# Patient Record
Sex: FEMALE | Race: WHITE | NOT HISPANIC OR LATINO | Employment: OTHER | URBAN - METROPOLITAN AREA
[De-identification: names, ages, dates, MRNs, and addresses within clinical notes are randomized per-mention and may not be internally consistent; named-entity substitution may affect disease eponyms.]

---

## 2017-01-17 DIAGNOSIS — I27.20 PULMONARY HYPERTENSION (H): Primary | ICD-10-CM

## 2017-01-17 DIAGNOSIS — I51.81 STRESS-INDUCED CARDIOMYOPATHY: ICD-10-CM

## 2017-01-19 RX ORDER — FUROSEMIDE 40 MG
TABLET ORAL
Qty: 90 TABLET | Refills: 3 | Status: SHIPPED
Start: 2017-01-19 | End: 2017-11-22

## 2017-02-23 ENCOUNTER — PRE VISIT (OUTPATIENT)
Dept: CARDIOLOGY | Facility: CLINIC | Age: 57
End: 2017-02-23

## 2017-02-23 DIAGNOSIS — R06.09 DYSPNEA ON EXERTION: Primary | ICD-10-CM

## 2017-02-27 NOTE — PROGRESS NOTES
Norm Franco M.D.  Cardiovascular Medicine    I personally saw and examined this patient, discussed care with housestaff and other consultants, reviewed current laboratories and imaging studies, and conveyed impression and diagnostic/therapeutic plan to patient.    Problem List  2. Pulmonary hypertension  3. Hyperlipidemia  4. Gallstones with CBD involvement  5. CKD  6. Iron deficiency  7. Depression  8. MGUS  9. Hyperprolactinemia    History    Patient returns for follow-up.  She is generally doing well, though states she high high emotional stress levels related to her children and caring for her grandchild.  She has no edema, pre-syncope, syncope, PND, orthopnea, increased abdominal girth, palpitations, hemoptysis.      The remainder of 13 system ROS is negative or unchanged  .    Objective  Constitutional: alert, oriented, normal gait and station, normal mentation.  Oral: moist mucous membranes  Lymph: without pathologic adenopathy  Chest: clear to ausculation and percussion  Cor: No evidence of left or right ventricular activity.  Rhythm is regular.  S1 normal, S2 split physiologically widely and loud. Soft murmur TR, JVP not increased and HJR not seen despite echo findings of severe TR  Abdomen: without tenderness, rebound, guarding, masses, ascites and liver not pulsatile  Extremities: Edema not present  Neuro: no focal defects, normal mentation  Skin: without open lesions  Psych: oriented, verbal, mental status in tact    Wt Readings from Last 5 Encounters:   10/18/16 61 kg (134 lb 8 oz)   10/14/16 62 kg (136 lb 9.6 oz)   08/25/16 63 kg (138 lb 14.4 oz)   07/14/16 62.4 kg (137 lb 9.6 oz)   05/24/16 64 kg (141 lb)       Meds    Current Outpatient Prescriptions on File Prior to Visit:  furosemide (LASIX) 40 MG tablet TAKE 1 TABLET BY MOUTH DAILY   carvedilol (COREG) 12.5 MG tablet Take 0.5 tablets (6.25 mg) by mouth 2 times daily (with meals)   denosumab (PROLIA) 60 MG/ML SOLN Inject 1 mL (60 mg)  Subcutaneous every 6 months   tadalafil, PAH, (ADCIRCA) 20 MG TABS Take 2 tablets (40 mg) by mouth daily   azaTHIOprine (IMURAN) 50 MG tablet Take 1 tablet (50 mg) by mouth daily   treprostinil diolamine ER (ORENITRAM) 0.125 MG CR tablet Take 1 tablet (0.125 mg) by mouth 3 times daily (with meals) 4.25mg tid   calcitRIOL (ROCALTROL) 0.25 MCG capsule Take 1 capsule (0.25 mcg) by mouth daily   potassium chloride (K-DUR) 10 MEQ tablet Take 1 tablet (10 mEq) by mouth daily   aspirin 81 MG tablet Take 81 mg by mouth daily   SODIUM BICARBONATE PO Take by mouth 2 times daily   Lactobacillus (ACIDOPHILUS PO) Take 1 tablet by mouth daily   Multiple Vitamins-Minerals (EYE VITAMINS PO) Take 1 tablet by mouth daily   ORDER FOR DME Equipment being ordered: Compression stockings; 2 pair; custom measured. Med compression 20-30 mmhg   TOPIRAMATE PO Take 100 mg by mouth At Bedtime   TEMAZEPAM PO Take by mouth At Bedtime   omeprazole (PRILOSEC) 20 MG capsule Take 1 capsule (20 mg) by mouth 2 times daily (before meals)   LORazepam (ATIVAN) 0.5 MG tablet Take 0.5 mg (1 tab) every morning and 1 mg (2 tabs) every night at bedtime. (Patient taking differently: 1 mg Take 0.5 mg (1 tab) every morning and 1 mg (2 tabs) every night at bedtime.)   buPROPion (WELLBUTRIN SR) 200 MG 12 hr tablet Take 1 tablet (200 mg) by mouth every morning   ORDER FOR DME Equipment being ordered: SHOWER CHAIR  FROM [facility]   acetaminophen (TYLENOL) 500 MG tablet Take 1-2 tablets (500-1,000 mg) by mouth every 6 hours as needed for pain (Take as needed for pain.  Do not exceed 4 grams (8 tablets) in a day)   denosumab (PROLIA) 60 MG/ML SOLN Inject 1 mL (60 mg) Subcutaneous every 6 months   Elastic Bandages & Supports (T.E.D. BELOW KNEE/M-REGULAR) MISC 1 each daily   simvastatin (ZOCOR) 10 MG tablet Take 1 tablet by mouth At Bedtime.     No current facility-administered medications on file prior to visit.       Labs:pending      Imaging   Name: DALIA  ANALI HERNANDEZ  MRN: [id number]  : 1960  Study Date: 10/14/2016 01:42 PM  Age: 56 yrs  Gender: Female  Patient Location: [facility]  Reason For Study: PHTN  History: PHTN  Ordering Physician: MINE CASTRO  Referring Physician: MINE CASTRO  Performed By: Sharmaine Teran DAPHNE     BSA: 1.7 m2  Height: 63 in  Weight: 138 lb  BP: 100/55 mmHg  ______________________________________________________________________________        Procedure  Echocardiogram with two-dimensional, color and spectral Doppler performed.  ______________________________________________________________________________     Interpretation Summary  Global and regional left ventricular function is normal with an EF of 60-65%.  Flattened septum is consistent with right ventricular pressure and volume  overload.  Mild to moderate right ventricular dilation is present.Global right  ventricular function is normal.  Estimated pulmonary artery systolic pressure is 88 mmHg plus right atrial  pressure.  The inferior vena cava is normal.  No pericardial effusion is present.  ______________________________________________________________________________           Left Ventricle  Left ventricular size is normal. Global and regional left ventricular  function is normal with an EF of 60-65%. Flattened septum is consistent with  right ventricular pressure and volume overload.     Right Ventricle  Global right ventricular function is normal. Mild to moderate right  ventricular dilation is present.  Atria  The left atrium appears normal. Mild to moderate right atrial enlargement is  present.     Mitral Valve  The mitral valve is normal.     Aortic Valve  Aortic valve is normal in structure and function.     Tricuspid Valve  The tricuspid valve is normal. Mild to moderate tricuspid insufficiency is  present. Estimated pulmonary artery systolic pressure is 88 mmHg plus right  atrial pressure.     Pulmonic Valve  The pulmonic valve is normal.     Vessels  The  aorta root is normal. The inferior vena cava is normal.  Pericardium  No pericardial effusion is present.     Compared to Previous Study  This study was compared with the study from 4..16, PASP is slightly lower .     ______________________________________________________________________________  MMode/2D Measurements & Calculations  IVSd: 0.66 cm  LVIDd: 3.9 cm  LVIDs: 2.0 cm  LVPWd: 0.93 cm  FS: 47.9 %  EDV(Teich): 63.9 ml  ESV(Teich): 12.8 ml  LV mass(C)d: 87.4 grams  Ao root diam: 2.6 cm  LA dimension: 4.5 cm  asc Aorta Diam: 3.0 cm  LA/Ao: 1.8  LVOT diam: 1.9 cm  LVOT area: 2.8 cm2  LA Volume (BP): 49.0 ml     LA Volume Index (BP): 29.7 ml/m2        Doppler Measurements & Calculations  MV E max dionne: 68.2 cm/sec  MV A max dionne: 47.5 cm/sec  MV E/A: 1.4  MV dec time: 0.17 sec  Ao V2 max: 104.2 cm/sec  Ao max P.3 mmHg  Ao V2 mean: 77.7 cm/sec  Ao mean P.7 mmHg  Ao V2 VTI: 22.6 cm  ZANDER(I,D): 2.5 cm2  ZANDER(V,D): 2.6 cm2  LV V1 max PG: 3.7 mmHg  LV V1 max: 96.0 cm/sec  LV V1 VTI: 19.8 cm  SV(LVOT): 55.8 ml  TR max dionne: 466.9 cm/sec  TR max P.2 mmHg  ZANDER Index (cm2/m2): 1.5  Lateral E/e': 5.8  Medial E/e': 15.2     MR CARDIAC W/O CONTRAST W FLOW QUANT   Date: 2016 12:57 PM  Ordering: RENETTA LOZA  Indication: 56 year old female with sacroid, pulmonary hypertension,  CKD and RV dysfunction.  Comparison: 2015  Technical quality: good         IMPRESSION:  1. Normal left ventricular size and systolic function with a  calculated ejection fraction of 56 %. There is systolic septal  flattening consistent with pulmonary hypertension.  2. Normal right ventricular size and mildly reduced systolic function  with a calculated ejection fraction of 45%. There is moderate right  ventricular hypertrophy. RV visually appears mildly enlarged compared  to LV size, though the RV volumes are normal.  3. The pulmonary artery is mildly dilated.  4. Compared to prior study from May-, paradoxical septum  appears  more pronounced. RV function is unchanged to mildly reduced now.     The MRI sequences and imaging planes in this study were tailored for  cardiac imaging and are suboptimal for evaluation of non-cardiac  structures.     FINDINGS     Left ventricle  Cavity size: Normal  Wall thickness: Normal  Global systolic function: Normal with a quantitative LV ejection  fraction of 56%.   Regional wall motion: Normal     Right ventricle  Cavity size: Normal  Wall thickness: Hypertrophied  Global systolic function: Mildly depressed with a quantitative RV  ejection fraction of 45%.   Regional wall motion: Normal     Atria  LA size: Normal  RA size: Normal     Valves  Aortic Valve  Valve morphology: Tricuspid  Aortic stenosis: None  Aortic regurgitation: None     Mitral Valve  Mitral stenosis: None  Mitral regurgitation: Trace     Tricuspid Valve   Tricuspid stenosis: None  Tricuspid regurgitation: Mild     Pulmonic Valve  Pulmonic stenosis: None  Pulmonic regurgitation: None     Extracardiac  Aortic root dimension: 31 mm (at the sinuses of Valsalva)  Main PA dimension: 38 mm  Pericardial thickness: Normal  Pericardial effusion:None  Pleural effusion: None     Measurements  LEFT VENTRICULAR VOLUME RESULTS  Body Surface Area: 1.77 m2   ED volume: 100.25 ml (96 - 174 ml)       ED volume index: 56.63 ml/m2 (56 - 100 ml/m2)       ES volume: 44.25 ml (27 - 71 ml)       ES volume index: 25.00 ml/m2       Stroke volume: 55.99 ml (61 - 111 ml)       Stroke volume index: 31.63 ml/m2       Cardiac output: 3.44 l/min       Cardiac output index: 1.94 l/(m2*min)       Ejection fraction: 55.86 % (54 - 74 %)          LV wall thickness - Anteroseptal: 0.6 cm  LV wall thickness - Inferolateral: 0.7 cm  LV wall thickness - Maximum: 0.7 cm  LV end-diastolic diameter: 4.3 cm  LV end-systolic diameter: 2.6 cm  LA ehsan-posterior diameter: 3.7 cm     RIGHT VENTRICULAR VOLUME RESULTS  ED volume: 146.97 ml (83 - 178 ml)       ED volume index:  83.02 ml/m2 (47 - 103 ml/m2)       ES volume: 81.44 ml (32 - 73 ml)       ES volume index: 46.00 ml/m2       Stroke volume: 65.53 ml (44 - 113 ml)       Stroke volume index: 37.02 ml/m2       Cardiac output: 4.03 l/min       Cardiac output index: 2.28 l/(m2*min)       Ejection fraction: 44.59 % (49 - 70 %)      Velocity-encoded flow assessment of the pulmonary artery and aorta     Velocity encoded flow maps of the aorta  Forward flow volume: 56 mL  Regurgitant volume: 0 mL  Regurgitant fraction: 0 %     Velocity encoded flow maps of the pulmonary artery  Forward flow volume: 58 mL  Regurgitant volume: 0 mL  Regurgitant fraction: 0 %     Sedation and contrast  Sedation used: No     Pulse sequences used  SSFP Localizers  SSFP Cine  T1 mapping  Velocity encoded flow mapping  Image post-processing was performed on a [employer] workstation    Findings:   Chest: Cardiac size is within normal limits. No pericardial effusion.  Common origin of the left carotid artery and brachiocephalic (bovine  arch). The main pulmonary artery measures 3.8 cm in diameter. The  tracheobronchial tree is clear. The thyroid is unremarkable. No  significant mediastinal, hilar or axillary adenopathy. The lungs are  clear. No pleural effusion or pneumothorax. Punctate endobronchial  filling defect in the right upper lobe (series 4, image 45). There is  a new punctate endobronchial filling defect in the posterior right  upper lobe (series 4, image 123). There is a persistent endobronchial  filling defect in the right middle lobe (series 4, image 181) with a  new filling defect anteriorly.     Upper Abdomen: Evaluation of the upper abdomen is limited by lack of  intravenous contrast. Prominent collecting system in the partially  visualized left kidney, not significantly changed since 3/8/2013.  Postsurgical changes of cholecystectomy.     Bones/Soft tissues: The soft tissues are within normal limits. No  suspicious osseous lesions. Superior endplate  compression deformity of  L1 is new since 9/13/2013. It may have been present on 4/21/2014,  although L1 is incompletely visualized on that study.      IMPRESSION  Impression:   1. Dilated main pulmonary artery suggestive of pulmonary artery  hypertension.  2. Scattered endobronchial filling defects, which likely represent  secretions, in the patient without history of smoking.   3. Superior endplate compression fracture of L1.   4. Continued prominence of the left renal collecting system     Assessment/Plan  1. Laboratories: CBC,CMP, CRP, BNP  2. Right heart catherization  3. Consider MRI   4. RTC immediately following RHC    Our preliminary review of the echocardiogram suggests now severe TR suggestive of failing right ventricle.  The patient is now at target dose of oral prostanoid.  Previous RHC 5 months ago demonstrated improving though still high PA pressures and PVR.  The patient may not be an ideal candidate for parenteral prostanoids in view of emotional stress.      60 minutes of which > 50% involved counseling and coordination of care.

## 2017-02-28 ENCOUNTER — RADIANT APPOINTMENT (OUTPATIENT)
Dept: CARDIOLOGY | Facility: CLINIC | Age: 57
End: 2017-02-28

## 2017-02-28 ENCOUNTER — OFFICE VISIT (OUTPATIENT)
Dept: CARDIOLOGY | Facility: CLINIC | Age: 57
End: 2017-02-28
Attending: INTERNAL MEDICINE
Payer: MEDICARE

## 2017-02-28 VITALS
HEART RATE: 78 BPM | DIASTOLIC BLOOD PRESSURE: 73 MMHG | SYSTOLIC BLOOD PRESSURE: 109 MMHG | OXYGEN SATURATION: 96 % | HEIGHT: 63 IN | WEIGHT: 122.8 LBS | BODY MASS INDEX: 21.76 KG/M2

## 2017-02-28 DIAGNOSIS — R06.09 DYSPNEA ON EXERTION: ICD-10-CM

## 2017-02-28 DIAGNOSIS — I27.20 PULMONARY HYPERTENSION (H): ICD-10-CM

## 2017-02-28 DIAGNOSIS — I27.20 PULMONARY HYPERTENSION (H): Primary | ICD-10-CM

## 2017-02-28 LAB
ANION GAP SERPL CALCULATED.3IONS-SCNC: 9 MMOL/L (ref 3–14)
BUN SERPL-MCNC: 21 MG/DL (ref 7–30)
CALCIUM SERPL-MCNC: 8.7 MG/DL (ref 8.5–10.1)
CHLORIDE SERPL-SCNC: 111 MMOL/L (ref 94–109)
CO2 SERPL-SCNC: 24 MMOL/L (ref 20–32)
CREAT SERPL-MCNC: 1.51 MG/DL (ref 0.52–1.04)
ERYTHROCYTE [DISTWIDTH] IN BLOOD BY AUTOMATED COUNT: 13.3 % (ref 10–15)
GFR SERPL CREATININE-BSD FRML MDRD: 36 ML/MIN/1.7M2
GLUCOSE SERPL-MCNC: 89 MG/DL (ref 70–99)
HCT VFR BLD AUTO: 42.4 % (ref 35–47)
HGB BLD-MCNC: 14.2 G/DL (ref 11.7–15.7)
MCH RBC QN AUTO: 31.1 PG (ref 26.5–33)
MCHC RBC AUTO-ENTMCNC: 33.5 G/DL (ref 31.5–36.5)
MCV RBC AUTO: 93 FL (ref 78–100)
NT-PROBNP SERPL-MCNC: 790 PG/ML (ref 0–125)
PLATELET # BLD AUTO: 228 10E9/L (ref 150–450)
POTASSIUM SERPL-SCNC: 3.6 MMOL/L (ref 3.4–5.3)
RBC # BLD AUTO: 4.56 10E12/L (ref 3.8–5.2)
SODIUM SERPL-SCNC: 144 MMOL/L (ref 133–144)
WBC # BLD AUTO: 6.1 10E9/L (ref 4–11)

## 2017-02-28 PROCEDURE — 36415 COLL VENOUS BLD VENIPUNCTURE: CPT | Performed by: INTERNAL MEDICINE

## 2017-02-28 PROCEDURE — 99215 OFFICE O/P EST HI 40 MIN: CPT | Mod: ZP | Performed by: INTERNAL MEDICINE

## 2017-02-28 PROCEDURE — 83880 ASSAY OF NATRIURETIC PEPTIDE: CPT | Performed by: INTERNAL MEDICINE

## 2017-02-28 PROCEDURE — 80048 BASIC METABOLIC PNL TOTAL CA: CPT | Performed by: INTERNAL MEDICINE

## 2017-02-28 PROCEDURE — 99212 OFFICE O/P EST SF 10 MIN: CPT | Mod: ZF

## 2017-02-28 PROCEDURE — 85027 COMPLETE CBC AUTOMATED: CPT | Performed by: INTERNAL MEDICINE

## 2017-02-28 RX ORDER — LIDOCAINE 40 MG/G
CREAM TOPICAL
Status: CANCELLED | OUTPATIENT
Start: 2017-02-28

## 2017-02-28 ASSESSMENT — PAIN SCALES - GENERAL: PAINLEVEL: NO PAIN (0)

## 2017-02-28 NOTE — NURSING NOTE
Med Reconcile: Reviewed and verified all current medications with the patient. The updated medication list was printed and given to the patient.  Return Appointment: Patient given instructions regarding scheduling next clinic visit. Patient demonstrated understanding of this information and agreed to call with further questions or concerns.  Right Heart Catheterization: Patient was instructed regarding right heart catheterization, including discussion of the procedure, preparation, intra-procedural steps, and recovery at home. Patient demonstrated understanding of this information and agreed to call with further questions or concerns.  Patient stated she understood all health information given and agreed to call with further questions or concerns.     Medication Changes:  No medication changes at this time. Please continue current medication regiment.    Patient Instructions:    Check-In  Time Check-In Location Estimated Length Procedure   Name        [facility]  waiting room 60-90 minutes Right Heart Catheterization**     Procedure Preparations & Instructions     This is an invasive procedure that DOES require preparation:    - Nothing to eat for 6 hours   - You may have clear liquids up until the time of your procedure  - A ride should be arranged for you in the instance you are unable to drive home, however you should be able to function as you normally would after the procedure     *For Patients with Diabetes: ? DO NOT take any oral diabetic medication, short-acting diabetes medications/insulin, humalog or regular insulin the morning of your test  ? Take   dose of long-acting insulin (Lantus, Levemir) the day of your test  ? Remember to  bring your glucometer and insulin with you to take after your test if needed     *For Patients on anticoagulants: ? Your Coumadin Clinic will give you instructions on medication adjustments or bridging prior to the procedure        Follow up Appointment Information:  After Right heart  catheterization

## 2017-02-28 NOTE — NURSING NOTE
Chief Complaint   Patient presents with     Follow Up For     Return PH for 4 month follow up with labs, ECHO prior.

## 2017-02-28 NOTE — MR AVS SNAPSHOT
After Visit Summary   2/28/2017    Judith Nelson    MRN: [id number]           Patient Information     Date Of Birth          1960        Visit Information        Provider Department      2/28/2017 4:30 PM Norm Franco MD [facility]        Today's Diagnoses     Pulmonary hypertension (H)    -  1      Care Instructions    Medication Changes:  No medication changes at this time. Please continue current medication regiment.    Patient Instructions:    Check-In  Time Check-In Location Estimated Length Procedure   Name        [facility]  waiting room 60-90 minutes Right Heart Catheterization**     Procedure Preparations & Instructions     This is an invasive procedure that DOES require preparation:    - Nothing to eat for 6 hours   - You may have clear liquids up until the time of your procedure  - A ride should be arranged for you in the instance you are unable to drive home, however you should be able to function as you normally would after the procedure     *For Patients with Diabetes: ? DO NOT take any oral diabetic medication, short-acting diabetes medications/insulin, humalog or regular insulin the morning of your test  ? Take   dose of long-acting insulin (Lantus, Levemir) the day of your test  ? Remember to  bring your glucometer and insulin with you to take after your test if needed     *For Patients on anticoagulants: ? Your Coumadin Clinic will give you instructions on medication adjustments or bridging prior to the procedure        Follow up Appointment Information:  After Right heart catheterization    We are located on the third floor of [facility] on the [facility].  Our address is     [address] [place] [address] on 3rd Floor   [place] [id number]      Thank you for allowing us to be a part of your care here at the [facility]    If you have questions or concerns please contact us  at:    Lynne Tamayo, RN      Nurse Coordinator       Pulmonary Hypertension     [facility]   [phone]                ** Please note that you will NOT receive a reminder call regarding your scheduled testing, reminder calls are for provider appointments only.  If you are scheduled for testing within the [facility] system you may receive a call regarding pre-registration for billing purposes only.**     Remember to weigh yourself daily after voiding and before you consume any food or beverages and log the numbers.  If you have gained/lost 2 pounds overnight or 5 pounds in a week contact us immediately for medication adjustments or further instructions.        Follow-ups after your visit        Follow-up notes from your care team     Return in about 6 weeks (around 4/11/2017) for After RHC , with Joan, Return PH.      Your next 10 appointments already scheduled     May 11, 2017 10:00 AM CDT   PFT VISIT with  PFL B   [facility] Pulmonary Function Testing ([facility])    [address] [place] [id number] [phone]            May 11, 2017 10:30 AM CDT   (Arrive by 10:15 AM)   Return Interstitial Lung with Gael Flaherty MD   [facility] ([facility])    [address] [place] [id number] [phone]              Future tests that were ordered for you today     Open Future Orders        Priority Expected Expires Ordered    Heart Cath Right heart cath Routine  2/28/2018 2/28/2017            Who to contact     If you have questions or need follow up information about today's clinic visit or your schedule please contact [facility] directly at [phone].  Normal or non-critical lab and imaging results will be communicated to you by MyChart, letter or phone within 4 business days after the clinic has received the results. If you do not hear from  "us within 7 days, please contact the clinic through [employer] or phone. If you have a critical or abnormal lab result, we will notify you by phone as soon as possible.  Submit refill requests through [employer] or call your pharmacy and they will forward the refill request to us. Please allow 3 business days for your refill to be completed.          Additional Information About Your Visit        [employer] Information     [employer] gives you secure access to your electronic health record. If you see a primary care provider, you can also send messages to your care team and make appointments. If you have questions, please call your primary care clinic.  If you do not have a primary care provider, please call [phone] and they will assist you.        Care EveryWhere ID     This is your Care EveryWhere ID. This could be used by other organizations to access your [place] medical records  [id number]        Your Vitals Were     Pulse Height Pulse Oximetry BMI (Body Mass Index)          78 1.6 m (5' 3\") 96% 21.75 kg/m2         Blood Pressure from Last 3 Encounters:   02/28/17 109/73   10/18/16 108/58   10/14/16 121/76    Weight from Last 3 Encounters:   02/28/17 55.7 kg (122 lb 12.8 oz)   10/18/16 61 kg (134 lb 8 oz)   10/14/16 62 kg (136 lb 9.6 oz)                 Today's Medication Changes          These changes are accurate as of: 2/28/17  5:45 PM.  If you have any questions, ask your nurse or doctor.               These medicines have changed or have updated prescriptions.        Dose/Directions    LORazepam 0.5 MG tablet   Commonly known as:  ATIVAN   This may have changed:    - how much to take  - additional instructions   Used for:  Major depressive disorder, single episode, severe with psychotic features (H)        Take 0.5 mg (1 tab) every morning and 1 mg (2 tabs) every night at bedtime.   Quantity:  90 tablet   Refills:  1       treprostinil diolamine ER 0.125 MG CR tablet   Commonly known as:  ORENITRAM   This may " have changed:    - how much to take  - when to take this  - additional instructions   Used for:  Pulmonary arterial hypertension (H)        Dose:  0.125 mg   Take 1 tablet (0.125 mg) by mouth 3 times daily (with meals) 4.25mg tid   Quantity:  90 tablet   Refills:  0                Primary Care Provider Office Phone # Fax #    Jeanne Angel [phone]       [address]  [facility] [address]        Thank you!     Thank you for choosing [facility]  for your care. Our goal is always to provide you with excellent care. Hearing back from our patients is one way we can continue to improve our services. Please take a few minutes to complete the written survey that you may receive in the mail after your visit with us. Thank you!             Your Updated Medication List - Protect others around you: Learn how to safely use, store and throw away your medicines at www.disposemymeds.org.          This list is accurate as of: 2/28/17  5:45 PM.  Always use your most recent med list.                   Brand Name Dispense Instructions for use    acetaminophen 500 MG tablet    TYLENOL    45 tablet    Take 1-2 tablets (500-1,000 mg) by mouth every 6 hours as needed for pain (Take as needed for pain.  Do not exceed 4 grams (8 tablets) in a day)       ACIDOPHILUS PO      Take 1 tablet by mouth daily       aspirin 81 MG tablet      Take 81 mg by mouth daily       azaTHIOprine 50 MG tablet    IMURAN    90 tablet    Take 1 tablet (50 mg) by mouth daily       buPROPion 200 MG 12 hr tablet    WELLBUTRIN SR    30 tablet    Take 1 tablet (200 mg) by mouth every morning       calcitRIOL 0.25 MCG capsule    ROCALTROL    30 capsule    Take 1 capsule (0.25 mcg) by mouth daily       carvedilol 12.5 MG tablet    COREG    135 tablet    Take 0.5 tablets (6.25 mg) by mouth 2 times daily (with meals)       * denosumab 60 MG/ML Soln injection    PROLIA    1 mL    Inject 1 mL (60 mg) Subcutaneous  every 6 months       * denosumab 60 MG/ML Soln injection    PROLIA    1 mL    Inject 1 mL (60 mg) Subcutaneous every 6 months       EYE VITAMINS PO      Take 1 tablet by mouth daily       furosemide 40 MG tablet    LASIX    90 tablet    TAKE 1 TABLET BY MOUTH DAILY       LORazepam 0.5 MG tablet    ATIVAN    90 tablet    Take 0.5 mg (1 tab) every morning and 1 mg (2 tabs) every night at bedtime.       omeprazole 20 MG CR capsule    priLOSEC    180 capsule    Take 1 capsule (20 mg) by mouth 2 times daily (before meals)       order for DME     1 Device    Equipment being ordered: SHOWER CHAIR  FROM [facility]       order for DME     2 Package    Equipment being ordered: Compression stockings; 2 pair; custom measured. Med compression 20-30 mmhg       potassium chloride 10 MEQ tablet    K-TAB,KLOR-CON    90 tablet    Take 1 tablet (10 mEq) by mouth daily       simvastatin 10 MG tablet    ZOCOR    90 tablet    Take 1 tablet by mouth At Bedtime.       SODIUM BICARBONATE PO      Take by mouth 2 times daily       T.E.D. BELOW KNEE/M-REGULAR Misc     2 each    1 each daily       tadalafil (PAH) 20 MG Tabs    ADCIRCA    60 tablet    Take 2 tablets (40 mg) by mouth daily       TEMAZEPAM PO      Take by mouth At Bedtime       TOPIRAMATE PO      Take 100 mg by mouth At Bedtime       treprostinil diolamine ER 0.125 MG CR tablet    ORENITRAM    90 tablet    Take 1 tablet (0.125 mg) by mouth 3 times daily (with meals) 4.25mg tid       * Notice:  This list has 2 medication(s) that are the same as other medications prescribed for you. Read the directions carefully, and ask your doctor or other care provider to review them with you.

## 2017-02-28 NOTE — LETTER
2/28/2017      RE: Judith Nelson  [address] [place] [address]       Dear Colleague,    Thank you for the opportunity to participate in the care of your patient, Judith Nelson, at the [facility]. Please see a copy of my visit note below.    Norm Franco M.D.  Cardiovascular Medicine    I personally saw and examined this patient, discussed care with housestaff and other consultants, reviewed current laboratories and imaging studies, and conveyed impression and diagnostic/therapeutic plan to patient.    Problem List  2. Pulmonary hypertension  3. Hyperlipidemia  4. Gallstones with CBD involvement  5. CKD  6. Iron deficiency  7. Depression  8. MGUS  9. Hyperprolactinemia    History    Patient returns for follow-up.  She is generally doing well, though states she high high emotional stress levels related to her children and caring for her grandchild.  She has no edema, pre-syncope, syncope, PND, orthopnea, increased abdominal girth, palpitations, hemoptysis.      The remainder of 13 system ROS is negative or unchanged  .    Objective  Constitutional: alert, oriented, normal gait and station, normal mentation.  Oral: moist mucous membranes  Lymph: without pathologic adenopathy  Chest: clear to ausculation and percussion  Cor: No evidence of left or right ventricular activity.  Rhythm is regular.  S1 normal, S2 split physiologically widely and loud. Soft murmur TR, JVP not increased and HJR not seen despite echo findings of severe TR  Abdomen: without tenderness, rebound, guarding, masses, ascites and liver not pulsatile  Extremities: Edema not present  Neuro: no focal defects, normal mentation  Skin: without open lesions  Psych: oriented, verbal, mental status in tact    Wt Readings from Last 5 Encounters:   10/18/16 61 kg (134 lb 8 oz)   10/14/16 62 kg (136 lb 9.6 oz)   08/25/16 63 kg (138 lb 14.4 oz)   07/14/16 62.4 kg (137 lb 9.6 oz)    05/24/16 64 kg (141 lb)       Meds    Current Outpatient Prescriptions on File Prior to Visit:  furosemide (LASIX) 40 MG tablet TAKE 1 TABLET BY MOUTH DAILY   carvedilol (COREG) 12.5 MG tablet Take 0.5 tablets (6.25 mg) by mouth 2 times daily (with meals)   denosumab (PROLIA) 60 MG/ML SOLN Inject 1 mL (60 mg) Subcutaneous every 6 months   tadalafil, PAH, (ADCIRCA) 20 MG TABS Take 2 tablets (40 mg) by mouth daily   azaTHIOprine (IMURAN) 50 MG tablet Take 1 tablet (50 mg) by mouth daily   treprostinil diolamine ER (ORENITRAM) 0.125 MG CR tablet Take 1 tablet (0.125 mg) by mouth 3 times daily (with meals) 4.25mg tid   calcitRIOL (ROCALTROL) 0.25 MCG capsule Take 1 capsule (0.25 mcg) by mouth daily   potassium chloride (K-DUR) 10 MEQ tablet Take 1 tablet (10 mEq) by mouth daily   aspirin 81 MG tablet Take 81 mg by mouth daily   SODIUM BICARBONATE PO Take by mouth 2 times daily   Lactobacillus (ACIDOPHILUS PO) Take 1 tablet by mouth daily   Multiple Vitamins-Minerals (EYE VITAMINS PO) Take 1 tablet by mouth daily   ORDER FOR DME Equipment being ordered: Compression stockings; 2 pair; custom measured. Med compression 20-30 mmhg   TOPIRAMATE PO Take 100 mg by mouth At Bedtime   TEMAZEPAM PO Take by mouth At Bedtime   omeprazole (PRILOSEC) 20 MG capsule Take 1 capsule (20 mg) by mouth 2 times daily (before meals)   LORazepam (ATIVAN) 0.5 MG tablet Take 0.5 mg (1 tab) every morning and 1 mg (2 tabs) every night at bedtime. (Patient taking differently: 1 mg Take 0.5 mg (1 tab) every morning and 1 mg (2 tabs) every night at bedtime.)   buPROPion (WELLBUTRIN SR) 200 MG 12 hr tablet Take 1 tablet (200 mg) by mouth every morning   ORDER FOR DME Equipment being ordered: SHOWER CHAIR  FROM [facility]   acetaminophen (TYLENOL) 500 MG tablet Take 1-2 tablets (500-1,000 mg) by mouth every 6 hours as needed for pain (Take as needed for pain.  Do not exceed 4 grams (8 tablets) in a day)   denosumab (PROLIA) 60 MG/ML SOLN Inject 1  mL (60 mg) Subcutaneous every 6 months   Elastic Bandages & Supports (T.E.D. BELOW KNEE/M-REGULAR) MISC 1 each daily   simvastatin (ZOCOR) 10 MG tablet Take 1 tablet by mouth At Bedtime.     No current facility-administered medications on file prior to visit.       Labs:pending      Imaging   Name: ANALI GÓMEZ  MRN: [id number]  : 1960  Study Date: 10/14/2016 01:42 PM  Age: 56 yrs  Gender: Female  Patient [facility]: [facility]  Reason For Study: PHTN  History: PHTN  Ordering Physician: MINE CASTRO  Referring Physician: MINE CASTRO  Performed By: Sharmaine Teran RDCS     BSA: 1.7 m2  Height: 63 in  Weight: 138 lb  BP: 100/55 mmHg  ______________________________________________________________________________        Procedure  Echocardiogram with two-dimensional, color and spectral Doppler performed.  ______________________________________________________________________________     Interpretation Summary  Global and regional left ventricular function is normal with an EF of 60-65%.  Flattened septum is consistent with right ventricular pressure and volume  overload.  Mild to moderate right ventricular dilation is present.Global right  ventricular function is normal.  Estimated pulmonary artery systolic pressure is 88 mmHg plus right atrial  pressure.  The inferior vena cava is normal.  No pericardial effusion is present.  ______________________________________________________________________________           Left Ventricle  Left ventricular size is normal. Global and regional left ventricular  function is normal with an EF of 60-65%. Flattened septum is consistent with  right ventricular pressure and volume overload.     Right Ventricle  Global right ventricular function is normal. Mild to moderate right  ventricular dilation is present.  Atria  The left atrium appears normal. Mild to moderate right atrial enlargement is  present.     Mitral Valve  The mitral valve is normal.     Aortic  Valve  Aortic valve is normal in structure and function.     Tricuspid Valve  The tricuspid valve is normal. Mild to moderate tricuspid insufficiency is  present. Estimated pulmonary artery systolic pressure is 88 mmHg plus right  atrial pressure.     Pulmonic Valve  The pulmonic valve is normal.     Vessels  The aorta root is normal. The inferior vena cava is normal.  Pericardium  No pericardial effusion is present.     Compared to Previous Study  This study was compared with the study from 16, PASP is slightly lower .     ______________________________________________________________________________  MMode/2D Measurements & Calculations  IVSd: 0.66 cm  LVIDd: 3.9 cm  LVIDs: 2.0 cm  LVPWd: 0.93 cm  FS: 47.9 %  EDV(Teich): 63.9 ml  ESV(Teich): 12.8 ml  LV mass(C)d: 87.4 grams  Ao root diam: 2.6 cm  LA dimension: 4.5 cm  asc Aorta Diam: 3.0 cm  LA/Ao: 1.8  LVOT diam: 1.9 cm  LVOT area: 2.8 cm2  LA Volume (BP): 49.0 ml     LA Volume Index (BP): 29.7 ml/m2        Doppler Measurements & Calculations  MV E max dionne: 68.2 cm/sec  MV A max dionne: 47.5 cm/sec  MV E/A: 1.4  MV dec time: 0.17 sec  Ao V2 max: 104.2 cm/sec  Ao max P.3 mmHg  Ao V2 mean: 77.7 cm/sec  Ao mean P.7 mmHg  Ao V2 VTI: 22.6 cm  ZANDER(I,D): 2.5 cm2  ZANDER(V,D): 2.6 cm2  LV V1 max PG: 3.7 mmHg  LV V1 max: 96.0 cm/sec  LV V1 VTI: 19.8 cm  SV(LVOT): 55.8 ml  TR max dionne: 466.9 cm/sec  TR max P.2 mmHg  ZANDER Index (cm2/m2): 1.5  Lateral E/e': 5.8  Medial E/e': 15.2     MR CARDIAC W/O CONTRAST W FLOW QUANT   Date: 2016 12:57 PM  Ordering: RENETTA LOZA  Indication: 56 year old female with sacroid, pulmonary hypertension,  CKD and RV dysfunction.  Comparison: 2015  Technical quality: good         IMPRESSION:  1. Normal left ventricular size and systolic function with a  calculated ejection fraction of 56 %. There is systolic septal  flattening consistent with pulmonary hypertension.  2. Normal right ventricular size and mildly  reduced systolic function  with a calculated ejection fraction of 45%. There is moderate right  ventricular hypertrophy. RV visually appears mildly enlarged compared  to LV size, though the RV volumes are normal.  3. The pulmonary artery is mildly dilated.  4. Compared to prior study from May-2015, paradoxical septum appears  more pronounced. RV function is unchanged to mildly reduced now.     The MRI sequences and imaging planes in this study were tailored for  cardiac imaging and are suboptimal for evaluation of non-cardiac  structures.     FINDINGS     Left ventricle  Cavity size: Normal  Wall thickness: Normal  Global systolic function: Normal with a quantitative LV ejection  fraction of 56%.   Regional wall motion: Normal     Right ventricle  Cavity size: Normal  Wall thickness: Hypertrophied  Global systolic function: Mildly depressed with a quantitative RV  ejection fraction of 45%.   Regional wall motion: Normal     Atria  LA size: Normal  RA size: Normal     Valves  Aortic Valve  Valve morphology: Tricuspid  Aortic stenosis: None  Aortic regurgitation: None     Mitral Valve  Mitral stenosis: None  Mitral regurgitation: Trace     Tricuspid Valve   Tricuspid stenosis: None  Tricuspid regurgitation: Mild     Pulmonic Valve  Pulmonic stenosis: None  Pulmonic regurgitation: None     Extracardiac  Aortic root dimension: 31 mm (at the sinuses of Valsalva)  Main PA dimension: 38 mm  Pericardial thickness: Normal  Pericardial effusion:None  Pleural effusion: None     Measurements  LEFT VENTRICULAR VOLUME RESULTS  Body Surface Area: 1.77 m2   ED volume: 100.25 ml (96 - 174 ml)       ED volume index: 56.63 ml/m2 (56 - 100 ml/m2)       ES volume: 44.25 ml (27 - 71 ml)       ES volume index: 25.00 ml/m2       Stroke volume: 55.99 ml (61 - 111 ml)       Stroke volume index: 31.63 ml/m2       Cardiac output: 3.44 l/min       Cardiac output index: 1.94 l/(m2*min)       Ejection fraction: 55.86 % (54 - 74 %)          LV  wall thickness - Anteroseptal: 0.6 cm  LV wall thickness - Inferolateral: 0.7 cm  LV wall thickness - Maximum: 0.7 cm  LV end-diastolic diameter: 4.3 cm  LV end-systolic diameter: 2.6 cm  LA ehsan-posterior diameter: 3.7 cm     RIGHT VENTRICULAR VOLUME RESULTS  ED volume: 146.97 ml (83 - 178 ml)       ED volume index: 83.02 ml/m2 (47 - 103 ml/m2)       ES volume: 81.44 ml (32 - 73 ml)       ES volume index: 46.00 ml/m2       Stroke volume: 65.53 ml (44 - 113 ml)       Stroke volume index: 37.02 ml/m2       Cardiac output: 4.03 l/min       Cardiac output index: 2.28 l/(m2*min)       Ejection fraction: 44.59 % (49 - 70 %)      Velocity-encoded flow assessment of the pulmonary artery and aorta     Velocity encoded flow maps of the aorta  Forward flow volume: 56 mL  Regurgitant volume: 0 mL  Regurgitant fraction: 0 %     Velocity encoded flow maps of the pulmonary artery  Forward flow volume: 58 mL  Regurgitant volume: 0 mL  Regurgitant fraction: 0 %     Sedation and contrast  Sedation used: No     Pulse sequences used  SSFP Localizers  SSFP Cine  T1 mapping  Velocity encoded flow mapping  Image post-processing was performed on a [employer] workstation    Findings:   Chest: Cardiac size is within normal limits. No pericardial effusion.  Common origin of the left carotid artery and brachiocephalic (bovine  arch). The main pulmonary artery measures 3.8 cm in diameter. The  tracheobronchial tree is clear. The thyroid is unremarkable. No  significant mediastinal, hilar or axillary adenopathy. The lungs are  clear. No pleural effusion or pneumothorax. Punctate endobronchial  filling defect in the right upper lobe (series 4, image 45). There is  a new punctate endobronchial filling defect in the posterior right  upper lobe (series 4, image 123). There is a persistent endobronchial  filling defect in the right middle lobe (series 4, image 181) with a  new filling defect anteriorly.     Upper Abdomen: Evaluation of the upper  abdomen is limited by lack of  intravenous contrast. Prominent collecting system in the partially  visualized left kidney, not significantly changed since 3/8/2013.  Postsurgical changes of cholecystectomy.     Bones/Soft tissues: The soft tissues are within normal limits. No  suspicious osseous lesions. Superior endplate compression deformity of  L1 is new since 9/13/2013. It may have been present on 4/21/2014,  although L1 is incompletely visualized on that study.      IMPRESSION  Impression:   1. Dilated main pulmonary artery suggestive of pulmonary artery  hypertension.  2. Scattered endobronchial filling defects, which likely represent  secretions, in the patient without history of smoking.   3. Superior endplate compression fracture of L1.   4. Continued prominence of the left renal collecting system     Assessment/Plan  1. Laboratories: CBC,CMP, CRP, BNP  2. Right heart catherization  3. Consider MRI   4. RTC immediately following RHC    Our preliminary review of the echocardiogram suggests now severe TR suggestive of failing right ventricle.  The patient is now at target dose of oral prostanoid.  Previous RHC 5 months ago demonstrated improving though still high PA pressures and PVR.  The patient may not be an ideal candidate for parenteral prostanoids in view of emotional stress.      60 minutes of which > 50% involved counseling and coordination of care.      Please do not hesitate to contact me if you have any questions/concerns.     Sincerely,     Norm Franco MD

## 2017-02-28 NOTE — PATIENT INSTRUCTIONS
Medication Changes:  No medication changes at this time. Please continue current medication regiment.    Patient Instructions:    Check-In  Time Check-In Location Estimated Length Procedure   Name        GOLD  waiting room 60-90 minutes Right Heart Catheterization**     Procedure Preparations & Instructions     This is an invasive procedure that DOES require preparation:    - Nothing to eat for 6 hours   - You may have clear liquids up until the time of your procedure  - A ride should be arranged for you in the instance you are unable to drive home, however you should be able to function as you normally would after the procedure     *For Patients with Diabetes: ? DO NOT take any oral diabetic medication, short-acting diabetes medications/insulin, humalog or regular insulin the morning of your test  ? Take   dose of long-acting insulin (Lantus, Levemir) the day of your test  ? Remember to  bring your glucometer and insulin with you to take after your test if needed     *For Patients on anticoagulants: ? Your Coumadin Clinic will give you instructions on medication adjustments or bridging prior to the procedure        Follow up Appointment Information:  After Right heart catheterization    We are located on the third floor of the [facility] ([facility] on the [facility].  Our address is     [address] [place] [address] on [address]rd [place] [address]      Thank you for allowing us to be a part of your care here at the [facility]    If you have questions or concerns please contact us at:    Lynne Tamayo RN      Nurse Coordinator       Pulmonary Hypertension     [facility]   P)[phone]                ** Please note that you will NOT receive a reminder call regarding your scheduled testing, reminder calls are for provider appointments only.  If you are scheduled for testing within the [place] system you may receive a call  regarding pre-registration for billing purposes only.**     Remember to weigh yourself daily after voiding and before you consume any food or beverages and log the numbers.  If you have gained/lost 2 pounds overnight or 5 pounds in a week contact us immediately for medication adjustments or further instructions.

## 2017-03-06 ENCOUNTER — TELEPHONE (OUTPATIENT)
Dept: ENDOCRINOLOGY | Facility: CLINIC | Age: 57
End: 2017-03-06

## 2017-03-06 PROBLEM — Z92.29 PERSONAL HISTORY OF DRUG THERAPY: Status: ACTIVE | Noted: 2017-03-06

## 2017-03-15 DIAGNOSIS — I50.9 HEART FAILURE (H): ICD-10-CM

## 2017-03-15 RX ORDER — POTASSIUM CHLORIDE 750 MG/1
10 TABLET, EXTENDED RELEASE ORAL DAILY
Qty: 90 TABLET | Refills: 1 | Status: SHIPPED | OUTPATIENT
Start: 2017-03-15 | End: 2017-09-08

## 2017-04-06 ENCOUNTER — TRANSFERRED RECORDS (OUTPATIENT)
Dept: HEALTH INFORMATION MANAGEMENT | Facility: CLINIC | Age: 57
End: 2017-04-06

## 2017-04-06 ENCOUNTER — RECORDS - HEALTHEAST (OUTPATIENT)
Dept: LAB | Facility: CLINIC | Age: 57
End: 2017-04-06

## 2017-04-06 LAB
CHOLEST SERPL-MCNC: 193 MG/DL
FASTING STATUS PATIENT QL REPORTED: ABNORMAL
HDLC SERPL-MCNC: 47 MG/DL
LDLC SERPL CALC-MCNC: 124 MG/DL
TRIGL SERPL-MCNC: 112 MG/DL

## 2017-04-07 ENCOUNTER — MEDICAL CORRESPONDENCE (OUTPATIENT)
Dept: HEALTH INFORMATION MANAGEMENT | Facility: CLINIC | Age: 57
End: 2017-04-07

## 2017-04-11 NOTE — PROGRESS NOTES
Reminder message sent to pt via [facility] re: C scheduled for tomorrow. Included instructions and callback number should pt have questions.

## 2017-04-12 ENCOUNTER — APPOINTMENT (OUTPATIENT)
Dept: MEDSURG UNIT | Facility: CLINIC | Age: 57
End: 2017-04-12
Attending: INTERNAL MEDICINE
Payer: MEDICARE

## 2017-04-12 ENCOUNTER — OFFICE VISIT (OUTPATIENT)
Dept: CARDIOLOGY | Facility: CLINIC | Age: 57
End: 2017-04-12
Attending: INTERNAL MEDICINE
Payer: MEDICARE

## 2017-04-12 ENCOUNTER — HOSPITAL ENCOUNTER (OUTPATIENT)
Facility: CLINIC | Age: 57
Discharge: HOME | End: 2017-04-12
Attending: INTERNAL MEDICINE | Admitting: INTERNAL MEDICINE
Payer: MEDICARE

## 2017-04-12 ENCOUNTER — PRE VISIT (OUTPATIENT)
Dept: CARDIOLOGY | Facility: CLINIC | Age: 57
End: 2017-04-12

## 2017-04-12 VITALS
OXYGEN SATURATION: 97 % | SYSTOLIC BLOOD PRESSURE: 136 MMHG | RESPIRATION RATE: 18 BRPM | DIASTOLIC BLOOD PRESSURE: 89 MMHG

## 2017-04-12 VITALS
SYSTOLIC BLOOD PRESSURE: 119 MMHG | HEART RATE: 74 BPM | BODY MASS INDEX: 21.6 KG/M2 | OXYGEN SATURATION: 95 % | HEIGHT: 63 IN | WEIGHT: 121.9 LBS | DIASTOLIC BLOOD PRESSURE: 81 MMHG

## 2017-04-12 DIAGNOSIS — I27.20 PULMONARY HYPERTENSION (H): Primary | ICD-10-CM

## 2017-04-12 DIAGNOSIS — Z79.899 USE OF MEDICATION WITH TERATOGENIC POTENTIAL IN FEMALE OF REPRODUCTIVE AGE: ICD-10-CM

## 2017-04-12 DIAGNOSIS — I27.20 PULMONARY HYPERTENSION (H): ICD-10-CM

## 2017-04-12 LAB
ALBUMIN SERPL-MCNC: 3.6 G/DL (ref 3.4–5)
ALP SERPL-CCNC: 53 U/L (ref 40–150)
ALT SERPL W P-5'-P-CCNC: 22 U/L (ref 0–50)
ANION GAP SERPL CALCULATED.3IONS-SCNC: 9 MMOL/L (ref 3–14)
AST SERPL W P-5'-P-CCNC: 12 U/L (ref 0–45)
BASOPHILS # BLD AUTO: 0 10E9/L (ref 0–0.2)
BASOPHILS NFR BLD AUTO: 0.8 %
BILIRUB SERPL-MCNC: 0.3 MG/DL (ref 0.2–1.3)
BUN SERPL-MCNC: 26 MG/DL (ref 7–30)
CALCIUM SERPL-MCNC: 8.2 MG/DL (ref 8.5–10.1)
CHLORIDE SERPL-SCNC: 113 MMOL/L (ref 94–109)
CO2 SERPL-SCNC: 21 MMOL/L (ref 20–32)
CREAT SERPL-MCNC: 1.46 MG/DL (ref 0.52–1.04)
DIFFERENTIAL METHOD BLD: ABNORMAL
EOSINOPHIL # BLD AUTO: 0.1 10E9/L (ref 0–0.7)
EOSINOPHIL NFR BLD AUTO: 1.8 %
ERYTHROCYTE [DISTWIDTH] IN BLOOD BY AUTOMATED COUNT: 13.2 % (ref 10–15)
GFR SERPL CREATININE-BSD FRML MDRD: 37 ML/MIN/1.7M2
GLUCOSE SERPL-MCNC: 81 MG/DL (ref 70–99)
HCT VFR BLD AUTO: 38.4 % (ref 35–47)
HGB BLD-MCNC: 12.4 G/DL (ref 11.7–15.7)
IMM GRANULOCYTES # BLD: 0 10E9/L (ref 0–0.4)
IMM GRANULOCYTES NFR BLD: 0 %
LYMPHOCYTES # BLD AUTO: 1 10E9/L (ref 0.8–5.3)
LYMPHOCYTES NFR BLD AUTO: 25.3 %
MCH RBC QN AUTO: 30.4 PG (ref 26.5–33)
MCHC RBC AUTO-ENTMCNC: 32.3 G/DL (ref 31.5–36.5)
MCV RBC AUTO: 94 FL (ref 78–100)
MONOCYTES # BLD AUTO: 0.3 10E9/L (ref 0–1.3)
MONOCYTES NFR BLD AUTO: 6.8 %
NEUTROPHILS # BLD AUTO: 2.5 10E9/L (ref 1.6–8.3)
NEUTROPHILS NFR BLD AUTO: 65.3 %
NRBC # BLD AUTO: 0 10*3/UL
NRBC BLD AUTO-RTO: 0 /100
NT-PROBNP SERPL-MCNC: 413 PG/ML (ref 0–900)
PLATELET # BLD AUTO: 195 10E9/L (ref 150–450)
POTASSIUM SERPL-SCNC: 4.2 MMOL/L (ref 3.4–5.3)
PROLACTIN SERPL-MCNC: 9 UG/L (ref 3–27)
PROT SERPL-MCNC: 6.6 G/DL (ref 6.8–8.8)
RBC # BLD AUTO: 4.08 10E12/L (ref 3.8–5.2)
SODIUM SERPL-SCNC: 142 MMOL/L (ref 133–144)
WBC # BLD AUTO: 3.8 10E9/L (ref 4–11)

## 2017-04-12 PROCEDURE — 4A023N6 MEASUREMENT OF CARDIAC SAMPLING AND PRESSURE, RIGHT HEART, PERCUTANEOUS APPROACH: ICD-10-PCS | Performed by: INTERNAL MEDICINE

## 2017-04-12 PROCEDURE — 27210787 ZZH MANIFOLD CR2

## 2017-04-12 PROCEDURE — 27210982 ZZH KIT RT HC TOTES DISP CR7

## 2017-04-12 PROCEDURE — 27211181 ZZH BALLOON TIP PRESSURE CR5

## 2017-04-12 PROCEDURE — 83880 ASSAY OF NATRIURETIC PEPTIDE: CPT | Performed by: INTERNAL MEDICINE

## 2017-04-12 PROCEDURE — 84146 ASSAY OF PROLACTIN: CPT | Performed by: INTERNAL MEDICINE

## 2017-04-12 PROCEDURE — 99213 OFFICE O/P EST LOW 20 MIN: CPT | Mod: ZP | Performed by: INTERNAL MEDICINE

## 2017-04-12 PROCEDURE — 80053 COMPREHEN METABOLIC PANEL: CPT | Performed by: INTERNAL MEDICINE

## 2017-04-12 PROCEDURE — 93451 RIGHT HEART CATH: CPT

## 2017-04-12 PROCEDURE — 99213 OFFICE O/P EST LOW 20 MIN: CPT | Mod: ZF

## 2017-04-12 PROCEDURE — 40000166 ZZH STATISTIC PP CARE STAGE 1

## 2017-04-12 PROCEDURE — 85025 COMPLETE CBC W/AUTO DIFF WBC: CPT | Performed by: INTERNAL MEDICINE

## 2017-04-12 PROCEDURE — 93451 RIGHT HEART CATH: CPT | Mod: 26 | Performed by: INTERNAL MEDICINE

## 2017-04-12 RX ORDER — LIDOCAINE 40 MG/G
CREAM TOPICAL
Status: COMPLETED | OUTPATIENT
Start: 2017-04-12 | End: 2017-04-12

## 2017-04-12 RX ADMIN — LIDOCAINE: 40 CREAM TOPICAL at 09:34

## 2017-04-12 ASSESSMENT — PAIN SCALES - GENERAL: PAINLEVEL: NO PAIN (0)

## 2017-04-12 NOTE — DISCHARGE INSTRUCTIONS
[facility]                        Interventional Cardiology  Discharge Instructions   Post Right Heart Cath      AFTER YOU GO HOME:    DO drink plenty of fluids    DO resume your regular diet and medications unless otherwise instructed by your Primary Physician    Do Not scrub the procedure site vigorously    No lotion or powder to the puncture site for 3 days    CALL YOUR PRIMARY PHYSICIAN IF: You may resume all normal activity.  Monitor neck site for bleeding, swelling, or voice changes. If you notice bleeding or swelling immediately apply pressure to the site and call number below to speak with Cardiology Fellow.  If you experience any changes in your breathing you should call your doctor immediately or come to the closest Emergency Department.  Do not drive yourself.    ADDITIONAL INSTRUCTIONS: Medications: You are to resume all home medications including anticoagulation therapy unless otherwise advised by your primary cardiologist or nurse coordinator.    Follow Up: Per your primary cardiology team    If you have any questions or concerns regarding your procedure site please call [phone] at anytime and ask for Cardiology Fellow on call.  They are available 24 hours a day.  You may also contact the Cardiology Clinic after hours number at [phone].                                                       Telephone Numbers [phone] Monday-Friday 8:00 am to 4:30 pm    [phone] After 4:30 pm Monday-Friday, Weekends & Holidays  Ask for Interventional Cardiologist on call. Someone is on call 24 hours/day   [facility] toll free number [phone] Monday-Friday 8:00 am to 4:30 pm   [facility] Emergency Dept [phone]

## 2017-04-12 NOTE — PROGRESS NOTES
Pt arrived to 2A for R heart cath. VSS, pt denies pain. Lidocaine cream applied to R neck. Pt has been consented. Continue to monitor

## 2017-04-12 NOTE — PROGRESS NOTES
1045  Returned to 2A from CCL per WC accompanied by RN.  S/P Right Heart Cath.  Procedure site is at Right IJ, right neck;  FDI.  No hematoma noted.  Denies any pain except for very slight soreness at procedure site.  Sister-in-law, Bessie, is here with pt.  Nutrition of toast and coffee taken well.  No nausea.  Pt has appointment with Dr. Franco later this afternoon.  CTRN

## 2017-04-12 NOTE — PATIENT INSTRUCTIONS
Medication Changes:  We will obtain macicentan for you.  We and you will investigate subcutaneous and intravenous remodulin    Patient Instructions:  **Prolactin level today (labs)  Results:  Lab Results   Component Value Date    WBC 3.8 (L) 04/12/2017    HGB 12.4 04/12/2017    HCT 38.4 04/12/2017     04/12/2017    CHOL 204 (H) 10/09/2013    TRIG 136 10/09/2013    HDL 75 10/09/2013    ALT 22 04/12/2017    AST 12 04/12/2017     04/12/2017    BUN 26 04/12/2017    CO2 21 04/12/2017    TSH 1.39 10/03/2014    INR 1.04 03/26/2013     Follow up Appointment Information:  Follow up in 3-4 weeks with JAVAN Oh CNP  Check-In  Time Check-In Location Appointment   Type Provider   Name        [address] [place] [address]  3rd Floor Routine Clinic   Follow Up Visit Korin RAPP CNP   Appointment Preparations & Instructions   We ask that you plan accordingly due to traffic and parking that may delay you. We look forward to seeing you!         We are located on the third floor of the [facility] ([facility]) on the [facility].  Our address is     [address] [place] [address] on [address] [place] [phone]    Thank you for allowing us to be a part of your care here at the [facility]    If you have questions or concerns please contact us at:    Mary Davis RN, BSN    Reji Escobar (Schedule,P.A.)  Nurse Coordinator     Clinic   Pulmonary Hypertension   Pulmonary Hypertension  [facility]  (P)[phone]    (P) [phone]        (F)[phone]    ** Please note that you will NOT receive a reminder call regarding your scheduled testing, reminder calls are for provider appointments only.  If you are scheduled for testing within the [facility] system you may receive a call regarding pre-registration for billing purposes only.**     Remember to weigh yourself  daily after voiding and before you consume any food or beverages and log the numbers.  If you have gained/lost 2 pounds overnight or 5 pounds in a week contact us immediately for medication adjustments or further instructions.

## 2017-04-12 NOTE — IP AVS SNAPSHOT
[address] [address]                                       After Visit Summary   4/12/2017    Judith Nelson    MRN: [id number]           After Visit Summary Signature Page     I have received my discharge instructions, and my questions have been answered. I have discussed any challenges I see with this plan with the nurse or doctor.    ..........................................................................................................................................  Patient/Patient Representative Signature      ..........................................................................................................................................  Patient Representative Print Name and Relationship to Patient    ..................................................               ................................................  Date                                            Time    ..........................................................................................................................................  Reviewed by Signature/Title    ...................................................              ..............................................  Date                                                            Time

## 2017-04-12 NOTE — IP AVS SNAPSHOT
MRN:[id number]                      After Visit Summary   4/12/2017    Judith Nelson    MRN: [id number]           Visit Information        Department      4/12/2017  9:01 AM [address] [facility]          Review of your medicines      UNREVIEWED medicines. Ask your doctor about these medicines        Dose / Directions    acetaminophen 500 MG tablet   Commonly known as:  TYLENOL   Used for:  Pulmonary hypertension (H)        Dose:  500-1000 mg   Take 1-2 tablets (500-1,000 mg) by mouth every 6 hours as needed for pain (Take as needed for pain.  Do not exceed 4 grams (8 tablets) in a day)   Quantity:  45 tablet   Refills:  10       ACIDOPHILUS PO   Used for:  Sarcoidosis (H)        Dose:  1 tablet   Take 1 tablet by mouth daily   Refills:  0       aspirin 81 MG tablet   Used for:  Pulmonary arterial hypertension (H)        Dose:  81 mg   Take 81 mg by mouth daily   Refills:  0       azaTHIOprine 50 MG tablet   Commonly known as:  IMURAN   Used for:  Sarcoidosis (H)        Dose:  50 mg   Take 1 tablet (50 mg) by mouth daily   Quantity:  90 tablet   Refills:  3       buPROPion 200 MG 12 hr tablet   Commonly known as:  WELLBUTRIN SR   Used for:  Major depressive disorder, single episode, moderate (H)        Dose:  200 mg   Take 1 tablet (200 mg) by mouth every morning   Quantity:  30 tablet   Refills:  0       calcitRIOL 0.25 MCG capsule   Commonly known as:  ROCALTROL        Dose:  0.25 mcg   Take 1 capsule (0.25 mcg) by mouth daily   Quantity:  30 capsule   Refills:  1       carvedilol 12.5 MG tablet   Commonly known as:  COREG   Used for:  Chronic systolic heart failure (H)        Dose:  6.25 mg   Take 0.5 tablets (6.25 mg) by mouth 2 times daily (with meals)   Quantity:  135 tablet   Refills:  3       * denosumab 60 MG/ML Soln injection   Commonly known as:  PROLIA   Used for:  Osteoporosis, Encounter for long-term (current) use of other medications, Senile osteoporosis        Dose:  60 mg    Inject 1 mL (60 mg) Subcutaneous every 6 months   Quantity:  1 mL   Refills:  1       * denosumab 60 MG/ML Soln injection   Commonly known as:  PROLIA   Used for:  Osteoporosis, postmenopausal        Dose:  60 mg   Inject 1 mL (60 mg) Subcutaneous every 6 months   Quantity:  1 mL   Refills:  1       EYE VITAMINS PO   Used for:  Sarcoidosis (H)        Dose:  1 tablet   Take 1 tablet by mouth daily   Refills:  0       furosemide 40 MG tablet   Commonly known as:  LASIX   Used for:  Pulmonary hypertension (H), Stress-induced cardiomyopathy        TAKE 1 TABLET BY MOUTH DAILY   Quantity:  90 tablet   Refills:  3       LORazepam 0.5 MG tablet   Commonly known as:  ATIVAN   Used for:  Major depressive disorder, single episode, severe with psychotic features (H)        Take 0.5 mg (1 tab) every morning and 1 mg (2 tabs) every night at bedtime.   Quantity:  90 tablet   Refills:  1       omeprazole 20 MG CR capsule   Commonly known as:  priLOSEC   Indication:  gi prophylaxis   Used for:  Dyspepsia        Dose:  20 mg   Take 1 capsule (20 mg) by mouth 2 times daily (before meals)   Quantity:  180 capsule   Refills:  3       potassium chloride 10 MEQ tablet   Commonly known as:  K-TAB,KLOR-CON   Used for:  Heart failure (H)        Dose:  10 mEq   Take 1 tablet (10 mEq) by mouth daily   Quantity:  90 tablet   Refills:  1       simvastatin 10 MG tablet   Commonly known as:  ZOCOR   Used for:  Hypercholesterolemia        Dose:  10 mg   Take 1 tablet by mouth At Bedtime.   Quantity:  90 tablet   Refills:  1       SODIUM BICARBONATE PO   Used for:  Pulmonary arterial hypertension (H)        Take by mouth 2 times daily   Refills:  0       tadalafil (PAH) 20 MG Tabs   Commonly known as:  ADCIRCA   Used for:  Pulmonary hypertension (H)        Dose:  40 mg   Take 2 tablets (40 mg) by mouth daily   Quantity:  60 tablet   Refills:  11       TEMAZEPAM PO        Take by mouth At Bedtime   Refills:  0       TOPIRAMATE PO        Dose:  100  mg   Take 100 mg by mouth At Bedtime   Refills:  0       treprostinil diolamine ER 0.125 MG CR tablet   Commonly known as:  ORENITRAM   Used for:  Pulmonary arterial hypertension (H)        Dose:  0.125 mg   Take 1 tablet (0.125 mg) by mouth 3 times daily (with meals) 4.25mg tid   Quantity:  90 tablet   Refills:  0       * Notice:  This list has 2 medication(s) that are the same as other medications prescribed for you. Read the directions carefully, and ask your doctor or other care provider to review them with you.      CONTINUE these medicines which have NOT CHANGED        Dose / Directions    order for DME   Used for:  Tremor, Generalized muscle weakness, Sarcoidosis (H)        Equipment being ordered: SHOWER CHAIR  FROM [employer]   Quantity:  1 Device   Refills:  0       order for DME   Used for:  Pulmonary hypertension (H), Edema        Equipment being ordered: Compression stockings; 2 pair; custom measured. Med compression 20-30 mmhg   Quantity:  2 Package   Refills:  2       T.E.D. BELOW KNEE/M-REGULAR Misc   Used for:  Lower extremity edema        Dose:  1 each   1 each daily   Quantity:  2 each   Refills:  0                Protect others around you: Learn how to safely use, store and throw away your medicines at www.disposemymeds.org.         Follow-ups after your visit        Your next 10 appointments already scheduled     Apr 12, 2017 10:00 AM CDT   Heart Cath Right Heart Cath with [id number]   [facility] ([facility])    [address] [facility] [address]   [phone]            Apr 12, 2017  2:00 PM CDT   (Arrive by 1:45 PM)   RETURN PRIMARY PULMONARY with Norm Franco MD   [facility] ([facility])    [address]  [address] [facility] [address]   [phone]            Apr 24, 2017 11:15 AM CDT   New Visit with Benito Baltazar MD   [facility]    [address] [facility] [address]   [phone]            May 11, 2017 10:00 AM CDT   PFT VISIT with ALEXANDRA PFL B   [facility] Pulmonary Function Testing ([facility])    [address] [place] [address]  3rd [facility] [address]   [phone]            May 11, 2017 10:30 AM CDT   (Arrive by 10:15 AM)   Return Interstitial Lung with Geal Flaherty MD   [facility] ([facility])    [address] [place] [address]   [phone]            May 11, 2017 12:00 PM CDT   Infusion 60 with ALEXANDRA SPEC INFUSION, UC 49 ATC   [facility] Advanced [facility] ([facility])    [address] [place] [address]   [phone]               Care Instructions        Further instructions from your care team       [facility]                        Interventional Cardiology  Discharge Instructions   Post Right Heart Cath      AFTER YOU GO HOME:    DO drink plenty of fluids    DO resume your regular diet and medications unless otherwise instructed by your Primary Physician    Do Not scrub the procedure site vigorously    No lotion or powder to the puncture site for 3 days    CALL YOUR PRIMARY PHYSICIAN IF: You may resume all normal activity.  Monitor neck site for bleeding, swelling, or voice changes. If you notice bleeding or swelling immediately apply pressure to the site and call number below to speak with Cardiology Fellow.  If you experience any changes in your breathing you should call your doctor immediately or come to the closest Emergency Department.  Do not drive yourself.    ADDITIONAL INSTRUCTIONS: Medications: You are to resume all home medications including anticoagulation therapy unless otherwise advised by your primary cardiologist or nurse coordinator.    Follow Up: Per your primary  cardiology team    If you have any questions or concerns regarding your procedure site please call [phone] at anytime and ask for Cardiology Fellow on call.  They are available 24 hours a day.  You may also contact the Cardiology Clinic after hours number at [phone].                                                       Telephone Numbers [phone] Monday-Friday 8:00 am to 4:30 pm    [phone] After 4:30 pm Monday-Friday, Weekends & Holidays  Ask for Interventional Cardiologist on call. Someone is on call 24 hours/day   [facility] toll free number [phone] Monday-Friday 8:00 am to 4:30 pm   [facility] Emergency Dept [phone]                    Additional Information About Your Visit        [facility] Information     [employer] gives you secure access to your electronic health record. If you see a primary care provider, you can also send messages to your care team and make appointments. If you have questions, please call your primary care clinic.  If you do not have a primary care provider, please call [phone] and they will assist you.        Care EveryWhere ID     This is your Care EveryWhere ID. This could be used by other organizations to access your [facility] medical records  [id number]         Primary Care Provider Office Phone # Fax #    Jeanne Angel [phone]      Thank you!     Thank you for choosing [facility] for your care. Our goal is always to provide you with excellent care. Hearing back from our patients is one way we can continue to improve our services. Please take a few minutes to complete the written survey that you may receive in the mail after you visit with us. Thank you!             Medication List: This is a list of all your medications and when to take them. Check marks below indicate your daily home schedule. Keep this list as a reference.      Medications           Morning Afternoon Evening Bedtime As Needed    acetaminophen 500 MG tablet    Commonly known as:  TYLENOL   Take 1-2 tablets (500-1,000 mg) by mouth every 6 hours as needed for pain (Take as needed for pain.  Do not exceed 4 grams (8 tablets) in a day)                                ACIDOPHILUS PO   Take 1 tablet by mouth daily                                aspirin 81 MG tablet   Take 81 mg by mouth daily                                azaTHIOprine 50 MG tablet   Commonly known as:  IMURAN   Take 1 tablet (50 mg) by mouth daily                                buPROPion 200 MG 12 hr tablet   Commonly known as:  WELLBUTRIN SR   Take 1 tablet (200 mg) by mouth every morning                                calcitRIOL 0.25 MCG capsule   Commonly known as:  ROCALTROL   Take 1 capsule (0.25 mcg) by mouth daily                                carvedilol 12.5 MG tablet   Commonly known as:  COREG   Take 0.5 tablets (6.25 mg) by mouth 2 times daily (with meals)                                * denosumab 60 MG/ML Soln injection   Commonly known as:  PROLIA   Inject 1 mL (60 mg) Subcutaneous every 6 months                                * denosumab 60 MG/ML Soln injection   Commonly known as:  PROLIA   Inject 1 mL (60 mg) Subcutaneous every 6 months                                EYE VITAMINS PO   Take 1 tablet by mouth daily                                furosemide 40 MG tablet   Commonly known as:  LASIX   TAKE 1 TABLET BY MOUTH DAILY                                LORazepam 0.5 MG tablet   Commonly known as:  ATIVAN   Take 0.5 mg (1 tab) every morning and 1 mg (2 tabs) every night at bedtime.                                omeprazole 20 MG CR capsule   Commonly known as:  priLOSEC   Take 1 capsule (20 mg) by mouth 2 times daily (before meals)                                order for DME   Equipment being ordered: SHOWER CHAIR  FROM [facility]                                order for DME   Equipment being ordered: Compression stockings; 2 pair; custom measured. Med compression 20-30 mmhg                                 potassium chloride 10 MEQ tablet   Commonly known as:  K-TAB,KLOR-CON   Take 1 tablet (10 mEq) by mouth daily                                simvastatin 10 MG tablet   Commonly known as:  ZOCOR   Take 1 tablet by mouth At Bedtime.                                SODIUM BICARBONATE PO   Take by mouth 2 times daily                                T.E.D. BELOW KNEE/M-REGULAR Misc   1 each daily                                tadalafil (PAH) 20 MG Tabs   Commonly known as:  ADCIRCA   Take 2 tablets (40 mg) by mouth daily                                TEMAZEPAM PO   Take by mouth At Bedtime                                TOPIRAMATE PO   Take 100 mg by mouth At Bedtime                                treprostinil diolamine ER 0.125 MG CR tablet   Commonly known as:  ORENITRAM   Take 1 tablet (0.125 mg) by mouth 3 times daily (with meals) 4.25mg tid                                * Notice:  This list has 2 medication(s) that are the same as other medications prescribed for you. Read the directions carefully, and ask your doctor or other care provider to review them with you.

## 2017-04-12 NOTE — PROGRESS NOTES
Norm Franco M.D.  Cardiovascular Medicine    I personally saw and examined this patient, discussed care with housestaff and other consultants, reviewed current laboratories and imaging studies, and conveyed impression and diagnostic/therapeutic plan to patient.    Problem List  2. Pulmonary hypertension  3. Hyperlipidemia  4. Gallstones with CBD involvement  5. CKD  6. Iron deficiency  7. Depression  8. MGUS  9. Hyperprolactinemia    History        Objective    Wt Readings from Last 5 Encounters:   02/28/17 55.7 kg (122 lb 12.8 oz)   10/18/16 61 kg (134 lb 8 oz)   10/14/16 62 kg (136 lb 9.6 oz)   08/25/16 63 kg (138 lb 14.4 oz)   07/14/16 62.4 kg (137 lb 9.6 oz)       Meds    Current Facility-Administered Medications on File Prior to Visit:  [COMPLETED] lidocaine (LMX4) kit     Current Outpatient Prescriptions on File Prior to Visit:  potassium chloride (K-TAB,KLOR-CON) 10 MEQ tablet Take 1 tablet (10 mEq) by mouth daily   furosemide (LASIX) 40 MG tablet TAKE 1 TABLET BY MOUTH DAILY   carvedilol (COREG) 12.5 MG tablet Take 0.5 tablets (6.25 mg) by mouth 2 times daily (with meals)   denosumab (PROLIA) 60 MG/ML SOLN Inject 1 mL (60 mg) Subcutaneous every 6 months   tadalafil, PAH, (ADCIRCA) 20 MG TABS Take 2 tablets (40 mg) by mouth daily   azaTHIOprine (IMURAN) 50 MG tablet Take 1 tablet (50 mg) by mouth daily   treprostinil diolamine ER (ORENITRAM) 0.125 MG CR tablet Take 1 tablet (0.125 mg) by mouth 3 times daily (with meals) 4.25mg tid (Patient taking differently: Take 5 mg by mouth daily 4.25mg tid)   calcitRIOL (ROCALTROL) 0.25 MCG capsule Take 1 capsule (0.25 mcg) by mouth daily   aspirin 81 MG tablet Take 81 mg by mouth daily   SODIUM BICARBONATE PO Take by mouth 2 times daily   Lactobacillus (ACIDOPHILUS PO) Take 1 tablet by mouth daily   Multiple Vitamins-Minerals (EYE VITAMINS PO) Take 1 tablet by mouth daily   ORDER FOR DME Equipment being ordered: Compression stockings; 2 pair; custom measured. Med  compression 20-30 mmhg   TOPIRAMATE PO Take 100 mg by mouth At Bedtime   TEMAZEPAM PO Take by mouth At Bedtime   omeprazole (PRILOSEC) 20 MG capsule Take 1 capsule (20 mg) by mouth 2 times daily (before meals)   LORazepam (ATIVAN) 0.5 MG tablet Take 0.5 mg (1 tab) every morning and 1 mg (2 tabs) every night at bedtime. (Patient taking differently: 1 mg Take 0.5 mg (1 tab) every morning and 1 mg (2 tabs) every night at bedtime.)   buPROPion (WELLBUTRIN SR) 200 MG 12 hr tablet Take 1 tablet (200 mg) by mouth every morning   ORDER FOR DME Equipment being ordered: SHOWER CHAIR  FROM [facility]   acetaminophen (TYLENOL) 500 MG tablet Take 1-2 tablets (500-1,000 mg) by mouth every 6 hours as needed for pain (Take as needed for pain.  Do not exceed 4 grams (8 tablets) in a day)   denosumab (PROLIA) 60 MG/ML SOLN Inject 1 mL (60 mg) Subcutaneous every 6 months   Elastic Bandages & Supports (T.E.D. BELOW KNEE/M-REGULAR) MISC 1 each daily   simvastatin (ZOCOR) 10 MG tablet Take 1 tablet by mouth At Bedtime.     Labs  Results for ANALI GÓMEZ (MRN [id number]) as of 4/12/2017 10:42   Ref. Range 2/28/2017 15:06 2/28/2017 16:11 4/12/2017 10:16 4/12/2017 10:41   Sodium Latest Ref Range: 133 - 144 mmol/L 144      Potassium Latest Ref Range: 3.4 - 5.3 mmol/L 3.6      Chloride Latest Ref Range: 94 - 109 mmol/L 111 (H)      Carbon Dioxide Latest Ref Range: 20 - 32 mmol/L 24      Urea Nitrogen Latest Ref Range: 7 - 30 mg/dL 21      Creatinine Latest Ref Range: 0.52 - 1.04 mg/dL 1.51 (H)      GFR Estimate Latest Ref Range: >60 mL/min/1.7m2 36 (L)      GFR Estimate If Black Latest Ref Range: >60 mL/min/1.7m2 43 (L)      Calcium Latest Ref Range: 8.5 - 10.1 mg/dL 8.7      Anion Gap Latest Ref Range: 3 - 14 mmol/L 9      N-Terminal Pro Bnp Latest Ref Range: 0 - 125 pg/mL 790 (H)      Glucose Latest Ref Range: 70 - 99 mg/dL 89      WBC Latest Ref Range: 4.0 - 11.0 10e9/L 6.1  3.8 (L)    Hemoglobin Latest Ref Range: 11.7 - 15.7 g/dL  14.2  12.4    Hematocrit Latest Ref Range: 35.0 - 47.0 % 42.4  38.4    Platelet Count Latest Ref Range: 150 - 450 10e9/L 228  195    RBC Count Latest Ref Range: 3.8 - 5.2 10e12/L 4.56  4.08    MCV Latest Ref Range: 78 - 100 fl 93  94    MCH Latest Ref Range: 26.5 - 33.0 pg 31.1  30.4    MCHC Latest Ref Range: 31.5 - 36.5 g/dL 33.5  32.3    RDW Latest Ref Range: 10.0 - 15.0 % 13.3  13.2    Diff Method Unknown   Automated Method    % Neutrophils Latest Units: %   65.3    % Lymphocytes Latest Units: %   25.3    % Monocytes Latest Units: %   6.8    % Eosinophils Latest Units: %   1.8    % Basophils Latest Units: %   0.8    % Immature Granulocytes Latest Units: %   0.0    Nucleated RBCs Latest Ref Range: 0 /100   0    Absolute Neutrophil Latest Ref Range: 1.6 - 8.3 10e9/L   2.5    Absolute Lymphocytes Latest Ref Range: 0.8 - 5.3 10e9/L   1.0    Absolute Monocytes Latest Ref Range: 0.0 - 1.3 10e9/L   0.3    Absolute Eosinophils Latest Ref Range: 0.0 - 0.7 10e9/L   0.1    Absolute Basophils Latest Ref Range: 0.0 - 0.2 10e9/L   0.0    Abs Immature Granulocytes Latest Ref Range: 0 - 0.4 10e9/L   0.0    Absolute Nucleated RBC Unknown   0.0    ECHO COMPLETE Unknown  Rpt     HEART CATH RIGHT HEART CATH Unknown    Rpt       Imaging   Name: ANALI GÓMEZ  MRN: [id number]  : 1960  Study Date: 2017 03:29 PM  Age: 57 yrs  Gender: Female  [place] [facility] [place]  Reason For Study: Other secondary pulmonary hypertension  Ordering Physician: MINE CASTRO  Referring Physician: MINE CASTRO  Performed By: Holger Collazo RDCS     BSA: 1.6 m2  Height: 63 in  Weight: 134 lb  _____________________________________________________________________________  __        Procedure  Echocardiogram with two-dimensional, color and spectral Doppler performed.  Limited Echocardiogram with portions of two-dimensional, color and spectral  Doppler  performed.  _____________________________________________________________________________  __        Interpretation Summary  The RV is moderately dilated. Global right ventricular function is normal.  Paradoxical septal motion consistent with right ventricular pressure overload  is present.  Global and regional left ventricular function is normal with an EF of 60-65%.  Severe tricuspid insufficiency is present. The right ventricular systolic  pressure is 132 mmHg above the right atrial pressure.  This study was compared with the study from 10/14/2016. RV size and function  are both worse. The degree of pulmonary hypertension is significantly higher.  _____________________________________________________________________________  __        Left Ventricle  Global and regional left ventricular function is normal with an EF of 60-65%.  Left ventricular size is normal. Left ventricular wall thickness is normal.  Paradoxical septal motion consistent with right ventricular pressure overload  is present.     Right Ventricle  Global right ventricular function is normal. The RV is moderately dilated.     Atria  The left atrium appears normal. Severe right atrial enlargement is present.        Mitral Valve  The mitral valve is normal.     Aortic Valve  Aortic valve is normal in structure and function.     Tricuspid Valve  Severe tricuspid insufficiency is present. Right ventricular systolic pressure  is 132mmHg above the right atrial pressure.     Pulmonic Valve  Trace pulmonic insufficiency is present. Notching of the Doppler signal across  the pulmonic valve suggests increased pulmonary vascular resistance.     Vessels  The aorta root is normal. The inferior vena cava was normal in size with  preserved respiratory variability. Estimated right atrial pressure is < 5  mmHg.     Pericardium  No pericardial effusion is present.        Compared to Previous Study  This study was compared with the study from 10/14/2016 . RV size and  "function  are both worse. The degree of pulmonary hypertension is significantly higher.  _____________________________________________________________________________  __     MMode/2D Measurements & Calculations  IVSd: 0.72 cm  LVIDd: 3.8 cm  LVIDs: 2.5 cm  LVPWd: 0.41 cm  FS: 33.0 %  EDV(Teich): 61.8 ml  ESV(Teich): 23.3 ml  LV mass(C)d: 54.5 grams  Ao root diam: 3.0 cm  asc Aorta Diam: 2.7 cm  LVOT diam: 1.8 cm  LVOT area: 2.5 cm2  LA Volume (BP): 41.7 ml  LA Volume Index (BP): 25.6 ml/m2     HEMODYNAMICS:  BSA 1.6  1. HR 60 bpm  2. /69/89 mmHg  3. RA 7/5/4   4. /7  5. /37/65   6. PCW 12/12/10   7. PA sat 56%   8. PCW sat --%  9. Hgb 12.8 g/dL   10. Dipti CO 2.3   11. Dipti CI 1.3   12. TD CO 2.9   13. TD CI 1.7   14. PVR 20.1     Assessment/Plan     1. We will obtain macicentan for you.    2. You will think about allowing us to use drugs \"off label\"    3. We and you will investigate subcutaneous and intravenous remodulin    4. Prolactin level today      See dictated note  "

## 2017-04-12 NOTE — NURSING NOTE
"Diet: Patient instructed regarding a heart healthy diet, including discussion of reduced fat and sodium intake. Patient demonstrated understanding of this information and agreed to call with further questions or concerns.    Labs: Patient was given results of the laboratory testing obtained today. Patient demonstrated understanding of this information and agreed to call with further questions or concerns.     Med Reconcile: Reviewed and verified all current medications with the patient. The updated medication list was printed and given to the patient.    New Medication: Patient was educated regarding newly prescribed medication, including discussion of  the indication, administration, side effects, and when to report to MD or RN. Patient demonstrated understanding of this information and agreed to call with further questions or concerns.    Return Appointment: Patient given instructions regarding scheduling next clinic visit. Patient demonstrated understanding of this information and agreed to call with further questions or concerns.    Medication Change: Patient was educated regarding prescribed medication change, including discussion of the indication, administration, side effects, and when to report to MD or RN. Patient demonstrated understanding of this information and agreed to call with further questions or concerns.    Patient stated she understood all health information given and agreed to call with further questions or concerns.    Medication Changes:  We will obtain macicentan for you.  You will think about allowing us to use drugs \"off label\"  We and you will investigate subcutaneous and intravenous remodulin  Patient Instructions:  **Prolactin level today (labs)    Results:  Lab Results   Component Value Date    WBC 3.8 (L) 04/12/2017    HGB 12.4 04/12/2017    HCT 38.4 04/12/2017     04/12/2017    CHOL 204 (H) 10/09/2013    TRIG 136 10/09/2013    HDL 75 10/09/2013    ALT 22 04/12/2017    AST 12 " 04/12/2017     04/12/2017    BUN 26 04/12/2017    CO2 21 04/12/2017    TSH 1.39 10/03/2014    INR 1.04 03/26/2013     Follow up Appointment Information:  Follow up in 3-4 weeks with Dr. Franco/Korin Hirsch, APRN, CNP  Check-In  Time Check-In Location Appointment   Type Provider   Name        [address] [place] [address]  3rd Floor Routine Clinic   Follow Up Visit Norm Franco MD   Appointment Preparations & Instructions   We ask that you plan accordingly due to traffic and parking that may delay you. We look forward to seeing you!

## 2017-04-12 NOTE — LETTER
4/12/2017      RE: Judith Nelson  [address] [facility] [address]       Dear Colleague,    Thank you for the opportunity to participate in the care of your patient, Judith Nelson, at the [facility]. Please see a copy of my visit note below.    Norm Franco M.D.  Cardiovascular Medicine    I personally saw and examined this patient, discussed care with housestaff and other consultants, reviewed current laboratories and imaging studies, and conveyed impression and diagnostic/therapeutic plan to patient.    Problem List  2. Pulmonary hypertension  3. Hyperlipidemia  4. Gallstones with CBD involvement  5. CKD  6. Iron deficiency  7. Depression  8. MGUS  9. Hyperprolactinemia    History        Objective    Wt Readings from Last 5 Encounters:   02/28/17 55.7 kg (122 lb 12.8 oz)   10/18/16 61 kg (134 lb 8 oz)   10/14/16 62 kg (136 lb 9.6 oz)   08/25/16 63 kg (138 lb 14.4 oz)   07/14/16 62.4 kg (137 lb 9.6 oz)       Meds    Current Facility-Administered Medications on File Prior to Visit:  [COMPLETED] lidocaine (LMX4) kit     Current Outpatient Prescriptions on File Prior to Visit:  potassium chloride (K-TAB,KLOR-CON) 10 MEQ tablet Take 1 tablet (10 mEq) by mouth daily   furosemide (LASIX) 40 MG tablet TAKE 1 TABLET BY MOUTH DAILY   carvedilol (COREG) 12.5 MG tablet Take 0.5 tablets (6.25 mg) by mouth 2 times daily (with meals)   denosumab (PROLIA) 60 MG/ML SOLN Inject 1 mL (60 mg) Subcutaneous every 6 months   tadalafil, PAH, (ADCIRCA) 20 MG TABS Take 2 tablets (40 mg) by mouth daily   azaTHIOprine (IMURAN) 50 MG tablet Take 1 tablet (50 mg) by mouth daily   treprostinil diolamine ER (ORENITRAM) 0.125 MG CR tablet Take 1 tablet (0.125 mg) by mouth 3 times daily (with meals) 4.25mg tid (Patient taking differently: Take 5 mg by mouth daily 4.25mg tid)   calcitRIOL (ROCALTROL) 0.25 MCG capsule Take 1 capsule (0.25 mcg) by mouth daily   aspirin 81  MG tablet Take 81 mg by mouth daily   SODIUM BICARBONATE PO Take by mouth 2 times daily   Lactobacillus (ACIDOPHILUS PO) Take 1 tablet by mouth daily   Multiple Vitamins-Minerals (EYE VITAMINS PO) Take 1 tablet by mouth daily   ORDER FOR DME Equipment being ordered: Compression stockings; 2 pair; custom measured. Med compression 20-30 mmhg   TOPIRAMATE PO Take 100 mg by mouth At Bedtime   TEMAZEPAM PO Take by mouth At Bedtime   omeprazole (PRILOSEC) 20 MG capsule Take 1 capsule (20 mg) by mouth 2 times daily (before meals)   LORazepam (ATIVAN) 0.5 MG tablet Take 0.5 mg (1 tab) every morning and 1 mg (2 tabs) every night at bedtime. (Patient taking differently: 1 mg Take 0.5 mg (1 tab) every morning and 1 mg (2 tabs) every night at bedtime.)   buPROPion (WELLBUTRIN SR) 200 MG 12 hr tablet Take 1 tablet (200 mg) by mouth every morning   ORDER FOR DME Equipment being ordered: SHOWER CHAIR  FROM [facility]   acetaminophen (TYLENOL) 500 MG tablet Take 1-2 tablets (500-1,000 mg) by mouth every 6 hours as needed for pain (Take as needed for pain.  Do not exceed 4 grams (8 tablets) in a day)   denosumab (PROLIA) 60 MG/ML SOLN Inject 1 mL (60 mg) Subcutaneous every 6 months   Elastic Bandages & Supports (T.E.D. BELOW KNEE/M-REGULAR) MISC 1 each daily   simvastatin (ZOCOR) 10 MG tablet Take 1 tablet by mouth At Bedtime.     Labs  Results for ANALI GÓMEZ (MRN [id number]) as of 4/12/2017 10:42   Ref. Range 2/28/2017 15:06 2/28/2017 16:11 4/12/2017 10:16 4/12/2017 10:41   Sodium Latest Ref Range: 133 - 144 mmol/L 144      Potassium Latest Ref Range: 3.4 - 5.3 mmol/L 3.6      Chloride Latest Ref Range: 94 - 109 mmol/L 111 (H)      Carbon Dioxide Latest Ref Range: 20 - 32 mmol/L 24      Urea Nitrogen Latest Ref Range: 7 - 30 mg/dL 21      Creatinine Latest Ref Range: 0.52 - 1.04 mg/dL 1.51 (H)      GFR Estimate Latest Ref Range: >60 mL/min/1.7m2 36 (L)      GFR Estimate If Black Latest Ref Range: >60 mL/min/1.7m2 43 (L)       Calcium Latest Ref Range: 8.5 - 10.1 mg/dL 8.7      Anion Gap Latest Ref Range: 3 - 14 mmol/L 9      N-Terminal Pro Bnp Latest Ref Range: 0 - 125 pg/mL 790 (H)      Glucose Latest Ref Range: 70 - 99 mg/dL 89      WBC Latest Ref Range: 4.0 - 11.0 10e9/L 6.1  3.8 (L)    Hemoglobin Latest Ref Range: 11.7 - 15.7 g/dL 14.2  12.4    Hematocrit Latest Ref Range: 35.0 - 47.0 % 42.4  38.4    Platelet Count Latest Ref Range: 150 - 450 10e9/L 228  195    RBC Count Latest Ref Range: 3.8 - 5.2 10e12/L 4.56  4.08    MCV Latest Ref Range: 78 - 100 fl 93  94    MCH Latest Ref Range: 26.5 - 33.0 pg 31.1  30.4    MCHC Latest Ref Range: 31.5 - 36.5 g/dL 33.5  32.3    RDW Latest Ref Range: 10.0 - 15.0 % 13.3  13.2    Diff Method Unknown   Automated Method    % Neutrophils Latest Units: %   65.3    % Lymphocytes Latest Units: %   25.3    % Monocytes Latest Units: %   6.8    % Eosinophils Latest Units: %   1.8    % Basophils Latest Units: %   0.8    % Immature Granulocytes Latest Units: %   0.0    Nucleated RBCs Latest Ref Range: 0 /100   0    Absolute Neutrophil Latest Ref Range: 1.6 - 8.3 10e9/L   2.5    Absolute Lymphocytes Latest Ref Range: 0.8 - 5.3 10e9/L   1.0    Absolute Monocytes Latest Ref Range: 0.0 - 1.3 10e9/L   0.3    Absolute Eosinophils Latest Ref Range: 0.0 - 0.7 10e9/L   0.1    Absolute Basophils Latest Ref Range: 0.0 - 0.2 10e9/L   0.0    Abs Immature Granulocytes Latest Ref Range: 0 - 0.4 10e9/L   0.0    Absolute Nucleated RBC Unknown   0.0    ECHO COMPLETE Unknown  Rpt     HEART CATH RIGHT HEART CATH Unknown    Rpt       Imaging   Name: ANALI GÓMEZ  MRN: [id number]  : 1960  Study Date: 2017 03:29 PM  Age: 57 yrs  Gender: Female  Patient Location: [facility]  Reason For Study: Other secondary pulmonary hypertension  Ordering Physician: MINE CASTRO  Referring Physician: MINE CASTRO  Performed By: Holger Collazo RDCS     BSA: 1.6 m2  Height: 63 in  Weight: 134  lb  _____________________________________________________________________________  __        Procedure  Echocardiogram with two-dimensional, color and spectral Doppler performed.  Limited Echocardiogram with portions of two-dimensional, color and spectral  Doppler performed.  _____________________________________________________________________________  __        Interpretation Summary  The RV is moderately dilated. Global right ventricular function is normal.  Paradoxical septal motion consistent with right ventricular pressure overload  is present.  Global and regional left ventricular function is normal with an EF of 60-65%.  Severe tricuspid insufficiency is present. The right ventricular systolic  pressure is 132 mmHg above the right atrial pressure.  This study was compared with the study from 10/14/2016. RV size and function  are both worse. The degree of pulmonary hypertension is significantly higher.  _____________________________________________________________________________  __        Left Ventricle  Global and regional left ventricular function is normal with an EF of 60-65%.  Left ventricular size is normal. Left ventricular wall thickness is normal.  Paradoxical septal motion consistent with right ventricular pressure overload  is present.     Right Ventricle  Global right ventricular function is normal. The RV is moderately dilated.     Atria  The left atrium appears normal. Severe right atrial enlargement is present.        Mitral Valve  The mitral valve is normal.     Aortic Valve  Aortic valve is normal in structure and function.     Tricuspid Valve  Severe tricuspid insufficiency is present. Right ventricular systolic pressure  is 132mmHg above the right atrial pressure.     Pulmonic Valve  Trace pulmonic insufficiency is present. Notching of the Doppler signal across  the pulmonic valve suggests increased pulmonary vascular resistance.     Vessels  The aorta root is normal. The inferior vena  "cava was normal in size with  preserved respiratory variability. Estimated right atrial pressure is < 5  mmHg.     Pericardium  No pericardial effusion is present.        Compared to Previous Study  This study was compared with the study from 10/14/2016 . RV size and function  are both worse. The degree of pulmonary hypertension is significantly higher.  _____________________________________________________________________________  __     MMode/2D Measurements & Calculations  IVSd: 0.72 cm  LVIDd: 3.8 cm  LVIDs: 2.5 cm  LVPWd: 0.41 cm  FS: 33.0 %  EDV(Teich): 61.8 ml  ESV(Teich): 23.3 ml  LV mass(C)d: 54.5 grams  Ao root diam: 3.0 cm  asc Aorta Diam: 2.7 cm  LVOT diam: 1.8 cm  LVOT area: 2.5 cm2  LA Volume (BP): 41.7 ml  LA Volume Index (BP): 25.6 ml/m2     HEMODYNAMICS:  BSA 1.6  1. HR 60 bpm  2. /69/89 mmHg  3. RA 7/5/4   4. /7  5. /37/65   6. PCW 12/12/10   7. PA sat 56%   8. PCW sat --%  9. Hgb 12.8 g/dL   10. Dipti CO 2.3   11. Dipti CI 1.3   12. TD CO 2.9   13. TD CI 1.7   14. PVR 20.1     Assessment/Plan     1. We will obtain macicentan for you.    2. You will think about allowing us to use drugs \"off label\"    3. We and you will investigate subcutaneous and intravenous remodulin    4. Prolactin level today        Please do not hesitate to contact me if you have any questions/concerns.     Sincerely,     Norm Franco MD    "

## 2017-04-12 NOTE — MR AVS SNAPSHOT
After Visit Summary   4/12/2017    Judith Nelson    MRN: [id number]           Patient Information     Date Of Birth          1960        Visit Information        Provider Department      4/12/2017 2:00 PM Norm Franco MD [facility]        Today's Diagnoses     Pulmonary hypertension (H)    -  1    Use of medication with teratogenic potential in female of reproductive age          Care Instructions    Medication Changes:  We will obtain macicentan for you.  We and you will investigate subcutaneous and intravenous remodulin    Patient Instructions:  **Prolactin level today (labs)  Results:  Lab Results   Component Value Date    WBC 3.8 (L) 04/12/2017    HGB 12.4 04/12/2017    HCT 38.4 04/12/2017     04/12/2017    CHOL 204 (H) 10/09/2013    TRIG 136 10/09/2013    HDL 75 10/09/2013    ALT 22 04/12/2017    AST 12 04/12/2017     04/12/2017    BUN 26 04/12/2017    CO2 21 04/12/2017    TSH 1.39 10/03/2014    INR 1.04 03/26/2013     Follow up Appointment Information:  Follow up in 3-4 weeks with JAVAN Oh CNP  Check-In  Time Check-In Location Appointment   Type Provider   Name        [address] [place] [address] Floor Routine Clinic   Follow Up Visit Korin RAPP CNP   Appointment Preparations & Instructions   We ask that you plan accordingly due to traffic and parking that may delay you. We look forward to seeing you!         We are located on the third floor of the [facility] and [facility] [place] [place] [facility].  Our address is     [address] [place] [address] on [address] [place] [id number]    Thank you for allowing us to be a part of your care here at the [facility]    If you have questions or concerns please contact us at:    Mary Davis, RN, BSN    Reji Escobar (Schedule,P.A.)  Nurse Coordinator     Clinic   Pulmonary Hypertension   Pulmonary  Hypertension  [facility]  (P)[phone]    (P) [phone]        (F)[phone]    ** Please note that you will NOT receive a reminder call regarding your scheduled testing, reminder calls are for provider appointments only.  If you are scheduled for testing within the [facility] system you may receive a call regarding pre-registration for billing purposes only.**     Remember to weigh yourself daily after voiding and before you consume any food or beverages and log the numbers.  If you have gained/lost 2 pounds overnight or 5 pounds in a week contact us immediately for medication adjustments or further instructions.          Follow-ups after your visit        Follow-up notes from your care team     Return in about 3 weeks (around 5/3/2017) for with Joan, with JAVAN Oh, CNP, Return PH, with, Labs.      Your next 10 appointments already scheduled     Apr 24, 2017 11:15 AM CDT   [facility]    [address] [place] [facility] [address]   [phone]            May 11, 2017 10:00 AM CDT   PFT VISIT with  PFL B   [facility] Pulmonary Function Testing ([facility])    [address] [place] [address]   [phone]            May 11, 2017 10:30 AM CDT   (Arrive by 10:15 AM)   Return Interstitial Lung with Gael Flaherty MD   [facility] ([facility])    [address] [place] [address]   [phone]            May 11, 2017 12:00 PM CDT   Infusion 60 with  SPEC INFUSION, UC 49 ATC   [facility] ([facility])    [address] [place] [address]   [phone]            May 16, 2017 12:30 PM CDT   Lab with ALEXANDRA LAB   [facility]  "Lab ([facility])    [address]  1st Floor  [facility] [id number] [phone]            May 16, 2017  1:00 PM CDT   (Arrive by 12:45 PM)   RETURN PRIMARY PULMONARY with JAVAN Beaulieu CNP   [facility] ([facility])    [address]  [address] [facility] [id number] [phone]              Who to contact     If you have questions or need follow up information about today's clinic visit or your schedule please contact [facility] directly at [phone].  Normal or non-critical lab and imaging results will be communicated to you by MyChart, letter or phone within 4 business days after the clinic has received the results. If you do not hear from us within 7 days, please contact the clinic through [employer] or phone. If you have a critical or abnormal lab result, we will notify you by phone as soon as possible.  Submit refill requests through [employer] or call your pharmacy and they will forward the refill request to us. Please allow 3 business days for your refill to be completed.          Additional Information About Your Visit        [employer] Information     [employer] gives you secure access to your electronic health record. If you see a primary care provider, you can also send messages to your care team and make appointments. If you have questions, please call your primary care clinic.  If you do not have a primary care provider, please call [phone] and they will assist you.        Care EveryWhere ID     This is your Care EveryWhere ID. This could be used by other organizations to access your [facility] medical records  [id number]        Your Vitals Were     Pulse Height Pulse Oximetry BMI (Body Mass Index)          74 1.6 m (5' 3\") 95% 21.59 kg/m2         Blood Pressure from Last 3 Encounters:   04/12/17 136/89   04/12/17 119/81   02/28/17 109/73    Weight from Last 3 Encounters:   04/12/17 55.3 kg " (121 lb 14.4 oz)   02/28/17 55.7 kg (122 lb 12.8 oz)   10/18/16 61 kg (134 lb 8 oz)              Today, you had the following     No orders found for display         Today's Medication Changes      Notice     This visit is during an admission. Changes to the med list made in this visit will be reflected in the After Visit Summary of the admission.             Primary Care Provider Office Phone # Fax #    Anupama Babcock [phone]       [facility] [address] [place] [id number]        Thank you!     Thank you for choosing [facility]  for your care. Our goal is always to provide you with excellent care. Hearing back from our patients is one way we can continue to improve our services. Please take a few minutes to complete the written survey that you may receive in the mail after your visit with us. Thank you!             Your Updated Medication List - Protect others around you: Learn how to safely use, store and throw away your medicines at www.disposemymeds.org.      Notice     This visit is during an admission. Changes to the med list made in this visit will be reflected in the After Visit Summary of the admission.

## 2017-04-12 NOTE — PROCEDURES
PRELIMINARY CARDIAC CATH REPORT:     PROCEDURES PERFORMED:   Right Heart Catheterization    PHYSICIANS:  Attending Physician: Nolan Haque MD  Cardiology Fellow: Reny Sepulveda    INDICATION:  Judith Nelson is a 57 year old female with h/o Severe PAH on 2 oral agents (adcirca + orenitram) with mean PA pressure of 50 on her last RHC in 10/2016, RV dilation and severe TR on echo, MGUS and CKD who presents for follow-up RHC.    DESCRIPTION:  1. Consent obtained with discussion of risks.  All questions were answered.  2. Sterile prep and procedure.  3. Location with Sheaths:   Rt IJ  7 Fr 10 cm [short]  4. Access: Local anesthetic with lidocaine.  A standard 18 guage needle with ultrasound guidance was used to establish vascular access using a modified Seldinger technique.  5. Diagnostic Catheters:   7 Fr  [place] Lani  6. Estimated blood loss: < 5 ml    MEDICATIONS:  The procedure was performed under no conscious sedation.     Procedures:  (AM dose of orenitram not taken due to NPO status - med taken with food)    HEMODYNAMICS:  BSA 1.6  1. HR 60 bpm  2. /69/89 mmHg  3. RA 7/5/4   4. /7  5. /37/65   6. PCW 12/12/10   7. PA sat 56%   8. PCW sat --%  9. Hgb 12.8 g/dL   10. Deep CO 2.3   11. Deep CI 1.3   12. TD CO 2.9   13. TD CI 1.7   14. PVR 20.1     Sheath Removal:  The 7 Fr RIJ sheath was manually removed in the cardiac catheterization laboratory.    Contrast: Isovue, 0 ml     Fluoroscopy Time: 1 min    COMPLICATIONS:  1. None    SUMMARY:   >> Normal right sided filling pressures.  >> Normal left sided filling pressures.  >> Severe pulmonary artery hypertension with mean PA 65 mmHg  >> Reduced cardiac output, 2.3 L/min with index 1.3 L/min/m2 by DEEP    PLAN:   >> Follow-up with Dr. Davison in clinic today  >>. Discharge today per protocol    The attending interventional cardiologist was present and supervised all critical aspects the procedure.    Findings discussed with Dr. Franco.    See CVIS  report for final draft.    Reny Sepulveda  Cardiology Fellow    Dr. Haque   Cardiology Staff

## 2017-04-13 ENCOUNTER — TELEPHONE (OUTPATIENT)
Dept: CARDIOLOGY | Facility: CLINIC | Age: 57
End: 2017-04-13

## 2017-04-13 NOTE — PROGRESS NOTES
April 12, 2017      [place]up[place] [address]      Dear Dr. Babcock:        IMPRESSION:   1.  Pulmonary hypertension.   2.  History of profound depression.   3.  History of prolactin excess with galactorrhea (remote).      I had the pleasure of meeting with Judith Nelson and her sister-in-law for over an hour yesterday to go over the results of her recent right heart catheterization.  This demonstrated continuing significant pulmonary artery hypertension.  The heart rate was 60 at the time of catheterization with blood pressure 121/69 and a mean blood pressure of 89.  The right atrial pressure was 4, which was within normal limits.  The pulmonary artery pressure was 113/37 with a mean pulmonary artery pressure of 65.  In general, the upper limit of the PA systolic pressure should be 35 in someone her size while the mean pulmonary artery pressure should be less than 25 in the normal range.  Her pulmonary capillary wedge pressure (an indirect measurement of left ventricular end-diastolic pressure) was 10.  This was within normal limits.  Her pulmonary artery saturation was extremely low at 56%.  Her Dipti cardiac output was 2.3 with an index of 1.3 while her thermodilution output was 2.9 with an index of 1.7.  Her pulmonary vascular resistance was markedly elevated at 20.  These values were obtained while she was taking oral prostanoids and phosphodiesterase inhibitor.  This is not happy news.      Laboratories yesterday showed a creatinine 1.46, total protein 6.6, the remainder of the electrolytes are within normal limits.  Her white count was somewhat low at 3800 with hemoglobin of 12.4.      Her most recent echocardiogram was obtained last month which showed the right ventricle to be moderately dilated but global right ventricular function was interestingly normal.  There was evidence of right ventricular pressure volume overload  "as would be expected.  Severe tricuspid regurgitation was present.  When compared to previous studies, the RV size and function were both worse.      The \"ultimate\" therapy for pulmonary hypertension is continuous infusion prostacyclin.  This would require placement of a Stratton catheter and the wearing of a pump.  The drug would have to be mixed daily in a sterile fashion and connected to the pump in a thorough fashion.  Under the best of circumstances, there is a risk of systemic infection of approximately 5% or more per year.  The other option for administrating the prostacyclin derivatives is by continuous infusion subcutaneously.  This eliminates the risk of direct intravascular sepsis; however, it still requires sterile mixing at intervals.  In addition, the prostacyclin in some people activates the subcutaneous nerves and produces pain though this can be managed with topical medications.      We thoroughly discussed the upsides and downsides of continuing medical management.  Another option to be considered would be a consideration for lung or heart-lung transplant.        Obviously, her mental health issues make difficult choices even more difficult.      For the interim, we have arranged for her to start to receive Opsumit, an endothelin receptor antagonist that can be given orally, which will hopefully add in incremental therapeutic benefit to her current regimen.      She and her sister-in-law will think about this and discuss the situation further.      We have also drawn a prolactin level as elevated prolactin levels have been associated with pulmonary hypertension.  She no longer has galactorrhea and her galactorrhea and elevated prolactin levels were felt to be a side effect of her psychiatric drugs.  However, we will check to make sure it is not elevated as there are more specific therapies that might be appropriate.      We appreciate your support and thoughts regarding her continuing care.    "   Sincerely yours,         MINE CASTRO MD             D: 2017 08:35   T: 2017 09:02   MT: MADELINE      Name:     ANALI GÓMEZ   MRN:      [id number]        Account:      [id number]   :      1960           Service Date: 2017      Document: [id number]

## 2017-04-13 NOTE — TELEPHONE ENCOUNTER
Prior Authorization Specialty Medication Request    Medication/Dose: Opsumit  Frequency: Daily    Route: PO  Take one tablet (10 MG) by mouth daily. Quantity 30 tablets/30days  Diagnosis and ICD: Idiopathic   WHO Group: 1 NYHA FC: 3  New/Renewal/Insurance Change PA: New  Previously Tried and Failed Therapies:   *Adcirca initiated: 10/13/12  *Tyvaso initiated: 08/19/12 (stopped due to worsening PA pressures)  *Orenitram Initiated: 12/15

## 2017-04-13 NOTE — TELEPHONE ENCOUNTER
PA Initiation    Medication: Opsumit  Insurance Company: [employer] Part D - Phone [phone] Fax [phone]  Start Date: 4/13/2017     C report and clinical documentation have been included with request. Hub form has been faxed to [employer].

## 2017-04-18 NOTE — TELEPHONE ENCOUNTER
Prior Authorization Approval    Authorization Effective Date: 1/14/2017  Authorization Expiration Date: 4/13/2020  Medication: Opsumit - Approved.  Approved Dose/Quantity: 30 tabs/30 days  Reference #: [id number]   Insurance Company: [employer] Part D - Phone [phone] Fax [phone]  Expected CoPay:       CoPay Card Available:      Foundation Assistance Needed:    Which Pharmacy is filling the prescription (Not needed for infusion/clinic administered):    Pharmacy Notified: Yes      [facility] info.

## 2017-04-21 ENCOUNTER — TRANSFERRED RECORDS (OUTPATIENT)
Dept: CARDIOLOGY | Facility: CLINIC | Age: 57
End: 2017-04-21

## 2017-05-03 ENCOUNTER — CARE COORDINATION (OUTPATIENT)
Dept: CARDIOLOGY | Facility: CLINIC | Age: 57
End: 2017-05-03

## 2017-05-03 ENCOUNTER — OFFICE VISIT (OUTPATIENT)
Dept: DERMATOLOGY | Facility: CLINIC | Age: 57
End: 2017-05-03
Payer: MEDICARE

## 2017-05-03 VITALS — DIASTOLIC BLOOD PRESSURE: 66 MMHG | OXYGEN SATURATION: 99 % | SYSTOLIC BLOOD PRESSURE: 106 MMHG | HEART RATE: 66 BPM

## 2017-05-03 DIAGNOSIS — D48.5 NEOPLASM OF UNCERTAIN BEHAVIOR OF SKIN: ICD-10-CM

## 2017-05-03 DIAGNOSIS — L82.1 SK (SEBORRHEIC KERATOSIS): Primary | ICD-10-CM

## 2017-05-03 DIAGNOSIS — L81.4 LENTIGO: ICD-10-CM

## 2017-05-03 DIAGNOSIS — L72.0 MILIA: ICD-10-CM

## 2017-05-03 PROCEDURE — 88305 TISSUE EXAM BY PATHOLOGIST: CPT | Mod: 26 | Performed by: DERMATOLOGY

## 2017-05-03 PROCEDURE — 11100 HC BIOPSY SKIN/SUBQ/MUC MEM, SINGLE LESION: CPT | Mod: 59 | Performed by: DERMATOLOGY

## 2017-05-03 PROCEDURE — 99203 OFFICE O/P NEW LOW 30 MIN: CPT | Mod: 25 | Performed by: DERMATOLOGY

## 2017-05-03 PROCEDURE — 88305 TISSUE EXAM BY PATHOLOGIST: CPT | Performed by: DERMATOLOGY

## 2017-05-03 PROCEDURE — 17110 DESTRUCTION B9 LES UP TO 14: CPT | Performed by: DERMATOLOGY

## 2017-05-03 NOTE — NURSING NOTE
"Initial /66  Pulse 66  SpO2 99% Estimated body mass index is 21.59 kg/(m^2) as calculated from the following:    Height as of 4/12/17: 1.6 m (5' 3\").    Weight as of 4/12/17: 55.3 kg (121 lb 14.4 oz). .      "

## 2017-05-03 NOTE — PROGRESS NOTES
Judith Nelson is a 57 year old year old female patient here today in consultation for spots on face by Dr. Babcock.  Patient states this has been present for a while.  Location left cheek.  Patient reports the following symptoms:  growing .  Patient reports the following previous treatments none.  Patient reports the following modifying factors none.  Associated symptoms: none.  Patient has no other skin complaints today.  Remainder of the HPI, Meds, PMH, Allergies, FH, and SH was reviewed in chart.    Pertinent Hx:   Dad basal cell carcinoma   Past Medical History:   Diagnosis Date     Anxiety      CKD (chronic kidney disease), stage III     biopsy suspicious for renal sarcoidosis      Hyperprolactinemia (H)      Lower extremity edema     chronic w/ chronic right ankle ulcer     Lumbar compression fracture (H)      Major depressive disorder     with psychotic features, followed by Dr. Rosales at [facility] in [place]     Menopause     patient is post menopausal     MGUS (monoclonal gammopathy of unknown significance)     mild, followed by Dr. Long     Osteoporosis      Other seborrheic keratosis      Protrusio acetabuli      Pulmonary hypertension (H)      Stress-induced cardiomyopathy        Past Surgical History:   Procedure Laterality Date     Csection,  X 2       CYSTOSCOPY, RETROGRADES, INSERT STENT URETER(S), COMBINED  10/2/2013    Procedure: COMBINED CYSTOSCOPY, RETROGRADES, INSERT STENT URETER(S);  Left Retrograde Ureteropyelogram and Ureteral Stent Placement;  Surgeon: SONIA Martinez MD;  Location: [facility] OR     ENDOSCOPIC RETROGRADE CHOLANGIOPANCREATOGRAM  2012    Procedure:ENDOSCOPIC RETROGRADE CHOLANGIOPANCREATOGRAM; ERCP biliary sphincterotomy and stone removal; Surgeon:MARGIE RUGGIEROIQ; Location:[facility] OR     GASTROSCOPY,FL  6/10    Erythematous duodenopathy     LAPAROSCOPIC CHOLECYSTECTOMY WITH CHOLANGIOGRAMS  2012    Procedure:LAPAROSCOPIC CHOLECYSTECTOMY WITH CHOLANGIOGRAMS;  Surgeon:BRIANA PADILLA; Location:[facility] OR     ORTHOPEDIC SURGERY  10/1/2010    Right hip replacement        Family History   Problem Relation Age of Onset     CANCER Mother       age 62 leukemia     GASTROINTESTINAL DISEASE Mother      diverticulitis     Hypertension Mother      Allergies Mother      Arthritis Mother      Depression Mother      Eye Disorder Mother      OSTEOPOROSIS Mother      C.A.D. Father      MI/CAD      CANCER Father      skin     Blood Disease Father      renal  problem     Hypertension Father      Anesthesia Reaction Father      Cardiovascular Father      Neurologic Disorder Father      Parkinson's disease     C.A.D. Maternal Grandmother       late 82s MI     DIABETES Maternal Grandmother      C.A.D. Maternal Grandfather       mid 80s MI     Alzheimer Disease Paternal Grandmother       80s     Alzheimer Disease Paternal Grandfather       80s     Neurologic Disorder Sister      migraines     Allergies Sister      DIABETES Other      AODM     Neurologic Disorder Sister      migraines     Allergies Sister      Anesthesia Reaction Son      Anesthesia Reaction Daughter      Anesthesia Reaction Daughter      DIABETES Sister      hypoglycemia       Social History     Social History     Marital status:      Spouse name: N/A     Number of children: 3     Years of education: 12     Occupational History      Unemployed     Social History Main Topics     Smoking status: Never Smoker     Smokeless tobacco: Never Used     Alcohol use No      Comment: Occ.     Drug use: No     Sexual activity: No     Other Topics Concern     Parent/Sibling W/ Cabg, Mi Or Angioplasty Before 65f 55m? No     Social History Narrative       Outpatient Encounter Prescriptions as of 5/3/2017   Medication Sig Dispense Refill     potassium chloride (K-TAB,KLOR-CON) 10 MEQ tablet Take 1 tablet (10 mEq) by mouth daily 90 tablet 1     furosemide (LASIX) 40 MG tablet TAKE 1 TABLET BY MOUTH DAILY 90 tablet  3     carvedilol (COREG) 12.5 MG tablet Take 0.5 tablets (6.25 mg) by mouth 2 times daily (with meals) 135 tablet 3     denosumab (PROLIA) 60 MG/ML SOLN Inject 1 mL (60 mg) Subcutaneous every 6 months 1 mL 1     tadalafil, PAH, (ADCIRCA) 20 MG TABS Take 2 tablets (40 mg) by mouth daily 60 tablet 11     azaTHIOprine (IMURAN) 50 MG tablet Take 1 tablet (50 mg) by mouth daily 90 tablet 3     treprostinil diolamine ER (ORENITRAM) 0.125 MG CR tablet Take 1 tablet (0.125 mg) by mouth 3 times daily (with meals) 4.25mg tid (Patient taking differently: Take 5 mg by mouth daily 4.25mg tid) 90 tablet 0     calcitRIOL (ROCALTROL) 0.25 MCG capsule Take 1 capsule (0.25 mcg) by mouth daily 30 capsule 1     aspirin 81 MG tablet Take 81 mg by mouth daily       SODIUM BICARBONATE PO Take by mouth 2 times daily       Lactobacillus (ACIDOPHILUS PO) Take 1 tablet by mouth daily       Multiple Vitamins-Minerals (EYE VITAMINS PO) Take 1 tablet by mouth daily       ORDER FOR DME Equipment being ordered: Compression stockings; 2 pair; custom measured. Med compression 20-30 mmhg 2 Package 2     TOPIRAMATE PO Take 100 mg by mouth At Bedtime       TEMAZEPAM PO Take by mouth At Bedtime       omeprazole (PRILOSEC) 20 MG capsule Take 1 capsule (20 mg) by mouth 2 times daily (before meals) 180 capsule 3     LORazepam (ATIVAN) 0.5 MG tablet Take 0.5 mg (1 tab) every morning and 1 mg (2 tabs) every night at bedtime. (Patient taking differently: 1 mg Take 0.5 mg (1 tab) every morning and 1 mg (2 tabs) every night at bedtime.) 90 tablet 1     buPROPion (WELLBUTRIN SR) 200 MG 12 hr tablet Take 1 tablet (200 mg) by mouth every morning 30 tablet 0     ORDER FOR DME Equipment being ordered: SHOWER CHAIR  FROM [facility] 1 Device 0     acetaminophen (TYLENOL) 500 MG tablet Take 1-2 tablets (500-1,000 mg) by mouth every 6 hours as needed for pain (Take as needed for pain.  Do not exceed 4 grams (8 tablets) in a day) 45 tablet 10     denosumab (PROLIA) 60  MG/ML SOLN Inject 1 mL (60 mg) Subcutaneous every 6 months 1 mL 1     Elastic Bandages & Supports (T.E.D. BELOW KNEE/M-REGULAR) MISC 1 each daily 2 each 0     simvastatin (ZOCOR) 10 MG tablet Take 1 tablet by mouth At Bedtime. 90 tablet 1     No facility-administered encounter medications on file as of 5/3/2017.              Review Of Systems  Skin: As above  Eyes: negative  Ears/Nose/Throat: negative  Respiratory: No shortness of breath, dyspnea on exertion, cough, or hemoptysis  Cardiovascular: negative  Gastrointestinal: negative  Genitourinary: negative  Musculoskeletal: negative  Neurologic: negative  Psychiatric: negative  Hematologic/Lymphatic/Immunologic: negative  Endocrine: negative      O:   NAD, WDWN, Alert & Oriented, Mood & Affect wnl, Vitals stable   Here today alone   /66  Pulse 66  SpO2 99%   General appearance luz maria ii   Vitals stable   Alert, oriented and in no acute distress      Following lymph nodes palpated: Occipital, Cervical, Supraclavicular no lad   Stuck on papules and brown macules on trunk and ext    l meolabial fold 5mm flesh colored papule   Milia on chin         The remainder of expanded problem focused exam was unremarkable; the following areas were examined:  scalp/hair, conjunctiva/lids, face, neck, lips/teeth, chest, digits/nails, RUE, LUE.      Eyes: Conjunctivae/lids:Normal     ENT: Lips, buccal mucosa, tongue: normal    MSK:Normal    Cardiovascular: peripheral edema none    Pulm: Breathing Normal    Lymph Nodes: No Head and Neck Lymphadenopathy     Neuro/Psych: Orientation:Normal; Mood/Affect:Normal      A/P:  1. L meolabial fold growing mole r/o idn  TANGENTIAL BIOPSY SENT OUT:  After consent, anesthesia with LEC and prep, tangential excision performed and specimen sent out for permanent section histology.  No complications and routine wound care. Patient told to call our office in 1-2 weeks for result.      2. Seborrheic keratosis, lentigo  3. Milia  L  chin  TANGENTIAL EXCISION:  After consent, anesthesia with LEC and prep, tangential excision performed.  No complications and routine wound care.    BENIGN LESIONS DISCUSSED WITH PATIENT:  I discussed the specifics of tumor, prognosis, and genetics of benign lesions.  I explained that treatment of these lesions would be purely cosmetic and not medically neccessary.  I discussed with patient different removal options including excision, cautery and /or laser.      Nature and genetics of benign skin lesions dicussed with patient.  Signs and Symptoms of skin cancer discussed with patient.  Patient encouraged to perform monthly skin exams.  UV precautions reviewed with patient.  Skin care regimen reviewed with patient: Eliminate harsh soaps, i.e. Dial, zest, irsih spring; Mild soaps such as Cetaphil or Dove sensitive skin, avoid hot or cold showers, aggressive use of emollients including vanicream, cetaphil or cerave discussed with patient.    Risks of non-melanoma skin cancer discussed with patient   Return to clinic as needed

## 2017-05-03 NOTE — PROGRESS NOTES
"Problem: SQ Remodulin home visit    Background: 1. Pulmonary hypertension.     2. History of profound depression.     3. History of prolactin excess with galactorrhea (remote).     Assessment: Mendy from [facility] called yesterday (5/2) with an update on how Judith's visit went regarding possibly starting her on SQ Remodulin.     Mendy, RN stated that Judith had a lot of support present at the time of the visit, however, she was still feeling \"overwhelmed\" by everything that was going on. Pt's RN reported that there was still a lot of reservation regarding starting this medication. Judith would prefer to not start this right now and see how things go with her Orenitram and Opsumit.     Pt states that she has not been \"compliant with taking her Orenitram lately\" and is hoping that once she gets back to taking her medications as ordered by Dr. Franco, that she may not need to start her SQ Remodulin.     Intervention: Mendy from [facility] provided the necessary education and support during her RN visit with the patient. All questions and concerns were addressed and support was given as needed.     Response:  Writer will update Dr. Franco of above findings and report.    Continue to monitor closely.   "

## 2017-05-03 NOTE — MR AVS SNAPSHOT
After Visit Summary   5/3/2017    Judith Nelson    MRN: [id number]           Patient Information     Date Of Birth          1960        Visit Information        Provider Department      5/3/2017 2:00 PM Benito Baltazar MD [facility]        Today's Diagnoses     SK (seborrheic keratosis)    -  1    Lentigo        Neoplasm of uncertain behavior of skin        Milia          Care Instructions          Wound Care Instructions     FOR SUPERFICIAL WOUNDS     [facility] [phone]    [facility] [phone]                       AFTER 24 HOURS YOU SHOULD REMOVE THE BANDAGE AND BEGIN DAILY DRESSING CHANGES AS FOLLOWS:     1) Remove Dressing.     2) Clean and dry the area with tap water using a Q-tip or sterile gauze pad.     3) Apply Vaseline, Aquaphor, Polysporin ointment or Bacitracin ointment over entire wound.  Do NOT use Neosporin ointment.     4) Cover the wound with a band-aid, or a sterile non-stick gauze pad and micropore paper tape      REPEAT THESE INSTRUCTIONS AT LEAST ONCE A DAY UNTIL THE WOUND HAS COMPLETELY HEALED.    It is an old wives tale that a wound heals better when it is exposed to air and allowed to dry out. The wound will heal faster with a better cosmetic result if it is kept moist with ointment and covered with a bandage.    **Do not let the wound dry out.**      Supplies Needed:      *Cotton tipped applicators (Q-tips)    *Polysporin Ointment or Bacitracin Ointment (NOT NEOSPORIN)    *Band-aids or non-stick gauze pads and micropore paper tape.      PATIENT INFORMATION:    During the healing process you will notice a number of changes. All wounds develop a small halo of redness surrounding the wound.  This means healing is occurring. Severe itching with extensive redness usually indicates sensitivity to the ointment or bandage tape used to dress the wound.  You should call our office if this develops.      Swelling  and/or  discoloration around your surgical site is common, particularly when performed around the eye.    All wounds normally drain.  The larger the wound the more drainage there will be.  After 7-10 days, you will notice the wound beginning to shrink and new skin will begin to grow.  The wound is healed when you can see skin has formed over the entire area.  A healed wound has a healthy, shiny look to the surface and is red to dark pink in color to normalize.  Wounds may take approximately 4-6 weeks to heal.  Larger wounds may take 6-8 weeks.  After the wound is healed you may discontinue dressing changes.    You may experience a sensation of tightness as your wound heals. This is normal and will gradually subside.    Your healed wound may be sensitive to temperature changes. This sensitivity improves with time, but if you re having a lot of discomfort, try to avoid temperature extremes.    Patients frequently experience itching after their wound appears to have healed because of the continue healing under the skin.  Plain Vaseline will help relieve the itching.        POSSIBLE COMPLICATIONS    BLEEDIN. Leave the bandage in place.  2. Use tightly rolled up gauze or a cloth to apply direct pressure over the bandage for 30  minutes.  3. Reapply pressure for an additional 30 minutes if necessary  4. Use additional gauze and tape to maintain pressure once the bleeding has stopped.          Follow-ups after your visit        Your next 10 appointments already scheduled     May 11, 2017 10:00 AM CDT   PFT VISIT with ALEXANDRA BENDER   [facility] Function Testing ([facility])    [address] [place] [id number]   [phone]            May 11, 2017 10:30 AM CDT   (Arrive by 10:15 AM)   Return Interstitial Lung with Gael Flaherty MD   [facility] ([facility])    [address] [place] [address]  [id number]   [phone]            May 11, 2017 12:00 PM CDT   Infusion 60 with UC SPEC INFUSION, UC 49 ATC   [facility] ([facility])    [address]  2nd Floor  [facility] [id number] [phone]            May 16, 2017 12:30 PM CDT   Lab with UC LAB   [facility] ([facility])    [address] [place] [address]  [address] [facility] [id number] [phone]            May 16, 2017  1:00 PM CDT   (Arrive by 12:45 PM)   RETURN PRIMARY PULMONARY with JAVAN Beaulieu [facility] ([facility])    [address] [place] [address]  3rd Floor  [facility] [id number] [phone]              Who to contact     If you have questions or need follow up information about today's clinic visit or your schedule please contact [facility] directly at [phone].  Normal or non-critical lab and imaging results will be communicated to you by MyChart, letter or phone within 4 business days after the clinic has received the results. If you do not hear from us within 7 days, please contact the clinic through Superhart or phone. If you have a critical or abnormal lab result, we will notify you by phone as soon as possible.  Submit refill requests through MySQL or call your pharmacy and they will forward the refill request to us. Please allow 3 business days for your refill to be completed.          Additional Information About Your Visit        Superhart Information     MySQL gives you secure access to your electronic health record. If you see a primary care provider, you can also send messages to your care team and make appointments. If you have questions, please call your primary care clinic.  If you do not have a primary care provider, please call [phone] and they will assist you.        Care EveryWhere ID     This is your Care EveryWhere ID. This could be  used by other organizations to access your [facility] medical records  [id number]        Your Vitals Were     Pulse Pulse Oximetry                66 99%           Blood Pressure from Last 3 Encounters:   05/03/17 106/66   04/12/17 136/89   04/12/17 119/81    Weight from Last 3 Encounters:   04/12/17 55.3 kg (121 lb 14.4 oz)   02/28/17 55.7 kg (122 lb 12.8 oz)   10/18/16 61 kg (134 lb 8 oz)              We Performed the Following     BIOPSY SKIN/SUBQ/MUC MEM, SINGLE LESION     DESTRUCT BENIGN LESION, UP TO 14     Surgical pathology exam          Today's Medication Changes          These changes are accurate as of: 5/3/17  2:08 PM.  If you have any questions, ask your nurse or doctor.               These medicines have changed or have updated prescriptions.        Dose/Directions    LORazepam 0.5 MG tablet   Commonly known as:  ATIVAN   This may have changed:    - how much to take  - additional instructions   Used for:  Major depressive disorder, single episode, severe with psychotic features (H)        Take 0.5 mg (1 tab) every morning and 1 mg (2 tabs) every night at bedtime.   Quantity:  90 tablet   Refills:  1       treprostinil diolamine ER 0.125 MG CR tablet   Commonly known as:  ORENITRAM   This may have changed:    - how much to take  - when to take this  - additional instructions   Used for:  Pulmonary arterial hypertension (H)        Dose:  0.125 mg   Take 1 tablet (0.125 mg) by mouth 3 times daily (with meals) 4.25mg tid   Quantity:  90 tablet   Refills:  0                Primary Care Provider Office Phone # Fax #    Anupama Babcock [phone]       [address] [address]        Thank you!     Thank you for choosing [facility]  for your care. Our goal is always to provide you with excellent care. Hearing back from our patients is one way we can continue to improve our services. Please take a few minutes to complete the written  survey that you may receive in the mail after your visit with us. Thank you!             Your Updated Medication List - Protect others around you: Learn how to safely use, store and throw away your medicines at www.disposemymeds.org.          This list is accurate as of: 5/3/17  2:08 PM.  Always use your most recent med list.                   Brand Name Dispense Instructions for use    acetaminophen 500 MG tablet    TYLENOL    45 tablet    Take 1-2 tablets (500-1,000 mg) by mouth every 6 hours as needed for pain (Take as needed for pain.  Do not exceed 4 grams (8 tablets) in a day)       ACIDOPHILUS PO      Take 1 tablet by mouth daily       aspirin 81 MG tablet      Take 81 mg by mouth daily       azaTHIOprine 50 MG tablet    IMURAN    90 tablet    Take 1 tablet (50 mg) by mouth daily       buPROPion 200 MG 12 hr tablet    WELLBUTRIN SR    30 tablet    Take 1 tablet (200 mg) by mouth every morning       calcitRIOL 0.25 MCG capsule    ROCALTROL    30 capsule    Take 1 capsule (0.25 mcg) by mouth daily       carvedilol 12.5 MG tablet    COREG    135 tablet    Take 0.5 tablets (6.25 mg) by mouth 2 times daily (with meals)       * denosumab 60 MG/ML Soln injection    PROLIA    1 mL    Inject 1 mL (60 mg) Subcutaneous every 6 months       * denosumab 60 MG/ML Soln injection    PROLIA    1 mL    Inject 1 mL (60 mg) Subcutaneous every 6 months       EYE VITAMINS PO      Take 1 tablet by mouth daily       furosemide 40 MG tablet    LASIX    90 tablet    TAKE 1 TABLET BY MOUTH DAILY       LORazepam 0.5 MG tablet    ATIVAN    90 tablet    Take 0.5 mg (1 tab) every morning and 1 mg (2 tabs) every night at bedtime.       omeprazole 20 MG CR capsule    priLOSEC    180 capsule    Take 1 capsule (20 mg) by mouth 2 times daily (before meals)       order for DME     1 Device    Equipment being ordered: SHOWER CHAIR  FROM [facility] [employer]       order for DME     2 Package    Equipment being ordered: Compression stockings; 2 pair;  custom measured. Med compression 20-30 mmhg       potassium chloride 10 MEQ tablet    K-TAB,KLOR-CON    90 tablet    Take 1 tablet (10 mEq) by mouth daily       simvastatin 10 MG tablet    ZOCOR    90 tablet    Take 1 tablet by mouth At Bedtime.       SODIUM BICARBONATE PO      Take by mouth 2 times daily       T.E.D. BELOW KNEE/M-REGULAR Misc     2 each    1 each daily       tadalafil (PAH) 20 MG Tabs    ADCIRCA    60 tablet    Take 2 tablets (40 mg) by mouth daily       TEMAZEPAM PO      Take by mouth At Bedtime       TOPIRAMATE PO      Take 100 mg by mouth At Bedtime       treprostinil diolamine ER 0.125 MG CR tablet    ORENITRAM    90 tablet    Take 1 tablet (0.125 mg) by mouth 3 times daily (with meals) 4.25mg tid       * Notice:  This list has 2 medication(s) that are the same as other medications prescribed for you. Read the directions carefully, and ask your doctor or other care provider to review them with you.

## 2017-05-03 NOTE — PATIENT INSTRUCTIONS
Wound Care Instructions     FOR SUPERFICIAL WOUNDS     [facility] [phone]    [facility] [phone]                       AFTER 24 HOURS YOU SHOULD REMOVE THE BANDAGE AND BEGIN DAILY DRESSING CHANGES AS FOLLOWS:     1) Remove Dressing.     2) Clean and dry the area with tap water using a Q-tip or sterile gauze pad.     3) Apply Vaseline, Aquaphor, Polysporin ointment or Bacitracin ointment over entire wound.  Do NOT use Neosporin ointment.     4) Cover the wound with a band-aid, or a sterile non-stick gauze pad and micropore paper tape      REPEAT THESE INSTRUCTIONS AT LEAST ONCE A DAY UNTIL THE WOUND HAS COMPLETELY HEALED.    It is an old wives tale that a wound heals better when it is exposed to air and allowed to dry out. The wound will heal faster with a better cosmetic result if it is kept moist with ointment and covered with a bandage.    **Do not let the wound dry out.**      Supplies Needed:      *Cotton tipped applicators (Q-tips)    *Polysporin Ointment or Bacitracin Ointment (NOT NEOSPORIN)    *Band-aids or non-stick gauze pads and micropore paper tape.      PATIENT INFORMATION:    During the healing process you will notice a number of changes. All wounds develop a small halo of redness surrounding the wound.  This means healing is occurring. Severe itching with extensive redness usually indicates sensitivity to the ointment or bandage tape used to dress the wound.  You should call our office if this develops.      Swelling  and/or discoloration around your surgical site is common, particularly when performed around the eye.    All wounds normally drain.  The larger the wound the more drainage there will be.  After 7-10 days, you will notice the wound beginning to shrink and new skin will begin to grow.  The wound is healed when you can see skin has formed over the entire area.  A healed wound has a healthy, shiny look to the surface and is red to dark pink in color  to normalize.  Wounds may take approximately 4-6 weeks to heal.  Larger wounds may take 6-8 weeks.  After the wound is healed you may discontinue dressing changes.    You may experience a sensation of tightness as your wound heals. This is normal and will gradually subside.    Your healed wound may be sensitive to temperature changes. This sensitivity improves with time, but if you re having a lot of discomfort, try to avoid temperature extremes.    Patients frequently experience itching after their wound appears to have healed because of the continue healing under the skin.  Plain Vaseline will help relieve the itching.        POSSIBLE COMPLICATIONS    BLEEDIN. Leave the bandage in place.  2. Use tightly rolled up gauze or a cloth to apply direct pressure over the bandage for 30  minutes.  3. Reapply pressure for an additional 30 minutes if necessary  4. Use additional gauze and tape to maintain pressure once the bleeding has stopped.

## 2017-05-08 LAB — COPATH REPORT: NORMAL

## 2017-05-10 ENCOUNTER — PRE VISIT (OUTPATIENT)
Dept: CARDIOLOGY | Facility: CLINIC | Age: 57
End: 2017-05-10

## 2017-05-10 DIAGNOSIS — R06.09 DYSPNEA ON EXERTION: Primary | ICD-10-CM

## 2017-05-10 DIAGNOSIS — I27.20 PULMONARY HYPERTENSION (H): ICD-10-CM

## 2017-05-10 NOTE — PROGRESS NOTES
RE: abnormal lab results  Received: Yesterday       Jenna Salas MD Berg, Bobbi J RN                     We are ok to proceed            Previous Messages       ----- Message -----      From: Mercedes Santacruz RN      Sent: 5/9/2017   9:47 AM        To: Jenna Salas MD   Subject: abnormal lab results                             Pt is coming in for a prolia injection.  Her labs were drawn on 4-12-17.   Creatinine 1.46 and calcium 8.2 and no phosphorus was drawn.  Are we okay to proceed or do you want new labs drawn?     Thank you,     Mercedes Santacruz RN   [facility]   [phone]

## 2017-05-11 ENCOUNTER — INFUSION THERAPY VISIT (OUTPATIENT)
Dept: INFUSION THERAPY | Facility: CLINIC | Age: 57
End: 2017-05-11
Attending: INTERNAL MEDICINE
Payer: MEDICARE

## 2017-05-11 ENCOUNTER — OFFICE VISIT (OUTPATIENT)
Dept: PULMONOLOGY | Facility: CLINIC | Age: 57
End: 2017-05-11
Attending: INTERNAL MEDICINE
Payer: MEDICARE

## 2017-05-11 VITALS
BODY MASS INDEX: 21.61 KG/M2 | DIASTOLIC BLOOD PRESSURE: 62 MMHG | SYSTOLIC BLOOD PRESSURE: 102 MMHG | WEIGHT: 122 LBS | RESPIRATION RATE: 16 BRPM | OXYGEN SATURATION: 95 % | HEART RATE: 74 BPM

## 2017-05-11 VITALS
OXYGEN SATURATION: 97 % | SYSTOLIC BLOOD PRESSURE: 110 MMHG | RESPIRATION RATE: 16 BRPM | TEMPERATURE: 98 F | DIASTOLIC BLOOD PRESSURE: 64 MMHG

## 2017-05-11 DIAGNOSIS — M81.0 OSTEOPOROSIS, POSTMENOPAUSAL: ICD-10-CM

## 2017-05-11 DIAGNOSIS — M81.0 SENILE OSTEOPOROSIS: ICD-10-CM

## 2017-05-11 DIAGNOSIS — D86.9 SARCOIDOSIS: Primary | ICD-10-CM

## 2017-05-11 DIAGNOSIS — Z79.899 ENCOUNTER FOR LONG-TERM CURRENT USE OF MEDICATION: ICD-10-CM

## 2017-05-11 DIAGNOSIS — D50.9 IRON DEFICIENCY ANEMIA, UNSPECIFIED IRON DEFICIENCY ANEMIA TYPE: ICD-10-CM

## 2017-05-11 DIAGNOSIS — Z92.29 PERSONAL HISTORY OF DRUG THERAPY: Primary | ICD-10-CM

## 2017-05-11 DIAGNOSIS — N18.4 CKD (CHRONIC KIDNEY DISEASE) STAGE 4, GFR 15-29 ML/MIN (H): ICD-10-CM

## 2017-05-11 DIAGNOSIS — M85.80 OSTEOPENIA: ICD-10-CM

## 2017-05-11 DIAGNOSIS — N18.4 CHRONIC KIDNEY DISEASE, STAGE IV (SEVERE) (H): ICD-10-CM

## 2017-05-11 LAB
ANION GAP SERPL CALCULATED.3IONS-SCNC: 12 MMOL/L (ref 3–14)
BUN SERPL-MCNC: 19 MG/DL (ref 7–30)
CALCIUM SERPL-MCNC: 9 MG/DL (ref 8.5–10.1)
CHLORIDE SERPL-SCNC: 107 MMOL/L (ref 94–109)
CO2 SERPL-SCNC: 21 MMOL/L (ref 20–32)
CREAT SERPL-MCNC: 1.58 MG/DL (ref 0.52–1.04)
GFR SERPL CREATININE-BSD FRML MDRD: 34 ML/MIN/1.7M2
GLUCOSE SERPL-MCNC: 81 MG/DL (ref 70–99)
POTASSIUM SERPL-SCNC: 4.3 MMOL/L (ref 3.4–5.3)
SODIUM SERPL-SCNC: 139 MMOL/L (ref 133–144)

## 2017-05-11 PROCEDURE — 82306 VITAMIN D 25 HYDROXY: CPT | Performed by: INTERNAL MEDICINE

## 2017-05-11 PROCEDURE — 96372 THER/PROPH/DIAG INJ SC/IM: CPT

## 2017-05-11 PROCEDURE — 99212 OFFICE O/P EST SF 10 MIN: CPT | Mod: ZF

## 2017-05-11 PROCEDURE — 82164 ANGIOTENSIN I ENZYME TEST: CPT | Performed by: INTERNAL MEDICINE

## 2017-05-11 PROCEDURE — 99212 OFFICE O/P EST SF 10 MIN: CPT | Mod: 25

## 2017-05-11 PROCEDURE — 82652 VIT D 1 25-DIHYDROXY: CPT | Performed by: INTERNAL MEDICINE

## 2017-05-11 PROCEDURE — 36415 COLL VENOUS BLD VENIPUNCTURE: CPT | Performed by: INTERNAL MEDICINE

## 2017-05-11 PROCEDURE — 80048 BASIC METABOLIC PNL TOTAL CA: CPT | Performed by: INTERNAL MEDICINE

## 2017-05-11 PROCEDURE — 25000128 H RX IP 250 OP 636: Mod: ZF | Performed by: INTERNAL MEDICINE

## 2017-05-11 RX ADMIN — DENOSUMAB 60 MG: 60 INJECTION SUBCUTANEOUS at 12:06

## 2017-05-11 ASSESSMENT — PAIN SCALES - GENERAL: PAINLEVEL: NO PAIN (0)

## 2017-05-11 NOTE — PROGRESS NOTES
Reason for Visit  Judith Nelson is a 57 year old year old female who is being seen for sarcoidosis.  HPI    The patient was seen and examined by Gael Flaherty     Ms. Nelson was last seen in the Pulmonary Clinic on 09/2015. At that time she was on  Imuran 50 mg per day for her sarcoidosis primarily for renal involvement.  She also has pulmonary hypertension, most recently with a mean PA pressure of 65.  With the addition of OPSUMIT, and endothelial receptor antagonist orally, in addition to treprostinil and tadalafil.  There is also thought about initiating IV therapy for her.      From a pulmonary standpoint, she believes that her symptoms are stable.  She does have dyspnea on exertion.  She goes up and down a flight of stairs without any significant problems, though towards the end of the day she would feel short of breath.  She has to pace herself while she is lifting anything including laundry.  Denies any cough or sputum production.  No other symptoms.  No known syncope.       Current Outpatient Prescriptions   Medication     potassium chloride (K-TAB,KLOR-CON) 10 MEQ tablet     furosemide (LASIX) 40 MG tablet     carvedilol (COREG) 12.5 MG tablet     denosumab (PROLIA) 60 MG/ML SOLN     tadalafil, PAH, (ADCIRCA) 20 MG TABS     azaTHIOprine (IMURAN) 50 MG tablet     treprostinil diolamine ER (ORENITRAM) 0.125 MG CR tablet     calcitRIOL (ROCALTROL) 0.25 MCG capsule     aspirin 81 MG tablet     SODIUM BICARBONATE PO     Lactobacillus (ACIDOPHILUS PO)     Multiple Vitamins-Minerals (EYE VITAMINS PO)     ORDER FOR DME     TOPIRAMATE PO     TEMAZEPAM PO     omeprazole (PRILOSEC) 20 MG capsule     LORazepam (ATIVAN) 0.5 MG tablet     buPROPion (WELLBUTRIN SR) 200 MG 12 hr tablet     ORDER FOR DME     acetaminophen (TYLENOL) 500 MG tablet     denosumab (PROLIA) 60 MG/ML SOLN     Elastic Bandages & Supports (T.E.D. BELOW KNEE/M-REGULAR) MISC     simvastatin (ZOCOR) 10 MG tablet     No current  facility-administered medications for this visit.      Allergies   Allergen Reactions     Latex Rash     From gloves     Past Medical History:   Diagnosis Date     Anxiety      CKD (chronic kidney disease), stage III     biopsy suspicious for renal sarcoidosis      Hyperprolactinemia (H)      Lower extremity edema     chronic w/ chronic right ankle ulcer     Lumbar compression fracture (H)      Major depressive disorder     with psychotic features, followed by Dr. Rosales at [facility] in [place]     Menopause     patient is post menopausal     MGUS (monoclonal gammopathy of unknown significance)     mild, followed by Dr. Long     Osteoporosis      Other seborrheic keratosis      Protrusio acetabuli      Pulmonary hypertension (H)      Stress-induced cardiomyopathy        Past Surgical History:   Procedure Laterality Date     Csection,  X 2       CYSTOSCOPY, RETROGRADES, INSERT STENT URETER(S), COMBINED  10/2/2013    Procedure: COMBINED CYSTOSCOPY, RETROGRADES, INSERT STENT URETER(S);  Left Retrograde Ureteropyelogram and Ureteral Stent Placement;  Surgeon: SONIA Martinez MD;  Location: [facility] OR     ENDOSCOPIC RETROGRADE CHOLANGIOPANCREATOGRAM  2012    Procedure:ENDOSCOPIC RETROGRADE CHOLANGIOPANCREATOGRAM; ERCP biliary sphincterotomy and stone removal; Surgeon:MARGIE RUGGIEROIQ; Location:[facility] OR     GASTROSCOPY,FL  6/10    Erythematous duodenopathy     LAPAROSCOPIC CHOLECYSTECTOMY WITH CHOLANGIOGRAMS  2012    Procedure:LAPAROSCOPIC CHOLECYSTECTOMY WITH CHOLANGIOGRAMS; Surgeon:BRIANA PADILLA; Location:[facility] OR     ORTHOPEDIC SURGERY  10/1/2010    Right hip replacement       Social History     Social History     Marital status:      Spouse name: N/A     Number of children: 3     Years of education: 12     Occupational History      Unemployed     Social History Main Topics     Smoking status: Never Smoker     Smokeless tobacco: Never Used     Alcohol use No      Comment: Occ.      Drug use: No     Sexual activity: No     Other Topics Concern     Parent/Sibling W/ Cabg, Mi Or Angioplasty Before 65f 55m? No     Social History Narrative       ROS Pulmonary  A complete ROS was otherwise negative except as noted in the HPI.  /62 (BP Location: Right arm, Patient Position: Chair, Cuff Size: Adult Regular)  Pulse 74  Resp 16  Wt 55.3 kg (122 lb)  SpO2 95%  BMI 21.61 kg/m2  Exam:   GENERAL APPEARANCE: Alert, and in no apparent distress.  EYES: PERRL, EOMI  MOUTH: Oral mucosa is moist, without any lesions,, no oropharyngeal exudate.  NECK: supple, no masses, no thyromegaly.  LYMPHATICS: No significant axillary, cervical, or supraclavicular nodes.  RESP: normal percussion, good air flow throughout.  No crackles. No rhonchi. No wheezes.  CV: Normal S1, S2, regular rhythm, normal rate. No murmur.  No rub. No gallop. No LE edema.   MS: extremities normal. No clubbing. No cyanosis.  SKIN: no rash on limited exam  NEURO: Mentation intact, speech normal, normal strength and tone, normal gait and stance  Results:  PFTs done today were reviewed by me with the patient.  FVC is 2.85 liters, which is 98% of predicted.  FEV1 is 2.05 liters, which is 87% of predicted.  The ratio is 72.  DLCO not corrected for hemoglobin is 76% of predicted.  In comparison to prior spirometry in 09/2013, slight decrease in FVC (250 mL) and FEV1 (190 mL).  My interpretation is that spirometry is in the normal range with mildly decreased diffusion capacity.     Assessment and plan: Ms. Nelson is a pleasant 57-year-old female with sarcoidosis based on documentation of granulomatous inflammation on a kidney biopsy, hypercalcemia with minimal pulmonary disease.  She has a longstanding history of pulmonary hypertension, now mean PA pressure of 65 despite oral therapy.  Currently on 50 mg per day of Imuran.  She has had depression in the past.   1.  Pulmonary sarcoidosis.  We will continue to closely monitor.  It is reassuring  that her PFTs have not changed significantly.  Last chest CT in 2015.   2.  Sarcoidosis.  We will check an electrolyte panel, calcium, vitamin D and 125 dihydroxyvitamin D.  LFTs done in April were within normal range.  CBC also in April was within normal range except for leukopenia with a white cell count of 3.8, could be related to her medications.  We will recheck this at the next lab draw.   3.  Pulmonary hypertension.  SAPH versus others.  Continuous IV infusion treatment is being considered.   4.  Osteopenia getting Prolia with infusion pending today.  Her calcium level was low in the past.  We will check vitamin D and 125 dihydroxyvitamin D levels.  I reviewed the case with Dr. Jenna Salas.       Follow up in 6 months. Needs to get CBC to check WBC.    1,25 D3. Is low will review with nephrology.

## 2017-05-11 NOTE — MR AVS SNAPSHOT
After Visit Summary   5/11/2017    Judith Nelson    MRN: [id number]           Patient Information     Date Of Birth          1960        Visit Information        Provider Department      5/11/2017 12:00 PM ALEXANDRA 49 ATC;  SPEC [facility] Specialty and Procedure        Today's Diagnoses     Personal history of drug therapy    -  1    Chronic kidney disease, stage IV (severe) (H)        Iron deficiency anemia, unspecified iron deficiency anemia type        Senile osteoporosis        Osteoporosis, postmenopausal        Encounter for long-term current use of medication        CKD (chronic kidney disease) stage 4, GFR 15-29 ml/min (H)           Follow-ups after your visit        Your next 10 appointments already scheduled     May 11, 2017 12:30 PM CDT   LAB with [facility] ([facility])    [address] [place] [id number] [phone]           Patient must bring picture ID.  Patient should be prepared to give a urine specimen  Please do not eat 10-12 hours before your appointment if you are coming in fasting for labs on lipids, cholesterol, or glucose (sugar).  Pregnant women should follow their Care Team instructions. Water with medications is okay. Do not drink coffee or other fluids.   If you have concerns about taking  your medications, please ask at office or if scheduling via 3Touch, send a message by clicking on Secure Messaging, Message Your Care Team.            May 16, 2017 12:30 PM CDT   Lab with [facility] ([facility])    [address] [place] [id number] [phone]            May 16, 2017  1:00 PM CDT   (Arrive by 12:45 PM)   RETURN PRIMARY PULMONARY with JAVAN Beaulieu [facility] ([facility])    [address] [place] [address]  [address] [facility] [address] [phone]             Nov 16, 2017 11:00 AM CST   PFT VISIT with ALEXANDRA PFL B   [facility] Function [facility] ([facility])    [address] [facility]  [address] [facility] [id number] [phone]            Nov 16, 2017 11:30 AM CST   (Arrive by 11:15 AM)   Return Interstitial Lung with Gael Flaherty MD   [facility] ([facility])    [address]  [address] [facility] [address] [phone]              Future tests that were ordered for you today     Open Future Orders        Priority Expected Expires Ordered    General PFT Lab (Please always keep checked) Routine  5/11/2018 5/11/2017    Pulmonary Function Test Routine  5/11/2018 5/11/2017    6 minute walk test Routine  5/11/2018 5/11/2017    Basic metabolic panel Today  6/10/2017 5/11/2017    CBC with platelets differential Today  6/10/2017 5/11/2017    Vitamin D Deficiency Routine  6/10/2017 5/11/2017    Comprehensive metabolic panel Routine 5/15/2017 5/19/2017 5/10/2017    CBC with platelets Routine 5/15/2017 5/19/2017 5/10/2017    N terminal pro BNP outpatient Routine 5/15/2017 5/19/2017 5/10/2017            Who to contact     If you have questions or need follow up information about today's clinic visit or your schedule please contact [facility] SPECIALTY AND PROCEDURE directly at [phone].  Normal or non-critical lab and imaging results will be communicated to you by MyChart, letter or phone within 4 business days after the clinic has received the results. If you do not hear from us within 7 days, please contact the clinic through MyChart or phone. If you have a critical or abnormal lab result, we will notify you by phone as soon as possible.  Submit refill requests through [employer] or call your pharmacy and they will forward the refill request to us. Please allow 3 business days for your refill to be completed.          Additional  Information About Your Visit        [facility] Information     [employer] gives you secure access to your electronic health record. If you see a primary care provider, you can also send messages to your care team and make appointments. If you have questions, please call your primary care clinic.  If you do not have a primary care provider, please call [phone] and they will assist you.        Care EveryWhere ID     This is your Care EveryWhere ID. This could be used by other organizations to access your [place] medical records  [id number]        Your Vitals Were     Temperature Respirations Pulse Oximetry             98  F (36.7  C) (Oral) 16 97%          Blood Pressure from Last 3 Encounters:   05/11/17 110/64   05/11/17 102/62   05/03/17 106/66    Weight from Last 3 Encounters:   05/11/17 55.3 kg (122 lb)   04/12/17 55.3 kg (121 lb 14.4 oz)   02/28/17 55.7 kg (122 lb 12.8 oz)              Today, you had the following     No orders found for display         Today's Medication Changes          These changes are accurate as of: 5/11/17 12:16 PM.  If you have any questions, ask your nurse or doctor.               These medicines have changed or have updated prescriptions.        Dose/Directions    LORazepam 0.5 MG tablet   Commonly known as:  ATIVAN   This may have changed:    - how much to take  - additional instructions   Used for:  Major depressive disorder, single episode, severe with psychotic features (H)        Take 0.5 mg (1 tab) every morning and 1 mg (2 tabs) every night at bedtime.   Quantity:  90 tablet   Refills:  1       treprostinil diolamine ER 0.125 MG CR tablet   Commonly known as:  ORENITRAM   This may have changed:    - how much to take  - when to take this  - additional instructions   Used for:  Pulmonary arterial hypertension (H)        Dose:  0.125 mg   Take 1 tablet (0.125 mg) by mouth 3 times daily (with meals) 4.25mg tid   Quantity:  90 tablet   Refills:  0                Primary Care  Provider Office Phone # Fax #    Anupama Babcock [phone]       [place] [address] [address]  [facility] [address]        Thank you!     Thank you for choosing [facility] SPECIALTY AND PROCEDURE  for your care. Our goal is always to provide you with excellent care. Hearing back from our patients is one way we can continue to improve our services. Please take a few minutes to complete the written survey that you may receive in the mail after your visit with us. Thank you!             Your Updated Medication List - Protect others around you: Learn how to safely use, store and throw away your medicines at www.disposemymeds.org.          This list is accurate as of: 5/11/17 12:16 PM.  Always use your most recent med list.                   Brand Name Dispense Instructions for use    acetaminophen 500 MG tablet    TYLENOL    45 tablet    Take 1-2 tablets (500-1,000 mg) by mouth every 6 hours as needed for pain (Take as needed for pain.  Do not exceed 4 grams (8 tablets) in a day)       ACIDOPHILUS PO      Take 1 tablet by mouth daily       aspirin 81 MG tablet      Take 81 mg by mouth daily       azaTHIOprine 50 MG tablet    IMURAN    90 tablet    Take 1 tablet (50 mg) by mouth daily       buPROPion 200 MG 12 hr tablet    WELLBUTRIN SR    30 tablet    Take 1 tablet (200 mg) by mouth every morning       calcitRIOL 0.25 MCG capsule    ROCALTROL    30 capsule    Take 1 capsule (0.25 mcg) by mouth daily       carvedilol 12.5 MG tablet    COREG    135 tablet    Take 0.5 tablets (6.25 mg) by mouth 2 times daily (with meals)       * denosumab 60 MG/ML Soln injection    PROLIA    1 mL    Inject 1 mL (60 mg) Subcutaneous every 6 months       * denosumab 60 MG/ML Soln injection    PROLIA    1 mL    Inject 1 mL (60 mg) Subcutaneous every 6 months       EYE VITAMINS PO      Take 1 tablet by mouth daily       furosemide 40 MG tablet    LASIX    90 tablet    TAKE 1 TABLET BY  MOUTH DAILY       LORazepam 0.5 MG tablet    ATIVAN    90 tablet    Take 0.5 mg (1 tab) every morning and 1 mg (2 tabs) every night at bedtime.       omeprazole 20 MG CR capsule    priLOSEC    180 capsule    Take 1 capsule (20 mg) by mouth 2 times daily (before meals)       order for DME     1 Device    Equipment being ordered: SHOWER CHAIR  FROM [facility]       order for DME     2 Package    Equipment being ordered: Compression stockings; 2 pair; custom measured. Med compression 20-30 mmhg       potassium chloride 10 MEQ tablet    K-TAB,KLOR-CON    90 tablet    Take 1 tablet (10 mEq) by mouth daily       simvastatin 10 MG tablet    ZOCOR    90 tablet    Take 1 tablet by mouth At Bedtime.       SODIUM BICARBONATE PO      Take by mouth 2 times daily       T.E.D. BELOW KNEE/M-REGULAR Misc     2 each    1 each daily       tadalafil (PAH) 20 MG Tabs    ADCIRCA    60 tablet    Take 2 tablets (40 mg) by mouth daily       TEMAZEPAM PO      Take by mouth At Bedtime       TOPIRAMATE PO      Take 100 mg by mouth At Bedtime       treprostinil diolamine ER 0.125 MG CR tablet    ORENITRAM    90 tablet    Take 1 tablet (0.125 mg) by mouth 3 times daily (with meals) 4.25mg tid       * Notice:  This list has 2 medication(s) that are the same as other medications prescribed for you. Read the directions carefully, and ask your doctor or other care provider to review them with you.

## 2017-05-11 NOTE — LETTER
5/11/2017       RE: Judith Nelson  [address] [facility] [address]     Dear Colleague,    Thank you for referring your patient, Judith Nelson, to the [facility] at [facility]. Please see a copy of my visit note below.    Reason for Visit  Judith Nelson is a 57 year old year old female who is being seen for sarcoidosis.  HPI    The patient was seen and examined by Gael Flaherty     Ms. Nelson was last seen in the Pulmonary Clinic on 09/2015. At that time she was on  Imuran 50 mg per day for her sarcoidosis primarily for renal involvement.  She also has pulmonary hypertension, most recently with a mean PA pressure of 65.  With the addition of OPSUMIT, and endothelial receptor antagonist orally, in addition to treprostinil and tadalafil.  There is also thought about initiating IV therapy for her.      From a pulmonary standpoint, she believes that her symptoms are stable.  She does have dyspnea on exertion.  She goes up and down a flight of stairs without any significant problems, though towards the end of the day she would feel short of breath.  She has to pace herself while she is lifting anything including laundry.  Denies any cough or sputum production.  No other symptoms.  No known syncope.       Current Outpatient Prescriptions   Medication     potassium chloride (K-TAB,KLOR-CON) 10 MEQ tablet     furosemide (LASIX) 40 MG tablet     carvedilol (COREG) 12.5 MG tablet     denosumab (PROLIA) 60 MG/ML SOLN     tadalafil, PAH, (ADCIRCA) 20 MG TABS     azaTHIOprine (IMURAN) 50 MG tablet     treprostinil diolamine ER (ORENITRAM) 0.125 MG CR tablet     calcitRIOL (ROCALTROL) 0.25 MCG capsule     aspirin 81 MG tablet     SODIUM BICARBONATE PO     Lactobacillus (ACIDOPHILUS PO)     Multiple Vitamins-Minerals (EYE VITAMINS PO)     ORDER FOR DME     TOPIRAMATE PO     TEMAZEPAM PO     omeprazole (PRILOSEC) 20 MG capsule      LORazepam (ATIVAN) 0.5 MG tablet     buPROPion (WELLBUTRIN SR) 200 MG 12 hr tablet     ORDER FOR DME     acetaminophen (TYLENOL) 500 MG tablet     denosumab (PROLIA) 60 MG/ML SOLN     Elastic Bandages & Supports (T.E.D. BELOW KNEE/M-REGULAR) MISC     simvastatin (ZOCOR) 10 MG tablet     No current facility-administered medications for this visit.      Allergies   Allergen Reactions     Latex Rash     From gloves     Past Medical History:   Diagnosis Date     Anxiety      CKD (chronic kidney disease), stage III     biopsy suspicious for renal sarcoidosis      Hyperprolactinemia (H)      Lower extremity edema     chronic w/ chronic right ankle ulcer     Lumbar compression fracture (H)      Major depressive disorder     with psychotic features, followed by Dr. Rosales at [facility] in [facility]     patient is post menopausal     MGUS (monoclonal gammopathy of unknown significance)     mild, followed by Dr. Long     Osteoporosis      Other seborrheic keratosis      Protrusio acetabuli      Pulmonary hypertension (H)      Stress-induced cardiomyopathy        Past Surgical History:   Procedure Laterality Date     Csection,  X 2       CYSTOSCOPY, RETROGRADES, INSERT STENT URETER(S), COMBINED  10/2/2013    Procedure: COMBINED CYSTOSCOPY, RETROGRADES, INSERT STENT URETER(S);  Left Retrograde Ureteropyelogram and Ureteral Stent Placement;  Surgeon: SONIA Martinez MD;  Location: [facility] OR     ENDOSCOPIC RETROGRADE CHOLANGIOPANCREATOGRAM  2012    Procedure:ENDOSCOPIC RETROGRADE CHOLANGIOPANCREATOGRAM; ERCP biliary sphincterotomy and stone removal; Surgeon:MARGIE RUGGIERO; Location:[facility] OR     GASTROSCOPY,FL  6/10    Erythematous duodenopathy     LAPAROSCOPIC CHOLECYSTECTOMY WITH CHOLANGIOGRAMS  2012    Procedure:LAPAROSCOPIC CHOLECYSTECTOMY WITH CHOLANGIOGRAMS; Surgeon:BRIANA PADILLA; Location:[facility] OR     ORTHOPEDIC SURGERY  10/1/2010    Right hip replacement       Social History      Social History     Marital status:      Spouse name: N/A     Number of children: 3     Years of education: 12     Occupational History      Unemployed     Social History Main Topics     Smoking status: Never Smoker     Smokeless tobacco: Never Used     Alcohol use No      Comment: Occ.     Drug use: No     Sexual activity: No     Other Topics Concern     Parent/Sibling W/ Cabg, Mi Or Angioplasty Before 65f 55m? No     Social History Narrative       ROS Pulmonary  A complete ROS was otherwise negative except as noted in the HPI.  /62 (BP Location: Right arm, Patient Position: Chair, Cuff Size: Adult Regular)  Pulse 74  Resp 16  Wt 55.3 kg (122 lb)  SpO2 95%  BMI 21.61 kg/m2  Exam:   GENERAL APPEARANCE: Alert, and in no apparent distress.  EYES: PERRL, EOMI  MOUTH: Oral mucosa is moist, without any lesions,, no oropharyngeal exudate.  NECK: supple, no masses, no thyromegaly.  LYMPHATICS: No significant axillary, cervical, or supraclavicular nodes.  RESP: normal percussion, good air flow throughout.  No crackles. No rhonchi. No wheezes.  CV: Normal S1, S2, regular rhythm, normal rate. No murmur.  No rub. No gallop. No LE edema.   MS: extremities normal. No clubbing. No cyanosis.  SKIN: no rash on limited exam  NEURO: Mentation intact, speech normal, normal strength and tone, normal gait and stance  Results:  PFTs done today were reviewed by me with the patient.  FVC is 2.85 liters, which is 98% of predicted.  FEV1 is 2.05 liters, which is 87% of predicted.  The ratio is 72.  DLCO not corrected for hemoglobin is 76% of predicted.  In comparison to prior spirometry in 09/2013, slight decrease in FVC (250 mL) and FEV1 (190 mL).  My interpretation is that spirometry is in the normal range with mildly decreased diffusion capacity.     Assessment and plan: Ms. Nelson is a pleasant 57-year-old female with sarcoidosis based on documentation of granulomatous inflammation on a kidney biopsy,  hypercalcemia with minimal pulmonary disease.  She has a longstanding history of pulmonary hypertension, now mean PA pressure of 65 despite oral therapy.  Currently on 50 mg per day of Imuran.  She has had depression in the past.   1.  Pulmonary sarcoidosis.  We will continue to closely monitor.  It is reassuring that her PFTs have not changed significantly.  Last chest CT in 2015.   2.  Sarcoidosis.  We will check an electrolyte panel, calcium, vitamin D and 125 dihydroxyvitamin D.  LFTs done in April were within normal range.  CBC also in April was within normal range except for leukopenia with a white cell count of 3.8, could be related to her medications.  We will recheck this at the next lab draw.   3.  Pulmonary hypertension.  SAPH versus others.  Continuous IV infusion treatment is being considered.   4.  Osteopenia getting Prolia with infusion pending today.  Her calcium level was low in the past.  We will check vitamin D and 125 dihydroxyvitamin D levels.  I reviewed the case with Dr. Jenna Salas.       Follow up in 6 months. Needs to get CBC to check WBC.    1,25 D3. Is low will review with nephrology.               Again, thank you for allowing me to participate in the care of your patient.      Sincerely,    Gael Flaherty MD

## 2017-05-11 NOTE — PROGRESS NOTES
Prolia given today as ordered: 60 mg sub-q in the left upper quadrant. Per Dr. Salas patient's labs from 4/12 are ok to proceed off of.     Patient tolerated injection well.   Next appointment in 6 months.     Jie Meneses RN

## 2017-05-11 NOTE — MR AVS SNAPSHOT
After Visit Summary   5/11/2017    Judith Nelson    MRN: [id number]           Patient Information     Date Of Birth          1960        Visit Information        Provider Department      5/11/2017 10:30 AM Gael Flaherty MD [facility]        Today's Diagnoses     Sarcoidosis (H)    -  1    Osteopenia           Follow-ups after your visit        Follow-up notes from your care team     Return in about 6 months (around 11/11/2017).      Your next 10 appointments already scheduled     May 11, 2017 12:00 PM CDT   Infusion 60 with  SPEC INFUSION, UC 49 ATC   [facility] ([facility])    [address] [place] [address]  2nd [facility] [id number] [phone]            May 11, 2017 12:30 PM CDT   LAB with  LAB   [facility] Lab ([facility])    [address] [place] [id number] [phone]           Patient must bring picture ID.  Patient should be prepared to give a urine specimen  Please do not eat 10-12 hours before your appointment if you are coming in fasting for labs on lipids, cholesterol, or glucose (sugar).  Pregnant women should follow their Care Team instructions. Water with medications is okay. Do not drink coffee or other fluids.   If you have concerns about taking  your medications, please ask at office or if scheduling via [employer], send a message by clicking on Secure Messaging, Message Your Care Team.            May 16, 2017 12:30 PM CDT   Lab with  LAB   [facility] Lab ([facility])    [address] [place] [address]  1st [facility] [id number] [phone]            May 16, 2017  1:00 PM CDT   (Arrive by 12:45 PM)   RETURN PRIMARY PULMONARY with JAVAN Beaulieu [facility] ([facility])    [address] [place] [address]  3rd [facility]  [id number]   [phone]            Nov 16, 2017 11:00 AM CST   PFT VISIT with  PFL B   [facility] Pulmonary Function Testing ([facility])    [address]  3rd [facility] [id number] [phone]            Nov 16, 2017 11:30 AM CST   (Arrive by 11:15 AM)   Return Interstitial Lung with Gael Flaherty MD   [facility] ([facility])    [address]  3rd [facility] [id number] [phone]              Future tests that were ordered for you today     Open Future Orders        Priority Expected Expires Ordered    General PFT Lab (Please always keep checked) Routine  5/11/2018 5/11/2017    Pulmonary Function Test Routine  5/11/2018 5/11/2017    6 minute walk test Routine  5/11/2018 5/11/2017    Basic metabolic panel Today  6/10/2017 5/11/2017    CBC with platelets differential Today  6/10/2017 5/11/2017    Vitamin D Deficiency Routine  6/10/2017 5/11/2017    Comprehensive metabolic panel Routine 5/15/2017 5/19/2017 5/10/2017    CBC with platelets Routine 5/15/2017 5/19/2017 5/10/2017    N terminal pro BNP outpatient Routine 5/15/2017 5/19/2017 5/10/2017            Who to contact     If you have questions or need follow up information about today's clinic visit or your schedule please contact [facility] directly at [phone].  Normal or non-critical lab and imaging results will be communicated to you by MyChart, letter or phone within 4 business days after the clinic has received the results. If you do not hear from us within 7 days, please contact the clinic through [employer] or phone. If you have a critical or abnormal lab result, we will notify you by phone as soon as possible.  Submit refill requests through [employer] or call your pharmacy and they will forward the refill request to us. Please allow 3 business days for your refill to be completed.           Additional Information About Your Visit        [employer] Information     [employer] gives you secure access to your electronic health record. If you see a primary care provider, you can also send messages to your care team and make appointments. If you have questions, please call your primary care clinic.  If you do not have a primary care provider, please call [phone] and they will assist you.        Care EveryWhere ID     This is your Care EveryWhere ID. This could be used by other organizations to access your [place] medical records  [id number]        Your Vitals Were     Pulse Respirations Pulse Oximetry BMI (Body Mass Index)          74 16 95% 21.61 kg/m2         Blood Pressure from Last 3 Encounters:   05/11/17 102/62   05/03/17 106/66   04/12/17 136/89    Weight from Last 3 Encounters:   05/11/17 55.3 kg (122 lb)   04/12/17 55.3 kg (121 lb 14.4 oz)   02/28/17 55.7 kg (122 lb 12.8 oz)              We Performed the Following     1,25 Dihydroxyvitamin D     Angiotensin converting enzyme          Today's Medication Changes          These changes are accurate as of: 5/11/17 11:53 AM.  If you have any questions, ask your nurse or doctor.               These medicines have changed or have updated prescriptions.        Dose/Directions    LORazepam 0.5 MG tablet   Commonly known as:  ATIVAN   This may have changed:    - how much to take  - additional instructions   Used for:  Major depressive disorder, single episode, severe with psychotic features (H)        Take 0.5 mg (1 tab) every morning and 1 mg (2 tabs) every night at bedtime.   Quantity:  90 tablet   Refills:  1       treprostinil diolamine ER 0.125 MG CR tablet   Commonly known as:  ORENITRAM   This may have changed:    - how much to take  - when to take this  - additional instructions   Used for:  Pulmonary arterial hypertension (H)        Dose:  0.125 mg   Take 1 tablet (0.125 mg) by mouth 3 times daily (with meals) 4.25mg tid   Quantity:  90 tablet    Refills:  0                Primary Care Provider Office Phone # Fax #    Anupama Babcock [phone]       [address] [address]        Thank you!     Thank you for choosing [facility]  for your care. Our goal is always to provide you with excellent care. Hearing back from our patients is one way we can continue to improve our services. Please take a few minutes to complete the written survey that you may receive in the mail after your visit with us. Thank you!             Your Updated Medication List - Protect others around you: Learn how to safely use, store and throw away your medicines at www.disposemymeds.org.          This list is accurate as of: 5/11/17 11:53 AM.  Always use your most recent med list.                   Brand Name Dispense Instructions for use    acetaminophen 500 MG tablet    TYLENOL    45 tablet    Take 1-2 tablets (500-1,000 mg) by mouth every 6 hours as needed for pain (Take as needed for pain.  Do not exceed 4 grams (8 tablets) in a day)       ACIDOPHILUS PO      Take 1 tablet by mouth daily       aspirin 81 MG tablet      Take 81 mg by mouth daily       azaTHIOprine 50 MG tablet    IMURAN    90 tablet    Take 1 tablet (50 mg) by mouth daily       buPROPion 200 MG 12 hr tablet    WELLBUTRIN SR    30 tablet    Take 1 tablet (200 mg) by mouth every morning       calcitRIOL 0.25 MCG capsule    ROCALTROL    30 capsule    Take 1 capsule (0.25 mcg) by mouth daily       carvedilol 12.5 MG tablet    COREG    135 tablet    Take 0.5 tablets (6.25 mg) by mouth 2 times daily (with meals)       * denosumab 60 MG/ML Soln injection    PROLIA    1 mL    Inject 1 mL (60 mg) Subcutaneous every 6 months       * denosumab 60 MG/ML Soln injection    PROLIA    1 mL    Inject 1 mL (60 mg) Subcutaneous every 6 months       EYE VITAMINS PO      Take 1 tablet by mouth daily       furosemide 40 MG tablet    LASIX    90  tablet    TAKE 1 TABLET BY MOUTH DAILY       LORazepam 0.5 MG tablet    ATIVAN    90 tablet    Take 0.5 mg (1 tab) every morning and 1 mg (2 tabs) every night at bedtime.       omeprazole 20 MG CR capsule    priLOSEC    180 capsule    Take 1 capsule (20 mg) by mouth 2 times daily (before meals)       order for DME     1 Device    Equipment being ordered: SHOWER CHAIR  FROM [facility]       order for DME     2 Package    Equipment being ordered: Compression stockings; 2 pair; custom measured. Med compression 20-30 mmhg       potassium chloride 10 MEQ tablet    K-TAB,KLOR-CON    90 tablet    Take 1 tablet (10 mEq) by mouth daily       simvastatin 10 MG tablet    ZOCOR    90 tablet    Take 1 tablet by mouth At Bedtime.       SODIUM BICARBONATE PO      Take by mouth 2 times daily       T.E.D. BELOW KNEE/M-REGULAR Misc     2 each    1 each daily       tadalafil (PAH) 20 MG Tabs    ADCIRCA    60 tablet    Take 2 tablets (40 mg) by mouth daily       TEMAZEPAM PO      Take by mouth At Bedtime       TOPIRAMATE PO      Take 100 mg by mouth At Bedtime       treprostinil diolamine ER 0.125 MG CR tablet    ORENITRAM    90 tablet    Take 1 tablet (0.125 mg) by mouth 3 times daily (with meals) 4.25mg tid       * Notice:  This list has 2 medication(s) that are the same as other medications prescribed for you. Read the directions carefully, and ask your doctor or other care provider to review them with you.

## 2017-05-11 NOTE — NURSING NOTE
Chief Complaint   Patient presents with     Interstitial Lung Disease (ILD)     Patient is being seen for ILD follow up      Guillermina Shook CMA at 10:55 AM on 5/11/2017

## 2017-05-12 LAB — ACE SERPL-CCNC: 53 U/L

## 2017-05-15 LAB — DEPRECATED CALCIDIOL+CALCIFEROL SERPL-MC: 44 UG/L (ref 20–75)

## 2017-05-16 ENCOUNTER — OFFICE VISIT (OUTPATIENT)
Dept: CARDIOLOGY | Facility: CLINIC | Age: 57
End: 2017-05-16
Attending: NURSE PRACTITIONER
Payer: MEDICARE

## 2017-05-16 VITALS
HEIGHT: 63 IN | OXYGEN SATURATION: 95 % | HEART RATE: 72 BPM | WEIGHT: 114.7 LBS | BODY MASS INDEX: 20.32 KG/M2 | SYSTOLIC BLOOD PRESSURE: 120 MMHG | DIASTOLIC BLOOD PRESSURE: 64 MMHG

## 2017-05-16 DIAGNOSIS — R06.09 DYSPNEA ON EXERTION: ICD-10-CM

## 2017-05-16 DIAGNOSIS — I27.29 RIGHT HEART FAILURE DUE TO PULMONARY HYPERTENSION (H): ICD-10-CM

## 2017-05-16 DIAGNOSIS — I27.20 PULMONARY HYPERTENSION (H): ICD-10-CM

## 2017-05-16 DIAGNOSIS — I27.20 PULMONARY HYPERTENSION (H): Primary | ICD-10-CM

## 2017-05-16 DIAGNOSIS — I50.810 RIGHT HEART FAILURE DUE TO PULMONARY HYPERTENSION (H): ICD-10-CM

## 2017-05-16 LAB
ALBUMIN SERPL-MCNC: 4 G/DL (ref 3.4–5)
ALP SERPL-CCNC: 52 U/L (ref 40–150)
ALT SERPL W P-5'-P-CCNC: 29 U/L (ref 0–50)
ANION GAP SERPL CALCULATED.3IONS-SCNC: 7 MMOL/L (ref 3–14)
AST SERPL W P-5'-P-CCNC: 28 U/L (ref 0–45)
BILIRUB SERPL-MCNC: 0.4 MG/DL (ref 0.2–1.3)
BUN SERPL-MCNC: 15 MG/DL (ref 7–30)
CALCIUM SERPL-MCNC: 8 MG/DL (ref 8.5–10.1)
CHLORIDE SERPL-SCNC: 116 MMOL/L (ref 94–109)
CO2 SERPL-SCNC: 19 MMOL/L (ref 20–32)
CREAT SERPL-MCNC: 1.18 MG/DL (ref 0.52–1.04)
ERYTHROCYTE [DISTWIDTH] IN BLOOD BY AUTOMATED COUNT: 12.9 % (ref 10–15)
GFR SERPL CREATININE-BSD FRML MDRD: 47 ML/MIN/1.7M2
GLUCOSE SERPL-MCNC: 93 MG/DL (ref 70–99)
HCT VFR BLD AUTO: 40.8 % (ref 35–47)
HGB BLD-MCNC: 13.2 G/DL (ref 11.7–15.7)
MCH RBC QN AUTO: 30.8 PG (ref 26.5–33)
MCHC RBC AUTO-ENTMCNC: 32.4 G/DL (ref 31.5–36.5)
MCV RBC AUTO: 95 FL (ref 78–100)
NT-PROBNP SERPL-MCNC: 685 PG/ML (ref 0–125)
PLATELET # BLD AUTO: 195 10E9/L (ref 150–450)
POTASSIUM SERPL-SCNC: 4.1 MMOL/L (ref 3.4–5.3)
PROT SERPL-MCNC: 7.2 G/DL (ref 6.8–8.8)
RBC # BLD AUTO: 4.28 10E12/L (ref 3.8–5.2)
SODIUM SERPL-SCNC: 142 MMOL/L (ref 133–144)
WBC # BLD AUTO: 4.3 10E9/L (ref 4–11)

## 2017-05-16 PROCEDURE — 99214 OFFICE O/P EST MOD 30 MIN: CPT | Mod: ZP | Performed by: NURSE PRACTITIONER

## 2017-05-16 PROCEDURE — 36415 COLL VENOUS BLD VENIPUNCTURE: CPT | Performed by: NURSE PRACTITIONER

## 2017-05-16 PROCEDURE — 83880 ASSAY OF NATRIURETIC PEPTIDE: CPT | Performed by: NURSE PRACTITIONER

## 2017-05-16 PROCEDURE — 80053 COMPREHEN METABOLIC PANEL: CPT | Performed by: NURSE PRACTITIONER

## 2017-05-16 PROCEDURE — 99213 OFFICE O/P EST LOW 20 MIN: CPT | Mod: ZF

## 2017-05-16 PROCEDURE — 85027 COMPLETE CBC AUTOMATED: CPT | Performed by: NURSE PRACTITIONER

## 2017-05-16 ASSESSMENT — PAIN SCALES - GENERAL: PAINLEVEL: NO PAIN (0)

## 2017-05-16 NOTE — PATIENT INSTRUCTIONS
You were seen today in the Cardiovascular Clinic at the [facility].      Cardiology Providers you saw during your visit:  Korin Hirsch CNP    Diagnosis:  Pulmonary hypertension    Results:  Labs reviewed in clinic.    Recommendations:   Continue medications as prescribed.     Gentle exercise outside.  You should be able to talk in full sentences with out being short of breath.     Take orenitram with food.  Try protein bars.    Follow-up:  6-8 weeks with Dr. Franco      For emergencies call [phone].    For any scheduling needs, please call [phone]. Option 1 then option 3    Thank you for your visit today!     Please call if you have any questions or concerns.  Lynne Tamayo RN

## 2017-05-16 NOTE — PROGRESS NOTES
"May 16, 2017      Ms. Judith Nelson is a pleasant 57 year old female with a past medical history of pulmonary arterial hypertension, iron deficiency anemia, sarcoidosis, hyperprolactinemia, depression, MGUS, CKD, anxiety and depression.     Today, she is accompanied by her daughter and supportive sister in law, Bessie.  She is currently taking Orenitram and Adcirca, and then we recently started her on Opsumit.  At her last visit, the initiation of IV prostacyclin therapy was spoken about at length, however Mrs. Nelson is very nervous, overwhelmed and resistant to this course of therapy.  The [facility] RN came out to her home to initiate teaching, and Mrs. Nelson had all three of her children, as well as two friends at her home for the teaching. Bessie was not able to be there. Mrs. Nelson stated that the more everyone started asking questions, the more she felt she \"could not do this.\"  She ultimately was upset and felt very overwhelmed by everything.     Mrs. Nelson has only been on the Opsumit for the past two weeks, but so far reports feeling no worsening symptoms, lower extremity swelling, weight gain, bloating in her abdomen or increased shortness of breath.    Current Symptoms  1. Any ER visits or hospital admissions since last appointment: No  2. Shortness of breath: No change; occurs with overexertion.  3. Dizziness or lightheadedness: Yes: but rarely and with positional changes.  4. Any syncopal episodes or falls: No  5. Chest pain or chest tightness: No  6. Nausea, vomiting, diarrhea, constipation: Yes: one episode with taking Orenitram on an empty stomach.  At times she will also have stomach cramping when she doesn't eat enough.    7. Swelling in the legs: No  8. Bloating in the abdomen: No  9. PND; Waking up at night coughing, choking or SOB: No; but will cough in the evenings at times.  10. Any medication intolerances: No  11. Reported headaches: Yes: once in a while; but more than before " starting Orenitram.  12. Jaw pain or bone/joint pain: No  13. Current level of activity: Independent, does all ADLs.  Will need to sit and rest with extended exertion, as she becomes short of breath.      PAST MEDICAL HISTORY:  Past Medical History:   Diagnosis Date     Anxiety      CKD (chronic kidney disease), stage III     biopsy suspicious for renal sarcoidosis      Hyperprolactinemia (H)      Lower extremity edema     chronic w/ chronic right ankle ulcer     Lumbar compression fracture (H)      Major depressive disorder     with psychotic features, followed by Dr. Rosales at [facility] in [place]     Menopause 2012    patient is post menopausal     MGUS (monoclonal gammopathy of unknown significance)     mild, followed by Dr. Long     Osteoporosis      Other seborrheic keratosis      Protrusio acetabuli      Pulmonary hypertension (H)      Stress-induced cardiomyopathy          FAMILY HISTORY:  Family History   Problem Relation Age of Onset     CANCER Mother       age 62 leukemia     GASTROINTESTINAL DISEASE Mother      diverticulitis     Hypertension Mother      Allergies Mother      Arthritis Mother      Depression Mother      Eye Disorder Mother      OSTEOPOROSIS Mother      C.A.D. Father      MI/CAD      CANCER Father      skin     Blood Disease Father      renal  problem     Hypertension Father      Anesthesia Reaction Father      Cardiovascular Father      Neurologic Disorder Father      Parkinson's disease     C.A.D. Maternal Grandmother       late 82s MI     DIABETES Maternal Grandmother      C.A.D. Maternal Grandfather       mid 80s MI     Alzheimer Disease Paternal Grandmother       80s     Alzheimer Disease Paternal Grandfather       80s     Neurologic Disorder Sister      migraines     Allergies Sister      DIABETES Other      AODM     Neurologic Disorder Sister      migraines     Allergies Sister      Anesthesia Reaction Son      Anesthesia Reaction Daughter       Anesthesia Reaction Daughter      DIABETES Sister      hypoglycemia         SOCIAL HISTORY:  Social History   Substance Use Topics     Smoking status: Never Smoker     Smokeless tobacco: Never Used     Alcohol use No      Comment: Occ.         CURRENT MEDICATIONS:  Current Outpatient Prescriptions   Medication Sig Dispense Refill     Macitentan (OPSUMIT PO) Take by mouth daily Pt doesn't know dosage,       potassium chloride (K-TAB,KLOR-CON) 10 MEQ tablet Take 1 tablet (10 mEq) by mouth daily 90 tablet 1     furosemide (LASIX) 40 MG tablet TAKE 1 TABLET BY MOUTH DAILY 90 tablet 3     carvedilol (COREG) 12.5 MG tablet Take 0.5 tablets (6.25 mg) by mouth 2 times daily (with meals) 135 tablet 3     denosumab (PROLIA) 60 MG/ML SOLN Inject 1 mL (60 mg) Subcutaneous every 6 months 1 mL 1     tadalafil, PAH, (ADCIRCA) 20 MG TABS Take 2 tablets (40 mg) by mouth daily 60 tablet 11     azaTHIOprine (IMURAN) 50 MG tablet Take 1 tablet (50 mg) by mouth daily 90 tablet 3     treprostinil diolamine ER (ORENITRAM) 0.125 MG CR tablet Take 1 tablet (0.125 mg) by mouth 3 times daily (with meals) 4.25mg tid (Patient taking differently: Take 5 mg by mouth daily 4.25mg tid) 90 tablet 0     calcitRIOL (ROCALTROL) 0.25 MCG capsule Take 1 capsule (0.25 mcg) by mouth daily 30 capsule 1     aspirin 81 MG tablet Take 81 mg by mouth daily       SODIUM BICARBONATE PO Take by mouth 2 times daily       Lactobacillus (ACIDOPHILUS PO) Take 1 tablet by mouth daily       Multiple Vitamins-Minerals (EYE VITAMINS PO) Take 1 tablet by mouth daily       ORDER FOR DME Equipment being ordered: Compression stockings; 2 pair; custom measured. Med compression 20-30 mmhg 2 Package 2     TOPIRAMATE PO Take 100 mg by mouth At Bedtime       TEMAZEPAM PO Take by mouth At Bedtime       omeprazole (PRILOSEC) 20 MG capsule Take 1 capsule (20 mg) by mouth 2 times daily (before meals) 180 capsule 3     LORazepam (ATIVAN) 0.5 MG tablet Take 0.5 mg (1 tab) every morning  "and 1 mg (2 tabs) every night at bedtime. (Patient taking differently: 1 mg Take 0.5 mg (1 tab) every morning and 1 mg (2 tabs) every night at bedtime.) 90 tablet 1     buPROPion (WELLBUTRIN SR) 200 MG 12 hr tablet Take 1 tablet (200 mg) by mouth every morning 30 tablet 0     ORDER FOR DME Equipment being ordered: SHOWER CHAIR  FROM [email or web address] 1 Device 0     acetaminophen (TYLENOL) 500 MG tablet Take 1-2 tablets (500-1,000 mg) by mouth every 6 hours as needed for pain (Take as needed for pain.  Do not exceed 4 grams (8 tablets) in a day) 45 tablet 10     denosumab (PROLIA) 60 MG/ML SOLN Inject 1 mL (60 mg) Subcutaneous every 6 months 1 mL 1     Elastic Bandages & Supports (T.E.D. BELOW KNEE/M-REGULAR) MISC 1 each daily 2 each 0     simvastatin (ZOCOR) 10 MG tablet Take 1 tablet by mouth At Bedtime. 90 tablet 1         ROS:   10 point ROS of systems including Constitutional, Eyes, Respiratory, Cardiovascular, Gastroenterology, Genitourinary, Integumentary, Muscularskeletal, Psychiatric were all negative except for pertinent positives noted in my HPI.    EXAM:  /64 (BP Location: Left arm, Patient Position: Chair, Cuff Size: Adult Regular)  Pulse 72  Ht 1.588 m (5' 2.5\")  Wt 52 kg (114 lb 11.2 oz)  SpO2 95%  BMI 20.64 kg/m2  General: appears comfortable, alert and articulate  Head: normocephalic, atraumatic  Eyes: anicteric sclera, EOMI  Neck: no adenopathy  Orophyarynx: moist mucosa, no lesions, dentition intact  Heart: regular, S1/S2, P2 auscultated, no murmur, gallop, rub, estimated JVP WNL  Lungs: clear, no rales or wheezing  Abdomen: soft, non-tender, bowel sounds present, no hepatosplenomegaly  Extremities: no clubbing, cyanosis or edema  Neurological: normal speech and affect, no gross motor deficits, slight tremor observed      Weight  Wt Readings from Last 10 Encounters:   05/16/17 52 kg (114 lb 11.2 oz)   05/11/17 55.3 kg (122 lb)   04/12/17 55.3 kg (121 lb 14.4 oz)   02/28/17 55.7 kg (122 lb " 12.8 oz)   10/18/16 61 kg (134 lb 8 oz)   10/14/16 62 kg (136 lb 9.6 oz)   08/25/16 63 kg (138 lb 14.4 oz)   07/14/16 62.4 kg (137 lb 9.6 oz)   05/24/16 64 kg (141 lb)   04/26/16 69.4 kg (153 lb)         Labs:    CBC RESULTS:  Lab Results   Component Value Date    WBC 4.3 05/16/2017    RBC 4.28 05/16/2017    HGB 13.2 05/16/2017    HCT 40.8 05/16/2017    MCV 95 05/16/2017    MCH 30.8 05/16/2017    MCHC 32.4 05/16/2017    RDW 12.9 05/16/2017     05/16/2017       CMP RESULTS:  Lab Results   Component Value Date     05/11/2017    POTASSIUM 4.3 05/11/2017    CHLORIDE 107 05/11/2017    CO2 21 05/11/2017    ANIONGAP 12 05/11/2017    GLC 81 05/11/2017    BUN 19 05/11/2017    CR 1.58 (H) 05/11/2017    GFRESTIMATED 34 (L) 05/11/2017    GFRESTBLACK 41 (L) 05/11/2017    ISHMAEL 9.0 05/11/2017    BILITOTAL 0.3 04/12/2017    ALBUMIN 3.6 04/12/2017    ALKPHOS 53 04/12/2017    ALT 22 04/12/2017    AST 12 04/12/2017        INR RESULTS:  Lab Results   Component Value Date    INR 1.04 03/26/2013       BNP RESULTS:  Lab Results   Component Value Date    NTBNPI 413 04/12/2017     No results found for: BNP      Recent Tests:      Echocardiogram (2/28/2017):  Interpretation Summary  The RV is moderately dilated. Global right ventricular function is normal.  Paradoxical septal motion consistent with right ventricular pressure overload  is present.  Global and regional left ventricular function is normal with an EF of 60-65%.  Severe tricuspid insufficiency is present. The right ventricular systolic  pressure is 132 mmHg above the right atrial pressure.  This study was compared with the study from 10/14/2016. RV size and function  are both worse. The degree of pulmonary hypertension is significantly higher.      RHC (4/12/2017):  RA- 7, 5, 4  RV- 113, 7  PA- 113/37, 65  PAW- 12, 12, 10  PVR-20.1  CO/CI-2.9 (1.6)  Dipti CO/CI-2.3 (1.3)      PFT (5/11/2017):   PFT Latest Ref Rng & Units 5/11/2017   FVC L 2.85   FEV1 L 2.05   FVC% % 98    FEV1% % 87     FEV1/FVC- 0.72  DLCO - 76%      6MWT (4/26/2016):  351 meters, on room air, O2sats starting at 94%, desaturated to 87%      Assessment and Plan:   Ms. Judith Nelson is a 57 year old female with pulmonary arterial hypertension, WHO Group I, NYHA Functional Class II/III.    Diagnoses:  1. Pulmonary arterial hypertension  Mrs. Judiht Nelson has significant PAH that was evaluated to be worsened between previous and most recent RHC procedures with Dr. Franco at her last appointment. Although Opsumit therapy was started as triple therapy, the message conveyed in her last visit and today's visit was that eventually, with the time undetermined as of now, IV prostacyclin would be therapy that Mrs. Nelson would have to start.  Instead of a forceful approach, I encouraged Mrs. Nelson that the current plan would be to see if Opsumit will make any improvements in her condition and symptoms.  If it becomes the case that her condition still worsens on triple therapy (Opsumit, Adcirca and Orenitram), then she would have to start IV prostacyclin to maintain her health, longevity and activity tolerance.  I emphasized how much support we would provide to Mrs. Nelson, and that we would not let her be overwhelmed, and help her in every way we could.  Conversely, I also did not push this on her and did say that it was quite reasonable to give the Opsumit a chance to work - since it has only been two weeks of therapy so far.  We spent 65 minutes overall together, discussing the meeting with the [facility] RN, strategies on who could help her at home if she may need to manage an IV and IV CAD pump, and how although it feels overwhelming now - she truly can be strong enough to do this because it will mean staying one step ahead of her progressing PAH. Instead of letting the possibility frighten her, I tried to empower her and focus on her capabilities to take care of herself in this way.     At the end of our  meeting, I feel that Mrs. Nelson was not as completely overwhelmed with the idea of IV prostacyclin, but she, her sister in law, and myself all agreed to give the Opsumit a chance to work with her other medications. I do still want to make sure she feels a part of the decision-making when it comes to her care under our team.  Lastly, I suggested she try having protein bars available, to make sure she has adequate calories when taking Orenitram at times that she does not have access to a full meal to prevent nausea.  We also agreed that gentle walking for exercise without overexertion, would be helpful for maintaining activity tolerance.        It was a pleasure seeing Ms. Judith Nelson at the [facility].       Sincerely,  JAVAN Oh, CNP.

## 2017-05-16 NOTE — NURSING NOTE
Labs: Patient was given results of the laboratory testing obtained today. Patient was instructed to return for the next laboratory testing in 6-8 weeks . Patient demonstrated understanding of this information and agreed to call with further questions or concerns.   Med Reconcile: Reviewed and verified all current medications with the patient. Patient will take her orenitram with food.  The updated medication list was printed and given to the patient.  Patient will start gentle, outside exercise.  She will be able to maintain a conversation with out getting short of breath.  She will use this to guide her exercise.  Patient states understanding and agrees to call with any questions or concerns.  Return Appointment: 6-8 weeks with Dr. Franco.  Patient given instructions regarding scheduling next clinic visit. Patient demonstrated understanding of this information and agreed to call with further questions or concerns.  Patient stated she understood all health information given and agreed to call with further questions or concerns.

## 2017-05-16 NOTE — LETTER
"5/16/2017      RE: Judith Nelson  [address] [facility] [address]       Dear Colleague,    Thank you for the opportunity to participate in the care of your patient, Judith Nelson, at the [facility] at [facility]. Please see a copy of my visit note below.    May 16, 2017      Ms. Judith Nelson is a pleasant 57 year old female with a past medical history of pulmonary arterial hypertension, iron deficiency anemia, sarcoidosis, hyperprolactinemia, depression, MGUS, CKD, anxiety and depression.     Today, she is accompanied by her daughter and supportive sister in law, Bessie.  She is currently taking Orenitram and Adcirca, and then we recently started her on Opsumit.  At her last visit, the initiation of IV prostacyclin therapy was spoken about at length, however Mrs. Nelson is very nervous, overwhelmed and resistant to this course of therapy.  The [facility] RN came out to her home to initiate teaching, and Mrs. Nelson had all three of her children, as well as two friends at her home for the teaching. Bessie was not able to be there. Mrs. Nelson stated that the more everyone started asking questions, the more she felt she \"could not do this.\"  She ultimately was upset and felt very overwhelmed by everything.     Mrs. Nelson has only been on the Opsumit for the past two weeks, but so far reports feeling no worsening symptoms, lower extremity swelling, weight gain, bloating in her abdomen or increased shortness of breath.    Current Symptoms  1. Any ER visits or hospital admissions since last appointment: No  2. Shortness of breath: No change; occurs with overexertion.  3. Dizziness or lightheadedness: Yes: but rarely and with positional changes.  4. Any syncopal episodes or falls: No  5. Chest pain or chest tightness: No  6. Nausea, vomiting, diarrhea, constipation: Yes: one episode with taking Orenitram on an empty stomach.  At times she will also " have stomach cramping when she doesn't eat enough.    7. Swelling in the legs: No  8. Bloating in the abdomen: No  9. PND; Waking up at night coughing, choking or SOB: No; but will cough in the evenings at times.  10. Any medication intolerances: No  11. Reported headaches: Yes: once in a while; but more than before starting Orenitram.  12. Jaw pain or bone/joint pain: No  13. Current level of activity: Independent, does all ADLs.  Will need to sit and rest with extended exertion, as she becomes short of breath.      PAST MEDICAL HISTORY:  Past Medical History:   Diagnosis Date     Anxiety      CKD (chronic kidney disease), stage III     biopsy suspicious for renal sarcoidosis      Hyperprolactinemia (H)      Lower extremity edema     chronic w/ chronic right ankle ulcer     Lumbar compression fracture (H)      Major depressive disorder     with psychotic features, followed by Dr. Rosales at [facility] in [place]     Menopause 2012    patient is post menopausal     MGUS (monoclonal gammopathy of unknown significance)     mild, followed by Dr. Long     Osteoporosis      Other seborrheic keratosis      Protrusio acetabuli      Pulmonary hypertension (H)      Stress-induced cardiomyopathy          FAMILY HISTORY:  Family History   Problem Relation Age of Onset     CANCER Mother       age 62 leukemia     GASTROINTESTINAL DISEASE Mother      diverticulitis     Hypertension Mother      Allergies Mother      Arthritis Mother      Depression Mother      Eye Disorder Mother      OSTEOPOROSIS Mother      C.A.D. Father      MI/CAD      CANCER Father      skin     Blood Disease Father      renal  problem     Hypertension Father      Anesthesia Reaction Father      Cardiovascular Father      Neurologic Disorder Father      Parkinson's disease     C.A.D. Maternal Grandmother       late 82s MI     DIABETES Maternal Grandmother      C.A.D. Maternal Grandfather       mid 80s MI     Alzheimer Disease Paternal  Grandmother       80s     Alzheimer Disease Paternal Grandfather       80s     Neurologic Disorder Sister      migraines     Allergies Sister      DIABETES Other      AODM     Neurologic Disorder Sister      migraines     Allergies Sister      Anesthesia Reaction Son      Anesthesia Reaction Daughter      Anesthesia Reaction Daughter      DIABETES Sister      hypoglycemia         SOCIAL HISTORY:  Social History   Substance Use Topics     Smoking status: Never Smoker     Smokeless tobacco: Never Used     Alcohol use No      Comment: Occ.         CURRENT MEDICATIONS:  Current Outpatient Prescriptions   Medication Sig Dispense Refill     Macitentan (OPSUMIT PO) Take by mouth daily Pt doesn't know dosage,       potassium chloride (K-TAB,KLOR-CON) 10 MEQ tablet Take 1 tablet (10 mEq) by mouth daily 90 tablet 1     furosemide (LASIX) 40 MG tablet TAKE 1 TABLET BY MOUTH DAILY 90 tablet 3     carvedilol (COREG) 12.5 MG tablet Take 0.5 tablets (6.25 mg) by mouth 2 times daily (with meals) 135 tablet 3     denosumab (PROLIA) 60 MG/ML SOLN Inject 1 mL (60 mg) Subcutaneous every 6 months 1 mL 1     tadalafil, PAH, (ADCIRCA) 20 MG TABS Take 2 tablets (40 mg) by mouth daily 60 tablet 11     azaTHIOprine (IMURAN) 50 MG tablet Take 1 tablet (50 mg) by mouth daily 90 tablet 3     treprostinil diolamine ER (ORENITRAM) 0.125 MG CR tablet Take 1 tablet (0.125 mg) by mouth 3 times daily (with meals) 4.25mg tid (Patient taking differently: Take 5 mg by mouth daily 4.25mg tid) 90 tablet 0     calcitRIOL (ROCALTROL) 0.25 MCG capsule Take 1 capsule (0.25 mcg) by mouth daily 30 capsule 1     aspirin 81 MG tablet Take 81 mg by mouth daily       SODIUM BICARBONATE PO Take by mouth 2 times daily       Lactobacillus (ACIDOPHILUS PO) Take 1 tablet by mouth daily       Multiple Vitamins-Minerals (EYE VITAMINS PO) Take 1 tablet by mouth daily       ORDER FOR DME Equipment being ordered: Compression stockings; 2 pair; custom measured. Med  "compression 20-30 mmhg 2 Package 2     TOPIRAMATE PO Take 100 mg by mouth At Bedtime       TEMAZEPAM PO Take by mouth At Bedtime       omeprazole (PRILOSEC) 20 MG capsule Take 1 capsule (20 mg) by mouth 2 times daily (before meals) 180 capsule 3     LORazepam (ATIVAN) 0.5 MG tablet Take 0.5 mg (1 tab) every morning and 1 mg (2 tabs) every night at bedtime. (Patient taking differently: 1 mg Take 0.5 mg (1 tab) every morning and 1 mg (2 tabs) every night at bedtime.) 90 tablet 1     buPROPion (WELLBUTRIN SR) 200 MG 12 hr tablet Take 1 tablet (200 mg) by mouth every morning 30 tablet 0     ORDER FOR DME Equipment being ordered: SHOWER CHAIR  FROM [employer] 1 Device 0     acetaminophen (TYLENOL) 500 MG tablet Take 1-2 tablets (500-1,000 mg) by mouth every 6 hours as needed for pain (Take as needed for pain.  Do not exceed 4 grams (8 tablets) in a day) 45 tablet 10     denosumab (PROLIA) 60 MG/ML SOLN Inject 1 mL (60 mg) Subcutaneous every 6 months 1 mL 1     Elastic Bandages & Supports (T.E.D. BELOW KNEE/M-REGULAR) MISC 1 each daily 2 each 0     simvastatin (ZOCOR) 10 MG tablet Take 1 tablet by mouth At Bedtime. 90 tablet 1         ROS:   10 point ROS of systems including Constitutional, Eyes, Respiratory, Cardiovascular, Gastroenterology, Genitourinary, Integumentary, Muscularskeletal, Psychiatric were all negative except for pertinent positives noted in my HPI.    EXAM:  /64 (BP Location: Left arm, Patient Position: Chair, Cuff Size: Adult Regular)  Pulse 72  Ht 1.588 m (5' 2.5\")  Wt 52 kg (114 lb 11.2 oz)  SpO2 95%  BMI 20.64 kg/m2  General: appears comfortable, alert and articulate  Head: normocephalic, atraumatic  Eyes: anicteric sclera, EOMI  Neck: no adenopathy  Orophyarynx: moist mucosa, no lesions, dentition intact  Heart: regular, S1/S2, P2 auscultated, no murmur, gallop, rub, estimated JVP WNL  Lungs: clear, no rales or wheezing  Abdomen: soft, non-tender, bowel sounds present, no " hepatosplenomegaly  Extremities: no clubbing, cyanosis or edema  Neurological: normal speech and affect, no gross motor deficits, slight tremor observed      Weight  Wt Readings from Last 10 Encounters:   05/16/17 52 kg (114 lb 11.2 oz)   05/11/17 55.3 kg (122 lb)   04/12/17 55.3 kg (121 lb 14.4 oz)   02/28/17 55.7 kg (122 lb 12.8 oz)   10/18/16 61 kg (134 lb 8 oz)   10/14/16 62 kg (136 lb 9.6 oz)   08/25/16 63 kg (138 lb 14.4 oz)   07/14/16 62.4 kg (137 lb 9.6 oz)   05/24/16 64 kg (141 lb)   04/26/16 69.4 kg (153 lb)         Labs:    CBC RESULTS:  Lab Results   Component Value Date    WBC 4.3 05/16/2017    RBC 4.28 05/16/2017    HGB 13.2 05/16/2017    HCT 40.8 05/16/2017    MCV 95 05/16/2017    MCH 30.8 05/16/2017    MCHC 32.4 05/16/2017    RDW 12.9 05/16/2017     05/16/2017       CMP RESULTS:  Lab Results   Component Value Date     05/11/2017    POTASSIUM 4.3 05/11/2017    CHLORIDE 107 05/11/2017    CO2 21 05/11/2017    ANIONGAP 12 05/11/2017    GLC 81 05/11/2017    BUN 19 05/11/2017    CR 1.58 (H) 05/11/2017    GFRESTIMATED 34 (L) 05/11/2017    GFRESTBLACK 41 (L) 05/11/2017    ISHMAEL 9.0 05/11/2017    BILITOTAL 0.3 04/12/2017    ALBUMIN 3.6 04/12/2017    ALKPHOS 53 04/12/2017    ALT 22 04/12/2017    AST 12 04/12/2017        INR RESULTS:  Lab Results   Component Value Date    INR 1.04 03/26/2013       BNP RESULTS:  Lab Results   Component Value Date    NTBNPI 413 04/12/2017     No results found for: BNP      Recent Tests:      Echocardiogram (2/28/2017):  Interpretation Summary  The RV is moderately dilated. Global right ventricular function is normal.  Paradoxical septal motion consistent with right ventricular pressure overload  is present.  Global and regional left ventricular function is normal with an EF of 60-65%.  Severe tricuspid insufficiency is present. The right ventricular systolic  pressure is 132 mmHg above the right atrial pressure.  This study was compared with the study from 10/14/2016.  RV size and function  are both worse. The degree of pulmonary hypertension is significantly higher.      RHC (4/12/2017):  RA- 7, 5, 4  RV- 113, 7  PA- 113/37, 65  PAW- 12, 12, 10  PVR-20.1  CO/CI-2.9 (1.6)  Dipti CO/CI-2.3 (1.3)      PFT (5/11/2017):   PFT Latest Ref Rng & Units 5/11/2017   FVC L 2.85   FEV1 L 2.05   FVC% % 98   FEV1% % 87     FEV1/FVC- 0.72  DLCO - 76%      6MWT (4/26/2016):  351 meters, on room air, O2sats starting at 94%, desaturated to 87%      Assessment and Plan:   Ms. Judith Nleson is a 57 year old female with pulmonary arterial hypertension, WHO Group I, NYHA Functional Class II/III.    Diagnoses:  1. Pulmonary arterial hypertension  Mrs. Judith Nelson has significant PAH that was evaluated to be worsened between previous and most recent RHC procedures with Dr. Franco at her last appointment. Although Opsumit therapy was started as triple therapy, the message conveyed in her last visit and today's visit was that eventually, with the time undetermined as of now, IV prostacyclin would be therapy that Mrs. Nelson would have to start.  Instead of a forceful approach, I encouraged Mrs. Nelson that the current plan would be to see if Opsumit will make any improvements in her condition and symptoms.  If it becomes the case that her condition still worsens on triple therapy (Opsumit, Adcirca and Orenitram), then she would have to start IV prostacyclin to maintain her health, longevity and activity tolerance.  I emphasized how much support we would provide to Mrs. Nelson, and that we would not let her be overwhelmed, and help her in every way we could.  Conversely, I also did not push this on her and did say that it was quite reasonable to give the Opsumit a chance to work - since it has only been two weeks of therapy so far.  We spent 65 minutes overall together, discussing the meeting with the [facility] RN, strategies on who could help her at home if she may need to manage an IV and IV  CAD pump, and how although it feels overwhelming now - she truly can be strong enough to do this because it will mean staying one step ahead of her progressing PAH. Instead of letting the possibility frighten her, I tried to empower her and focus on her capabilities to take care of herself in this way.     At the end of our meeting, I feel that Mrs. Nelson was not as completely overwhelmed with the idea of IV prostacyclin, but she, her sister in law, and myself all agreed to give the Opsumit a chance to work with her other medications. I do still want to make sure she feels a part of the decision-making when it comes to her care under our team.  Lastly, I suggested she try having protein bars available, to make sure she has adequate calories when taking Orenitram at times that she does not have access to a full meal to prevent nausea.  We also agreed that gentle walking for exercise without overexertion, would be helpful for maintaining activity tolerance.        It was a pleasure seeing Ms. Judith Nelson at the [facility].       Sincerely,  Korin Hirsch, APRN, CNP.

## 2017-05-16 NOTE — NURSING NOTE
Chief Complaint   Patient presents with     Follow Up For     Return PH for follow up s/p discussion regarding initiation of prostacyclin therapy

## 2017-05-16 NOTE — MR AVS SNAPSHOT
After Visit Summary   5/16/2017    Judith Nelson    MRN: [id number]           Patient Information     Date Of Birth          1960        Visit Information        Provider Department      5/16/2017 1:00 PM Korin Hirsch APRN CNP [facility]        Today's Diagnoses     Pulmonary hypertension (H)    -  1    Heart failure (H)           Care Instructions    You were seen today in the Cardiovascular Clinic at the [facility].      Cardiology Providers you saw during your visit:  Korin Hirsch CNP    Diagnosis:  Pulmonary hypertension    Results:  Labs reviewed in clinic.    Recommendations:   Continue medications as prescribed.     Gentle exercise outside.  You should be able to talk in full sentences with out being short of breath.     Take orenitram with food.  Try protein bars.    Follow-up:  6-8 weeks with Dr. Franco      For emergencies call 911.    For any scheduling needs, please call [phone]. Option 1 then option 3    Thank you for your visit today!     Please call if you have any questions or concerns.  Lynne Tamayo RN            Follow-ups after your visit        Follow-up notes from your care team     See patient instructions section of the AVS Return in about 6 weeks (around 6/27/2017) for Dr. Franco,  , Pulmonary Hypertension.      Your next 10 appointments already scheduled     Jul 25, 2017  2:00 PM CDT   Lab with [employer] LAB   [facility] ([facility])    [address] [place] [id number] [phone]            Jul 25, 2017  2:30 PM CDT   (Arrive by 2:15 PM)   RETURN PRIMARY PULMONARY with Norm Franco MD   [facility] ([facility])    [address] [place] [id number] [phone]            Oct 16, 2017 10:45 AM CDT   Lab with [employer] LAB   [facility] ([facility])    [address] [place] [address]  Floor  [facility] [address]   [phone]            Nov 16, 2017 11:00 AM CST   PFT VISIT with ALEXANDRA PFL B   [facility] ([facility])    [address] [place] [id number]   [phone]            Nov 16, 2017 11:30 AM CST   (Arrive by 11:15 AM)   Return Interstitial Lung with Gael Flaherty MD   [facility] ([facility])    [address] [place] [id number]   [phone]              Future tests that were ordered for you today     Open Future Orders        Priority Expected Expires Ordered    Comprehensive metabolic panel Routine 6/16/2017 7/24/2017 5/16/2017    CBC with platelets Routine 6/16/2017 7/24/2017 5/16/2017    N terminal pro BNP outpatient Routine 6/16/2017 7/24/2017 5/16/2017            Who to contact     If you have questions or need follow up information about today's clinic visit or your schedule please contact [facility] directly at [phone].  Normal or non-critical lab and imaging results will be communicated to you by [employer], letter or phone within 4 business days after the clinic has received the results. If you do not hear from us within 7 days, please contact the clinic through [employer] or phone. If you have a critical or abnormal lab result, we will notify you by phone as soon as possible.  Submit refill requests through [employer] or call your pharmacy and they will forward the refill request to us. Please allow 3 business days for your refill to be completed.          Additional Information About Your Visit        [employer] Information     [employer] gives you secure access to your electronic health record. If you see a primary care provider, you can also send messages to your care team and make appointments. If you have questions, please call your primary care clinic.  If you do not have a primary care provider, please call  "[phone] and they will assist you.        Care EveryWhere ID     This is your Care EveryWhere ID. This could be used by other organizations to access your [place] medical records  [id number]        Your Vitals Were     Pulse Height Pulse Oximetry BMI (Body Mass Index)          72 1.588 m (5' 2.5\") 95% 20.64 kg/m2         Blood Pressure from Last 3 Encounters:   05/16/17 120/64   05/11/17 110/64   05/11/17 102/62    Weight from Last 3 Encounters:   05/16/17 52 kg (114 lb 11.2 oz)   05/11/17 55.3 kg (122 lb)   04/12/17 55.3 kg (121 lb 14.4 oz)                 Today's Medication Changes          These changes are accurate as of: 5/16/17  2:36 PM.  If you have any questions, ask your nurse or doctor.               These medicines have changed or have updated prescriptions.        Dose/Directions    LORazepam 0.5 MG tablet   Commonly known as:  ATIVAN   This may have changed:    - how much to take  - additional instructions   Used for:  Major depressive disorder, single episode, severe with psychotic features (H)        Take 0.5 mg (1 tab) every morning and 1 mg (2 tabs) every night at bedtime.   Quantity:  90 tablet   Refills:  1       treprostinil diolamine ER 0.125 MG CR tablet   Commonly known as:  ORENITRAM   This may have changed:    - how much to take  - when to take this  - additional instructions   Used for:  Pulmonary arterial hypertension (H)        Dose:  0.125 mg   Take 1 tablet (0.125 mg) by mouth 3 times daily (with meals) 4.25mg tid   Quantity:  90 tablet   Refills:  0                Primary Care Provider Office Phone # Fax #    Anupama Babcock [phone]       [address] [address]        Thank you!     Thank you for choosing [facility]  for your care. Our goal is always to provide you with excellent care. Hearing back from our patients is one way we can continue to improve our services. Please take a few minutes to complete " the written survey that you may receive in the mail after your visit with us. Thank you!             Your Updated Medication List - Protect others around you: Learn how to safely use, store and throw away your medicines at www.disposemymeds.org.          This list is accurate as of: 5/16/17  2:36 PM.  Always use your most recent med list.                   Brand Name Dispense Instructions for use    acetaminophen 500 MG tablet    TYLENOL    45 tablet    Take 1-2 tablets (500-1,000 mg) by mouth every 6 hours as needed for pain (Take as needed for pain.  Do not exceed 4 grams (8 tablets) in a day)       ACIDOPHILUS PO      Take 1 tablet by mouth daily       aspirin 81 MG tablet      Take 81 mg by mouth daily       azaTHIOprine 50 MG tablet    IMURAN    90 tablet    Take 1 tablet (50 mg) by mouth daily       buPROPion 200 MG 12 hr tablet    WELLBUTRIN SR    30 tablet    Take 1 tablet (200 mg) by mouth every morning       calcitRIOL 0.25 MCG capsule    ROCALTROL    30 capsule    Take 1 capsule (0.25 mcg) by mouth daily       carvedilol 12.5 MG tablet    COREG    135 tablet    Take 0.5 tablets (6.25 mg) by mouth 2 times daily (with meals)       * denosumab 60 MG/ML Soln injection    PROLIA    1 mL    Inject 1 mL (60 mg) Subcutaneous every 6 months       * denosumab 60 MG/ML Soln injection    PROLIA    1 mL    Inject 1 mL (60 mg) Subcutaneous every 6 months       EYE VITAMINS PO      Take 1 tablet by mouth daily       furosemide 40 MG tablet    LASIX    90 tablet    TAKE 1 TABLET BY MOUTH DAILY       LORazepam 0.5 MG tablet    ATIVAN    90 tablet    Take 0.5 mg (1 tab) every morning and 1 mg (2 tabs) every night at bedtime.       omeprazole 20 MG CR capsule    priLOSEC    180 capsule    Take 1 capsule (20 mg) by mouth 2 times daily (before meals)       OPSUMIT PO      Take by mouth daily Pt doesn't know dosage,       order for DME     1 Device    Equipment being ordered: SHOWER CHAIR  FROM [facility]       order for  DME     2 Package    Equipment being ordered: Compression stockings; 2 pair; custom measured. Med compression 20-30 mmhg       potassium chloride 10 MEQ tablet    K-TAB,KLOR-CON    90 tablet    Take 1 tablet (10 mEq) by mouth daily       simvastatin 10 MG tablet    ZOCOR    90 tablet    Take 1 tablet by mouth At Bedtime.       SODIUM BICARBONATE PO      Take by mouth 2 times daily       T.E.D. BELOW KNEE/M-REGULAR Misc     2 each    1 each daily       tadalafil (PAH) 20 MG Tabs    ADCIRCA    60 tablet    Take 2 tablets (40 mg) by mouth daily       TEMAZEPAM PO      Take by mouth At Bedtime       TOPIRAMATE PO      Take 100 mg by mouth At Bedtime       treprostinil diolamine ER 0.125 MG CR tablet    ORENITRAM    90 tablet    Take 1 tablet (0.125 mg) by mouth 3 times daily (with meals) 4.25mg tid       * Notice:  This list has 2 medication(s) that are the same as other medications prescribed for you. Read the directions carefully, and ask your doctor or other care provider to review them with you.

## 2017-05-17 LAB — 1,25(OH)2D SERPL-MCNC: <5 PG/ML (ref 19.9–79.3)

## 2017-05-24 ENCOUNTER — HOSPITAL ENCOUNTER (EMERGENCY)
Facility: CLINIC | Age: 57
Discharge: HOME | End: 2017-05-24
Attending: NURSE PRACTITIONER | Admitting: NURSE PRACTITIONER
Payer: MEDICARE

## 2017-05-24 VITALS
OXYGEN SATURATION: 97 % | SYSTOLIC BLOOD PRESSURE: 130 MMHG | TEMPERATURE: 98.1 F | HEART RATE: 88 BPM | BODY MASS INDEX: 20.7 KG/M2 | DIASTOLIC BLOOD PRESSURE: 70 MMHG | RESPIRATION RATE: 18 BRPM | WEIGHT: 115 LBS

## 2017-05-24 DIAGNOSIS — L03.116 CELLULITIS OF LEFT LOWER EXTREMITY: ICD-10-CM

## 2017-05-24 LAB
ANION GAP SERPL CALCULATED.3IONS-SCNC: 9 MMOL/L (ref 3–14)
BASOPHILS # BLD AUTO: 0 10E9/L (ref 0–0.2)
BASOPHILS NFR BLD AUTO: 0.1 %
BUN SERPL-MCNC: 19 MG/DL (ref 7–30)
CALCIUM SERPL-MCNC: 8.3 MG/DL (ref 8.5–10.1)
CHLORIDE SERPL-SCNC: 107 MMOL/L (ref 94–109)
CO2 SERPL-SCNC: 19 MMOL/L (ref 20–32)
CREAT SERPL-MCNC: 1.31 MG/DL (ref 0.52–1.04)
DIFFERENTIAL METHOD BLD: ABNORMAL
EOSINOPHIL # BLD AUTO: 0 10E9/L (ref 0–0.7)
EOSINOPHIL NFR BLD AUTO: 0.3 %
ERYTHROCYTE [DISTWIDTH] IN BLOOD BY AUTOMATED COUNT: 12.5 % (ref 10–15)
GFR SERPL CREATININE-BSD FRML MDRD: 42 ML/MIN/1.7M2
GLUCOSE SERPL-MCNC: 99 MG/DL (ref 70–99)
HCT VFR BLD AUTO: 39.7 % (ref 35–47)
HGB BLD-MCNC: 13.4 G/DL (ref 11.7–15.7)
IMM GRANULOCYTES # BLD: 0 10E9/L (ref 0–0.4)
IMM GRANULOCYTES NFR BLD: 0.1 %
LACTATE BLD-SCNC: 0.9 MMOL/L (ref 0.7–2.1)
LYMPHOCYTES # BLD AUTO: 0.8 10E9/L (ref 0.8–5.3)
LYMPHOCYTES NFR BLD AUTO: 9.7 %
MCH RBC QN AUTO: 31.2 PG (ref 26.5–33)
MCHC RBC AUTO-ENTMCNC: 33.8 G/DL (ref 31.5–36.5)
MCV RBC AUTO: 92 FL (ref 78–100)
MONOCYTES # BLD AUTO: 0.7 10E9/L (ref 0–1.3)
MONOCYTES NFR BLD AUTO: 8.4 %
NEUTROPHILS # BLD AUTO: 6.4 10E9/L (ref 1.6–8.3)
NEUTROPHILS NFR BLD AUTO: 81.4 %
PLATELET # BLD AUTO: 136 10E9/L (ref 150–450)
POTASSIUM SERPL-SCNC: 3.5 MMOL/L (ref 3.4–5.3)
RBC # BLD AUTO: 4.3 10E12/L (ref 3.8–5.2)
SODIUM SERPL-SCNC: 135 MMOL/L (ref 133–144)
WBC # BLD AUTO: 7.8 10E9/L (ref 4–11)

## 2017-05-24 PROCEDURE — 25000128 H RX IP 250 OP 636: Performed by: NURSE PRACTITIONER

## 2017-05-24 PROCEDURE — 83605 ASSAY OF LACTIC ACID: CPT | Performed by: NURSE PRACTITIONER

## 2017-05-24 PROCEDURE — 80048 BASIC METABOLIC PNL TOTAL CA: CPT | Performed by: NURSE PRACTITIONER

## 2017-05-24 PROCEDURE — 96365 THER/PROPH/DIAG IV INF INIT: CPT

## 2017-05-24 PROCEDURE — 99284 EMERGENCY DEPT VISIT MOD MDM: CPT | Performed by: NURSE PRACTITIONER

## 2017-05-24 PROCEDURE — 96375 TX/PRO/DX INJ NEW DRUG ADDON: CPT

## 2017-05-24 PROCEDURE — 85025 COMPLETE CBC W/AUTO DIFF WBC: CPT | Performed by: NURSE PRACTITIONER

## 2017-05-24 PROCEDURE — 99284 EMERGENCY DEPT VISIT MOD MDM: CPT | Mod: 25

## 2017-05-24 RX ORDER — SODIUM CHLORIDE 9 MG/ML
1000 INJECTION, SOLUTION INTRAVENOUS CONTINUOUS
Status: DISCONTINUED | OUTPATIENT
Start: 2017-05-24 | End: 2017-05-24 | Stop reason: HOSPADM

## 2017-05-24 RX ORDER — ERTAPENEM 1 G/1
1 INJECTION, POWDER, LYOPHILIZED, FOR SOLUTION INTRAMUSCULAR; INTRAVENOUS EVERY 24 HOURS
Status: CANCELLED | OUTPATIENT
Start: 2017-05-26

## 2017-05-24 RX ORDER — ONDANSETRON 2 MG/ML
4 INJECTION INTRAMUSCULAR; INTRAVENOUS EVERY 30 MIN PRN
Status: DISCONTINUED | OUTPATIENT
Start: 2017-05-24 | End: 2017-05-24 | Stop reason: HOSPADM

## 2017-05-24 RX ORDER — ERTAPENEM 1 G/1
1 INJECTION, POWDER, LYOPHILIZED, FOR SOLUTION INTRAMUSCULAR; INTRAVENOUS ONCE
Status: COMPLETED | OUTPATIENT
Start: 2017-05-24 | End: 2017-05-24

## 2017-05-24 RX ADMIN — ERTAPENEM SODIUM 1 G: 1 INJECTION, POWDER, LYOPHILIZED, FOR SOLUTION INTRAMUSCULAR; INTRAVENOUS at 13:36

## 2017-05-24 RX ADMIN — ONDANSETRON 4 MG: 2 INJECTION INTRAMUSCULAR; INTRAVENOUS at 13:16

## 2017-05-24 RX ADMIN — SODIUM CHLORIDE 1000 ML: 9 INJECTION, SOLUTION INTRAVENOUS at 13:16

## 2017-05-24 ASSESSMENT — ENCOUNTER SYMPTOMS
VOMITING: 0
ABDOMINAL PAIN: 0
RESPIRATORY NEGATIVE: 1
FEVER: 1
CARDIOVASCULAR NEGATIVE: 1
CHILLS: 1
NAUSEA: 1
NEUROLOGICAL NEGATIVE: 1

## 2017-05-24 NOTE — ED PROVIDER NOTES
History     Chief Complaint   Patient presents with     Wound Infection     left lower leg redness, warm to touch, nausea, intermittent fevers.     HPI  Judith Nelson is a 57 year old female with history of pulmonary htn, GERD, and CKD who presents for evaluation of cat scratches/bite to her left lower extremity. This occurred Sunday (3 days ago). Her cat became frightened from a stranger at the door and attacked her left lower leg, scratching and biting her. Patient reports increased redness, pain, and swelling over the last 48 hours. Low grade fever and chills up to 100 at home on Monday.     Patient Active Problem List   Diagnosis     Asymptomatic postmenopausal status     Major depressive disorder, single episode, moderate (H)     Intrapelvic protrusion of acetabulum     GENERALIZED ANXIETY DISORDER     Breast discharge     Hyperprolactinemia (H)     Duodenitis hemorrhagic     Pulmonary hypertension (H)     CARDIOVASCULAR SCREENING; LDL GOAL LESS THAN 160     Mental status change     Acute renal failure (ARF) (H)     Lower extremity edema     Hypomagnesemia     Elevated liver enzymes     Gall stones, common bile duct     Tremor     Personal history of other drug therapy     Encounter for long-term current use of medication     Osteoporosis, postmenopausal     Senile osteoporosis     Skin ulcer of ankle (H)     CKD (chronic kidney disease) stage 4, GFR 15-29 ml/min (H)     Sarcoidosis (H)     Dehydration     Compression fracture     Elevated troponin     Stress-induced cardiomyopathy     Mixed hyperlipidemia     GERD (gastroesophageal reflux disease)     Major depressive disorder, single episode, severe with psychotic features (H)     [facility]     Iron deficiency anemia     Intestinal malabsorption     Chronic steroid use     Chronic kidney disease, stage IV (severe) (H)     Personal history of drug therapy     Cellulitis of left lower extremity       No current  facility-administered medications on file prior to encounter.   Current Outpatient Prescriptions on File Prior to Encounter:  potassium chloride (K-TAB,KLOR-CON) 10 MEQ tablet Take 1 tablet (10 mEq) by mouth daily   furosemide (LASIX) 40 MG tablet TAKE 1 TABLET BY MOUTH DAILY   carvedilol (COREG) 12.5 MG tablet Take 0.5 tablets (6.25 mg) by mouth 2 times daily (with meals)   denosumab (PROLIA) 60 MG/ML SOLN Inject 1 mL (60 mg) Subcutaneous every 6 months   tadalafil, PAH, (ADCIRCA) 20 MG TABS Take 2 tablets (40 mg) by mouth daily   azaTHIOprine (IMURAN) 50 MG tablet Take 1 tablet (50 mg) by mouth daily   treprostinil diolamine ER (ORENITRAM) 0.125 MG CR tablet Take 1 tablet (0.125 mg) by mouth 3 times daily (with meals) 4.25mg tid (Patient taking differently: Take 5 mg by mouth daily 4.25mg tid)   calcitRIOL (ROCALTROL) 0.25 MCG capsule Take 1 capsule (0.25 mcg) by mouth daily   aspirin 81 MG tablet Take 81 mg by mouth daily   Multiple Vitamins-Minerals (EYE VITAMINS PO) Take 1 tablet by mouth daily   TOPIRAMATE PO Take 100 mg by mouth At Bedtime   buPROPion (WELLBUTRIN SR) 200 MG 12 hr tablet Take 1 tablet (200 mg) by mouth every morning   acetaminophen (TYLENOL) 500 MG tablet Take 1-2 tablets (500-1,000 mg) by mouth every 6 hours as needed for pain (Take as needed for pain.  Do not exceed 4 grams (8 tablets) in a day)   simvastatin (ZOCOR) 10 MG tablet Take 1 tablet by mouth At Bedtime.   Macitentan (OPSUMIT PO) Take by mouth daily Pt doesn't know dosage,   SODIUM BICARBONATE PO Take by mouth 2 times daily   Lactobacillus (ACIDOPHILUS PO) Take 1 tablet by mouth daily   ORDER FOR DME Equipment being ordered: Compression stockings; 2 pair; custom measured. Med compression 20-30 mmhg   TEMAZEPAM PO Take by mouth At Bedtime   omeprazole (PRILOSEC) 20 MG capsule Take 1 capsule (20 mg) by mouth 2 times daily (before meals)   LORazepam (ATIVAN) 0.5 MG tablet Take 0.5 mg (1 tab) every morning and 1 mg (2 tabs) every night  at bedtime. (Patient taking differently: 1 mg Take 0.5 mg (1 tab) every morning and 1 mg (2 tabs) every night at bedtime.)   ORDER FOR DME Equipment being ordered: SHOWER CHAIR  FROM [employer]   [DISCONTINUED] denosumab (PROLIA) 60 MG/ML SOLN Inject 1 mL (60 mg) Subcutaneous every 6 months   Elastic Bandages & Supports (T.E.D. BELOW KNEE/M-REGULAR) MISC 1 each daily           I have reviewed the Medications, Allergies, Past Medical and Surgical History, and Social History in the Epic system.    Review of Systems   Constitutional: Positive for chills and fever.   HENT: Negative.    Respiratory: Negative.    Cardiovascular: Negative.    Gastrointestinal: Positive for nausea. Negative for abdominal pain and vomiting.   Skin:        Lower extremity redness, pain and abrasions from cat.   Neurological: Negative.        Physical Exam   BP: 123/77  Heart Rate: 100  Temp: 98.1  F (36.7  C)  Resp: 18  Weight: 52.2 kg (115 lb)  SpO2: 94 %  Physical Exam   Constitutional: She is oriented to person, place, and time. She appears well-developed and well-nourished. No distress.   HENT:   Head: Normocephalic and atraumatic.   Right Ear: External ear normal.   Left Ear: External ear normal.   Nose: Nose normal.   Mouth/Throat: Oropharynx is clear and moist.   Eyes: Conjunctivae and EOM are normal.   Cardiovascular: Normal rate, regular rhythm and normal heart sounds.    Abdominal: Soft. There is no tenderness.   Musculoskeletal: Normal range of motion.   Neurological: She is alert and oriented to person, place, and time.   Skin:   Left lower extremity with multiple cat scratches and cat bite with surrounding erythema and induration. No evidence of abscess or fluctuance palpable.        ED Course     ED Course     Procedures           Results for orders placed or performed during the hospital encounter of 05/24/17 (from the past 48 hour(s))   CBC with platelets differential   Result Value Ref Range    WBC 7.8 4.0 - 11.0 10e9/L     RBC Count 4.30 3.8 - 5.2 10e12/L    Hemoglobin 13.4 11.7 - 15.7 g/dL    Hematocrit 39.7 35.0 - 47.0 %    MCV 92 78 - 100 fl    MCH 31.2 26.5 - 33.0 pg    MCHC 33.8 31.5 - 36.5 g/dL    RDW 12.5 10.0 - 15.0 %    Platelet Count 136 (L) 150 - 450 10e9/L    Diff Method Automated Method     % Neutrophils 81.4 %    % Lymphocytes 9.7 %    % Monocytes 8.4 %    % Eosinophils 0.3 %    % Basophils 0.1 %    % Immature Granulocytes 0.1 %    Absolute Neutrophil 6.4 1.6 - 8.3 10e9/L    Absolute Lymphocytes 0.8 0.8 - 5.3 10e9/L    Absolute Monocytes 0.7 0.0 - 1.3 10e9/L    Absolute Eosinophils 0.0 0.0 - 0.7 10e9/L    Absolute Basophils 0.0 0.0 - 0.2 10e9/L    Abs Immature Granulocytes 0.0 0 - 0.4 10e9/L   Basic metabolic panel   Result Value Ref Range    Sodium 135 133 - 144 mmol/L    Potassium 3.5 3.4 - 5.3 mmol/L    Chloride 107 94 - 109 mmol/L    Carbon Dioxide 19 (L) 20 - 32 mmol/L    Anion Gap 9 3 - 14 mmol/L    Glucose 99 70 - 99 mg/dL    Urea Nitrogen 19 7 - 30 mg/dL    Creatinine 1.31 (H) 0.52 - 1.04 mg/dL    GFR Estimate 42 (L) >60 mL/min/1.7m2    GFR Estimate If Black 51 (L) >60 mL/min/1.7m2    Calcium 8.3 (L) 8.5 - 10.1 mg/dL   Lactic acid whole blood   Result Value Ref Range    Lactic Acid 0.9 0.7 - 2.1 mmol/L     *Note: Due to a large number of results and/or encounters for the requested time period, some results have not been displayed. A complete set of results can be found in Results Review.         Assessments & Plan (with Medical Decision Making)   57 year old female with cellulitis of the left lower extremity after she was scratched and bitten by her cat at home on Sunday.  There are multiple abrasions and a Bite wounds with surrounding erythema.  I discussed patient's history, and exam findings with Dr. Cheng Dailey, emergency provider, who also examined the patient. WBC normal. Lactic acid normal. Patient received IV ertapenem 1 g today.  Infusion therapy plan placed for patient to receive ertapenem 1 g every  24 hours for 2 more doses.  She will be evaluated in clinic on Friday and at that time it can be determined whether she needs to continue IV therapy or if improving can switch to oral antibiotics.  If symptoms worsen, including increased redness, fever, or pain she should return sooner to the emergency department to be reevaluated.  I have reviewed the nursing notes.    I have reviewed the findings, diagnosis, plan and need for follow up with the patient.    Discharge Medication List as of 5/24/2017  2:18 PM          Final diagnoses:   Cellulitis of left lower extremity       5/24/2017   [facility] EMERGENCY DEPARTMENT     Emily Noonan APRN CNP  05/24/17 4043       Emily Noonan APRN CNP  05/24/17 0114

## 2017-05-24 NOTE — ED AVS SNAPSHOT
[facility] Emergency Department    [address] [address]    Phone:  [phone]    Fax:  [phone]                                       Judith Nelson   MRN: [id number]    Department:  [facility] Emergency Department   Date of Visit:  5/24/2017           After Visit Summary Signature Page     I have received my discharge instructions, and my questions have been answered. I have discussed any challenges I see with this plan with the nurse or doctor.    ..........................................................................................................................................  Patient/Patient Representative Signature      ..........................................................................................................................................  Patient Representative Print Name and Relationship to Patient    ..................................................               ................................................  Date                                            Time    ..........................................................................................................................................  Reviewed by Signature/Title    ...................................................              ..............................................  Date                                                            Time

## 2017-05-24 NOTE — ED NOTES
Pt has multiple scratches and mult open wounds to lt lower leg from cat scratch/poss bite from this past Sunday. Lower leg is swollen, red, and painful.  Cat up to date on shots.

## 2017-05-24 NOTE — DISCHARGE INSTRUCTIONS
Return tomorrow and Friday at 2pm for IV infusion of antibiotic at the Infusion [facility].  Recheck in clinic on Friday 5/26 at 10:40am with Jaimee Zhao NP for recheck and for the provider determine continuation of IV antibiotics or switch to oral antibiotics after last dose on Friday.

## 2017-05-24 NOTE — ED AVS SNAPSHOT
[facility] Emergency Department    [address] [address]    Phone:  [phone]    Fax:  [phone]                                       Judith Nelson   MRN: [id number]    Department:  [facility] Emergency Department   Date of Visit:  5/24/2017           Patient Information     Date Of Birth          1960        Your diagnoses for this visit were:     Cellulitis of left lower extremity        You were seen by Emily Noonan APRN CNP.      Follow-up Information     Follow up with Jaimee Zhao APRN CNP In 2 days.    Specialty:  Nurse Practitioner    Why:  at 10:40am for recheck.    Contact information:    [facility]  [address] [address]  [phone]          Discharge Instructions       Return tomorrow and Friday for IV infusion of antibiotic at the Infusion Center.  Recheck in clinic on Friday 5/26 at 10:40am with Jaimee Zhao NP for recheck and determine continuation of IV antibiotics or switch to oral antibiotics.    Future Appointments        Provider Department Dept Phone Center    5/26/2017 10:40 AM JAVAN Brown CNP [facility] [phone] [facility]    7/25/2017 2:00 PM Lab [facility] Lab [phone] [facility]    7/25/2017 2:30 PM Norm Franco MD [facility] [phone] [facility]    10/16/2017 10:45 AM Lab [facility] Lab [phone] [facility]    11/16/2017 11:00 AM Pulmonary Function Lab [facility] Pulmonary Function Testing [phone] [facility]    11/16/2017 11:30 AM Gael Flaherty MD [facility] [phone] [facility]      24 Hour Appointment Hotline       To make an appointment at any [facility], call [phone] ([phone]). If you don't have a family doctor or clinic, we will help you find one. [facility] are conveniently located to serve the needs of you and your family.             Review of your medicines      Our records  show that you are taking the medicines listed below. If these are incorrect, please call your family doctor or clinic.        Dose / Directions Last dose taken    acetaminophen 500 MG tablet   Commonly known as:  TYLENOL   Dose:  500-1000 mg   Quantity:  45 tablet        Take 1-2 tablets (500-1,000 mg) by mouth every 6 hours as needed for pain (Take as needed for pain.  Do not exceed 4 grams (8 tablets) in a day)   Refills:  10        ACIDOPHILUS PO   Dose:  1 tablet        Take 1 tablet by mouth daily   Refills:  0        aspirin 81 MG tablet   Dose:  81 mg        Take 81 mg by mouth daily   Refills:  0        azaTHIOprine 50 MG tablet   Commonly known as:  IMURAN   Dose:  50 mg   Quantity:  90 tablet        Take 1 tablet (50 mg) by mouth daily   Refills:  3        buPROPion 200 MG 12 hr tablet   Commonly known as:  WELLBUTRIN SR   Dose:  200 mg   Quantity:  30 tablet        Take 1 tablet (200 mg) by mouth every morning   Refills:  0        calcitRIOL 0.25 MCG capsule   Commonly known as:  ROCALTROL   Dose:  0.25 mcg   Quantity:  30 capsule        Take 1 capsule (0.25 mcg) by mouth daily   Refills:  1        carvedilol 12.5 MG tablet   Commonly known as:  COREG   Dose:  6.25 mg   Quantity:  135 tablet        Take 0.5 tablets (6.25 mg) by mouth 2 times daily (with meals)   Refills:  3        denosumab 60 MG/ML Soln injection   Commonly known as:  PROLIA   Dose:  60 mg   Quantity:  1 mL        Inject 1 mL (60 mg) Subcutaneous every 6 months   Refills:  1        EYE VITAMINS PO   Dose:  1 tablet        Take 1 tablet by mouth daily   Refills:  0        furosemide 40 MG tablet   Commonly known as:  LASIX   Quantity:  90 tablet        TAKE 1 TABLET BY MOUTH DAILY   Refills:  3        LORazepam 0.5 MG tablet   Commonly known as:  ATIVAN   Quantity:  90 tablet        Take 0.5 mg (1 tab) every morning and 1 mg (2 tabs) every night at bedtime.   Refills:  1        omeprazole 20 MG CR capsule   Commonly known as:  priLOSEC    Dose:  20 mg   Indication:  gi prophylaxis   Quantity:  180 capsule        Take 1 capsule (20 mg) by mouth 2 times daily (before meals)   Refills:  3        OPSUMIT PO        Take by mouth daily Pt doesn't know dosage,   Refills:  0        order for DME   Quantity:  1 Device        Equipment being ordered: SHOWER CHAIR  FROM [employer]   Refills:  0        order for DME   Quantity:  2 Package        Equipment being ordered: Compression stockings; 2 pair; custom measured. Med compression 20-30 mmhg   Refills:  2        potassium chloride 10 MEQ tablet   Commonly known as:  K-TAB,KLOR-CON   Dose:  10 mEq   Quantity:  90 tablet        Take 1 tablet (10 mEq) by mouth daily   Refills:  1        simvastatin 10 MG tablet   Commonly known as:  ZOCOR   Dose:  10 mg   Quantity:  90 tablet        Take 1 tablet by mouth At Bedtime.   Refills:  1        SODIUM BICARBONATE PO        Take by mouth 2 times daily   Refills:  0        T.E.D. BELOW KNEE/M-REGULAR Misc   Dose:  1 each   Quantity:  2 each        1 each daily   Refills:  0        tadalafil (PAH) 20 MG Tabs   Commonly known as:  ADCIRCA   Dose:  40 mg   Quantity:  60 tablet        Take 2 tablets (40 mg) by mouth daily   Refills:  11        TEMAZEPAM PO        Take by mouth At Bedtime   Refills:  0        TOPIRAMATE PO   Dose:  100 mg        Take 100 mg by mouth At Bedtime   Refills:  0        treprostinil diolamine ER 0.125 MG CR tablet   Commonly known as:  ORENITRAM   Dose:  0.125 mg   Quantity:  90 tablet        Take 1 tablet (0.125 mg) by mouth 3 times daily (with meals) 4.25mg tid   Refills:  0                Procedures and tests performed during your visit     Basic metabolic panel    CBC with platelets differential    Lactic acid whole blood      Orders Needing Specimen Collection     None      Pending Results     No orders found from 5/22/2017 to 5/25/2017.            Pending Culture Results     No orders found from 5/22/2017 to 5/25/2017.            Pending  Results Instructions     If you had any lab results that were not finalized at the time of your Discharge, you can call the ED Lab Result RN at [phone]. You will be contacted by this team for any positive Lab results or changes in treatment. The nurses are available 7 days a week from 10A to 6:30P.  You can leave a message 24 hours per day and they will return your call.        Test Results From Your Hospital Stay        5/24/2017  1:28 PM      Component Results     Component Value Ref Range & Units Status    WBC 7.8 4.0 - 11.0 10e9/L Final    RBC Count 4.30 3.8 - 5.2 10e12/L Final    Hemoglobin 13.4 11.7 - 15.7 g/dL Final    Hematocrit 39.7 35.0 - 47.0 % Final    MCV 92 78 - 100 fl Final    MCH 31.2 26.5 - 33.0 pg Final    MCHC 33.8 31.5 - 36.5 g/dL Final    RDW 12.5 10.0 - 15.0 % Final    Platelet Count 136 (L) 150 - 450 10e9/L Final    Diff Method Automated Method  Final    % Neutrophils 81.4 % Final    % Lymphocytes 9.7 % Final    % Monocytes 8.4 % Final    % Eosinophils 0.3 % Final    % Basophils 0.1 % Final    % Immature Granulocytes 0.1 % Final    Absolute Neutrophil 6.4 1.6 - 8.3 10e9/L Final    Absolute Lymphocytes 0.8 0.8 - 5.3 10e9/L Final    Absolute Monocytes 0.7 0.0 - 1.3 10e9/L Final    Absolute Eosinophils 0.0 0.0 - 0.7 10e9/L Final    Absolute Basophils 0.0 0.0 - 0.2 10e9/L Final    Abs Immature Granulocytes 0.0 0 - 0.4 10e9/L Final         5/24/2017  1:43 PM      Component Results     Component Value Ref Range & Units Status    Sodium 135 133 - 144 mmol/L Final    Potassium 3.5 3.4 - 5.3 mmol/L Final    Chloride 107 94 - 109 mmol/L Final    Carbon Dioxide 19 (L) 20 - 32 mmol/L Final    Anion Gap 9 3 - 14 mmol/L Final    Glucose 99 70 - 99 mg/dL Final    Urea Nitrogen 19 7 - 30 mg/dL Final    Creatinine 1.31 (H) 0.52 - 1.04 mg/dL Final    GFR Estimate 42 (L) >60 mL/min/1.7m2 Final    Non  GFR Calc    GFR Estimate If Black 51 (L) >60 mL/min/1.7m2 Final    African American GFR  Calc    Calcium 8.3 (L) 8.5 - 10.1 mg/dL Final         5/24/2017  1:29 PM      Component Results     Component Value Ref Range & Units Status    Lactic Acid 0.9 0.7 - 2.1 mmol/L Final                Thank you for choosing [facility]       Thank you for choosing [facility] for your care. Our goal is always to provide you with excellent care. Hearing back from our patients is one way we can continue to improve our services. Please take a few minutes to complete the written survey that you may receive in the mail after you visit with us. Thank you!        [employer] Information     [employer] gives you secure access to your electronic health record. If you see a primary care provider, you can also send messages to your care team and make appointments. If you have questions, please call your primary care clinic.  If you do not have a primary care provider, please call [phone] and they will assist you.        Care EveryWhere ID     This is your Care EveryWhere ID. This could be used by other organizations to access your [facility] medical records  [id number]        After Visit Summary       This is your record. Keep this with you and show to your community pharmacist(s) and doctor(s) at your next visit.

## 2017-05-25 ENCOUNTER — INFUSION THERAPY VISIT (OUTPATIENT)
Dept: INFUSION THERAPY | Facility: CLINIC | Age: 57
End: 2017-05-25
Attending: NURSE PRACTITIONER
Payer: MEDICARE

## 2017-05-25 VITALS — SYSTOLIC BLOOD PRESSURE: 90 MMHG | DIASTOLIC BLOOD PRESSURE: 51 MMHG | TEMPERATURE: 97.3 F | HEART RATE: 75 BPM

## 2017-05-25 DIAGNOSIS — L03.116 CELLULITIS OF LEFT LOWER EXTREMITY: Primary | ICD-10-CM

## 2017-05-25 PROCEDURE — 25000128 H RX IP 250 OP 636: Performed by: NURSE PRACTITIONER

## 2017-05-25 PROCEDURE — 96365 THER/PROPH/DIAG IV INF INIT: CPT

## 2017-05-25 RX ORDER — ERTAPENEM 1 G/1
1 INJECTION, POWDER, LYOPHILIZED, FOR SOLUTION INTRAMUSCULAR; INTRAVENOUS EVERY 24 HOURS
Status: DISCONTINUED | OUTPATIENT
Start: 2017-05-26 | End: 2017-05-25 | Stop reason: HOSPADM

## 2017-05-25 RX ORDER — ERTAPENEM 1 G/1
1 INJECTION, POWDER, LYOPHILIZED, FOR SOLUTION INTRAMUSCULAR; INTRAVENOUS EVERY 24 HOURS
Status: CANCELLED | OUTPATIENT
Start: 2017-05-26

## 2017-05-25 RX ADMIN — ERTAPENEM SODIUM 1 G: 1 INJECTION, POWDER, LYOPHILIZED, FOR SOLUTION INTRAMUSCULAR; INTRAVENOUS at 14:42

## 2017-05-25 NOTE — PROGRESS NOTES
Infusion Nursing Note:  Judith Nelson presents today for Invanz.    Patient seen by provider today: No   present during visit today: Not Applicable.    Note: N/A.    Intravenous Access:  Peripheral IV in place and patent.    Treatment Conditions:  Not Applicable.      Post Infusion Assessment:  Patient tolerated infusion without incident.  Blood return noted pre and post infusion.  Site patent and intact, free from redness, edema or discomfort.  No evidence of extravasations.  Flushed with NS for use tomorrow.    Discharge Plan:   Patient discharged in stable condition accompanied by: daughter.  Departure Mode: Ambulatory.    Odalis Schneider RN

## 2017-05-25 NOTE — MR AVS SNAPSHOT
After Visit Summary   5/25/2017    Judith Nelson    MRN: [id number]           Patient Information     Date Of Birth          1960        Visit Information        Provider Department      5/25/2017 2:00 PM ROOM 2 [facility]        Today's Diagnoses     Cellulitis of left lower extremity    -  1       Follow-ups after your visit        Your next 10 appointments already scheduled     May 26, 2017 10:40 AM CDT   SHORT with JAVAN Jordan CNP   [facility]    [address] [facility] [address]   [phone]            May 26, 2017  1:30 PM CDT   Level 1 with ROOM 8 [facility] ([facility])    [facility]  [address]  [place] [address]   [phone]            Jul 25, 2017  2:00 PM CDT   Lab with  LAB   [facility] ([facility])    [address] [place] [id number] [phone]            Jul 25, 2017  2:30 PM CDT   (Arrive by 2:15 PM)   RETURN PRIMARY PULMONARY with Norm Franco MD   [facility] ([facility])    [address] [place] [address] [phone]            Oct 16, 2017 10:45 AM CDT   Lab with UC LAB   [facility] ([facility])    [address] [place] [id number] [phone]            Nov 16, 2017 11:00 AM CST   PFT VISIT with  PFL B   [facility] Pulmonary Function Testing ([facility])    [address] [place] [address]  3rd [facility] [id number] [phone]            Nov 16, 2017 11:30 AM CST   (Arrive by 11:15 AM)   Return Interstitial Lung with Gael Flaherty MD   [facility] ([facility])    [address] [place] [address]   [phone]               Who to contact     If you have questions or need follow up information about today's clinic visit or your schedule please contact [facility] directly at [phone].  Normal or non-critical lab and imaging results will be communicated to you by [employer], letter or phone within 4 business days after the clinic has received the results. If you do not hear from us within 7 days, please contact the clinic through [employer] or phone. If you have a critical or abnormal lab result, we will notify you by phone as soon as possible.  Submit refill requests through directworx or call your pharmacy and they will forward the refill request to us. Please allow 3 business days for your refill to be completed.          Additional Information About Your Visit        [employer] Information     directworx gives you secure access to your electronic health record. If you see a primary care provider, you can also send messages to your care team and make appointments. If you have questions, please call your primary care clinic.  If you do not have a primary care provider, please call [phone] and they will assist you.        Care EveryWhere ID     This is your Care EveryWhere ID. This could be used by other organizations to access your [place] medical records  [id number]        Your Vitals Were     Pulse Temperature                75 97.3  F (36.3  C) (Tympanic)           Blood Pressure from Last 3 Encounters:   05/25/17 90/51   05/24/17 130/70   05/16/17 120/64    Weight from Last 3 Encounters:   05/24/17 52.2 kg (115 lb)   05/16/17 52 kg (114 lb 11.2 oz)   05/11/17 55.3 kg (122 lb)              Today, you had the following     No orders found for display         Today's Medication Changes          These changes are accurate as of: 5/25/17  3:33 PM.  If you have any questions, ask your nurse or doctor.               These medicines have changed or have updated prescriptions.        Dose/Directions    LORazepam 0.5 MG  tablet   Commonly known as:  ATIVAN   This may have changed:    - how much to take  - additional instructions   Used for:  Major depressive disorder, single episode, severe with psychotic features (H)        Take 0.5 mg (1 tab) every morning and 1 mg (2 tabs) every night at bedtime.   Quantity:  90 tablet   Refills:  1       treprostinil diolamine ER 0.125 MG CR tablet   Commonly known as:  ORENITRAM   This may have changed:    - how much to take  - when to take this  - additional instructions   Used for:  Pulmonary arterial hypertension (H)        Dose:  0.125 mg   Take 1 tablet (0.125 mg) by mouth 3 times daily (with meals) 4.25mg tid   Quantity:  90 tablet   Refills:  0                Primary Care Provider Office Phone # Fax #    Anupama Babcock [phone]       [address] [address]        Thank you!     Thank you for choosing [facility]  for your care. Our goal is always to provide you with excellent care. Hearing back from our patients is one way we can continue to improve our services. Please take a few minutes to complete the written survey that you may receive in the mail after your visit with us. Thank you!             Your Updated Medication List - Protect others around you: Learn how to safely use, store and throw away your medicines at www.disposemymeds.org.          This list is accurate as of: 5/25/17  3:33 PM.  Always use your most recent med list.                   Brand Name Dispense Instructions for use    acetaminophen 500 MG tablet    TYLENOL    45 tablet    Take 1-2 tablets (500-1,000 mg) by mouth every 6 hours as needed for pain (Take as needed for pain.  Do not exceed 4 grams (8 tablets) in a day)       ACIDOPHILUS PO      Take 1 tablet by mouth daily       aspirin 81 MG tablet      Take 81 mg by mouth daily       azaTHIOprine 50 MG tablet    IMURAN    90 tablet    Take 1 tablet (50 mg) by mouth daily       buPROPion  200 MG 12 hr tablet    WELLBUTRIN SR    30 tablet    Take 1 tablet (200 mg) by mouth every morning       calcitRIOL 0.25 MCG capsule    ROCALTROL    30 capsule    Take 1 capsule (0.25 mcg) by mouth daily       carvedilol 12.5 MG tablet    COREG    135 tablet    Take 0.5 tablets (6.25 mg) by mouth 2 times daily (with meals)       denosumab 60 MG/ML Soln injection    PROLIA    1 mL    Inject 1 mL (60 mg) Subcutaneous every 6 months       EYE VITAMINS PO      Take 1 tablet by mouth daily       furosemide 40 MG tablet    LASIX    90 tablet    TAKE 1 TABLET BY MOUTH DAILY       LORazepam 0.5 MG tablet    ATIVAN    90 tablet    Take 0.5 mg (1 tab) every morning and 1 mg (2 tabs) every night at bedtime.       omeprazole 20 MG CR capsule    priLOSEC    180 capsule    Take 1 capsule (20 mg) by mouth 2 times daily (before meals)       OPSUMIT PO      Take by mouth daily Pt doesn't know dosage,       order for DME     1 Device    Equipment being ordered: SHOWER CHAIR  FROM [facility]       order for DME     2 Package    Equipment being ordered: Compression stockings; 2 pair; custom measured. Med compression 20-30 mmhg       potassium chloride 10 MEQ tablet    K-TAB,KLOR-CON    90 tablet    Take 1 tablet (10 mEq) by mouth daily       simvastatin 10 MG tablet    ZOCOR    90 tablet    Take 1 tablet by mouth At Bedtime.       SODIUM BICARBONATE PO      Take by mouth 2 times daily       T.E.D. BELOW KNEE/M-REGULAR Misc     2 each    1 each daily       tadalafil (PAH) 20 MG Tabs    ADCIRCA    60 tablet    Take 2 tablets (40 mg) by mouth daily       TEMAZEPAM PO      Take by mouth At Bedtime       TOPIRAMATE PO      Take 100 mg by mouth At Bedtime       treprostinil diolamine ER 0.125 MG CR tablet    ORENITRAM    90 tablet    Take 1 tablet (0.125 mg) by mouth 3 times daily (with meals) 4.25mg tid

## 2017-05-26 ENCOUNTER — OFFICE VISIT (OUTPATIENT)
Dept: FAMILY MEDICINE | Facility: CLINIC | Age: 57
End: 2017-05-26
Payer: MEDICARE

## 2017-05-26 ENCOUNTER — INFUSION THERAPY VISIT (OUTPATIENT)
Dept: INFUSION THERAPY | Facility: CLINIC | Age: 57
End: 2017-05-26
Attending: NURSE PRACTITIONER
Payer: MEDICARE

## 2017-05-26 VITALS — DIASTOLIC BLOOD PRESSURE: 52 MMHG | HEART RATE: 89 BPM | SYSTOLIC BLOOD PRESSURE: 103 MMHG | TEMPERATURE: 99.1 F

## 2017-05-26 VITALS
BODY MASS INDEX: 20.82 KG/M2 | WEIGHT: 117.5 LBS | TEMPERATURE: 99.1 F | HEIGHT: 63 IN | HEART RATE: 83 BPM | SYSTOLIC BLOOD PRESSURE: 97 MMHG | DIASTOLIC BLOOD PRESSURE: 58 MMHG

## 2017-05-26 DIAGNOSIS — L03.116 CELLULITIS OF LEFT LOWER EXTREMITY: Primary | ICD-10-CM

## 2017-05-26 PROCEDURE — 25000132 ZZH RX MED GY IP 250 OP 250 PS 637: Mod: GY | Performed by: NURSE PRACTITIONER

## 2017-05-26 PROCEDURE — A9270 NON-COVERED ITEM OR SERVICE: HCPCS | Mod: GY | Performed by: NURSE PRACTITIONER

## 2017-05-26 PROCEDURE — 96365 THER/PROPH/DIAG IV INF INIT: CPT

## 2017-05-26 PROCEDURE — 99213 OFFICE O/P EST LOW 20 MIN: CPT | Performed by: NURSE PRACTITIONER

## 2017-05-26 PROCEDURE — 25000128 H RX IP 250 OP 636: Performed by: NURSE PRACTITIONER

## 2017-05-26 RX ORDER — ERTAPENEM 1 G/1
1 INJECTION, POWDER, LYOPHILIZED, FOR SOLUTION INTRAMUSCULAR; INTRAVENOUS EVERY 24 HOURS
Status: CANCELLED | OUTPATIENT
Start: 2017-05-27

## 2017-05-26 RX ORDER — ERTAPENEM 1 G/1
1 INJECTION, POWDER, LYOPHILIZED, FOR SOLUTION INTRAMUSCULAR; INTRAVENOUS EVERY 24 HOURS
Status: DISCONTINUED | OUTPATIENT
Start: 2017-05-27 | End: 2017-05-26 | Stop reason: HOSPADM

## 2017-05-26 RX ORDER — ACETAMINOPHEN 500 MG
1000 TABLET ORAL ONCE
Status: COMPLETED | OUTPATIENT
Start: 2017-05-26 | End: 2017-05-26

## 2017-05-26 RX ADMIN — ACETAMINOPHEN 1000 MG: 500 TABLET ORAL at 14:29

## 2017-05-26 RX ADMIN — ERTAPENEM SODIUM 1 G: 1 INJECTION, POWDER, LYOPHILIZED, FOR SOLUTION INTRAMUSCULAR; INTRAVENOUS at 13:52

## 2017-05-26 RX ADMIN — SODIUM CHLORIDE 250 ML: 9 INJECTION, SOLUTION INTRAVENOUS at 13:51

## 2017-05-26 NOTE — MR AVS SNAPSHOT
After Visit Summary   5/26/2017    Judith Nelson    MRN: [id number]           Patient Information     Date Of Birth          1960        Visit Information        Provider Department      5/26/2017 10:40 AM Jaimee Zhao APRN CNP [facility]        Today's Diagnoses     Cellulitis of left lower extremity    -  1       Follow-ups after your visit        Your next 10 appointments already scheduled     May 26, 2017  1:30 PM CDT   Level 1 with ROOM 8 [facility] ([facility])    [facility]  [address]  [facility] [address]   [phone]            May 31, 2017  4:00 PM CDT   SHORT with JAVAN Ramirez CNP   [facility] ([facility])    [address] [facility] [address]   [phone]            Jul 25, 2017  2:00 PM CDT   Lab with  LAB   [facility] ([facility])    [address] [place] [address]   [phone]            Jul 25, 2017  2:30 PM CDT   (Arrive by 2:15 PM)   RETURN PRIMARY PULMONARY with Norm Franco MD   [facility] ([facility])    [address] [place] [address]   [phone]            Oct 16, 2017 10:45 AM CDT   Lab with  LAB    [facility] ([facility])    [address] [place] [address]   [phone]            Nov 16, 2017 11:00 AM CST   PFT VISIT with  PFL B   [facility] Pulmonary Function Testing ([facility])    [address] [place] [address]   [phone]            Nov 16, 2017 11:30 AM CST   (Arrive by 11:15 AM)   Return Interstitial Lung with Gael Flaherty MD   [facility] ([facility])    [address] [place] [address]  [id number] [phone]              Who to contact     If you have questions or need follow up information about today's clinic visit or your schedule please contact [facility] directly at [phone].  Normal or non-critical lab and imaging results will be communicated to you by Connect[employer]hart, letter or phone within 4 business days after the clinic has received the results. If you do not hear from us within 7 days, please contact the clinic through obopayt or phone. If you have a critical or abnormal lab result, we will notify you by phone as soon as possible.  Submit refill requests through [employer] or call your pharmacy and they will forward the refill request to us. Please allow 3 business days for your refill to be completed.          Additional Information About Your Visit        [employer] Information     [employer] gives you secure access to your electronic health record. If you see a primary care provider, you can also send messages to your care team and make appointments. If you have questions, please call your primary care clinic.  If you do not have a primary care provider, please call [phone] and they will assist you.        Care EveryWhere ID     This is your Care EveryWhere ID. This could be used by other organizations to access your [place] medical records  [id number]        Your Vitals Were     Pulse Temperature Height Breastfeeding? BMI (Body Mass Index)       83 99.1  F (37.3  C) (Tympanic) 5' 2.5\" (1.588 m) No 21.15 kg/m2        Blood Pressure from Last 3 Encounters:   05/26/17 97/58   05/25/17 90/51   05/24/17 130/70    Weight from Last 3 Encounters:   05/26/17 117 lb 8 oz (53.3 kg)   05/24/17 115 lb (52.2 kg)   05/16/17 114 lb 11.2 oz (52 kg)              Today, you had the following     No orders found for display         Today's Medication Changes          These changes are accurate as of: 5/26/17 12:23 PM.  If you have any questions, ask your nurse or doctor.               Start " taking these medicines.        Dose/Directions    amoxicillin-clavulanate 875-125 MG per tablet   Commonly known as:  AUGMENTIN   Used for:  Cellulitis of left lower extremity   Started by:  Jaimee Zhao APRN CNP        Dose:  1 tablet   Take 1 tablet by mouth 2 times daily   Quantity:  28 tablet   Refills:  0         These medicines have changed or have updated prescriptions.        Dose/Directions    LORazepam 0.5 MG tablet   Commonly known as:  ATIVAN   This may have changed:    - how much to take  - additional instructions   Used for:  Major depressive disorder, single episode, severe with psychotic features (H)        Take 0.5 mg (1 tab) every morning and 1 mg (2 tabs) every night at bedtime.   Quantity:  90 tablet   Refills:  1       treprostinil diolamine ER 0.125 MG CR tablet   Commonly known as:  ORENITRAM   This may have changed:    - how much to take  - when to take this  - additional instructions   Used for:  Pulmonary arterial hypertension (H)        Dose:  0.125 mg   Take 1 tablet (0.125 mg) by mouth 3 times daily (with meals) 4.25mg tid   Quantity:  90 tablet   Refills:  0            Where to get your medicines      These medications were sent to [facility] [place], [place] - [address] [facility]  [address] [facility] [address]     Phone:  [phone]     amoxicillin-clavulanate 875-125 MG per tablet                Primary Care Provider Office Phone # Fax #    Anupama Babcock [phone]       [address] [address]        Thank you!     Thank you for choosing [facility]  for your care. Our goal is always to provide you with excellent care. Hearing back from our patients is one way we can continue to improve our services. Please take a few minutes to complete the written survey that you may receive in the mail after your visit with us. Thank you!             Your Updated Medication List -  Protect others around you: Learn how to safely use, store and throw away your medicines at www.disposemymeds.org.          This list is accurate as of: 5/26/17 12:23 PM.  Always use your most recent med list.                   Brand Name Dispense Instructions for use    acetaminophen 500 MG tablet    TYLENOL    45 tablet    Take 1-2 tablets (500-1,000 mg) by mouth every 6 hours as needed for pain (Take as needed for pain.  Do not exceed 4 grams (8 tablets) in a day)       ACIDOPHILUS PO      Take 1 tablet by mouth daily       amoxicillin-clavulanate 875-125 MG per tablet    AUGMENTIN    28 tablet    Take 1 tablet by mouth 2 times daily       aspirin 81 MG tablet      Take 81 mg by mouth daily       azaTHIOprine 50 MG tablet    IMURAN    90 tablet    Take 1 tablet (50 mg) by mouth daily       buPROPion 200 MG 12 hr tablet    WELLBUTRIN SR    30 tablet    Take 1 tablet (200 mg) by mouth every morning       calcitRIOL 0.25 MCG capsule    ROCALTROL    30 capsule    Take 1 capsule (0.25 mcg) by mouth daily       carvedilol 12.5 MG tablet    COREG    135 tablet    Take 0.5 tablets (6.25 mg) by mouth 2 times daily (with meals)       denosumab 60 MG/ML Soln injection    PROLIA    1 mL    Inject 1 mL (60 mg) Subcutaneous every 6 months       EYE VITAMINS PO      Take 1 tablet by mouth daily       furosemide 40 MG tablet    LASIX    90 tablet    TAKE 1 TABLET BY MOUTH DAILY       LORazepam 0.5 MG tablet    ATIVAN    90 tablet    Take 0.5 mg (1 tab) every morning and 1 mg (2 tabs) every night at bedtime.       omeprazole 20 MG CR capsule    priLOSEC    180 capsule    Take 1 capsule (20 mg) by mouth 2 times daily (before meals)       OPSUMIT PO      Take by mouth daily Pt doesn't know dosage,       order for DME     1 Device    Equipment being ordered: SHOWER CHAIR  FROM [facility]       order for DME     2 Package    Equipment being ordered: Compression stockings; 2 pair; custom measured. Med compression 20-30 mmhg        potassium chloride 10 MEQ tablet    K-TAB,KLOR-CON    90 tablet    Take 1 tablet (10 mEq) by mouth daily       simvastatin 10 MG tablet    ZOCOR    90 tablet    Take 1 tablet by mouth At Bedtime.       SODIUM BICARBONATE PO      Take by mouth 2 times daily       T.E.D. BELOW KNEE/M-REGULAR Misc     2 each    1 each daily       tadalafil (PAH) 20 MG Tabs    ADCIRCA    60 tablet    Take 2 tablets (40 mg) by mouth daily       TEMAZEPAM PO      Take by mouth At Bedtime       TOPIRAMATE PO      Take 100 mg by mouth At Bedtime       treprostinil diolamine ER 0.125 MG CR tablet    ORENITRAM    90 tablet    Take 1 tablet (0.125 mg) by mouth 3 times daily (with meals) 4.25mg tid

## 2017-05-26 NOTE — PROGRESS NOTES
Infusion Nursing Note:  Judith Nelson presents today for Invanz.    Patient seen by provider today: No   present during visit today: Not Applicable.    Note: N/A.    Intravenous Access:  Peripheral IV in place.    Treatment Conditions:  Not Applicable.      Post Infusion Assessment:  Patient tolerated infusion without incident.  Blood return noted pre and post infusion.  Site patent and intact, free from redness, edema or discomfort.  No evidence of extravasations.  Access discontinued per protocol.    Discharge Plan:   Patient discharged in stable condition accompanied by: daughter  Departure Mode: Ambulatory.    Molly Alexander RN

## 2017-05-26 NOTE — MR AVS SNAPSHOT
After Visit Summary   5/26/2017    Judith Nelson    MRN: [id number]           Patient Information     Date Of Birth          1960        Visit Information        Provider Department      5/26/2017 1:30 PM ROOM 8 [place] [facility]        Today's Diagnoses     Cellulitis of left lower extremity    -  1       Follow-ups after your visit        Your next 10 appointments already scheduled     May 31, 2017  4:00 PM CDT   SHORT with JAVAN Ramirez CNP   [facility] ([facility])    [address] [facility] [address]   [phone]            Jul 25, 2017  2:00 PM CDT   Lab with  LAB    [facility] ([facility])    [address] [place] [id number] [phone]            Jul 25, 2017  2:30 PM CDT   (Arrive by 2:15 PM)   RETURN PRIMARY PULMONARY with Norm Franco MD   [facility] ([facility])    [address] [place] [address]  [address] [facility] [id number] [phone]            Oct 16, 2017 10:45 AM CDT   Lab with  LAB    [facility] ([facility])    [address] [place] [id number] [phone]            Nov 16, 2017 11:00 AM CST   PFT VISIT with  PFL B   [facility] Pulmonary Function Testing ([facility])    [address] [place] [id number] [phone]            Nov 16, 2017 11:30 AM CST   (Arrive by 11:15 AM)   Return Interstitial Lung with Gael Flaherty MD   [facility] ([facility])    [address] [place] [id number] [phone]              Who to contact     If you have questions or need follow up information about today's clinic visit or your schedule please contact [facility] directly at [phone].  Normal or non-critical lab and  imaging results will be communicated to you by [employer], letter or phone within 4 business days after the clinic has received the results. If you do not hear from us within 7 days, please contact the clinic through [facility] or phone. If you have a critical or abnormal lab result, we will notify you by phone as soon as possible.  Submit refill requests through [employer] or call your pharmacy and they will forward the refill request to us. Please allow 3 business days for your refill to be completed.          Additional Information About Your Visit        [employer] Information     [employer] gives you secure access to your electronic health record. If you see a primary care provider, you can also send messages to your care team and make appointments. If you have questions, please call your primary care clinic.  If you do not have a primary care provider, please call [phone] and they will assist you.        Care EveryWhere ID     This is your Care EveryWhere ID. This could be used by other organizations to access your [place] medical records  [id number]        Your Vitals Were     Pulse Temperature                89 99.1  F (37.3  C) (Oral)           Blood Pressure from Last 3 Encounters:   05/26/17 103/52   05/26/17 97/58   05/25/17 90/51    Weight from Last 3 Encounters:   05/26/17 53.3 kg (117 lb 8 oz)   05/24/17 52.2 kg (115 lb)   05/16/17 52 kg (114 lb 11.2 oz)              Today, you had the following     No orders found for display         Today's Medication Changes          These changes are accurate as of: 5/26/17  3:15 PM.  If you have any questions, ask your nurse or doctor.               Start taking these medicines.        Dose/Directions    amoxicillin-clavulanate 875-125 MG per tablet   Commonly known as:  AUGMENTIN   Used for:  Cellulitis of left lower extremity   Started by:  Jaimee Zhao APRN CNP        Dose:  1 tablet   Take 1 tablet by mouth 2 times daily   Quantity:  28 tablet    Refills:  0         These medicines have changed or have updated prescriptions.        Dose/Directions    LORazepam 0.5 MG tablet   Commonly known as:  ATIVAN   This may have changed:    - how much to take  - additional instructions   Used for:  Major depressive disorder, single episode, severe with psychotic features (H)        Take 0.5 mg (1 tab) every morning and 1 mg (2 tabs) every night at bedtime.   Quantity:  90 tablet   Refills:  1       treprostinil diolamine ER 0.125 MG CR tablet   Commonly known as:  ORENITRAM   This may have changed:    - how much to take  - when to take this  - additional instructions   Used for:  Pulmonary arterial hypertension (H)        Dose:  0.125 mg   Take 1 tablet (0.125 mg) by mouth 3 times daily (with meals) 4.25mg tid   Quantity:  90 tablet   Refills:  0            Where to get your medicines      These medications were sent to [facility] [address] [facility]  [address] [facility] [address]     Phone:  [phone]     amoxicillin-clavulanate 875-125 MG per tablet                Primary Care Provider Office Phone # Fax #    Anupama Babcock [phone]       [address] [address]        Thank you!     Thank you for choosing [facility]  for your care. Our goal is always to provide you with excellent care. Hearing back from our patients is one way we can continue to improve our services. Please take a few minutes to complete the written survey that you may receive in the mail after your visit with us. Thank you!             Your Updated Medication List - Protect others around you: Learn how to safely use, store and throw away your medicines at www.disposemymeds.org.          This list is accurate as of: 5/26/17  3:15 PM.  Always use your most recent med list.                   Brand Name Dispense Instructions for use    acetaminophen 500 MG tablet    TYLENOL    45 tablet     Take 1-2 tablets (500-1,000 mg) by mouth every 6 hours as needed for pain (Take as needed for pain.  Do not exceed 4 grams (8 tablets) in a day)       ACIDOPHILUS PO      Take 1 tablet by mouth daily       amoxicillin-clavulanate 875-125 MG per tablet    AUGMENTIN    28 tablet    Take 1 tablet by mouth 2 times daily       aspirin 81 MG tablet      Take 81 mg by mouth daily       azaTHIOprine 50 MG tablet    IMURAN    90 tablet    Take 1 tablet (50 mg) by mouth daily       buPROPion 200 MG 12 hr tablet    WELLBUTRIN SR    30 tablet    Take 1 tablet (200 mg) by mouth every morning       calcitRIOL 0.25 MCG capsule    ROCALTROL    30 capsule    Take 1 capsule (0.25 mcg) by mouth daily       carvedilol 12.5 MG tablet    COREG    135 tablet    Take 0.5 tablets (6.25 mg) by mouth 2 times daily (with meals)       denosumab 60 MG/ML Soln injection    PROLIA    1 mL    Inject 1 mL (60 mg) Subcutaneous every 6 months       EYE VITAMINS PO      Take 1 tablet by mouth daily       furosemide 40 MG tablet    LASIX    90 tablet    TAKE 1 TABLET BY MOUTH DAILY       LORazepam 0.5 MG tablet    ATIVAN    90 tablet    Take 0.5 mg (1 tab) every morning and 1 mg (2 tabs) every night at bedtime.       omeprazole 20 MG CR capsule    priLOSEC    180 capsule    Take 1 capsule (20 mg) by mouth 2 times daily (before meals)       OPSUMIT PO      Take by mouth daily Pt doesn't know dosage,       order for DME     1 Device    Equipment being ordered: SHOWER CHAIR  FROM [facility]       order for DME     2 Package    Equipment being ordered: Compression stockings; 2 pair; custom measured. Med compression 20-30 mmhg       potassium chloride 10 MEQ tablet    K-TAB,KLOR-CON    90 tablet    Take 1 tablet (10 mEq) by mouth daily       simvastatin 10 MG tablet    ZOCOR    90 tablet    Take 1 tablet by mouth At Bedtime.       SODIUM BICARBONATE PO      Take by mouth 2 times daily       T.E.D. BELOW KNEE/M-REGULAR Misc     2 each    1 each daily        tadalafil (PAH) 20 MG Tabs    ADCIRCA    60 tablet    Take 2 tablets (40 mg) by mouth daily       TEMAZEPAM PO      Take by mouth At Bedtime       TOPIRAMATE PO      Take 100 mg by mouth At Bedtime       treprostinil diolamine ER 0.125 MG CR tablet    ORENITRAM    90 tablet    Take 1 tablet (0.125 mg) by mouth 3 times daily (with meals) 4.25mg tid

## 2017-05-26 NOTE — NURSING NOTE
"Initial BP 97/58  Pulse 83  Temp 99.1  F (37.3  C) (Tympanic)  Ht 5' 2.5\" (1.588 m)  Wt 117 lb 8 oz (53.3 kg)  Breastfeeding? No  BMI 21.15 kg/m2 Estimated body mass index is 21.15 kg/(m^2) as calculated from the following:    Height as of this encounter: 5' 2.5\" (1.588 m).    Weight as of this encounter: 117 lb 8 oz (53.3 kg). .    Taylor Iyer CMA ([facility])  "

## 2017-05-26 NOTE — PROGRESS NOTES
"  SUBJECTIVE:                                                    Judith Nelson is a 57 year old female who presents to clinic today for the following health issues:    ED/UC Followup:    Facility:  [place]   Date of visit: 5/24/17  Reason for visit: Cellulitis of left leg  - from her cat. Was scratched or bit multiple times  Current Status:  Pt state she is doing ok, her lower left leg is still red but patient states it is looking better. She is here today to decide if she still needs IV antibiotics or if she can switch to oral medication.          Problem list and histories reviewed & adjusted, as indicated.  Additional history: as documented      Reviewed and updated as needed this visit by clinical staff       Reviewed and updated as needed this visit by Provider         ROS:  Constitutional, HEENT, cardiovascular, pulmonary, gi and gu systems are negative, except as otherwise noted.    OBJECTIVE:                                                    BP 97/58  Pulse 83  Temp 99.1  F (37.3  C) (Tympanic)  Ht 5' 2.5\" (1.588 m)  Wt 117 lb 8 oz (53.3 kg)  Breastfeeding? No  BMI 21.15 kg/m2  Body mass index is 21.15 kg/(m^2).  GENERAL: healthy, alert and no distress  SKIN: Left lower leg - minimal edema. Multiple scratch/bit sites. Erythema continues. No warmth. Tender to touch.         ASSESSMENT/PLAN:                                                        ICD-10-CM    1. Cellulitis of left lower extremity L03.116 amoxicillin-clavulanate (AUGMENTIN) 875-125 MG per tablet     Patient states that the warmth and swelling are improved, states that the redness is a little better and the pain is better.  Continues to have redness on exam.  Advised patient to get last dose of IV antibiotics today.   Start oral antibiotics today as well - Augmentin BID.  Re-eval on Wednesday.  Instructed to return to ER over the weekend if any of the symptoms worsen.    The risks, benefits and treatment options of prescribed " medications or other treatments have been discussed with the patient. The patient verbalized their understanding and should call or follow up if no improvement or if they develop further problems.    JAVAN Brown [facility]

## 2017-05-31 ENCOUNTER — OFFICE VISIT (OUTPATIENT)
Dept: FAMILY MEDICINE | Facility: CLINIC | Age: 57
End: 2017-05-31
Payer: MEDICARE

## 2017-05-31 VITALS
TEMPERATURE: 97.1 F | DIASTOLIC BLOOD PRESSURE: 66 MMHG | HEART RATE: 64 BPM | WEIGHT: 118.8 LBS | HEIGHT: 63 IN | BODY MASS INDEX: 21.05 KG/M2 | SYSTOLIC BLOOD PRESSURE: 92 MMHG

## 2017-05-31 DIAGNOSIS — L03.116 CELLULITIS OF LEFT LOWER EXTREMITY: Primary | ICD-10-CM

## 2017-05-31 PROCEDURE — 99213 OFFICE O/P EST LOW 20 MIN: CPT | Performed by: NURSE PRACTITIONER

## 2017-05-31 NOTE — PROGRESS NOTES
"  SUBJECTIVE:                                                    Judith Nelson is a 57 year old female who presents to clinic today for the following health issues: left leg cellulitis recheck. Had cat bite and multiple scratches over a week ago, received IV Ertapenem for 2 days and then was switched to oral Augmentin, which she started 7 days ago. Noted improvement in leg erythema, but still have left leg swelling and small amounts of bloody discharge.   Denies fever, chills.  No side effects from Augmentin, today is 7 th day of treatment.     Problem list and histories reviewed & adjusted, as indicated.  Additional history: as documented    Labs reviewed in EPIC    Reviewed and updated as needed this visit by clinical staff  Allergies       Reviewed and updated as needed this visit by Provider         ROS:  Constitutional, HEENT, cardiovascular, pulmonary, gi and gu systems are negative, except as otherwise noted.    OBJECTIVE:                                                    BP 92/66  Pulse 64  Temp 97.1  F (36.2  C) (Tympanic)  Ht 5' 2.5\" (1.588 m)  Wt 118 lb 12.8 oz (53.9 kg)  BMI 21.38 kg/m2  Body mass index is 21.38 kg/(m^2).  GENERAL: healthy, alert and no distress  ABDOMEN: soft, nontender, no hepatosplenomegaly, no masses and bowel sounds normal  MS: mild non-pitting left lower leg edema  SKIN: left leg lateral shin erythema with few scratched marks and small bite wound draining of serous-bloody drainage.   I saw this patient with Jaimee Zhao last week and see improvement, but the progression of improvement is slow.      ASSESSMENT/PLAN:                                                      1. Cellulitis of left lower extremity  -slowly improving   - continue amoxicillin-clavulanate (AUGMENTIN) 875-125 MG per tablet; Take 1 tablet by mouth 2 times daily for another 7 days  -follow up for recheck in 5-6 days  -will consider switching to different antibiotic, Bactrim if still have infection. "   -elevate leg when possible  -Bacitracin cream twice daily     See Patient Instructions    JAVAN Olson CNP  [facility]

## 2017-05-31 NOTE — PATIENT INSTRUCTIONS
Continue Augmentin 1 tablet twice daily for another week, if still  slow improvement will switch to different antibiotic, possibly Bactrim plus Flagyl, or Cipro plus Flagyl     Follow up in 5-7 days  Bacitracin twice daily

## 2017-05-31 NOTE — NURSING NOTE
"Chief Complaint   Patient presents with     Cellulitis     Recheck, has improved but is still bleeding.      Edema     Left lower leg, last night was the worse.        Initial BP 92/66  Pulse 64  Temp 97.1  F (36.2  C) (Tympanic)  Ht 5' 2.5\" (1.588 m)  Wt 118 lb 12.8 oz (53.9 kg)  BMI 21.38 kg/m2 Estimated body mass index is 21.38 kg/(m^2) as calculated from the following:    Height as of this encounter: 5' 2.5\" (1.588 m).    Weight as of this encounter: 118 lb 12.8 oz (53.9 kg).  Medication Reconciliation: complete  "

## 2017-05-31 NOTE — MR AVS SNAPSHOT
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    -  1    Cellulitis of left lower extremity          Care Instructions    Continue Augmentin 1 tablet twice daily for another week, if slow improvement will switch to different antibiotic, possibly Bactrim plus Flagyl, or Cipro plus Flagyl     Follow up in 5-7 days  Bacitracin twice daily             Follow-ups after your visit        Your next 10 appointments already scheduled     Jul 25, 2017  2:00 PM CDT   Lab with  LAB   [facility] ([facility])    [address] [place] [id number] [phone]            Jul 25, 2017  2:30 PM CDT   (Arrive by 2:15 PM)   RETURN PRIMARY PULMONARY with Norm Franco MD   [facility] ([facility])    [address] [place] [id number] [phone]            Oct 16, 2017 10:45 AM CDT   Lab with  LAB   [facility] ([facility])    [address] [place] [id number] [phone]            Nov 16, 2017 11:00 AM CST   PFT VISIT with  PFL B   [facility] Pulmonary Function Testing ([facility])    [address] [place] [id number] [phone]            Nov 16, 2017 11:30 AM CST   (Arrive by 11:15 AM)   Return Interstitial Lung with Gael Flaherty MD   [facility] ([facility])    [address] [place] [id number] [phone]              Who to contact     If you have questions or need follow up information about today's clinic  "visit or your schedule please contact [facility] directly at [phone].  Normal or non-critical lab and imaging results will be communicated to you by MyChart, letter or phone within 4 business days after the clinic has received the results. If you do not hear from us within 7 days, please contact the clinic through "[employer]t or phone. If you have a critical or abnormal lab result, we will notify you by phone as soon as possible.  Submit refill requests through [employer] or call your pharmacy and they will forward the refill request to us. Please allow 3 business days for your refill to be completed.          Additional Information About Your Visit        [employer] Information     [employer] gives you secure access to your electronic health record. If you see a primary care provider, you can also send messages to your care team and make appointments. If you have questions, please call your primary care clinic.  If you do not have a primary care provider, please call [phone] and they will assist you.        Care EveryWhere ID     This is your Care EveryWhere ID. This could be used by other organizations to access your [place] medical records  [id number]        Your Vitals Were     Pulse Temperature Height BMI (Body Mass Index)          64 97.1  F (36.2  C) (Tympanic) 5' 2.5\" (1.588 m) 21.38 kg/m2         Blood Pressure from Last 3 Encounters:   05/31/17 92/66   05/26/17 103/52   05/26/17 97/58    Weight from Last 3 Encounters:   05/31/17 118 lb 12.8 oz (53.9 kg)   05/26/17 117 lb 8 oz (53.3 kg)   05/24/17 115 lb (52.2 kg)              Today, you had the following     No orders found for display         Today's Medication Changes          These changes are accurate as of: 5/31/17  4:39 PM.  If you have any questions, ask your nurse or doctor.               These medicines have changed or have updated prescriptions.        Dose/Directions    LORazepam 0.5 MG tablet   Commonly known as:  ATIVAN   This " may have changed:    - how much to take  - additional instructions   Used for:  Major depressive disorder, single episode, severe with psychotic features (H)        Take 0.5 mg (1 tab) every morning and 1 mg (2 tabs) every night at bedtime.   Quantity:  90 tablet   Refills:  1       treprostinil diolamine ER 0.125 MG CR tablet   Commonly known as:  ORENITRAM   This may have changed:    - how much to take  - when to take this  - additional instructions   Used for:  Pulmonary arterial hypertension (H)        Dose:  0.125 mg   Take 1 tablet (0.125 mg) by mouth 3 times daily (with meals) 4.25mg tid   Quantity:  90 tablet   Refills:  0                Primary Care Provider Office Phone # Fax #    Anupama Babcock [phone]       [address] [address]        Thank you!     Thank you for choosing [facility]  for your care. Our goal is always to provide you with excellent care. Hearing back from our patients is one way we can continue to improve our services. Please take a few minutes to complete the written survey that you may receive in the mail after your visit with us. Thank you!             Your Updated Medication List - Protect others around you: Learn how to safely use, store and throw away your medicines at www.disposemymeds.org.          This list is accurate as of: 5/31/17  4:39 PM.  Always use your most recent med list.                   Brand Name Dispense Instructions for use    acetaminophen 500 MG tablet    TYLENOL    45 tablet    Take 1-2 tablets (500-1,000 mg) by mouth every 6 hours as needed for pain (Take as needed for pain.  Do not exceed 4 grams (8 tablets) in a day)       ACIDOPHILUS PO      Take 1 tablet by mouth daily       amoxicillin-clavulanate 875-125 MG per tablet    AUGMENTIN    28 tablet    Take 1 tablet by mouth 2 times daily       aspirin 81 MG tablet      Take 81 mg by mouth daily       azaTHIOprine 50 MG tablet     IMURAN    90 tablet    Take 1 tablet (50 mg) by mouth daily       buPROPion 200 MG 12 hr tablet    WELLBUTRIN SR    30 tablet    Take 1 tablet (200 mg) by mouth every morning       calcitRIOL 0.25 MCG capsule    ROCALTROL    30 capsule    Take 1 capsule (0.25 mcg) by mouth daily       carvedilol 12.5 MG tablet    COREG    135 tablet    Take 0.5 tablets (6.25 mg) by mouth 2 times daily (with meals)       denosumab 60 MG/ML Soln injection    PROLIA    1 mL    Inject 1 mL (60 mg) Subcutaneous every 6 months       EYE VITAMINS PO      Take 1 tablet by mouth daily       furosemide 40 MG tablet    LASIX    90 tablet    TAKE 1 TABLET BY MOUTH DAILY       LORazepam 0.5 MG tablet    ATIVAN    90 tablet    Take 0.5 mg (1 tab) every morning and 1 mg (2 tabs) every night at bedtime.       omeprazole 20 MG CR capsule    priLOSEC    180 capsule    Take 1 capsule (20 mg) by mouth 2 times daily (before meals)       OPSUMIT PO      Take by mouth daily Pt doesn't know dosage,       order for DME     1 Device    Equipment being ordered: SHOWER CHAIR  FROM [facility] MEDICAL       order for DME     2 Package    Equipment being ordered: Compression stockings; 2 pair; custom measured. Med compression 20-30 mmhg       potassium chloride 10 MEQ tablet    K-TAB,KLOR-CON    90 tablet    Take 1 tablet (10 mEq) by mouth daily       simvastatin 10 MG tablet    ZOCOR    90 tablet    Take 1 tablet by mouth At Bedtime.       SODIUM BICARBONATE PO      Take by mouth 2 times daily       T.E.D. BELOW KNEE/M-REGULAR Misc     2 each    1 each daily       tadalafil (PAH) 20 MG Tabs    ADCIRCA    60 tablet    Take 2 tablets (40 mg) by mouth daily       TEMAZEPAM PO      Take by mouth At Bedtime       TOPIRAMATE PO      Take 100 mg by mouth At Bedtime       treprostinil diolamine ER 0.125 MG CR tablet    ORENITRAM    90 tablet    Take 1 tablet (0.125 mg) by mouth 3 times daily (with meals) 4.25mg tid

## 2017-06-07 LAB
DLCOUNC-%PRED-PRE: 76 %
DLCOUNC-PRE: 16.52 ML/MIN/MMHG
DLCOUNC-PRED: 21.64 ML/MIN/MMHG
ERV-%PRED-PRE: 93 %
ERV-PRE: 0.94 L
ERV-PRED: 1 L
EXPTIME-PRE: 7.14 SEC
FEF2575-%PRED-PRE: 62 %
FEF2575-PRE: 1.43 L/SEC
FEF2575-PRED: 2.27 L/SEC
FEFMAX-%PRED-PRE: 68 %
FEFMAX-PRE: 4.31 L/SEC
FEFMAX-PRED: 6.33 L/SEC
FEV1-%PRED-PRE: 87 %
FEV1-PRE: 2.05 L
FEV1FEV6-PRE: 72 %
FEV1FEV6-PRED: 81 %
FEV1FVC-PRE: 72 %
FEV1FVC-PRED: 81 %
FEV1SVC-PRE: 76 %
FEV1SVC-PRED: 73 %
FIFMAX-PRE: 4.12 L/SEC
FVC-%PRED-PRE: 98 %
FVC-PRE: 2.85 L
FVC-PRED: 2.91 L
IC-%PRED-PRE: 78 %
IC-PRE: 1.74 L
IC-PRED: 2.22 L
VA-%PRED-PRE: 88 %
VA-PRE: 4.35 L
VC-%PRED-PRE: 83 %
VC-PRE: 2.68 L
VC-PRED: 3.22 L

## 2017-06-09 ENCOUNTER — OFFICE VISIT (OUTPATIENT)
Dept: FAMILY MEDICINE | Facility: CLINIC | Age: 57
End: 2017-06-09
Payer: MEDICARE

## 2017-06-09 VITALS
WEIGHT: 118.6 LBS | DIASTOLIC BLOOD PRESSURE: 58 MMHG | HEIGHT: 63 IN | HEART RATE: 70 BPM | TEMPERATURE: 98.5 F | BODY MASS INDEX: 21.02 KG/M2 | SYSTOLIC BLOOD PRESSURE: 100 MMHG

## 2017-06-09 DIAGNOSIS — L03.116 CELLULITIS OF LEFT LOWER EXTREMITY: Primary | ICD-10-CM

## 2017-06-09 DIAGNOSIS — I95.9 HYPOTENSION, UNSPECIFIED HYPOTENSION TYPE: ICD-10-CM

## 2017-06-09 LAB
ANION GAP SERPL CALCULATED.3IONS-SCNC: 7 MMOL/L (ref 3–14)
BUN SERPL-MCNC: 14 MG/DL (ref 7–30)
CALCIUM SERPL-MCNC: 8.8 MG/DL (ref 8.5–10.1)
CHLORIDE SERPL-SCNC: 108 MMOL/L (ref 94–109)
CO2 SERPL-SCNC: 23 MMOL/L (ref 20–32)
CREAT SERPL-MCNC: 1.4 MG/DL (ref 0.52–1.04)
GFR SERPL CREATININE-BSD FRML MDRD: 39 ML/MIN/1.7M2
GLUCOSE SERPL-MCNC: 87 MG/DL (ref 70–99)
POTASSIUM SERPL-SCNC: 4 MMOL/L (ref 3.4–5.3)
SODIUM SERPL-SCNC: 138 MMOL/L (ref 133–144)
WBC # BLD AUTO: 5.5 10E9/L (ref 4–11)

## 2017-06-09 PROCEDURE — 36415 COLL VENOUS BLD VENIPUNCTURE: CPT | Performed by: NURSE PRACTITIONER

## 2017-06-09 PROCEDURE — 85048 AUTOMATED LEUKOCYTE COUNT: CPT | Performed by: NURSE PRACTITIONER

## 2017-06-09 PROCEDURE — 80048 BASIC METABOLIC PNL TOTAL CA: CPT | Performed by: NURSE PRACTITIONER

## 2017-06-09 PROCEDURE — 99214 OFFICE O/P EST MOD 30 MIN: CPT | Performed by: NURSE PRACTITIONER

## 2017-06-09 RX ORDER — SULFAMETHOXAZOLE/TRIMETHOPRIM 800-160 MG
1 TABLET ORAL 2 TIMES DAILY
Qty: 14 TABLET | Refills: 0 | Status: SHIPPED | OUTPATIENT
Start: 2017-06-09 | End: 2017-11-01

## 2017-06-09 NOTE — NURSING NOTE
"Chief Complaint   Patient presents with     Cellulitis     Recheck, doesn't think it has gone away.        Initial BP 96/53  Pulse 72  Temp 98.5  F (36.9  C) (Tympanic)  Ht 5' 2.5\" (1.588 m)  Wt 118 lb 9.6 oz (53.8 kg)  BMI 21.35 kg/m2 Estimated body mass index is 21.35 kg/(m^2) as calculated from the following:    Height as of this encounter: 5' 2.5\" (1.588 m).    Weight as of this encounter: 118 lb 9.6 oz (53.8 kg).  Medication Reconciliation: complete  "

## 2017-06-09 NOTE — PROGRESS NOTES
"  SUBJECTIVE:                                                    Judith Nelson is a 57 year old female who presents to clinic today for the following health issues:follow up, left lower leg cellulitis recheck. Had cat bite and multiple scratches on May 24 th, was seen in ED, received IV Ertapenem for 2 days and then was switched to oral Augmentin, finished 2 weeks treatment with Augmentin. Noted improvement in leg erythema, but still have slight redness and mild edema. Denies fever, chills.  Complains of low BP readings, she has history of CKD stage 4, pulmonary hypertension and sarcoidosis. She is on Lasix 40 mg daily, Coreg 6.25 twice daily, Orinitram and Adcirca for pulmonary hypertension. She recently saw her nephrologist who was also concerned about her slightly low BP readings. The patient denies dizziness, shortness of breath, chest pain and palpitations.     Problem list and histories reviewed & adjusted, as indicated.  Additional history: as documented    Labs reviewed in EPIC    Reviewed and updated as needed this visit by clinical staff  Tobacco  Allergies  Med Hx  Surg Hx  Fam Hx  Soc Hx      Reviewed and updated as needed this visit by Provider         ROS:  Constitutional, HEENT, cardiovascular, pulmonary, gi and gu systems are negative, except as otherwise noted.    OBJECTIVE:                                                    /58  Pulse 70  Temp 98.5  F (36.9  C) (Tympanic)  Ht 5' 2.5\" (1.588 m)  Wt 118 lb 9.6 oz (53.8 kg)  BMI 21.35 kg/m2  Body mass index is 21.35 kg/(m^2).  GENERAL: healthy, alert and no distress  RESP: lungs clear to auscultation - no rales, rhonchi or wheezes  CV: regular rate and rhythm, normal S1 S2, no S3 or S4, no murmur, click or rub, no peripheral edema and peripheral pulses strong  SKIN: left lower leg lateral side mild erythema, significant improvement compare to exam when I saw patient on 5/31, there is small dry scab, there is no drainage. "     Diagnostic Test Results:  pending     ASSESSMENT/PLAN:                                                      1. Cellulitis of left lower extremity  -improving  -stop Augmentin   - sulfamethoxazole-trimethoprim (BACTRIM DS/SEPTRA DS) 800-160 MG per tablet; Take 1 tablet by mouth 2 times daily  Dispense: 14 tablet; Refill: 0  - Basic metabolic panel  - WBC count  -keep wound open to ear to promote healing     2. Hypotension, unspecified hypotension type  -BP ranging from 92/66 to 100/58, she still have mild cellulitis which is resolving, she is not having fevers, not tachycardic.    -considering history of CKD and risk of renal hypoperfusion with having low BP, that can worsen her renal function I recommended decrease Lasix to 40 mg 4 days per week and 20 mg on other days.  -follow up with cardiologist in July regarding pulmonary hypertension     - Basic metabolic panel    See Patient Instructions    JAVAN Olson [facility]

## 2017-06-09 NOTE — PATIENT INSTRUCTIONS
BP slightly low today  Plan: reduce Lasix to 40 mg days per week and take 20 mg on other days    Labs: kidney function, electrolytes, WBC    Bactrim 1 tablet twice daily for 7 days      Keep wound open to air    Apply Bacitracin cream twice daily for 2-3 days and then stop

## 2017-06-09 NOTE — MR AVS SNAPSHOT
After Visit Summary   6/9/2017    Judith Nelson    MRN: [id number]           Patient Information     Date Of Birth          1960        Visit Information        Provider Department      6/9/2017 1:20 PM Kiana Virgen APRN [facility]        Today's Diagnoses     Cellulitis of left lower extremity    -  1      Care Instructions    BP slightly low today  Plan: reduce Lasix to 40 mg days per week and take 20 mg on other days    Labs: kidney function, electrolytes, WBC    Bactrim 1 tablet twice daily for 7 days      Keep wound open to air    Apply Bacitracin cream twice daily for 2-3 days and then stop                 Follow-ups after your visit        Your next 10 appointments already scheduled     Jul 25, 2017  2:00 PM CDT   Lab with  LAB   [facility] ([facility])    [address] [place] [address]   [phone]            Jul 25, 2017  2:30 PM CDT   (Arrive by 2:15 PM)   RETURN PRIMARY PULMONARY with Norm Franco MD   [facility] ([facility])    [address] [place] [id number] [phone]            Oct 16, 2017 10:45 AM CDT   Lab with  LAB   [facility] ([facility])    [address] [place] [address]   [phone]            Oct 20, 2017  1:00 PM CDT   (Arrive by 12:45 PM)   RETURN ENDOCRINE with Jenna Salas MD   [facility] Endocrinology ([facility])    [address] [place] [address]   [phone]            Nov 16, 2017 11:00 AM CST   PFT VISIT with  PFL B   [facility] Pulmonary Function Testing ([facility])    [address] [place] [address]   [phone]            Nov 16, 2017 11:30 AM CST   (Arrive by 11:15 AM)   Return Interstitial Lung with Gael Flaherty MD     "[facility] ([facility])    [address]  3rd Floor  [facility] [address] [phone]              Who to contact     If you have questions or need follow up information about today's clinic visit or your schedule please contact [facility] directly at [phone].  Normal or non-critical lab and imaging results will be communicated to you by MyChart, letter or phone within 4 business days after the clinic has received the results. If you do not hear from us within 7 days, please contact the clinic through [facility] [employer] or phone. If you have a critical or abnormal lab result, we will notify you by phone as soon as possible.  Submit refill requests through [employer] or call your pharmacy and they will forward the refill request to us. Please allow 3 business days for your refill to be completed.          Additional Information About Your Visit        [facility] [employer] Information     [employer] gives you secure access to your electronic health record. If you see a primary care provider, you can also send messages to your care team and make appointments. If you have questions, please call your primary care clinic.  If you do not have a primary care provider, please call [phone] and they will assist you.        Care EveryWhere ID     This is your Care EveryWhere ID. This could be used by other organizations to access your [facility] medical records  [id number]        Your Vitals Were     Pulse Temperature Height BMI (Body Mass Index)          72 98.5  F (36.9  C) (Tympanic) 5' 2.5\" (1.588 m) 21.35 kg/m2         Blood Pressure from Last 3 Encounters:   06/09/17 96/53   05/31/17 92/66   05/26/17 103/52    Weight from Last 3 Encounters:   06/09/17 118 lb 9.6 oz (53.8 kg)   05/31/17 118 lb 12.8 oz (53.9 kg)   05/26/17 117 lb 8 oz (53.3 kg)              We Performed the Following     Basic metabolic panel     WBC count          Today's Medication Changes "          These changes are accurate as of: 6/9/17  2:01 PM.  If you have any questions, ask your nurse or doctor.               Start taking these medicines.        Dose/Directions    sulfamethoxazole-trimethoprim 800-160 MG per tablet   Commonly known as:  BACTRIM DS/SEPTRA DS   Used for:  Cellulitis of left lower extremity   Started by:  Kiana Virgen APRN CNP        Dose:  1 tablet   Take 1 tablet by mouth 2 times daily   Quantity:  14 tablet   Refills:  0         These medicines have changed or have updated prescriptions.        Dose/Directions    LORazepam 0.5 MG tablet   Commonly known as:  ATIVAN   This may have changed:    - how much to take  - additional instructions   Used for:  Major depressive disorder, single episode, severe with psychotic features (H)        Take 0.5 mg (1 tab) every morning and 1 mg (2 tabs) every night at bedtime.   Quantity:  90 tablet   Refills:  1       treprostinil diolamine ER 0.125 MG CR tablet   Commonly known as:  ORENITRAM   This may have changed:    - how much to take  - when to take this  - additional instructions   Used for:  Pulmonary arterial hypertension (H)        Dose:  0.125 mg   Take 1 tablet (0.125 mg) by mouth 3 times daily (with meals) 4.25mg tid   Quantity:  90 tablet   Refills:  0            Where to get your medicines      These medications were sent to [facility] [address] [place] [address] AT [facility] OF [address] [place] [address]  [address] [facility] [address]     Phone:  [phone]     sulfamethoxazole-trimethoprim 800-160 MG per tablet                Primary Care Provider Office Phone # Fax #    Anupama Babcock [phone]       [address] [address]        Thank you!     Thank you for choosing [facility]  for your care. Our goal is always to provide you with excellent care. Hearing back from our patients is one way we can continue to  improve our services. Please take a few minutes to complete the written survey that you may receive in the mail after your visit with us. Thank you!             Your Updated Medication List - Protect others around you: Learn how to safely use, store and throw away your medicines at www.disposemymeds.org.          This list is accurate as of: 6/9/17  2:01 PM.  Always use your most recent med list.                   Brand Name Dispense Instructions for use    acetaminophen 500 MG tablet    TYLENOL    45 tablet    Take 1-2 tablets (500-1,000 mg) by mouth every 6 hours as needed for pain (Take as needed for pain.  Do not exceed 4 grams (8 tablets) in a day)       ACIDOPHILUS PO      Take 1 tablet by mouth daily       amoxicillin-clavulanate 875-125 MG per tablet    AUGMENTIN    28 tablet    Take 1 tablet by mouth 2 times daily       aspirin 81 MG tablet      Take 81 mg by mouth daily       azaTHIOprine 50 MG tablet    IMURAN    90 tablet    Take 1 tablet (50 mg) by mouth daily       buPROPion 200 MG 12 hr tablet    WELLBUTRIN SR    30 tablet    Take 1 tablet (200 mg) by mouth every morning       calcitRIOL 0.25 MCG capsule    ROCALTROL    30 capsule    Take 1 capsule (0.25 mcg) by mouth daily       carvedilol 12.5 MG tablet    COREG    135 tablet    Take 0.5 tablets (6.25 mg) by mouth 2 times daily (with meals)       denosumab 60 MG/ML Soln injection    PROLIA    1 mL    Inject 1 mL (60 mg) Subcutaneous every 6 months       EYE VITAMINS PO      Take 1 tablet by mouth daily       furosemide 40 MG tablet    LASIX    90 tablet    TAKE 1 TABLET BY MOUTH DAILY       LORazepam 0.5 MG tablet    ATIVAN    90 tablet    Take 0.5 mg (1 tab) every morning and 1 mg (2 tabs) every night at bedtime.       omeprazole 20 MG CR capsule    priLOSEC    180 capsule    Take 1 capsule (20 mg) by mouth 2 times daily (before meals)       OPSUMIT PO      Take by mouth daily Pt doesn't know dosage,       order for DME     1 Device    Equipment  being ordered: SHOWER CHAIR  FROM [facility]       order for DME     2 Package    Equipment being ordered: Compression stockings; 2 pair; custom measured. Med compression 20-30 mmhg       potassium chloride 10 MEQ tablet    K-TAB,KLOR-CON    90 tablet    Take 1 tablet (10 mEq) by mouth daily       simvastatin 10 MG tablet    ZOCOR    90 tablet    Take 1 tablet by mouth At Bedtime.       SODIUM BICARBONATE PO      Take by mouth 2 times daily       sulfamethoxazole-trimethoprim 800-160 MG per tablet    BACTRIM DS/SEPTRA DS    14 tablet    Take 1 tablet by mouth 2 times daily       T.E.D. BELOW KNEE/M-REGULAR Misc     2 each    1 each daily       tadalafil (PAH) 20 MG Tabs    ADCIRCA    60 tablet    Take 2 tablets (40 mg) by mouth daily       TEMAZEPAM PO      Take by mouth At Bedtime       TOPIRAMATE PO      Take 100 mg by mouth At Bedtime       treprostinil diolamine ER 0.125 MG CR tablet    ORENITRAM    90 tablet    Take 1 tablet (0.125 mg) by mouth 3 times daily (with meals) 4.25mg tid

## 2017-06-28 ENCOUNTER — TELEPHONE (OUTPATIENT)
Dept: FAMILY MEDICINE | Facility: CLINIC | Age: 57
End: 2017-06-28

## 2017-06-28 NOTE — LETTER
[facility]  [address]  [facility] [address] [phone]    June 28, 2017    Judith Nelson  [address] [facility] [address]          Dear Judith,    --Pap smear screening is recommended every 3 years. Some patients require more frequent Pap smears based on an individual assessment of other risk factors. Please call the clinic to schedule this in the near future.   --Mammogram screening is generally recommended every year starting at age of 50.  Some women may choose to be screened at an earlier age ( between 40 & 50) depending on risk factors and discussions with her primary provider.   --Colon cancer screening is generally recommended in all patients over the age of 50 years of age. This can be with either colonoscopy every 10 years, or testing the stool for blood one time per year (called a FIT test, a simple card you can send back to us in the mail). On review of our electronic medical record it appears you are due for colon cancer screening. Please call the clinic to assist in setting up a colonoscopy or, if you prefer, setting up the test for stool blood(FIT).    If you have had any of these tests at an outside facility, please let us know so that we can update your record. -  If you are still seeing Dr. Anupama Barton I apologize for sending this, but you've been seen here at [facility] 3 times recently so I need to update your information.  Thank you!    Please disregard this notice if you have already scheduled an appointment.     Sincerely,    Jaimee KNAPP  [facility] - [phone]  [email or web address]

## 2017-06-28 NOTE — TELEPHONE ENCOUNTER
Panel Management Review          Composite cancer screening  Chart review shows that this patient is due/due soon for the following Pap Smear, Mammogram and Colonoscopy  Summary:    Patient is due/failing the following:   COLONOSCOPY, MAMMOGRAM and PAP  Chart states she transferred care to Dr. Anupama [facility] as of 2015, but has since seen providers here at [facility]    Action needed:   Patient needs office visit for physical/pap / mammo and colonoscopy.    Type of outreach:    Sent [employer] message.    Questions for provider review:    None                                                                                                                                JENNIFER TENORIO        .

## 2017-07-15 ENCOUNTER — HEALTH MAINTENANCE LETTER (OUTPATIENT)
Age: 57
End: 2017-07-15

## 2017-07-24 ENCOUNTER — PRE VISIT (OUTPATIENT)
Dept: CARDIOLOGY | Facility: CLINIC | Age: 57
End: 2017-07-24

## 2017-07-24 DIAGNOSIS — R06.09 DOE (DYSPNEA ON EXERTION): ICD-10-CM

## 2017-07-24 DIAGNOSIS — I51.81 STRESS-INDUCED CARDIOMYOPATHY: Primary | ICD-10-CM

## 2017-07-24 DIAGNOSIS — I27.20 PULMONARY HYPERTENSION (H): ICD-10-CM

## 2017-07-25 ENCOUNTER — HOSPITAL ENCOUNTER (OUTPATIENT)
Facility: CLINIC | Age: 57
End: 2017-07-25
Admitting: INTERNAL MEDICINE

## 2017-07-25 ENCOUNTER — PRE VISIT (OUTPATIENT)
Dept: CARDIOLOGY | Facility: CLINIC | Age: 57
End: 2017-07-25

## 2017-07-25 ENCOUNTER — OFFICE VISIT (OUTPATIENT)
Dept: CARDIOLOGY | Facility: CLINIC | Age: 57
End: 2017-07-25
Attending: NURSE PRACTITIONER
Payer: MEDICARE

## 2017-07-25 VITALS
OXYGEN SATURATION: 94 % | HEART RATE: 66 BPM | DIASTOLIC BLOOD PRESSURE: 62 MMHG | BODY MASS INDEX: 22.05 KG/M2 | WEIGHT: 119.8 LBS | HEIGHT: 62 IN | SYSTOLIC BLOOD PRESSURE: 99 MMHG

## 2017-07-25 DIAGNOSIS — I51.81 STRESS-INDUCED CARDIOMYOPATHY: ICD-10-CM

## 2017-07-25 DIAGNOSIS — I27.20 PULMONARY HYPERTENSION (H): ICD-10-CM

## 2017-07-25 DIAGNOSIS — I27.20 PULMONARY HYPERTENSION (H): Primary | ICD-10-CM

## 2017-07-25 DIAGNOSIS — R06.09 DOE (DYSPNEA ON EXERTION): ICD-10-CM

## 2017-07-25 LAB
ALBUMIN SERPL-MCNC: 4.1 G/DL (ref 3.4–5)
ALP SERPL-CCNC: 37 U/L (ref 40–150)
ALT SERPL W P-5'-P-CCNC: 19 U/L (ref 0–50)
ANION GAP SERPL CALCULATED.3IONS-SCNC: 8 MMOL/L (ref 3–14)
AST SERPL W P-5'-P-CCNC: 15 U/L (ref 0–45)
BILIRUB SERPL-MCNC: 0.5 MG/DL (ref 0.2–1.3)
BUN SERPL-MCNC: 21 MG/DL (ref 7–30)
CALCIUM SERPL-MCNC: 9.3 MG/DL (ref 8.5–10.1)
CHLORIDE SERPL-SCNC: 108 MMOL/L (ref 94–109)
CO2 SERPL-SCNC: 23 MMOL/L (ref 20–32)
CREAT SERPL-MCNC: 1.5 MG/DL (ref 0.52–1.04)
ERYTHROCYTE [DISTWIDTH] IN BLOOD BY AUTOMATED COUNT: 13 % (ref 10–15)
GFR SERPL CREATININE-BSD FRML MDRD: 36 ML/MIN/1.7M2
GLUCOSE SERPL-MCNC: 100 MG/DL (ref 70–99)
HCT VFR BLD AUTO: 40.7 % (ref 35–47)
HGB BLD-MCNC: 13.7 G/DL (ref 11.7–15.7)
MCH RBC QN AUTO: 30.7 PG (ref 26.5–33)
MCHC RBC AUTO-ENTMCNC: 33.7 G/DL (ref 31.5–36.5)
MCV RBC AUTO: 91 FL (ref 78–100)
NT-PROBNP SERPL-MCNC: 525 PG/ML (ref 0–125)
PLATELET # BLD AUTO: 187 10E9/L (ref 150–450)
POTASSIUM SERPL-SCNC: 3.9 MMOL/L (ref 3.4–5.3)
PROT SERPL-MCNC: 7 G/DL (ref 6.8–8.8)
RBC # BLD AUTO: 4.46 10E12/L (ref 3.8–5.2)
SODIUM SERPL-SCNC: 140 MMOL/L (ref 133–144)
WBC # BLD AUTO: 4.4 10E9/L (ref 4–11)

## 2017-07-25 PROCEDURE — 83880 ASSAY OF NATRIURETIC PEPTIDE: CPT | Performed by: NURSE PRACTITIONER

## 2017-07-25 PROCEDURE — 99213 OFFICE O/P EST LOW 20 MIN: CPT | Mod: ZP | Performed by: NURSE PRACTITIONER

## 2017-07-25 PROCEDURE — 36415 COLL VENOUS BLD VENIPUNCTURE: CPT | Performed by: NURSE PRACTITIONER

## 2017-07-25 PROCEDURE — 85027 COMPLETE CBC AUTOMATED: CPT | Performed by: NURSE PRACTITIONER

## 2017-07-25 PROCEDURE — 80053 COMPREHEN METABOLIC PANEL: CPT | Performed by: NURSE PRACTITIONER

## 2017-07-25 PROCEDURE — 99212 OFFICE O/P EST SF 10 MIN: CPT

## 2017-07-25 RX ORDER — LIDOCAINE 40 MG/G
CREAM TOPICAL
Status: CANCELLED | OUTPATIENT
Start: 2017-07-25

## 2017-07-25 ASSESSMENT — PAIN SCALES - GENERAL: PAINLEVEL: NO PAIN (0)

## 2017-07-25 NOTE — PATIENT INSTRUCTIONS
Medication Changes:  1. No medication changes today.    Patient Instructions:  1. You will have a repeat right heart catheterization procedure  2. Continue regular exercise as tolerable.   3. Stress reducing activities and practicing mindfulness    Check-In  Time Check-In Location Estimated Length Procedure   Name        GOLD  waiting room 60-90 minutes Right Heart Catheterization**     Procedure Preparations & Instructions     This is an invasive procedure that DOES require preparation:    - Nothing to eat for 6 hours   - You may have clear liquids up until the time of your procedure  - A ride should be arranged for you in the instance you are unable to drive home, however you should be able to function as you normally would after the procedure     *For Patients with Diabetes: ? DO NOT take any oral diabetic medication, short-acting diabetes medications/insulin, humalog or regular insulin the morning of your test  ? Take   dose of long-acting insulin (Lantus, Levemir) the day of your test  ? Remember to  bring your glucometer and insulin with you to take after your test if needed     *For Patients on anticoagulants: ? Your Coumadin Clinic will give you instructions on medication adjustments or bridging prior to the procedure        Follow up Appointment Information:  1. In 6 weeks with Dr. Franco and labs    We are located on the third floor of [facility] on the [facility].  Our address is     [address] [place] [address] [place] [address] [place] [address]      Thank you for allowing us to be a part of your care here at the [facility]    If you have questions or concerns please contact us at:    Lynne Tamayo, RN, BSN    Reji Escobar ([facility]  Nurse Coordinator     Clinic   Pulmonary Hypertension   Pulmonary Hypertension  [facility]  (P)[phone]    (P) [phone]        (F)[phone]                ** Please note that you will NOT receive a reminder call regarding your scheduled testing, reminder calls are for provider appointments only.  If you are scheduled for testing within the [facility] system you may receive a call regarding pre-registration for billing purposes only.**     Remember to weigh yourself daily after voiding and before you consume any food or beverages and log the numbers.  If you have gained/lost 2 pounds overnight or 5 pounds in a week contact us immediately for medication adjustments or further instructions.  **Please call us immediately if you have any syncope, chest pain, edema, or decline in your functional status.

## 2017-07-25 NOTE — LETTER
7/25/2017      RE: Judith Nelson  [address] [facility] [id number]       Dear Colleague,    Thank you for the opportunity to participate in the care of your patient, Judith Nelson, at the [facility] at [facility]. Please see a copy of my visit note below.    July 25, 2017      Ms. Judith Nelson is a pleasant 57 year old female with a past medical history of pulmonary arterial hypertension, iron deficiency anemia, sarcoidosis, hyperprolactinemia, depression, MGUS, CKD, anxiety and depression.     Today, she is accompanied by: sister in law, Bessie      Current Symptoms  1. Any ER visits or hospital admissions since last appointment: Yes:  For a left lower leg cat scratch/bite, where she was treating at the [facility] for IV antibiotics.    2. Shortness of breath: Yes: with extended activity.  Will also feel tired at the end of a busy day.     3. Dizziness or lightheadedness: No  4. Any syncopal episodes or falls: No  5. Chest pain or chest tightness: No  6. Nausea, vomiting, diarrhea, constipation: Yes: improved with eating before taking orenitram  7. Swelling in the legs: No  8. Bloating in the abdomen: No  9. PND; Waking up at night coughing, choking or SOB: No  10. Any medication intolerances: No  11. Reported headaches: Yes: occasionally, treated with OTC pain medications  12. Jaw pain or bone/joint pain: Yes: occasionally, and depending on the food  13. On IV Prostacyclin: No; current dose: N/A      PAST MEDICAL HISTORY:  Past Medical History:   Diagnosis Date     Anxiety      CKD (chronic kidney disease), stage III     biopsy suspicious for renal sarcoidosis      Hyperprolactinemia (H)      Lower extremity edema     chronic w/ chronic right ankle ulcer     Lumbar compression fracture (H)      Major depressive disorder     with psychotic features, followed by Dr. Rosales at [facility] in [place]     Menopause 2012    patient  is post menopausal     MGUS (monoclonal gammopathy of unknown significance)     mild, followed by Dr. Long     Osteoporosis      Other seborrheic keratosis      Protrusio acetabuli      Pulmonary hypertension (H)      Stress-induced cardiomyopathy          FAMILY HISTORY:  Family History   Problem Relation Age of Onset     CANCER Mother       age 62 leukemia     GASTROINTESTINAL DISEASE Mother      diverticulitis     Hypertension Mother      Allergies Mother      Arthritis Mother      Depression Mother      Eye Disorder Mother      OSTEOPOROSIS Mother      C.A.D. Father      MI/CAD      CANCER Father      skin     Blood Disease Father      renal  problem     Hypertension Father      Anesthesia Reaction Father      Cardiovascular Father      Neurologic Disorder Father      Parkinson's disease     C.A.D. Maternal Grandmother       late 82s MI     DIABETES Maternal Grandmother      C.A.D. Maternal Grandfather       mid 80s MI     Alzheimer Disease Paternal Grandmother       80s     Alzheimer Disease Paternal Grandfather       80s     Neurologic Disorder Sister      migraines     Allergies Sister      DIABETES Other      AODM     Neurologic Disorder Sister      migraines     Allergies Sister      Anesthesia Reaction Son      Anesthesia Reaction Daughter      Anesthesia Reaction Daughter      DIABETES Sister      hypoglycemia         SOCIAL HISTORY:  Social History   Substance Use Topics     Smoking status: Never Smoker     Smokeless tobacco: Never Used     Alcohol use No      Comment: Occ.         CURRENT MEDICATIONS:  Current Outpatient Prescriptions   Medication Sig Dispense Refill     Macitentan (OPSUMIT PO) Take by mouth daily Pt doesn't know dosage,       potassium chloride (K-TAB,KLOR-CON) 10 MEQ tablet Take 1 tablet (10 mEq) by mouth daily 90 tablet 1     furosemide (LASIX) 40 MG tablet TAKE 1 TABLET BY MOUTH DAILY 90 tablet 3     carvedilol (COREG) 12.5 MG tablet Take 0.5 tablets (6.25 mg) by  mouth 2 times daily (with meals) 135 tablet 3     denosumab (PROLIA) 60 MG/ML SOLN Inject 1 mL (60 mg) Subcutaneous every 6 months 1 mL 1     tadalafil, PAH, (ADCIRCA) 20 MG TABS Take 2 tablets (40 mg) by mouth daily 60 tablet 11     azaTHIOprine (IMURAN) 50 MG tablet Take 1 tablet (50 mg) by mouth daily 90 tablet 3     treprostinil diolamine ER (ORENITRAM) 0.125 MG CR tablet Take 1 tablet (0.125 mg) by mouth 3 times daily (with meals) 4.25mg tid (Patient taking differently: Take 5 mg by mouth daily 4.25mg tid) 90 tablet 0     calcitRIOL (ROCALTROL) 0.25 MCG capsule Take 1 capsule (0.25 mcg) by mouth daily 30 capsule 1     aspirin 81 MG tablet Take 81 mg by mouth daily       SODIUM BICARBONATE PO Take by mouth 2 times daily       Lactobacillus (ACIDOPHILUS PO) Take 1 tablet by mouth daily       Multiple Vitamins-Minerals (EYE VITAMINS PO) Take 1 tablet by mouth daily       ORDER FOR DME Equipment being ordered: Compression stockings; 2 pair; custom measured. Med compression 20-30 mmhg 2 Package 2     TOPIRAMATE PO Take 100 mg by mouth At Bedtime       TEMAZEPAM PO Take by mouth At Bedtime       omeprazole (PRILOSEC) 20 MG capsule Take 1 capsule (20 mg) by mouth 2 times daily (before meals) 180 capsule 3     LORazepam (ATIVAN) 0.5 MG tablet Take 0.5 mg (1 tab) every morning and 1 mg (2 tabs) every night at bedtime. (Patient taking differently: 1 mg Take 0.5 mg (1 tab) every morning and 1 mg (2 tabs) every night at bedtime.) 90 tablet 1     buPROPion (WELLBUTRIN SR) 200 MG 12 hr tablet Take 1 tablet (200 mg) by mouth every morning 30 tablet 0     ORDER FOR DME Equipment being ordered: SHOWER CHAIR  FROM [employer] 1 Device 0     acetaminophen (TYLENOL) 500 MG tablet Take 1-2 tablets (500-1,000 mg) by mouth every 6 hours as needed for pain (Take as needed for pain.  Do not exceed 4 grams (8 tablets) in a day) 45 tablet 10     Elastic Bandages & Supports (T.E.D. BELOW KNEE/M-REGULAR) MISC 1 each daily 2 each 0      "simvastatin (ZOCOR) 10 MG tablet Take 1 tablet by mouth At Bedtime. 90 tablet 1     sulfamethoxazole-trimethoprim (BACTRIM DS/SEPTRA DS) 800-160 MG per tablet Take 1 tablet by mouth 2 times daily (Patient not taking: Reported on 7/25/2017) 14 tablet 0     amoxicillin-clavulanate (AUGMENTIN) 875-125 MG per tablet Take 1 tablet by mouth 2 times daily (Patient not taking: Reported on 7/25/2017) 28 tablet 0         ROS:   10 point ROS of systems including Constitutional, Eyes, Respiratory, Cardiovascular, Gastroenterology, Genitourinary, Integumentary, Muscularskeletal, Psychiatric were all negative except for pertinent positives noted in my HPI.    EXAM:  BP 99/62 (BP Location: Left arm, Patient Position: Chair, Cuff Size: Adult Small)  Pulse 66  Ht 1.575 m (5' 2\")  Wt 54.3 kg (119 lb 12.8 oz)  SpO2 94%  BMI 21.91 kg/m2  General: appears comfortable, alert and articulate  Head: normocephalic, atraumatic  Eyes: anicteric sclera, EOMI  Neck: no adenopathy  Orophyarynx: moist mucosa, no lesions, dentition intact  Heart: regular, S1, audible P2, +lift, no murmur, gallop, rub, estimated JVP at level of the clavicle  Lungs: clear, no rales or wheezing  Abdomen: soft, non-tender, bowel sounds present, no hepatosplenomegaly  Extremities: no clubbing, cyanosis or edema  Neurological: normal speech and affect, no gross motor deficits      Weight  Wt Readings from Last 10 Encounters:   07/25/17 54.3 kg (119 lb 12.8 oz)   06/09/17 53.8 kg (118 lb 9.6 oz)   05/31/17 53.9 kg (118 lb 12.8 oz)   05/26/17 53.3 kg (117 lb 8 oz)   05/24/17 52.2 kg (115 lb)   05/16/17 52 kg (114 lb 11.2 oz)   05/11/17 55.3 kg (122 lb)   04/12/17 55.3 kg (121 lb 14.4 oz)   02/28/17 55.7 kg (122 lb 12.8 oz)   10/18/16 61 kg (134 lb 8 oz)         Labs:    CBC RESULTS:  Lab Results   Component Value Date    WBC 4.4 07/25/2017    RBC 4.46 07/25/2017    HGB 13.7 07/25/2017    HCT 40.7 07/25/2017    MCV 91 07/25/2017    MCH 30.7 07/25/2017    MCHC 33.7 " 07/25/2017    RDW 13.0 07/25/2017     07/25/2017       CMP RESULTS:  Lab Results   Component Value Date     07/25/2017    POTASSIUM 3.9 07/25/2017    CHLORIDE 108 07/25/2017    CO2 23 07/25/2017    ANIONGAP 8 07/25/2017     (H) 07/25/2017    BUN 21 07/25/2017    CR 1.50 (H) 07/25/2017    GFRESTIMATED 36 (L) 07/25/2017    GFRESTBLACK 43 (L) 07/25/2017    ISHMAEL 9.3 07/25/2017    BILITOTAL 0.5 07/25/2017    ALBUMIN 4.1 07/25/2017    ALKPHOS 37 (L) 07/25/2017    ALT 19 07/25/2017    AST 15 07/25/2017        INR RESULTS:  Lab Results   Component Value Date    INR 1.04 03/26/2013       BNP RESULTS:  Lab Results   Component Value Date    NTBNPI 413 04/12/2017     No results found for: BNP      Recent Tests:      Echocardiogram (2/28/2017):  Interpretation Summary  The RV is moderately dilated. Global right ventricular function is normal.  Paradoxical septal motion consistent with right ventricular pressure overload  is present.  Global and regional left ventricular function is normal with an EF of 60-65%.  Severe tricuspid insufficiency is present. The right ventricular systolic  pressure is 132 mmHg above the right atrial pressure.  This study was compared with the study from 10/14/2016. RV size and function  are both worse. The degree of pulmonary hypertension is significantly higher.        RHC (4/12/2017):  RA- 7, 5, 4  RV- 113, 7  PA- 113/37, 65  PAW- 12, 12, 10  PVR-20.1  CO/CI-2.9 (1.6)  Dipti CO/CI-2.3 (1.3)      PFT (5/11/2017):   PFT Latest Ref Rng & Units 5/11/2017   FVC L 2.85   FEV1 L 2.05   FVC% % 98   FEV1% % 87       Assessment and Plan:   Ms. Judith Nelson is a 57 year old female with pulmonary arterial hypertension and sarcoidosis, WHO Group I, NYHA Functional Class II.    Mrs. Judith Nelson has been taking Opsumit therapy for approximately two months and is feeling well on the medication without side effects.  She also is finding her Orenitram medication more tolerable by eating  enough calories when taking the dosages.  Since she is very reluctant to switch to IV prostacyclin therapy despite her severe PAH, she will instead have a repeat RHC procedure in 6 weeks to reassess the mPAP.  She will have a follow up appointment with Dr. Franco also in 6 weeks to discuss the results.    It was a pleasure seeing Ms. Judith Nelson at the [facility].       Sincerely,  Korin Hirsch, APRN, CNP.

## 2017-07-25 NOTE — PROGRESS NOTES
July 25, 2017      Ms. Judith Nelson is a pleasant 57 year old female with a past medical history of pulmonary arterial hypertension, iron deficiency anemia, sarcoidosis, hyperprolactinemia, depression, MGUS, CKD, anxiety and depression.     Today, she is accompanied by: sister in law, Bessie      Current Symptoms  1. Any ER visits or hospital admissions since last appointment: Yes:  For a left lower leg cat scratch/bite, where she was treating at the [facility] for IV antibiotics.    2. Shortness of breath: Yes: with extended activity.  Will also feel tired at the end of a busy day.     3. Dizziness or lightheadedness: No  4. Any syncopal episodes or falls: No  5. Chest pain or chest tightness: No  6. Nausea, vomiting, diarrhea, constipation: Yes: improved with eating before taking orenitram  7. Swelling in the legs: No  8. Bloating in the abdomen: No  9. PND; Waking up at night coughing, choking or SOB: No  10. Any medication intolerances: No  11. Reported headaches: Yes: occasionally, treated with OTC pain medications  12. Jaw pain or bone/joint pain: Yes: occasionally, and depending on the food  13. On IV Prostacyclin: No; current dose: N/A      PAST MEDICAL HISTORY:  Past Medical History:   Diagnosis Date     Anxiety      CKD (chronic kidney disease), stage III     biopsy suspicious for renal sarcoidosis      Hyperprolactinemia (H)      Lower extremity edema     chronic w/ chronic right ankle ulcer     Lumbar compression fracture (H)      Major depressive disorder     with psychotic features, followed by Dr. Rosales at [facility] in [place]     Menopause 2012    patient is post menopausal     MGUS (monoclonal gammopathy of unknown significance)     mild, followed by Dr. Long     Osteoporosis      Other seborrheic keratosis      Protrusio acetabuli      Pulmonary hypertension (H)      Stress-induced cardiomyopathy          FAMILY HISTORY:  Family History   Problem Relation Age of Onset      CANCER Mother       age 62 leukemia     GASTROINTESTINAL DISEASE Mother      diverticulitis     Hypertension Mother      Allergies Mother      Arthritis Mother      Depression Mother      Eye Disorder Mother      OSTEOPOROSIS Mother      C.A.D. Father      MI/CAD      CANCER Father      skin     Blood Disease Father      renal  problem     Hypertension Father      Anesthesia Reaction Father      Cardiovascular Father      Neurologic Disorder Father      Parkinson's disease     C.A.D. Maternal Grandmother       late 82s MI     DIABETES Maternal Grandmother      C.A.D. Maternal Grandfather       mid 80s MI     Alzheimer Disease Paternal Grandmother       80s     Alzheimer Disease Paternal Grandfather       80s     Neurologic Disorder Sister      migraines     Allergies Sister      DIABETES Other      AODM     Neurologic Disorder Sister      migraines     Allergies Sister      Anesthesia Reaction Son      Anesthesia Reaction Daughter      Anesthesia Reaction Daughter      DIABETES Sister      hypoglycemia         SOCIAL HISTORY:  Social History   Substance Use Topics     Smoking status: Never Smoker     Smokeless tobacco: Never Used     Alcohol use No      Comment: Occ.         CURRENT MEDICATIONS:  Current Outpatient Prescriptions   Medication Sig Dispense Refill     Macitentan (OPSUMIT PO) Take by mouth daily Pt doesn't know dosage,       potassium chloride (K-TAB,KLOR-CON) 10 MEQ tablet Take 1 tablet (10 mEq) by mouth daily 90 tablet 1     furosemide (LASIX) 40 MG tablet TAKE 1 TABLET BY MOUTH DAILY 90 tablet 3     carvedilol (COREG) 12.5 MG tablet Take 0.5 tablets (6.25 mg) by mouth 2 times daily (with meals) 135 tablet 3     denosumab (PROLIA) 60 MG/ML SOLN Inject 1 mL (60 mg) Subcutaneous every 6 months 1 mL 1     tadalafil, PAH, (ADCIRCA) 20 MG TABS Take 2 tablets (40 mg) by mouth daily 60 tablet 11     azaTHIOprine (IMURAN) 50 MG tablet Take 1 tablet (50 mg) by mouth daily 90 tablet 3      treprostinil diolamine ER (ORENITRAM) 0.125 MG CR tablet Take 1 tablet (0.125 mg) by mouth 3 times daily (with meals) 4.25mg tid (Patient taking differently: Take 5 mg by mouth daily 4.25mg tid) 90 tablet 0     calcitRIOL (ROCALTROL) 0.25 MCG capsule Take 1 capsule (0.25 mcg) by mouth daily 30 capsule 1     aspirin 81 MG tablet Take 81 mg by mouth daily       SODIUM BICARBONATE PO Take by mouth 2 times daily       Lactobacillus (ACIDOPHILUS PO) Take 1 tablet by mouth daily       Multiple Vitamins-Minerals (EYE VITAMINS PO) Take 1 tablet by mouth daily       ORDER FOR DME Equipment being ordered: Compression stockings; 2 pair; custom measured. Med compression 20-30 mmhg 2 Package 2     TOPIRAMATE PO Take 100 mg by mouth At Bedtime       TEMAZEPAM PO Take by mouth At Bedtime       omeprazole (PRILOSEC) 20 MG capsule Take 1 capsule (20 mg) by mouth 2 times daily (before meals) 180 capsule 3     LORazepam (ATIVAN) 0.5 MG tablet Take 0.5 mg (1 tab) every morning and 1 mg (2 tabs) every night at bedtime. (Patient taking differently: 1 mg Take 0.5 mg (1 tab) every morning and 1 mg (2 tabs) every night at bedtime.) 90 tablet 1     buPROPion (WELLBUTRIN SR) 200 MG 12 hr tablet Take 1 tablet (200 mg) by mouth every morning 30 tablet 0     ORDER FOR DME Equipment being ordered: SHOWER CHAIR  FROM [employer] 1 Device 0     acetaminophen (TYLENOL) 500 MG tablet Take 1-2 tablets (500-1,000 mg) by mouth every 6 hours as needed for pain (Take as needed for pain.  Do not exceed 4 grams (8 tablets) in a day) 45 tablet 10     Elastic Bandages & Supports (T.E.D. BELOW KNEE/M-REGULAR) MISC 1 each daily 2 each 0     simvastatin (ZOCOR) 10 MG tablet Take 1 tablet by mouth At Bedtime. 90 tablet 1     sulfamethoxazole-trimethoprim (BACTRIM DS/SEPTRA DS) 800-160 MG per tablet Take 1 tablet by mouth 2 times daily (Patient not taking: Reported on 7/25/2017) 14 tablet 0     amoxicillin-clavulanate (AUGMENTIN) 875-125 MG per tablet Take 1  "tablet by mouth 2 times daily (Patient not taking: Reported on 7/25/2017) 28 tablet 0         ROS:   10 point ROS of systems including Constitutional, Eyes, Respiratory, Cardiovascular, Gastroenterology, Genitourinary, Integumentary, Muscularskeletal, Psychiatric were all negative except for pertinent positives noted in my HPI.    EXAM:  BP 99/62 (BP Location: Left arm, Patient Position: Chair, Cuff Size: Adult Small)  Pulse 66  Ht 1.575 m (5' 2\")  Wt 54.3 kg (119 lb 12.8 oz)  SpO2 94%  BMI 21.91 kg/m2  General: appears comfortable, alert and articulate  Head: normocephalic, atraumatic  Eyes: anicteric sclera, EOMI  Neck: no adenopathy  Orophyarynx: moist mucosa, no lesions, dentition intact  Heart: regular, S1, audible P2, +lift, no murmur, gallop, rub, estimated JVP at level of the clavicle  Lungs: clear, no rales or wheezing  Abdomen: soft, non-tender, bowel sounds present, no hepatosplenomegaly  Extremities: no clubbing, cyanosis or edema  Neurological: normal speech and affect, no gross motor deficits      Weight  Wt Readings from Last 10 Encounters:   07/25/17 54.3 kg (119 lb 12.8 oz)   06/09/17 53.8 kg (118 lb 9.6 oz)   05/31/17 53.9 kg (118 lb 12.8 oz)   05/26/17 53.3 kg (117 lb 8 oz)   05/24/17 52.2 kg (115 lb)   05/16/17 52 kg (114 lb 11.2 oz)   05/11/17 55.3 kg (122 lb)   04/12/17 55.3 kg (121 lb 14.4 oz)   02/28/17 55.7 kg (122 lb 12.8 oz)   10/18/16 61 kg (134 lb 8 oz)         Labs:    CBC RESULTS:  Lab Results   Component Value Date    WBC 4.4 07/25/2017    RBC 4.46 07/25/2017    HGB 13.7 07/25/2017    HCT 40.7 07/25/2017    MCV 91 07/25/2017    MCH 30.7 07/25/2017    MCHC 33.7 07/25/2017    RDW 13.0 07/25/2017     07/25/2017       CMP RESULTS:  Lab Results   Component Value Date     07/25/2017    POTASSIUM 3.9 07/25/2017    CHLORIDE 108 07/25/2017    CO2 23 07/25/2017    ANIONGAP 8 07/25/2017     (H) 07/25/2017    BUN 21 07/25/2017    CR 1.50 (H) 07/25/2017    GFRESTIMATED 36 " (L) 07/25/2017    GFRESTBLACK 43 (L) 07/25/2017    ISHMAEL 9.3 07/25/2017    BILITOTAL 0.5 07/25/2017    ALBUMIN 4.1 07/25/2017    ALKPHOS 37 (L) 07/25/2017    ALT 19 07/25/2017    AST 15 07/25/2017        INR RESULTS:  Lab Results   Component Value Date    INR 1.04 03/26/2013       BNP RESULTS:  Lab Results   Component Value Date    NTBNPI 413 04/12/2017     No results found for: BNP      Recent Tests:      Echocardiogram (2/28/2017):  Interpretation Summary  The RV is moderately dilated. Global right ventricular function is normal.  Paradoxical septal motion consistent with right ventricular pressure overload  is present.  Global and regional left ventricular function is normal with an EF of 60-65%.  Severe tricuspid insufficiency is present. The right ventricular systolic  pressure is 132 mmHg above the right atrial pressure.  This study was compared with the study from 10/14/2016. RV size and function  are both worse. The degree of pulmonary hypertension is significantly higher.        RHC (4/12/2017):  RA- 7, 5, 4  RV- 113, 7  PA- 113/37, 65  PAW- 12, 12, 10  PVR-20.1  CO/CI-2.9 (1.6)  Dipti CO/CI-2.3 (1.3)      PFT (5/11/2017):   PFT Latest Ref Rng & Units 5/11/2017   FVC L 2.85   FEV1 L 2.05   FVC% % 98   FEV1% % 87       Assessment and Plan:   Ms. Judith Nelson is a 57 year old female with pulmonary arterial hypertension and sarcoidosis, WHO Group I, NYHA Functional Class II.    Mrs. Judith Nelson has been taking Opsumit therapy for approximately two months and is feeling well on the medication without side effects.  She also is finding her Orenitram medication more tolerable by eating enough calories when taking the dosages.  Since she is very reluctant to switch to IV prostacyclin therapy despite her severe PAH, she will instead have a repeat RHC procedure in 6 weeks to reassess the mPAP.  She will have a follow up appointment with Dr. Franco also in 6 weeks to discuss the results.    It was a pleasure  seeing Ms. Judith Nelson at the [facility].       Sincerely,  Korin Hirsch, APRN, CNP.

## 2017-07-25 NOTE — MR AVS SNAPSHOT
After Visit Summary   7/25/2017    Judith Nelson    MRN: [id number]           Patient Information     Date Of Birth          1960        Visit Information        Provider Department      7/25/2017 1:00 PM Korin Hirsch, APRN CNP [facility]        Today's Diagnoses     Pulmonary hypertension (H)    -  1      Care Instructions    Medication Changes:  1. No medication changes today.    Patient Instructions:  1. You will have a repeat right heart catheterization procedure  2. Continue regular exercise as tolerable.   3. Stress reducing activities and practicing mindfulness    Check-In  Time Check-In Location Estimated Length Procedure   Name        [facility]  waiting room 60-90 minutes Right Heart Catheterization**     Procedure Preparations & Instructions     This is an invasive procedure that DOES require preparation:    - Nothing to eat for 6 hours   - You may have clear liquids up until the time of your procedure  - A ride should be arranged for you in the instance you are unable to drive home, however you should be able to function as you normally would after the procedure     *For Patients with Diabetes: ? DO NOT take any oral diabetic medication, short-acting diabetes medications/insulin, humalog or regular insulin the morning of your test  ? Take   dose of long-acting insulin (Lantus, Levemir) the day of your test  ? Remember to  bring your glucometer and insulin with you to take after your test if needed     *For Patients on anticoagulants: ? Your Coumadin Clinic will give you instructions on medication adjustments or bridging prior to the procedure        Follow up Appointment Information:  1. In 6 weeks with Dr. Franco and labs    We are located on the third floor [facility] the [facility].  Our address is     [address] [place] [address] on [address] [place] [phone]      Thank you for allowing us to  be a part of your care here at the [facility]    If you have questions or concerns please contact us at:    Lynne Tamayo RN, BSN    Reji Escobar (Schedule,P.A.)  Nurse Coordinator     Clinic   Pulmonary Hypertension   Pulmonary Hypertension  [facility]  (P)[phone]    (P) [phone]        (F)[phone]                ** Please note that you will NOT receive a reminder call regarding your scheduled testing, reminder calls are for provider appointments only.  If you are scheduled for testing within the [place] system you may receive a call regarding pre-registration for billing purposes only.**     Remember to weigh yourself daily after voiding and before you consume any food or beverages and log the numbers.  If you have gained/lost 2 pounds overnight or 5 pounds in a week contact us immediately for medication adjustments or further instructions.  **Please call us immediately if you have any syncope, chest pain, edema, or decline in your functional status.            Follow-ups after your visit        Follow-up notes from your care team     Return in about 6 weeks (around 9/5/2017) for with Joan, Return PH, Labs.      Your next 10 appointments already scheduled     Sep 13, 2017  2:00 PM CDT   (Arrive by 1:45 PM)   RETURN PRIMARY PULMONARY with Norm Franco MD   [facility] ([facility])    [address] [place] [id number] [phone]            Oct 16, 2017 10:45 AM CDT   Lab with  LAB   [facility] Lab ([facility])    [address] [place] [address]   [phone]            Oct 20, 2017  1:00 PM CDT   (Arrive by 12:45 PM)   RETURN ENDOCRINE with Jenna Salas MD   [facility] Endocrinology ([facility])    [address] [place] [address]  Floor  [facility] [address]   [phone]            Nov 16, 2017 11:00 AM CST   PFT VISIT with ALEXANDRA PFL YULIA   [facility] Pulmonary Function Testing ([facility])    [address] [place] [address]   [phone]            Nov 16, 2017 11:30 AM CST   (Arrive by 11:15 AM)   Return Interstitial Lung with Gael Flaherty MD   [facility] ([facility])    [address] [place] [address]   [phone]              Future tests that were ordered for you today     Open Future Orders        Priority Expected Expires Ordered    Heart Cath Right heart cath Routine  7/25/2018 7/25/2017            Who to contact     If you have questions or need follow up information about today's clinic visit or your schedule please contact [facility] directly at [phone].  Normal or non-critical lab and imaging results will be communicated to you by [employer], letter or phone within 4 business days after the clinic has received the results. If you do not hear from us within 7 days, please contact the clinic through [employer] or phone. If you have a critical or abnormal lab result, we will notify you by phone as soon as possible.  Submit refill requests through [employer] or call your pharmacy and they will forward the refill request to us. Please allow 3 business days for your refill to be completed.          Additional Information About Your Visit        [employer] Information     [employer] gives you secure access to your electronic health record. If you see a primary care provider, you can also send messages to your care team and make appointments. If you have questions, please call your primary care clinic.  If you do not have a primary care provider, please call [phone] and they will assist you.        Care EveryWhere ID     This is your Care EveryWhere ID. This could be used by other  "organizations to access your [place] medical records  [id number]        Your Vitals Were     Pulse Height Pulse Oximetry BMI (Body Mass Index)          66 1.575 m (5' 2\") 94% 21.91 kg/m2         Blood Pressure from Last 3 Encounters:   07/25/17 99/62   06/09/17 100/58   05/31/17 92/66    Weight from Last 3 Encounters:   07/25/17 54.3 kg (119 lb 12.8 oz)   06/09/17 53.8 kg (118 lb 9.6 oz)   05/31/17 53.9 kg (118 lb 12.8 oz)                 Today's Medication Changes          These changes are accurate as of: 7/25/17  2:22 PM.  If you have any questions, ask your nurse or doctor.               These medicines have changed or have updated prescriptions.        Dose/Directions    LORazepam 0.5 MG tablet   Commonly known as:  ATIVAN   This may have changed:    - how much to take  - additional instructions   Used for:  Major depressive disorder, single episode, severe with psychotic features (H)        Take 0.5 mg (1 tab) every morning and 1 mg (2 tabs) every night at bedtime.   Quantity:  90 tablet   Refills:  1       treprostinil diolamine ER 0.125 MG CR tablet   Commonly known as:  ORENITRAM   This may have changed:    - how much to take  - when to take this  - additional instructions   Used for:  Pulmonary arterial hypertension (H)        Dose:  0.125 mg   Take 1 tablet (0.125 mg) by mouth 3 times daily (with meals) 4.25mg tid   Quantity:  90 tablet   Refills:  0                Primary Care Provider Office Phone # Fax #    Anupama Cecilia Babcock [phone]       [address] [address]        Equal Access to Services     SHADI CHACKO AH: Hadii pablo Leiva, wadanieda lufrancisca, qaybta kaalmaalysha epstein. So [facility] [phone].    ATENCIÓN: Si habla español, tiene a dhillon disposición servicios gratuitos de asistencia lingüística. Llame al [phone].    We comply with applicable federal civil rights laws and [place] " laws. We do not discriminate on the basis of race, color, national origin, age, disability sex, sexual orientation or gender identity.            Thank you!     Thank you for choosing [facility]  for your care. Our goal is always to provide you with excellent care. Hearing back from our patients is one way we can continue to improve our services. Please take a few minutes to complete the written survey that you may receive in the mail after your visit with us. Thank you!             Your Updated Medication List - Protect others around you: Learn how to safely use, store and throw away your medicines at www.disposemymeds.org.          This list is accurate as of: 7/25/17  2:22 PM.  Always use your most recent med list.                   Brand Name Dispense Instructions for use Diagnosis    acetaminophen 500 MG tablet    TYLENOL    45 tablet    Take 1-2 tablets (500-1,000 mg) by mouth every 6 hours as needed for pain (Take as needed for pain.  Do not exceed 4 grams (8 tablets) in a day)    Pulmonary hypertension (H)       ACIDOPHILUS PO      Take 1 tablet by mouth daily    Sarcoidosis (H)       amoxicillin-clavulanate 875-125 MG per tablet    AUGMENTIN    28 tablet    Take 1 tablet by mouth 2 times daily    Cellulitis of left lower extremity       aspirin 81 MG tablet      Take 81 mg by mouth daily    Pulmonary arterial hypertension (H)       azaTHIOprine 50 MG tablet    IMURAN    90 tablet    Take 1 tablet (50 mg) by mouth daily    Sarcoidosis (H)       buPROPion 200 MG 12 hr tablet    WELLBUTRIN SR    30 tablet    Take 1 tablet (200 mg) by mouth every morning    Major depressive disorder, single episode, moderate (H)       calcitRIOL 0.25 MCG capsule    ROCALTROL    30 capsule    Take 1 capsule (0.25 mcg) by mouth daily        carvedilol 12.5 MG tablet    COREG    135 tablet    Take 0.5 tablets (6.25 mg) by mouth 2 times daily (with meals)    Chronic systolic heart failure (H)       denosumab 60 MG/ML Soln  injection    PROLIA    1 mL    Inject 1 mL (60 mg) Subcutaneous every 6 months    Osteoporosis, postmenopausal       EYE VITAMINS PO      Take 1 tablet by mouth daily    Sarcoidosis (H)       furosemide 40 MG tablet    LASIX    90 tablet    TAKE 1 TABLET BY MOUTH DAILY    Pulmonary hypertension (H), Stress-induced cardiomyopathy       LORazepam 0.5 MG tablet    ATIVAN    90 tablet    Take 0.5 mg (1 tab) every morning and 1 mg (2 tabs) every night at bedtime.    Major depressive disorder, single episode, severe with psychotic features (H)       omeprazole 20 MG CR capsule    priLOSEC    180 capsule    Take 1 capsule (20 mg) by mouth 2 times daily (before meals)    Dyspepsia       OPSUMIT PO      Take by mouth daily Pt doesn't know dosage,        order for DME     1 Device    Equipment being ordered: SHOWER CHAIR  FROM [facility]    Tremor, Generalized muscle weakness, Sarcoidosis (H)       order for DME     2 Package    Equipment being ordered: Compression stockings; 2 pair; custom measured. Med compression 20-30 mmhg    Pulmonary hypertension (H), Edema       potassium chloride 10 MEQ tablet    K-TAB,KLOR-CON    90 tablet    Take 1 tablet (10 mEq) by mouth daily    Heart failure (H)       simvastatin 10 MG tablet    ZOCOR    90 tablet    Take 1 tablet by mouth At Bedtime.    Hypercholesterolemia       SODIUM BICARBONATE PO      Take by mouth 2 times daily    Pulmonary arterial hypertension (H)       sulfamethoxazole-trimethoprim 800-160 MG per tablet    BACTRIM DS/SEPTRA DS    14 tablet    Take 1 tablet by mouth 2 times daily    Cellulitis of left lower extremity       T.E.D. BELOW KNEE/M-REGULAR Misc     2 each    1 each daily    Lower extremity edema       tadalafil (PAH) 20 MG Tabs    ADCIRCA    60 tablet    Take 2 tablets (40 mg) by mouth daily    Pulmonary hypertension (H)       TEMAZEPAM PO      Take by mouth At Bedtime        TOPIRAMATE PO      Take 100 mg by mouth At Bedtime        treprostinil diolamine  ER 0.125 MG CR tablet    ORENITRAM    90 tablet    Take 1 tablet (0.125 mg) by mouth 3 times daily (with meals) 4.25mg tid    Pulmonary arterial hypertension (H)

## 2017-09-08 DIAGNOSIS — I50.9 HEART FAILURE (H): ICD-10-CM

## 2017-09-12 DIAGNOSIS — D86.9 SARCOIDOSIS: ICD-10-CM

## 2017-09-12 RX ORDER — AZATHIOPRINE 50 MG/1
50 TABLET ORAL DAILY
Qty: 90 TABLET | Refills: 3 | Status: SHIPPED | OUTPATIENT
Start: 2017-09-12 | End: 2018-09-21

## 2017-09-13 ENCOUNTER — HOSPITAL ENCOUNTER (OUTPATIENT)
Facility: CLINIC | Age: 57
Discharge: HOME | End: 2017-09-13
Attending: INTERNAL MEDICINE | Admitting: INTERNAL MEDICINE
Payer: MEDICARE

## 2017-09-13 ENCOUNTER — APPOINTMENT (OUTPATIENT)
Dept: MEDSURG UNIT | Facility: CLINIC | Age: 57
End: 2017-09-13
Attending: INTERNAL MEDICINE
Payer: MEDICARE

## 2017-09-13 ENCOUNTER — OFFICE VISIT (OUTPATIENT)
Dept: CARDIOLOGY | Facility: CLINIC | Age: 57
End: 2017-09-13
Attending: INTERNAL MEDICINE
Payer: MEDICARE

## 2017-09-13 ENCOUNTER — APPOINTMENT (OUTPATIENT)
Dept: CARDIOLOGY | Facility: CLINIC | Age: 57
End: 2017-09-13
Attending: NURSE PRACTITIONER
Payer: MEDICARE

## 2017-09-13 ENCOUNTER — PRE VISIT (OUTPATIENT)
Dept: CARDIOLOGY | Facility: CLINIC | Age: 57
End: 2017-09-13

## 2017-09-13 VITALS
HEIGHT: 63 IN | SYSTOLIC BLOOD PRESSURE: 120 MMHG | WEIGHT: 123.1 LBS | BODY MASS INDEX: 21.81 KG/M2 | HEART RATE: 71 BPM | DIASTOLIC BLOOD PRESSURE: 77 MMHG | OXYGEN SATURATION: 93 %

## 2017-09-13 VITALS
OXYGEN SATURATION: 93 % | SYSTOLIC BLOOD PRESSURE: 130 MMHG | RESPIRATION RATE: 20 BRPM | TEMPERATURE: 97.8 F | DIASTOLIC BLOOD PRESSURE: 68 MMHG | HEART RATE: 63 BPM

## 2017-09-13 DIAGNOSIS — R06.09 DYSPNEA ON EXERTION: ICD-10-CM

## 2017-09-13 DIAGNOSIS — I27.20 PULMONARY HYPERTENSION (H): ICD-10-CM

## 2017-09-13 DIAGNOSIS — I27.20 PULMONARY HYPERTENSION (H): Primary | ICD-10-CM

## 2017-09-13 LAB
ALBUMIN SERPL-MCNC: 3.8 G/DL (ref 3.4–5)
ALP SERPL-CCNC: 62 U/L (ref 40–150)
ALT SERPL W P-5'-P-CCNC: 39 U/L (ref 0–50)
ANION GAP SERPL CALCULATED.3IONS-SCNC: 6 MMOL/L (ref 3–14)
AST SERPL W P-5'-P-CCNC: 36 U/L (ref 0–45)
BILIRUB SERPL-MCNC: 0.4 MG/DL (ref 0.2–1.3)
BUN SERPL-MCNC: 20 MG/DL (ref 7–30)
CALCIUM SERPL-MCNC: 8.4 MG/DL (ref 8.5–10.1)
CHLORIDE SERPL-SCNC: 110 MMOL/L (ref 94–109)
CO2 SERPL-SCNC: 24 MMOL/L (ref 20–32)
CREAT SERPL-MCNC: 1.44 MG/DL (ref 0.52–1.04)
ERYTHROCYTE [DISTWIDTH] IN BLOOD BY AUTOMATED COUNT: 13.3 % (ref 10–15)
GFR SERPL CREATININE-BSD FRML MDRD: 38 ML/MIN/1.7M2
GLUCOSE SERPL-MCNC: 98 MG/DL (ref 70–99)
HCG SERPL QL: NEGATIVE
HCT VFR BLD AUTO: 39.8 % (ref 35–47)
HGB BLD-MCNC: 12.9 G/DL (ref 11.7–15.7)
MCH RBC QN AUTO: 30.1 PG (ref 26.5–33)
MCHC RBC AUTO-ENTMCNC: 32.4 G/DL (ref 31.5–36.5)
MCV RBC AUTO: 93 FL (ref 78–100)
NT-PROBNP SERPL-MCNC: 2161 PG/ML (ref 0–125)
PLATELET # BLD AUTO: 209 10E9/L (ref 150–450)
POTASSIUM SERPL-SCNC: 3.8 MMOL/L (ref 3.4–5.3)
PROT SERPL-MCNC: 7.1 G/DL (ref 6.8–8.8)
RBC # BLD AUTO: 4.28 10E12/L (ref 3.8–5.2)
SODIUM SERPL-SCNC: 140 MMOL/L (ref 133–144)
WBC # BLD AUTO: 4.3 10E9/L (ref 4–11)

## 2017-09-13 PROCEDURE — 84703 CHORIONIC GONADOTROPIN ASSAY: CPT | Performed by: NURSE PRACTITIONER

## 2017-09-13 PROCEDURE — 93451 RIGHT HEART CATH: CPT

## 2017-09-13 PROCEDURE — 27211181 ZZH BALLOON TIP PRESSURE CR5

## 2017-09-13 PROCEDURE — 4A023N6 MEASUREMENT OF CARDIAC SAMPLING AND PRESSURE, RIGHT HEART, PERCUTANEOUS APPROACH: ICD-10-PCS | Performed by: INTERNAL MEDICINE

## 2017-09-13 PROCEDURE — 27210806 ZZH SHEATH CR5

## 2017-09-13 PROCEDURE — 27210787 ZZH MANIFOLD CR2

## 2017-09-13 PROCEDURE — 36415 COLL VENOUS BLD VENIPUNCTURE: CPT | Performed by: INTERNAL MEDICINE

## 2017-09-13 PROCEDURE — 99212 OFFICE O/P EST SF 10 MIN: CPT | Mod: 25,ZF

## 2017-09-13 PROCEDURE — 25000132 ZZH RX MED GY IP 250 OP 250 PS 637: Mod: GY | Performed by: INTERNAL MEDICINE

## 2017-09-13 PROCEDURE — 40000166 ZZH STATISTIC PP CARE STAGE 1

## 2017-09-13 PROCEDURE — A9270 NON-COVERED ITEM OR SERVICE: HCPCS | Mod: GY | Performed by: INTERNAL MEDICINE

## 2017-09-13 PROCEDURE — 93451 RIGHT HEART CATH: CPT | Mod: 26 | Performed by: INTERNAL MEDICINE

## 2017-09-13 PROCEDURE — 27210982 ZZH KIT RT HC TOTES DISP CR7

## 2017-09-13 PROCEDURE — 80053 COMPREHEN METABOLIC PANEL: CPT | Performed by: INTERNAL MEDICINE

## 2017-09-13 PROCEDURE — 83880 ASSAY OF NATRIURETIC PEPTIDE: CPT | Performed by: INTERNAL MEDICINE

## 2017-09-13 PROCEDURE — 85027 COMPLETE CBC AUTOMATED: CPT | Performed by: INTERNAL MEDICINE

## 2017-09-13 RX ORDER — LORAZEPAM 0.5 MG/1
1 TABLET ORAL ONCE
Status: COMPLETED | OUTPATIENT
Start: 2017-09-13 | End: 2017-09-13

## 2017-09-13 RX ORDER — POTASSIUM CHLORIDE 750 MG/1
10 TABLET, EXTENDED RELEASE ORAL DAILY
Qty: 90 TABLET | Refills: 1 | Status: SHIPPED | OUTPATIENT
Start: 2017-09-13 | End: 2018-03-15

## 2017-09-13 RX ORDER — LIDOCAINE 40 MG/G
CREAM TOPICAL
Status: COMPLETED | OUTPATIENT
Start: 2017-09-13 | End: 2017-09-13

## 2017-09-13 RX ADMIN — LORAZEPAM 1 MG: 0.5 TABLET ORAL at 11:18

## 2017-09-13 RX ADMIN — LIDOCAINE: 40 CREAM TOPICAL at 11:07

## 2017-09-13 ASSESSMENT — PAIN SCALES - GENERAL: PAINLEVEL: NO PAIN (0)

## 2017-09-13 NOTE — PROGRESS NOTES
Mendy returned to 2a after having her right heart cath procedure.  Her vital signs were stable.  The puncture site was dry and intact.  Discharge instructions were reviewed.  PIV removed.  Patient discharged and has a clinic appointment at 1400.

## 2017-09-13 NOTE — IP AVS SNAPSHOT
[address] [address]                                       After Visit Summary   9/13/2017    Judith Nelson    MRN: [id number]           After Visit Summary Signature Page     I have received my discharge instructions, and my questions have been answered. I have discussed any challenges I see with this plan with the nurse or doctor.    ..........................................................................................................................................  Patient/Patient Representative Signature      ..........................................................................................................................................  Patient Representative Print Name and Relationship to Patient    ..................................................               ................................................  Date                                            Time    ..........................................................................................................................................  Reviewed by Signature/Title    ...................................................              ..............................................  Date                                                            Time

## 2017-09-13 NOTE — NURSING NOTE
Chief Complaint   Patient presents with     Follow Up For     Return for PH F/U with [facility] prior     Vitals were taken and medications were reconciled.   Bria Camarillo MA    1:31 PM

## 2017-09-13 NOTE — IP AVS SNAPSHOT
MRN:[id number]                      After Visit Summary   9/13/2017    Judith Nelson    MRN: [id number]           Visit Information        Department      9/13/2017 10:30 AM [address] [facility]          Review of your medicines      UNREVIEWED medicines. Ask your doctor about these medicines        Dose / Directions    acetaminophen 500 MG tablet   Commonly known as:  TYLENOL   Used for:  Pulmonary hypertension (H)        Dose:  500-1000 mg   Take 1-2 tablets (500-1,000 mg) by mouth every 6 hours as needed for pain (Take as needed for pain.  Do not exceed 4 grams (8 tablets) in a day)   Quantity:  45 tablet   Refills:  10       ACIDOPHILUS PO   Used for:  Sarcoidosis (H)        Dose:  1 tablet   Take 1 tablet by mouth daily   Refills:  0       amoxicillin-clavulanate 875-125 MG per tablet   Commonly known as:  AUGMENTIN   Used for:  Cellulitis of left lower extremity        Dose:  1 tablet   Take 1 tablet by mouth 2 times daily   Quantity:  28 tablet   Refills:  0       aspirin 81 MG tablet   Used for:  Pulmonary arterial hypertension (H)        Dose:  81 mg   Take 81 mg by mouth daily   Refills:  0       azaTHIOprine 50 MG tablet   Commonly known as:  IMURAN   Used for:  Sarcoidosis (H)        Dose:  50 mg   Take 1 tablet (50 mg) by mouth daily   Quantity:  90 tablet   Refills:  3       buPROPion 200 MG 12 hr tablet   Commonly known as:  WELLBUTRIN SR   Used for:  Major depressive disorder, single episode, moderate (H)        Dose:  200 mg   Take 1 tablet (200 mg) by mouth every morning   Quantity:  30 tablet   Refills:  0       calcitRIOL 0.25 MCG capsule   Commonly known as:  ROCALTROL        Dose:  0.25 mcg   Take 1 capsule (0.25 mcg) by mouth daily   Quantity:  30 capsule   Refills:  1       carvedilol 12.5 MG tablet   Commonly known as:  COREG   Used for:  Chronic systolic heart failure (H)        Dose:  6.25 mg   Take 0.5 tablets (6.25 mg) by mouth 2 times daily (with meals)    Quantity:  135 tablet   Refills:  3       denosumab 60 MG/ML Soln injection   Commonly known as:  PROLIA   Used for:  Osteoporosis, postmenopausal        Dose:  60 mg   Inject 1 mL (60 mg) Subcutaneous every 6 months   Quantity:  1 mL   Refills:  1       EYE VITAMINS PO   Used for:  Sarcoidosis (H)        Dose:  1 tablet   Take 1 tablet by mouth daily   Refills:  0       furosemide 40 MG tablet   Commonly known as:  LASIX   Used for:  Pulmonary hypertension (H), Stress-induced cardiomyopathy        TAKE 1 TABLET BY MOUTH DAILY   Quantity:  90 tablet   Refills:  3       LORazepam 0.5 MG tablet   Commonly known as:  ATIVAN   Used for:  Major depressive disorder, single episode, severe with psychotic features (H)        Take 0.5 mg (1 tab) every morning and 1 mg (2 tabs) every night at bedtime.   Quantity:  90 tablet   Refills:  1       omeprazole 20 MG CR capsule   Commonly known as:  priLOSEC   Indication:  gi prophylaxis   Used for:  Dyspepsia        Dose:  20 mg   Take 1 capsule (20 mg) by mouth 2 times daily (before meals)   Quantity:  180 capsule   Refills:  3       OPSUMIT PO        Take by mouth daily Pt doesn't know dosage,   Refills:  0       potassium chloride 10 MEQ tablet   Commonly known as:  K-TAB,KLOR-CON   Used for:  Heart failure (H)        Dose:  10 mEq   Take 1 tablet (10 mEq) by mouth daily   Quantity:  90 tablet   Refills:  1       simvastatin 10 MG tablet   Commonly known as:  ZOCOR   Used for:  Hypercholesterolemia        Dose:  10 mg   Take 1 tablet by mouth At Bedtime.   Quantity:  90 tablet   Refills:  1       SODIUM BICARBONATE PO   Used for:  Pulmonary arterial hypertension (H)        Take by mouth 2 times daily   Refills:  0       sulfamethoxazole-trimethoprim 800-160 MG per tablet   Commonly known as:  BACTRIM DS/SEPTRA DS   Used for:  Cellulitis of left lower extremity        Dose:  1 tablet   Take 1 tablet by mouth 2 times daily   Quantity:  14 tablet   Refills:  0       tadalafil  (PAH) 20 MG Tabs   Commonly known as:  ADCIRCA   Used for:  Pulmonary hypertension (H)        Dose:  40 mg   Take 2 tablets (40 mg) by mouth daily   Quantity:  60 tablet   Refills:  11       TEMAZEPAM PO        Take by mouth At Bedtime   Refills:  0       TOPIRAMATE PO        Dose:  100 mg   Take 100 mg by mouth At Bedtime   Refills:  0       treprostinil diolamine ER 0.125 MG CR tablet   Commonly known as:  ORENITRAM   Used for:  Pulmonary arterial hypertension (H)        Dose:  0.125 mg   Take 1 tablet (0.125 mg) by mouth 3 times daily (with meals) 4.25mg tid   Quantity:  90 tablet   Refills:  0         CONTINUE these medicines which have NOT CHANGED        Dose / Directions    order for DME   Used for:  Tremor, Generalized muscle weakness, Sarcoidosis (H)        Equipment being ordered: SHOWER CHAIR  FROM [employer]   Quantity:  1 Device   Refills:  0       order for DME   Used for:  Pulmonary hypertension (H), Edema        Equipment being ordered: Compression stockings; 2 pair; custom measured. Med compression 20-30 mmhg   Quantity:  2 Package   Refills:  2       T.E.D. BELOW KNEE/M-REGULAR Misc   Used for:  Lower extremity edema        Dose:  1 each   1 each daily   Quantity:  2 each   Refills:  0                Protect others around you: Learn how to safely use, store and throw away your medicines at www.disposemymeds.org.         Follow-ups after your visit        Your next 10 appointments already scheduled     Sep 13, 2017  2:00 PM CDT   (Arrive by 1:45 PM)   RETURN PRIMARY PULMONARY with Norm Franco MD   [facility] ([facility])    [address] [place] [address]  3rd [facility] [id number] [phone]            Oct 16, 2017 10:45 AM CDT   Lab with  LAB    [facility] ([facility])    [address] [place] [id number] [phone]            Oct 20, 2017  1:00 PM CDT   (Arrive by 12:45 PM)   RETURN  ENDOCRINE with Jenna Salas MD   [facility] Endocrinology ([facility])    [address] [id number] [phone]            Nov 16, 2017 11:00 AM CST   PFT VISIT with ALEXANDRA PFL YULIA   [facility] Pulmonary Function Testing ([facility])    [address] [place] [id number] [phone]            Nov 16, 2017 11:30 AM CST   (Arrive by 11:15 AM)   Return Interstitial Lung with Gael Flaherty MD   [facility] ([facility])    [address] [place] [id number] [phone]               Care Instructions        Further instructions from your care team       [facility]                        Interventional Cardiology  Discharge Instructions   Post Right Heart Cath      AFTER YOU GO HOME:    DO drink plenty of fluids    DO resume your regular diet and medications unless otherwise instructed by your Primary Physician    Do Not scrub the procedure site vigorously    No lotion or powder to the puncture site for 3 days    CALL YOUR PRIMARY PHYSICIAN IF: You may resume all normal activity.  Monitor neck site for bleeding, swelling, or voice changes. If you notice bleeding or swelling immediately apply pressure to the site and call number below to speak with Cardiology Fellow.  If you experience any changes in your breathing you should call your doctor immediately or come to the closest Emergency Department.  Do not drive yourself.    ADDITIONAL INSTRUCTIONS: Medications: You are to resume all home medications including anticoagulation therapy unless otherwise advised by your primary cardiologist or nurse coordinator.    Follow Up: Per your primary cardiology team    If you have any questions or concerns regarding your procedure site please call [phone] at anytime and ask for Cardiology Fellow on call.  They are  available 24 hours a day.  You may also contact the Cardiology Clinic after hours number at [phone].                                                       Telephone Numbers [phone] Monday-Friday 8:00 am to 4:30 pm    [phone] After 4:30 pm Monday-Friday, Weekends & Holidays  Ask for Interventional Cardiologist on call. Someone is on call 24 hours/day   [facility] toll free number [phone] Monday-Friday 8:00 am to 4:30 pm   [facility] Emergency Dept [phone]                    Additional Information About Your Visit        [facility] Information     [facility] gives you secure access to your electronic health record. If you see a primary care provider, you can also send messages to your care team and make appointments. If you have questions, please call your primary care clinic.  If you do not have a primary care provider, please call [phone] and they will assist you.        Care EveryWhere ID     This is your Care EveryWhere ID. This could be used by other organizations to access your [facility] medical records  [id number]        Your Vitals Were     Blood Pressure Pulse Temperature Respirations Pulse Oximetry       113/74 (BP Location: Right arm) 63 97.8  F (36.6  C) 20 93%        Primary Care Provider Office Phone # Fax #    Anupama Babcock [phone]      Equal Access to Services     [facility] : Hadii aad ku hadasho Soomaali, waaxda luqadaha, qaybta kaalmada adeegyada, alysha abdi. So [facility] [phone].    ATENCIÓN: Si habla español, tiene a dhillon disposición servicios gratuitos de asistencia lingüística. Llame al [phone].    We comply with applicable federal civil rights laws and [place] laws. We do not discriminate on the basis of race, color, national origin, age, disability sex, sexual orientation or gender identity.            Thank you!     Thank you for choosing [facility] for your care. Our goal is always to provide you with  excellent care. Hearing back from our patients is one way we can continue to improve our services. Please take a few minutes to complete the written survey that you may receive in the mail after you visit with us. Thank you!             Medication List: This is a list of all your medications and when to take them. Check marks below indicate your daily home schedule. Keep this list as a reference.      Medications           Morning Afternoon Evening Bedtime As Needed    acetaminophen 500 MG tablet   Commonly known as:  TYLENOL   Take 1-2 tablets (500-1,000 mg) by mouth every 6 hours as needed for pain (Take as needed for pain.  Do not exceed 4 grams (8 tablets) in a day)                                ACIDOPHILUS PO   Take 1 tablet by mouth daily                                amoxicillin-clavulanate 875-125 MG per tablet   Commonly known as:  AUGMENTIN   Take 1 tablet by mouth 2 times daily                                aspirin 81 MG tablet   Take 81 mg by mouth daily                                azaTHIOprine 50 MG tablet   Commonly known as:  IMURAN   Take 1 tablet (50 mg) by mouth daily                                buPROPion 200 MG 12 hr tablet   Commonly known as:  WELLBUTRIN SR   Take 1 tablet (200 mg) by mouth every morning                                calcitRIOL 0.25 MCG capsule   Commonly known as:  ROCALTROL   Take 1 capsule (0.25 mcg) by mouth daily                                carvedilol 12.5 MG tablet   Commonly known as:  COREG   Take 0.5 tablets (6.25 mg) by mouth 2 times daily (with meals)                                denosumab 60 MG/ML Soln injection   Commonly known as:  PROLIA   Inject 1 mL (60 mg) Subcutaneous every 6 months                                EYE VITAMINS PO   Take 1 tablet by mouth daily                                furosemide 40 MG tablet   Commonly known as:  LASIX   TAKE 1 TABLET BY MOUTH DAILY                                LORazepam 0.5 MG tablet   Commonly known as:   ATIVAN   Take 0.5 mg (1 tab) every morning and 1 mg (2 tabs) every night at bedtime.   Last time this was given:  1 mg on 9/13/2017 11:18 AM                                omeprazole 20 MG CR capsule   Commonly known as:  priLOSEC   Take 1 capsule (20 mg) by mouth 2 times daily (before meals)                                OPSUMIT PO   Take by mouth daily Pt doesn't know dosage,                                order for DME   Equipment being ordered: SHOWER CHAIR  FROM [facility]                                order for DME   Equipment being ordered: Compression stockings; 2 pair; custom measured. Med compression 20-30 mmhg                                potassium chloride 10 MEQ tablet   Commonly known as:  K-TAB,KLOR-CON   Take 1 tablet (10 mEq) by mouth daily                                simvastatin 10 MG tablet   Commonly known as:  ZOCOR   Take 1 tablet by mouth At Bedtime.                                SODIUM BICARBONATE PO   Take by mouth 2 times daily                                sulfamethoxazole-trimethoprim 800-160 MG per tablet   Commonly known as:  BACTRIM DS/SEPTRA DS   Take 1 tablet by mouth 2 times daily                                T.E.D. BELOW KNEE/M-REGULAR Misc   1 each daily                                tadalafil (PAH) 20 MG Tabs   Commonly known as:  ADCIRCA   Take 2 tablets (40 mg) by mouth daily                                TEMAZEPAM PO   Take by mouth At Bedtime                                TOPIRAMATE PO   Take 100 mg by mouth At Bedtime                                treprostinil diolamine ER 0.125 MG CR tablet   Commonly known as:  ORENITRAM   Take 1 tablet (0.125 mg) by mouth 3 times daily (with meals) 4.25mg tid

## 2017-09-13 NOTE — DISCHARGE INSTRUCTIONS
[facility]                        Interventional Cardiology  Discharge Instructions   Post Right Heart Cath      AFTER YOU GO HOME:    DO drink plenty of fluids    DO resume your regular diet and medications unless otherwise instructed by your Primary Physician    Do Not scrub the procedure site vigorously    No lotion or powder to the puncture site for 3 days    CALL YOUR PRIMARY PHYSICIAN IF: You may resume all normal activity.  Monitor neck site for bleeding, swelling, or voice changes. If you notice bleeding or swelling immediately apply pressure to the site and call number below to speak with Cardiology Fellow.  If you experience any changes in your breathing you should call your doctor immediately or come to the closest Emergency Department.  Do not drive yourself.    ADDITIONAL INSTRUCTIONS: Medications: You are to resume all home medications including anticoagulation therapy unless otherwise advised by your primary cardiologist or nurse coordinator.    Follow Up: Per your primary cardiology team    If you have any questions or concerns regarding your procedure site please call [phone] at anytime and ask for Cardiology Fellow on call.  They are available 24 hours a day.  You may also contact the Cardiology Clinic after hours number at [phone].                                                       Telephone Numbers [phone] Monday-Friday 8:00 am to 4:30 pm    [phone] After 4:30 pm Monday-Friday, Weekends & Holidays  Ask for Interventional Cardiologist on call. Someone is on call 24 hours/day   [facility] toll free number [phone] Monday-Friday 8:00 am to 4:30 pm   [facility] Emergency Dept [phone]

## 2017-09-13 NOTE — PROGRESS NOTES
April 12, 2017      Anupama Babcock [place] [address]      Dear Dr. Babcock:        IMPRESSION:   1.  Pulmonary hypertension.   2.  History of profound depression.   3.  History of prolactin excess with galactorrhea (remote).      I had the pleasure of meeting with Judith Nelson and her sister-in-law for over an hour yesterday to go over the results of her recent right heart catheterization.  This demonstrated continuing significant pulmonary artery hypertension.  The heart rate was 60 at the time of catheterization with blood pressure 121/69 and a mean blood pressure of 89.  The right atrial pressure was 4, which was within normal limits.  The pulmonary artery pressure was 113/37 with a mean pulmonary artery pressure of 65.  In general, the upper limit of the PA systolic pressure should be 35 in someone her size while the mean pulmonary artery pressure should be less than 25 in the normal range.  Her pulmonary capillary wedge pressure (an indirect measurement of left ventricular end-diastolic pressure) was 10.  This was within normal limits.  Her pulmonary artery saturation was extremely low at 56%.  Her Dipti cardiac output was 2.3 with an index of 1.3 while her thermodilution output was 2.9 with an index of 1.7.  Her pulmonary vascular resistance was markedly elevated at 20.  These values were obtained while she was taking oral prostanoids and phosphodiesterase inhibitor.  This is not happy news.      Laboratories yesterday showed a creatinine 1.46, total protein 6.6, the remainder of the electrolytes are within normal limits.  Her white count was somewhat low at 3800 with hemoglobin of 12.4.      Her most recent echocardiogram was obtained last month which showed the right ventricle to be moderately dilated but global right ventricular function was interestingly normal.  There was evidence of right ventricular pressure volume overload  "as would be expected.  Severe tricuspid regurgitation was present.  When compared to previous studies, the RV size and function were both worse.      The \"ultimate\" therapy for pulmonary hypertension is continuous infusion prostacyclin.  This would require placement of a Stratton catheter and the wearing of a pump.  The drug would have to be mixed daily in a sterile fashion and connected to the pump in a thorough fashion.  Under the best of circumstances, there is a risk of systemic infection of approximately 5% or more per year.  The other option for administrating the prostacyclin derivatives is by continuous infusion subcutaneously.  This eliminates the risk of direct intravascular sepsis; however, it still requires sterile mixing at intervals.  In addition, the prostacyclin in some people activates the subcutaneous nerves and produces pain though this can be managed with topical medications.      We thoroughly discussed the upsides and downsides of continuing medical management.  Another option to be considered would be a consideration for lung or heart-lung transplant.        Obviously, her mental health issues make difficult choices even more difficult.      For the interim, we have arranged for her to start to receive Opsumit, an endothelin receptor antagonist that can be given orally, which will hopefully add in incremental therapeutic benefit to her current regimen.      She and her sister-in-law will think about this and discuss the situation further.      We have also drawn a prolactin level as elevated prolactin levels have been associated with pulmonary hypertension.  She no longer has galactorrhea and her galactorrhea and elevated prolactin levels were felt to be a side effect of her psychiatric drugs.  However, we will check to make sure it is not elevated as there are more specific therapies that might be appropriate.      We appreciate your support and thoughts regarding her continuing care. "     HEMODYNAMICS:  HR: 51  RA:    RV: 100/10  PA: 100/40 (57)   PCWP: 15   PA sat: 55.6%   Measured VO2: 232 mL/(kg*min)  Dipti CO/CI: 3.9/2.6   TD CO/CI: 3.2/2.1   Measured Dipti CO/CI: 3.4/2.3  SV: 67  PVR: 12.4   PAC: 1.1     Sincerely yours,         MINE CASTRO MD             D: 2017 08:35   T: 2017 09:02   MT: MADELINE      Name:     ANALI GÓMEZ   MRN:      [id number]        Account:      [id number]   :      1960           Service Date: 2017      Document: [id number]

## 2017-09-13 NOTE — MR AVS SNAPSHOT
After Visit Summary   9/13/2017    Judith Nelson    MRN: [id number]           Patient Information     Date Of Birth          1960        Visit Information        Provider Department      9/13/2017 2:00 PM Norm Franco MD [facility]        Today's Diagnoses     Pulmonary hypertension (H)    -  1    Dyspnea on exertion          Care Instructions    Medication Changes:  No medication changes at this time. Please continue current medication regiment.     Patient Instructions:  Continue staying active, eating a low sodium, low fat diet.     Check-In  Time Check-In Location Estimated Length Procedure   Name        [facility]  waiting room 60 minutes Cardiac MRI**   Procedure Preparations & Instructions   This is a non-invasive procedure that DOES require preparation:    - DO NOT use alcohol, tobacco or food/beverages containing caffeine for 12 hours prior to your procedure (ie. Coffee, tea, soda, chocolate, de-caffeinated beverages, certain medications including Excedrin, Anacin, NoDoz)        *For Pt on Continuous IV Medications   Please call your specialty pharmacy and ensure they are sending you the following supplies: IV extended tubing, extra medication.  - Please bring to the test: IV extended tubing, extra pump, extra medication and supplies (extra cassette of medicaiton)          Follow up Appointment Information:  November with SARI Langley    Results:    We are located on the third floor of the [facility] ([facility] on the [facility].  Our address is     [address] [place] [address] on [address]rd [place] [address]    Thank you for allowing us to be a part of your care here at the [facility]    If you have questions or concerns please contact us at:    Ness Bonilla, RN, BSN   Reji Escobar (Schedule,P.A.)  Nurse Coordinator     Clinic   Pulmonary Hypertension   Pulmonary  Hypertension  [facility]  [phone])[phone]    (P) [phone]        (F)[phone]    ** Please note that you will NOT receive a reminder call regarding your scheduled testing, reminder calls are for provider appointments only.  If you are scheduled for testing within the [place] system you may receive a call regarding pre-registration for billing purposes only.**     Remember to weigh yourself daily after voiding and before you consume any food or beverages and log the numbers.  If you have gained/lost 2 pounds overnight or 5 pounds in a week contact us immediately for medication adjustments or further instructions.   **Please call us immediately if you have any syncope, chest pain, edema, or decline in your functional status.    Support Group:  Pulmonary Hypertension Association  https://www.phassociation.org/    [facility]  [place], [place]  Leader: Michelet Garcia  Phone: [phone]  Email: [email or web address]          Follow-ups after your visit        Follow-up notes from your care team     Return for with Korin, Return PH, with, Labs.      Your next 10 appointments already scheduled     Oct 16, 2017 10:45 AM CDT   Lab with  LAB   [facility] Lab ([facility])    [address] [place] [id number] [phone]            Oct 20, 2017  1:00 PM CDT   (Arrive by 12:45 PM)   RETURN ENDOCRINE with Jenna Salas MD   [facility] Endocrinology ([facility])    [address] [place] [id number] [phone]            Nov 01, 2017 12:00 PM CDT   MR MYOCARDIUM W/O CONTRAST with UUMR4, UU CV MR NURSE   [facility] [place], MRI ([facility])    [address] [facility] [id number] [phone]           Take your medicines as usual, unless your doctor tells you not to. Bring a  list of your current medicines to your exam (including vitamins, minerals and over-the-counter drugs). Also bring the results of similar scans you may have had.  Please remove any body piercings and hair extensions before you arrive.  Follow your doctor s orders. If you do not, we may have to postpone your exam.  You will not have contrast for this exam. You do not need to do anything special to prepare.  The MRI machine uses a strong magnet. Please wear clothes without metal (snaps, zippers). A sweatsuit works well, or we may give you a hospital gown.   **IMPORTANT** THE INSTRUCTIONS BELOW ARE ONLY FOR THOSE PATIENTS WHO HAVE BEEN TOLD THEY WILL RECEIVE SEDATION OR GENERAL ANESTHESIA DURING THEIR MRI PROCEDURE:  IF YOU WILL RECEIVE SEDATION (take medicine to help you relax during your exam):   You must get the medicine from your doctor before you arrive. Bring the medicine to the exam. Do not take it at home.   Arrive one hour early. Bring someone who can take you home after the test. Your medicine will make you sleepy. After the exam, you may not drive, take a bus or take a taxi by yourself.   No eating 8 hours before your exam. You may have clear liquids up until 4 hours before your exam. (Clear liquids include water, clear tea, black coffee and fruit juice without pulp.)  IF YOU WILL RECEIVE ANESTHESIA (be asleep for your exam):   Arrive 1 1/2 hours early. Bring someone who can take you home after the test. You may not drive, take a bus or take a taxi by yourself.   No eating 8 hours before your exam. You may have clear liquids up until 4 hours before your exam. (Clear liquids include water, clear tea, black coffee and fruit juice without pulp.)   You will spend four to five hours in the recovery room.  Please call the Imaging Department at your exam site with any questions.            Nov 01, 2017  2:00 PM CDT   Lab with [facility] ([facility])    [address] [place] [address]  [facility] [id number]   [phone]            Nov 01, 2017  2:30 PM CDT   (Arrive by 2:15 PM)   RETURN PRIMARY PULMONARY with Norm Franco MD   [facility] ([facility])    [address] [place] [id number]   [phone]            Nov 16, 2017 11:00 AM CST   PFT VISIT with  PFL B   [facility] Pulmonary Function Testing ([facility])    [address] [place] [phone]   [phone]            Nov 16, 2017 11:30 AM CST   (Arrive by 11:15 AM)   Return Interstitial Lung with Gael Flaherty MD   [facility] ([facility])    [address] [place] [id number]   [phone]              Future tests that were ordered for you today     Open Future Orders        Priority Expected Expires Ordered    N terminal pro BNP outpatient Routine  9/13/2018 9/13/2017    CBC with platelets Routine  9/13/2018 9/13/2017    Comprehensive metabolic panel Routine  9/13/2018 9/13/2017    MRI Cardiac w/o contrast Routine 9/14/2017 9/13/2018 9/13/2017            Who to contact     If you have questions or need follow up information about today's clinic visit or your schedule please contact [facility] directly at [phone].  Normal or non-critical lab and imaging results will be communicated to you by MyChart, letter or phone within 4 business days after the clinic has received the results. If you do not hear from us within 7 days, please contact the clinic through [employer] or phone. If you have a critical or abnormal lab result, we will notify you by phone as soon as possible.  Submit refill requests through [employer] or call your pharmacy and they will forward the refill request to us. Please allow 3 business days for your refill to be completed.          Additional Information About Your Visit        MyChart Information  "    [employer] gives you secure access to your electronic health record. If you see a primary care provider, you can also send messages to your care team and make appointments. If you have questions, please call your primary care clinic.  If you do not have a primary care provider, please call [phone] and they will assist you.        Care EveryWhere ID     This is your Care EveryWhere ID. This could be used by other organizations to access your [facility] medical records  [id number]        Your Vitals Were     Pulse Height Pulse Oximetry BMI (Body Mass Index)          71 1.6 m (5' 3\") 93% 21.81 kg/m2         Blood Pressure from Last 3 Encounters:   09/13/17 120/77   09/13/17 130/68   07/25/17 99/62    Weight from Last 3 Encounters:   09/13/17 55.8 kg (123 lb 1.6 oz)   07/25/17 54.3 kg (119 lb 12.8 oz)   06/09/17 53.8 kg (118 lb 9.6 oz)                 Today's Medication Changes          These changes are accurate as of: 9/13/17  3:35 PM.  If you have any questions, ask your nurse or doctor.               These medicines have changed or have updated prescriptions.        Dose/Directions    LORazepam 0.5 MG tablet   Commonly known as:  ATIVAN   This may have changed:    - how much to take  - additional instructions   Used for:  Major depressive disorder, single episode, severe with psychotic features (H)        Take 0.5 mg (1 tab) every morning and 1 mg (2 tabs) every night at bedtime.   Quantity:  90 tablet   Refills:  1       treprostinil diolamine ER 0.125 MG CR tablet   Commonly known as:  ORENITRAM   This may have changed:    - how much to take  - when to take this  - additional instructions   Used for:  Pulmonary arterial hypertension (H)        Dose:  0.125 mg   Take 1 tablet (0.125 mg) by mouth 3 times daily (with meals) 4.25mg tid   Quantity:  90 tablet   Refills:  0                Primary Care Provider Office Phone # Fax #    Anupama Babcock [phone]       [place] [phone] " KAREN CHOUDHARY  [facility] [address]        Equal Access to Services     LESLIE CHACKO : Hadii pablo zuniga ralphjabier Liliaali, waaxda luqadaha, qaybta karenardveronique garza, alysha reederchiquitafranklin worley . So [facility] [phone].    ATENCIÓN: Si habla español, tiene a dhillon disposición servicios gratuitos de asistencia lingüística. Llame al [phone].    We comply with applicable federal civil rights laws and [place] laws. We do not discriminate on the basis of race, color, national origin, age, disability sex, sexual orientation or gender identity.            Thank you!     Thank you for choosing [facility]  for your care. Our goal is always to provide you with excellent care. Hearing back from our patients is one way we can continue to improve our services. Please take a few minutes to complete the written survey that you may receive in the mail after your visit with us. Thank you!             Your Updated Medication List - Protect others around you: Learn how to safely use, store and throw away your medicines at www.disposemymeds.org.          This list is accurate as of: 9/13/17  3:35 PM.  Always use your most recent med list.                   Brand Name Dispense Instructions for use Diagnosis    acetaminophen 500 MG tablet    TYLENOL    45 tablet    Take 1-2 tablets (500-1,000 mg) by mouth every 6 hours as needed for pain (Take as needed for pain.  Do not exceed 4 grams (8 tablets) in a day)    Pulmonary hypertension (H)       ACIDOPHILUS PO      Take 1 tablet by mouth daily    Sarcoidosis (H)       amoxicillin-clavulanate 875-125 MG per tablet    AUGMENTIN    28 tablet    Take 1 tablet by mouth 2 times daily    Cellulitis of left lower extremity       aspirin 81 MG tablet      Take 81 mg by mouth daily    Pulmonary arterial hypertension (H)       azaTHIOprine 50 MG tablet    IMURAN    90 tablet    Take 1 tablet (50 mg) by mouth daily    Sarcoidosis (H)       buPROPion 200 MG 12 hr tablet     WELLBUTRIN SR    30 tablet    Take 1 tablet (200 mg) by mouth every morning    Major depressive disorder, single episode, moderate (H)       calcitRIOL 0.25 MCG capsule    ROCALTROL    30 capsule    Take 1 capsule (0.25 mcg) by mouth daily        carvedilol 12.5 MG tablet    COREG    135 tablet    Take 0.5 tablets (6.25 mg) by mouth 2 times daily (with meals)    Chronic systolic heart failure (H)       denosumab 60 MG/ML Soln injection    PROLIA    1 mL    Inject 1 mL (60 mg) Subcutaneous every 6 months    Osteoporosis, postmenopausal       EYE VITAMINS PO      Take 1 tablet by mouth daily    Sarcoidosis (H)       furosemide 40 MG tablet    LASIX    90 tablet    TAKE 1 TABLET BY MOUTH DAILY    Pulmonary hypertension (H), Stress-induced cardiomyopathy       LORazepam 0.5 MG tablet    ATIVAN    90 tablet    Take 0.5 mg (1 tab) every morning and 1 mg (2 tabs) every night at bedtime.    Major depressive disorder, single episode, severe with psychotic features (H)       omeprazole 20 MG CR capsule    priLOSEC    180 capsule    Take 1 capsule (20 mg) by mouth 2 times daily (before meals)    Dyspepsia       OPSUMIT PO      Take by mouth daily Pt doesn't know dosage,        order for DME     1 Device    Equipment being ordered: SHOWER CHAIR  FROM [facility]    Tremor, Generalized muscle weakness, Sarcoidosis (H)       order for DME     2 Package    Equipment being ordered: Compression stockings; 2 pair; custom measured. Med compression 20-30 mmhg    Pulmonary hypertension (H), Edema       potassium chloride 10 MEQ tablet    K-TAB,KLOR-CON    90 tablet    Take 1 tablet (10 mEq) by mouth daily    Heart failure (H)       simvastatin 10 MG tablet    ZOCOR    90 tablet    Take 1 tablet by mouth At Bedtime.    Hypercholesterolemia       SODIUM BICARBONATE PO      Take by mouth 2 times daily    Pulmonary arterial hypertension (H)       sulfamethoxazole-trimethoprim 800-160 MG per tablet    BACTRIM DS/SEPTRA DS    14 tablet     Take 1 tablet by mouth 2 times daily    Cellulitis of left lower extremity       T.E.D. BELOW KNEE/M-REGULAR Misc     2 each    1 each daily    Lower extremity edema       tadalafil (PAH) 20 MG Tabs    ADCIRCA    60 tablet    Take 2 tablets (40 mg) by mouth daily    Pulmonary hypertension (H)       TEMAZEPAM PO      Take by mouth At Bedtime        TOPIRAMATE PO      Take 100 mg by mouth At Bedtime        treprostinil diolamine ER 0.125 MG CR tablet    ORENITRAM    90 tablet    Take 1 tablet (0.125 mg) by mouth 3 times daily (with meals) 4.25mg tid    Pulmonary arterial hypertension (H)

## 2017-09-13 NOTE — TELEPHONE ENCOUNTER
Return Pulmonary Hypertension Patient Form       Patient Name: Judith Nelson Age: 57F [birth year] MRN: [id number]   Todays Provider: Dr. Norm Franco   Appt: 9/13/2017             PH Medications: Adcirca 40 mg Daily, Opsumit 10 mg Daily, Orenitram 4.25 mg TID              Todays Testing: RHC                  Patient Update: Pt was last seen on 7/25/17 by Korin GUO, at that time we requested she complete a RHC and F/U after testing.    Pt has a history of pulmonary arterial hypertension, iron deficiency anemia, sarcoidosis, hyperprolactinemia, depression, MGUS, CKD, anxiety and depression.

## 2017-09-13 NOTE — H&P
H&P Right Heart Catheterization    HPI:  57 year old female with a past medical history of WHO Group I pulmonary arterial hypertension, iron deficiency anemia, sarcoidosis, hyperprolactinemia, depression, MGUS, CKD, anxiety and depression who comes for right heart catheterization. She is on treatment with Opsumit, Orenitram, and Adcirca.     Past Medical History:   Diagnosis Date     Anxiety      CKD (chronic kidney disease), stage III     biopsy suspicious for renal sarcoidosis      Hyperprolactinemia (H)      Lower extremity edema     chronic w/ chronic right ankle ulcer     Lumbar compression fracture (H)      Major depressive disorder     with psychotic features, followed by Dr. Rosales [facility] in [facility] 2012    patient is post menopausal     MGUS (monoclonal gammopathy of unknown significance)     mild, followed by Dr. Long     Osteoporosis      Other seborrheic keratosis      Protrusio acetabuli      Pulmonary hypertension (H)      Stress-induced cardiomyopathy      Past Surgical History:   Procedure Laterality Date     Csection,  X 2       CYSTOSCOPY, RETROGRADES, INSERT STENT URETER(S), COMBINED  10/2/2013    Procedure: COMBINED CYSTOSCOPY, RETROGRADES, INSERT STENT URETER(S);  Left Retrograde Ureteropyelogram and Ureteral Stent Placement;  Surgeon: SONIA Matrinez MD;  Location: [facility] OR     ENDOSCOPIC RETROGRADE CHOLANGIOPANCREATOGRAM  2012    Procedure:ENDOSCOPIC RETROGRADE CHOLANGIOPANCREATOGRAM; ERCP biliary sphincterotomy and stone removal; Surgeon:MARGIE RUGGIEROIQ; Location:[facility] OR     GASTROSCOPY,FL  6/10    Erythematous duodenopathy     LAPAROSCOPIC CHOLECYSTECTOMY WITH CHOLANGIOGRAMS  2012    Procedure:LAPAROSCOPIC CHOLECYSTECTOMY WITH CHOLANGIOGRAMS; Surgeon:BRIANA PADILLA; Location:[facility] OR     ORTHOPEDIC SURGERY  10/1/2010    Right hip replacement     /74 (BP Location: Right arm)  Pulse 61  Temp 97.8  F (36.6  C) (Oral)  Resp 20  SpO2  95%    Gen: sitting in bed, no acute distress  CV: audible s1, s2  Pulm: audible breath sounds  Neuro: no focal deficits    Lab Results   Component Value Date    WBC 4.4 07/25/2017    HGB 13.7 07/25/2017    HCT 40.7 07/25/2017     07/25/2017     07/25/2017    POTASSIUM 3.9 07/25/2017    CHLORIDE 108 07/25/2017    CO2 23 07/25/2017    BUN 21 07/25/2017    CR 1.50 (H) 07/25/2017     (H) 07/25/2017    SED 8 08/16/2013    NTBNPI 413 04/12/2017    NTBNP 525 (H) 07/25/2017    TROPI 0.467 (HH) 04/19/2014    AST 15 07/25/2017    ALT 19 07/25/2017    GGT 16 12/18/2008    ALKPHOS 37 (L) 07/25/2017    BILITOTAL 0.5 07/25/2017    INR 1.04 03/26/2013       RHC (4/12/2017):  RA- 7, 5, 4  RV- 113, 7  PA- 113/37, 65  PAW- 12, 12, 10  PVR-20.1  CO/CI-2.9 (1.6)  Dipit CO/CI-2.3 (1.3)      Assessment:  57 year old female with a past medical history of WHO Group I pulmonary arterial hypertension who comes for RHC to reassess her pulmonary pressures.    Plan:  Proceed with right heart catheterization.    Carlos Cordova MD  Advanced Heart Failure/Heart Transplant Fellow  [facility] Division of Cardiology   [phone]    Jeanne Angel MD   [facility]  Heart Failure, Transplant, and Mechanical Circulatory Support Cardiology   Cardiovascular Division  [facility]   [phone]

## 2017-09-13 NOTE — PATIENT INSTRUCTIONS
Medication Changes:  No medication changes at this time. Please continue current medication regiment.     Patient Instructions:  Continue staying active, eating a low sodium, low fat diet.     Check-In  Time Check-In Location Estimated Length Procedure   Name        [facility]  waiting room 60 minutes Cardiac MRI**   Procedure Preparations & Instructions   This is a non-invasive procedure that DOES require preparation:    - DO NOT use alcohol, tobacco or food/beverages containing caffeine for 12 hours prior to your procedure (ie. Coffee, tea, soda, chocolate, de-caffeinated beverages, certain medications including Excedrin, Anacin, NoDoz)        *For Pt on Continuous IV Medications   Please call your specialty pharmacy and ensure they are sending you the following supplies: IV extended tubing, extra medication.  - Please bring to the test: IV extended tubing, extra pump, extra medication and supplies (extra cassette of medicaiton)          Follow up Appointment Information:  November with SARI Langley    Results:    We are located on the third floor of the Clinic [facility] Surgery Center ([facility] on the [facility].  Our address is     [address] [place] [address] on [address]rd [place] [address]    Thank you for allowing us to be a part of your care here at the [facility]    If you have questions or concerns please contact us at:    Ness Bonilla RN, BSN   Reji Escobar (Schedule,P.A.)  Nurse Coordinator     Clinic   Pulmonary Hypertension   Pulmonary Hypertension  [facility]  (P)[phone]    (P) [phone]        (F)[phone]    ** Please note that you will NOT receive a reminder call regarding your scheduled testing, reminder calls are for provider appointments only.  If you are scheduled for testing within the [facility] system you may receive a call regarding  pre-registration for billing purposes only.**     Remember to weigh yourself daily after voiding and before you consume any food or beverages and log the numbers.  If you have gained/lost 2 pounds overnight or 5 pounds in a week contact us immediately for medication adjustments or further instructions.   **Please call us immediately if you have any syncope, chest pain, edema, or decline in your functional status.    Support Group:  Pulmonary Hypertension Association  https://www.phassociation.org/    [facility] Support Group  [place], [place]  Leader: Michelet Garcia  Phone: [phone]  Email: [email or web address]

## 2017-09-13 NOTE — LETTER
9/13/2017      RE: Judith Nelson  [address] [facility] [address]       Dear Colleague,    Thank you for the opportunity to participate in the care of your patient, Judith Nelson, at the [facility]. Please see a copy of my visit note below.    April 12, 2017      Anupama Babcock MD.    [place] [address]      Dear Dr. Babcock:        IMPRESSION:   1.  Pulmonary hypertension.   2.  History of profound depression.   3.  History of prolactin excess with galactorrhea (remote).      I had the pleasure of meeting with Judith Nelson and her sister-in-law for over an hour yesterday to go over the results of her recent right heart catheterization.  This demonstrated continuing significant pulmonary artery hypertension.  The heart rate was 60 at the time of catheterization with blood pressure 121/69 and a mean blood pressure of 89.  The right atrial pressure was 4, which was within normal limits.  The pulmonary artery pressure was 113/37 with a mean pulmonary artery pressure of 65.  In general, the upper limit of the PA systolic pressure should be 35 in someone her size while the mean pulmonary artery pressure should be less than 25 in the normal range.  Her pulmonary capillary wedge pressure (an indirect measurement of left ventricular end-diastolic pressure) was 10.  This was within normal limits.  Her pulmonary artery saturation was extremely low at 56%.  Her Dipti cardiac output was 2.3 with an index of 1.3 while her thermodilution output was 2.9 with an index of 1.7.  Her pulmonary vascular resistance was markedly elevated at 20.  These values were obtained while she was taking oral prostanoids and phosphodiesterase inhibitor.  This is not happy news.      Laboratories yesterday showed a creatinine 1.46, total protein 6.6, the remainder of the electrolytes are within normal  "limits.  Her white count was somewhat low at 3800 with hemoglobin of 12.4.      Her most recent echocardiogram was obtained last month which showed the right ventricle to be moderately dilated but global right ventricular function was interestingly normal.  There was evidence of right ventricular pressure volume overload as would be expected.  Severe tricuspid regurgitation was present.  When compared to previous studies, the RV size and function were both worse.      The \"ultimate\" therapy for pulmonary hypertension is continuous infusion prostacyclin.  This would require placement of a Stratton catheter and the wearing of a pump.  The drug would have to be mixed daily in a sterile fashion and connected to the pump in a thorough fashion.  Under the best of circumstances, there is a risk of systemic infection of approximately 5% or more per year.  The other option for administrating the prostacyclin derivatives is by continuous infusion subcutaneously.  This eliminates the risk of direct intravascular sepsis; however, it still requires sterile mixing at intervals.  In addition, the prostacyclin in some people activates the subcutaneous nerves and produces pain though this can be managed with topical medications.      We thoroughly discussed the upsides and downsides of continuing medical management.  Another option to be considered would be a consideration for lung or heart-lung transplant.        Obviously, her mental health issues make difficult choices even more difficult.      For the interim, we have arranged for her to start to receive Opsumit, an endothelin receptor antagonist that can be given orally, which will hopefully add in incremental therapeutic benefit to her current regimen.      She and her sister-in-law will think about this and discuss the situation further.      We have also drawn a prolactin level as elevated prolactin levels have been associated with pulmonary hypertension.  She no longer has " galactorrhea and her galactorrhea and elevated prolactin levels were felt to be a side effect of her psychiatric drugs.  However, we will check to make sure it is not elevated as there are more specific therapies that might be appropriate.      We appreciate your support and thoughts regarding her continuing care.     HEMODYNAMICS:  HR: 51  RA: 9/13/9   RV: 100/10  PA: 100/40 (57)   PCWP: 15   PA sat: 55.6%   Measured VO2: 232 mL/(kg*min)  Dipti CO/CI: 3.9/2.6   TD CO/CI: 3.2/2.1   Measured Dipti CO/CI: 3.4/2.3  SV: 67  PVR: 12.4   PAC: 1.1     Sincerely yours,      Norm Franco MD

## 2017-09-13 NOTE — PROCEDURES
PRELIMINARY CARDIAC CATH REPORT:     PROCEDURES PERFORMED:   Right Heart Catheterization    PHYSICIANS:  Attending Physician: Jeanne Angel MD  Cardiology Fellow: Nikolai Razo MD, PhD    INDICATION:  Judith Nelson is a 57 year old female with severe PAH who comes for hemodynamic evaluation.    DESCRIPTION:  1. Consent obtained with discussion of risks.  All questions were answered.  2. Sterile prep and procedure.  3. Location with Sheaths:   Rt IJ  7 Fr 10 cm [short]  4. Access: Local anesthetic with lidocaine.  A micropuncture 21 gauge needle with ultrasound guidance was used to establish vascular access using a modified Seldinger technique.  5. Diagnostic Catheters: 7 Fr  [place]  6. Estimated blood loss: < 5 ml    HEMODYNAMICS:  HR: 51  RA: 9/13/9   RV: 100/10  PA: 100/40 (57)   PCWP: 15   PA sat: 55.6%   Measured VO2: 232 mL/(kg*min)  Dipti CO/CI: 3.9/2.6   TD CO/CI: 3.2/2.1   Measured Dipti CO/CI: 3.4/2.3  SV: 67  PVR: 12.4   PAC: 1.1    Sheath Removal:  The 7 Fr sheath was manually removed in the cardiac catheterization laboratory.    Contrast: Isovue, 0 ml     Fluoroscopy Time: 1.0 min    COMPLICATIONS:  1. None    SUMMARY:   -Severe pulmonary arterial hypertension  -Normal filling pressures  -Slightly reduced cardiac output    PLAN:    >>. Discharge today per protocol    The attending cardiologist was present and supervised all critical aspects the procedure.    Findings discussed with Dr. Franco.    See CVIS report for final draft.    Nikolai Razo MD, PhD  Cardiology Fellow    Jeanne Angel MD  Cardiology Staff

## 2017-09-14 ENCOUNTER — TELEPHONE (OUTPATIENT)
Dept: CARDIOLOGY | Facility: CLINIC | Age: 57
End: 2017-09-14

## 2017-09-14 NOTE — TELEPHONE ENCOUNTER
Medication/Dose: Orenitram 5 MG  Frequency: TID    Route: PO  Diagnosis and ICD: Idiopathic PAH WHO Group 1 NYHA 3  New/Renewal/Insurance Change PA: Renewal  Previously Tried and Failed Therapies:   *Adcirca initiated: 10/13/12  *Tyvaso initiated: 08/19/12 (stopped due to worsening PA pressures)  *Orenitram Initiated: 12/15    *Please included RHC results from 9/13/17.

## 2017-09-14 NOTE — TELEPHONE ENCOUNTER
PA Initiation    Medication: Orenitram 5 MG  Insurance Company: [employer] Part D - Phone [phone] Fax [phone]  Pharmacy Filling the Rx: [facility], [address]  Filling Pharmacy Phone: [phone]  Filling Pharmacy Fax:    Start Date: 9/14/2017    Urgent request has been submitted to [facility] via phone. Clinical pharmacy who reviewed the case states that determination letter will be faxed.

## 2017-10-17 DIAGNOSIS — J84.9 ILD (INTERSTITIAL LUNG DISEASE) (H): Primary | ICD-10-CM

## 2017-10-30 ENCOUNTER — PRE VISIT (OUTPATIENT)
Dept: CARDIOLOGY | Facility: CLINIC | Age: 57
End: 2017-10-30

## 2017-10-30 DIAGNOSIS — R06.09 DYSPNEA ON EXERTION: ICD-10-CM

## 2017-10-30 DIAGNOSIS — I27.20 PULMONARY HYPERTENSION (H): Primary | ICD-10-CM

## 2017-10-30 NOTE — TELEPHONE ENCOUNTER
Pulmonary Hypertension Return Patient Form       Patient Name: Judith Nelson Age: 57F  [birth year] MRN: [id number]   Todays Provider: Dr. Franco    Appt:              PH Medications: Opsumit, Adcirca 40mg QD, Orenitram                Todays Testing: Labs, Cardiac MRI                 Patient Update: Last seen by Dr. Franco on 9/13. At that time, RHC thoroughly reviewed, risks/benefits to IV prostacyclin and heart/lung transplant discussed. All options complicated by patient's complex mental health issues. Opsumit therapy initiated.              9/13/2017 RHC RA: 9, 13, 9 PA: 100/40, 57 PVR: 854 (10.7)     RV: 100, 10 PAW: *, *, 15 COI: 3.9 (2.2)

## 2017-11-01 ENCOUNTER — OFFICE VISIT (OUTPATIENT)
Dept: CARDIOLOGY | Facility: CLINIC | Age: 57
End: 2017-11-01
Attending: INTERNAL MEDICINE
Payer: MEDICARE

## 2017-11-01 ENCOUNTER — HOSPITAL ENCOUNTER (OUTPATIENT)
Dept: MRI IMAGING | Facility: CLINIC | Age: 57
Discharge: HOME | End: 2017-11-01
Attending: INTERNAL MEDICINE | Admitting: INTERNAL MEDICINE
Payer: MEDICARE

## 2017-11-01 VITALS
HEIGHT: 63 IN | WEIGHT: 120 LBS | BODY MASS INDEX: 21.26 KG/M2 | OXYGEN SATURATION: 95 % | SYSTOLIC BLOOD PRESSURE: 103 MMHG | HEART RATE: 69 BPM | DIASTOLIC BLOOD PRESSURE: 68 MMHG

## 2017-11-01 DIAGNOSIS — I27.20 PULMONARY HYPERTENSION (H): ICD-10-CM

## 2017-11-01 DIAGNOSIS — D86.9 SARCOIDOSIS: ICD-10-CM

## 2017-11-01 DIAGNOSIS — R06.09 DYSPNEA ON EXERTION: ICD-10-CM

## 2017-11-01 DIAGNOSIS — I27.20 PULMONARY HYPERTENSION (H): Primary | ICD-10-CM

## 2017-11-01 DIAGNOSIS — I27.21 PULMONARY ARTERIAL HYPERTENSION (H): ICD-10-CM

## 2017-11-01 LAB
ALBUMIN SERPL-MCNC: 4.1 G/DL (ref 3.4–5)
ALP SERPL-CCNC: 43 U/L (ref 40–150)
ALT SERPL W P-5'-P-CCNC: 23 U/L (ref 0–50)
ANION GAP SERPL CALCULATED.3IONS-SCNC: 9 MMOL/L (ref 3–14)
AST SERPL W P-5'-P-CCNC: 19 U/L (ref 0–45)
BILIRUB SERPL-MCNC: 0.6 MG/DL (ref 0.2–1.3)
BUN SERPL-MCNC: 26 MG/DL (ref 7–30)
CALCIUM SERPL-MCNC: 9 MG/DL (ref 8.5–10.1)
CHLORIDE SERPL-SCNC: 106 MMOL/L (ref 94–109)
CO2 SERPL-SCNC: 25 MMOL/L (ref 20–32)
CREAT BLD-MCNC: 1.6 MG/DL (ref 0.52–1.04)
CREAT SERPL-MCNC: 1.56 MG/DL (ref 0.52–1.04)
ERYTHROCYTE [DISTWIDTH] IN BLOOD BY AUTOMATED COUNT: 13 % (ref 10–15)
GFR SERPL CREATININE-BSD FRML MDRD: 33 ML/MIN/1.7M2
GFR SERPL CREATININE-BSD FRML MDRD: 34 ML/MIN/1.7M2
GLUCOSE SERPL-MCNC: 88 MG/DL (ref 70–99)
HCT VFR BLD AUTO: 44.3 % (ref 35–47)
HGB BLD-MCNC: 14.3 G/DL (ref 11.7–15.7)
MCH RBC QN AUTO: 29.8 PG (ref 26.5–33)
MCHC RBC AUTO-ENTMCNC: 32.3 G/DL (ref 31.5–36.5)
MCV RBC AUTO: 92 FL (ref 78–100)
NT-PROBNP SERPL-MCNC: 648 PG/ML (ref 0–125)
PLATELET # BLD AUTO: 195 10E9/L (ref 150–450)
POTASSIUM SERPL-SCNC: 3.7 MMOL/L (ref 3.4–5.3)
PROT SERPL-MCNC: 7.2 G/DL (ref 6.8–8.8)
RBC # BLD AUTO: 4.8 10E12/L (ref 3.8–5.2)
SODIUM SERPL-SCNC: 139 MMOL/L (ref 133–144)
WBC # BLD AUTO: 5.5 10E9/L (ref 4–11)

## 2017-11-01 PROCEDURE — 75557 CARDIAC MRI FOR MORPH: CPT

## 2017-11-01 PROCEDURE — 83880 ASSAY OF NATRIURETIC PEPTIDE: CPT | Performed by: INTERNAL MEDICINE

## 2017-11-01 PROCEDURE — 82565 ASSAY OF CREATININE: CPT

## 2017-11-01 PROCEDURE — 36415 COLL VENOUS BLD VENIPUNCTURE: CPT | Performed by: INTERNAL MEDICINE

## 2017-11-01 PROCEDURE — 99214 OFFICE O/P EST MOD 30 MIN: CPT | Mod: ZP | Performed by: INTERNAL MEDICINE

## 2017-11-01 PROCEDURE — 75557 CARDIAC MRI FOR MORPH: CPT | Mod: 26 | Performed by: INTERNAL MEDICINE

## 2017-11-01 PROCEDURE — 85027 COMPLETE CBC AUTOMATED: CPT | Performed by: INTERNAL MEDICINE

## 2017-11-01 PROCEDURE — 99213 OFFICE O/P EST LOW 20 MIN: CPT | Mod: ZF

## 2017-11-01 PROCEDURE — 80053 COMPREHEN METABOLIC PANEL: CPT | Performed by: INTERNAL MEDICINE

## 2017-11-01 RX ORDER — SELENIUM 50 MCG
1 TABLET ORAL PRN
Qty: 30 CAPSULE | Refills: 0 | Status: SHIPPED | OUTPATIENT
Start: 2017-11-01 | End: 2018-04-10

## 2017-11-01 ASSESSMENT — PAIN SCALES - GENERAL: PAINLEVEL: NO PAIN (0)

## 2017-11-01 NOTE — LETTER
11/1/2017      RE: Judith Nelson  [address] [facility] [address]       Dear Colleague,    Thank you for the opportunity to participate in the care of your patient, Judith Nelson, at the [facility] HEART [facility]. Please see a copy of my visit note below.         Anupama Babcock MD.    [place] [address]      Dear Dr. Babcock:        IMPRESSION:   1.  Pulmonary hypertension.   2.  History of profound depression.   3.  History of prolactin excess with galactorrhea (remote).      I had the pleasure of meeting with Judith Nelson and her sister-in-law for over an hour yesterday to go over the results of her recent right heart catheterization and her interim treatment.  This demonstrated continuing significant pulmonary artery hypertension.  The heart rate was 60 at the time of catheterization with blood pressure 121/69 and a mean blood pressure of 89.  The right atrial pressure was 4, which was within normal limits.  The pulmonary artery pressure was 113/37 with a mean pulmonary artery pressure of 65.  In general, the upper limit of the PA systolic pressure should be 35 in someone her size while the mean pulmonary artery pressure should be less than 25 in the normal range.  Her pulmonary capillary wedge pressure (an indirect measurement of left ventricular end-diastolic pressure) was 10.  This was within normal limits.  Her pulmonary artery saturation was extremely low at 56%.  Her Dipti cardiac output was 2.3 with an index of 1.3 while her thermodilution output was 2.9 with an index of 1.7.  Her pulmonary vascular resistance was markedly elevated at 20.  These values were obtained while she was taking oral prostanoids and phosphodiesterase inhibitor.  This is not happy news.     Functionally, she is improved with increased exercise tolerance and no symptoms of right heart failure nor  "exertional pre-syncope or syncope.  More importantly, her BNP is markedly reduced, suggesting a medication effect.     Laboratories yesterday showed a creatinine 1.46, total protein 6.6, the remainder of the electrolytes are within normal limits.  Her white count was somewhat low at 3800 with hemoglobin of 12.4.      Her most recent echocardiogram was obtained last month which showed the right ventricle to be moderately dilated but global right ventricular function was interestingly normal.  There was evidence of right ventricular pressure volume overload as would be expected.  Severe tricuspid regurgitation was present.  When compared to previous studies, the RV size and function were both worse.      The \"ultimate\" therapy for pulmonary hypertension is continuous infusion prostacyclin.  This would require placement of a Stratton catheter and the wearing of a pump.  The drug would have to be mixed daily in a sterile fashion and connected to the pump in a thorough fashion.  Under the best of circumstances, there is a risk of systemic infection of approximately 5% or more per year.  The other option for administrating the prostacyclin derivatives is by continuous infusion subcutaneously.  This eliminates the risk of direct intravascular sepsis; however, it still requires sterile mixing at intervals.  In addition, the prostacyclin in some people activates the subcutaneous nerves and produces pain though this can be managed with topical medications.      We thoroughly discussed the upsides and downsides of continuing medical management.  Another option to be considered would be a consideration for lung or heart-lung transplant.  At this point since she appears to be improved we collectively decided to hold the course and continue with medical management as currently outlined      Obviously, her mental health issues make difficult choices even more difficult.      For the interim, we have arranged for her to start to " receive Opsumit, an endothelin receptor antagonist that can be given orally, which will hopefully add in incremental therapeutic benefit to her current regimen.      She and her sister-in-law will think about this and discuss the situation further.      We have also drawn a prolactin level as elevated prolactin levels have been associated with pulmonary hypertension.  She no longer has galactorrhea and her galactorrhea and elevated prolactin levels were felt to be a side effect of her psychiatric drugs.  However, we will check to make sure it is not elevated as there are more specific therapies that might be appropriate.      We appreciate your support and thoughts regarding her continuing care.     Results for ANALI GÓMEZ (MRN [id number]) as of 11/2/2017 08:24   Ref. Range 9/13/2017 12:18 9/13/2017 15:58 11/1/2017 12:26 11/1/2017 13:18 11/1/2017 13:47   Sodium Latest Ref Range: 133 - 144 mmol/L  140   139   Potassium Latest Ref Range: 3.4 - 5.3 mmol/L  3.8   3.7   Chloride Latest Ref Range: 94 - 109 mmol/L  110 (H)   106   Carbon Dioxide Latest Ref Range: 20 - 32 mmol/L  24   25   Urea Nitrogen Latest Ref Range: 7 - 30 mg/dL  20   26   Creatinine Latest Ref Range: 0.52 - 1.04 mg/dL  1.44 (H) 1.6 (H)  1.56 (H)   GFR Estimate Latest Ref Range: >60 mL/min/1.7m2  38 (L) 33 (L)  34 (L)   GFR Estimate If Black Latest Ref Range: >60 mL/min/1.7m2  45 (L) 40 (L)  41 (L)   Calcium Latest Ref Range: 8.5 - 10.1 mg/dL  8.4 (L)   9.0   Anion Gap Latest Ref Range: 3 - 14 mmol/L  6   9   Albumin Latest Ref Range: 3.4 - 5.0 g/dL  3.8   4.1   Protein Total Latest Ref Range: 6.8 - 8.8 g/dL  7.1   7.2   Bilirubin Total Latest Ref Range: 0.2 - 1.3 mg/dL  0.4   0.6   Alkaline Phosphatase Latest Ref Range: 40 - 150 U/L  62   43   ALT Latest Ref Range: 0 - 50 U/L  39   23   AST Latest Ref Range: 0 - 45 U/L  36   19   N-Terminal Pro Bnp Latest Ref Range: 0 - 125 pg/mL  2161 (H)   648 (H)   Glucose Latest Ref Range: 70 - 99 mg/dL  98  "  88   WBC Latest Ref Range: 4.0 - 11.0 10e9/L  4.3   5.5   Hemoglobin Latest Ref Range: 11.7 - 15.7 g/dL  12.9   14.3   Hematocrit Latest Ref Range: 35.0 - 47.0 %  39.8   44.3   Platelet Count Latest Ref Range: 150 - 450 10e9/L  209   195   RBC Count Latest Ref Range: 3.8 - 5.2 10e12/L  4.28   4.80   MCV Latest Ref Range: 78 - 100 fl  93   92   MCH Latest Ref Range: 26.5 - 33.0 pg  30.1   29.8   MCHC Latest Ref Range: 31.5 - 36.5 g/dL  32.4   32.3   RDW Latest Ref Range: 10.0 - 15.0 %  13.3   13.0   MR CARDIAC W/O CONTRAST Unknown    Rpt        HEMODYNAMICS:  HR: 51  RA: 9/13/9   RV: 100/10  PA: 100/40 (57)   PCWP: 15   PA sat: 55.6%   Measured VO2: 232 mL/(kg*min)  Dipti CO/CI: 3.9/2.6   TD CO/CI: 3.2/2.1   Measured Dipti CO/CI: 3.4/2.3  SV: 67  PVR: 12.4   PAC: 1.1       Norm Franco M.D.  Cardiovascular Medicine    I personally saw and examined this patient, discussed care with housestaff and other consultants, reviewed current laboratories and imaging studies, and conveyed impression and diagnostic/therapeutic plan to patient.    Problem List  1. Pulmonary hypertension  2. Chronic renal failure  3. Sarcoid      History    The patient returns for follow-up of heart failure.  There is no interim history of chest pain, tightness, paroxysmal nocturnal dyspnea, orthopnea, peripheral edema, palpitation, pre-syncope, syncope, device discharge.  Exercise tolerance is stable.  The patient is attempting to exercise regularly and following a sodium restricted, calorically appropriate diet.  Medications are reviewed and the patient is taking medications as prescribed.  The patient is generally sleeping well.       Objective  /68 (BP Location: Left arm, Patient Position: Chair, Cuff Size: Adult Regular)  Pulse 69  Ht 1.6 m (5' 3\")  Wt 54.4 kg (120 lb)  SpO2 95%  BMI 21.26 kg/m2   PERRLA, EOM full, normal gait and station, normal mentation, skin without lesions, chest is clear, no evidence of right or left " ventricular overactivity, sternum stable, no carotid bruits, regular rhythm, S1, S2, no murmurs, abdomen without tenderness, rebound guarding or masses, no edema, no focal neurologic defects.    Wt Readings from Last 5 Encounters:   11/01/17 54.4 kg (120 lb)   09/13/17 55.8 kg (123 lb 1.6 oz)   07/25/17 54.3 kg (119 lb 12.8 oz)   06/09/17 53.8 kg (118 lb 9.6 oz)   05/31/17 53.9 kg (118 lb 12.8 oz)       Meds  Current Outpatient Prescriptions   Medication     potassium chloride (K-TAB,KLOR-CON) 10 MEQ tablet     azaTHIOprine (IMURAN) 50 MG tablet     sulfamethoxazole-trimethoprim (BACTRIM DS/SEPTRA DS) 800-160 MG per tablet     amoxicillin-clavulanate (AUGMENTIN) 875-125 MG per tablet     Macitentan (OPSUMIT PO)     furosemide (LASIX) 40 MG tablet     carvedilol (COREG) 12.5 MG tablet     denosumab (PROLIA) 60 MG/ML SOLN     tadalafil, PAH, (ADCIRCA) 20 MG TABS     treprostinil diolamine ER (ORENITRAM) 0.125 MG CR tablet     calcitRIOL (ROCALTROL) 0.25 MCG capsule     aspirin 81 MG tablet     SODIUM BICARBONATE PO     Lactobacillus (ACIDOPHILUS PO)     Multiple Vitamins-Minerals (EYE VITAMINS PO)     ORDER FOR DME     TOPIRAMATE PO     TEMAZEPAM PO     omeprazole (PRILOSEC) 20 MG capsule     LORazepam (ATIVAN) 0.5 MG tablet     buPROPion (WELLBUTRIN SR) 200 MG 12 hr tablet     ORDER FOR DME     acetaminophen (TYLENOL) 500 MG tablet     Elastic Bandages & Supports (T.E.D. BELOW KNEE/M-REGULAR) MISC     simvastatin (ZOCOR) 10 MG tablet     No current facility-administered medications for this visit.        Wt Readings from Last 24 Encounters:   11/01/17 54.4 kg (120 lb)   09/13/17 55.8 kg (123 lb 1.6 oz)   07/25/17 54.3 kg (119 lb 12.8 oz)   06/09/17 53.8 kg (118 lb 9.6 oz)   05/31/17 53.9 kg (118 lb 12.8 oz)   05/26/17 53.3 kg (117 lb 8 oz)   05/24/17 52.2 kg (115 lb)   05/16/17 52 kg (114 lb 11.2 oz)   05/11/17 55.3 kg (122 lb)   04/12/17 55.3 kg (121 lb 14.4 oz)   02/28/17 55.7 kg (122 lb 12.8 oz)   10/18/16 61 kg  (134 lb 8 oz)   10/14/16 62 kg (136 lb 9.6 oz)   08/25/16 63 kg (138 lb 14.4 oz)   07/14/16 62.4 kg (137 lb 9.6 oz)   05/24/16 64 kg (141 lb)   04/26/16 69.4 kg (153 lb)   03/17/16 69.4 kg (153 lb)   03/16/16 69.4 kg (153 lb)   03/15/16 68.3 kg (150 lb 8 oz)   03/08/16 68.7 kg (151 lb 8 oz)   03/03/16 70.3 kg (154 lb 15.7 oz)   02/16/16 70.3 kg (155 lb)   02/11/16 70.3 kg (155 lb)     Labs  Results for ANALI GÓMEZ (MRN [id number]) as of 11/1/2017 15:36   Ref. Range 2/28/2017 15:06 4/12/2017 10:16 5/11/2017 12:39 5/16/2017 12:54 5/24/2017 13:12 6/9/2017 14:10 7/25/2017 12:10 9/13/2017 15:58 11/1/2017 12:26 11/1/2017 13:47   Creatinine Latest Ref Range: 0.52 - 1.04 mg/dL 1.51 (H) 1.46 (H) 1.58 (H) 1.18 (H) 1.31 (H) 1.40 (H) 1.50 (H) 1.44 (H) 1.6 (H) 1.56 (H)       Results for ANALI GÓMEZ (MRN [id number]) as of 11/1/2017 15:36   Ref. Range 9/13/2017 15:58 11/1/2017 12:26 11/1/2017 13:18 11/1/2017 13:47   Sodium Latest Ref Range: 133 - 144 mmol/L 140   139   Potassium Latest Ref Range: 3.4 - 5.3 mmol/L 3.8   3.7   Chloride Latest Ref Range: 94 - 109 mmol/L 110 (H)   106   Carbon Dioxide Latest Ref Range: 20 - 32 mmol/L 24   25   Urea Nitrogen Latest Ref Range: 7 - 30 mg/dL 20   26   Creatinine Latest Ref Range: 0.52 - 1.04 mg/dL 1.44 (H) 1.6 (H)  1.56 (H)   GFR Estimate Latest Ref Range: >60 mL/min/1.7m2 38 (L) 33 (L)  34 (L)   GFR Estimate If Black Latest Ref Range: >60 mL/min/1.7m2 45 (L) 40 (L)  41 (L)   Calcium Latest Ref Range: 8.5 - 10.1 mg/dL 8.4 (L)   9.0   Anion Gap Latest Ref Range: 3 - 14 mmol/L 6   9   Albumin Latest Ref Range: 3.4 - 5.0 g/dL 3.8   4.1   Protein Total Latest Ref Range: 6.8 - 8.8 g/dL 7.1   7.2   Bilirubin Total Latest Ref Range: 0.2 - 1.3 mg/dL 0.4   0.6   Alkaline Phosphatase Latest Ref Range: 40 - 150 U/L 62   43   ALT Latest Ref Range: 0 - 50 U/L 39   23   AST Latest Ref Range: 0 - 45 U/L 36   19   N-Terminal Pro Bnp Latest Ref Range: 0 - 125 pg/mL 2161 (H)   648 (H)    Glucose Latest Ref Range: 70 - 99 mg/dL 98   88   WBC Latest Ref Range: 4.0 - 11.0 10e9/L 4.3   5.5   Hemoglobin Latest Ref Range: 11.7 - 15.7 g/dL 12.9   14.3   Hematocrit Latest Ref Range: 35.0 - 47.0 % 39.8   44.3   Platelet Count Latest Ref Range: 150 - 450 10e9/L 209   195   RBC Count Latest Ref Range: 3.8 - 5.2 10e12/L 4.28   4.80   MCV Latest Ref Range: 78 - 100 fl 93   92   MCH Latest Ref Range: 26.5 - 33.0 pg 30.1   29.8   MCHC Latest Ref Range: 31.5 - 36.5 g/dL 32.4   32.3   RDW Latest Ref Range: 10.0 - 15.0 % 13.3   13.0   MR CARDIAC W/O CONTRAST Unknown   Rpt        Imaging     Scan Date:   2017 12:52:25                                     Signed by Mellissa Flores ([id number])  14:22:04.     SUMMARY   ==========================================================================================================     Clinical history: Pulmonary hypertension  Comparison CMR:2016     1. The LV is normal in cavity size and wall thickness. The global systolic function is normal. The LVEF is  66%. There is systolic flattening of the interventricular septum consistent with RV pressure overload.      2. The RV is normal in cavity size.  There is moderate right ventricular hypertrophy. The global systolic  function is low normal. The RVEF is 52%.      3. There is mild dilatation of the main pulmonary artery.     4. There is no significant valvular disease.      5. Late gadolinium enhancement imaging was not performed.      CONCLUSIONS:  Normal left ventricular systolic function.  The right ventricle is normal in size with  moderate right ventricular hypertrophy. Right ventriculast systolic function is low-normal with an RVEF of  52%.    In direct comparison to previous study dated 2016, there has been a mild interval improvement in right  ventricular systolic function.    Name: ANALI GÓMEZ  MRN: [id number]  : 1960  Study Date: 2016 02:12 PM  Age: 56 yrs  Gender:  Female  Patient Location: [facility]  Reason For Study: , Other secondary pulmonary hypertension  Ordering Physician: RENETTA LOZA  Referring Physician: RENETTA LOZA  Performed By: Jose Pastrana RDCS     BSA: 1.7 m2  Height: 62 in  Weight: 153 lb  BP: 117/73 mmHg  ______________________________________________________________________________        Procedure  Echocardiogram with two-dimensional, color and spectral Doppler performed.  ______________________________________________________________________________     Interpretation Summary  Paradoxical septal motion consistent with right ventricular pressure and  volume overload is present.  Mild to moderate right ventricular dilation is present.  Global right ventricular function is mildly reduced.  Moderate tricuspid insufficiency is present.  Right ventricular systolic pressure is 95mmHg above the right atrial  pressure.  The inferior vena cava is normal.  No pericardial effusion is present.  ______________________________________________________________________________           Left Ventricle  Global and regional left ventricular function is normal with an EF of 60-65%.  Left ventricular wall thickness is normal. Left ventricular size is normal.  Left ventricular diastolic function cannot be assessed. Paradoxical septal  motion consistent with right ventricular pressure and volume overload is  present.     Right Ventricle  Mild to moderate right ventricular dilation is present. Global right  ventricular function is mildly reduced.  Atria  Mild left atrial enlargement is present. Moderate right atrial enlargement is  present.     Mitral Valve  The mitral valve is normal.     Aortic Valve  Aortic valve is normal in structure and function.     Tricuspid Valve  Moderate tricuspid insufficiency is present. Right ventricular systolic  pressure is 95mmHg above the right atrial pressure.     Pulmonic Valve  The pulmonic valve is normal.     Vessels  The  aorta root is normal. The inferior vena cava is normal.  Pericardium  No pericardial effusion is present.     ______________________________________________________________________________  MMode/2D Measurements & Calculations  IVSd: 0.52 cm  LVIDd: 4.2 cm  LVIDs: 2.9 cm  LVPWd: 0.64 cm  FS: 29.6 %  EDV(Teich): 76.7 ml  ESV(Teich): 33.0 ml  LV mass(C)d: 66.4 grams  LA dimension: 3.3 cm  asc Aorta Diam: 2.9 cm  LVOT diam: 2.1 cm  LVOT area: 3.4 cm2  LA Volume (BP): 62.0 ml     LA Volume Index (BP): 36.3 ml/m2           Doppler Measurements & Calculations  MV E max dionne: 56.0 cm/sec  MV A max dionne: 34.9 cm/sec  MV E/A: 1.6  MV dec time: 0.24 sec  TR max dionne: 460.2 cm/sec  TR max P.8 mmHg          HR: 51  RA: 9/13/9   RV: 100/10  PA: 100/40 (57)   PCWP: 15   PA sat: 55.6%   Measured VO2: 232 mL/(kg*min)  Dipti CO/CI: 3.9/2.6   TD CO/CI: 3.2/2.1   Measured Dipti CO/CI: 3.4/2.3  SV: 67  PVR: 12.4   PAC: 1.1  Assessment/Plan     Return to clinic in 3-4 months    Have echocardiogram on day of return to clinic    Consider PET to assess sarcoid activity    Discussed intravenous prostacyclin but will hold off for now    Laboratories with return: CRPinfl, IL-6 in addition to regular laboratories      Sincerely,     Norm Franco MD

## 2017-11-01 NOTE — PROGRESS NOTES
Anupama Babcock MD.    [place] [address]      Dear Dr. Babcock:        IMPRESSION:   1.  Pulmonary hypertension.   2.  History of profound depression.   3.  History of prolactin excess with galactorrhea (remote).      I had the pleasure of meeting with Judith Nelson and her sister-in-law for over an hour yesterday to go over the results of her recent right heart catheterization and her interim treatment.  This demonstrated continuing significant pulmonary artery hypertension.  The heart rate was 60 at the time of catheterization with blood pressure 121/69 and a mean blood pressure of 89.  The right atrial pressure was 4, which was within normal limits.  The pulmonary artery pressure was 113/37 with a mean pulmonary artery pressure of 65.  In general, the upper limit of the PA systolic pressure should be 35 in someone her size while the mean pulmonary artery pressure should be less than 25 in the normal range.  Her pulmonary capillary wedge pressure (an indirect measurement of left ventricular end-diastolic pressure) was 10.  This was within normal limits.  Her pulmonary artery saturation was extremely low at 56%.  Her Dipti cardiac output was 2.3 with an index of 1.3 while her thermodilution output was 2.9 with an index of 1.7.  Her pulmonary vascular resistance was markedly elevated at 20.  These values were obtained while she was taking oral prostanoids and phosphodiesterase inhibitor.  This is not happy news.     Functionally, she is improved with increased exercise tolerance and no symptoms of right heart failure nor exertional pre-syncope or syncope.  More importantly, her BNP is markedly reduced, suggesting a medication effect.     Laboratories yesterday showed a creatinine 1.46, total protein 6.6, the remainder of the electrolytes are within normal limits.  Her white count was somewhat low at 3800 with hemoglobin of 12.4.      Her most  "recent echocardiogram was obtained last month which showed the right ventricle to be moderately dilated but global right ventricular function was interestingly normal.  There was evidence of right ventricular pressure volume overload as would be expected.  Severe tricuspid regurgitation was present.  When compared to previous studies, the RV size and function were both worse.      The \"ultimate\" therapy for pulmonary hypertension is continuous infusion prostacyclin.  This would require placement of a Stratton catheter and the wearing of a pump.  The drug would have to be mixed daily in a sterile fashion and connected to the pump in a thorough fashion.  Under the best of circumstances, there is a risk of systemic infection of approximately 5% or more per year.  The other option for administrating the prostacyclin derivatives is by continuous infusion subcutaneously.  This eliminates the risk of direct intravascular sepsis; however, it still requires sterile mixing at intervals.  In addition, the prostacyclin in some people activates the subcutaneous nerves and produces pain though this can be managed with topical medications.      We thoroughly discussed the upsides and downsides of continuing medical management.  Another option to be considered would be a consideration for lung or heart-lung transplant.  At this point since she appears to be improved we collectively decided to hold the course and continue with medical management as currently outlined      Obviously, her mental health issues make difficult choices even more difficult.      For the interim, we have arranged for her to start to receive Opsumit, an endothelin receptor antagonist that can be given orally, which will hopefully add in incremental therapeutic benefit to her current regimen.      She and her sister-in-law will think about this and discuss the situation further.      We have also drawn a prolactin level as elevated prolactin levels have been " associated with pulmonary hypertension.  She no longer has galactorrhea and her galactorrhea and elevated prolactin levels were felt to be a side effect of her psychiatric drugs.  However, we will check to make sure it is not elevated as there are more specific therapies that might be appropriate.      We appreciate your support and thoughts regarding her continuing care.     Results for ANALI GÓMEZ (MRN [id number]) as of 11/2/2017 08:24   Ref. Range 9/13/2017 12:18 9/13/2017 15:58 11/1/2017 12:26 11/1/2017 13:18 11/1/2017 13:47   Sodium Latest Ref Range: 133 - 144 mmol/L  140   139   Potassium Latest Ref Range: 3.4 - 5.3 mmol/L  3.8   3.7   Chloride Latest Ref Range: 94 - 109 mmol/L  110 (H)   106   Carbon Dioxide Latest Ref Range: 20 - 32 mmol/L  24   25   Urea Nitrogen Latest Ref Range: 7 - 30 mg/dL  20   26   Creatinine Latest Ref Range: 0.52 - 1.04 mg/dL  1.44 (H) 1.6 (H)  1.56 (H)   GFR Estimate Latest Ref Range: >60 mL/min/1.7m2  38 (L) 33 (L)  34 (L)   GFR Estimate If Black Latest Ref Range: >60 mL/min/1.7m2  45 (L) 40 (L)  41 (L)   Calcium Latest Ref Range: 8.5 - 10.1 mg/dL  8.4 (L)   9.0   Anion Gap Latest Ref Range: 3 - 14 mmol/L  6   9   Albumin Latest Ref Range: 3.4 - 5.0 g/dL  3.8   4.1   Protein Total Latest Ref Range: 6.8 - 8.8 g/dL  7.1   7.2   Bilirubin Total Latest Ref Range: 0.2 - 1.3 mg/dL  0.4   0.6   Alkaline Phosphatase Latest Ref Range: 40 - 150 U/L  62   43   ALT Latest Ref Range: 0 - 50 U/L  39   23   AST Latest Ref Range: 0 - 45 U/L  36   19   N-Terminal Pro Bnp Latest Ref Range: 0 - 125 pg/mL  2161 (H)   648 (H)   Glucose Latest Ref Range: 70 - 99 mg/dL  98   88   WBC Latest Ref Range: 4.0 - 11.0 10e9/L  4.3   5.5   Hemoglobin Latest Ref Range: 11.7 - 15.7 g/dL  12.9   14.3   Hematocrit Latest Ref Range: 35.0 - 47.0 %  39.8   44.3   Platelet Count Latest Ref Range: 150 - 450 10e9/L  209   195   RBC Count Latest Ref Range: 3.8 - 5.2 10e12/L  4.28   4.80   MCV Latest Ref Range: 78 -  100 fl  93   92   MCH Latest Ref Range: 26.5 - 33.0 pg  30.1   29.8   MCHC Latest Ref Range: 31.5 - 36.5 g/dL  32.4   32.3   RDW Latest Ref Range: 10.0 - 15.0 %  13.3   13.0   MR CARDIAC W/O CONTRAST Unknown    Rpt        HEMODYNAMICS:  HR: 51  RA:    RV: 100/10  PA: 100/40 (57)   PCWP: 15   PA sat: 55.6%   Measured VO2: 232 mL/(kg*min)  Dipti CO/CI: 3.9/2.6   TD CO/CI: 3.2/2.1   Measured Dipti CO/CI: 3.4/2.3  SV: 67  PVR: 12.4   PAC: 1.1     Sincerely yours,         MINE CASTRO MD             D: 2017 08:35   T: 2017 09:02   MT: MADELINE      Name:     ANALI GÓMEZ   MRN:      [id number]        Account:      [id number]   :      1960           Service Date: 2017      Document: [id number]

## 2017-11-01 NOTE — NURSING NOTE
Cardiac Testing: Patient given instructions regarding  echocardiogram . Discussed purpose, preparation, procedure and when to expect results reported back to the patient. Patient demonstrated understanding of this information and agreed to call with further questions or concerns.    Diet: Patient instructed regarding a heart healthy diet, including discussion of reduced fat and sodium intake. Patient demonstrated understanding of this information and agreed to call with further questions or concerns.    Med Reconcile: Reviewed and verified all current medications with the patient. The updated medication list was printed and given to the patient.    Return Appointment: Patient given instructions regarding scheduling next clinic visit. Patient demonstrated understanding of this information and agreed to call with further questions or concerns.    Patient stated she understood all health information given and agreed to call with further questions or concerns.    Medication Changes:  No medication changes at this time. Please continue current medication regiment.      Patient Instructions:  Continue staying active, eating a low sodium, low fat diet.    Check-In  Time Check-In Location Estimated Length Procedure   Name         60 minutes Echocardiogram (Echo)**   Procedure Preparations & Instructions     This is a non-invasive procedure and does NOT require any preparation       Consider PET scan to assess sarcoid activity.    Follow up Appointment Information:  Follow up in 3-4 months with echo done same day.    Results:  Results for DALIA ANALI J (MRN [id number]) as of 11/1/2017 16:05   Ref. Range 11/1/2017 13:47   Sodium Latest Ref Range: 133 - 144 mmol/L 139   Potassium Latest Ref Range: 3.4 - 5.3 mmol/L 3.7   Chloride Latest Ref Range: 94 - 109 mmol/L 106   Carbon Dioxide Latest Ref Range: 20 - 32 mmol/L 25   Urea Nitrogen Latest Ref Range: 7 - 30 mg/dL 26   Creatinine Latest Ref Range: 0.52 - 1.04 mg/dL 1.56  (H)   GFR Estimate Latest Ref Range: >60 mL/min/1.7m2 34 (L)   GFR Estimate If Black Latest Ref Range: >60 mL/min/1.7m2 41 (L)   Calcium Latest Ref Range: 8.5 - 10.1 mg/dL 9.0   Anion Gap Latest Ref Range: 3 - 14 mmol/L 9   Albumin Latest Ref Range: 3.4 - 5.0 g/dL 4.1   Protein Total Latest Ref Range: 6.8 - 8.8 g/dL 7.2   Bilirubin Total Latest Ref Range: 0.2 - 1.3 mg/dL 0.6   Alkaline Phosphatase Latest Ref Range: 40 - 150 U/L 43   ALT Latest Ref Range: 0 - 50 U/L 23   AST Latest Ref Range: 0 - 45 U/L 19   N-Terminal Pro Bnp Latest Ref Range: 0 - 125 pg/mL 648 (H)   Glucose Latest Ref Range: 70 - 99 mg/dL 88   WBC Latest Ref Range: 4.0 - 11.0 10e9/L 5.5   Hemoglobin Latest Ref Range: 11.7 - 15.7 g/dL 14.3   Hematocrit Latest Ref Range: 35.0 - 47.0 % 44.3   Platelet Count Latest Ref Range: 150 - 450 10e9/L 195   RBC Count Latest Ref Range: 3.8 - 5.2 10e12/L 4.80   MCV Latest Ref Range: 78 - 100 fl 92   MCH Latest Ref Range: 26.5 - 33.0 pg 29.8   MCHC Latest Ref Range: 31.5 - 36.5 g/dL 32.3   RDW Latest Ref Range: 10.0 - 15.0 % 13.0

## 2017-11-01 NOTE — PATIENT INSTRUCTIONS
Medication Changes:  No medication changes at this time. Please continue current medication regiment. Please verify Lasix and Buspar dosages and call Ness at [phone] to update.      Patient Instructions:  Continue staying active, eating a low sodium, low fat diet.    Check-In  Time Check-In Location Estimated Length Procedure   Name         60 minutes Echocardiogram (Echo)**   Procedure Preparations & Instructions     This is a non-invasive procedure and does NOT require any preparation       Consider PET scan to assess sarcoid activity.    Follow up Appointment Information:  Follow up in 3-4 months with echo done same day.    Results:  Results for ANALI GÓMEZ (MRN [id number]) as of 11/1/2017 16:05   Ref. Range 11/1/2017 13:47   Sodium Latest Ref Range: 133 - 144 mmol/L 139   Potassium Latest Ref Range: 3.4 - 5.3 mmol/L 3.7   Chloride Latest Ref Range: 94 - 109 mmol/L 106   Carbon Dioxide Latest Ref Range: 20 - 32 mmol/L 25   Urea Nitrogen Latest Ref Range: 7 - 30 mg/dL 26   Creatinine Latest Ref Range: 0.52 - 1.04 mg/dL 1.56 (H)   GFR Estimate Latest Ref Range: >60 mL/min/1.7m2 34 (L)   GFR Estimate If Black Latest Ref Range: >60 mL/min/1.7m2 41 (L)   Calcium Latest Ref Range: 8.5 - 10.1 mg/dL 9.0   Anion Gap Latest Ref Range: 3 - 14 mmol/L 9   Albumin Latest Ref Range: 3.4 - 5.0 g/dL 4.1   Protein Total Latest Ref Range: 6.8 - 8.8 g/dL 7.2   Bilirubin Total Latest Ref Range: 0.2 - 1.3 mg/dL 0.6   Alkaline Phosphatase Latest Ref Range: 40 - 150 U/L 43   ALT Latest Ref Range: 0 - 50 U/L 23   AST Latest Ref Range: 0 - 45 U/L 19   N-Terminal Pro Bnp Latest Ref Range: 0 - 125 pg/mL 648 (H)   Glucose Latest Ref Range: 70 - 99 mg/dL 88   WBC Latest Ref Range: 4.0 - 11.0 10e9/L 5.5   Hemoglobin Latest Ref Range: 11.7 - 15.7 g/dL 14.3   Hematocrit Latest Ref Range: 35.0 - 47.0 % 44.3   Platelet Count Latest Ref Range: 150 - 450 10e9/L 195   RBC Count Latest Ref Range: 3.8 - 5.2 10e12/L 4.80   MCV Latest Ref  Range: 78 - 100 fl 92   MCH Latest Ref Range: 26.5 - 33.0 pg 29.8   MCHC Latest Ref Range: 31.5 - 36.5 g/dL 32.3   RDW Latest Ref Range: 10.0 - 15.0 % 13.0       We are located on the third floor of the [facility] on the [facility].  Our address is     [address] [place] [address] [place] [address] [place] [address]    Thank you for allowing us to be a part of your care here at the [facility]    If you have questions or concerns please contact us at:    Ness Bonilla, RN, BSN   Reji Escobar (Schedule,P.A.)  Nurse Coordinator     Clinic   Pulmonary Hypertension   Pulmonary Hypertension  [facility]  [phone]    (P) [phone]        (F)[phone]    ** Please note that you will NOT receive a reminder call regarding your scheduled testing, reminder calls are for provider appointments only.  If you are scheduled for testing within the [facility] system you may receive a call regarding pre-registration for billing purposes only.**     Remember to weigh yourself daily after voiding and before you consume any food or beverages and log the numbers.  If you have gained/lost 2 pounds overnight or 5 pounds in a week contact us immediately for medication adjustments or further instructions.   **Please call us immediately if you have any syncope, chest pain, edema, or decline in your functional status.    Support Group:  Pulmonary Hypertension Association  Https://www.phassociation.org/  **Look at the Events Tab** They even have Support Groups that you can call into    [facility] PH Support Group  First Saturday of the Month from 1-3 PM   Location: [address] [place] [address]  Leader: Michelet Garcia  Phone: [phone]  Email: [email or web address]      Return to clinic in 3-4 months    Have echocardiogram on day of return to clinic        Discussed  intravenous prostacyclin but will hold off for now    Laboratories with return: CRPinfl, IL-6 in addition to regular laboratories

## 2017-11-01 NOTE — NURSING NOTE
Chief Complaint   Patient presents with     Follow Up For     Return PH patient with labs prior     Vitals were taken and medications were reconciled. EKG was performed.    KASI Crawford  2:13 PM

## 2017-11-01 NOTE — MR AVS SNAPSHOT
After Visit Summary   11/1/2017    Anali Gómez    MRN: [id number]           Patient Information     Date Of Birth          1960        Visit Information        Provider Department      11/1/2017 2:30 PM Norm Franco MD [facility]        Today's Diagnoses     Pulmonary hypertension    -  1    Pulmonary arterial hypertension        Sarcoidosis          Care Instructions    Medication Changes:  No medication changes at this time. Please continue current medication regiment. Please verify Lasix and Buspar dosages and call Ness at [phone] to update.      Patient Instructions:  Continue staying active, eating a low sodium, low fat diet.    Check-In  Time Check-In Location Estimated Length Procedure   Name         60 minutes Echocardiogram (Echo)**   Procedure Preparations & Instructions     This is a non-invasive procedure and does NOT require any preparation       Consider PET scan to assess sarcoid activity.    Follow up Appointment Information:  Follow up in 3-4 months with echo done same day.    Results:  Results for ANALI GÓMEZ (MRN [id number]) as of 11/1/2017 16:05   Ref. Range 11/1/2017 13:47   Sodium Latest Ref Range: 133 - 144 mmol/L 139   Potassium Latest Ref Range: 3.4 - 5.3 mmol/L 3.7   Chloride Latest Ref Range: 94 - 109 mmol/L 106   Carbon Dioxide Latest Ref Range: 20 - 32 mmol/L 25   Urea Nitrogen Latest Ref Range: 7 - 30 mg/dL 26   Creatinine Latest Ref Range: 0.52 - 1.04 mg/dL 1.56 (H)   GFR Estimate Latest Ref Range: >60 mL/min/1.7m2 34 (L)   GFR Estimate If Black Latest Ref Range: >60 mL/min/1.7m2 41 (L)   Calcium Latest Ref Range: 8.5 - 10.1 mg/dL 9.0   Anion Gap Latest Ref Range: 3 - 14 mmol/L 9   Albumin Latest Ref Range: 3.4 - 5.0 g/dL 4.1   Protein Total Latest Ref Range: 6.8 - 8.8 g/dL 7.2   Bilirubin Total Latest Ref Range: 0.2 - 1.3 mg/dL 0.6   Alkaline Phosphatase Latest Ref Range: 40 - 150 U/L 43   ALT Latest Ref Range: 0 - 50 U/L 23    AST Latest Ref Range: 0 - 45 U/L 19   N-Terminal Pro Bnp Latest Ref Range: 0 - 125 pg/mL 648 (H)   Glucose Latest Ref Range: 70 - 99 mg/dL 88   WBC Latest Ref Range: 4.0 - 11.0 10e9/L 5.5   Hemoglobin Latest Ref Range: 11.7 - 15.7 g/dL 14.3   Hematocrit Latest Ref Range: 35.0 - 47.0 % 44.3   Platelet Count Latest Ref Range: 150 - 450 10e9/L 195   RBC Count Latest Ref Range: 3.8 - 5.2 10e12/L 4.80   MCV Latest Ref Range: 78 - 100 fl 92   MCH Latest Ref Range: 26.5 - 33.0 pg 29.8   MCHC Latest Ref Range: 31.5 - 36.5 g/dL 32.3   RDW Latest Ref Range: 10.0 - 15.0 % 13.0       We are located on the third floor of the [facility] ([facility] on the [facility].  Our address is     [address] [place] [address] on [address]rd [place] [address]    Thank you for allowing us to be a part of your care here at the [facility]    If you have questions or concerns please contact us at:    Ness Bonilla RN, BSN   Reji Escobar (Schedule,P.A.)  Nurse Coordinator     Clinic   Pulmonary Hypertension   Pulmonary Hypertension  [facility]  [phone])[phone]    (P) [phone]        (F)[phone]    ** Please note that you will NOT receive a reminder call regarding your scheduled testing, reminder calls are for provider appointments only.  If you are scheduled for testing within the [facility] system you may receive a call regarding pre-registration for billing purposes only.**     Remember to weigh yourself daily after voiding and before you consume any food or beverages and log the numbers.  If you have gained/lost 2 pounds overnight or 5 pounds in a week contact us immediately for medication adjustments or further instructions.   **Please call us immediately if you have any syncope, chest pain, edema, or decline in your functional status.    Support Group:  Pulmonary Hypertension  Association  Https://www.phassociation.org/  **Look at the Events Tab** They even have Support Groups that you can call into    [facility] PH Support Group  First Saturday of the Month from 1-3 PM   [facility] [address]  Leader: Micheletshanique Garcia  Phone: [phone]  Email: [email or web address]      Return to clinic in 3-4 months    Have echocardiogram on day of return to clinic        Discussed intravenous prostacyclin but will hold off for now    Laboratories with return: CRPinfl, IL-6 in addition to regular laboratories              Follow-ups after your visit        Follow-up notes from your care team     Return in about 4 months (around 3/1/2018) for with Joan, Return PH, with, Echo, Labs.      Your next 10 appointments already scheduled     Nov 16, 2017 10:45 AM CST   Lab with  LAB   [facility] ([facility])    [address] [place] [id number]   [phone]            Nov 16, 2017 11:00 AM CST   PFT VISIT with  PFL B   [facility] ([facility])    [address] [place] [phone]   [phone]            Nov 16, 2017 11:30 AM CST   (Arrive by 11:15 AM)   Return Interstitial Lung with Gael Flaherty MD   [facility] ([facility])    [address] [place] [phone]   [phone]            Feb 09, 2018 12:30 PM CST   (Arrive by 12:15 PM)   RETURN ENDOCRINE with Jenna Salas MD   [facility] ([facility])    [address] [place] [id number] [phone]            Mar 21, 2018 12:30 PM CDT   Lab with [facility]    [facility] ([facility])    [address] [place] [id number]   [phone]            Mar 21, 2018  1:00 PM CDT   Ech Complete with ALEXANDRAECHPAUL     [facility] ([facility])    [address] [address] [phone]           1. Please bring or wear a comfortable two-piece outfit. 2. You may eat, drink and take your normal medicines. 3. For any questions that cannot be answered, please contact the ordering physician            Mar 21, 2018  2:00 PM CDT   (Arrive by 1:45 PM)   RETURN PRIMARY PULMONARY with Norm Franco MD   [facility] ([facility])    [address] [place] [id number] [phone]              Future tests that were ordered for you today     Open Future Orders        Priority Expected Expires Ordered    Interleukin 6 Blood Routine  11/1/2018 11/1/2017    CRP inflammation Routine  11/1/2018 11/1/2017            Who to contact     If you have questions or need follow up information about today's clinic visit or your schedule please contact [facility] directly at [phone].  Normal or non-critical lab and imaging results will be communicated to you by [facility], letter or phone within 4 business days after the clinic has received the results. If you do not hear from us within 7 days, please contact the clinic through [facility] or phone. If you have a critical or abnormal lab result, we will notify you by phone as soon as possible.  Submit refill requests through [facility] or call your pharmacy and they will forward the refill request to us. Please allow 3 business days for your refill to be completed.          Additional Information About Your Visit        [facility] Information     [facility] gives you secure access to your electronic health record. If you see a primary care provider, you can also send messages to your care team and make appointments. If you have questions, please call your primary care clinic.  If you do not have a primary care provider, please call [phone] and they will assist you.        Care  "EveryWhere ID     This is your Care EveryWhere ID. This could be used by other organizations to access your [facility] medical records  [id number]        Your Vitals Were     Pulse Height Pulse Oximetry BMI (Body Mass Index)          69 1.6 m (5' 3\") 95% 21.26 kg/m2         Blood Pressure from Last 3 Encounters:   11/01/17 103/68   09/13/17 120/77   09/13/17 130/68    Weight from Last 3 Encounters:   11/01/17 54.4 kg (120 lb)   09/13/17 55.8 kg (123 lb 1.6 oz)   07/25/17 54.3 kg (119 lb 12.8 oz)                 Today's Medication Changes          These changes are accurate as of: 11/1/17  4:40 PM.  If you have any questions, ask your nurse or doctor.               These medicines have changed or have updated prescriptions.        Dose/Directions    acidophilus Caps   This may have changed:    - when to take this  - reasons to take this   Used for:  Sarcoidosis   Changed by:  Norm Franco MD        Dose:  1 tablet   Take 1 tablet by mouth as needed   Quantity:  30 capsule   Refills:  0       LORazepam 0.5 MG tablet   Commonly known as:  ATIVAN   This may have changed:    - how much to take  - additional instructions   Used for:  Major depressive disorder, single episode, severe with psychotic features (H)        Take 0.5 mg (1 tab) every morning and 1 mg (2 tabs) every night at bedtime.   Quantity:  90 tablet   Refills:  1       treprostinil diolamine ER 0.125 MG CR tablet   Commonly known as:  ORENITRAM   This may have changed:  how much to take   Used for:  Pulmonary arterial hypertension   Changed by:  Norm Franco MD        Dose:  0.5 mg   Take 4 tablets (0.5 mg) by mouth 3 times daily (with meals) 4.25mg tid   Quantity:  90 tablet   Refills:  0            Where to get your medicines      Some of these will need a paper prescription and others can be bought over the counter.  Ask your nurse if you have questions.     Bring a paper prescription for each of these medications     acidophilus " Caps         Call your pharmacy to confirm that your medication is ready for pickup. It may take up to 24 hours for them to receive the prescription. If the prescription is not ready within 3 business days, please contact your clinic or your provider.     We will let you know when these medications are ready. If you don't hear back within 3 business days, please contact us.     treprostinil diolamine ER 0.125 MG CR tablet                Primary Care Provider Office Phone # Fax #    Anupama Babcock [phone]       [address] [address]        Equal Access to Services     [facility]: Hadii pablo Leiva, waaxveronique salas, qajason yeageraldara garza, alysha worley . [facility] [phone].    ATENCIÓN: Si habla español, tiene a dhillon disposición servicios gratuitos de asistencia lingüística. [facility] [phone].    We comply with applicable federal civil rights laws and [place] laws. We do not discriminate on the basis of race, color, national origin, age, disability, sex, sexual orientation, or gender identity.            Thank you!     Thank you for choosing [facility]  for your care. Our goal is always to provide you with excellent care. Hearing back from our patients is one way we can continue to improve our services. Please take a few minutes to complete the written survey that you may receive in the mail after your visit with us. Thank you!             Your Updated Medication List - Protect others around you: Learn how to safely use, store and throw away your medicines at www.disposemymeds.org.          This list is accurate as of: 11/1/17  4:40 PM.  Always use your most recent med list.                   Brand Name Dispense Instructions for use Diagnosis    acetaminophen 500 MG tablet    TYLENOL    45 tablet    Take 1-2 tablets (500-1,000 mg) by mouth every 6 hours as needed for pain (Take as needed for  pain.  Do not exceed 4 grams (8 tablets) in a day)    Pulmonary hypertension       acidophilus Caps     30 capsule    Take 1 tablet by mouth as needed    Sarcoidosis       azaTHIOprine 50 MG tablet    IMURAN    90 tablet    Take 1 tablet (50 mg) by mouth daily    Sarcoidosis       buPROPion 200 MG 12 hr tablet    WELLBUTRIN SR    30 tablet    Take 1 tablet (200 mg) by mouth every morning    Major depressive disorder, single episode, moderate (H)       calcitRIOL 0.25 MCG capsule    ROCALTROL    30 capsule    Take 1 capsule (0.25 mcg) by mouth daily        carvedilol 12.5 MG tablet    COREG    135 tablet    Take 0.5 tablets (6.25 mg) by mouth 2 times daily (with meals)    Chronic systolic heart failure (H)       denosumab 60 MG/ML Soln injection    PROLIA    1 mL    Inject 1 mL (60 mg) Subcutaneous every 6 months    Osteoporosis, postmenopausal       EYE VITAMINS PO      Take 1 tablet by mouth daily    Sarcoidosis       furosemide 40 MG tablet    LASIX    90 tablet    TAKE 1 TABLET BY MOUTH DAILY    Pulmonary hypertension, Stress-induced cardiomyopathy       LORazepam 0.5 MG tablet    ATIVAN    90 tablet    Take 0.5 mg (1 tab) every morning and 1 mg (2 tabs) every night at bedtime.    Major depressive disorder, single episode, severe with psychotic features (H)       omeprazole 20 MG CR capsule    priLOSEC    180 capsule    Take 1 capsule (20 mg) by mouth 2 times daily (before meals)    Dyspepsia       OPSUMIT PO      Take by mouth daily Pt doesn't know dosage,        order for DME     1 Device    Equipment being ordered: SHOWER CHAIR  FROM [facility]    Tremor, Generalized muscle weakness, Sarcoidosis       order for DME     2 Package    Equipment being ordered: Compression stockings; 2 pair; custom measured. Med compression 20-30 mmhg    Pulmonary hypertension, Edema       potassium chloride 10 MEQ tablet    K-TAB,KLOR-CON    90 tablet    Take 1 tablet (10 mEq) by mouth daily    Heart failure (H)        simvastatin 10 MG tablet    ZOCOR    90 tablet    Take 1 tablet by mouth At Bedtime.    Hypercholesterolemia       SODIUM BICARBONATE PO      Take by mouth 2 times daily    Pulmonary arterial hypertension       T.E.D. BELOW KNEE/M-REGULAR Misc     2 each    1 each daily    Lower extremity edema       tadalafil (PAH) 20 MG Tabs    ADCIRCA    60 tablet    Take 2 tablets (40 mg) by mouth daily    Pulmonary hypertension       TEMAZEPAM PO      Take by mouth At Bedtime        TOPIRAMATE PO      Take 100 mg by mouth At Bedtime        treprostinil diolamine ER 0.125 MG CR tablet    ORENITRAM    90 tablet    Take 4 tablets (0.5 mg) by mouth 3 times daily (with meals) 4.25mg tid    Pulmonary arterial hypertension

## 2017-11-01 NOTE — PROGRESS NOTES
"Norm Franco M.D.  Cardiovascular Medicine    I personally saw and examined this patient, discussed care with housestaff and other consultants, reviewed current laboratories and imaging studies, and conveyed impression and diagnostic/therapeutic plan to patient.    Problem List  1. Pulmonary hypertension  2. Chronic renal failure  3. Sarcoid      History    The patient returns for follow-up of heart failure.  There is no interim history of chest pain, tightness, paroxysmal nocturnal dyspnea, orthopnea, peripheral edema, palpitation, pre-syncope, syncope, device discharge.  Exercise tolerance is stable.  The patient is attempting to exercise regularly and following a sodium restricted, calorically appropriate diet.  Medications are reviewed and the patient is taking medications as prescribed.  The patient is generally sleeping well.       Objective  /68 (BP Location: Left arm, Patient Position: Chair, Cuff Size: Adult Regular)  Pulse 69  Ht 1.6 m (5' 3\")  Wt 54.4 kg (120 lb)  SpO2 95%  BMI 21.26 kg/m2   PERRLA, EOM full, normal gait and station, normal mentation, skin without lesions, chest is clear, no evidence of right or left ventricular overactivity, sternum stable, no carotid bruits, regular rhythm, S1, S2, no murmurs, abdomen without tenderness, rebound guarding or masses, no edema, no focal neurologic defects.    Wt Readings from Last 5 Encounters:   11/01/17 54.4 kg (120 lb)   09/13/17 55.8 kg (123 lb 1.6 oz)   07/25/17 54.3 kg (119 lb 12.8 oz)   06/09/17 53.8 kg (118 lb 9.6 oz)   05/31/17 53.9 kg (118 lb 12.8 oz)       Meds  Current Outpatient Prescriptions   Medication     potassium chloride (K-TAB,KLOR-CON) 10 MEQ tablet     azaTHIOprine (IMURAN) 50 MG tablet     sulfamethoxazole-trimethoprim (BACTRIM DS/SEPTRA DS) 800-160 MG per tablet     amoxicillin-clavulanate (AUGMENTIN) 875-125 MG per tablet     Macitentan (OPSUMIT PO)     furosemide (LASIX) 40 MG tablet     carvedilol (COREG) 12.5 MG " tablet     denosumab (PROLIA) 60 MG/ML SOLN     tadalafil, PAH, (ADCIRCA) 20 MG TABS     treprostinil diolamine ER (ORENITRAM) 0.125 MG CR tablet     calcitRIOL (ROCALTROL) 0.25 MCG capsule     aspirin 81 MG tablet     SODIUM BICARBONATE PO     Lactobacillus (ACIDOPHILUS PO)     Multiple Vitamins-Minerals (EYE VITAMINS PO)     ORDER FOR DME     TOPIRAMATE PO     TEMAZEPAM PO     omeprazole (PRILOSEC) 20 MG capsule     LORazepam (ATIVAN) 0.5 MG tablet     buPROPion (WELLBUTRIN SR) 200 MG 12 hr tablet     ORDER FOR DME     acetaminophen (TYLENOL) 500 MG tablet     Elastic Bandages & Supports (T.E.D. BELOW KNEE/M-REGULAR) MISC     simvastatin (ZOCOR) 10 MG tablet     No current facility-administered medications for this visit.        Wt Readings from Last 24 Encounters:   11/01/17 54.4 kg (120 lb)   09/13/17 55.8 kg (123 lb 1.6 oz)   07/25/17 54.3 kg (119 lb 12.8 oz)   06/09/17 53.8 kg (118 lb 9.6 oz)   05/31/17 53.9 kg (118 lb 12.8 oz)   05/26/17 53.3 kg (117 lb 8 oz)   05/24/17 52.2 kg (115 lb)   05/16/17 52 kg (114 lb 11.2 oz)   05/11/17 55.3 kg (122 lb)   04/12/17 55.3 kg (121 lb 14.4 oz)   02/28/17 55.7 kg (122 lb 12.8 oz)   10/18/16 61 kg (134 lb 8 oz)   10/14/16 62 kg (136 lb 9.6 oz)   08/25/16 63 kg (138 lb 14.4 oz)   07/14/16 62.4 kg (137 lb 9.6 oz)   05/24/16 64 kg (141 lb)   04/26/16 69.4 kg (153 lb)   03/17/16 69.4 kg (153 lb)   03/16/16 69.4 kg (153 lb)   03/15/16 68.3 kg (150 lb 8 oz)   03/08/16 68.7 kg (151 lb 8 oz)   03/03/16 70.3 kg (154 lb 15.7 oz)   02/16/16 70.3 kg (155 lb)   02/11/16 70.3 kg (155 lb)     Labs  Results for ANALI GÓMEZ (MRN [id number]) as of 11/1/2017 15:36   Ref. Range 2/28/2017 15:06 4/12/2017 10:16 5/11/2017 12:39 5/16/2017 12:54 5/24/2017 13:12 6/9/2017 14:10 7/25/2017 12:10 9/13/2017 15:58 11/1/2017 12:26 11/1/2017 13:47   Creatinine Latest Ref Range: 0.52 - 1.04 mg/dL 1.51 (H) 1.46 (H) 1.58 (H) 1.18 (H) 1.31 (H) 1.40 (H) 1.50 (H) 1.44 (H) 1.6 (H) 1.56 (H)       Results  for GÓMEZANALI (MRN [id number]) as of 11/1/2017 15:36   Ref. Range 9/13/2017 15:58 11/1/2017 12:26 11/1/2017 13:18 11/1/2017 13:47   Sodium Latest Ref Range: 133 - 144 mmol/L 140   139   Potassium Latest Ref Range: 3.4 - 5.3 mmol/L 3.8   3.7   Chloride Latest Ref Range: 94 - 109 mmol/L 110 (H)   106   Carbon Dioxide Latest Ref Range: 20 - 32 mmol/L 24   25   Urea Nitrogen Latest Ref Range: 7 - 30 mg/dL 20   26   Creatinine Latest Ref Range: 0.52 - 1.04 mg/dL 1.44 (H) 1.6 (H)  1.56 (H)   GFR Estimate Latest Ref Range: >60 mL/min/1.7m2 38 (L) 33 (L)  34 (L)   GFR Estimate If Black Latest Ref Range: >60 mL/min/1.7m2 45 (L) 40 (L)  41 (L)   Calcium Latest Ref Range: 8.5 - 10.1 mg/dL 8.4 (L)   9.0   Anion Gap Latest Ref Range: 3 - 14 mmol/L 6   9   Albumin Latest Ref Range: 3.4 - 5.0 g/dL 3.8   4.1   Protein Total Latest Ref Range: 6.8 - 8.8 g/dL 7.1   7.2   Bilirubin Total Latest Ref Range: 0.2 - 1.3 mg/dL 0.4   0.6   Alkaline Phosphatase Latest Ref Range: 40 - 150 U/L 62   43   ALT Latest Ref Range: 0 - 50 U/L 39   23   AST Latest Ref Range: 0 - 45 U/L 36   19   N-Terminal Pro Bnp Latest Ref Range: 0 - 125 pg/mL 2161 (H)   648 (H)   Glucose Latest Ref Range: 70 - 99 mg/dL 98   88   WBC Latest Ref Range: 4.0 - 11.0 10e9/L 4.3   5.5   Hemoglobin Latest Ref Range: 11.7 - 15.7 g/dL 12.9   14.3   Hematocrit Latest Ref Range: 35.0 - 47.0 % 39.8   44.3   Platelet Count Latest Ref Range: 150 - 450 10e9/L 209   195   RBC Count Latest Ref Range: 3.8 - 5.2 10e12/L 4.28   4.80   MCV Latest Ref Range: 78 - 100 fl 93   92   MCH Latest Ref Range: 26.5 - 33.0 pg 30.1   29.8   MCHC Latest Ref Range: 31.5 - 36.5 g/dL 32.4   32.3   RDW Latest Ref Range: 10.0 - 15.0 % 13.3   13.0   MR CARDIAC W/O CONTRAST Unknown   Rpt        Imaging     Scan Date:   2017-11-01 12:52:25                                     Signed by Mellissa Flores ([id number]) 2017-Nov-01 14:22:04.     SUMMARY    ==========================================================================================================     Clinical history: Pulmonary hypertension  Comparison CMR:2016     1. The LV is normal in cavity size and wall thickness. The global systolic function is normal. The LVEF is  66%. There is systolic flattening of the interventricular septum consistent with RV pressure overload.      2. The RV is normal in cavity size.  There is moderate right ventricular hypertrophy. The global systolic  function is low normal. The RVEF is 52%.      3. There is mild dilatation of the main pulmonary artery.     4. There is no significant valvular disease.      5. Late gadolinium enhancement imaging was not performed.      CONCLUSIONS:  Normal left ventricular systolic function.  The right ventricle is normal in size with  moderate right ventricular hypertrophy. Right ventriculast systolic function is low-normal with an RVEF of  52%.    In direct comparison to previous study dated 2016, there has been a mild interval improvement in right  ventricular systolic function.    Name: ANALI GÓMEZ  MRN: [id number]  : 1960  Study Date: 2016 02:12 PM  Age: 56 yrs  Gender: Female  [place] [facility] [place] Study: , Other secondary pulmonary hypertension  Ordering Physician: RENETTA LOZA  Referring Physician: RENETTA LOZA  Performed By: Jose Pastrana RDCS     BSA: 1.7 m2  Height: 62 in  Weight: 153 lb  BP: 117/73 mmHg  ______________________________________________________________________________        Procedure  Echocardiogram with two-dimensional, color and spectral Doppler performed.  ______________________________________________________________________________     Interpretation Summary  Paradoxical septal motion consistent with right ventricular pressure and  volume overload is present.  Mild to moderate right ventricular dilation is present.  Global right ventricular  function is mildly reduced.  Moderate tricuspid insufficiency is present.  Right ventricular systolic pressure is 95mmHg above the right atrial  pressure.  The inferior vena cava is normal.  No pericardial effusion is present.  ______________________________________________________________________________           Left Ventricle  Global and regional left ventricular function is normal with an EF of 60-65%.  Left ventricular wall thickness is normal. Left ventricular size is normal.  Left ventricular diastolic function cannot be assessed. Paradoxical septal  motion consistent with right ventricular pressure and volume overload is  present.     Right Ventricle  Mild to moderate right ventricular dilation is present. Global right  ventricular function is mildly reduced.  Atria  Mild left atrial enlargement is present. Moderate right atrial enlargement is  present.     Mitral Valve  The mitral valve is normal.     Aortic Valve  Aortic valve is normal in structure and function.     Tricuspid Valve  Moderate tricuspid insufficiency is present. Right ventricular systolic  pressure is 95mmHg above the right atrial pressure.     Pulmonic Valve  The pulmonic valve is normal.     Vessels  The aorta root is normal. The inferior vena cava is normal.  Pericardium  No pericardial effusion is present.     ______________________________________________________________________________  MMode/2D Measurements & Calculations  IVSd: 0.52 cm  LVIDd: 4.2 cm  LVIDs: 2.9 cm  LVPWd: 0.64 cm  FS: 29.6 %  EDV(Teich): 76.7 ml  ESV(Teich): 33.0 ml  LV mass(C)d: 66.4 grams  LA dimension: 3.3 cm  asc Aorta Diam: 2.9 cm  LVOT diam: 2.1 cm  LVOT area: 3.4 cm2  LA Volume (BP): 62.0 ml     LA Volume Index (BP): 36.3 ml/m2           Doppler Measurements & Calculations  MV E max dionne: 56.0 cm/sec  MV A max dionne: 34.9 cm/sec  MV E/A: 1.6  MV dec time: 0.24 sec  TR max dionne: 460.2 cm/sec  TR max P.8 mmHg          HR: 51  RA:    RV: 100/10  PA:  100/40 (57)   PCWP: 15   PA sat: 55.6%   Measured VO2: 232 mL/(kg*min)  Dipti CO/CI: 3.9/2.6   TD CO/CI: 3.2/2.1   Measured Dipti CO/CI: 3.4/2.3  SV: 67  PVR: 12.4   PAC: 1.1  Assessment/Plan     Return to clinic in 3-4 months    Have echocardiogram on day of return to clinic    Consider PET to assess sarcoid activity    Discussed intravenous prostacyclin but will hold off for now    Laboratories with return: CRPinfl, IL-6 in addition to regular laboratories

## 2017-11-02 NOTE — TELEPHONE ENCOUNTER
Prior Authorization Approval    Authorization Effective Date: 6/16/2017  Authorization Expiration Date: 9/14/2018  Medication: Orenitram 5 MG - Approved  Approved Dose/Quantity: N/A  Reference #: [id number]   Insurance Company: [employer] Part D - Phone [phone] Fax [phone]  Expected CoPay: N/A     CoPay Card Available:      Foundation Assistance Needed:    Which Pharmacy is filling the prescription (Not needed for infusion/clinic administered): [facility] [address]  Pharmacy Notified: Yes  Patient Notified: Yes

## 2017-11-08 DIAGNOSIS — I27.20 PULMONARY HYPERTENSION (H): ICD-10-CM

## 2017-11-13 RX ORDER — TADALAFIL 20 MG/1
40 TABLET ORAL DAILY
Qty: 60 TABLET | Refills: 11 | Status: SHIPPED | OUTPATIENT
Start: 2017-11-13 | End: 2018-12-24

## 2017-11-16 ENCOUNTER — OFFICE VISIT (OUTPATIENT)
Dept: PULMONOLOGY | Facility: CLINIC | Age: 57
End: 2017-11-16
Attending: INTERNAL MEDICINE
Payer: MEDICARE

## 2017-11-16 VITALS
RESPIRATION RATE: 16 BRPM | WEIGHT: 117.5 LBS | DIASTOLIC BLOOD PRESSURE: 64 MMHG | BODY MASS INDEX: 20.81 KG/M2 | OXYGEN SATURATION: 94 % | HEART RATE: 70 BPM | SYSTOLIC BLOOD PRESSURE: 100 MMHG

## 2017-11-16 DIAGNOSIS — D86.9 SARCOIDOSIS: ICD-10-CM

## 2017-11-16 DIAGNOSIS — I27.20 PULMONARY HYPERTENSION (H): ICD-10-CM

## 2017-11-16 DIAGNOSIS — Z23 ENCOUNTER FOR IMMUNIZATION: ICD-10-CM

## 2017-11-16 DIAGNOSIS — J84.9 ILD (INTERSTITIAL LUNG DISEASE) (H): ICD-10-CM

## 2017-11-16 DIAGNOSIS — D86.9 SARCOIDOSIS: Primary | ICD-10-CM

## 2017-11-16 LAB
ALBUMIN SERPL-MCNC: 4.2 G/DL (ref 3.4–5)
ALP SERPL-CCNC: 45 U/L (ref 40–150)
ALT SERPL W P-5'-P-CCNC: 27 U/L (ref 0–50)
ANION GAP SERPL CALCULATED.3IONS-SCNC: 10 MMOL/L (ref 3–14)
AST SERPL W P-5'-P-CCNC: 20 U/L (ref 0–45)
BASOPHILS # BLD AUTO: 0.1 10E9/L (ref 0–0.2)
BASOPHILS NFR BLD AUTO: 0.9 %
BILIRUB SERPL-MCNC: 0.6 MG/DL (ref 0.2–1.3)
BUN SERPL-MCNC: 22 MG/DL (ref 7–30)
CALCIUM SERPL-MCNC: 9.5 MG/DL (ref 8.5–10.1)
CHLORIDE SERPL-SCNC: 105 MMOL/L (ref 94–109)
CO2 SERPL-SCNC: 24 MMOL/L (ref 20–32)
CREAT SERPL-MCNC: 1.59 MG/DL (ref 0.52–1.04)
CRP SERPL-MCNC: <2.9 MG/L (ref 0–8)
DIFFERENTIAL METHOD BLD: NORMAL
EOSINOPHIL # BLD AUTO: 0.1 10E9/L (ref 0–0.7)
EOSINOPHIL NFR BLD AUTO: 1.3 %
ERYTHROCYTE [DISTWIDTH] IN BLOOD BY AUTOMATED COUNT: 13.2 % (ref 10–15)
GFR SERPL CREATININE-BSD FRML MDRD: 34 ML/MIN/1.7M2
GLUCOSE SERPL-MCNC: 90 MG/DL (ref 70–99)
HCT VFR BLD AUTO: 41.8 % (ref 35–47)
HGB BLD-MCNC: 13.8 G/DL (ref 11.7–15.7)
IMM GRANULOCYTES # BLD: 0 10E9/L (ref 0–0.4)
IMM GRANULOCYTES NFR BLD: 0.2 %
LYMPHOCYTES # BLD AUTO: 1.2 10E9/L (ref 0.8–5.3)
LYMPHOCYTES NFR BLD AUTO: 22 %
MCH RBC QN AUTO: 30.5 PG (ref 26.5–33)
MCHC RBC AUTO-ENTMCNC: 33 G/DL (ref 31.5–36.5)
MCV RBC AUTO: 92 FL (ref 78–100)
MONOCYTES # BLD AUTO: 0.5 10E9/L (ref 0–1.3)
MONOCYTES NFR BLD AUTO: 8.2 %
NEUTROPHILS # BLD AUTO: 3.7 10E9/L (ref 1.6–8.3)
NEUTROPHILS NFR BLD AUTO: 67.4 %
NRBC # BLD AUTO: 0 10*3/UL
NRBC BLD AUTO-RTO: 0 /100
PLATELET # BLD AUTO: 193 10E9/L (ref 150–450)
POTASSIUM SERPL-SCNC: 3.8 MMOL/L (ref 3.4–5.3)
PROT SERPL-MCNC: 7.4 G/DL (ref 6.8–8.8)
RBC # BLD AUTO: 4.53 10E12/L (ref 3.8–5.2)
SODIUM SERPL-SCNC: 139 MMOL/L (ref 133–144)
WBC # BLD AUTO: 5.5 10E9/L (ref 4–11)

## 2017-11-16 PROCEDURE — 85025 COMPLETE CBC W/AUTO DIFF WBC: CPT | Performed by: INTERNAL MEDICINE

## 2017-11-16 PROCEDURE — G0009 ADMIN PNEUMOCOCCAL VACCINE: HCPCS | Mod: ZF

## 2017-11-16 PROCEDURE — G0008 ADMIN INFLUENZA VIRUS VAC: HCPCS | Mod: ZF

## 2017-11-16 PROCEDURE — 36415 COLL VENOUS BLD VENIPUNCTURE: CPT | Performed by: INTERNAL MEDICINE

## 2017-11-16 PROCEDURE — 80053 COMPREHEN METABOLIC PANEL: CPT | Performed by: INTERNAL MEDICINE

## 2017-11-16 PROCEDURE — 25000128 H RX IP 250 OP 636: Mod: ZF | Performed by: INTERNAL MEDICINE

## 2017-11-16 PROCEDURE — 90686 IIV4 VACC NO PRSV 0.5 ML IM: CPT | Mod: ZF | Performed by: INTERNAL MEDICINE

## 2017-11-16 PROCEDURE — 96372 THER/PROPH/DIAG INJ SC/IM: CPT | Mod: ZF

## 2017-11-16 PROCEDURE — 90670 PCV13 VACCINE IM: CPT | Mod: ZF | Performed by: INTERNAL MEDICINE

## 2017-11-16 RX ADMIN — PNEUMOCOCCAL 13-VALENT CONJUGATE VACCINE 0.5 ML: 2.2; 2.2; 2.2; 2.2; 2.2; 4.4; 2.2; 2.2; 2.2; 2.2; 2.2; 2.2; 2.2 INJECTION, SUSPENSION INTRAMUSCULAR at 13:19

## 2017-11-16 RX ADMIN — INFLUENZA A VIRUS A/MICHIGAN/45/2015 X-275 (H1N1) ANTIGEN (FORMALDEHYDE INACTIVATED), INFLUENZA A VIRUS A/HONG KONG/4801/2014 X-263B (H3N2) ANTIGEN (FORMALDEHYDE INACTIVATED), INFLUENZA B VIRUS B/PHUKET/3073/2013 ANTIGEN (FORMALDEHYDE INACTIVATED), AND INFLUENZA B VIRUS B/BRISBANE/60/2008 ANTIGEN (FORMALDEHYDE INACTIVATED) 0.5 ML: 15; 15; 15; 15 INJECTION, SUSPENSION INTRAMUSCULAR at 13:19

## 2017-11-16 ASSESSMENT — PAIN SCALES - GENERAL: PAINLEVEL: NO PAIN (0)

## 2017-11-16 NOTE — LETTER
11/16/2017       RE: Judith Nelson  [address] [facility] [address]     Dear Colleague,    Thank you for referring your patient, Judith Nelson, to the [facility] Please see a copy of my visit note below.    Reason for Visit  Judith Nelson is a 57 year old year old female who is being seen for Sarcoidosis  Pulmonary HPI    The patient was seen and examined by aGel Flaherty     Ms. Nelson comes in for followup.  She was last seen in the Pulmonary Clinic on 05/11/2017.  At that time, for her pulmonary hypertension with tadalafil, treprostinil and endothelial receptor antagonist, her PA pressures were 65.  IV therapy was being contemplated.  Since then, she has had a repeat right heart catheterization done where some improvement has been seen in the PA pressure with a mean PA pressure of 57 (100/40) and pulmonary capillary wedge pressure of 15.  She is currently on oral agents for her pulmonary hypertension.  Her current dose of Imuran for sarcoidosis is 50 mg per day.      Overall, she tells me that she is doing quite well.  She participated in pulmonary rehab and thinks that she was able to lose quite a bit of weight and her functional status improved.  She is not doing anything to continue the program at home.  She denies any cough or sputum production.  She denies any other pulmonary symptoms.  She has dyspnea which is present more with activity and is quite a bit better than how it has been in the past.           Current Outpatient Prescriptions   Medication     BUSPIRONE HCL PO     tadalafil, PAH, (ADCIRCA) 20 MG TABS     treprostinil diolamine ER (ORENITRAM) 0.125 MG CR tablet     potassium chloride (K-TAB,KLOR-CON) 10 MEQ tablet     azaTHIOprine (IMURAN) 50 MG tablet     Macitentan (OPSUMIT PO)     furosemide (LASIX) 40 MG tablet     carvedilol (COREG) 12.5 MG tablet     denosumab (PROLIA) 60 MG/ML  SOLN     calcitRIOL (ROCALTROL) 0.25 MCG capsule     SODIUM BICARBONATE PO     Multiple Vitamins-Minerals (EYE VITAMINS PO)     ORDER FOR DME     TOPIRAMATE PO     TEMAZEPAM PO     omeprazole (PRILOSEC) 20 MG capsule     LORazepam (ATIVAN) 0.5 MG tablet     buPROPion (WELLBUTRIN SR) 200 MG 12 hr tablet     ORDER FOR DME     acetaminophen (TYLENOL) 500 MG tablet     Elastic Bandages & Supports (T.E.D. BELOW KNEE/M-REGULAR) MISC     simvastatin (ZOCOR) 10 MG tablet     Lactobacillus (ACIDOPHILUS) CAPS     No current facility-administered medications for this visit.      Allergies   Allergen Reactions     Latex Rash     From gloves     Past Medical History:   Diagnosis Date     Anxiety      CKD (chronic kidney disease), stage III     biopsy suspicious for renal sarcoidosis      Hyperprolactinemia (H)      Lower extremity edema     chronic w/ chronic right ankle ulcer     Lumbar compression fracture (H)      Major depressive disorder     with psychotic features, followed by Dr. Rosales at [facility] in [facility]     patient is post menopausal     MGUS (monoclonal gammopathy of unknown significance)     mild, followed by Dr. Long     Osteoporosis      Other seborrheic keratosis      Protrusio acetabuli      Pulmonary hypertension      Stress-induced cardiomyopathy        Past Surgical History:   Procedure Laterality Date     Csection,  X 2       CYSTOSCOPY, RETROGRADES, INSERT STENT URETER(S), COMBINED  10/2/2013    Procedure: COMBINED CYSTOSCOPY, RETROGRADES, INSERT STENT URETER(S);  Left Retrograde Ureteropyelogram and Ureteral Stent Placement;  Surgeon: SONIA Martinez MD;  Location: [facility] OR     ENDOSCOPIC RETROGRADE CHOLANGIOPANCREATOGRAM  2012    Procedure:ENDOSCOPIC RETROGRADE CHOLANGIOPANCREATOGRAM; ERCP biliary sphincterotomy and stone removal; Surgeon:MARGIE RUGGIEROIQ; Location:[facility] OR     GASTROSCOPY,FL  6/10    Erythematous duodenopathy     LAPAROSCOPIC CHOLECYSTECTOMY WITH  CHOLANGIOGRAMS  1/21/2012    Procedure:LAPAROSCOPIC CHOLECYSTECTOMY WITH CHOLANGIOGRAMS; Surgeon:BRIANA PADILLA; Location:[facility] OR     ORTHOPEDIC SURGERY  10/1/2010    Right hip replacement       Social History     Social History     Marital status:      Spouse name: N/A     Number of children: 3     Years of education: 12     Occupational History      Unemployed     Social History Main Topics     Smoking status: Never Smoker     Smokeless tobacco: Never Used     Alcohol use No      Comment: Occ.     Drug use: No     Sexual activity: No     Other Topics Concern     Parent/Sibling W/ Cabg, Mi Or Angioplasty Before 65f 55m? No     Social History Narrative       ROS Pulmonary    A complete ROS was otherwise negative except as noted in the HPI.  /64 (BP Location: Right arm, Patient Position: Chair, Cuff Size: Adult Regular)  Pulse 70  Resp 16  Wt 53.3 kg (117 lb 8 oz)  SpO2 94%  BMI 20.81 kg/m2  Exam:   GENERAL APPEARANCE: Alert, and in no apparent distress.  EYES: PERRL, EOMI  HENT: Nasal mucosa with no edema and no hyperemia. No nasal polyps.  MOUTH: Oral mucosa is moist, without any lesions, no tonsillar enlargement, no oropharyngeal exudate.  NECK: supple, no masses, no thyromegaly.  LYMPHATICS: No significant axillary, cervical, or supraclavicular nodes.  RESP: normal percussion, good air flow throughout.  No crackles. No rhonchi. No wheezes.  CV: Normal S1, S2, regular rhythm, normal rate. No murmur.  No rub. No gallop. No LE edema.   ABDOMEN:  Bowel sounds normal, soft, nontender, no HSM or masses.   MS: extremities normal. No clubbing. No cyanosis.    Results:  PFTs done today were reviewed by me with the patient.  Her FVC is 2.69 liters which is 92% of predicted.  FEV1 is 1.93 liters which is 82% of predicted.  The ratio is 72.  DLCO not corrected for hemoglobin is 73.  My interpretation is that the spirometry is in the normal range with mildly decreased uncorrected diffusion capacity.   However, compared to May, the FVC has gone down by 160 mL and the FEV1 has also decreased by 120 mL.  The diffusion capacity is unchanged.       Assessment and plan:   Ms. Nelson is a very pleasant 57-year-old female with a diagnosis of sarcoidosis based on granulomatous inflammation found on renal biopsy and hypercalcemia with minimal pulmonary disease.  She is currently on Imuran 50 mg once a day for the renal involvement.  I believe that the pulmonary hypertension could be related to her sarcoidosis.    1.  Pulmonary sarcoidosis with normal spirometry.  The DLCO decrease could be related to pulmonary hypertension.  I do not think that she needs any specific treatment for pulmonary disease in addition to what she is getting for renal involvement.   2.  Extrapulmonary sarcoidosis with possible sarcoidosis pulmonary hypertension and renal involvement.  She also had hypercalcemia.  She follows with an ophthalmologist outside the [facility] system.  We will check CBC and LFTs today.  We will also check calcium levels today.  She will schedule with Ophthalmology soon.   3.  Osteoporosis.  She follows with Dr. Salas which has been scheduled for February.        Again, thank you for allowing me to participate in the care of your patient.      Sincerely,    Gael Flaherty MD     cc:  Willi Garcia MD, Nephrology

## 2017-11-16 NOTE — MR AVS SNAPSHOT
After Visit Summary   11/16/2017    Judith Nelson    MRN: [id number]           Patient Information     Date Of Birth          1960        Visit Information        Provider Department      11/16/2017 11:30 AM Gael Flaherty MD [facility]        Today's Diagnoses     Sarcoidosis    -  1    Pulmonary hypertension        Encounter for immunization           Follow-ups after your visit        Additional Services     PULMONARY REHAB REFERRAL                 Follow-up notes from your care team     Return in about 4 weeks (around 12/14/2017).      Your next 10 appointments already scheduled     Feb 09, 2018 12:30 PM CST   (Arrive by 12:15 PM)   RETURN ENDOCRINE with Jenna Salas MD   [facility] Endocrinology ([facility])    [address] [place] [id number]   [phone]            Mar 13, 2018  2:30 PM CDT   FULL PULMONARY FUNCTION with UC PFL D   [facility] Pulmonary Function Testing ([facility])    [address] [place] [address]   [phone]            Mar 13, 2018  3:30 PM CDT   Six Minute Walk with UC PFL 6 MINUTE WALK 1   [facility] Pulmonary Function Testing ([facility])    [address] [place] [address]   [phone]            Mar 13, 2018  4:00 PM CDT   (Arrive by 3:45 PM)   Return Interstitial Lung with Gael Flaherty MD   [facility] ([facility])    [address] [place] [address]   [phone]            Mar 21, 2018 12:30 PM CDT   Lab with UC LAB    [facility] ([facility])    [address] [place] [address]   [phone]            Mar 21, 2018  1:00 PM CDT   Ech Complete with UCECHCR2   [facility] Echo ([facility])    [address] [place] [address]  Street [address]  [address] [place] [id number] [phone]           1. Please bring or wear a comfortable two-piece outfit. 2. You may eat, drink and take your normal medicines. 3. For any questions that cannot be answered, please contact the ordering physician            Mar 21, 2018  2:00 PM CDT   (Arrive by 1:45 PM)   RETURN PRIMARY PULMONARY with Norm Franco MD   [facility] ([facility])    [address] [id number] [phone]              Future tests that were ordered for you today     Open Future Orders        Priority Expected Expires Ordered    General PFT Lab (Please always keep checked) Routine  11/16/2018 11/16/2017    Pulmonary Function Test Routine  2/2/2027 11/16/2017    6 minute walk test Routine  11/16/2018 11/16/2017    PULMONARY REHAB REFERRAL Routine  11/16/2018 11/16/2017            Who to contact     If you have questions or need follow up information about today's clinic visit or your schedule please contact [facility] directly at [phone].  Normal or non-critical lab and imaging results will be communicated to you by BoardProspectshart, letter or phone within 4 business days after the clinic has received the results. If you do not hear from us within 7 days, please contact the clinic through SureSpeakt or phone. If you have a critical or abnormal lab result, we will notify you by phone as soon as possible.  Submit refill requests through [employer] or call your pharmacy and they will forward the refill request to us. Please allow 3 business days for your refill to be completed.          Additional Information About Your Visit        BoardProspectshart Information     [employer] gives you secure access to your electronic health record. If you see a primary care provider, you can also send messages to your care team and make appointments. If you have questions, please call your primary care clinic.  If  you do not have a primary care provider, please call [phone] and they will assist you.        Care EveryWhere ID     This is your Care EveryWhere ID. This could be used by other organizations to access your [place] medical records  [id number]        Your Vitals Were     Pulse Respirations Pulse Oximetry BMI (Body Mass Index)          70 16 94% 20.81 kg/m2         Blood Pressure from Last 3 Encounters:   11/16/17 100/64   11/01/17 103/68   09/13/17 120/77    Weight from Last 3 Encounters:   11/16/17 53.3 kg (117 lb 8 oz)   11/01/17 54.4 kg (120 lb)   09/13/17 55.8 kg (123 lb 1.6 oz)                 Today's Medication Changes          These changes are accurate as of: 11/16/17 12:46 PM.  If you have any questions, ask your nurse or doctor.               These medicines have changed or have updated prescriptions.        Dose/Directions    LORazepam 0.5 MG tablet   Commonly known as:  ATIVAN   This may have changed:    - how much to take  - additional instructions   Used for:  Major depressive disorder, single episode, severe with psychotic features (H)        Take 0.5 mg (1 tab) every morning and 1 mg (2 tabs) every night at bedtime.   Quantity:  90 tablet   Refills:  1                Primary Care Provider Office Phone # Fax #    Anupama Babcock [phone]       [place] [address]        Equal Access to Services     SHADI CHACKO AH: Hadii pablo rosaso Somarsha, waaxda luqadaha, qaybta kaalmada kayla, alysha abdi. So [facility] [phone].    ATENCIÓN: Si habla español, tiene a dhillon disposición servicios gratuitos de asistencia lingüística. Llame al [phone].    We comply with applicable federal civil rights laws and [place] laws. We do not discriminate on the basis of race, color, national origin, age, disability, sex, sexual orientation, or gender identity.            Thank you!     Thank you for choosing [facility]  for your care. Our goal is always to provide you with excellent care. Hearing back from our patients is one way we can continue to improve our services. Please take a few minutes to complete the written survey that you may receive in the mail after your visit with us. Thank you!             Your Updated Medication List - Protect others around you: Learn how to safely use, store and throw away your medicines at www.disposemymeds.org.          This list is accurate as of: 11/16/17 12:46 PM.  Always use your most recent med list.                   Brand Name Dispense Instructions for use Diagnosis    acetaminophen 500 MG tablet    TYLENOL    45 tablet    Take 1-2 tablets (500-1,000 mg) by mouth every 6 hours as needed for pain (Take as needed for pain.  Do not exceed 4 grams (8 tablets) in a day)    Pulmonary hypertension       acidophilus Caps     30 capsule    Take 1 tablet by mouth as needed    Sarcoidosis       azaTHIOprine 50 MG tablet    IMURAN    90 tablet    Take 1 tablet (50 mg) by mouth daily    Sarcoidosis       buPROPion 200 MG 12 hr tablet    WELLBUTRIN SR    30 tablet    Take 1 tablet (200 mg) by mouth every morning    Major depressive disorder, single episode, moderate (H)       BUSPIRONE HCL PO      Take 15 mg by mouth 2 times daily        calcitRIOL 0.25 MCG capsule    ROCALTROL    30 capsule    Take 1 capsule (0.25 mcg) by mouth daily        carvedilol 12.5 MG tablet    COREG    135 tablet    Take 0.5 tablets (6.25 mg) by mouth 2 times daily (with meals)    Chronic systolic heart failure (H)       denosumab 60 MG/ML Soln injection    PROLIA    1 mL    Inject 1 mL (60 mg) Subcutaneous every 6 months    Osteoporosis, postmenopausal       EYE VITAMINS PO      Take 1 tablet by mouth daily    Sarcoidosis       furosemide 40 MG tablet    LASIX    90 tablet    TAKE 1 TABLET BY MOUTH DAILY    Pulmonary hypertension, Stress-induced cardiomyopathy       LORazepam 0.5  MG tablet    ATIVAN    90 tablet    Take 0.5 mg (1 tab) every morning and 1 mg (2 tabs) every night at bedtime.    Major depressive disorder, single episode, severe with psychotic features (H)       omeprazole 20 MG CR capsule    priLOSEC    180 capsule    Take 1 capsule (20 mg) by mouth 2 times daily (before meals)    Dyspepsia       OPSUMIT PO      Take by mouth daily Pt doesn't know dosage,        order for DME     1 Device    Equipment being ordered: SHOWER CHAIR  FROM [facility]    Tremor, Generalized muscle weakness, Sarcoidosis       order for DME     2 Package    Equipment being ordered: Compression stockings; 2 pair; custom measured. Med compression 20-30 mmhg    Pulmonary hypertension, Edema       potassium chloride 10 MEQ tablet    K-TAB,KLOR-CON    90 tablet    Take 1 tablet (10 mEq) by mouth daily    Heart failure (H)       simvastatin 10 MG tablet    ZOCOR    90 tablet    Take 1 tablet by mouth At Bedtime.    Hypercholesterolemia       SODIUM BICARBONATE PO      Take by mouth 2 times daily    Pulmonary arterial hypertension       T.E.D. BELOW KNEE/M-REGULAR Misc     2 each    1 each daily    Lower extremity edema       tadalafil (PAH) 20 MG Tabs    ADCIRCA    60 tablet    Take 2 tablets (40 mg) by mouth daily    Pulmonary hypertension       TEMAZEPAM PO      Take by mouth At Bedtime        TOPIRAMATE PO      Take 100 mg by mouth At Bedtime        treprostinil diolamine ER 0.125 MG CR tablet    ORENITRAM    90 tablet    Take 4 tablets (0.5 mg) by mouth 3 times daily (with meals) 4.25mg tid    Pulmonary arterial hypertension

## 2017-11-16 NOTE — PROGRESS NOTES
Reason for Visit  Judith Nelson is a 57 year old year old female who is being seen for Sarcoidosis  Pulmonary HPI    The patient was seen and examined by Gael Flaherty     Ms. Nelson comes in for followup.  She was last seen in the Pulmonary Clinic on 05/11/2017.  At that time, for her pulmonary hypertension with tadalafil, treprostinil and endothelial receptor antagonist, her PA pressures were 65.  IV therapy was being contemplated.  Since then, she has had a repeat right heart catheterization done where some improvement has been seen in the PA pressure with a mean PA pressure of 57 (100/40) and pulmonary capillary wedge pressure of 15.  She is currently on oral agents for her pulmonary hypertension.  Her current dose of Imuran for sarcoidosis is 50 mg per day.      Overall, she tells me that she is doing quite well.  She participated in pulmonary rehab and thinks that she was able to lose quite a bit of weight and her functional status improved.  She is not doing anything to continue the program at home.  She denies any cough or sputum production.  She denies any other pulmonary symptoms.  She has dyspnea which is present more with activity and is quite a bit better than how it has been in the past.           Current Outpatient Prescriptions   Medication     BUSPIRONE HCL PO     tadalafil, PAH, (ADCIRCA) 20 MG TABS     treprostinil diolamine ER (ORENITRAM) 0.125 MG CR tablet     potassium chloride (K-TAB,KLOR-CON) 10 MEQ tablet     azaTHIOprine (IMURAN) 50 MG tablet     Macitentan (OPSUMIT PO)     furosemide (LASIX) 40 MG tablet     carvedilol (COREG) 12.5 MG tablet     denosumab (PROLIA) 60 MG/ML SOLN     calcitRIOL (ROCALTROL) 0.25 MCG capsule     SODIUM BICARBONATE PO     Multiple Vitamins-Minerals (EYE VITAMINS PO)     ORDER FOR DME     TOPIRAMATE PO     TEMAZEPAM PO     omeprazole (PRILOSEC) 20 MG capsule     LORazepam (ATIVAN) 0.5 MG tablet     buPROPion (WELLBUTRIN SR) 200 MG 12 hr tablet     ORDER  FOR DME     acetaminophen (TYLENOL) 500 MG tablet     Elastic Bandages & Supports (T.E.D. BELOW KNEE/M-REGULAR) MISC     simvastatin (ZOCOR) 10 MG tablet     Lactobacillus (ACIDOPHILUS) CAPS     No current facility-administered medications for this visit.      Allergies   Allergen Reactions     Latex Rash     From gloves     Past Medical History:   Diagnosis Date     Anxiety      CKD (chronic kidney disease), stage III     biopsy suspicious for renal sarcoidosis      Hyperprolactinemia (H)      Lower extremity edema     chronic w/ chronic right ankle ulcer     Lumbar compression fracture (H)      Major depressive disorder     with psychotic features, followed by Dr. Rosales at [facility] in [place]     Menopause 2012    patient is post menopausal     MGUS (monoclonal gammopathy of unknown significance)     mild, followed by Dr. Long     Osteoporosis      Other seborrheic keratosis      Protrusio acetabuli      Pulmonary hypertension      Stress-induced cardiomyopathy        Past Surgical History:   Procedure Laterality Date     Csection,  X 2       CYSTOSCOPY, RETROGRADES, INSERT STENT URETER(S), COMBINED  10/2/2013    Procedure: COMBINED CYSTOSCOPY, RETROGRADES, INSERT STENT URETER(S);  Left Retrograde Ureteropyelogram and Ureteral Stent Placement;  Surgeon: SONIA Martinez MD;  Location: [facility] OR     ENDOSCOPIC RETROGRADE CHOLANGIOPANCREATOGRAM  2012    Procedure:ENDOSCOPIC RETROGRADE CHOLANGIOPANCREATOGRAM; ERCP biliary sphincterotomy and stone removal; Surgeon:MARGIE RUGGIEROIQ; Location:[facility] OR     GASTROSCOPY,FL  6/10    Erythematous duodenopathy     LAPAROSCOPIC CHOLECYSTECTOMY WITH CHOLANGIOGRAMS  2012    Procedure:LAPAROSCOPIC CHOLECYSTECTOMY WITH CHOLANGIOGRAMS; Surgeon:BRIANA PADILLA; Location:[facility] OR     ORTHOPEDIC SURGERY  10/1/2010    Right hip replacement       Social History     Social History     Marital status:      Spouse name: N/A     Number of children: 3      Years of education: 12     Occupational History      Unemployed     Social History Main Topics     Smoking status: Never Smoker     Smokeless tobacco: Never Used     Alcohol use No      Comment: Occ.     Drug use: No     Sexual activity: No     Other Topics Concern     Parent/Sibling W/ Cabg, Mi Or Angioplasty Before 65f 55m? No     Social History Narrative       ROS Pulmonary    A complete ROS was otherwise negative except as noted in the HPI.  /64 (BP Location: Right arm, Patient Position: Chair, Cuff Size: Adult Regular)  Pulse 70  Resp 16  Wt 53.3 kg (117 lb 8 oz)  SpO2 94%  BMI 20.81 kg/m2  Exam:   GENERAL APPEARANCE: Alert, and in no apparent distress.  EYES: PERRL, EOMI  HENT: Nasal mucosa with no edema and no hyperemia. No nasal polyps.  MOUTH: Oral mucosa is moist, without any lesions, no tonsillar enlargement, no oropharyngeal exudate.  NECK: supple, no masses, no thyromegaly.  LYMPHATICS: No significant axillary, cervical, or supraclavicular nodes.  RESP: normal percussion, good air flow throughout.  No crackles. No rhonchi. No wheezes.  CV: Normal S1, S2, regular rhythm, normal rate. No murmur.  No rub. No gallop. No LE edema.   ABDOMEN:  Bowel sounds normal, soft, nontender, no HSM or masses.   MS: extremities normal. No clubbing. No cyanosis.    Results:  PFTs done today were reviewed by me with the patient.  Her FVC is 2.69 liters which is 92% of predicted.  FEV1 is 1.93 liters which is 82% of predicted.  The ratio is 72.  DLCO not corrected for hemoglobin is 73.  My interpretation is that the spirometry is in the normal range with mildly decreased uncorrected diffusion capacity.  However, compared to May, the FVC has gone down by 160 mL and the FEV1 has also decreased by 120 mL.  The diffusion capacity is unchanged.       Assessment and plan:   Ms. Nelson is a very pleasant 57-year-old female with a diagnosis of sarcoidosis based on granulomatous inflammation found on renal biopsy and  hypercalcemia with minimal pulmonary disease.  She is currently on Imuran 50 mg once a day for the renal involvement.  I believe that the pulmonary hypertension could be related to her sarcoidosis.    1.  Pulmonary sarcoidosis with normal spirometry.  The DLCO decrease could be related to pulmonary hypertension.  I do not think that she needs any specific treatment for pulmonary disease in addition to what she is getting for renal involvement.   2.  Extrapulmonary sarcoidosis with possible sarcoidosis pulmonary hypertension and renal involvement.  She also had hypercalcemia.  She follows with an ophthalmologist outside the [place] system.  We will check CBC and LFTs today.  We will also check calcium levels today.  She will schedule with Ophthalmology soon.   3.  Osteoporosis.  She follows with Dr. Salas which has been scheduled for February.       cc:  Willi Garcia MD, Nephrology

## 2017-11-17 LAB — IL6 SERPL-MCNC: <0.7 PG/ML

## 2017-11-21 LAB
DLCOUNC-%PRED-PRE: 73 %
DLCOUNC-PRE: 15.88 ML/MIN/MMHG
DLCOUNC-PRED: 21.58 ML/MIN/MMHG
ERV-%PRED-PRE: 103 %
ERV-PRE: 1.07 L
ERV-PRED: 1.03 L
EXPTIME-PRE: 7.48 SEC
FEF2575-%PRED-PRE: 59 %
FEF2575-PRE: 1.33 L/SEC
FEF2575-PRED: 2.24 L/SEC
FEFMAX-%PRED-PRE: 62 %
FEFMAX-PRE: 3.95 L/SEC
FEFMAX-PRED: 6.31 L/SEC
FEV1-%PRED-PRE: 82 %
FEV1-PRE: 1.93 L
FEV1FEV6-PRE: 72 %
FEV1FEV6-PRED: 81 %
FEV1FVC-PRE: 72 %
FEV1FVC-PRED: 81 %
FEV1SVC-PRE: 72 %
FEV1SVC-PRED: 73 %
FIFMAX-PRE: 4.51 L/SEC
FVC-%PRED-PRE: 92 %
FVC-PRE: 2.69 L
FVC-PRED: 2.9 L
IC-%PRED-PRE: 74 %
IC-PRE: 1.62 L
IC-PRED: 2.18 L
VA-%PRED-PRE: 82 %
VA-PRE: 4.05 L
VC-%PRED-PRE: 83 %
VC-PRE: 2.69 L
VC-PRED: 3.21 L

## 2017-11-22 DIAGNOSIS — I27.20 PULMONARY HYPERTENSION (H): ICD-10-CM

## 2017-11-22 DIAGNOSIS — I51.81 STRESS-INDUCED CARDIOMYOPATHY: ICD-10-CM

## 2017-11-22 RX ORDER — FUROSEMIDE 40 MG
TABLET ORAL
Qty: 90 TABLET | Refills: 3 | COMMUNITY
Start: 2017-11-22 | End: 2018-01-20

## 2017-11-30 ENCOUNTER — CARE COORDINATION (OUTPATIENT)
Dept: CARDIOLOGY | Facility: CLINIC | Age: 57
End: 2017-11-30

## 2017-11-30 NOTE — PROGRESS NOTES
Patient called in with report of 8 pound weight loss overnight. States she has had intermittent diarrhea and one episode of chest pain last week, but otherwise denies new symptoms. Writer advised that she should get in to see her primary today, tomorrow at the latest and that is only if her symptoms don't worsen over the course of the day. Advised that if she has another episode of chest pain that would be considered urgent/emergent and she would need to be seen by a medical professional immediately.     Patient will call her daughter for transportation to her primary. She will call me if she is unable to secure transportation or an appointment.    Patient returns verbal understanding of all teaching and agrees to plan of care. She will call with further questions/concerns.

## 2018-01-20 DIAGNOSIS — I51.81 STRESS-INDUCED CARDIOMYOPATHY: ICD-10-CM

## 2018-01-20 DIAGNOSIS — I27.20 PULMONARY HYPERTENSION (H): ICD-10-CM

## 2018-01-22 RX ORDER — FUROSEMIDE 40 MG
TABLET ORAL
Qty: 90 TABLET | Refills: 3 | Status: ON HOLD | OUTPATIENT
Start: 2018-01-22 | End: 2019-01-25

## 2018-02-06 ENCOUNTER — APPOINTMENT (OUTPATIENT)
Dept: GENERAL RADIOLOGY | Facility: CLINIC | Age: 58
End: 2018-02-06
Attending: FAMILY MEDICINE
Payer: MEDICARE

## 2018-02-06 ENCOUNTER — HOSPITAL ENCOUNTER (EMERGENCY)
Facility: CLINIC | Age: 58
Discharge: HOME | End: 2018-02-06
Attending: FAMILY MEDICINE | Admitting: FAMILY MEDICINE
Payer: MEDICARE

## 2018-02-06 VITALS
WEIGHT: 115 LBS | RESPIRATION RATE: 18 BRPM | OXYGEN SATURATION: 92 % | HEIGHT: 63 IN | HEART RATE: 85 BPM | TEMPERATURE: 98 F | SYSTOLIC BLOOD PRESSURE: 115 MMHG | DIASTOLIC BLOOD PRESSURE: 73 MMHG | BODY MASS INDEX: 20.38 KG/M2

## 2018-02-06 DIAGNOSIS — F41.9 ANXIETY: ICD-10-CM

## 2018-02-06 DIAGNOSIS — R07.9 RIGHT-SIDED CHEST PAIN: ICD-10-CM

## 2018-02-06 LAB
ALBUMIN SERPL-MCNC: 4.1 G/DL (ref 3.4–5)
ALP SERPL-CCNC: 56 U/L (ref 40–150)
ALT SERPL W P-5'-P-CCNC: 17 U/L (ref 0–50)
ANION GAP SERPL CALCULATED.3IONS-SCNC: 10 MMOL/L (ref 3–14)
AST SERPL W P-5'-P-CCNC: 13 U/L (ref 0–45)
BASOPHILS # BLD AUTO: 0 10E9/L (ref 0–0.2)
BASOPHILS NFR BLD AUTO: 0.6 %
BILIRUB SERPL-MCNC: 0.4 MG/DL (ref 0.2–1.3)
BUN SERPL-MCNC: 21 MG/DL (ref 7–30)
CALCIUM SERPL-MCNC: 8.9 MG/DL (ref 8.5–10.1)
CHLORIDE SERPL-SCNC: 109 MMOL/L (ref 94–109)
CO2 SERPL-SCNC: 23 MMOL/L (ref 20–32)
CREAT SERPL-MCNC: 1.55 MG/DL (ref 0.52–1.04)
DIFFERENTIAL METHOD BLD: NORMAL
EOSINOPHIL # BLD AUTO: 0.1 10E9/L (ref 0–0.7)
EOSINOPHIL NFR BLD AUTO: 2.2 %
ERYTHROCYTE [DISTWIDTH] IN BLOOD BY AUTOMATED COUNT: 13 % (ref 10–15)
GFR SERPL CREATININE-BSD FRML MDRD: 34 ML/MIN/1.7M2
GLUCOSE SERPL-MCNC: 96 MG/DL (ref 70–99)
HCT VFR BLD AUTO: 42.2 % (ref 35–47)
HGB BLD-MCNC: 14.6 G/DL (ref 11.7–15.7)
IMM GRANULOCYTES # BLD: 0 10E9/L (ref 0–0.4)
IMM GRANULOCYTES NFR BLD: 0.2 %
LYMPHOCYTES # BLD AUTO: 1.2 10E9/L (ref 0.8–5.3)
LYMPHOCYTES NFR BLD AUTO: 24.1 %
MCH RBC QN AUTO: 31 PG (ref 26.5–33)
MCHC RBC AUTO-ENTMCNC: 34.6 G/DL (ref 31.5–36.5)
MCV RBC AUTO: 90 FL (ref 78–100)
MONOCYTES # BLD AUTO: 0.6 10E9/L (ref 0–1.3)
MONOCYTES NFR BLD AUTO: 12.3 %
NEUTROPHILS # BLD AUTO: 3.1 10E9/L (ref 1.6–8.3)
NEUTROPHILS NFR BLD AUTO: 60.6 %
PLATELET # BLD AUTO: 201 10E9/L (ref 150–450)
POTASSIUM SERPL-SCNC: 3.6 MMOL/L (ref 3.4–5.3)
PROT SERPL-MCNC: 7.4 G/DL (ref 6.8–8.8)
RBC # BLD AUTO: 4.71 10E12/L (ref 3.8–5.2)
SODIUM SERPL-SCNC: 142 MMOL/L (ref 133–144)
TROPONIN I SERPL-MCNC: <0.015 UG/L (ref 0–0.04)
WBC # BLD AUTO: 5 10E9/L (ref 4–11)

## 2018-02-06 PROCEDURE — 71046 X-RAY EXAM CHEST 2 VIEWS: CPT

## 2018-02-06 PROCEDURE — 84484 ASSAY OF TROPONIN QUANT: CPT | Performed by: FAMILY MEDICINE

## 2018-02-06 PROCEDURE — 93005 ELECTROCARDIOGRAM TRACING: CPT

## 2018-02-06 PROCEDURE — 93010 ELECTROCARDIOGRAM REPORT: CPT | Mod: Z6 | Performed by: FAMILY MEDICINE

## 2018-02-06 PROCEDURE — 80053 COMPREHEN METABOLIC PANEL: CPT | Performed by: FAMILY MEDICINE

## 2018-02-06 PROCEDURE — 99285 EMERGENCY DEPT VISIT HI MDM: CPT | Mod: 25 | Performed by: FAMILY MEDICINE

## 2018-02-06 PROCEDURE — 85025 COMPLETE CBC W/AUTO DIFF WBC: CPT | Performed by: FAMILY MEDICINE

## 2018-02-06 PROCEDURE — 99285 EMERGENCY DEPT VISIT HI MDM: CPT | Mod: 25

## 2018-02-06 NOTE — DISCHARGE INSTRUCTIONS
Return to the Emergency Room if the following occurs:     Worsened pain, worsened breathing, fever >101, fainting, or for any concern at anytime.    Or, follow-up with the following provider as we discussed:     Return to your primary doctor as needed or if the pain is recurring.    Medications discussed:    None new.  No changes.    If you received pain-relieving or sedating medication during your time in the ER, avoid alcohol, driving automobiles, or working with machinery.  Also, a responsible adult must stay with you.        Call the Nurse Advice Line at [phone] or [phone] for any concern at anytime.

## 2018-02-06 NOTE — ED NOTES
Pt with onset of chest pain 3.5 hours ago. Pt unable to sleep due to discomfort. Pt denies SOB or cough. Pt states pain is stabbing, currently in right side of chest.

## 2018-02-06 NOTE — ED PROVIDER NOTES
"HPI  Patient is a 57-year-old female presenting with right-sided chest pain.  She has a known history of sarcoid and pulmonary hypertension.  She sees a cardiologist and a pulmonologist at the [facility].  Recent office visits are recorded in her chart.  Her last EKG in our system is documented on 4/18/14.  No prior myocardial infarction.  Medications are reviewed.  Other past medical history reviewed.  Occasional alcohol, none recently.    The patient reports onset of right-sided chest pain starting at about 10:00 PM though she admits that \"the pain may have started earlier.\"  The pain was felt on the right side of her chest.  It was mild at the time of onset.  No radiating symptoms described.  No pleuritic pain described.  She does report the pain to be more obvious when she would lie on her right side.  She had trouble sleeping.  She acknowledges that she was expecting her daughter to be home by 10:00 and she was very frustrated and anxious about where she was.  The patient's discomfort continued throughout the night.  Just prior to arrival her pain resolved.  She currently rates her pain 0/10.  There is been no diaphoresis or nausea associated with the pain.  No shortness of breath.  No palpitations or lightheadedness.  No fainting.  She does report recent bilateral leg discomfort but none currently.  She has had bilateral leg edema secondary to her underlying medical problems but she reports this is improved.  No recent cough.  No fever.  No trauma or injury.  No abdominal pain.  No back pain.  She denies having similar symptoms in the past.    ROS: All other review of systems are negative other than that noted above.     Past Medical History:   Diagnosis Date     Anxiety      CKD (chronic kidney disease), stage III     biopsy suspicious for renal sarcoidosis      Hyperprolactinemia (H)      Lower extremity edema     chronic w/ chronic right ankle ulcer     Lumbar compression fracture (H)      " Major depressive disorder     with psychotic features, followed by Dr. Rosales at [facility] in [place]     Menopause     patient is post menopausal     MGUS (monoclonal gammopathy of unknown significance)     mild, followed by Dr. Long     Osteoporosis      Other seborrheic keratosis      Protrusio acetabuli      Pulmonary hypertension      Sarcoidosis      Stress-induced cardiomyopathy      Past Surgical History:   Procedure Laterality Date     Csection,  X 2       CYSTOSCOPY, RETROGRADES, INSERT STENT URETER(S), COMBINED  10/2/2013    Procedure: COMBINED CYSTOSCOPY, RETROGRADES, INSERT STENT URETER(S);  Left Retrograde Ureteropyelogram and Ureteral Stent Placement;  Surgeon: SONIA Martinez MD;  Location: [facility] OR     ENDOSCOPIC RETROGRADE CHOLANGIOPANCREATOGRAM  2012    Procedure:ENDOSCOPIC RETROGRADE CHOLANGIOPANCREATOGRAM; ERCP biliary sphincterotomy and stone removal; Surgeon:MARGIE RUGGIEROIQ; Location:[facility] OR     GASTROSCOPY,FL  6/10    Erythematous duodenopathy     LAPAROSCOPIC CHOLECYSTECTOMY WITH CHOLANGIOGRAMS  2012    Procedure:LAPAROSCOPIC CHOLECYSTECTOMY WITH CHOLANGIOGRAMS; Surgeon:BRIANA PADILLA; Location:[facility] OR     ORTHOPEDIC SURGERY  10/1/2010    Right hip replacement     Family History   Problem Relation Age of Onset     CANCER Mother       age 62 leukemia     GASTROINTESTINAL DISEASE Mother      diverticulitis     Hypertension Mother      Allergies Mother      Arthritis Mother      Depression Mother      Eye Disorder Mother      OSTEOPOROSIS Mother      C.A.D. Father      MI/CAD      CANCER Father      skin     Blood Disease Father      renal  problem     Hypertension Father      Anesthesia Reaction Father      Cardiovascular Father      Neurologic Disorder Father      Parkinson's disease     C.A.D. Maternal Grandmother       late 82s MI     DIABETES Maternal Grandmother      C.A.D. Maternal Grandfather       mid 80s MI     Alzheimer Disease Paternal  "Grandmother       80s     Alzheimer Disease Paternal Grandfather       80s     Neurologic Disorder Sister      migraines     Allergies Sister      DIABETES Other      AODM     Neurologic Disorder Sister      migraines     Allergies Sister      Anesthesia Reaction Son      Anesthesia Reaction Daughter      Anesthesia Reaction Daughter      DIABETES Sister      hypoglycemia     Social History   Substance Use Topics     Smoking status: Never Smoker     Smokeless tobacco: Never Used     Alcohol use No      Comment: Occ.         PHYSICAL  /66  Pulse 85  Temp 98  F (36.7  C) (Oral)  Resp 18  Ht 1.6 m (5' 3\")  Wt 52.2 kg (115 lb)  SpO2 93%  Breastfeeding? No  BMI 20.37 kg/m2  General: Patient is alert and in moderate distress.  Anxious.  Neurological: Alert.  Moving upper and lower extremities equally, bilaterally.  Head / Neck: Atraumatic.  Ears: Not done.  Eyes: Pupils are equal, round, and reactive.  Normal conjunctiva.  Nose: Midline.  No epistaxis.  Mouth / Throat: No ulcerations or lesions.  Upper pharynx is not erythematous.  Moist.  Respiratory: No respiratory distress. CTA B.  Cardiovascular: Regular rhythm.  Peripheral extremities are warm.  No edema.  No calf tenderness.  Abdomen / Pelvis: Not tender.  No distention.  Soft throughout.  Genitalia: Not done.  Musculoskeletal: No tenderness over major muscles and joints.  Skin: No evidence of rash or trauma.        ED COURSE  0437.  Patient reports onset of pain at about 10:00 PM last evening.  Pain resolved just prior to arrival.  No associated symptoms.  EKG does show mild, nonspecific changes in leads V1-V3 is documented below.  Troponin pending.  Chest x-ray ordered.  Patient is obviously anxious and frustrated about her daughter's absence (she returned to the home at about 3:00 PM).    Labs Ordered and Resulted from Time of ED Arrival Up to the Time of Departure from the ED   COMPREHENSIVE METABOLIC PANEL - Abnormal; Notable for the " following:        Result Value    Creatinine 1.55 (*)     GFR Estimate 34 (*)     GFR Estimate If Black 42 (*)     All other components within normal limits   CBC WITH PLATELETS DIFFERENTIAL   TROPONIN I     IMAGING  Images reviewed by me.  Radiology report also reviewed.  XR Chest 2 Views   Preliminary Result   IMPRESSION: No acute abnormality.        EKG  (0407)   Interpretation performed by me.  Rate: 79     Rhythm: sinus     Axis: nl  Intervals: ND (12-2) 132, QRS (<12) 96, QTc (>5) 441  P wave: nl     QRS complex: nl  ST segment / T-wave: New T-wave inversion in leads V1-V3.  Conclusion: New T-wave inversion in leads V1-V3 as compared to 04/18/14.    0511.  Troponin negative.  Low concern for acute coronary syndrome.  Chest x-ray unremarkable for acute change.  Low concern for pulmonary source of symptoms.  Low concern for aortic dissection.  The patient will be discharged home.  She continues to be without symptoms.  She acknowledges that she has anxiety and stress related to her daughter.  She attributes the symptoms to that problem and actually tells me now that she has had symptoms like this off and on in the past.      IMPRESSION    ICD-10-CM    1. Right-sided chest pain R07.9    2. Anxiety F41.9            Critical Care time:  none                    Markie Giles MD  02/06/18 5421

## 2018-02-06 NOTE — ED AVS SNAPSHOT
[facility] Emergency Department    [address] [address]    Phone:  [phone]    Fax:  [phone]                                       Judith Nelson   MRN: [id number]    Department:  [facility] Emergency Department   Date of Visit:  2/6/2018           Patient Information     Date Of Birth          1960        Your diagnoses for this visit were:     Right-sided chest pain     Anxiety        You were seen by Markie Giles MD.        Discharge Instructions       Return to the Emergency Room if the following occurs:     Worsened pain, worsened breathing, fever >101, fainting, or for any concern at anytime.    Or, follow-up with the following provider as we discussed:     Return to your primary doctor as needed or if the pain is recurring.    Medications discussed:    None new.  No changes.    If you received pain-relieving or sedating medication during your time in the ER, avoid alcohol, driving automobiles, or working with machinery.  Also, a responsible adult must stay with you.        Call the Nurse Advice Line at [phone] or [phone] for any concern at anytime.      Future Appointments        Provider Department Dept Phone Center    2/9/2018 12:30 PM Jenna Salas MD [facility] Endocrinology [phone] [facility]    3/13/2018 2:30 PM Pulmonary Function Lab [facility] Pulmonary Function Testing [phone] [facility]    3/13/2018 3:30 PM PFL 6 minute Walk  Health Pulmonary Function Testing [phone] [facility]    3/13/2018 4:00 PM Gael Flaherty MD [facility] [phone] [facility]    3/21/2018 12:30 PM Lab  Health Lab [phone] [facility]    3/21/2018 1:00 PM [facility] IMAGING CVC ECHO ROOM 2  Health Echo [phone] [facility]    3/21/2018 2:00 PM Norm Franco MD [facility] Heart Care [phone] [facility]      24 Hour Appointment Hotline       To make an appointment at any [facility], call [phone]  ([phone]). If you don't have a family doctor or clinic, we will help you find one. [facility] are conveniently located to serve the needs of you and your family.             Review of your medicines      Our records show that you are taking the medicines listed below. If these are incorrect, please call your family doctor or clinic.        Dose / Directions Last dose taken    acetaminophen 500 MG tablet   Commonly known as:  TYLENOL   Dose:  500-1000 mg   Quantity:  45 tablet        Take 1-2 tablets (500-1,000 mg) by mouth every 6 hours as needed for pain (Take as needed for pain.  Do not exceed 4 grams (8 tablets) in a day)   Refills:  10        acidophilus Caps   Dose:  1 tablet   Quantity:  30 capsule        Take 1 tablet by mouth as needed   Refills:  0        azaTHIOprine 50 MG tablet   Commonly known as:  IMURAN   Dose:  50 mg   Quantity:  90 tablet        Take 1 tablet (50 mg) by mouth daily   Refills:  3        buPROPion 200 MG 12 hr tablet   Commonly known as:  WELLBUTRIN SR   Dose:  200 mg   Quantity:  30 tablet        Take 1 tablet (200 mg) by mouth every morning   Refills:  0        BUSPIRONE HCL PO   Dose:  15 mg        Take 15 mg by mouth 2 times daily   Refills:  0        calcitRIOL 0.25 MCG capsule   Commonly known as:  ROCALTROL   Dose:  0.25 mcg   Quantity:  30 capsule        Take 1 capsule (0.25 mcg) by mouth daily   Refills:  1        carvedilol 12.5 MG tablet   Commonly known as:  COREG   Dose:  6.25 mg   Quantity:  135 tablet        Take 0.5 tablets (6.25 mg) by mouth 2 times daily (with meals)   Refills:  3        denosumab 60 MG/ML Soln injection   Commonly known as:  PROLIA   Dose:  60 mg   Quantity:  1 mL        Inject 1 mL (60 mg) Subcutaneous every 6 months   Refills:  1        EYE VITAMINS PO   Dose:  1 tablet        Take 1 tablet by mouth daily   Refills:  0        furosemide 40 MG tablet   Commonly known as:  LASIX   Quantity:  90 tablet        Take 40 MG M-W-F, 20 MG all  other days.   Refills:  3        LORazepam 0.5 MG tablet   Commonly known as:  ATIVAN   Quantity:  90 tablet        Take 0.5 mg (1 tab) every morning and 1 mg (2 tabs) every night at bedtime.   Refills:  1        omeprazole 20 MG CR capsule   Commonly known as:  priLOSEC   Dose:  20 mg   Indication:  gi prophylaxis   Quantity:  180 capsule        Take 1 capsule (20 mg) by mouth 2 times daily (before meals)   Refills:  3        OPSUMIT PO        Take by mouth daily Pt doesn't know dosage,   Refills:  0        order for DME   Quantity:  1 Device        Equipment being ordered: SHOWER CHAIR  FROM 1d4 Pty   Refills:  0        order for DME   Quantity:  2 Package        Equipment being ordered: Compression stockings; 2 pair; custom measured. Med compression 20-30 mmhg   Refills:  2        potassium chloride 10 MEQ tablet   Commonly known as:  K-TAB,KLOR-CON   Dose:  10 mEq   Quantity:  90 tablet        Take 1 tablet (10 mEq) by mouth daily   Refills:  1        simvastatin 10 MG tablet   Commonly known as:  ZOCOR   Dose:  10 mg   Quantity:  90 tablet        Take 1 tablet by mouth At Bedtime.   Refills:  1        SODIUM BICARBONATE PO        Take by mouth 2 times daily   Refills:  0        T.E.D. BELOW KNEE/M-REGULAR Misc   Dose:  1 each   Quantity:  2 each        1 each daily   Refills:  0        tadalafil (PAH) 20 MG Tabs   Commonly known as:  ADCIRCA   Dose:  40 mg   Quantity:  60 tablet        Take 2 tablets (40 mg) by mouth daily   Refills:  11        TEMAZEPAM PO        Take by mouth At Bedtime   Refills:  0        TOPIRAMATE PO   Dose:  100 mg        Take 100 mg by mouth At Bedtime   Refills:  0        treprostinil diolamine ER 0.125 MG CR tablet   Commonly known as:  ORENITRAM   Dose:  0.5 mg   Quantity:  90 tablet        Take 4 tablets (0.5 mg) by mouth 3 times daily (with meals) 4.25mg tid   Refills:  0                Procedures and tests performed during your visit     CBC with platelets differential     Comprehensive metabolic panel    EKG 12-lead, tracing only    Troponin I    XR Chest 2 Views      Orders Needing Specimen Collection     None      Pending Results     Date and Time Order Name Status Description    2/6/2018 0429 XR Chest 2 Views Preliminary             Pending Culture Results     No orders found from 2/4/2018 to 2/7/2018.            Pending Results Instructions     If you had any lab results that were not finalized at the time of your Discharge, you can call the ED Lab Result RN at [phone]. You will be contacted by this team for any positive Lab results or changes in treatment. The nurses are available 7 days a week from 10A to 6:30P.  You can leave a message 24 hours per day and they will return your call.        Test Results From Your Hospital Stay        2/6/2018  4:18 AM      Component Results     Component Value Ref Range & Units Status    WBC 5.0 4.0 - 11.0 10e9/L Final    RBC Count 4.71 3.8 - 5.2 10e12/L Final    Hemoglobin 14.6 11.7 - 15.7 g/dL Final    Hematocrit 42.2 35.0 - 47.0 % Final    MCV 90 78 - 100 fl Final    MCH 31.0 26.5 - 33.0 pg Final    MCHC 34.6 31.5 - 36.5 g/dL Final    RDW 13.0 10.0 - 15.0 % Final    Platelet Count 201 150 - 450 10e9/L Final    Diff Method Automated Method  Final    % Neutrophils 60.6 % Final    % Lymphocytes 24.1 % Final    % Monocytes 12.3 % Final    % Eosinophils 2.2 % Final    % Basophils 0.6 % Final    % Immature Granulocytes 0.2 % Final    Absolute Neutrophil 3.1 1.6 - 8.3 10e9/L Final    Absolute Lymphocytes 1.2 0.8 - 5.3 10e9/L Final    Absolute Monocytes 0.6 0.0 - 1.3 10e9/L Final    Absolute Eosinophils 0.1 0.0 - 0.7 10e9/L Final    Absolute Basophils 0.0 0.0 - 0.2 10e9/L Final    Abs Immature Granulocytes 0.0 0 - 0.4 10e9/L Final         2/6/2018  4:32 AM      Component Results     Component Value Ref Range & Units Status    Sodium 142 133 - 144 mmol/L Final    Potassium 3.6 3.4 - 5.3 mmol/L Final    Chloride 109 94 - 109 mmol/L Final     Carbon Dioxide 23 20 - 32 mmol/L Final    Anion Gap 10 3 - 14 mmol/L Final    Glucose 96 70 - 99 mg/dL Final    Urea Nitrogen 21 7 - 30 mg/dL Final    Creatinine 1.55 (H) 0.52 - 1.04 mg/dL Final    GFR Estimate 34 (L) >60 mL/min/1.7m2 Final    Non  GFR Calc    GFR Estimate If Black 42 (L) >60 mL/min/1.7m2 Final    African American GFR Calc    Calcium 8.9 8.5 - 10.1 mg/dL Final    Bilirubin Total 0.4 0.2 - 1.3 mg/dL Final    Albumin 4.1 3.4 - 5.0 g/dL Final    Protein Total 7.4 6.8 - 8.8 g/dL Final    Alkaline Phosphatase 56 40 - 150 U/L Final    ALT 17 0 - 50 U/L Final    AST 13 0 - 45 U/L Final         2/6/2018  4:32 AM      Component Results     Component Value Ref Range & Units Status    Troponin I ES <0.015 0.000 - 0.045 ug/L Final    The 99th percentile for upper reference range is 0.045 ug/L.  Troponin values   in the range of 0.045 - 0.120 ug/L may be associated with risks of adverse   clinical events.           2/6/2018  4:47 AM      Narrative     XR CHEST 2 VW  2/6/2018 4:42 AM     HISTORY: Chest pain.    COMPARISON: 10/3/2014.    FINDINGS: The heart size is normal. The lungs are clear. No  pneumothorax or pleural effusion.        Impression     IMPRESSION: No acute abnormality.                Thank you for choosing [facility]       Thank you for choosing [facility] for your care. Our goal is always to provide you with excellent care. Hearing back from our patients is one way we can continue to improve our services. Please take a few minutes to complete the written survey that you may receive in the mail after you visit with us. Thank you!        [employer] Information     [employer] gives you secure access to your electronic health record. If you see a primary care provider, you can also send messages to your care team and make appointments. If you have questions, please call your primary care clinic.  If you do not have a primary care provider, please call [phone] and they will assist you.         Care EveryWhere ID     This is your Care EveryWhere ID. This could be used by other organizations to access your [facility] medical records  [id number]        Equal Access to Services     LESLIE CHACKO : Flavio Leiva, natanael salas, zhao garza, alysha abdi. So [facility] [phone].    ATENCIÓN: Si habla español, tiene a dhillon disposición servicios gratuitos de asistencia lingüística. Llame al [phone].    We comply with applicable federal civil rights laws and [place] laws. We do not discriminate on the basis of race, color, national origin, age, disability, sex, sexual orientation, or gender identity.            After Visit Summary       This is your record. Keep this with you and show to your community pharmacist(s) and doctor(s) at your next visit.

## 2018-02-06 NOTE — ED AVS SNAPSHOT
[facility] Emergency Department    [address] [address]    Phone:  [phone]    Fax:  [phone]                                       Judith Nelson   MRN: [id number]    Department:  [facility] Emergency Department   Date of Visit:  2/6/2018           After Visit Summary Signature Page     I have received my discharge instructions, and my questions have been answered. I have discussed any challenges I see with this plan with the nurse or doctor.    ..........................................................................................................................................  Patient/Patient Representative Signature      ..........................................................................................................................................  Patient Representative Print Name and Relationship to Patient    ..................................................               ................................................  Date                                            Time    ..........................................................................................................................................  Reviewed by Signature/Title    ...................................................              ..............................................  Date                                                            Time

## 2018-02-09 ENCOUNTER — OFFICE VISIT (OUTPATIENT)
Dept: ENDOCRINOLOGY | Facility: CLINIC | Age: 58
End: 2018-02-09
Payer: MEDICARE

## 2018-02-09 VITALS
BODY MASS INDEX: 21.19 KG/M2 | HEART RATE: 105 BPM | SYSTOLIC BLOOD PRESSURE: 104 MMHG | HEIGHT: 63 IN | WEIGHT: 119.6 LBS | DIASTOLIC BLOOD PRESSURE: 68 MMHG

## 2018-02-09 DIAGNOSIS — M81.0 OSTEOPOROSIS, POSTMENOPAUSAL: Primary | ICD-10-CM

## 2018-02-09 DIAGNOSIS — M81.0 OSTEOPOROSIS, POSTMENOPAUSAL: ICD-10-CM

## 2018-02-09 DIAGNOSIS — R93.89 ABNORMAL FINDINGS ON DIAGNOSTIC IMAGING OF OTHER SPECIFIED BODY STRUCTURES: ICD-10-CM

## 2018-02-09 LAB
ANION GAP SERPL CALCULATED.3IONS-SCNC: 8 MMOL/L (ref 3–14)
BUN SERPL-MCNC: 24 MG/DL (ref 7–30)
CALCIUM SERPL-MCNC: 8.9 MG/DL (ref 8.5–10.1)
CHLORIDE SERPL-SCNC: 108 MMOL/L (ref 94–109)
CO2 SERPL-SCNC: 24 MMOL/L (ref 20–32)
CREAT SERPL-MCNC: 1.75 MG/DL (ref 0.52–1.04)
GFR SERPL CREATININE-BSD FRML MDRD: 30 ML/MIN/1.7M2
GLUCOSE SERPL-MCNC: 101 MG/DL (ref 70–99)
POTASSIUM SERPL-SCNC: 3.7 MMOL/L (ref 3.4–5.3)
PTH-INTACT SERPL-MCNC: 112 PG/ML (ref 12–72)
SODIUM SERPL-SCNC: 140 MMOL/L (ref 133–144)
T4 FREE SERPL-MCNC: 1.16 NG/DL (ref 0.76–1.46)
TSH SERPL DL<=0.005 MIU/L-ACNC: 1.34 MU/L (ref 0.4–4)

## 2018-02-09 PROCEDURE — 82306 VITAMIN D 25 HYDROXY: CPT | Performed by: INTERNAL MEDICINE

## 2018-02-09 PROCEDURE — 83970 ASSAY OF PARATHORMONE: CPT | Performed by: INTERNAL MEDICINE

## 2018-02-09 RX ORDER — TROLAMINE SALICYLATE 10 G/100G
CREAM TOPICAL 2 TIMES DAILY
Qty: 141 G | Refills: 1 | Status: SHIPPED | OUTPATIENT
Start: 2018-02-09 | End: 2018-04-10

## 2018-02-09 ASSESSMENT — PAIN SCALES - GENERAL: PAINLEVEL: NO PAIN (0)

## 2018-02-09 NOTE — NURSING NOTE
Chief Complaint   Patient presents with     RECHECK     sarcodiosis annual follow up      Jaimee Yuan CMA

## 2018-02-09 NOTE — MR AVS SNAPSHOT
After Visit Summary   2/9/2018    Judith Nelson    MRN: [id number]           Patient Information     Date Of Birth          1960        Visit Information        Provider Department      2/9/2018 12:30 PM Jenna Salas MD [facility]        Today's Diagnoses     Osteoporosis, postmenopausal    -  1    Abnormal findings on diagnostic imaging of other specified body structures           Care Instructions    Put the salicylate cream on skin - twice daily for 1 week, then daily for 1 week, then as needed      Get dental work done    prolia 2 months after    See me in 6 months with dxa    To expedite your medication refill(s), please contact your pharmacy and have them fax a refill request to: [phone].  *Please allow 3 business days for routine medication refills.  *Please allow 5 business days for controlled substance medication refills.  --------------------  For scheduling appointments (including lab work), please request an appointment through [employer], or call: [phone].    For questions for your provider or the endocrine nurse, please send a [employer] message.  For after-hours urgent issues, please dial [phone], and ask to speak with the Endocrinologist On-Call.  --------------------  Please Note: If you are active on [employer], all future test results will be sent by [employer] message only and will no longer be sent by mail. You may also receive communication directly from your physician.            Follow-ups after your visit        Follow-up notes from your care team     Return in about 6 months (around 8/9/2018).      Your next 10 appointments already scheduled     Feb 09, 2018  1:45 PM CST   LAB with  LAB    Health Lab ([facility])    [address] [place] [id number] [phone]           Please do not eat 10-12 hours before your appointment if you are coming in fasting for labs on lipids, cholesterol, or  glucose (sugar). This does not apply to pregnant women. Water, hot tea and black coffee (with nothing added) are okay. Do not drink other fluids, diet soda or chew gum.            Mar 13, 2018  2:30 PM CDT   FULL PULMONARY FUNCTION with  PFL D   [facility] Function [facility] ([facility])    [address] [facility] [address]   [phone]            Mar 13, 2018  3:30 PM CDT   Six Minute Walk with  PFL 6 MINUTE WALK 1   [facility] ([facility])    [address] [place] [address]   [phone]            Mar 13, 2018  4:00 PM CDT   (Arrive by 3:45 PM)   Return Interstitial Lung with Gael Flaherty MD   [facility] ([facility])    [address] [place] [address]  [address] [place] [address]   [phone]            Mar 21, 2018 12:30 PM CDT   Lab with  LAB    [facility] ([facility])    [address] [place] [id number] [phone]            Mar 21, 2018  1:00 PM CDT   Ech Complete with UCECHCR2   [facility] Echo ([facility])    [address] [place] [id number] [phone]           1. Please bring or wear a comfortable two-piece outfit. 2. You may eat, drink and take your normal medicines. 3. For any questions that cannot be answered, please contact the ordering physician            Mar 21, 2018  2:00 PM CDT   (Arrive by 1:45 PM)   RETURN PRIMARY PULMONARY with Norm Franco MD   [facility] ([facility])    [address] [place] [address]  [address]  [facility] [id number] [phone]            Aug 10, 2018 12:00 PM CDT   DX HIP/PELVIS/SPINE with UCDX1   [facility] Dexa ([facility])    [address] [place] [address]    [phone]           Please do not take any of the following 24 hours prior to the day of your exam: vitamins, calcium tablets, antacids.  If possible, please wear clothes without metal (snaps, zippers). A sweatsuit works well.            Aug 10, 2018  1:00 PM CDT   (Arrive by 12:45 PM)   RETURN ENDOCRINE with Jenna Salas MD   [facility] ([facility])    [address] [place] [address] [phone]              Future tests that were ordered for you today     Open Future Orders        Priority Expected Expires Ordered    Dexa hip/pelvis/spine Routine  9/7/2018 2/9/2018    TSH Routine  2/9/2019 2/9/2018    T4 free Routine  2/9/2019 2/9/2018    25 Hydroxyvitamin D2 and D3 Routine  2/9/2019 2/9/2018    Parathyroid Hormone Intact Routine  2/9/2019 2/9/2018    Basic metabolic panel Routine  2/9/2019 2/9/2018            Who to contact     Please call your clinic at [phone] to:    Ask questions about your health    Make or cancel appointments    Discuss your medicines    Learn about your test results    Speak to your doctor            Additional Information About Your Visit        [employer] Information     [employer] gives you secure access to your electronic health record. If you see a primary care provider, you can also send messages to your care team and make appointments. If you have questions, please call your primary care clinic.  If you do not have a primary care provider, please call [phone] and they will assist you.      [employer] is an electronic gateway that provides easy, online access to your medical records. With [employer], you can request a clinic appointment, read your test results, renew a prescription or communicate with your care team.     To access your existing account, please contact your [facility] or call [phone] for assistance.        Care EveryWhere ID     This is your Care EveryWhere ID.  "This could be used by other organizations to access your [facility] medical records  [id number]        Your Vitals Were     Pulse Height BMI (Body Mass Index)             105 1.6 m (5' 3\") 21.19 kg/m2          Blood Pressure from Last 3 Encounters:   02/09/18 104/68   02/06/18 115/73   11/16/17 100/64    Weight from Last 3 Encounters:   02/09/18 54.3 kg (119 lb 9.6 oz)   02/06/18 52.2 kg (115 lb)   11/16/17 53.3 kg (117 lb 8 oz)                 Today's Medication Changes          These changes are accurate as of 2/9/18  1:31 PM.  If you have any questions, ask your nurse or doctor.               Start taking these medicines.        Dose/Directions    trolamine salicylate 10 % cream   Commonly known as:  ASPERCREME   Used for:  Osteoporosis, postmenopausal, Abnormal findings on diagnostic imaging of other specified body structures   Started by:  Jenna Salas MD        Apply topically 2 times daily   Quantity:  141 g   Refills:  1         These medicines have changed or have updated prescriptions.        Dose/Directions    LORazepam 0.5 MG tablet   Commonly known as:  ATIVAN   This may have changed:    - how much to take  - additional instructions   Used for:  Major depressive disorder, single episode, severe with psychotic features (H)        Take 0.5 mg (1 tab) every morning and 1 mg (2 tabs) every night at bedtime.   Quantity:  90 tablet   Refills:  1            Where to get your medicines      [facility] [address] AT [address]  [address] [facility] [address]     Phone:  [phone]     trolamine salicylate 10 % cream                Primary Care Provider Office Phone # Fax #    Anupama Babcock [phone]       [address] [address]        Equal Access to Services     LESLIE CHACKO AH: Flavio Leiva, natanael luqadashar, qaybta " alysha colladonasir worley ah. [facility] [phone].    ATENCIÓN: Si bruna coello, tiene a dhillon disposición servicios gratuitos de asistencia lingüística. Miller al [phone].    We comply with applicable federal civil rights laws and [place] laws. We do not discriminate on the basis of race, color, national origin, age, disability, sex, sexual orientation, or gender identity.            Thank you!     Thank you for choosing [facility]  for your care. Our goal is always to provide you with excellent care. Hearing back from our patients is one way we can continue to improve our services. Please take a few minutes to complete the written survey that you may receive in the mail after your visit with us. Thank you!             Your Updated Medication List - Protect others around you: Learn how to safely use, store and throw away your medicines at www.disposemymeds.org.          This list is accurate as of 2/9/18  1:31 PM.  Always use your most recent med list.                   Brand Name Dispense Instructions for use Diagnosis    acetaminophen 500 MG tablet    TYLENOL    45 tablet    Take 1-2 tablets (500-1,000 mg) by mouth every 6 hours as needed for pain (Take as needed for pain.  Do not exceed 4 grams (8 tablets) in a day)    Pulmonary hypertension       acidophilus Caps     30 capsule    Take 1 tablet by mouth as needed    Sarcoidosis       azaTHIOprine 50 MG tablet    IMURAN    90 tablet    Take 1 tablet (50 mg) by mouth daily    Sarcoidosis       buPROPion 200 MG 12 hr tablet    WELLBUTRIN SR    30 tablet    Take 1 tablet (200 mg) by mouth every morning    Major depressive disorder, single episode, moderate (H)       BUSPIRONE HCL PO      Take 15 mg by mouth 2 times daily        calcitRIOL 0.25 MCG capsule    ROCALTROL    30 capsule    Take 1 capsule (0.25 mcg) by mouth daily        carvedilol 12.5 MG tablet    COREG    135 tablet    Take 0.5 tablets (6.25 mg) by  mouth 2 times daily (with meals)    Chronic systolic heart failure (H)       denosumab 60 MG/ML Soln injection    PROLIA    1 mL    Inject 1 mL (60 mg) Subcutaneous every 6 months    Osteoporosis, postmenopausal       EYE VITAMINS PO      Take 1 tablet by mouth daily    Sarcoidosis       furosemide 40 MG tablet    LASIX    90 tablet    Take 40 MG M-W-F, 20 MG all other days.    Pulmonary hypertension, Stress-induced cardiomyopathy       LORazepam 0.5 MG tablet    ATIVAN    90 tablet    Take 0.5 mg (1 tab) every morning and 1 mg (2 tabs) every night at bedtime.    Major depressive disorder, single episode, severe with psychotic features (H)       omeprazole 20 MG CR capsule    priLOSEC    180 capsule    Take 1 capsule (20 mg) by mouth 2 times daily (before meals)    Dyspepsia       OPSUMIT PO      Take by mouth daily Pt doesn't know dosage,        order for DME     1 Device    Equipment being ordered: SHOWER CHAIR  FROM [facility]    Tremor, Generalized muscle weakness, Sarcoidosis       order for DME     2 Package    Equipment being ordered: Compression stockings; 2 pair; custom measured. Med compression 20-30 mmhg    Pulmonary hypertension, Edema       potassium chloride 10 MEQ tablet    K-TAB,KLOR-CON    90 tablet    Take 1 tablet (10 mEq) by mouth daily    Heart failure (H)       simvastatin 10 MG tablet    ZOCOR    90 tablet    Take 1 tablet by mouth At Bedtime.    Hypercholesterolemia       SODIUM BICARBONATE PO      Take by mouth 2 times daily    Pulmonary arterial hypertension       T.E.D. BELOW KNEE/M-REGULAR Misc     2 each    1 each daily    Lower extremity edema       tadalafil (PAH) 20 MG Tabs    ADCIRCA    60 tablet    Take 2 tablets (40 mg) by mouth daily    Pulmonary hypertension       TEMAZEPAM PO      Take by mouth At Bedtime        TOPIRAMATE PO      Take 100 mg by mouth At Bedtime        treprostinil diolamine ER 0.125 MG CR tablet    ORENITRAM    90 tablet    Take 4 tablets (0.5 mg) by mouth 3  times daily (with meals) 4.25mg tid    Pulmonary arterial hypertension       trolamine salicylate 10 % cream    ASPERCREME    141 g    Apply topically 2 times daily    Osteoporosis, postmenopausal, Abnormal findings on diagnostic imaging of other specified body structures

## 2018-02-09 NOTE — LETTER
2/9/2018    RE: Judith Nelson  [address] [facility] [address]     Patient presents for follow-up of osteoporosis    #1 osteoporosis With history of nontraumatic fracture and chronic steroid use and sarcoidosis  Ms. Nelson is a 53-year-old female with a complex medical history including pulmonary hypertension for which she has been following up with Dr. Franco since 10/2010 and she was placed on inhaled treprostini and tadafanil with right sided heart showing a significant pulm HTN. Earlier in 3/201 she was found to have high Ca of 11.3 along with a normal PTH and worsening renal functions. Patient was given a dose of Zolendroic acid with a drop in her Ca to 9.4 next month. Her Ca has been stable with levels less than 10 till last wek when she had a Ca of 10.7. She had an extensive workup for her renal failure and renal biopsy showed a non-necrotizing granulomas and negative stains bringing up the issue of sarcoidosis. That diagnosis has been considered more likely in view of the hypercalcemia, chronic kidney disease and elevated ACE levels. She also found to have MGUS.  She has a history of osteoporosis based on a DEXA scan which was done in 02/2010 showing a T score of - 2.7 in the LS along with a non traumatic fracture of her left femur after a hip arthoplasty. She was seen in our clinic for that matter in 2010 and she was placed on a monthly Boniva since then but that has been stopped apparently secondary to her worsening renal fx, as well as, her Ca- vitamin D supplement has been stopped in view of the recurrent hypercalcemia. She has a repeat DXA scan in 2011 which showed possible improvement in her T score to - 2.5.  The patient has been on prednisone since 04/02/2013. When I evaluated the patient in 2013, her renal function remained quite poor with a creatinine around 2.0.  I then started the patient on Prolia.  She received her first dose in October 2013.  She tolerated this well.  She  "received her second dose of Prolia in April 2014.  She tolerated this well.  Her bone density in April 2014 showed the lowest T score -2.3 in the lumbar spine which has improved compared with her previous T score -2.5 in 2011.   Of note, the patient reports stopping her prednisone in 2014.  She has now been off her prednisone since roughly August 2014.  Her creatinine in September 2014 was about 1.98. The patient received her 3rd dose of Prolia in September 2014.   She received her fourth dose of Prolia in April 2015.  March 2015 DEXA scan showed improvement at the level of the lumbar spine and possible decline at the left total hip ( which appears nonsignificant). Her fifth dose of Prolia was given in August, 2017. She has a history of hypercalcemia related to her sarcoidosis although her September 2015 serum calcium was 8.9.    Interval history since last visit: Patient is doing well, mood unchanged per patient, though her accompanying daughter states that she worries \"all the time\". She denies any fall or fractures since last visit. She continues to take calcitriol. Patient had not gotten her teeth extracted since last visit, but has plan to do so next Friday (Feb. 16). Patient is due for another Prolia infusion and DEXA scan.     #2 Right sided chest pain  Patient went to the emergency department 3 days ago complaining of right sided chest pain. Work up by the emergency department included EKG study, Troponin I, CBC & CMP.They did not reach a definitive diagnosis. Patient was discharged without incident. She is still feeling pain on her chest today, rating it a 6/10 on the pain scale. She says she notices the pain more when she is not distracted by an activity. Pain increases with movement, does not radiate. She does not recall any precipitating event that cause chest pain onset. The pain is more intense in the evening when she sleeps on the side. She denied shortness of breath. Patient ran out of Tylenol at " home and has not have a chance to go get more. She avoids taking NSAIDs due to her history of sarcoidosis.       #3 pulmonary issues due to pulmonary artery hypertension and sarcoidosis  The patient has a history of pulmonary artery hypertension currently on adcirca and inhaled tyvaso .  Recent cardiology evaluation showed no vasodilator response, reduce RV function, and cardiac output.  Patient is considered potential candidate for oral remoduli and was referred to pulmonology for further management.  Cardiac PET scan is planned.  Pulmonology assessment in 2015 is that she has minimal pulmonary involvement for her sarcoidosis.    #4 sarcoidosis  The patient had been previously on prednisone for treatment of sarcoidosis.  Per patient report, she's been on prednisone since  which she had stopped roughly 2014.  The patient is currently on Imuran.  This dose had been reduced because of her declining white count.  Interval history since last visit:  ACTH stimulation test was normal.    #5 anxiety  The patient is no longer on Zyprexa.  She reports that her overall mood has improved.  However, she is interested in weight loss.    Interval history since last visit: patient mood has not changed. She is experiencing stress with her eldest daughter getting ready to undergo induced labor in a few days.     PM/SH:  History of right ankle infection in  which grew Pseudomonas.  Her 2014 lower extremity ultrasound did not show any venous insufficiency.          Past Medical History   Diagnosis Date     Other seborrheic keratosis      Hypertension     Hyperlipidemia      Pulmonary hypertension      Sarcoidosis, suggested on recent renal biopsy      Protrusio acetabuli    - Osteoporosis  - depression and anxiety  - chronic kidney disease, stage 4    Past Surgical History:   Procedure Laterality Date     Csection,  X 2       CYSTOSCOPY, RETROGRADES, INSERT STENT URETER(S), COMBINED  10/2/2013     Procedure: COMBINED CYSTOSCOPY, RETROGRADES, INSERT STENT URETER(S);  Left Retrograde Ureteropyelogram and Ureteral Stent Placement;  Surgeon: SONIA Martinez MD;  Location: [facility] OR     ENDOSCOPIC RETROGRADE CHOLANGIOPANCREATOGRAM  1/22/2012    Procedure:ENDOSCOPIC RETROGRADE CHOLANGIOPANCREATOGRAM; ERCP biliary sphincterotomy and stone removal; Surgeon:MARGIE RUGGIERO; Location:[facility] OR     GASTROSCOPY,FL  6/10    Erythematous duodenopathy     LAPAROSCOPIC CHOLECYSTECTOMY WITH CHOLANGIOGRAMS  1/21/2012    Procedure:LAPAROSCOPIC CHOLECYSTECTOMY WITH CHOLANGIOGRAMS; Surgeon:BRIANA PADILLA; Location:[facility] OR     ORTHOPEDIC SURGERY  10/1/2010    Right hip replacement     Medications:  Current Outpatient Prescriptions   Medication Sig Dispense Refill     furosemide (LASIX) 40 MG tablet Take 40 MG M-W-F, 20 MG all other days. 90 tablet 3     BUSPIRONE HCL PO Take 15 mg by mouth 2 times daily       tadalafil, PAH, (ADCIRCA) 20 MG TABS Take 2 tablets (40 mg) by mouth daily 60 tablet 11     treprostinil diolamine ER (ORENITRAM) 0.125 MG CR tablet Take 4 tablets (0.5 mg) by mouth 3 times daily (with meals) 4.25mg tid 90 tablet 0     Lactobacillus (ACIDOPHILUS) CAPS Take 1 tablet by mouth as needed 30 capsule 0     potassium chloride (K-TAB,KLOR-CON) 10 MEQ tablet Take 1 tablet (10 mEq) by mouth daily 90 tablet 1     azaTHIOprine (IMURAN) 50 MG tablet Take 1 tablet (50 mg) by mouth daily 90 tablet 3     Macitentan (OPSUMIT PO) Take by mouth daily Pt doesn't know dosage,       carvedilol (COREG) 12.5 MG tablet Take 0.5 tablets (6.25 mg) by mouth 2 times daily (with meals) 135 tablet 3     denosumab (PROLIA) 60 MG/ML SOLN Inject 1 mL (60 mg) Subcutaneous every 6 months 1 mL 1     calcitRIOL (ROCALTROL) 0.25 MCG capsule Take 1 capsule (0.25 mcg) by mouth daily 30 capsule 1     SODIUM BICARBONATE PO Take by mouth 2 times daily       Multiple Vitamins-Minerals (EYE VITAMINS PO) Take 1 tablet by mouth daily       ORDER FOR DME  Equipment being ordered: Compression stockings; 2 pair; custom measured. Med compression 20-30 mmhg 2 Package 2     TOPIRAMATE PO Take 100 mg by mouth At Bedtime       TEMAZEPAM PO Take by mouth At Bedtime       omeprazole (PRILOSEC) 20 MG capsule Take 1 capsule (20 mg) by mouth 2 times daily (before meals) 180 capsule 3     LORazepam (ATIVAN) 0.5 MG tablet Take 0.5 mg (1 tab) every morning and 1 mg (2 tabs) every night at bedtime. (Patient taking differently: 1 mg Take 0.5 mg (1 tab) every morning and 1 mg (2 tabs) every night at bedtime.) 90 tablet 1     buPROPion (WELLBUTRIN SR) 200 MG 12 hr tablet Take 1 tablet (200 mg) by mouth every morning 30 tablet 0     ORDER FOR DME Equipment being ordered: SHOWER CHAIR  FROM Desall 1 Device 0     acetaminophen (TYLENOL) 500 MG tablet Take 1-2 tablets (500-1,000 mg) by mouth every 6 hours as needed for pain (Take as needed for pain.  Do not exceed 4 grams (8 tablets) in a day) 45 tablet 10     Elastic Bandages & Supports (T.E.D. BELOW KNEE/M-REGULAR) MISC 1 each daily 2 each 0     simvastatin (ZOCOR) 10 MG tablet Take 1 tablet by mouth At Bedtime. 90 tablet 1      Allergies:  Allergies   Allergen Reactions     Latex Rash     From gloves     Family History:  Family History   Problem Relation Age of Onset     Cancer Mother       age 62 leukemia     GI Mother      diverticulitis     Hypertension Mother      Allergies Mother      Arthritis Mother      Depression Mother      Eye Mother      Osteoporosis Mother      C.A.D. Father      MI/CAD      Cancer Father      skin     Blood Disease Father      renal  problem     Hypertension Father      Anesthesia Father      Cardiovascular Father      Neurological Father      Parkinson's disease     C.A.D. Maternal Grandmother       late 82s MI     Diabetes Maternal Grandmother      C.A.D. Maternal Grandfather       mid 80s MI     Alzheimers Paternal Grandmother       80s     Alzheimers Paternal Grandfather       " 80s     Neurological Sister      migraines     Allergies Sister      Diabetes Other      AODM     Neurological Sister      migraines     Allergies Sister      Diabetes Sister      hypoglycemia     Anesthesia Son      Anesthesia Daughter      Anesthesia Daughter      Social History:  Social History     Social History     Marital status:      Spouse name: N/A     Number of children: 3     Years of education: 12     Occupational History      Unemployed     Social History Main Topics     Smoking status: Never Smoker     Smokeless tobacco: Never Used     Alcohol use No      Comment: Occ.     Drug use: No     Sexual activity: No     Other Topics Concern     Parent/Sibling W/ Cabg, Mi Or Angioplasty Before 65f 55m? No     Social History Narrative     Review of System:   ROS: 10 point ROS neg other than the symptoms noted above in the HPI.    Physical Examination:  Blood pressure 104/68, pulse 105, height 1.6 m (5' 3\"), weight 54.3 kg (119 lb 9.6 oz), not currently breastfeeding.  Gen: NAD, comfortable,  No tremor noted today  PSYCH: Normal mood, stress with family obligation.  Alert and oriented   Musculoskeletal: localized right chest pain, intensity increases with palpation   Cardiovascular:  RRR without M/R/G  Pulmonary: lungs clear bilaterally, no crackle or stridor.   HENT: bilateral dental carries on bottom molar teeth and 2 teeth on top row.    Latest known medical visit with results is:  Recent Results (from the past 168 hour(s))   CBC with platelets differential    Collection Time: 18  4:11 AM   Result Value Ref Range    WBC 5.0 4.0 - 11.0 10e9/L    RBC Count 4.71 3.8 - 5.2 10e12/L    Hemoglobin 14.6 11.7 - 15.7 g/dL    Hematocrit 42.2 35.0 - 47.0 %    MCV 90 78 - 100 fl    MCH 31.0 26.5 - 33.0 pg    MCHC 34.6 31.5 - 36.5 g/dL    RDW 13.0 10.0 - 15.0 %    Platelet Count 201 150 - 450 10e9/L    Diff Method Automated Method     % Neutrophils 60.6 %    % Lymphocytes 24.1 %    % Monocytes 12.3 %    " % Eosinophils 2.2 %    % Basophils 0.6 %    % Immature Granulocytes 0.2 %    Absolute Neutrophil 3.1 1.6 - 8.3 10e9/L    Absolute Lymphocytes 1.2 0.8 - 5.3 10e9/L    Absolute Monocytes 0.6 0.0 - 1.3 10e9/L    Absolute Eosinophils 0.1 0.0 - 0.7 10e9/L    Absolute Basophils 0.0 0.0 - 0.2 10e9/L    Abs Immature Granulocytes 0.0 0 - 0.4 10e9/L   Comprehensive metabolic panel    Collection Time: 02/06/18  4:11 AM   Result Value Ref Range    Sodium 142 133 - 144 mmol/L    Potassium 3.6 3.4 - 5.3 mmol/L    Chloride 109 94 - 109 mmol/L    Carbon Dioxide 23 20 - 32 mmol/L    Anion Gap 10 3 - 14 mmol/L    Glucose 96 70 - 99 mg/dL    Urea Nitrogen 21 7 - 30 mg/dL    Creatinine 1.55 (H) 0.52 - 1.04 mg/dL    GFR Estimate 34 (L) >60 mL/min/1.7m2    GFR Estimate If Black 42 (L) >60 mL/min/1.7m2    Calcium 8.9 8.5 - 10.1 mg/dL    Bilirubin Total 0.4 0.2 - 1.3 mg/dL    Albumin 4.1 3.4 - 5.0 g/dL    Protein Total 7.4 6.8 - 8.8 g/dL    Alkaline Phosphatase 56 40 - 150 U/L    ALT 17 0 - 50 U/L    AST 13 0 - 45 U/L   Troponin I    Collection Time: 02/06/18  4:11 AM   Result Value Ref Range    Troponin I ES <0.015 0.000 - 0.045 ug/L   Basic metabolic panel    Collection Time: 02/09/18  2:11 PM   Result Value Ref Range    Sodium 140 133 - 144 mmol/L    Potassium 3.7 3.4 - 5.3 mmol/L    Chloride 108 94 - 109 mmol/L    Carbon Dioxide 24 20 - 32 mmol/L    Anion Gap 8 3 - 14 mmol/L    Glucose 101 (H) 70 - 99 mg/dL    Urea Nitrogen 24 7 - 30 mg/dL    Creatinine 1.75 (H) 0.52 - 1.04 mg/dL    GFR Estimate 30 (L) >60 mL/min/1.7m2    GFR Estimate If Black 36 (L) >60 mL/min/1.7m2    Calcium 8.9 8.5 - 10.1 mg/dL   TSH    Collection Time: 02/09/18  2:11 PM   Result Value Ref Range    TSH 1.34 0.40 - 4.00 mU/L   T4 free    Collection Time: 02/09/18  2:11 PM   Result Value Ref Range    T4 Free 1.16 0.76 - 1.46 ng/dL           Assessment:  #1 osteoporosis in the setting of nontraumatic femoral fracture, impaired renal function, chronic steroid use and  likely sarcoidosis  Overall, patient has been tolerating Prolia pretty well and it had improved her bone density. Her renal function is stable but not normal. Patient will benefit of continuing with Prolia considering that there has been a 9 months gap since her last infusion. However, she plans to have 3 teeth extraction next week. To avoid possible Bisphosphonate-Related Osteonecrosis of the Jaw, we will hold off the prolia until 2 months after extraction.    Will check thyroid and parathyroid labs today.  Will recheck BMD with return visit in about 6 months.    #2 Right sided chest pain  Patient has localized right sided chest pain, graded 6/10. Pain grade changes with position and movement and upon palpation. This was previously assessed by emergency department with no new insights as regards to cardiac functions. The pain may be more of musculoskeletal nature. Patient does not take NSAIDs to avoid compromising kidney function. I prescribed a topical salicylate for her to help alleviate the pain. Topical salicylate is minimally absrobed following application to intact skin so this will help alleviate pain without affecting kidney function.  Patient is advised to use as needed.    #3 pulmonary issues due to pulmonary hypertension and sarcoidosis  The main issue appears to be her progressive pulmonary hypertension.  There appears to be minimal pulmonary sarcoidosis.  We'll defer to cardiology.    #4 sarcoidosis  Patient currently on Imuran.  I think that the Prolia is helping with the hypercalcemia..    #5 anxiety  Patient no longer on Zyprexa.  Dietary lifestyle changes recommended for weight loss.    25 minutes spent with patient of which 20 minutes was spent in face-to-face discussion coordination of care    ICherie MS3, scribed for Dr. Jenna Salas. Dr. Jenna Salas reviewed and edited the aforementioned note. Jenna Salas MD personally performed the entire clinical encounter documented in this note.    Jenna  Darien Salas MD        Patient:  Judith Nelson  :      1960  MRN:     [id number]           Ms.Pamela MARY Nelson  [address] [facility] [address]           2018     Dear Judith     Your labs which look pretty reasonable.  Your thyroid is normal.  Your PTH has improved.  Your kidney function has slightly worsened with a creatinine of 1.75.  I am thinking that your calcitriol (currently at 0.25 mcg daily) could be potentially increased to twice daily, but I will defer to Dr. Garcia.  Please discuss with him when you next meet with him.  I will send him a copy and let him know.     If you have any questions, please feel free to contact my nurse at [phone] select option #3 for triage nurse  or  option #1 for scheduling related questions.     Regards     Jenna Salas MD      Resulted Orders   25 Hydroxyvitamin D2 and D3   Result Value Ref Range     25 OH Vit D2 <5 ug/L     25 OH Vit D3 26 ug/L     25 OH Vit D total <31 20 - 75 ug/L         Comment:         Season, race, dietary intake, and treatment affect the concentration of 25-hydroxy-Vitamin D. Values may decrease during winter months and increase during summer months. Values 20-29 ug/L may indicate Vitamin D insufficiency and values <20 ug/L may indicate Vitamin D deficiency. This test was developed and its performance characteristics determined by the [facility],  Special Chemistry Laboratory. It has not been cleared or approved by the FDA. The laboratory is regulated under CLIA as qualified to perform high-complexity testing. This test is used for clinical purposes. It should not be regarded as investigational or for research.   Parathyroid Hormone Intact   Result Value Ref Range     Parathyroid Hormone Intact 112 (H) 12 - 72 pg/mL   Basic metabolic panel   Result Value Ref Range     Sodium 140 133 - 144 mmol/L     Potassium 3.7 3.4 - 5.3 mmol/L     Chloride 108 94 - 109 mmol/L     Carbon Dioxide 24 20  - 32 mmol/L     Anion Gap 8 3 - 14 mmol/L     Glucose 101 (H) 70 - 99 mg/dL     Urea Nitrogen 24 7 - 30 mg/dL     Creatinine 1.75 (H) 0.52 - 1.04 mg/dL     GFR Estimate 30 (L) >60 mL/min/1.7m2         Comment:         Non  GFR Calc     GFR Estimate If Black 36 (L) >60 mL/min/1.7m2         Comment:          GFR Calc     Calcium 8.9 8.5 - 10.1 mg/dL   TSH   Result Value Ref Range     TSH 1.34 0.40 - 4.00 mU/L   T4 free   Result Value Ref Range     T4 Free 1.16 0.76 - 1.46 ng/dL         Cc:   Dr. Willi JOSE - [facility] (NEXT TO [facility], BORDERING [place] [address])  [address]  [address]  T:  [phone]

## 2018-02-09 NOTE — PATIENT INSTRUCTIONS
Put the salicylate cream on skin - twice daily for 1 week, then daily for 1 week, then as needed      Get dental work done    prolia 2 months after    See me in 6 months with dxa    To expedite your medication refill(s), please contact your pharmacy and have them fax a refill request to: [phone].  *Please allow 3 business days for routine medication refills.  *Please allow 5 business days for controlled substance medication refills.  --------------------  For scheduling appointments (including lab work), please request an appointment through [employer], or call: [phone].    For questions for your provider or the endocrine nurse, please send a [employer] message.  For after-hours urgent issues, please dial [phone], and ask to speak with the Endocrinologist On-Call.  --------------------  Please Note: If you are active on [employer], all future test results will be sent by [employer] message only and will no longer be sent by mail. You may also receive communication directly from your physician.

## 2018-02-09 NOTE — LETTER
Patient:  Judith Nelson  :   1960  MRN:     [id number]        Ms.Pamela MARY Nelson  [address] [facility] [address]        2018    Dear Judith    Your labs which look pretty reasonable.  Your thyroid is normal.  Your PTH has improved.  Your kidney function has slightly worsened with a creatinine of 1.75.  I am thinking that your calcitriol (currently at 0.25 mcg daily) could be potentially increased to twice daily, but I will defer to Dr. Garcia.  Please discuss with him when you next meet with him.  I will send him a copy and let him know.    If you have any questions, please feel free to contact my nurse at [phone] select option #3 for triage nurse  or  option #1 for scheduling related questions.    Regards    Jenna Salas MD            Resulted Orders   25 Hydroxyvitamin D2 and D3   Result Value Ref Range    25 OH Vit D2 <5 ug/L    25 OH Vit D3 26 ug/L    25 OH Vit D total <31 20 - 75 ug/L      Comment:      Season, race, dietary intake, and treatment affect the concentration of   25-hydroxy-Vitamin D. Values may decrease during winter months and increase   during summer months. Values 20-29 ug/L may indicate Vitamin D insufficiency   and values <20 ug/L may indicate Vitamin D deficiency.  This test was developed and its performance characteristics determined by the   [facility],  Special Chemistry Laboratory. It has   not been cleared or approved by the FDA. The laboratory is regulated under   CLIA as qualified to perform high-complexity testing. This test is used for   clinical purposes. It should not be regarded as investigational or for   research.     Parathyroid Hormone Intact   Result Value Ref Range    Parathyroid Hormone Intact 112 (H) 12 - 72 pg/mL   Basic metabolic panel   Result Value Ref Range    Sodium 140 133 - 144 mmol/L    Potassium 3.7 3.4 - 5.3 mmol/L    Chloride 108 94 - 109 mmol/L    Carbon Dioxide 24 20 - 32 mmol/L    Anion Gap  8 3 - 14 mmol/L    Glucose 101 (H) 70 - 99 mg/dL    Urea Nitrogen 24 7 - 30 mg/dL    Creatinine 1.75 (H) 0.52 - 1.04 mg/dL    GFR Estimate 30 (L) >60 mL/min/1.7m2      Comment:      Non  GFR Calc    GFR Estimate If Black 36 (L) >60 mL/min/1.7m2      Comment:       GFR Calc    Calcium 8.9 8.5 - 10.1 mg/dL   TSH   Result Value Ref Range    TSH 1.34 0.40 - 4.00 mU/L   T4 free   Result Value Ref Range    T4 Free 1.16 0.76 - 1.46 ng/dL       Cc: Dr. Willi JOSE - [facility] [place] ([facility] )  [address]  [address]  T:  [phone]

## 2018-02-09 NOTE — PROGRESS NOTES
Patient presents for follow-up of osteoporosis    #1 osteoporosis With history of nontraumatic fracture and chronic steroid use and sarcoidosis  Ms. Nelson is a 53-year-old female with a complex medical history including pulmonary hypertension for which she has been following up with Dr. Franco since 10/2010 and she was placed on inhaled treprostini and tadafanil with right sided heart showing a significant pulm HTN. Earlier in 3/201 she was found to have high Ca of 11.3 along with a normal PTH and worsening renal functions. Patient was given a dose of Zolendroic acid with a drop in her Ca to 9.4 next month. Her Ca has been stable with levels less than 10 till last wek when she had a Ca of 10.7. She had an extensive workup for her renal failure and renal biopsy showed a non-necrotizing granulomas and negative stains bringing up the issue of sarcoidosis. That diagnosis has been considered more likely in view of the hypercalcemia, chronic kidney disease and elevated ACE levels. She also found to have MGUS.  She has a history of osteoporosis based on a DEXA scan which was done in 02/2010 showing a T score of - 2.7 in the LS along with a non traumatic fracture of her left femur after a hip arthoplasty. She was seen in our clinic for that matter in 2010 and she was placed on a monthly Boniva since then but that has been stopped apparently secondary to her worsening renal fx, as well as, her Ca- vitamin D supplement has been stopped in view of the recurrent hypercalcemia. She has a repeat DXA scan in 2011 which showed possible improvement in her T score to - 2.5.  The patient has been on prednisone since 04/02/2013. When I evaluated the patient in 2013, her renal function remained quite poor with a creatinine around 2.0.  I then started the patient on Prolia.  She received her first dose in October 2013.  She tolerated this well.  She received her second dose of Prolia in April 2014.  She tolerated this well.  Her  "bone density in April 2014 showed the lowest T score -2.3 in the lumbar spine which has improved compared with her previous T score -2.5 in 2011.   Of note, the patient reports stopping her prednisone in 2014.  She has now been off her prednisone since roughly August 2014.  Her creatinine in September 2014 was about 1.98. The patient received her 3rd dose of Prolia in September 2014.   She received her fourth dose of Prolia in April 2015.  March 2015 DEXA scan showed improvement at the level of the lumbar spine and possible decline at the left total hip ( which appears nonsignificant). Her fifth dose of Prolia was given in August, 2017. She has a history of hypercalcemia related to her sarcoidosis although her September 2015 serum calcium was 8.9.    Interval history since last visit: Patient is doing well, mood unchanged per patient, though her accompanying daughter states that she worries \"all the time\". She denies any fall or fractures since last visit. She continues to take calcitriol. Patient had not gotten her teeth extracted since last visit, but has plan to do so next Friday (Feb. 16). Patient is due for another Prolia infusion and DEXA scan.     #2 Right sided chest pain  Patient went to the emergency department 3 days ago complaining of right sided chest pain. Work up by the emergency department included EKG study, Troponin I, CBC & CMP.They did not reach a definitive diagnosis. Patient was discharged without incident. She is still feeling pain on her chest today, rating it a 6/10 on the pain scale. She says she notices the pain more when she is not distracted by an activity. Pain increases with movement, does not radiate. She does not recall any precipitating event that cause chest pain onset. The pain is more intense in the evening when she sleeps on the side. She denied shortness of breath. Patient ran out of Tylenol at home and has not have a chance to go get more. She avoids taking NSAIDs due to her " history of sarcoidosis.       #3 pulmonary issues due to pulmonary artery hypertension and sarcoidosis  The patient has a history of pulmonary artery hypertension currently on adcirca and inhaled tyvaso .  Recent cardiology evaluation showed no vasodilator response, reduce RV function, and cardiac output.  Patient is considered potential candidate for oral remoduli and was referred to pulmonology for further management.  Cardiac PET scan is planned.  Pulmonology assessment in 2015 is that she has minimal pulmonary involvement for her sarcoidosis.    #4 sarcoidosis  The patient had been previously on prednisone for treatment of sarcoidosis.  Per patient report, she's been on prednisone since  which she had stopped roughly 2014.  The patient is currently on Imuran.  This dose had been reduced because of her declining white count.  Interval history since last visit:  ACTH stimulation test was normal.    #5 anxiety  The patient is no longer on Zyprexa.  She reports that her overall mood has improved.  However, she is interested in weight loss.    Interval history since last visit: patient mood has not changed. She is experiencing stress with her eldest daughter getting ready to undergo induced labor in a few days.     PM/SH:  History of right ankle infection in  which grew Pseudomonas.  Her 2014 lower extremity ultrasound did not show any venous insufficiency.          Past Medical History   Diagnosis Date     Other seborrheic keratosis      Hypertension     Hyperlipidemia      Pulmonary hypertension      Sarcoidosis, suggested on recent renal biopsy      Protrusio acetabuli    - Osteoporosis  - depression and anxiety  - chronic kidney disease, stage 4    Past Surgical History:   Procedure Laterality Date     Csection,  X 2       CYSTOSCOPY, RETROGRADES, INSERT STENT URETER(S), COMBINED  10/2/2013    Procedure: COMBINED CYSTOSCOPY, RETROGRADES, INSERT STENT URETER(S);  Left  Retrograde Ureteropyelogram and Ureteral Stent Placement;  Surgeon: SONIA Martinez MD;  Location: [facility]     ENDOSCOPIC RETROGRADE CHOLANGIOPANCREATOGRAM  1/22/2012    Procedure:ENDOSCOPIC RETROGRADE CHOLANGIOPANCREATOGRAM; ERCP biliary sphincterotomy and stone removal; Surgeon:MARGIE RUGGIERO; Location:[facility]     GASTROSCOPY,FL  6/10    Erythematous duodenopathy     LAPAROSCOPIC CHOLECYSTECTOMY WITH CHOLANGIOGRAMS  1/21/2012    Procedure:LAPAROSCOPIC CHOLECYSTECTOMY WITH CHOLANGIOGRAMS; Surgeon:BRIANA PADILLA; Location:[facility]     ORTHOPEDIC SURGERY  10/1/2010    Right hip replacement     Medications:  Current Outpatient Prescriptions   Medication Sig Dispense Refill     furosemide (LASIX) 40 MG tablet Take 40 MG M-W-F, 20 MG all other days. 90 tablet 3     BUSPIRONE HCL PO Take 15 mg by mouth 2 times daily       tadalafil, PAH, (ADCIRCA) 20 MG TABS Take 2 tablets (40 mg) by mouth daily 60 tablet 11     treprostinil diolamine ER (ORENITRAM) 0.125 MG CR tablet Take 4 tablets (0.5 mg) by mouth 3 times daily (with meals) 4.25mg tid 90 tablet 0     Lactobacillus (ACIDOPHILUS) CAPS Take 1 tablet by mouth as needed 30 capsule 0     potassium chloride (K-TAB,KLOR-CON) 10 MEQ tablet Take 1 tablet (10 mEq) by mouth daily 90 tablet 1     azaTHIOprine (IMURAN) 50 MG tablet Take 1 tablet (50 mg) by mouth daily 90 tablet 3     Macitentan (OPSUMIT PO) Take by mouth daily Pt doesn't know dosage,       carvedilol (COREG) 12.5 MG tablet Take 0.5 tablets (6.25 mg) by mouth 2 times daily (with meals) 135 tablet 3     denosumab (PROLIA) 60 MG/ML SOLN Inject 1 mL (60 mg) Subcutaneous every 6 months 1 mL 1     calcitRIOL (ROCALTROL) 0.25 MCG capsule Take 1 capsule (0.25 mcg) by mouth daily 30 capsule 1     SODIUM BICARBONATE PO Take by mouth 2 times daily       Multiple Vitamins-Minerals (EYE VITAMINS PO) Take 1 tablet by mouth daily       ORDER FOR DME Equipment being ordered: Compression stockings; 2 pair; custom measured.  Med compression 20-30 mmhg 2 Package 2     TOPIRAMATE PO Take 100 mg by mouth At Bedtime       TEMAZEPAM PO Take by mouth At Bedtime       omeprazole (PRILOSEC) 20 MG capsule Take 1 capsule (20 mg) by mouth 2 times daily (before meals) 180 capsule 3     LORazepam (ATIVAN) 0.5 MG tablet Take 0.5 mg (1 tab) every morning and 1 mg (2 tabs) every night at bedtime. (Patient taking differently: 1 mg Take 0.5 mg (1 tab) every morning and 1 mg (2 tabs) every night at bedtime.) 90 tablet 1     buPROPion (WELLBUTRIN SR) 200 MG 12 hr tablet Take 1 tablet (200 mg) by mouth every morning 30 tablet 0     ORDER FOR DME Equipment being ordered: SHOWER CHAIR  FROM [employer] 1 Device 0     acetaminophen (TYLENOL) 500 MG tablet Take 1-2 tablets (500-1,000 mg) by mouth every 6 hours as needed for pain (Take as needed for pain.  Do not exceed 4 grams (8 tablets) in a day) 45 tablet 10     Elastic Bandages & Supports (T.E.D. BELOW KNEE/M-REGULAR) MISC 1 each daily 2 each 0     simvastatin (ZOCOR) 10 MG tablet Take 1 tablet by mouth At Bedtime. 90 tablet 1      Allergies:  Allergies   Allergen Reactions     Latex Rash     From gloves     Family History:  Family History   Problem Relation Age of Onset     Cancer Mother       age 62 leukemia     GI Mother      diverticulitis     Hypertension Mother      Allergies Mother      Arthritis Mother      Depression Mother      Eye Mother      Osteoporosis Mother      C.A.D. Father      MI/CAD      Cancer Father      skin     Blood Disease Father      renal  problem     Hypertension Father      Anesthesia Father      Cardiovascular Father      Neurological Father      Parkinson's disease     C.A.D. Maternal Grandmother       late 82s MI     Diabetes Maternal Grandmother      C.A.D. Maternal Grandfather       mid 80s MI     Alzheimers Paternal Grandmother       80s     Alzheimers Paternal Grandfather       80s     Neurological Sister      migraines     Allergies Sister       "Diabetes Other      AODM     Neurological Sister      migraines     Allergies Sister      Diabetes Sister      hypoglycemia     Anesthesia Son      Anesthesia Daughter      Anesthesia Daughter      Social History:  Social History     Social History     Marital status:      Spouse name: N/A     Number of children: 3     Years of education: 12     Occupational History      Unemployed     Social History Main Topics     Smoking status: Never Smoker     Smokeless tobacco: Never Used     Alcohol use No      Comment: Occ.     Drug use: No     Sexual activity: No     Other Topics Concern     Parent/Sibling W/ Cabg, Mi Or Angioplasty Before 65f 55m? No     Social History Narrative     Review of System:   ROS: 10 point ROS neg other than the symptoms noted above in the HPI.    Physical Examination:  Blood pressure 104/68, pulse 105, height 1.6 m (5' 3\"), weight 54.3 kg (119 lb 9.6 oz), not currently breastfeeding.  Gen: NAD, comfortable,  No tremor noted today  PSYCH: Normal mood, stress with family obligation.  Alert and oriented   Musculoskeletal: localized right chest pain, intensity increases with palpation   Cardiovascular:  RRR without M/R/G  Pulmonary: lungs clear bilaterally, no crackle or stridor.   HENT: bilateral dental carries on bottom molar teeth and 2 teeth on top row.    Latest known medical visit with results is:  Recent Results (from the past 168 hour(s))   CBC with platelets differential    Collection Time: 02/06/18  4:11 AM   Result Value Ref Range    WBC 5.0 4.0 - 11.0 10e9/L    RBC Count 4.71 3.8 - 5.2 10e12/L    Hemoglobin 14.6 11.7 - 15.7 g/dL    Hematocrit 42.2 35.0 - 47.0 %    MCV 90 78 - 100 fl    MCH 31.0 26.5 - 33.0 pg    MCHC 34.6 31.5 - 36.5 g/dL    RDW 13.0 10.0 - 15.0 %    Platelet Count 201 150 - 450 10e9/L    Diff Method Automated Method     % Neutrophils 60.6 %    % Lymphocytes 24.1 %    % Monocytes 12.3 %    % Eosinophils 2.2 %    % Basophils 0.6 %    % Immature Granulocytes 0.2 % "    Absolute Neutrophil 3.1 1.6 - 8.3 10e9/L    Absolute Lymphocytes 1.2 0.8 - 5.3 10e9/L    Absolute Monocytes 0.6 0.0 - 1.3 10e9/L    Absolute Eosinophils 0.1 0.0 - 0.7 10e9/L    Absolute Basophils 0.0 0.0 - 0.2 10e9/L    Abs Immature Granulocytes 0.0 0 - 0.4 10e9/L   Comprehensive metabolic panel    Collection Time: 02/06/18  4:11 AM   Result Value Ref Range    Sodium 142 133 - 144 mmol/L    Potassium 3.6 3.4 - 5.3 mmol/L    Chloride 109 94 - 109 mmol/L    Carbon Dioxide 23 20 - 32 mmol/L    Anion Gap 10 3 - 14 mmol/L    Glucose 96 70 - 99 mg/dL    Urea Nitrogen 21 7 - 30 mg/dL    Creatinine 1.55 (H) 0.52 - 1.04 mg/dL    GFR Estimate 34 (L) >60 mL/min/1.7m2    GFR Estimate If Black 42 (L) >60 mL/min/1.7m2    Calcium 8.9 8.5 - 10.1 mg/dL    Bilirubin Total 0.4 0.2 - 1.3 mg/dL    Albumin 4.1 3.4 - 5.0 g/dL    Protein Total 7.4 6.8 - 8.8 g/dL    Alkaline Phosphatase 56 40 - 150 U/L    ALT 17 0 - 50 U/L    AST 13 0 - 45 U/L   Troponin I    Collection Time: 02/06/18  4:11 AM   Result Value Ref Range    Troponin I ES <0.015 0.000 - 0.045 ug/L   Basic metabolic panel    Collection Time: 02/09/18  2:11 PM   Result Value Ref Range    Sodium 140 133 - 144 mmol/L    Potassium 3.7 3.4 - 5.3 mmol/L    Chloride 108 94 - 109 mmol/L    Carbon Dioxide 24 20 - 32 mmol/L    Anion Gap 8 3 - 14 mmol/L    Glucose 101 (H) 70 - 99 mg/dL    Urea Nitrogen 24 7 - 30 mg/dL    Creatinine 1.75 (H) 0.52 - 1.04 mg/dL    GFR Estimate 30 (L) >60 mL/min/1.7m2    GFR Estimate If Black 36 (L) >60 mL/min/1.7m2    Calcium 8.9 8.5 - 10.1 mg/dL   TSH    Collection Time: 02/09/18  2:11 PM   Result Value Ref Range    TSH 1.34 0.40 - 4.00 mU/L   T4 free    Collection Time: 02/09/18  2:11 PM   Result Value Ref Range    T4 Free 1.16 0.76 - 1.46 ng/dL       Assessment:  #1 osteoporosis in the setting of nontraumatic femoral fracture, impaired renal function, chronic steroid use and likely sarcoidosis  Overall, patient has been tolerating Prolia pretty well and  it had improved her bone density. Her renal function is stable but not normal. Patient will benefit of continuing with Prolia considering that there has been a 9 months gap since her last infusion. However, she plans to have 3 teeth extraction next week. To avoid possible Bisphosphonate-Related Osteonecrosis of the Jaw, we will hold off the prolia until 2 months after extraction.    Will check thyroid and parathyroid labs today.  Will recheck BMD with return visit in about 6 months.    #2 Right sided chest pain  Patient has localized right sided chest pain, graded 6/10. Pain grade changes with position and movement and upon palpation. This was previously assessed by emergency department with no new insights as regards to cardiac functions. The pain may be more of musculoskeletal nature. Patient does not take NSAIDs to avoid compromising kidney function. I prescribed a topical salicylate for her to help alleviate the pain. Topical salicylate is minimally absrobed following application to intact skin so this will help alleviate pain without affecting kidney function.  Patient is advised to use as needed.    #3 pulmonary issues due to pulmonary hypertension and sarcoidosis  The main issue appears to be her progressive pulmonary hypertension.  There appears to be minimal pulmonary sarcoidosis.  We'll defer to cardiology.    #4 sarcoidosis  Patient currently on Imuran.  I think that the Prolia is helping with the hypercalcemia..    #5 anxiety  Patient no longer on Zyprexa.  Dietary lifestyle changes recommended for weight loss.    25 minutes spent with patient of which 20 minutes was spent in face-to-face discussion coordination of care    I, Cherie Husdonung MS3, scribed for Dr. Jenna Salas. Dr. Jenna Salas reviewed and edited the aforementioned note. Jenna Salas MD personally performed the entire clinical encounter documented in this note.

## 2018-02-09 NOTE — LETTER
2/9/2018       RE: Judith Nelson  [address] [facility] [address]     Dear Colleague,    Thank you for referring your patient, Judith Nelson, to the [facility] ENDO[facility] at [facility]. Please see a copy of my visit note below.    Patient presents for follow-up of osteoporosis    #1 osteoporosis With history of nontraumatic fracture and chronic steroid use and sarcoidosis  Ms. Nelson is a 53-year-old female with a complex medical history including pulmonary hypertension for which she has been following up with Dr. Franco since 10/2010 and she was placed on inhaled treprostini and tadafanil with right sided heart showing a significant pulm HTN. Earlier in 3/201 she was found to have high Ca of 11.3 along with a normal PTH and worsening renal functions. Patient was given a dose of Zolendroic acid with a drop in her Ca to 9.4 next month. Her Ca has been stable with levels less than 10 till last wek when she had a Ca of 10.7. She had an extensive workup for her renal failure and renal biopsy showed a non-necrotizing granulomas and negative stains bringing up the issue of sarcoidosis. That diagnosis has been considered more likely in view of the hypercalcemia, chronic kidney disease and elevated ACE levels. She also found to have MGUS.  She has a history of osteoporosis based on a DEXA scan which was done in 02/2010 showing a T score of - 2.7 in the LS along with a non traumatic fracture of her left femur after a hip arthoplasty. She was seen in our clinic for that matter in 2010 and she was placed on a monthly Boniva since then but that has been stopped apparently secondary to her worsening renal fx, as well as, her Ca- vitamin D supplement has been stopped in view of the recurrent hypercalcemia. She has a repeat DXA scan in 2011 which showed possible improvement in her T score to - 2.5.  The patient has been on prednisone since 04/02/2013. When I evaluated the  "patient in 2013, her renal function remained quite poor with a creatinine around 2.0.  I then started the patient on Prolia.  She received her first dose in October 2013.  She tolerated this well.  She received her second dose of Prolia in April 2014.  She tolerated this well.  Her bone density in April 2014 showed the lowest T score -2.3 in the lumbar spine which has improved compared with her previous T score -2.5 in 2011.   Of note, the patient reports stopping her prednisone in 2014.  She has now been off her prednisone since roughly August 2014.  Her creatinine in September 2014 was about 1.98. The patient received her 3rd dose of Prolia in September 2014.   She received her fourth dose of Prolia in April 2015.  March 2015 DEXA scan showed improvement at the level of the lumbar spine and possible decline at the left total hip ( which appears nonsignificant). Her fifth dose of Prolia was given in August, 2017. She has a history of hypercalcemia related to her sarcoidosis although her September 2015 serum calcium was 8.9.    Interval history since last visit: Patient is doing well, mood unchanged per patient, though her accompanying daughter states that she worries \"all the time\". She denies any fall or fractures since last visit. She continues to take calcitriol. Patient had not gotten her teeth extracted since last visit, but has plan to do so next Friday (Feb. 16). Patient is due for another Prolia infusion and DEXA scan.     #2 Right sided chest pain  Patient went to the emergency department 3 days ago complaining of right sided chest pain. Work up by the emergency department included EKG study, Troponin I, CBC & CMP.They did not reach a definitive diagnosis. Patient was discharged without incident. She is still feeling pain on her chest today, rating it a 6/10 on the pain scale. She says she notices the pain more when she is not distracted by an activity. Pain increases with movement, does not radiate. She " does not recall any precipitating event that cause chest pain onset. The pain is more intense in the evening when she sleeps on the side. She denied shortness of breath. Patient ran out of Tylenol at home and has not have a chance to go get more. She avoids taking NSAIDs due to her history of sarcoidosis.       #3 pulmonary issues due to pulmonary artery hypertension and sarcoidosis  The patient has a history of pulmonary artery hypertension currently on adcirca and inhaled tyvaso .  Recent cardiology evaluation showed no vasodilator response, reduce RV function, and cardiac output.  Patient is considered potential candidate for oral remoduli and was referred to pulmonology for further management.  Cardiac PET scan is planned.  Pulmonology assessment in September 2015 is that she has minimal pulmonary involvement for her sarcoidosis.    #4 sarcoidosis  The patient had been previously on prednisone for treatment of sarcoidosis.  Per patient report, she's been on prednisone since 2013 which she had stopped roughly August 2014.  The patient is currently on Imuran.  This dose had been reduced because of her declining white count.  Interval history since last visit: 2014 ACTH stimulation test was normal.    #5 anxiety  The patient is no longer on Zyprexa.  She reports that her overall mood has improved.  However, she is interested in weight loss.    Interval history since last visit: patient mood has not changed. She is experiencing stress with her eldest daughter getting ready to undergo induced labor in a few days.     PM/SH:  History of right ankle infection in 2014 which grew Pseudomonas.  Her March 2014 lower extremity ultrasound did not show any venous insufficiency.          Past Medical History   Diagnosis Date     Other seborrheic keratosis      Hypertension     Hyperlipidemia      Pulmonary hypertension      Sarcoidosis, suggested on recent renal biopsy      Protrusio acetabuli    - Osteoporosis  - depression  and anxiety  - chronic kidney disease, stage 4    Past Surgical History:   Procedure Laterality Date     Csection,  X 2       CYSTOSCOPY, RETROGRADES, INSERT STENT URETER(S), COMBINED  10/2/2013    Procedure: COMBINED CYSTOSCOPY, RETROGRADES, INSERT STENT URETER(S);  Left Retrograde Ureteropyelogram and Ureteral Stent Placement;  Surgeon: SONIA Martinez MD;  Location: [facility] OR     ENDOSCOPIC RETROGRADE CHOLANGIOPANCREATOGRAM  2012    Procedure:ENDOSCOPIC RETROGRADE CHOLANGIOPANCREATOGRAM; ERCP biliary sphincterotomy and stone removal; Surgeon:MARGIE RUGGIERO; Location:[facility] OR     GASTROSCOPY,FL  6/10    Erythematous duodenopathy     LAPAROSCOPIC CHOLECYSTECTOMY WITH CHOLANGIOGRAMS  2012    Procedure:LAPAROSCOPIC CHOLECYSTECTOMY WITH CHOLANGIOGRAMS; Surgeon:BRIANA PADILLA; Location:[facility] OR     ORTHOPEDIC SURGERY  10/1/2010    Right hip replacement     Medications:  Current Outpatient Prescriptions   Medication Sig Dispense Refill     furosemide (LASIX) 40 MG tablet Take 40 MG --, 20 MG all other days. 90 tablet 3     BUSPIRONE HCL PO Take 15 mg by mouth 2 times daily       tadalafil, PAH, (ADCIRCA) 20 MG TABS Take 2 tablets (40 mg) by mouth daily 60 tablet 11     treprostinil diolamine ER (ORENITRAM) 0.125 MG CR tablet Take 4 tablets (0.5 mg) by mouth 3 times daily (with meals) 4.25mg tid 90 tablet 0     Lactobacillus (ACIDOPHILUS) CAPS Take 1 tablet by mouth as needed 30 capsule 0     potassium chloride (K-TAB,KLOR-CON) 10 MEQ tablet Take 1 tablet (10 mEq) by mouth daily 90 tablet 1     azaTHIOprine (IMURAN) 50 MG tablet Take 1 tablet (50 mg) by mouth daily 90 tablet 3     Macitentan (OPSUMIT PO) Take by mouth daily Pt doesn't know dosage,       carvedilol (COREG) 12.5 MG tablet Take 0.5 tablets (6.25 mg) by mouth 2 times daily (with meals) 135 tablet 3     denosumab (PROLIA) 60 MG/ML SOLN Inject 1 mL (60 mg) Subcutaneous every 6 months 1 mL 1     calcitRIOL (ROCALTROL) 0.25 MCG capsule Take 1  capsule (0.25 mcg) by mouth daily 30 capsule 1     SODIUM BICARBONATE PO Take by mouth 2 times daily       Multiple Vitamins-Minerals (EYE VITAMINS PO) Take 1 tablet by mouth daily       ORDER FOR DME Equipment being ordered: Compression stockings; 2 pair; custom measured. Med compression 20-30 mmhg 2 Package 2     TOPIRAMATE PO Take 100 mg by mouth At Bedtime       TEMAZEPAM PO Take by mouth At Bedtime       omeprazole (PRILOSEC) 20 MG capsule Take 1 capsule (20 mg) by mouth 2 times daily (before meals) 180 capsule 3     LORazepam (ATIVAN) 0.5 MG tablet Take 0.5 mg (1 tab) every morning and 1 mg (2 tabs) every night at bedtime. (Patient taking differently: 1 mg Take 0.5 mg (1 tab) every morning and 1 mg (2 tabs) every night at bedtime.) 90 tablet 1     buPROPion (WELLBUTRIN SR) 200 MG 12 hr tablet Take 1 tablet (200 mg) by mouth every morning 30 tablet 0     ORDER FOR DME Equipment being ordered: SHOWER CHAIR  FROM Viropro 1 Device 0     acetaminophen (TYLENOL) 500 MG tablet Take 1-2 tablets (500-1,000 mg) by mouth every 6 hours as needed for pain (Take as needed for pain.  Do not exceed 4 grams (8 tablets) in a day) 45 tablet 10     Elastic Bandages & Supports (T.E.D. BELOW KNEE/M-REGULAR) MISC 1 each daily 2 each 0     simvastatin (ZOCOR) 10 MG tablet Take 1 tablet by mouth At Bedtime. 90 tablet 1      Allergies:  Allergies   Allergen Reactions     Latex Rash     From gloves     Family History:  Family History   Problem Relation Age of Onset     Cancer Mother       age 62 leukemia     GI Mother      diverticulitis     Hypertension Mother      Allergies Mother      Arthritis Mother      Depression Mother      Eye Mother      Osteoporosis Mother      C.A.D. Father      MI/CAD      Cancer Father      skin     Blood Disease Father      renal  problem     Hypertension Father      Anesthesia Father      Cardiovascular Father      Neurological Father      Parkinson's disease     C.A.D. Maternal Grandmother      "  late 82s MI     Diabetes Maternal Grandmother      AMARA Maternal Grandfather       mid 80s MI     Alzheimers Paternal Grandmother       80s     Alzheimers Paternal Grandfather       80s     Neurological Sister      migraines     Allergies Sister      Diabetes Other      AODM     Neurological Sister      migraines     Allergies Sister      Diabetes Sister      hypoglycemia     Anesthesia Son      Anesthesia Daughter      Anesthesia Daughter      Social History:  Social History     Social History     Marital status:      Spouse name: N/A     Number of children: 3     Years of education: 12     Occupational History      Unemployed     Social History Main Topics     Smoking status: Never Smoker     Smokeless tobacco: Never Used     Alcohol use No      Comment: Occ.     Drug use: No     Sexual activity: No     Other Topics Concern     Parent/Sibling W/ Cabg, Mi Or Angioplasty Before 65f 55m? No     Social History Narrative     Review of System:   ROS: 10 point ROS neg other than the symptoms noted above in the HPI.    Physical Examination:  Blood pressure 104/68, pulse 105, height 1.6 m (5' 3\"), weight 54.3 kg (119 lb 9.6 oz), not currently breastfeeding.  Gen: NAD, comfortable,  No tremor noted today  PSYCH: Normal mood, stress with family obligation.  Alert and oriented   Musculoskeletal: localized right chest pain, intensity increases with palpation   Cardiovascular:  RRR without M/R/G  Pulmonary: lungs clear bilaterally, no crackle or stridor.   HENT: bilateral dental carries on bottom molar teeth and 2 teeth on top row.    Latest known medical visit with results is:  Recent Results (from the past 168 hour(s))   CBC with platelets differential    Collection Time: 18  4:11 AM   Result Value Ref Range    WBC 5.0 4.0 - 11.0 10e9/L    RBC Count 4.71 3.8 - 5.2 10e12/L    Hemoglobin 14.6 11.7 - 15.7 g/dL    Hematocrit 42.2 35.0 - 47.0 %    MCV 90 78 - 100 fl    MCH 31.0 26.5 - 33.0 pg    " MCHC 34.6 31.5 - 36.5 g/dL    RDW 13.0 10.0 - 15.0 %    Platelet Count 201 150 - 450 10e9/L    Diff Method Automated Method     % Neutrophils 60.6 %    % Lymphocytes 24.1 %    % Monocytes 12.3 %    % Eosinophils 2.2 %    % Basophils 0.6 %    % Immature Granulocytes 0.2 %    Absolute Neutrophil 3.1 1.6 - 8.3 10e9/L    Absolute Lymphocytes 1.2 0.8 - 5.3 10e9/L    Absolute Monocytes 0.6 0.0 - 1.3 10e9/L    Absolute Eosinophils 0.1 0.0 - 0.7 10e9/L    Absolute Basophils 0.0 0.0 - 0.2 10e9/L    Abs Immature Granulocytes 0.0 0 - 0.4 10e9/L   Comprehensive metabolic panel    Collection Time: 02/06/18  4:11 AM   Result Value Ref Range    Sodium 142 133 - 144 mmol/L    Potassium 3.6 3.4 - 5.3 mmol/L    Chloride 109 94 - 109 mmol/L    Carbon Dioxide 23 20 - 32 mmol/L    Anion Gap 10 3 - 14 mmol/L    Glucose 96 70 - 99 mg/dL    Urea Nitrogen 21 7 - 30 mg/dL    Creatinine 1.55 (H) 0.52 - 1.04 mg/dL    GFR Estimate 34 (L) >60 mL/min/1.7m2    GFR Estimate If Black 42 (L) >60 mL/min/1.7m2    Calcium 8.9 8.5 - 10.1 mg/dL    Bilirubin Total 0.4 0.2 - 1.3 mg/dL    Albumin 4.1 3.4 - 5.0 g/dL    Protein Total 7.4 6.8 - 8.8 g/dL    Alkaline Phosphatase 56 40 - 150 U/L    ALT 17 0 - 50 U/L    AST 13 0 - 45 U/L   Troponin I    Collection Time: 02/06/18  4:11 AM   Result Value Ref Range    Troponin I ES <0.015 0.000 - 0.045 ug/L   Basic metabolic panel    Collection Time: 02/09/18  2:11 PM   Result Value Ref Range    Sodium 140 133 - 144 mmol/L    Potassium 3.7 3.4 - 5.3 mmol/L    Chloride 108 94 - 109 mmol/L    Carbon Dioxide 24 20 - 32 mmol/L    Anion Gap 8 3 - 14 mmol/L    Glucose 101 (H) 70 - 99 mg/dL    Urea Nitrogen 24 7 - 30 mg/dL    Creatinine 1.75 (H) 0.52 - 1.04 mg/dL    GFR Estimate 30 (L) >60 mL/min/1.7m2    GFR Estimate If Black 36 (L) >60 mL/min/1.7m2    Calcium 8.9 8.5 - 10.1 mg/dL   TSH    Collection Time: 02/09/18  2:11 PM   Result Value Ref Range    TSH 1.34 0.40 - 4.00 mU/L   T4 free    Collection Time: 02/09/18  2:11 PM    Result Value Ref Range    T4 Free 1.16 0.76 - 1.46 ng/dL       Assessment:  #1 osteoporosis in the setting of nontraumatic femoral fracture, impaired renal function, chronic steroid use and likely sarcoidosis  Overall, patient has been tolerating Prolia pretty well and it had improved her bone density. Her renal function is stable but not normal. Patient will benefit of continuing with Prolia considering that there has been a 9 months gap since her last infusion. However, she plans to have 3 teeth extraction next week. To avoid possible Bisphosphonate-Related Osteonecrosis of the Jaw, we will hold off the prolia until 2 months after extraction.    Will check thyroid and parathyroid labs today.  Will recheck BMD with return visit in about 6 months.    #2 Right sided chest pain  Patient has localized right sided chest pain, graded 6/10. Pain grade changes with position and movement and upon palpation. This was previously assessed by emergency department with no new insights as regards to cardiac functions. The pain may be more of musculoskeletal nature. Patient does not take NSAIDs to avoid compromising kidney function. I prescribed a topical salicylate for her to help alleviate the pain. Topical salicylate is minimally absrobed following application to intact skin so this will help alleviate pain without affecting kidney function.  Patient is advised to use as needed.    #3 pulmonary issues due to pulmonary hypertension and sarcoidosis  The main issue appears to be her progressive pulmonary hypertension.  There appears to be minimal pulmonary sarcoidosis.  We'll defer to cardiology.    #4 sarcoidosis  Patient currently on Imuran.  I think that the Prolia is helping with the hypercalcemia..    #5 anxiety  Patient no longer on Zyprexa.  Dietary lifestyle changes recommended for weight loss.    25 minutes spent with patient of which 20 minutes was spent in face-to-face discussion coordination of care    Cherie COOPER  MS3, scribed for Dr. Jenna Salas. Dr. Jenna Salas reviewed and edited the aforementioned note. Jenna Salas MD personally performed the entire clinical encounter documented in this note.

## 2018-02-13 ENCOUNTER — TELEPHONE (OUTPATIENT)
Dept: ENDOCRINOLOGY | Facility: CLINIC | Age: 58
End: 2018-02-13

## 2018-02-13 LAB
DEPRECATED CALCIDIOL+CALCIFEROL SERPL-MC: <31 UG/L (ref 20–75)
VITAMIN D2 SERPL-MCNC: <5 UG/L
VITAMIN D3 SERPL-MCNC: 26 UG/L

## 2018-02-13 NOTE — TELEPHONE ENCOUNTER
Spoke with pt, informed PA is approved, to call [facility] Infusion center [phone], [phone] and schedule.   ----- Message -----     From: Jenna Salas MD     Sent: 2/12/2018  11:13 AM       To: Jesus Flores RN    Yes, please    ----- Message -----     From: Jesus Flores RN     Sent: 2/9/2018   1:22 PM       To: Jenna Salas MD    Yes pt just need to call their infusion center for scheduling, I see PA is not done yet do you want me to initiate one?  ----- Message -----     From: Jenna Salas MD     Sent: 2/9/2018   1:00 PM       To: [facility]    Can we give prolia at [facility]

## 2018-02-13 NOTE — PROGRESS NOTES
Dear Judith    Your labs which look pretty reasonable.  Your thyroid is normal.  Your PTH has improved.  Your kidney function has slightly worsened with a creatinine of 1.75.  I am thinking that your calcitriol (currently at 0.25 mcg daily) could be potentially increased to twice daily, but I will defer to Dr. Garcia.  Please discuss with him when you next meet with him.  I will send him a copy and let him know.    If you have any questions, please feel free to contact my nurse at [phone] select option #3 for triage nurse  or  option #1 for scheduling related questions.    Regards    Jenna Salas MD      Cc: Dr. Willi Garcia  [facility] (NEXT TO [facility], [place]ORDERING [place] [address])  [address]  [address]  T:  [phone]

## 2018-02-15 ENCOUNTER — TELEPHONE (OUTPATIENT)
Dept: ENDOCRINOLOGY | Facility: CLINIC | Age: 58
End: 2018-02-15

## 2018-02-15 NOTE — TELEPHONE ENCOUNTER
Called and left message.- Will send letter in the interim.    ---- Message from Jenna Salas MD sent at 2/13/2018  4:48 PM CST -----  Contact willi flores about pt  Cc: Dr. Willi JOSE - [facility] (NEXT TO [facility], [address]ORDER[place] [address])  [address]  [address]  T:  [phone]

## 2018-03-15 DIAGNOSIS — I50.9 HEART FAILURE (H): Primary | ICD-10-CM

## 2018-03-15 DIAGNOSIS — I27.0 PRIMARY PULMONARY HYPERTENSION (H): Primary | ICD-10-CM

## 2018-03-15 RX ORDER — POTASSIUM CHLORIDE 750 MG/1
10 TABLET, EXTENDED RELEASE ORAL DAILY
Qty: 90 TABLET | Refills: 1 | Status: SHIPPED | OUTPATIENT
Start: 2018-03-15 | End: 2018-09-21

## 2018-03-15 NOTE — TELEPHONE ENCOUNTER
Medication Refill double check:    Last office visit was with Dr. Franco on 11/1/17.    Follow up was recommended for 3-4 months.    Any additional encounters with changes to requested med? No    Authorizing provider is :Jeanne changed to Dr. Franco.    Refill was approved.     Additional orders/notes: Labs ordered for upcoming appt in clinic.    Keyanna Mccray RN on 3/15/2018 at 11:02 AM

## 2018-03-16 DIAGNOSIS — R06.09 DYSPNEA ON EXERTION: ICD-10-CM

## 2018-03-16 DIAGNOSIS — I27.20 PULMONARY HYPERTENSION (H): ICD-10-CM

## 2018-03-21 ENCOUNTER — OFFICE VISIT (OUTPATIENT)
Dept: CARDIOLOGY | Facility: CLINIC | Age: 58
End: 2018-03-21
Attending: INTERNAL MEDICINE
Payer: MEDICARE

## 2018-03-21 ENCOUNTER — RADIANT APPOINTMENT (OUTPATIENT)
Dept: CARDIOLOGY | Facility: CLINIC | Age: 58
End: 2018-03-21
Payer: MEDICARE

## 2018-03-21 VITALS
WEIGHT: 115 LBS | OXYGEN SATURATION: 98 % | SYSTOLIC BLOOD PRESSURE: 94 MMHG | BODY MASS INDEX: 20.37 KG/M2 | DIASTOLIC BLOOD PRESSURE: 59 MMHG | HEART RATE: 80 BPM

## 2018-03-21 DIAGNOSIS — D64.9 ANEMIA, UNSPECIFIED TYPE: ICD-10-CM

## 2018-03-21 DIAGNOSIS — I27.20 PULMONARY HYPERTENSION (H): ICD-10-CM

## 2018-03-21 DIAGNOSIS — I27.0 PRIMARY PULMONARY HYPERTENSION (H): Primary | ICD-10-CM

## 2018-03-21 DIAGNOSIS — I27.21 PULMONARY ARTERIAL HYPERTENSION (H): ICD-10-CM

## 2018-03-21 DIAGNOSIS — R06.02 SOB (SHORTNESS OF BREATH): ICD-10-CM

## 2018-03-21 DIAGNOSIS — I27.0 PRIMARY PULMONARY HYPERTENSION (H): ICD-10-CM

## 2018-03-21 DIAGNOSIS — R06.09 DYSPNEA ON EXERTION: ICD-10-CM

## 2018-03-21 LAB
ALBUMIN SERPL-MCNC: 4.1 G/DL (ref 3.4–5)
ALP SERPL-CCNC: 66 U/L (ref 40–150)
ALT SERPL W P-5'-P-CCNC: 22 U/L (ref 0–50)
ANION GAP SERPL CALCULATED.3IONS-SCNC: 7 MMOL/L (ref 3–14)
AST SERPL W P-5'-P-CCNC: 30 U/L (ref 0–45)
BILIRUB SERPL-MCNC: 0.4 MG/DL (ref 0.2–1.3)
BUN SERPL-MCNC: 20 MG/DL (ref 7–30)
CALCIUM SERPL-MCNC: 9.3 MG/DL (ref 8.5–10.1)
CHLORIDE SERPL-SCNC: 105 MMOL/L (ref 94–109)
CO2 SERPL-SCNC: 25 MMOL/L (ref 20–32)
CREAT SERPL-MCNC: 1.57 MG/DL (ref 0.52–1.04)
ERYTHROCYTE [DISTWIDTH] IN BLOOD BY AUTOMATED COUNT: 13.2 % (ref 10–15)
FERRITIN SERPL-MCNC: 9 NG/ML (ref 8–252)
GFR SERPL CREATININE-BSD FRML MDRD: 34 ML/MIN/1.7M2
GLUCOSE SERPL-MCNC: 86 MG/DL (ref 70–99)
HCT VFR BLD AUTO: 41.7 % (ref 35–47)
HGB BLD-MCNC: 13.9 G/DL (ref 11.7–15.7)
IRON SATN MFR SERPL: 14 % (ref 15–46)
IRON SERPL-MCNC: 53 UG/DL (ref 35–180)
MCH RBC QN AUTO: 31 PG (ref 26.5–33)
MCHC RBC AUTO-ENTMCNC: 33.3 G/DL (ref 31.5–36.5)
MCV RBC AUTO: 93 FL (ref 78–100)
NT-PROBNP SERPL-MCNC: 552 PG/ML (ref 0–125)
PLATELET # BLD AUTO: 206 10E9/L (ref 150–450)
POTASSIUM SERPL-SCNC: 3.5 MMOL/L (ref 3.4–5.3)
PROT SERPL-MCNC: 7.4 G/DL (ref 6.8–8.8)
RBC # BLD AUTO: 4.48 10E12/L (ref 3.8–5.2)
SODIUM SERPL-SCNC: 137 MMOL/L (ref 133–144)
TIBC SERPL-MCNC: 385 UG/DL (ref 240–430)
WBC # BLD AUTO: 5.2 10E9/L (ref 4–11)

## 2018-03-21 PROCEDURE — 82728 ASSAY OF FERRITIN: CPT | Performed by: INTERNAL MEDICINE

## 2018-03-21 PROCEDURE — 83540 ASSAY OF IRON: CPT | Performed by: INTERNAL MEDICINE

## 2018-03-21 PROCEDURE — G0463 HOSPITAL OUTPT CLINIC VISIT: HCPCS | Mod: ZF

## 2018-03-21 PROCEDURE — 83550 IRON BINDING TEST: CPT | Performed by: INTERNAL MEDICINE

## 2018-03-21 PROCEDURE — 36415 COLL VENOUS BLD VENIPUNCTURE: CPT | Performed by: INTERNAL MEDICINE

## 2018-03-21 PROCEDURE — 85027 COMPLETE CBC AUTOMATED: CPT | Performed by: INTERNAL MEDICINE

## 2018-03-21 PROCEDURE — 84238 ASSAY NONENDOCRINE RECEPTOR: CPT | Performed by: INTERNAL MEDICINE

## 2018-03-21 PROCEDURE — 99213 OFFICE O/P EST LOW 20 MIN: CPT | Performed by: INTERNAL MEDICINE

## 2018-03-21 PROCEDURE — 83880 ASSAY OF NATRIURETIC PEPTIDE: CPT | Performed by: INTERNAL MEDICINE

## 2018-03-21 PROCEDURE — 80053 COMPREHEN METABOLIC PANEL: CPT | Performed by: INTERNAL MEDICINE

## 2018-03-21 RX ORDER — LIDOCAINE 40 MG/G
CREAM TOPICAL
Status: CANCELLED | OUTPATIENT
Start: 2018-03-21

## 2018-03-21 ASSESSMENT — PAIN SCALES - GENERAL: PAINLEVEL: NO PAIN (0)

## 2018-03-21 NOTE — PROGRESS NOTES
Norm Franco M.D.  Cardiovascular Medicine    I personally saw and examined this patient, discussed care with housestaff and other consultants, reviewed current laboratories and imaging studies, and conveyed impression and diagnostic/therapeutic plan to patient.    The patient returns for follow-up.  She complains of excessive fatigue and daytime somnolence.  There is no good history of obstructive sleep apnea.  She has no melena, hematochezia or hematemesis.  She does describe black stools, depending on diet.  She has no abdominal pain, hematemesis.  Previous GI evaluations have been negative according to her report.  The patient does describe loose stools.  Medications are reviewed and she is on omeprazole which may cause diarrhea.  Her hemoglobin is stable at 13.9.  She has been iron deficient in the past.      Plan:    The patient will discontinue omeprazole and see if this clears her diarrhea.  Laboratory show iron deficiency and this will be supplemented      Objective  BP 94/59  Pulse 80  Wt 52.2 kg (115 lb)  SpO2 98%  BMI 20.37 kg/m2  Current Outpatient Prescriptions   Medication     ranitidine (RANITIDINE) 75 MG tablet     treprostinil diolamine ER (ORENITRAM) 0.125 MG CR tablet     potassium chloride (K-TAB,KLOR-CON) 10 MEQ tablet     furosemide (LASIX) 40 MG tablet     BUSPIRONE HCL PO     tadalafil, PAH, (ADCIRCA) 20 MG TABS     azaTHIOprine (IMURAN) 50 MG tablet     Macitentan (OPSUMIT PO)     carvedilol (COREG) 12.5 MG tablet     denosumab (PROLIA) 60 MG/ML SOLN     calcitRIOL (ROCALTROL) 0.25 MCG capsule     SODIUM BICARBONATE PO     TEMAZEPAM PO     LORazepam (ATIVAN) 0.5 MG tablet     buPROPion (WELLBUTRIN SR) 200 MG 12 hr tablet     ORDER FOR DME     acetaminophen (TYLENOL) 500 MG tablet     Elastic Bandages & Supports (T.E.D. BELOW KNEE/M-REGULAR) MISC     simvastatin (ZOCOR) 10 MG tablet     trolamine salicylate (ASPERCREME) 10 % cream     Lactobacillus (ACIDOPHILUS) CAPS     Multiple  Vitamins-Minerals (EYE VITAMINS PO)     ORDER FOR DME     TOPIRAMATE PO     No current facility-administered medications for this visit.      Wt Readings from Last 5 Encounters:   18 52.2 kg (115 lb)   18 54.3 kg (119 lb 9.6 oz)   18 52.2 kg (115 lb)   17 53.3 kg (117 lb 8 oz)   17 54.4 kg (120 lb)       Meds  Current Outpatient Prescriptions   Medication     potassium chloride (K-TAB,KLOR-CON) 10 MEQ tablet     furosemide (LASIX) 40 MG tablet     BUSPIRONE HCL PO     tadalafil, PAH, (ADCIRCA) 20 MG TABS     treprostinil diolamine ER (ORENITRAM) 0.125 MG CR tablet     azaTHIOprine (IMURAN) 50 MG tablet     Macitentan (OPSUMIT PO)     carvedilol (COREG) 12.5 MG tablet     denosumab (PROLIA) 60 MG/ML SOLN     calcitRIOL (ROCALTROL) 0.25 MCG capsule     SODIUM BICARBONATE PO     TEMAZEPAM PO     omeprazole (PRILOSEC) 20 MG capsule     LORazepam (ATIVAN) 0.5 MG tablet     buPROPion (WELLBUTRIN SR) 200 MG 12 hr tablet     ORDER FOR DME     acetaminophen (TYLENOL) 500 MG tablet     Elastic Bandages & Supports (T.E.D. BELOW KNEE/M-REGULAR) MISC     simvastatin (ZOCOR) 10 MG tablet     trolamine salicylate (ASPERCREME) 10 % cream     Lactobacillus (ACIDOPHILUS) CAPS     Multiple Vitamins-Minerals (EYE VITAMINS PO)     ORDER FOR DME     TOPIRAMATE PO     No current facility-administered medications for this visit.          Labs  Laboratories were reviewed and demonstrated a creatinine of 1.57 which is in the range of previous values the creatinine is 1.57, GFR 34  iron-binding capacity is reduced at 14%  Imaging   MRN: [id number]  : 1960  Study Date: 2018 01:00 PM  Age: 58 yrs  Gender: Female  [place] [facility] [place]  Reason For Study: Primary pulmonary hypertension (H)  Ordering Physician: MINE CASTRO  Referring Physician: MINE CASTRO  Performed By: Sekou Paiz RDCS     BSA: 1.6 m2  Height: 63 in  Weight: 119 lb  HR: 66  BP: 104/68  mmHg  _____________________________________________________________________________  __        Procedure  Complete Portable Echo Adult.  _____________________________________________________________________________  __        Interpretation Summary  Paradoxical septal motion consistent with right ventricular pressure and  volume overload is present.  Mild to moderate right ventricular dilation is present.  Global right ventricular function is normal.  Right ventricular systolic pressure is 73mmHg above the right atrial pressure.  No pericardial effusion is present.  Lower PA pressure when compared to prior study.  _____________________________________________________________________________  __        Left Ventricle  Global and regional left ventricular function is normal with an EF of 60-65%.  Left ventricular wall thickness is normal. Left ventricular size is normal.  Left ventricular diastolic function is normal. Paradoxical septal motion  consistent with right ventricular pressure and volume overload is present.     Right Ventricle  Global right ventricular function is normal. Mild to moderate right  ventricular dilation is present.     Atria  Both atria appear normal. The atrial septum is intact as assessed by color  Doppler .     Mitral Valve  The mitral valve is normal.        Aortic Valve  Aortic valve is normal in structure and function.     Tricuspid Valve  The tricuspid valve is normal. Mild tricuspid insufficiency is present. Right  ventricular systolic pressure is 73mmHg above the right atrial pressure.     Pulmonic Valve  The pulmonic valve is normal.     Vessels  The aorta root is normal. The pulmonary artery is normal. The inferior vena  cava is normal.     Pericardium  No pericardial effusion is present.        Compared to Previous Study  Lower PA pressure when compared to prior study.  _____________________________________________________________________________  __  MMode/2D Measurements &  Calculations  IVSd: 0.82 cm     LVIDd: 3.8 cm  LVIDs: 2.6 cm  LVPWd: 0.79 cm  FS: 33.2 %  LV mass(C)d: 88.2 grams  LV mass(C)dI: 56.9 grams/m2  LVOT diam: 2.0 cm  LVOT area: 3.2 cm2  LA Volume (BP): 50.0 ml  LA Volume Index (BP): 32.3 ml/m2  RWT: 0.41        Doppler Measurements & Calculations  MV E max oziel: 51.9 cm/sec  MV A max oziel: 45.1 cm/sec  MV E/A: 1.2     MV dec time: 0.30 sec  Ao V2 max: 117.1 cm/sec  Ao max P.0 mmHg  ZANDER(V,D): 2.8 cm2  LV V1 max P.4 mmHg  LV V1 max: 105.3 cm/sec  PA acc time: 0.07 sec  TR max oziel: 414.8 cm/sec  TR max P.9 mmHg  AV Oziel Ratio (DI): 0.90  E/E' av.3  Lateral E/e': 4.8  Medial E/e': 11.8

## 2018-03-21 NOTE — MR AVS SNAPSHOT
After Visit Summary   3/21/2018    Judith Nelson    MRN: [id number]           Patient Information     Date Of Birth          1960        Visit Information        Provider Department      3/21/2018 2:00 PM Norm Franco MD [facility]        Today's Diagnoses     Primary pulmonary hypertension (H)    -  1    Anemia, unspecified type        SOB (shortness of breath)          Care Instructions    Medication Changes:  -Stop Omeprazole  -Start Zantac 75-150mg per day  -Change Orenitram to 7.5mg twice a day    Patient Instructions:  -Continue staying active and eat a heart healthy diet.  -Right Heart Catheterization and labs  -Overnight Oximetry (You will be contacted by [employer])  -Add-on Iron Labs today    Check-In  Time Check-In Location Estimated Length Procedure   Name        [facility]  waiting room 60-90 minutes Right Heart Catheterization**     Procedure Preparations & Instructions     This is an invasive procedure that DOES require preparation:    - Nothing to eat for 6 hours   - You may have clear liquids up until the time of your procedure  - A ride should be arranged for you in the instance you are unable to drive home, however you should be able to function as you normally would after the procedure     *For Patients with Diabetes: ? DO NOT take any oral diabetic medication, short-acting diabetes medications/insulin, humalog or regular insulin the morning of your test  ? Take   dose of long-acting insulin (Lantus, Levemir) the day of your test  ? Remember to  bring your glucometer and insulin with you to take after your test if needed     *For Patients on anticoagulants: ? Your Coumadin Clinic will give you instructions on medication adjustments or bridging prior to the procedure          Follow up Appointment Information:  Follow up in clinic with Dr. Franco after your Heart Catheterization on the same day.    Results:  Component      Latest Ref Rng & Units 3/21/2018    Sodium      133 - 144 mmol/L 137   Potassium      3.4 - 5.3 mmol/L 3.5   Chloride      94 - 109 mmol/L 105   Carbon Dioxide      20 - 32 mmol/L 25   Anion Gap      3 - 14 mmol/L 7   Glucose      70 - 99 mg/dL 86   Urea Nitrogen      7 - 30 mg/dL 20   Creatinine      0.52 - 1.04 mg/dL 1.57 (H)   GFR Estimate      >60 mL/min/1.7m2 34 (L)   GFR Estimate If Black      >60 mL/min/1.7m2 41 (L)   Calcium      8.5 - 10.1 mg/dL 9.3   Bilirubin Total      0.2 - 1.3 mg/dL 0.4   Albumin      3.4 - 5.0 g/dL 4.1   Protein Total      6.8 - 8.8 g/dL 7.4   Alkaline Phosphatase      40 - 150 U/L 66   ALT      0 - 50 U/L 22   AST      0 - 45 U/L 30   WBC      4.0 - 11.0 10e9/L 5.2   RBC Count      3.8 - 5.2 10e12/L 4.48   Hemoglobin      11.7 - 15.7 g/dL 13.9   Hematocrit      35.0 - 47.0 % 41.7   MCV      78 - 100 fl 93   MCH      26.5 - 33.0 pg 31.0   MCHC      31.5 - 36.5 g/dL 33.3   RDW      10.0 - 15.0 % 13.2   Platelet Count      150 - 450 10e9/L 206   N-Terminal Pro Bnp      0 - 125 pg/mL 552 (H)     We are located on the third floor of the [facility] [place].  Our address is     [address] [place] [address] on [address] [place] [address]    Thank you for allowing us to be a part of your care here at the [facility]    If you have questions or concerns please contact us at:    Keyanna Mccray, RN, BSN, PHN Reji Escobar (Schedule,P.A.)  Nurse Coordinator     Clinic   Pulmonary Hypertension   Pulmonary Hypertension  [facility]  (P)[phone]    (P) [phone]        (F) [phone]    ** Please note that you will NOT receive a reminder call regarding your scheduled testing, reminder calls are for provider appointments only.  If you are scheduled for testing within the [facility] system you may receive a call regarding pre-registration for billing  purposes only.**     Remember to weigh yourself daily after voiding and before you consume any food or beverages and log the numbers.  If you have gained/lost 2 pounds overnight or 5 pounds in a week contact us immediately for medication adjustments or further instructions.   **Please call us immediately if you have any syncope, chest pain, edema, or decline in your functional status.    Support Group:  Pulmonary Hypertension Association  Https://www.phassociation.org/  **Look at the Events Tab** They even have Support Groups that you can call into    [facility] Support Group  First Saturday of the Month from 1-3 PM   Location: [address] [place] [address]  Leader: Michelet Garcia  Phone: [phone]  Email: [email or web address]            Follow-ups after your visit        Additional Services     ORAL SURGERY REFERRAL       Your provider has referred you to: [facility]  [phone]      Please be aware that coverage of these services is subject to the terms and limitations of your health insurance plan.  Call member services at your health plan with any benefit or coverage questions.      Please bring the following to your appointment:    >>   Any x-rays, CTs or MRIs which have been performed.  Contact the facility where they were done to arrange for  prior to your scheduled appointment.    >>   List of current medications   >>   This referral request   >>   Any documents/labs given to you for this referral                  Your next 10 appointments already scheduled     Apr 18, 2018  8:30 AM CDT   Procedure - 2.5 hour with U2A ROOM 15   [address] [facility] ([facility])    [address] [address]               Apr 18, 2018  9:30 AM CDT   Heart Cath Right Heart Cath with [id number]   [facility]    [address]  [address]   [phone]            Apr 18, 2018 12:30 PM CDT   (Arrive by 12:15 PM)   RETURN PRIMARY PULMONARY with Norm Franco MD   [facility] ([facility])    [address] [place] [address]  [address] [place] [id number] [phone]            May 01, 2018  2:00 PM CDT   SIX MINUTE WALK with UC PFL C, UC PFL 6 MINUTE WALK 1   [facility] Pulmonary Function Testing ([facility])    [address] [place] [address]   [phone]            May 01, 2018  3:00 PM CDT   (Arrive by 2:45 PM)   Return Interstitial Lung with Gael Flaherty MD   [facility] ([facility])    [address] [place] [address]  [address] [place] [id number] [phone]            Aug 10, 2018 12:00 PM CDT   DX HIP/PELVIS/SPINE with UCDX1   [facility] ([facility])    [address] [place] [id number] [phone]           Please do not take any of the following 24 hours prior to the day of your exam: vitamins, calcium tablets, antacids.  If possible, please wear clothes without metal (snaps, zippers). A sweatsuit works well.            Aug 10, 2018  1:00 PM CDT   (Arrive by 12:45 PM)   RETURN ENDOCRINE with Jenna Salas MD   [facility] Endocrinology ([facility])    [address] [place] [id number] [phone]              Future tests that were ordered for you today     Open Future Orders        Priority Expected Expires Ordered    Heart Cath Right heart cath Routine  3/21/2019 3/21/2018            Who to contact     If you have questions or need follow up information about today's clinic visit or your schedule please contact [facility] directly at [phone].  Normal or non-critical lab and imaging results will be communicated to you by MyChart, letter or phone  within 4 business days after the clinic has received the results. If you do not hear from us within 7 days, please contact the clinic through [employer] or phone. If you have a critical or abnormal lab result, we will notify you by phone as soon as possible.  Submit refill requests through [employer] or call your pharmacy and they will forward the refill request to us. Please allow 3 business days for your refill to be completed.          Additional Information About Your Visit        [employer]ist Information     [employer] gives you secure access to your electronic health record. If you see a primary care provider, you can also send messages to your care team and make appointments. If you have questions, please call your primary care clinic.  If you do not have a primary care provider, please call [phone] and they will assist you.        Care EveryWhere ID     This is your Care EveryWhere ID. This could be used by other organizations to access your [place] medical records  [id number]        Your Vitals Were     Pulse Pulse Oximetry BMI (Body Mass Index)             80 98% 20.37 kg/m2          Blood Pressure from Last 3 Encounters:   03/21/18 94/59   02/09/18 104/68   02/06/18 115/73    Weight from Last 3 Encounters:   03/21/18 52.2 kg (115 lb)   02/09/18 54.3 kg (119 lb 9.6 oz)   02/06/18 52.2 kg (115 lb)              We Performed the Following     ORAL SURGERY REFERRAL          Today's Medication Changes          These changes are accurate as of 3/21/18  3:17 PM.  If you have any questions, ask your nurse or doctor.               Start taking these medicines.        Dose/Directions    ranitidine 75 MG tablet   Commonly known as:  ranitidine   Used for:  Primary pulmonary hypertension (H), Anemia, unspecified type, SOB (shortness of breath)   Started by:  Norm Franco MD        Dose:   mg   Take 1-2 tablets ( mg) by mouth 2 times daily   Quantity:  60 tablet   Refills:  11         These medicines  have changed or have updated prescriptions.        Dose/Directions    LORazepam 0.5 MG tablet   Commonly known as:  ATIVAN   This may have changed:    - how much to take  - additional instructions   Used for:  Major depressive disorder, single episode, severe with psychotic features (H)        Take 0.5 mg (1 tab) every morning and 1 mg (2 tabs) every night at bedtime.   Quantity:  90 tablet   Refills:  1         Stop taking these medicines if you haven't already. Please contact your care team if you have questions.     omeprazole 20 MG CR capsule   Commonly known as:  priLOSEC   Stopped by:  Norm Franco MD                Where to get your medicines      These medications were sent to [id number] - [address] AT [facility] OF [address] [place] [address]     Phone:  [phone]     ranitidine 75 MG tablet                Primary Care Provider Office Phone # Fax #    Anupama Cecilia Babcock [phone]       [address] [address]        Equal Access to Services     [facility] : Hadii aad ku hadasho Soomaali, waaxda luqadaha, qaybta kaalmada adeegyaveronique, alysha abdi. So [facility] [phone].    ATENCIÓN: Si habla español, tiene a dhillon disposición servicios gratuitos de asistencia lingüística. [facility] [phone].    We comply with applicable federal civil rights laws and [place] laws. We do not discriminate on the basis of race, color, national origin, age, disability, sex, sexual orientation, or gender identity.            Thank you!     Thank you for choosing [facility]  for your care. Our goal is always to provide you with excellent care. Hearing back from our patients is one way we can continue to improve our services. Please take a few minutes to complete the written survey that you may receive in the mail after your visit with us. Thank  you!             Your Updated Medication List - Protect others around you: Learn how to safely use, store and throw away your medicines at www.disposemymeds.org.          This list is accurate as of 3/21/18  3:17 PM.  Always use your most recent med list.                   Brand Name Dispense Instructions for use Diagnosis    acetaminophen 500 MG tablet    TYLENOL    45 tablet    Take 1-2 tablets (500-1,000 mg) by mouth every 6 hours as needed for pain (Take as needed for pain.  Do not exceed 4 grams (8 tablets) in a day)    Pulmonary hypertension       acidophilus Caps     30 capsule    Take 1 tablet by mouth as needed    Sarcoidosis       azaTHIOprine 50 MG tablet    IMURAN    90 tablet    Take 1 tablet (50 mg) by mouth daily    Sarcoidosis       buPROPion 200 MG 12 hr tablet    WELLBUTRIN SR    30 tablet    Take 1 tablet (200 mg) by mouth every morning    Major depressive disorder, single episode, moderate (H)       BUSPIRONE HCL PO      Take 15 mg by mouth 2 times daily        calcitRIOL 0.25 MCG capsule    ROCALTROL    30 capsule    Take 1 capsule (0.25 mcg) by mouth daily        carvedilol 12.5 MG tablet    COREG    135 tablet    Take 0.5 tablets (6.25 mg) by mouth 2 times daily (with meals)    Chronic systolic heart failure (H)       denosumab 60 MG/ML Soln injection    PROLIA    1 mL    Inject 1 mL (60 mg) Subcutaneous every 6 months    Osteoporosis, postmenopausal       EYE VITAMINS PO      Take 1 tablet by mouth daily    Sarcoidosis       furosemide 40 MG tablet    LASIX    90 tablet    Take 40 MG M-W-F, 20 MG all other days.    Pulmonary hypertension, Stress-induced cardiomyopathy       LORazepam 0.5 MG tablet    ATIVAN    90 tablet    Take 0.5 mg (1 tab) every morning and 1 mg (2 tabs) every night at bedtime.    Major depressive disorder, single episode, severe with psychotic features (H)       OPSUMIT PO      Take by mouth daily Pt doesn't know dosage,        order for DME     1 Device    Equipment  being ordered: SHOWER CHAIR  FROM [facility]    Tremor, Generalized muscle weakness, Sarcoidosis       order for DME     2 Package    Equipment being ordered: Compression stockings; 2 pair; custom measured. Med compression 20-30 mmhg    Pulmonary hypertension, Edema       potassium chloride 10 MEQ tablet    K-TAB,KLOR-CON    90 tablet    Take 1 tablet (10 mEq) by mouth daily    Heart failure (H)       ranitidine 75 MG tablet    ranitidine    60 tablet    Take 1-2 tablets ( mg) by mouth 2 times daily    Primary pulmonary hypertension (H), Anemia, unspecified type, SOB (shortness of breath)       simvastatin 10 MG tablet    ZOCOR    90 tablet    Take 1 tablet by mouth At Bedtime.    Hypercholesterolemia       SODIUM BICARBONATE PO      Take by mouth 2 times daily    Pulmonary arterial hypertension       T.E.D. BELOW KNEE/M-REGULAR Misc     2 each    1 each daily    Lower extremity edema       tadalafil (PAH) 20 MG Tabs    ADCIRCA    60 tablet    Take 2 tablets (40 mg) by mouth daily    Pulmonary hypertension       TEMAZEPAM PO      Take by mouth At Bedtime        TOPIRAMATE PO      Take 100 mg by mouth At Bedtime        treprostinil diolamine ER 0.125 MG CR tablet    ORENITRAM    90 tablet    Take 4 tablets (0.5 mg) by mouth 3 times daily (with meals) 4.25mg tid    Pulmonary arterial hypertension       trolamine salicylate 10 % cream    ASPERCREME    141 g    Apply topically 2 times daily    Osteoporosis, postmenopausal, Abnormal findings on diagnostic imaging of other specified body structures

## 2018-03-21 NOTE — NURSING NOTE
Chief Complaint   Patient presents with     Follow Up For     4 month return echo and labs prior      Vitals were taken and medications were reconciled.   Talita Colindres RMA  1:47 PM

## 2018-03-21 NOTE — LETTER
3/21/2018      RE: Judith Nelson  [address] [facility] [address]       Dear Colleague,    Thank you for the opportunity to participate in the care of your patient, Judith Nelson, at the [facility]. Please see a copy of my visit note below.    Norm Franco M.D.  Cardiovascular Medicine    I personally saw and examined this patient, discussed care with housestaff and other consultants, reviewed current laboratories and imaging studies, and conveyed impression and diagnostic/therapeutic plan to patient.    The patient returns for follow-up.  She complains of excessive fatigue and daytime somnolence.  There is no good history of obstructive sleep apnea.  She has no melena, hematochezia or hematemesis.  She does describe black stools, depending on diet.  She has no abdominal pain, hematemesis.  Previous GI evaluations have been negative according to her report.  The patient does describe loose stools.  Medications are reviewed and she is on omeprazole which may cause diarrhea.  Her hemoglobin is stable at 13.9.  She has been iron deficient in the past.      Plan:    The patient will discontinue omeprazole and see if this clears her diarrhea.  Laboratory show iron deficiency and this will be supplemented      Objective  BP 94/59  Pulse 80  Wt 52.2 kg (115 lb)  SpO2 98%  BMI 20.37 kg/m2  Current Outpatient Prescriptions   Medication     ranitidine (RANITIDINE) 75 MG tablet     treprostinil diolamine ER (ORENITRAM) 0.125 MG CR tablet     potassium chloride (K-TAB,KLOR-CON) 10 MEQ tablet     furosemide (LASIX) 40 MG tablet     BUSPIRONE HCL PO     tadalafil, PAH, (ADCIRCA) 20 MG TABS     azaTHIOprine (IMURAN) 50 MG tablet     Macitentan (OPSUMIT PO)     carvedilol (COREG) 12.5 MG tablet     denosumab (PROLIA) 60 MG/ML SOLN     calcitRIOL (ROCALTROL) 0.25 MCG capsule     SODIUM BICARBONATE PO     TEMAZEPAM PO     LORazepam (ATIVAN) 0.5 MG  tablet     buPROPion (WELLBUTRIN SR) 200 MG 12 hr tablet     ORDER FOR DME     acetaminophen (TYLENOL) 500 MG tablet     Elastic Bandages & Supports (T.E.D. BELOW KNEE/M-REGULAR) MISC     simvastatin (ZOCOR) 10 MG tablet     trolamine salicylate (ASPERCREME) 10 % cream     Lactobacillus (ACIDOPHILUS) CAPS     Multiple Vitamins-Minerals (EYE VITAMINS PO)     ORDER FOR DME     TOPIRAMATE PO     No current facility-administered medications for this visit.      Wt Readings from Last 5 Encounters:   18 52.2 kg (115 lb)   18 54.3 kg (119 lb 9.6 oz)   18 52.2 kg (115 lb)   17 53.3 kg (117 lb 8 oz)   17 54.4 kg (120 lb)       Meds  Current Outpatient Prescriptions   Medication     potassium chloride (K-TAB,KLOR-CON) 10 MEQ tablet     furosemide (LASIX) 40 MG tablet     BUSPIRONE HCL PO     tadalafil, PAH, (ADCIRCA) 20 MG TABS     treprostinil diolamine ER (ORENITRAM) 0.125 MG CR tablet     azaTHIOprine (IMURAN) 50 MG tablet     Macitentan (OPSUMIT PO)     carvedilol (COREG) 12.5 MG tablet     denosumab (PROLIA) 60 MG/ML SOLN     calcitRIOL (ROCALTROL) 0.25 MCG capsule     SODIUM BICARBONATE PO     TEMAZEPAM PO     omeprazole (PRILOSEC) 20 MG capsule     LORazepam (ATIVAN) 0.5 MG tablet     buPROPion (WELLBUTRIN SR) 200 MG 12 hr tablet     ORDER FOR DME     acetaminophen (TYLENOL) 500 MG tablet     Elastic Bandages & Supports (T.E.D. BELOW KNEE/M-REGULAR) MISC     simvastatin (ZOCOR) 10 MG tablet     trolamine salicylate (ASPERCREME) 10 % cream     Lactobacillus (ACIDOPHILUS) CAPS     Multiple Vitamins-Minerals (EYE VITAMINS PO)     ORDER FOR DME     TOPIRAMATE PO     No current facility-administered medications for this visit.          Labs  Laboratories were reviewed and demonstrated a creatinine of 1.57 which is in the range of previous values the creatinine is 1.57, GFR 34  iron-binding capacity is reduced at 14%  Imaging   MRN: [id number]  : 1960  Study Date: 2018  01:00 PM  Age: 58 yrs  Gender: Female  Patient Location: [facility] [place]  Reason For Study: Primary pulmonary hypertension (H)  Ordering Physician: MINE CASTRO  Referring Physician: MINE CASTRO  Performed By: [facility]     BSA: 1.6 m2  Height: 63 in  Weight: 119 lb  HR: 66  BP: 104/68 mmHg  _____________________________________________________________________________  __        Procedure  Complete Portable Echo Adult.  _____________________________________________________________________________  __        Interpretation Summary  Paradoxical septal motion consistent with right ventricular pressure and  volume overload is present.  Mild to moderate right ventricular dilation is present.  Global right ventricular function is normal.  Right ventricular systolic pressure is 73mmHg above the right atrial pressure.  No pericardial effusion is present.  Lower PA pressure when compared to prior study.  _____________________________________________________________________________  __        Left Ventricle  Global and regional left ventricular function is normal with an EF of 60-65%.  Left ventricular wall thickness is normal. Left ventricular size is normal.  Left ventricular diastolic function is normal. Paradoxical septal motion  consistent with right ventricular pressure and volume overload is present.     Right Ventricle  Global right ventricular function is normal. Mild to moderate right  ventricular dilation is present.     Atria  Both atria appear normal. The atrial septum is intact as assessed by color  Doppler .     Mitral Valve  The mitral valve is normal.        Aortic Valve  Aortic valve is normal in structure and function.     Tricuspid Valve  The tricuspid valve is normal. Mild tricuspid insufficiency is present. Right  ventricular systolic pressure is 73mmHg above the right atrial pressure.     Pulmonic Valve  The pulmonic valve is normal.     Vessels  The aorta root is normal. The pulmonary artery  is normal. The inferior vena  cava is normal.     Pericardium  No pericardial effusion is present.        Compared to Previous Study  Lower PA pressure when compared to prior study.  _____________________________________________________________________________  __  MMode/2D Measurements & Calculations  IVSd: 0.82 cm     LVIDd: 3.8 cm  LVIDs: 2.6 cm  LVPWd: 0.79 cm  FS: 33.2 %  LV mass(C)d: 88.2 grams  LV mass(C)dI: 56.9 grams/m2  LVOT diam: 2.0 cm  LVOT area: 3.2 cm2  LA Volume (BP): 50.0 ml  LA Volume Index (BP): 32.3 ml/m2  RWT: 0.41        Doppler Measurements & Calculations  MV E max oziel: 51.9 cm/sec  MV A max oziel: 45.1 cm/sec  MV E/A: 1.2     MV dec time: 0.30 sec  Ao V2 max: 117.1 cm/sec  Ao max P.0 mmHg  ZANDER(V,D): 2.8 cm2  LV V1 max P.4 mmHg  LV V1 max: 105.3 cm/sec  PA acc time: 0.07 sec  TR max oziel: 414.8 cm/sec  TR max P.9 mmHg  AV Oziel Ratio (DI): 0.90  E/E' av.3  Lateral E/e': 4.8  Medial E/e': 11.8               Please do not hesitate to contact me if you have any questions/concerns.     Sincerely,     Norm Franco MD

## 2018-03-21 NOTE — NURSING NOTE
Discussed purpose, preparation, procedure and when to expect results reported back to the patient. Patient demonstrated understanding of this information and agreed to call with further questions or concerns.    Right Heart Catheterization: Patient was instructed regarding right heart catheterization, including discussion of the procedure, preparation, intra-procedural steps, and recovery at home. Patient demonstrated understanding of this information and agreed to call with further questions or concerns.    Med Reconcile: Reviewed and verified all current medications with the patient. The updated medication list was printed and given to the patient.    New Medication: Patient was educated regarding newly prescribed medication, including discussion of  the indication, administration, side effects, and when to report to MD or RN. Patient demonstrated understanding of this information and agreed to call with further questions or concerns.    Medication Change: Patient was educated regarding prescribed medication change, including discussion of the indication, administration, side effects, and when to report to MD or RN. Patient demonstrated understanding of this information and agreed to call with further questions or concerns.    Medication Chagnes where called in to Guillermina @ [facility].  Changes are below in pt instructions.    Labs: Patient was given results of the laboratory testing obtained today. Patient demonstrated understanding of this information and agreed to call with further questions or concerns.     Diet: Patient instructed regarding a heart healthy diet, including discussion of reduced fat and sodium intake. Patient demonstrated understanding of this information and agreed to call with further questions or concerns.    Return Appointment: Patient given instructions regarding scheduling next clinic visit. Patient demonstrated understanding of this information and agreed to call with further questions or  concerns.    Patient stated she understood all health information given and agreed to call with further questions or concerns.    Medication Changes:  -Stop Omeprazole  -Start Zantac 75-150mg per day  -Change Orenitram to 7.5mg twice a day    Patient Instructions:  -Continue staying active and eat a heart healthy diet.  -Right Heart Catheterization and labs  -Overnight Oximetry (You will be contacted by [employer])  -Add-on Iron Labs today    Check-In  Time Check-In Location Estimated Length Procedure   Name        [facility]  waiting room 60-90 minutes Right Heart Catheterization**     Procedure Preparations & Instructions     This is an invasive procedure that DOES require preparation:    - Nothing to eat for 6 hours   - You may have clear liquids up until the time of your procedure  - A ride should be arranged for you in the instance you are unable to drive home, however you should be able to function as you normally would after the procedure     *For Patients with Diabetes: ? DO NOT take any oral diabetic medication, short-acting diabetes medications/insulin, humalog or regular insulin the morning of your test  ? Take   dose of long-acting insulin (Lantus, Levemir) the day of your test  ? Remember to  bring your glucometer and insulin with you to take after your test if needed     *For Patients on anticoagulants: ? Your Coumadin Clinic will give you instructions on medication adjustments or bridging prior to the procedure          Follow up Appointment Information:  Follow up in clinic with Dr. Franco after your Heart Catheterization on the same day.    Results:  Component      Latest Ref Rng & Units 3/21/2018   Sodium      133 - 144 mmol/L 137   Potassium      3.4 - 5.3 mmol/L 3.5   Chloride      94 - 109 mmol/L 105   Carbon Dioxide      20 - 32 mmol/L 25   Anion Gap      3 - 14 mmol/L 7   Glucose      70 - 99 mg/dL 86   Urea Nitrogen      7 - 30 mg/dL 20   Creatinine      0.52 - 1.04 mg/dL 1.57 (H)   GFR Estimate       >60 mL/min/1.7m2 34 (L)   GFR Estimate If Black      >60 mL/min/1.7m2 41 (L)   Calcium      8.5 - 10.1 mg/dL 9.3   Bilirubin Total      0.2 - 1.3 mg/dL 0.4   Albumin      3.4 - 5.0 g/dL 4.1   Protein Total      6.8 - 8.8 g/dL 7.4   Alkaline Phosphatase      40 - 150 U/L 66   ALT      0 - 50 U/L 22   AST      0 - 45 U/L 30   WBC      4.0 - 11.0 10e9/L 5.2   RBC Count      3.8 - 5.2 10e12/L 4.48   Hemoglobin      11.7 - 15.7 g/dL 13.9   Hematocrit      35.0 - 47.0 % 41.7   MCV      78 - 100 fl 93   MCH      26.5 - 33.0 pg 31.0   MCHC      31.5 - 36.5 g/dL 33.3   RDW      10.0 - 15.0 % 13.2   Platelet Count      150 - 450 10e9/L 206   N-Terminal Pro Bnp      0 - 125 pg/mL 552 (H)

## 2018-03-21 NOTE — PATIENT INSTRUCTIONS
Medication Changes:  -Stop Omeprazole  -Start Zantac 75-150mg per day  -Change Orenitram to 7.5mg twice a day    Patient Instructions:  -Continue staying active and eat a heart healthy diet.  -Right Heart Catheterization and labs  -Overnight Oximetry (You will be contacted by [employer])  -Add-on Iron Labs today    Check-In  Time Check-In Location Estimated Length Procedure   Name        [facility]  waiting room 60-90 minutes Right Heart Catheterization**     Procedure Preparations & Instructions     This is an invasive procedure that DOES require preparation:    - Nothing to eat for 6 hours   - You may have clear liquids up until the time of your procedure  - A ride should be arranged for you in the instance you are unable to drive home, however you should be able to function as you normally would after the procedure     *For Patients with Diabetes: ? DO NOT take any oral diabetic medication, short-acting diabetes medications/insulin, humalog or regular insulin the morning of your test  ? Take   dose of long-acting insulin (Lantus, Levemir) the day of your test  ? Remember to  bring your glucometer and insulin with you to take after your test if needed     *For Patients on anticoagulants: ? Your Coumadin Clinic will give you instructions on medication adjustments or bridging prior to the procedure          Follow up Appointment Information:  Follow up in clinic with Dr. Franco after your Heart Catheterization on the same day.    Results:  Component      Latest Ref Rng & Units 3/21/2018   Sodium      133 - 144 mmol/L 137   Potassium      3.4 - 5.3 mmol/L 3.5   Chloride      94 - 109 mmol/L 105   Carbon Dioxide      20 - 32 mmol/L 25   Anion Gap      3 - 14 mmol/L 7   Glucose      70 - 99 mg/dL 86   Urea Nitrogen      7 - 30 mg/dL 20   Creatinine      0.52 - 1.04 mg/dL 1.57 (H)   GFR Estimate      >60 mL/min/1.7m2 34 (L)   GFR Estimate If Black      >60 mL/min/1.7m2 41 (L)   Calcium      8.5 - 10.1 mg/dL 9.3   Bilirubin  Total      0.2 - 1.3 mg/dL 0.4   Albumin      3.4 - 5.0 g/dL 4.1   Protein Total      6.8 - 8.8 g/dL 7.4   Alkaline Phosphatase      40 - 150 U/L 66   ALT      0 - 50 U/L 22   AST      0 - 45 U/L 30   WBC      4.0 - 11.0 10e9/L 5.2   RBC Count      3.8 - 5.2 10e12/L 4.48   Hemoglobin      11.7 - 15.7 g/dL 13.9   Hematocrit      35.0 - 47.0 % 41.7   MCV      78 - 100 fl 93   MCH      26.5 - 33.0 pg 31.0   MCHC      31.5 - 36.5 g/dL 33.3   RDW      10.0 - 15.0 % 13.2   Platelet Count      150 - 450 10e9/L 206   N-Terminal Pro Bnp      0 - 125 pg/mL 552 (H)     We are located on the third floor of the [facility] ([facility] on the [facility].  Our address is     [address] [place] [address] on [address] [place] [address]    Thank you for allowing us to be a part of your care here at the [facility]    If you have questions or concerns please contact us at:    Keyanna Mccray, RN, BSN, PHN Reji Escobar (Schedule,P.A.)  Nurse Coordinator     Clinic   Pulmonary Hypertension   Pulmonary Hypertension  [facility]  [phone])[phone]    (P) [phone]        (F) [phone]    ** Please note that you will NOT receive a reminder call regarding your scheduled testing, reminder calls are for provider appointments only.  If you are scheduled for testing within the [facility] system you may receive a call regarding pre-registration for billing purposes only.**     Remember to weigh yourself daily after voiding and before you consume any food or beverages and log the numbers.  If you have gained/lost 2 pounds overnight or 5 pounds in a week contact us immediately for medication adjustments or further instructions.   **Please call us immediately if you have any syncope, chest pain, edema, or decline in your functional status.    Support Group:  Pulmonary Hypertension  Association  Https://www.phassociation.org/  **Look at the Events Tab** They even have Support Groups that you can call into    [facility] Support Group  First Saturday of the Month from 1-3 PM   [facility] [address]  Leader: Michelet Garcia  Phone: [phone]  Email: [email or web address]

## 2018-03-22 DIAGNOSIS — D50.8 OTHER IRON DEFICIENCY ANEMIA: Primary | ICD-10-CM

## 2018-03-22 LAB — STFR SERPL-SCNC: 3.6 MG/L (ref 1.9–4.4)

## 2018-03-22 NOTE — PROGRESS NOTES
Left message for patient to call back and discuss Iron studies and need for Iron infusion.    Iron infusion orders placed. Keyanna Mccray RN on 3/22/2018 at 3:31 PM    -------------------------------------------------------    Patient called back and we reviewed Iron labs and need for IV Iron infusion. Patient will receive infusion at the [place] infusion center, number given.  Patient verbalized understanding, agreed with plan and denied any further questions.    Keyanan Mccray RN on 3/22/2018 at 4:02 PM

## 2018-03-26 ENCOUNTER — MEDICAL CORRESPONDENCE (OUTPATIENT)
Dept: HEALTH INFORMATION MANAGEMENT | Facility: CLINIC | Age: 58
End: 2018-03-26

## 2018-03-26 ENCOUNTER — CARE COORDINATION (OUTPATIENT)
Dept: CARDIOLOGY | Facility: CLINIC | Age: 58
End: 2018-03-26

## 2018-03-27 ENCOUNTER — TELEPHONE (OUTPATIENT)
Dept: CARDIOLOGY | Facility: CLINIC | Age: 58
End: 2018-03-27

## 2018-03-27 NOTE — TELEPHONE ENCOUNTER
Patient called to ask about her recommended Iron infusion being that her Iron level is only borderline low. Advised patient that the recommendation comes from the MD, but we can go ahead and reduce the number of infusions to 1 instead of 2.      Patient mentions that she went to  her newly prescribed Zantac, but it costs too much on her restricted budget.  She asked about alternatives.  Advised patient she should call her insurance company and ask them what is the cheapest alternative for the Zantac.  Advised patient to try either Maalox or Pepto for immediate relief.  Patient states she has had ongoing nausea that even woke her up last night.  She ate some chicken noodle soup today, but has felt uneasy for a while now.    Judith also mentions that she she woke last night, her forearm was very sore for no reason.  Advised patient to use an ice pack and Acetaminophen, but no NSAIDS.    Patient verbalized understanding, agreed with plan and denied any further questions. Keyanna Mccray RN on 3/27/2018 at 2:19 PM

## 2018-04-09 DIAGNOSIS — I27.0 PRIMARY PULMONARY HYPERTENSION (H): Primary | ICD-10-CM

## 2018-04-10 RX ORDER — MACITENTAN 10 MG/1
TABLET, FILM COATED ORAL
Qty: 30 TABLET | Refills: 10 | Status: SHIPPED | OUTPATIENT
Start: 2018-04-10 | End: 2018-08-08

## 2018-04-10 NOTE — TELEPHONE ENCOUNTER
Medication Refill double check:    Last office visit was on 3/21/18 with .    Follow up was recommended for same day as Right Heart Cath.    Any additional encounters with changes to requested med? no    Authorizing provider is: Dr. Franco    Refill was approved.     Additional orders/notes: pepito Mccray RN on 4/10/2018 at 10:15 AM

## 2018-04-17 ENCOUNTER — TELEPHONE (OUTPATIENT)
Dept: ENDOCRINOLOGY | Facility: CLINIC | Age: 58
End: 2018-04-17

## 2018-04-17 NOTE — TELEPHONE ENCOUNTER
----- Message from Jenna Salas MD sent at 4/17/2018 10:06 AM CDT -----      ----- Message -----     From: Jenna Salas MD     Sent: 4/12/2018       To: Jenna Salas MD    prolia get approved and to do at [place]? Has it been 2 month since extraction?

## 2018-04-17 NOTE — TELEPHONE ENCOUNTER
Left message on Pts vm regarding Prolia and question from Dr Salas to please check MyChart for message

## 2018-04-18 ENCOUNTER — APPOINTMENT (OUTPATIENT)
Dept: CARDIOLOGY | Facility: CLINIC | Age: 58
End: 2018-04-18
Attending: INTERNAL MEDICINE
Payer: MEDICARE

## 2018-04-18 ENCOUNTER — APPOINTMENT (OUTPATIENT)
Dept: MEDSURG UNIT | Facility: CLINIC | Age: 58
End: 2018-04-18
Attending: INTERNAL MEDICINE
Payer: MEDICARE

## 2018-04-18 ENCOUNTER — HOSPITAL ENCOUNTER (OUTPATIENT)
Facility: CLINIC | Age: 58
Discharge: HOME | End: 2018-04-18
Attending: INTERNAL MEDICINE | Admitting: INTERNAL MEDICINE
Payer: MEDICARE

## 2018-04-18 VITALS
DIASTOLIC BLOOD PRESSURE: 86 MMHG | TEMPERATURE: 97.3 F | HEART RATE: 75 BPM | OXYGEN SATURATION: 94 % | RESPIRATION RATE: 16 BRPM | SYSTOLIC BLOOD PRESSURE: 147 MMHG

## 2018-04-18 VITALS
HEIGHT: 64 IN | HEART RATE: 66 BPM | OXYGEN SATURATION: 96 % | SYSTOLIC BLOOD PRESSURE: 136 MMHG | DIASTOLIC BLOOD PRESSURE: 72 MMHG | BODY MASS INDEX: 20.18 KG/M2 | WEIGHT: 118.2 LBS

## 2018-04-18 DIAGNOSIS — D64.9 ANEMIA, UNSPECIFIED TYPE: ICD-10-CM

## 2018-04-18 DIAGNOSIS — R06.02 SOB (SHORTNESS OF BREATH): ICD-10-CM

## 2018-04-18 DIAGNOSIS — I27.0 PRIMARY PULMONARY HYPERTENSION (H): ICD-10-CM

## 2018-04-18 DIAGNOSIS — I27.20 PULMONARY HYPERTENSION (H): ICD-10-CM

## 2018-04-18 DIAGNOSIS — R06.09 DYSPNEA ON EXERTION: ICD-10-CM

## 2018-04-18 LAB
ALBUMIN SERPL-MCNC: 3.8 G/DL (ref 3.4–5)
ALP SERPL-CCNC: 65 U/L (ref 40–150)
ALT SERPL W P-5'-P-CCNC: 25 U/L (ref 0–50)
ANION GAP SERPL CALCULATED.3IONS-SCNC: 6 MMOL/L (ref 3–14)
AST SERPL W P-5'-P-CCNC: 27 U/L (ref 0–45)
BILIRUB SERPL-MCNC: 0.4 MG/DL (ref 0.2–1.3)
BUN SERPL-MCNC: 26 MG/DL (ref 7–30)
CALCIUM SERPL-MCNC: 8.9 MG/DL (ref 8.5–10.1)
CHLORIDE SERPL-SCNC: 111 MMOL/L (ref 94–109)
CO2 SERPL-SCNC: 23 MMOL/L (ref 20–32)
CREAT SERPL-MCNC: 1.36 MG/DL (ref 0.52–1.04)
ERYTHROCYTE [DISTWIDTH] IN BLOOD BY AUTOMATED COUNT: 12.9 % (ref 10–15)
GFR SERPL CREATININE-BSD FRML MDRD: 40 ML/MIN/1.7M2
GLUCOSE SERPL-MCNC: 128 MG/DL (ref 70–99)
HCT VFR BLD AUTO: 38.8 % (ref 35–47)
HGB BLD-MCNC: 13.3 G/DL (ref 11.7–15.7)
MCH RBC QN AUTO: 31.5 PG (ref 26.5–33)
MCHC RBC AUTO-ENTMCNC: 34.3 G/DL (ref 31.5–36.5)
MCV RBC AUTO: 92 FL (ref 78–100)
NT-PROBNP SERPL-MCNC: 951 PG/ML (ref 0–125)
PLATELET # BLD AUTO: 178 10E9/L (ref 150–450)
POTASSIUM SERPL-SCNC: 4.4 MMOL/L (ref 3.4–5.3)
PROT SERPL-MCNC: 7 G/DL (ref 6.8–8.8)
RBC # BLD AUTO: 4.22 10E12/L (ref 3.8–5.2)
SODIUM SERPL-SCNC: 140 MMOL/L (ref 133–144)
WBC # BLD AUTO: 5.3 10E9/L (ref 4–11)

## 2018-04-18 PROCEDURE — 93451 RIGHT HEART CATH: CPT

## 2018-04-18 PROCEDURE — 93010 ELECTROCARDIOGRAM REPORT: CPT | Performed by: INTERNAL MEDICINE

## 2018-04-18 PROCEDURE — 80053 COMPREHEN METABOLIC PANEL: CPT | Performed by: INTERNAL MEDICINE

## 2018-04-18 PROCEDURE — 25000125 ZZHC RX 250: Performed by: INTERNAL MEDICINE

## 2018-04-18 PROCEDURE — A9270 NON-COVERED ITEM OR SERVICE: HCPCS | Mod: GY | Performed by: INTERNAL MEDICINE

## 2018-04-18 PROCEDURE — 93005 ELECTROCARDIOGRAM TRACING: CPT

## 2018-04-18 PROCEDURE — 40000065 ZZH STATISTIC EKG NON-CHARGEABLE

## 2018-04-18 PROCEDURE — 27210788 ZZH MANIFOLD CR3

## 2018-04-18 PROCEDURE — 99213 OFFICE O/P EST LOW 20 MIN: CPT | Mod: ZP | Performed by: INTERNAL MEDICINE

## 2018-04-18 PROCEDURE — 85027 COMPLETE CBC AUTOMATED: CPT | Performed by: INTERNAL MEDICINE

## 2018-04-18 PROCEDURE — 27210806 ZZH SHEATH CR5

## 2018-04-18 PROCEDURE — 93451 RIGHT HEART CATH: CPT | Mod: 26 | Performed by: INTERNAL MEDICINE

## 2018-04-18 PROCEDURE — 36415 COLL VENOUS BLD VENIPUNCTURE: CPT | Performed by: INTERNAL MEDICINE

## 2018-04-18 PROCEDURE — 27211181 ZZH BALLOON TIP PRESSURE CR5

## 2018-04-18 PROCEDURE — 40000166 ZZH STATISTIC PP CARE STAGE 1

## 2018-04-18 PROCEDURE — G0463 HOSPITAL OUTPT CLINIC VISIT: HCPCS | Mod: ZF

## 2018-04-18 PROCEDURE — 27210982 ZZH KIT RT HC TOTES DISP CR7

## 2018-04-18 PROCEDURE — 83880 ASSAY OF NATRIURETIC PEPTIDE: CPT | Performed by: INTERNAL MEDICINE

## 2018-04-18 PROCEDURE — 25000132 ZZH RX MED GY IP 250 OP 250 PS 637: Mod: GY | Performed by: INTERNAL MEDICINE

## 2018-04-18 RX ORDER — LIDOCAINE 40 MG/G
CREAM TOPICAL
Status: COMPLETED | OUTPATIENT
Start: 2018-04-18 | End: 2018-04-18

## 2018-04-18 RX ORDER — LORAZEPAM 0.5 MG/1
1 TABLET ORAL ONCE
Status: COMPLETED | OUTPATIENT
Start: 2018-04-18 | End: 2018-04-18

## 2018-04-18 RX ADMIN — LIDOCAINE: 40 CREAM TOPICAL at 09:03

## 2018-04-18 RX ADMIN — LORAZEPAM 1 MG: 0.5 TABLET ORAL at 09:32

## 2018-04-18 ASSESSMENT — PAIN SCALES - GENERAL: PAINLEVEL: NO PAIN (0)

## 2018-04-18 NOTE — NURSING NOTE
Med Reconcile: Reviewed and verified all current medications with the patient. The updated medication list was printed and given to the patient.  New Medication: Patient was educated regarding newly prescribed medication, including discussion of  the indication, administration, side effects, and when to report to MD or RN. Patient demonstrated understanding of this information and agreed to call with further questions or concerns.  Diet: Patient instructed regarding a heart healthy diet, including discussion of reduced fat and sodium intake. Patient demonstrated understanding of this information and agreed to call with further questions or concerns.  Return Appointment: Patient given instructions regarding scheduling next clinic visit. Patient demonstrated understanding of this information and agreed to call with further questions or concerns.  Patient stated she understood all health information given and agreed to call with further questions or concerns.     Medication Changes:  We are going to start a Prior Authorization for you to start the medication Uptravi (selesipag).  You will be transitioned off of your Orenitram (treprostinil).   Please call us when you start medication.  If your co-pay is high, call us before you pick it up.  Also, express financial need to your Pharmacy stating that the medication is too expensive.    Patient Instructions:  -Continue staying active and eat a heart healthy diet.  -Jan Brito  -[employer] - [phone]  -Iron Infusion  -Therapies  -Pulmonary Rehab  -Orthopedic Appointment  -[facility] - Finance  -[facility] Care Coordinator        Follow up Appointment Information:  1 month with Korin

## 2018-04-18 NOTE — PROGRESS NOTES
D: Pt arrived in cath lab for Right Heart Catheterization  I: Right heart catheterization completed per MD.  7fr sheath removed from RIJV site, manual pressure applied until hemostasis achieved.  A: RIJV site clean, dry and intact. Soft, no hematoma.  P: Pt to transfer to  for discharge.  Pt educated on watching neck site for bleeding, hematoma, pressure on airway and voice changes.

## 2018-04-18 NOTE — PATIENT INSTRUCTIONS
Medication Changes:  We are going to start a Prior Authorization for you to start the medication Uptravi (selesipag).  You will be transitioned off of your Orenitram (treprostinil).   Please call us when you start medication.  If your co-pay is high, call us before you pick it up.  Also, express financial need to your Pharmacy stating that the medication is too expensive.    Patient Instructions:  -Continue staying active and eat a heart healthy diet.  -Jan Brito  -[facility] [facility] - [phone]  -Iron Infusion  -Therapies  -Pulmonary Rehab  -Orthopedic Appointment  -[facility] - Finance  -[facility] Care Coordinator    - For copay assistance for non-specialty medications go to [employer]    Follow up Appointment Information:  1 month with Korin    We are located on the third floor of the [facility] on the [facility].  Our address is     [address] [place] [address] [place] [address] [place] [address]    Thank you for allowing us to be a part of your care here at the [facility]    If you have questions or concerns please contact us at:    Keyanna Mccray, RN, BSN, PHN  Reji Escobar (Schedule,P.A.)  Nurse Coordinator     Clinic   Pulmonary Hypertension   Pulmonary Hypertension  [facility]  (P)[phone]    (P) [phone]        (F) [phone]    ** Please note that you will NOT receive a reminder call regarding your scheduled testing, reminder calls are for provider appointments only.  If you are scheduled for testing within the [facility] system you may receive a call regarding pre-registration for billing purposes only.**     Remember to weigh yourself daily after voiding and before you consume any food or beverages and log the numbers.  If you have gained/lost 2 pounds overnight or 5 pounds in a week contact us  immediately for medication adjustments or further instructions.   **Please call us immediately if you have any syncope, chest pain, edema, or decline in your functional status.    Support Group:  Pulmonary Hypertension Association  Https://www.phassociation.org/  **Look at the Events Tab** They even have Support Groups that you can call into    [facility] Support Group  First Saturday of the Month from 1-3 PM   Location: [address] [place] [address]  Leader: Michelet Garcia  Phone: [phone]  Email: [email or web address]

## 2018-04-18 NOTE — LETTER
"4/18/2018      RE: Judith Nelson  [address] [facility] [address]       Dear Colleague,    Thank you for the opportunity to participate in the care of your patient, Judith Nelson, at the [facility]. Please see a copy of my visit note below.    Norm Franco M.D.  Cardiovascular Medicine    I personally saw and examined this patient, discussed care with housestaff and other consultants, reviewed current laboratories and imaging studies, and conveyed impression and diagnostic/therapeutic plan to patient.    Problem List  1. Sarcoidosis/hypercalcemia  2. Chronic kidney disease with GFR 30-40  3. Chronic imuran   4. Iron deficiency  5.     History      Objective  /72  Pulse 66  Ht 1.613 m (5' 3.5\")  Wt 53.6 kg (118 lb 3.2 oz)  SpO2 96%  BMI 20.61 kg/m2    Wt Readings from Last 5 Encounters:   04/18/18 53.6 kg (118 lb 3.2 oz)   03/21/18 52.2 kg (115 lb)   02/09/18 54.3 kg (119 lb 9.6 oz)   02/06/18 52.2 kg (115 lb)   11/16/17 53.3 kg (117 lb 8 oz)       Meds  Current Outpatient Prescriptions   Medication     acetaminophen (TYLENOL) 500 MG tablet     azaTHIOprine (IMURAN) 50 MG tablet     buPROPion (WELLBUTRIN SR) 200 MG 12 hr tablet     BUSPIRONE HCL PO     calcitRIOL (ROCALTROL) 0.25 MCG capsule     carvedilol (COREG) 12.5 MG tablet     denosumab (PROLIA) 60 MG/ML SOLN     Elastic Bandages & Supports (T.E.D. BELOW KNEE/M-REGULAR) MISC     furosemide (LASIX) 40 MG tablet     LORazepam (ATIVAN) 0.5 MG tablet     Multiple Vitamins-Minerals (EYE VITAMINS PO)     OPSUMIT 10 MG tablet     ORDER FOR DME     potassium chloride (K-TAB,KLOR-CON) 10 MEQ tablet     ranitidine (RANITIDINE) 75 MG tablet     simvastatin (ZOCOR) 10 MG tablet     SODIUM BICARBONATE PO     tadalafil, PAH, (ADCIRCA) 20 MG TABS     TEMAZEPAM PO     treprostinil diolamine ER (ORENITRAM) 0.125 MG CR tablet     No current facility-administered medications for this visit. "          Labs  Results for ANALI GÓMEZ (MRN [id number]) as of 4/18/2018 14:04   Ref. Range 2/9/2018 14:11 3/21/2018 12:39 3/21/2018 13:22 4/18/2018 10:33   Sodium Latest Ref Range: 133 - 144 mmol/L 140 137     Potassium Latest Ref Range: 3.4 - 5.3 mmol/L 3.7 3.5     Chloride Latest Ref Range: 94 - 109 mmol/L 108 105     Carbon Dioxide Latest Ref Range: 20 - 32 mmol/L 24 25     Urea Nitrogen Latest Ref Range: 7 - 30 mg/dL 24 20     Creatinine Latest Ref Range: 0.52 - 1.04 mg/dL 1.75 (H) 1.57 (H)     GFR Estimate Latest Ref Range: >60 mL/min/1.7m2 30 (L) 34 (L)     GFR Estimate If Black Latest Ref Range: >60 mL/min/1.7m2 36 (L) 41 (L)     Calcium Latest Ref Range: 8.5 - 10.1 mg/dL 8.9 9.3     Anion Gap Latest Ref Range: 3 - 14 mmol/L 8 7     Albumin Latest Ref Range: 3.4 - 5.0 g/dL  4.1     Protein Total Latest Ref Range: 6.8 - 8.8 g/dL  7.4     Bilirubin Total Latest Ref Range: 0.2 - 1.3 mg/dL  0.4     Alkaline Phosphatase Latest Ref Range: 40 - 150 U/L  66     ALT Latest Ref Range: 0 - 50 U/L  22     AST Latest Ref Range: 0 - 45 U/L  30     25 OH Vit D total Latest Ref Range: 20 - 75 ug/L <31      25 OH Vit D2 Latest Units: ug/L <5      25 OH Vit D3 Latest Units: ug/L 26      Ferritin Latest Ref Range: 8 - 252 ng/mL  9     Iron Latest Ref Range: 35 - 180 ug/dL  53     Iron Binding Cap Latest Ref Range: 240 - 430 ug/dL  385     Iron Saturation Index Latest Ref Range: 15 - 46 %  14 (L)     N-Terminal Pro Bnp Latest Ref Range: 0 - 125 pg/mL  552 (H)     Soluble Transferrin Receptor Latest Ref Range: 1.9 - 4.4 mg/L  3.6     T4 Free Latest Ref Range: 0.76 - 1.46 ng/dL 1.16      TSH Latest Ref Range: 0.40 - 4.00 mU/L 1.34      Glucose Latest Ref Range: 70 - 99 mg/dL 101 (H) 86     WBC Latest Ref Range: 4.0 - 11.0 10e9/L  5.2     Hemoglobin Latest Ref Range: 11.7 - 15.7 g/dL  13.9     Hematocrit Latest Ref Range: 35.0 - 47.0 %  41.7     Platelet Count Latest Ref Range: 150 - 450 10e9/L  206     RBC Count Latest Ref  Range: 3.8 - 5.2 10e12/L  4.48     MCV Latest Ref Range: 78 - 100 fl  93     MCH Latest Ref Range: 26.5 - 33.0 pg  31.0     MCHC Latest Ref Range: 31.5 - 36.5 g/dL  33.3     RDW Latest Ref Range: 10.0 - 15.0 %  13.2     Parathyroid Hormone Intact Latest Ref Range: 12 - 72 pg/mL 112 (H)      ECHO COMPLETE Unknown   Rpt    EKG SIGNAL AVERAGE Unknown    Rpt       Imaging   BSA 1.52  HR 60 bpm   /84/109 mmHg  RA 7/10/7  /10  /14/60    PCW 10/10/10  PA sat 58.8%   SpO2 97%  Hgb 11.8 g/dL   Assumed Dipti CO/CI 2.7/1.5  TD CO/CI 3.5/2.0  Measured VO2: 231  Measured Dipti CO/CO 3.9/2.2   PVR 12.8 (using measured Dipti)  TPR 15.4 (using measured Dipti)  SVR 2092 (using measured Dipti)  Assessment/Plan       Please do not hesitate to contact me if you have any questions/concerns.     Sincerely,     Norm Franco MD

## 2018-04-18 NOTE — PROGRESS NOTES
"Norm Franco M.D.  Cardiovascular Medicine    I personally saw and examined this patient, discussed care with housestaff and other consultants, reviewed current laboratories and imaging studies, and conveyed impression and diagnostic/therapeutic plan to patient.    Problem List  1. Sarcoidosis/hypercalcemia  2. Chronic kidney disease with GFR 30-40  3. Chronic imuran   4. Iron deficiency  5.     History      Objective  /72  Pulse 66  Ht 1.613 m (5' 3.5\")  Wt 53.6 kg (118 lb 3.2 oz)  SpO2 96%  BMI 20.61 kg/m2    Wt Readings from Last 5 Encounters:   04/18/18 53.6 kg (118 lb 3.2 oz)   03/21/18 52.2 kg (115 lb)   02/09/18 54.3 kg (119 lb 9.6 oz)   02/06/18 52.2 kg (115 lb)   11/16/17 53.3 kg (117 lb 8 oz)       Meds  Current Outpatient Prescriptions   Medication     acetaminophen (TYLENOL) 500 MG tablet     azaTHIOprine (IMURAN) 50 MG tablet     buPROPion (WELLBUTRIN SR) 200 MG 12 hr tablet     BUSPIRONE HCL PO     calcitRIOL (ROCALTROL) 0.25 MCG capsule     carvedilol (COREG) 12.5 MG tablet     denosumab (PROLIA) 60 MG/ML SOLN     Elastic Bandages & Supports (T.E.D. BELOW KNEE/M-REGULAR) MISC     furosemide (LASIX) 40 MG tablet     LORazepam (ATIVAN) 0.5 MG tablet     Multiple Vitamins-Minerals (EYE VITAMINS PO)     OPSUMIT 10 MG tablet     ORDER FOR DME     potassium chloride (K-TAB,KLOR-CON) 10 MEQ tablet     ranitidine (RANITIDINE) 75 MG tablet     simvastatin (ZOCOR) 10 MG tablet     SODIUM BICARBONATE PO     tadalafil, PAH, (ADCIRCA) 20 MG TABS     TEMAZEPAM PO     treprostinil diolamine ER (ORENITRAM) 0.125 MG CR tablet     No current facility-administered medications for this visit.          Labs  Results for ANALI GÓMEZ (MRN [id number]) as of 4/18/2018 14:04   Ref. Range 2/9/2018 14:11 3/21/2018 12:39 3/21/2018 13:22 4/18/2018 10:33   Sodium Latest Ref Range: 133 - 144 mmol/L 140 137     Potassium Latest Ref Range: 3.4 - 5.3 mmol/L 3.7 3.5     Chloride Latest Ref Range: 94 - 109 mmol/L 108 " 105     Carbon Dioxide Latest Ref Range: 20 - 32 mmol/L 24 25     Urea Nitrogen Latest Ref Range: 7 - 30 mg/dL 24 20     Creatinine Latest Ref Range: 0.52 - 1.04 mg/dL 1.75 (H) 1.57 (H)     GFR Estimate Latest Ref Range: >60 mL/min/1.7m2 30 (L) 34 (L)     GFR Estimate If Black Latest Ref Range: >60 mL/min/1.7m2 36 (L) 41 (L)     Calcium Latest Ref Range: 8.5 - 10.1 mg/dL 8.9 9.3     Anion Gap Latest Ref Range: 3 - 14 mmol/L 8 7     Albumin Latest Ref Range: 3.4 - 5.0 g/dL  4.1     Protein Total Latest Ref Range: 6.8 - 8.8 g/dL  7.4     Bilirubin Total Latest Ref Range: 0.2 - 1.3 mg/dL  0.4     Alkaline Phosphatase Latest Ref Range: 40 - 150 U/L  66     ALT Latest Ref Range: 0 - 50 U/L  22     AST Latest Ref Range: 0 - 45 U/L  30     25 OH Vit D total Latest Ref Range: 20 - 75 ug/L <31      25 OH Vit D2 Latest Units: ug/L <5      25 OH Vit D3 Latest Units: ug/L 26      Ferritin Latest Ref Range: 8 - 252 ng/mL  9     Iron Latest Ref Range: 35 - 180 ug/dL  53     Iron Binding Cap Latest Ref Range: 240 - 430 ug/dL  385     Iron Saturation Index Latest Ref Range: 15 - 46 %  14 (L)     N-Terminal Pro Bnp Latest Ref Range: 0 - 125 pg/mL  552 (H)     Soluble Transferrin Receptor Latest Ref Range: 1.9 - 4.4 mg/L  3.6     T4 Free Latest Ref Range: 0.76 - 1.46 ng/dL 1.16      TSH Latest Ref Range: 0.40 - 4.00 mU/L 1.34      Glucose Latest Ref Range: 70 - 99 mg/dL 101 (H) 86     WBC Latest Ref Range: 4.0 - 11.0 10e9/L  5.2     Hemoglobin Latest Ref Range: 11.7 - 15.7 g/dL  13.9     Hematocrit Latest Ref Range: 35.0 - 47.0 %  41.7     Platelet Count Latest Ref Range: 150 - 450 10e9/L  206     RBC Count Latest Ref Range: 3.8 - 5.2 10e12/L  4.48     MCV Latest Ref Range: 78 - 100 fl  93     MCH Latest Ref Range: 26.5 - 33.0 pg  31.0     MCHC Latest Ref Range: 31.5 - 36.5 g/dL  33.3     RDW Latest Ref Range: 10.0 - 15.0 %  13.2     Parathyroid Hormone Intact Latest Ref Range: 12 - 72 pg/mL 112 (H)      ECHO COMPLETE Unknown   Rpt     EKG SIGNAL AVERAGE Unknown    Rpt       Imaging   BSA 1.52  HR 60 bpm   /84/109 mmHg  RA 7/10/7  /10  /14/60    PCW 10/10/10  PA sat 58.8%   SpO2 97%  Hgb 11.8 g/dL   Assumed Dipti CO/CI 2.7/1.5  TD CO/CI 3.5/2.0  Measured VO2: 231  Measured Dipti CO/CO 3.9/2.2   PVR 12.8 (using measured Dipti)  TPR 15.4 (using measured Dipti)  SVR 2092 (using measured Dipti)  Assessment/Plan

## 2018-04-18 NOTE — DISCHARGE INSTRUCTIONS
[facility]                        Interventional Cardiology  Discharge Instructions   Post Right Heart Cath      AFTER YOU GO HOME:    DO drink plenty of fluids    DO resume your regular diet and medications unless otherwise instructed by your Primary Physician    Do Not scrub the procedure site vigorously    No lotion or powder to the puncture site for 3 days    CALL YOUR PRIMARY PHYSICIAN IF: You may resume all normal activity.  Monitor neck site for bleeding, swelling, or voice changes. If you notice bleeding or swelling immediately apply pressure to the site and call number below to speak with Cardiology Fellow.  If you experience any changes in your breathing you should call your doctor immediately or come to the closest Emergency Department.  Do not drive yourself.    ADDITIONAL INSTRUCTIONS: Medications: You are to resume all home medications including anticoagulation therapy unless otherwise advised by your primary cardiologist or nurse coordinator.    Follow Up: Per your primary cardiology team    If you have any questions or concerns regarding your procedure site please call [phone] at anytime and ask for Cardiology Fellow on call.  They are available 24 hours a day.  You may also contact the Cardiology Clinic after hours number at [phone].                                                       Telephone Numbers [phone] Monday-Friday 8:00 am to 4:30 pm    [phone] After 4:30 pm Monday-Friday, Weekends & Holidays  Ask for Interventional Cardiologist on call. Someone is on call 24 hours/day   [facility] toll free number [phone] Monday-Friday 8:00 am to 4:30 pm   [facility] Emergency Dept [phone]

## 2018-04-18 NOTE — NURSING NOTE
Chief Complaint   Patient presents with     Follow Up For      Return for 1 month PH F/U with labs and RHC prior (Needs IV meds)     Vitals were taken and medications were reconciled.     Bebe Ly, KASI  12:34 PM

## 2018-04-18 NOTE — MR AVS SNAPSHOT
After Visit Summary   4/18/2018    Judith Nelson    MRN: [id number]           Patient Information     Date Of Birth          1960        Visit Information        Provider Department      4/18/2018 12:30 PM Norm Franco MD [facility]        Today's Diagnoses     Pulmonary hypertension        Dyspnea on exertion          Care Instructions    Medication Changes:  We are going to start a Prior Authorization for you to start the medication Uptravi (selesipag).  You will be transitioned off of your Orenitram (treprostinil).   Please call us when you start medication.  If your co-pay is high, call us before you pick it up.  Also, express financial need to your Pharmacy stating that the medication is too expensive.    Patient Instructions:  -Continue staying active and eat a heart healthy diet.  -Jan Brito  -[employer] - [phone]  -Iron Infusion  -Therapies  -Pulmonary Rehab  -Orthopedic Appointment  -[facility] - Finance  -[facility] Care Coordinator    - For copay assistance for non-specialty medications go to [employer]    Follow up Appointment Information:  1 month with Korin    We are located on the third floor of the [facility] on the [facility].  Our address is     [address] [place] [address] [place] [address] [place] [address]    Thank you for allowing us to be a part of your care here at the [facility]    If you have questions or concerns please contact us at:    Keyanna Mccray, RN, BSN, PHN  Reji Escobar (Schedule,P.A.)  Nurse Coordinator     Clinic   Pulmonary Hypertension   Pulmonary Hypertension  [facility]  (P)[phone]    (P) [phone]        (F) [phone]    ** Please note that you will NOT receive a reminder call regarding your scheduled testing, reminder  calls are for provider appointments only.  If you are scheduled for testing within the [facility] system you may receive a call regarding pre-registration for billing purposes only.**     Remember to weigh yourself daily after voiding and before you consume any food or beverages and log the numbers.  If you have gained/lost 2 pounds overnight or 5 pounds in a week contact us immediately for medication adjustments or further instructions.   **Please call us immediately if you have any syncope, chest pain, edema, or decline in your functional status.    Support Group:  Pulmonary Hypertension Association  Https://www.phassociation.org/  **Look at the Events Tab** They even have Support Groups that you can call into    [facility] Support [facility]  First Saturday of the Month from 1-3 PM   Location: [address] [place] [address]  Leader: Michelet Garcia  Phone: [phone]  Email: [email or web address]          Follow-ups after your visit        Additional Services     Follow-Up with Cardiac Advanced Practice Provider       Labs today  1 month follow up with labs  Iron Infusions                  Your next 10 appointments already scheduled     Apr 18, 2018  4:15 PM CDT   Lab with [facility]   [facility] ([facility])    [address]  1st Floor  [facility] [id number] [phone]            May 01, 2018  2:00 PM CDT   SIX MINUTE WALK with UC PFL C, UC PFL 6 MINUTE WALK 1   [facility] Pulmonary Function Testing ([facility])    [address]  3rd Floor  [facility] [id number] [phone]            May 01, 2018  3:00 PM CDT   (Arrive by 2:45 PM)   Return Interstitial Lung with Gael Flaherty MD   [facility] ([facility])    [address]  [address]  [facility] [id number] [phone]            May 21, 2018 10:15 AM CDT   Lab with  LAB    [facility])    [address]  1st Floor  [facility] [address] [phone]            May 21, 2018 10:45 AM CDT   (Arrive by 10:30 AM)   RETURN PRIMARY PULMONARY with JAVAN Beaulieu CNP   [facility] ([facility])    [address]  [address]  [facility] [id number] [phone]            Aug 10, 2018 12:00 PM CDT   DX HIP/PELVIS/SPINE with UCDX1   [facility] ([facility])    [address]  1st [facility] [id number] [phone]           Please do not take any of the following 24 hours prior to the day of your exam: vitamins, calcium tablets, antacids.  If possible, please wear clothes without metal (snaps, zippers). A sweatsuit works well.            Aug 10, 2018  1:00 PM CDT   (Arrive by 12:45 PM)   RETURN ENDOCRINE with Jenna Salas MD    [facility] ([facility])    [address] [place] [address]  3rd Floor  [facility] [address] [phone]              Future tests that were ordered for you today     Open Future Orders        Priority Expected Expires Ordered    Follow-Up with Cardiac Advanced Practice Provider Routine  4/18/2019 4/18/2018    Comprehensive metabolic panel Routine  4/18/2019 4/18/2018    N terminal pro BNP outpatient Routine  4/18/2019 4/18/2018    CBC with platelets Routine  4/18/2019 4/18/2018    Comprehensive metabolic panel Routine  4/18/2019 4/18/2018    N terminal pro BNP outpatient Routine  4/18/2019 4/18/2018    CBC with platelets Routine  4/18/2019 4/18/2018            Who to contact     If you have questions or need follow up information about today's clinic visit or your schedule please contact [facility] directly at [phone].  Normal or non-critical lab and imaging results will be communicated to you by MyChart, letter or phone within 4 business days after the clinic has received the results. If  "you do not hear from us within 7 days, please contact the clinic through [employer] or phone. If you have a critical or abnormal lab result, we will notify you by phone as soon as possible.  Submit refill requests through [employer] or call your pharmacy and they will forward the refill request to us. Please allow 3 business days for your refill to be completed.          Additional Information About Your Visit        [employer] Information     [employer] gives you secure access to your electronic health record. If you see a primary care provider, you can also send messages to your care team and make appointments. If you have questions, please call your primary care clinic.  If you do not have a primary care provider, please call [phone] and they will assist you.        Care EveryWhere ID     This is your Care EveryWhere ID. This could be used by other organizations to access your [facility] medical records  [id number]        Your Vitals Were     Pulse Height Pulse Oximetry BMI (Body Mass Index)          66 1.613 m (5' 3.5\") 96% 20.61 kg/m2         Blood Pressure from Last 3 Encounters:   04/18/18 136/72   04/18/18 147/86   03/21/18 94/59    Weight from Last 3 Encounters:   04/18/18 53.6 kg (118 lb 3.2 oz)   03/21/18 52.2 kg (115 lb)   02/09/18 54.3 kg (119 lb 9.6 oz)                 Today's Medication Changes          These changes are accurate as of 4/18/18  4:00 PM.  If you have any questions, ask your nurse or doctor.               These medicines have changed or have updated prescriptions.        Dose/Directions    LORazepam 0.5 MG tablet   Commonly known as:  ATIVAN   This may have changed:    - how much to take  - additional instructions   Used for:  Major depressive disorder, single episode, severe with psychotic features (H)        Take 0.5 mg (1 tab) every morning and 1 mg (2 tabs) every night at bedtime.   Quantity:  90 tablet   Refills:  1                Primary Care Provider Office Phone # Fax #    Anupama Brooks " MD Sadiq [phone]       [place] [address]        Equal Access to Services     LESLIE CHACKO : Flavio Leiva, natanael salas, truptijc katrevon antonyayalaveronique, waxlupillo wendyin hayaajohann hardynasir lillichiquitafranklin abdi. So [facility] [phone].    ATENCIÓN: Si habla español, tiene a dhillon disposición servicios gratuitos de asistencia lingüística. Llame al [phone].    We comply with applicable federal civil rights laws and [place] laws. We do not discriminate on the basis of race, color, national origin, age, disability, sex, sexual orientation, or gender identity.            Thank you!     Thank you for choosing [facility]  for your care. Our goal is always to provide you with excellent care. Hearing back from our patients is one way we can continue to improve our services. Please take a few minutes to complete the written survey that you may receive in the mail after your visit with us. Thank you!             Your Updated Medication List - Protect others around you: Learn how to safely use, store and throw away your medicines at www.disposemymeds.org.          This list is accurate as of 4/18/18  4:00 PM.  Always use your most recent med list.                   Brand Name Dispense Instructions for use Diagnosis    acetaminophen 500 MG tablet    TYLENOL    45 tablet    Take 1-2 tablets (500-1,000 mg) by mouth every 6 hours as needed for pain (Take as needed for pain.  Do not exceed 4 grams (8 tablets) in a day)    Pulmonary hypertension       azaTHIOprine 50 MG tablet    IMURAN    90 tablet    Take 1 tablet (50 mg) by mouth daily    Sarcoidosis       buPROPion 200 MG 12 hr tablet    WELLBUTRIN SR    30 tablet    Take 1 tablet (200 mg) by mouth every morning    Major depressive disorder, single episode, moderate (H)       BUSPIRONE HCL PO      Take 15 mg by mouth 2 times daily        calcitRIOL 0.25 MCG capsule    ROCALTROL    30 capsule    Take 1  capsule (0.25 mcg) by mouth daily        carvedilol 12.5 MG tablet    COREG    135 tablet    Take 0.5 tablets (6.25 mg) by mouth 2 times daily (with meals)    Chronic systolic heart failure (H)       denosumab 60 MG/ML Soln injection    PROLIA    1 mL    Inject 1 mL (60 mg) Subcutaneous every 6 months    Osteoporosis, postmenopausal       EYE VITAMINS PO      Take 1 tablet by mouth daily    Sarcoidosis       furosemide 40 MG tablet    LASIX    90 tablet    Take 40 MG M-W-F, 20 MG all other days.    Pulmonary hypertension, Stress-induced cardiomyopathy       LORazepam 0.5 MG tablet    ATIVAN    90 tablet    Take 0.5 mg (1 tab) every morning and 1 mg (2 tabs) every night at bedtime.    Major depressive disorder, single episode, severe with psychotic features (H)       OPSUMIT 10 MG tablet   Generic drug:  macitentan     30 tablet    TAKE ONE TABLET (10MG) BY MOUTH DAILY. DO NOT HANDLE IF PREGNANT. DO NOT SPLIT, CRUSH, OR CHEW. REVIEW MEDICATION GUIDE. CALL [phone]    Primary pulmonary hypertension (H)       order for DME     1 Device    Equipment being ordered: SHOWER CHAIR  FROM [facility]    Tremor, Generalized muscle weakness, Sarcoidosis       potassium chloride 10 MEQ tablet    K-TAB,KLOR-CON    90 tablet    Take 1 tablet (10 mEq) by mouth daily    Heart failure (H)       ranitidine 75 MG tablet    ranitidine    60 tablet    Take 1-2 tablets ( mg) by mouth 2 times daily    Primary pulmonary hypertension (H), Anemia, unspecified type, SOB (shortness of breath)       simvastatin 10 MG tablet    ZOCOR    90 tablet    Take 1 tablet by mouth At Bedtime.    Hypercholesterolemia       SODIUM BICARBONATE PO      Take by mouth 2 times daily    Pulmonary arterial hypertension       T.E.D. BELOW KNEE/M-REGULAR Misc     2 each    1 each daily    Lower extremity edema       tadalafil (PAH) 20 MG Tabs    ADCIRCA    60 tablet    Take 2 tablets (40 mg) by mouth daily    Pulmonary hypertension       TEMAZEPAM PO       Take by mouth At Bedtime        treprostinil diolamine ER 0.125 MG CR tablet    ORENITRAM    90 tablet    Take 60 tablets (7.5 mg) by mouth 2 times daily (with meals) 4.25mg tid    Pulmonary arterial hypertension

## 2018-04-18 NOTE — IP AVS SNAPSHOT
MRN:[id number]                      After Visit Summary   4/18/2018    Judith Nelson    MRN: [id number]           Visit Information        Department      4/18/2018  8:35 AM [address] [facility]          Review of your medicines      UNREVIEWED medicines. Ask your doctor about these medicines        Dose / Directions    acetaminophen 500 MG tablet   Commonly known as:  TYLENOL   Used for:  Pulmonary hypertension        Dose:  500-1000 mg   Take 1-2 tablets (500-1,000 mg) by mouth every 6 hours as needed for pain (Take as needed for pain.  Do not exceed 4 grams (8 tablets) in a day)   Quantity:  45 tablet   Refills:  10       azaTHIOprine 50 MG tablet   Commonly known as:  IMURAN   Used for:  Sarcoidosis        Dose:  50 mg   Take 1 tablet (50 mg) by mouth daily   Quantity:  90 tablet   Refills:  3       buPROPion 200 MG 12 hr tablet   Commonly known as:  WELLBUTRIN SR   Used for:  Major depressive disorder, single episode, moderate (H)        Dose:  200 mg   Take 1 tablet (200 mg) by mouth every morning   Quantity:  30 tablet   Refills:  0       BUSPIRONE HCL PO        Dose:  15 mg   Take 15 mg by mouth 2 times daily   Refills:  0       calcitRIOL 0.25 MCG capsule   Commonly known as:  ROCALTROL        Dose:  0.25 mcg   Take 1 capsule (0.25 mcg) by mouth daily   Quantity:  30 capsule   Refills:  1       carvedilol 12.5 MG tablet   Commonly known as:  COREG   Used for:  Chronic systolic heart failure (H)        Dose:  6.25 mg   Take 0.5 tablets (6.25 mg) by mouth 2 times daily (with meals)   Quantity:  135 tablet   Refills:  3       denosumab 60 MG/ML Soln injection   Commonly known as:  PROLIA   Used for:  Osteoporosis, postmenopausal        Dose:  60 mg   Inject 1 mL (60 mg) Subcutaneous every 6 months   Quantity:  1 mL   Refills:  1       EYE VITAMINS PO   Used for:  Sarcoidosis        Dose:  1 tablet   Take 1 tablet by mouth daily   Refills:  0       furosemide 40 MG tablet   Commonly  known as:  LASIX   Used for:  Pulmonary hypertension, Stress-induced cardiomyopathy        Take 40 MG M-W-F, 20 MG all other days.   Quantity:  90 tablet   Refills:  3       LORazepam 0.5 MG tablet   Commonly known as:  ATIVAN   Used for:  Major depressive disorder, single episode, severe with psychotic features (H)        Take 0.5 mg (1 tab) every morning and 1 mg (2 tabs) every night at bedtime.   Quantity:  90 tablet   Refills:  1       * OPSUMIT PO        Take by mouth daily Pt doesn't know dosage,   Refills:  0       * OPSUMIT 10 MG tablet   Used for:  Primary pulmonary hypertension (H)   Generic drug:  macitentan        TAKE ONE TABLET (10MG) BY MOUTH DAILY. DO NOT HANDLE IF PREGNANT. DO NOT SPLIT, CRUSH, OR CHEW. REVIEW MEDICATION GUIDE. CALL [phone]   Quantity:  30 tablet   Refills:  10       potassium chloride 10 MEQ tablet   Commonly known as:  K-TAB,KLOR-CON   Used for:  Heart failure (H)        Dose:  10 mEq   Take 1 tablet (10 mEq) by mouth daily   Quantity:  90 tablet   Refills:  1       ranitidine 75 MG tablet   Commonly known as:  ranitidine   Used for:  Primary pulmonary hypertension (H), Anemia, unspecified type, SOB (shortness of breath)        Dose:   mg   Take 1-2 tablets ( mg) by mouth 2 times daily   Quantity:  60 tablet   Refills:  11       simvastatin 10 MG tablet   Commonly known as:  ZOCOR   Used for:  Hypercholesterolemia        Dose:  10 mg   Take 1 tablet by mouth At Bedtime.   Quantity:  90 tablet   Refills:  1       SODIUM BICARBONATE PO   Used for:  Pulmonary arterial hypertension        Take by mouth 2 times daily   Refills:  0       tadalafil (PAH) 20 MG Tabs   Commonly known as:  ADCIRCA   Used for:  Pulmonary hypertension        Dose:  40 mg   Take 2 tablets (40 mg) by mouth daily   Quantity:  60 tablet   Refills:  11       TEMAZEPAM PO        Take by mouth At Bedtime   Refills:  0       TOPIRAMATE PO        Dose:  100 mg   Take 100 mg by mouth At Bedtime    Refills:  0       treprostinil diolamine ER 0.125 MG CR tablet   Commonly known as:  ORENITRAM   Used for:  Pulmonary arterial hypertension        Dose:  7.5 mg   Take 60 tablets (7.5 mg) by mouth 2 times daily (with meals) 4.25mg tid   Quantity:  90 tablet   Refills:  0       * Notice:  This list has 2 medication(s) that are the same as other medications prescribed for you. Read the directions carefully, and ask your doctor or other care provider to review them with you.      CONTINUE these medicines which have NOT CHANGED        Dose / Directions    order for DME   Used for:  Tremor, Generalized muscle weakness, Sarcoidosis        Equipment being ordered: SHOWER CHAIR  FROM [employer]   Quantity:  1 Device   Refills:  0       T.E.D. BELOW KNEE/M-REGULAR Misc   Used for:  Lower extremity edema        Dose:  1 each   1 each daily   Quantity:  2 each   Refills:  0                Protect others around you: Learn how to safely use, store and throw away your medicines at www.disposemymeds.org.         Follow-ups after your visit        Your next 10 appointments already scheduled     Apr 18, 2018  9:30 AM CDT   Heart Cath Right Heart [facility] with [id number]   [facility] ([facility] [place])    [address] [facility] [address] [phone]            Apr 18, 2018 12:30 PM CDT   (Arrive by 12:15 PM)   RETURN PRIMARY PULMONARY with Norm Franco MD   [facility] ([facility])    [address]  [address] [place] [id number] [phone]            May 01, 2018  2:00 PM CDT   SIX MINUTE WALK with UC PFL C, UC PFL 6 MINUTE WALK 1   [facility] Pulmonary Function Testing ([facility])    [address]  3rd Floor  [facility] [address] [phone]            May 01, 2018  3:00 PM CDT   (Arrive by 2:45 PM)   Return Interstitial Lung with Gael Flaherty MD   [facility] ([facility])    [address]  [address]  [facility] [place] [id number] [phone]            Aug 10, 2018 12:00 PM CDT   DX HIP/PELVIS/SPINE with UCDX1   [facility] ([facility])    [address]  1st Floor  [facility] [place] [id number] [phone]           Please do not take any of the following 24 hours prior to the day of your exam: vitamins, calcium tablets, antacids.  If possible, please wear clothes without metal (snaps, zippers). A sweatsuit works well.            Aug 10, 2018  1:00 PM CDT   (Arrive by 12:45 PM)   RETURN ENDOCRINE with Jenna Salas MD   [facility] ([facility])    [address] [place] [address]  3rd Floor  [facility] [place] [id number] [phone]               Care Instructions        Further instructions from your care team       [facility]                        Interventional Cardiology  Discharge Instructions   Post Right Heart Cath      AFTER YOU GO HOME:    DO drink plenty of fluids    DO resume your regular diet and medications unless otherwise instructed by your Primary Physician    Do Not scrub the procedure site vigorously    No lotion or powder to the puncture site for 3 days    CALL YOUR PRIMARY PHYSICIAN IF: You may resume all normal activity.  Monitor neck site for bleeding, swelling, or voice changes. If you notice bleeding or swelling immediately apply pressure to the site and call number below to speak with Cardiology Fellow.  If you experience any changes in your breathing you should call your doctor immediately or come to the closest Emergency Department.  Do not drive yourself.    ADDITIONAL INSTRUCTIONS: Medications: You are to resume all home medications including anticoagulation therapy unless otherwise advised by your primary cardiologist or nurse coordinator.    Follow Up: Per your primary cardiology  team    If you have any questions or concerns regarding your procedure site please call [phone] at anytime and ask for Cardiology Fellow on call.  They are available 24 hours a day.  You may also contact the Cardiology Clinic after hours number at [phone].                                                       Telephone Numbers [phone] Monday-Friday 8:00 am to 4:30 pm    [phone] After 4:30 pm Monday-Friday, Weekends & Holidays  Ask for Interventional Cardiologist on call. Someone is on call 24 hours/day   [facility] toll free number [phone] Monday-Friday 8:00 am to 4:30 pm   [facility] Emergency Dept [phone]                    Additional Information About Your Visit        [employer] Information     [employer] gives you secure access to your electronic health record. If you see a primary care provider, you can also send messages to your care team and make appointments. If you have questions, please call your primary care clinic.  If you do not have a primary care provider, please call [phone] and they will assist you.        Care EveryWhere ID     This is your Care EveryWhere ID. This could be used by other organizations to access your [facility] medical records  [id number]        Your Vitals Were     Blood Pressure Pulse Temperature Respirations Pulse Oximetry       136/72 (BP Location: Right arm) 66 97.3  F (36.3  C) (Oral) 16 96%        Primary Care Provider Office Phone # Fax #    Anupama Babcock [phone]      Equal Access to Services     LESLIE CHACKO : Hadii aad ku hadasho Soomaali, waaxda luqadaha, qaybta kaalmada adeegyada, alysha worley . So [facility] [phone].    ATENCIÓN: Si habla español, tiene a dhillon disposición servicios gratuitos de asistencia lingüística. Llame al [phone].    We comply with applicable federal civil rights laws and [place] laws. We do not discriminate on the basis of race, color, national origin, age,  disability, sex, sexual orientation, or gender identity.            Thank you!     Thank you for choosing [facility] for your care. Our goal is always to provide you with excellent care. Hearing back from our patients is one way we can continue to improve our services. Please take a few minutes to complete the written survey that you may receive in the mail after you visit with us. Thank you!             Medication List: This is a list of all your medications and when to take them. Check marks below indicate your daily home schedule. Keep this list as a reference.      Medications           Morning Afternoon Evening Bedtime As Needed    acetaminophen 500 MG tablet   Commonly known as:  TYLENOL   Take 1-2 tablets (500-1,000 mg) by mouth every 6 hours as needed for pain (Take as needed for pain.  Do not exceed 4 grams (8 tablets) in a day)                                azaTHIOprine 50 MG tablet   Commonly known as:  IMURAN   Take 1 tablet (50 mg) by mouth daily                                buPROPion 200 MG 12 hr tablet   Commonly known as:  WELLBUTRIN SR   Take 1 tablet (200 mg) by mouth every morning                                BUSPIRONE HCL PO   Take 15 mg by mouth 2 times daily                                calcitRIOL 0.25 MCG capsule   Commonly known as:  ROCALTROL   Take 1 capsule (0.25 mcg) by mouth daily                                carvedilol 12.5 MG tablet   Commonly known as:  COREG   Take 0.5 tablets (6.25 mg) by mouth 2 times daily (with meals)                                denosumab 60 MG/ML Soln injection   Commonly known as:  PROLIA   Inject 1 mL (60 mg) Subcutaneous every 6 months                                EYE VITAMINS PO   Take 1 tablet by mouth daily                                furosemide 40 MG tablet   Commonly known as:  LASIX   Take 40 MG M-W-F, 20 MG all other days.                                LORazepam 0.5 MG tablet   Commonly known as:  ATIVAN   Take 0.5 mg (1 tab) every  morning and 1 mg (2 tabs) every night at bedtime.                                * OPSUMIT PO   Take by mouth daily Pt doesn't know dosage,                                * OPSUMIT 10 MG tablet   TAKE ONE TABLET (10MG) BY MOUTH DAILY. DO NOT HANDLE IF PREGNANT. DO NOT SPLIT, CRUSH, OR CHEW. REVIEW MEDICATION GUIDE. CALL [phone]   Generic drug:  macitentan                                order for DME   Equipment being ordered: SHOWER CHAIR  FROM [employer]                                potassium chloride 10 MEQ tablet   Commonly known as:  K-TAB,KLOR-CON   Take 1 tablet (10 mEq) by mouth daily                                ranitidine 75 MG tablet   Commonly known as:  ranitidine   Take 1-2 tablets ( mg) by mouth 2 times daily                                simvastatin 10 MG tablet   Commonly known as:  ZOCOR   Take 1 tablet by mouth At Bedtime.                                SODIUM BICARBONATE PO   Take by mouth 2 times daily                                T.E.D. BELOW KNEE/M-REGULAR Misc   1 each daily                                tadalafil (PAH) 20 MG Tabs   Commonly known as:  ADCIRCA   Take 2 tablets (40 mg) by mouth daily                                TEMAZEPAM PO   Take by mouth At Bedtime                                TOPIRAMATE PO   Take 100 mg by mouth At Bedtime                                treprostinil diolamine ER 0.125 MG CR tablet   Commonly known as:  ORENITRAM   Take 60 tablets (7.5 mg) by mouth 2 times daily (with meals) 4.25mg tid                                * Notice:  This list has 2 medication(s) that are the same as other medications prescribed for you. Read the directions carefully, and ask your doctor or other care provider to review them with you.

## 2018-04-18 NOTE — IP AVS SNAPSHOT
[address] [address]                                       After Visit Summary   4/18/2018    Judith Nelson    MRN: [id number]           After Visit Summary Signature Page     I have received my discharge instructions, and my questions have been answered. I have discussed any challenges I see with this plan with the nurse or doctor.    ..........................................................................................................................................  Patient/Patient Representative Signature      ..........................................................................................................................................  Patient Representative Print Name and Relationship to Patient    ..................................................               ................................................  Date                                            Time    ..........................................................................................................................................  Reviewed by Signature/Title    ...................................................              ..............................................  Date                                                            Time

## 2018-04-18 NOTE — PROCEDURES
PROCEDURES PERFORMED:   1. Right heart catheterization.     PHYSICIANS:  1. Attending Cardiology Staff: Jeanne Angel MD  2. Cardiology Fellow: Jaimee Gordillo MD      INDICATION:  Judith Nelson is a 58 year old female with pulmonary sarcoid and pulmonary hypertension who presents on an elective outpatient basis for evaluation of invasive hemodynamic. She is on triple therapy with Orenitram, Tadalafil, Macitentan.     DESCRIPTION:  1. Sterile prep and procedure.  2. Venous Location: right internal jugular vein  3. Access: Local anesthetic with lidocaine.  A micropuncture(21 g) needle with ultrasound guidance was used to establish access using a modified Seldinger technique.  4. Venous Sheath: 7F standard sheath  5. Catheters: PA catheter     MEDICATIONS:  1. None     HEMODYNAMICS:  BSA 1.52  HR 60 bpm   /84/109 mmHg  RA 7/10/7  /10  /14/60    PCW 10/10/10  PA sat 58.8%   SpO2 97%  Hgb 11.8 g/dL   Assumed Dipti CO/CI 2.7/1.5  TD CO/CI 3.5/2.0  Measured VO2: 231  Measured Dipti CO/CO 3.9/2.2   PVR 12.8 (using measured Dipti)  TPR 15.4 (using measured Dipti)  SVR 2092 (using measured Dipti)     COMPLICATIONS:  1. None     SUMMARY:   1. Normal right-sided and left-sided filling pressures.  2. Severe pulmonary hypertension with a mean PAP of 60 mmHg.  3. Mildly reduced measured Dipti cardiac output of 3.9 L/min and cardiac index of 2.2.     PLAN:   1. Bedrest per protocol.  2. Discharge today per protocol.     See CVIS report for final draft.     Findings discussed with the primary cardiology attending Dr. Cheney.     Jaimee Gordillo MD  Cardiology Fellow

## 2018-04-18 NOTE — PROGRESS NOTES
1020: Received from CCL post RHC. RIJ band aid dressing clean, flat, dry, and intact. Denies pain. Discharge instructions have been reviewed and a copy given to patient. Patient will be discharged following serial EKG.   1107:  Discharged to clinic appointment . Care transferred to patient's sister-in-law. Patient walked self to clinic.

## 2018-04-23 LAB — INTERPRETATION ECG - MUSE: NORMAL

## 2018-04-30 ENCOUNTER — TELEPHONE (OUTPATIENT)
Dept: CARDIOLOGY | Facility: CLINIC | Age: 58
End: 2018-04-30

## 2018-04-30 ENCOUNTER — HOSPITAL ENCOUNTER (EMERGENCY)
Facility: CLINIC | Age: 58
Discharge: HOME | End: 2018-04-30
Attending: STUDENT IN AN ORGANIZED HEALTH CARE EDUCATION/TRAINING PROGRAM | Admitting: STUDENT IN AN ORGANIZED HEALTH CARE EDUCATION/TRAINING PROGRAM
Payer: MEDICARE

## 2018-04-30 VITALS
HEIGHT: 63 IN | RESPIRATION RATE: 17 BRPM | DIASTOLIC BLOOD PRESSURE: 63 MMHG | WEIGHT: 114 LBS | SYSTOLIC BLOOD PRESSURE: 98 MMHG | BODY MASS INDEX: 20.2 KG/M2 | TEMPERATURE: 97.5 F | OXYGEN SATURATION: 95 %

## 2018-04-30 DIAGNOSIS — I49.9 IRREGULAR HEART BEAT: ICD-10-CM

## 2018-04-30 LAB
ALBUMIN SERPL-MCNC: 4 G/DL (ref 3.4–5)
ALP SERPL-CCNC: 68 U/L (ref 40–150)
ALT SERPL W P-5'-P-CCNC: 27 U/L (ref 0–50)
ANION GAP SERPL CALCULATED.3IONS-SCNC: 6 MMOL/L (ref 3–14)
AST SERPL W P-5'-P-CCNC: 27 U/L (ref 0–45)
BASOPHILS # BLD AUTO: 0 10E9/L (ref 0–0.2)
BASOPHILS NFR BLD AUTO: 0.8 %
BILIRUB SERPL-MCNC: 0.4 MG/DL (ref 0.2–1.3)
BUN SERPL-MCNC: 24 MG/DL (ref 7–30)
CALCIUM SERPL-MCNC: 9.1 MG/DL (ref 8.5–10.1)
CHLORIDE SERPL-SCNC: 112 MMOL/L (ref 94–109)
CO2 SERPL-SCNC: 24 MMOL/L (ref 20–32)
CREAT SERPL-MCNC: 1.6 MG/DL (ref 0.52–1.04)
DIFFERENTIAL METHOD BLD: NORMAL
EOSINOPHIL # BLD AUTO: 0.1 10E9/L (ref 0–0.7)
EOSINOPHIL NFR BLD AUTO: 2.6 %
ERYTHROCYTE [DISTWIDTH] IN BLOOD BY AUTOMATED COUNT: 12.9 % (ref 10–15)
GFR SERPL CREATININE-BSD FRML MDRD: 33 ML/MIN/1.7M2
GLUCOSE SERPL-MCNC: 75 MG/DL (ref 70–99)
HCT VFR BLD AUTO: 40.1 % (ref 35–47)
HGB BLD-MCNC: 13.6 G/DL (ref 11.7–15.7)
IMM GRANULOCYTES # BLD: 0 10E9/L (ref 0–0.4)
IMM GRANULOCYTES NFR BLD: 0.2 %
LYMPHOCYTES # BLD AUTO: 1.2 10E9/L (ref 0.8–5.3)
LYMPHOCYTES NFR BLD AUTO: 22.8 %
MAGNESIUM SERPL-MCNC: 2 MG/DL (ref 1.6–2.3)
MCH RBC QN AUTO: 30.9 PG (ref 26.5–33)
MCHC RBC AUTO-ENTMCNC: 33.9 G/DL (ref 31.5–36.5)
MCV RBC AUTO: 91 FL (ref 78–100)
MONOCYTES # BLD AUTO: 0.5 10E9/L (ref 0–1.3)
MONOCYTES NFR BLD AUTO: 8.5 %
NEUTROPHILS # BLD AUTO: 3.5 10E9/L (ref 1.6–8.3)
NEUTROPHILS NFR BLD AUTO: 65.1 %
PLATELET # BLD AUTO: 207 10E9/L (ref 150–450)
POTASSIUM SERPL-SCNC: 4.1 MMOL/L (ref 3.4–5.3)
PROT SERPL-MCNC: 7.3 G/DL (ref 6.8–8.8)
RBC # BLD AUTO: 4.4 10E12/L (ref 3.8–5.2)
SODIUM SERPL-SCNC: 142 MMOL/L (ref 133–144)
WBC # BLD AUTO: 5.3 10E9/L (ref 4–11)

## 2018-04-30 PROCEDURE — 93010 ELECTROCARDIOGRAM REPORT: CPT | Mod: Z6 | Performed by: STUDENT IN AN ORGANIZED HEALTH CARE EDUCATION/TRAINING PROGRAM

## 2018-04-30 PROCEDURE — 85025 COMPLETE CBC W/AUTO DIFF WBC: CPT | Performed by: STUDENT IN AN ORGANIZED HEALTH CARE EDUCATION/TRAINING PROGRAM

## 2018-04-30 PROCEDURE — 83735 ASSAY OF MAGNESIUM: CPT | Performed by: STUDENT IN AN ORGANIZED HEALTH CARE EDUCATION/TRAINING PROGRAM

## 2018-04-30 PROCEDURE — 80053 COMPREHEN METABOLIC PANEL: CPT | Performed by: STUDENT IN AN ORGANIZED HEALTH CARE EDUCATION/TRAINING PROGRAM

## 2018-04-30 PROCEDURE — 99284 EMERGENCY DEPT VISIT MOD MDM: CPT | Mod: 25

## 2018-04-30 PROCEDURE — 99284 EMERGENCY DEPT VISIT MOD MDM: CPT | Mod: 25 | Performed by: STUDENT IN AN ORGANIZED HEALTH CARE EDUCATION/TRAINING PROGRAM

## 2018-04-30 PROCEDURE — 93005 ELECTROCARDIOGRAM TRACING: CPT

## 2018-04-30 NOTE — ED PROVIDER NOTES
History     Chief Complaint   Patient presents with     Irregular Heart Beat     HPI  Judith Nelson is a 58 year old female with past medical history which includes anxiety, duodenitis hemorrhage, sarcoidosis, pulmonary hypertension, chronic kidney disease, and chronic steroid use who presents for evaluation after report of irregular heart rhythm was noted by visiting home health aide.  Patient explains that every 2 weeks she has a nurse visit her home for an evaluation, she has been feeling well as of late but today the nurse had noted some irregularity in her heart rhythm.  Patient called the clinic of her cardiologist Dr. Norm Franco and spoke with nurse Keyanna who instructed her to come in for evaluation of possible heart arrhythmia.  The patient maintained she has been feeling well lately without fever, chills, chest pain, palpations, cough, shortness of breath, or infectious symptoms.  She does note that she accidentally missed her morning medications yesterday but has since been compliant.      Problem List:    Patient Active Problem List    Diagnosis Date Noted     Gall stones, common bile duct 01/21/2012     Priority: High     Dyspnea on exertion 10/30/2017     Priority: Medium     Cellulitis of left lower extremity 05/24/2017     Priority: Medium     Personal history of drug therapy 03/06/2017     Priority: Medium     Chronic kidney disease, stage IV (severe) (H) 02/18/2015     Priority: Medium     Chronic steroid use 10/23/2014     Priority: Medium     Iron deficiency anemia 08/14/2014     Priority: Medium     updating diagnosis code for icd10 cutover       Intestinal malabsorption 08/14/2014     Priority: Medium     Problem list name updated by automated process. Provider to review       Health Care Home (HEALTHCARE) 06/12/2014     Priority: Medium     Status: Closed 6/12/14  Care Coordinator:  Antoinette Hoff           Mixed hyperlipidemia 04/29/2014     Priority: Medium     GERD (gastroesophageal  reflux disease) 04/29/2014     Priority: Medium     Major depressive disorder, single episode, severe with psychotic features (H) 04/29/2014     Priority: Medium     Thrush 04/29/2014     Priority: Medium     Stress-induced cardiomyopathy 04/22/2014     Priority: Medium     Elevated troponin 04/18/2014     Priority: Medium     Dehydration 04/16/2014     Priority: Medium     Compression fracture 04/16/2014     Priority: Medium     CKD (chronic kidney disease) stage 4, GFR 15-29 ml/min (H) 11/17/2013     Priority: Medium     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 Last visit 9/2014 - consult sent for scanning .  cgb 1/9/2015            Sarcoidosis 11/17/2013     Priority: Medium     Skin ulcer of ankle (H) 10/18/2013     Priority: Medium     Senile osteoporosis 09/24/2013     Priority: Medium     Osteoporosis, postmenopausal 09/20/2013     Priority: Medium     Encounter for long-term current use of medication 09/16/2013     Priority: Medium     Problem list name updated by automated process. Provider to review       Personal history of other drug therapy 09/13/2013     Priority: Medium     Tremor 03/22/2012     Priority: Medium     Resolved with med change 4/2014       Elevated liver enzymes 01/13/2012     Priority: Medium     Hypomagnesemia 12/06/2011     Priority: Medium     Diagnosis updated by automated process. Provider to review and confirm.       Lower extremity edema 12/05/2011     Priority: Medium     Since hip surgery 11/10       Mental status change 12/02/2011     Priority: Medium     Acute renal failure (ARF) (H) 12/02/2011     Priority: Medium     Cr 1.8-2.8; granulomatous interstitial nephritis: Etiology unclear, though suspicious for sarcoidosis. Also considered AIN; subsequently dc'd PPI. Urine protein 0.27 on 3/1/13. Will continue tapering steroids as pt is having undesirable side effects, and unfortunately, they do not  appear to improving renal function much - still trending 1.8-2.8.. Will have pt decrease dose by 5mg q2 wks until dc'd. Follows with Nephrology, Dr Garcia; PCP prophylaxis       CARDIOVASCULAR SCREENING; LDL GOAL LESS THAN 160 10/31/2010     Priority: Medium     Pulmonary hypertension 09/19/2010     Priority: Medium       Echo:             12/4/12                  RVSP 38             5/22/12                  RVSP 121             8/26/10                  RVSP  39                                             LVEF     RHC:   7/29/14 RA 5   RV 74. 5   PA 72/30, 43   PAW 10    (7)   TCO 4.7 (3)   RUCHI 3.9 (2.5)  1/16/13 RA 7, 8, 5   RV 65, 6   PA 62/22, 38   PAW 10, 10, 9    (8.3)   COI 3.5 (2.2)  6/12/12            Basal                     Nitric oxide 20             Nitric oxide 40                   Nitric oxide 80             RA         8,6,5             RV         80,8             PA         80/28,45                     76/17,42                    76/16,40                              71/17,40             PAW     10,7,8             PVR       720(9.0)                                                                                                      660(8.3)             COI        3.6(2.3)                                                                                                       3.7(2.4)    10/14/10                RA      10,6,4               RV       43,8               PA        45/12,27               PAW    12,11,9               COI  3.6(2.3)   10/10 right heart cath pulm htn just at upper limits of normal     6MWT: -  10/22/13 381m/RA/lowest sat 93%  4/30/13  367m/RA/lowest 93%       Duodenitis hemorrhagic 07/01/2010     Priority: Medium     Hyperprolactinemia (H) 11/26/2009     Priority: Medium     Diagnosis updated by automated process. Provider to review and confirm.       Breast discharge 11/02/2009     Priority: Medium     Since 2000 after last child; normal labs and mammo       Major  depressive disorder, single episode, moderate (H) 2006     Priority: Medium     2 hospitalizations- [facility] and [facility]; 10/06 had auditory hallucinations - Zyprexa added  Dr Jan Scherer - [facility] in [place]   readmitted to [facility] - increasing paranoia and anxiety. Geodon added.   2011: Risperdal, Cogentin, and Ativan  Psychologist - [facility] [place]       Intrapelvic protrusion of acetabulum 2006     Priority: Medium     - [facility]  [phone] x-ray for a diagnostic and therapeutic injection of her right hip. These treatment outlines are total hip arthroplasty.  Problem list name updated by automated process. Provider to review and confirm        Asymptomatic postmenopausal status      Priority: Medium     Since ,   Problem list name updated by automated process. Provider to review       GENERALIZED ANXIETY DISORDER 2007     Priority: Low        Past Medical History:    Past Medical History:   Diagnosis Date     Anxiety      CKD (chronic kidney disease), stage III      Hyperprolactinemia (H)      Lower extremity edema      Lumbar compression fracture (H)      Major depressive disorder      Menopause      MGUS (monoclonal gammopathy of unknown significance)      Osteoporosis      Other seborrheic keratosis      Protrusio acetabuli      Pulmonary hypertension      Sarcoidosis      Stress-induced cardiomyopathy        Past Surgical History:    Past Surgical History:   Procedure Laterality Date     Csection,  X 2       CYSTOSCOPY, RETROGRADES, INSERT STENT URETER(S), COMBINED  10/2/2013    Procedure: COMBINED CYSTOSCOPY, RETROGRADES, INSERT STENT URETER(S);  Left Retrograde Ureteropyelogram and Ureteral Stent Placement;  Surgeon: SONIA Martinez MD;  Location: [facility] OR     ENDOSCOPIC RETROGRADE CHOLANGIOPANCREATOGRAM  2012    Procedure:ENDOSCOPIC RETROGRADE CHOLANGIOPANCREATOGRAM; ERCP biliary sphincterotomy and  stone removal; Surgeon:MARGIE RUGGIERO; Location:[facility]     LAPAROSCOPIC CHOLECYSTECTOMY WITH CHOLANGIOGRAMS  2012    Procedure:LAPAROSCOPIC CHOLECYSTECTOMY WITH CHOLANGIOGRAMS; Surgeon:BRIANA PADILLA; Location:[facility] OR     ORTHOPEDIC SURGERY  10/1/2010    Right hip replacement     ZZ GASTROSCOPY,FL  6/10    Erythematous duodenopathy       Family History:    Family History   Problem Relation Age of Onset     CANCER Mother       age 62 leukemia     GASTROINTESTINAL DISEASE Mother      diverticulitis     Hypertension Mother      Allergies Mother      Arthritis Mother      Depression Mother      Eye Disorder Mother      OSTEOPOROSIS Mother      C.A.D. Father      MI/CAD      CANCER Father      skin     Blood Disease Father      renal  problem     Hypertension Father      Anesthesia Reaction Father      Cardiovascular Father      Neurologic Disorder Father      Parkinson's disease     C.A.D. Maternal Grandmother       late 82s MI     DIABETES Maternal Grandmother      C.A.D. Maternal Grandfather       mid 80s MI     Alzheimer Disease Paternal Grandmother       80s     Alzheimer Disease Paternal Grandfather       80s     Neurologic Disorder Sister      migraines     Allergies Sister      DIABETES Other      AODM     Neurologic Disorder Sister      migraines     Allergies Sister      Anesthesia Reaction Son      Anesthesia Reaction Daughter      Anesthesia Reaction Daughter      DIABETES Sister      hypoglycemia       Social History:  Marital Status:   [4]  Social History   Substance Use Topics     Smoking status: Never Smoker     Smokeless tobacco: Never Used     Alcohol use No      Comment: Occ.        Medications:      acetaminophen (TYLENOL) 500 MG tablet   azaTHIOprine (IMURAN) 50 MG tablet   buPROPion (WELLBUTRIN SR) 200 MG 12 hr tablet   BUSPIRONE HCL PO   calcitRIOL (ROCALTROL) 0.25 MCG capsule   carvedilol (COREG) 12.5 MG tablet   furosemide (LASIX) 40 MG tablet  "  LORazepam (ATIVAN) 0.5 MG tablet   OPSUMIT 10 MG tablet   potassium chloride (K-TAB,KLOR-CON) 10 MEQ tablet   simvastatin (ZOCOR) 10 MG tablet   tadalafil, PAH, (ADCIRCA) 20 MG TABS   TEMAZEPAM PO   treprostinil diolamine ER (ORENITRAM) 0.125 MG CR tablet   denosumab (PROLIA) 60 MG/ML SOLN   Elastic Bandages & Supports (T.E.D. BELOW KNEE/M-REGULAR) MISC   Multiple Vitamins-Minerals (EYE VITAMINS PO)   ORDER FOR DME   ranitidine (RANITIDINE) 75 MG tablet   SODIUM BICARBONATE PO         Review of Systems  Constitutional:  Negative for fever or recent illness.  Cardiovascular:  Negative for palpitations or chest pain.  Respiratory:  Negative for cough or shortness of breath.  Gastrointestinal:  Negative for abdominal pain, nausea, or vomiting.    All others reviewed and are negative.      Physical Exam   BP: 91/49  Heart Rate: 68  Temp: 97.5  F (36.4  C)  Resp: 16  Height: 160 cm (5' 3\")  Weight: 51.7 kg (114 lb)  SpO2: 97 %      Physical Exam  Constitutional:  Well developed, well nourished.  Appears nontoxic and in no acute distress.  Resting comfortably on the gurney.  HENT:  Normocephalic and atraumatic.  Symmetric in appearance.  Eyes:  Conjunctivae are normal.  Neck:  Neck supple.  Cardiovascular:  No cyanosis.  RRR.  No audible murmurs noted.  Symmetrical appearing lower extremities with minimal edema, no calf tenderness.  Respiratory:  Effort normal, no respiratory distress.  CTAB without diminished regions.  No wheezing, rhonchi, or crackles.  Gastrointestinal:  Soft, nondistended abdomen.  Nontender and without guarding.  No rigidity or rebound tenderness.  Negative Wren's sign.    Genitourinary:  Noncontributory.  Musculoskeletal:  Moves extremities spontaneously.  Neurological:  Patient is alert.  Skin:  Skin is warm and dry.  Psychiatric:  Normal mood and affect.      ED Course     ED Course     Procedures                    EKG Interpretation:      Interpreted by: Scotty Santos  Time reviewed: Upon " arrival     Symptoms at time of EKG: Asymptomatic  Rhythm: Sinus  Rate: Normal  Axis: Normal    Conduction: None atypical   ST Segments/ T Waves: No pathologic ST-elevations or T-wave abnormalities.  Q Waves: None  Comparison to prior: Similar morphology to previous     Clinical Impression: No sign of ischemia or arrhythmia        Critical Care time:  none               Results for orders placed or performed during the hospital encounter of 04/30/18 (from the past 24 hour(s))   CBC with platelets differential   Result Value Ref Range    WBC 5.3 4.0 - 11.0 10e9/L    RBC Count 4.40 3.8 - 5.2 10e12/L    Hemoglobin 13.6 11.7 - 15.7 g/dL    Hematocrit 40.1 35.0 - 47.0 %    MCV 91 78 - 100 fl    MCH 30.9 26.5 - 33.0 pg    MCHC 33.9 31.5 - 36.5 g/dL    RDW 12.9 10.0 - 15.0 %    Platelet Count 207 150 - 450 10e9/L    Diff Method Automated Method     % Neutrophils 65.1 %    % Lymphocytes 22.8 %    % Monocytes 8.5 %    % Eosinophils 2.6 %    % Basophils 0.8 %    % Immature Granulocytes 0.2 %    Absolute Neutrophil 3.5 1.6 - 8.3 10e9/L    Absolute Lymphocytes 1.2 0.8 - 5.3 10e9/L    Absolute Monocytes 0.5 0.0 - 1.3 10e9/L    Absolute Eosinophils 0.1 0.0 - 0.7 10e9/L    Absolute Basophils 0.0 0.0 - 0.2 10e9/L    Abs Immature Granulocytes 0.0 0 - 0.4 10e9/L   Comprehensive metabolic panel   Result Value Ref Range    Sodium 142 133 - 144 mmol/L    Potassium 4.1 3.4 - 5.3 mmol/L    Chloride 112 (H) 94 - 109 mmol/L    Carbon Dioxide 24 20 - 32 mmol/L    Anion Gap 6 3 - 14 mmol/L    Glucose 75 70 - 99 mg/dL    Urea Nitrogen 24 7 - 30 mg/dL    Creatinine 1.60 (H) 0.52 - 1.04 mg/dL    GFR Estimate 33 (L) >60 mL/min/1.7m2    GFR Estimate If Black 40 (L) >60 mL/min/1.7m2    Calcium 9.1 8.5 - 10.1 mg/dL    Bilirubin Total 0.4 0.2 - 1.3 mg/dL    Albumin 4.0 3.4 - 5.0 g/dL    Protein Total 7.3 6.8 - 8.8 g/dL    Alkaline Phosphatase 68 40 - 150 U/L    ALT 27 0 - 50 U/L    AST 27 0 - 45 U/L   Magnesium   Result Value Ref Range    Magnesium  2.0 1.6 - 2.3 mg/dL     *Note: Due to a large number of results and/or encounters for the requested time period, some results have not been displayed. A complete set of results can be found in Results Review.       Medications - No data to display    Assessments & Plan (with Medical Decision Making)   Judith Nelson is a 58 year old female who presents to the department at the urging of visiting home nurse for evaluation of possible irregular heart rhythm.  Patient explains that she has been feeling well recently without chest pain, palpitations, shortness of breath, or infectious symptoms.  She does have a history of sarcoidosis and pulmonary hypertension but never previously diagnosed with atrial fibrillation or cardiac arrhythmia.  Low suspicion for ACS or PE.  In the department her hemoglobin and electrolyte values are within reference range.  She remained comfortable during entire stay in the department without developing symptoms or identifiable arrhythmia via cardiac monitor.  Patient has scheduled follow-up appointment with pulmonology tomorrow, recommend she visit cardiology clinic and plan to schedule a reevaluation appointment for management plan and consideration for outpatient cardiac monitor.  Patient seems comfortable with the discharge plan including follow-up.      Disclaimer:  This note consists of symbols derived from keyboarding, dictation, and/or voice recognition software.  As a result, there may be errors in the script that have gone undetected.  Please consider this when interpreting information found in the chart.        I have reviewed the nursing notes.    I have reviewed the findings, diagnosis, plan and need for follow up with the patient.       Discharge Medication List as of 4/30/2018  8:37 PM          Final diagnoses:   Irregular heart beat       4/30/2018   [facility] EMERGENCY DEPARTMENT     Scotty Santos DO  04/30/18 2315

## 2018-04-30 NOTE — ED AVS SNAPSHOT
[facility] Emergency Department    [address] [facility] [address]    Phone:  [phone]    Fax:  [phone]                                       Judith Nelson   MRN: [id number]    Department:  [facility] Emergency Department   Date of Visit:  4/30/2018           Patient Information     Date Of Birth          1960        Your diagnoses for this visit were:     Irregular heart beat        You were seen by Scotty Santos DO.      Follow-up Information     Follow up with Norm Franco MD. Call in 2 days.    Specialty:  Cardiology    Why:  Followup for reevaluation and managment plan.    Contact information:    [address]  [facility] [place] [address]  [phone]        Discharge References/Attachments     ABOUT ARRHYTHMIAS (ENGLISH)      Your next 10 appointments already scheduled     May 01, 2018  2:00 PM CDT   SIX MINUTE WALK with  PFL C,  PFL 6 MINUTE WALK 1   [facility] Pulmonary Function Testing ([facility])    [address]  3rd Floor  [facility] [place] [id number] [phone]            May 01, 2018  3:00 PM CDT   (Arrive by 2:45 PM)   Return Interstitial Lung with Gael Flaherty MD   [facility] ([facility])    [address]  [address]  [facility] [place] [id number] [phone]            May 21, 2018 10:15 AM CDT   Lab with UC LAB   [facility] Lab ([facility])    [address] [place] [address]  1st Floor  [facility] [place] [address] [phone]            May 21, 2018 10:45 AM CDT   (Arrive by 10:30 AM)   RETURN PRIMARY PULMONARY with JAVAN Beaulieu [facility] ([facility])    [address]  [address]  [facility] [place] [id number] [phone]            Aug 10, 2018 12:00 PM CDT   DX HIP/PELVIS/SPINE with UCDX1   [facility] ([facility])     [address] [id number] [phone]           Please do not take any of the following 24 hours prior to the day of your exam: vitamins, calcium tablets, antacids.  If possible, please wear clothes without metal (snaps, zippers). A sweatsuit works well.            Aug 10, 2018  1:00 PM CDT   (Arrive by 12:45 PM)   RETURN ENDOCRINE with Jenna Salas MD   [facility] Endocrinology ([facility])    [address] [place] [id number] [phone]              24 Hour Appointment Hotline       To make an appointment at any [facility], call [phone] ([phone]). If you don't have a family doctor or clinic, we will help you find one. [facility] are conveniently located to serve the needs of you and your family.             Review of your medicines      Our records show that you are taking the medicines listed below. If these are incorrect, please call your family doctor or clinic.        Dose / Directions Last dose taken    acetaminophen 500 MG tablet   Commonly known as:  TYLENOL   Dose:  500-1000 mg   Quantity:  45 tablet        Take 1-2 tablets (500-1,000 mg) by mouth every 6 hours as needed for pain (Take as needed for pain.  Do not exceed 4 grams (8 tablets) in a day)   Refills:  10        azaTHIOprine 50 MG tablet   Commonly known as:  IMURAN   Dose:  50 mg   Quantity:  90 tablet        Take 1 tablet (50 mg) by mouth daily   Refills:  3        buPROPion 200 MG 12 hr tablet   Commonly known as:  WELLBUTRIN SR   Dose:  200 mg   Quantity:  30 tablet        Take 1 tablet (200 mg) by mouth every morning   Refills:  0        BUSPIRONE HCL PO   Dose:  15 mg        Take 15 mg by mouth 2 times daily   Refills:  0        calcitRIOL 0.25 MCG capsule   Commonly known as:  ROCALTROL   Dose:  0.25 mcg   Quantity:  30 capsule        Take 1 capsule (0.25 mcg) by mouth daily   Refills:  1        carvedilol 12.5 MG tablet    Commonly known as:  COREG   Dose:  6.25 mg   Quantity:  135 tablet        Take 0.5 tablets (6.25 mg) by mouth 2 times daily (with meals)   Refills:  3        denosumab 60 MG/ML Soln injection   Commonly known as:  PROLIA   Dose:  60 mg   Quantity:  1 mL        Inject 1 mL (60 mg) Subcutaneous every 6 months   Refills:  1        EYE VITAMINS PO   Dose:  1 tablet        Take 1 tablet by mouth daily   Refills:  0        furosemide 40 MG tablet   Commonly known as:  LASIX   Quantity:  90 tablet        Take 40 MG M-W-F, 20 MG all other days.   Refills:  3        LORazepam 0.5 MG tablet   Commonly known as:  ATIVAN   Quantity:  90 tablet        Take 0.5 mg (1 tab) every morning and 1 mg (2 tabs) every night at bedtime.   Refills:  1        OPSUMIT 10 MG tablet   Quantity:  30 tablet   Generic drug:  macitentan        TAKE ONE TABLET (10MG) BY MOUTH DAILY. DO NOT HANDLE IF PREGNANT. DO NOT SPLIT, CRUSH, OR CHEW. REVIEW MEDICATION GUIDE. CALL [phone]   Refills:  10        order for DME   Quantity:  1 Device        Equipment being ordered: SHOWER CHAIR  FROM [employer]   Refills:  0        potassium chloride 10 MEQ tablet   Commonly known as:  K-TAB,KLOR-CON   Dose:  10 mEq   Quantity:  90 tablet        Take 1 tablet (10 mEq) by mouth daily   Refills:  1        ranitidine 75 MG tablet   Commonly known as:  ranitidine   Dose:   mg   Quantity:  60 tablet        Take 1-2 tablets ( mg) by mouth 2 times daily   Refills:  11        simvastatin 10 MG tablet   Commonly known as:  ZOCOR   Dose:  10 mg   Quantity:  90 tablet        Take 1 tablet by mouth At Bedtime.   Refills:  1        SODIUM BICARBONATE PO        Take by mouth 2 times daily   Refills:  0        T.E.D. BELOW KNEE/M-REGULAR Misc   Dose:  1 each   Quantity:  2 each        1 each daily   Refills:  0        tadalafil (PAH) 20 MG Tabs   Commonly known as:  ADCIRCA   Dose:  40 mg   Quantity:  60 tablet        Take 2 tablets (40 mg) by mouth daily    Refills:  11        TEMAZEPAM PO        Take by mouth At Bedtime   Refills:  0        treprostinil diolamine ER 0.125 MG CR tablet   Commonly known as:  ORENITRAM   Dose:  7.5 mg   Quantity:  90 tablet        Take 7.5 mg by mouth 2 times daily (with meals)   Refills:  0                Procedures and tests performed during your visit     CBC with platelets differential    Comprehensive metabolic panel    EKG 12-lead, tracing only    Magnesium      Orders Needing Specimen Collection     None      Pending Results     No orders found from 4/28/2018 to 5/1/2018.            Pending Culture Results     No orders found from 4/28/2018 to 5/1/2018.            Pending Results Instructions     If you had any lab results that were not finalized at the time of your Discharge, you can call the ED Lab Result RN at [phone]. You will be contacted by this team for any positive Lab results or changes in treatment. The nurses are available 7 days a week from 10A to 6:30P.  You can leave a message 24 hours per day and they will return your call.        Test Results From Your Hospital Stay        4/30/2018  6:40 PM      Component Results     Component Value Ref Range & Units Status    WBC 5.3 4.0 - 11.0 10e9/L Final    RBC Count 4.40 3.8 - 5.2 10e12/L Final    Hemoglobin 13.6 11.7 - 15.7 g/dL Final    Hematocrit 40.1 35.0 - 47.0 % Final    MCV 91 78 - 100 fl Final    MCH 30.9 26.5 - 33.0 pg Final    MCHC 33.9 31.5 - 36.5 g/dL Final    RDW 12.9 10.0 - 15.0 % Final    Platelet Count 207 150 - 450 10e9/L Final    Diff Method Automated Method  Final    % Neutrophils 65.1 % Final    % Lymphocytes 22.8 % Final    % Monocytes 8.5 % Final    % Eosinophils 2.6 % Final    % Basophils 0.8 % Final    % Immature Granulocytes 0.2 % Final    Absolute Neutrophil 3.5 1.6 - 8.3 10e9/L Final    Absolute Lymphocytes 1.2 0.8 - 5.3 10e9/L Final    Absolute Monocytes 0.5 0.0 - 1.3 10e9/L Final    Absolute Eosinophils 0.1 0.0 - 0.7 10e9/L Final     Absolute Basophils 0.0 0.0 - 0.2 10e9/L Final    Abs Immature Granulocytes 0.0 0 - 0.4 10e9/L Final         4/30/2018  6:52 PM      Component Results     Component Value Ref Range & Units Status    Sodium 142 133 - 144 mmol/L Final    Potassium 4.1 3.4 - 5.3 mmol/L Final    Chloride 112 (H) 94 - 109 mmol/L Final    Carbon Dioxide 24 20 - 32 mmol/L Final    Anion Gap 6 3 - 14 mmol/L Final    Glucose 75 70 - 99 mg/dL Final    Urea Nitrogen 24 7 - 30 mg/dL Final    Creatinine 1.60 (H) 0.52 - 1.04 mg/dL Final    GFR Estimate 33 (L) >60 mL/min/1.7m2 Final    Non  GFR Calc    GFR Estimate If Black 40 (L) >60 mL/min/1.7m2 Final    African American GFR Calc    Calcium 9.1 8.5 - 10.1 mg/dL Final    Bilirubin Total 0.4 0.2 - 1.3 mg/dL Final    Albumin 4.0 3.4 - 5.0 g/dL Final    Protein Total 7.3 6.8 - 8.8 g/dL Final    Alkaline Phosphatase 68 40 - 150 U/L Final    ALT 27 0 - 50 U/L Final    AST 27 0 - 45 U/L Final         4/30/2018  6:52 PM      Component Results     Component Value Ref Range & Units Status    Magnesium 2.0 1.6 - 2.3 mg/dL Final                Thank you for choosing [facility]       Thank you for choosing [facility] for your care. Our goal is always to provide you with excellent care. Hearing back from our patients is one way we can continue to improve our services. Please take a few minutes to complete the written survey that you may receive in the mail after you visit with us. Thank you!        [employer] Information     [employer] gives you secure access to your electronic health record. If you see a primary care provider, you can also send messages to your care team and make appointments. If you have questions, please call your primary care clinic.  If you do not have a primary care provider, please call [phone] and they will assist you.        Care EveryWhere ID     This is your Care EveryWhere ID. This could be used by other organizations to access your [facility]  records  [id number]        Equal Access to Services     LESLIE CHACKO : Flavio Leiva, natanael salas, alysha ernst. So [facility] [phone].    ATENCIÓN: Si habla español, tiene a dhillon disposición servicios gratuitos de asistencia lingüística. Llame al [phone].    We comply with applicable federal civil rights laws and [place] laws. We do not discriminate on the basis of race, color, national origin, age, disability, sex, sexual orientation, or gender identity.            After Visit Summary       This is your record. Keep this with you and show to your community pharmacist(s) and doctor(s) at your next visit.

## 2018-04-30 NOTE — ED AVS SNAPSHOT
[facility] Emergency Department    [address] [address]    Phone:  [phone]    Fax:  [phone]                                       Judith Nelson   MRN: [id number]    Department:  [facility] Emergency Department   Date of Visit:  4/30/2018           After Visit Summary Signature Page     I have received my discharge instructions, and my questions have been answered. I have discussed any challenges I see with this plan with the nurse or doctor.    ..........................................................................................................................................  Patient/Patient Representative Signature      ..........................................................................................................................................  Patient Representative Print Name and Relationship to Patient    ..................................................               ................................................  Date                                            Time    ..........................................................................................................................................  Reviewed by Signature/Title    ...................................................              ..............................................  Date                                                            Time

## 2018-04-30 NOTE — ED NOTES
Pt has home care RN who comes every 2 weeks, she was there today and told pt she needed to call her cardiologist as pt HR was irregular and systolic BP was 90. Pt was told by cardiology clinic to come to ER. Pt has had pain between her shoulder blades for 2-3 weeks, pt says warm packs help with the pain, activity does not affect the pain. Pt has hx of pulmonary hypertension, has had problems with diarrhea for several weeks, about 10 our of 14 days.

## 2018-04-30 NOTE — TELEPHONE ENCOUNTER
Patient called after her [facility] RN told her she had an Irregular heart rate today.  The RN called patient's Primary MD who advised patient to call her Cardiologist.  Patient states her SBP was in the 90's, and she has been nauseated this last weekend with diarrhea as well.  patient does not know when she may have gone into this irregular rhythm, and does not have any hx of this.  Advised patient she needs to have some blood work and an EKG, so going to the closest ER would be the best place to be evaluated. Patient verbalized understanding, agreed with plan to have her daughter drive her and denied any further questions. Keyanna Mccray RN on 4/30/2018 at 4:18 PM

## 2018-05-01 ENCOUNTER — ALLIED HEALTH/NURSE VISIT (OUTPATIENT)
Dept: CARDIOLOGY | Facility: CLINIC | Age: 58
End: 2018-05-01
Attending: INTERNAL MEDICINE
Payer: MEDICARE

## 2018-05-01 ENCOUNTER — OFFICE VISIT (OUTPATIENT)
Dept: PULMONOLOGY | Facility: CLINIC | Age: 58
End: 2018-05-01
Attending: INTERNAL MEDICINE
Payer: MEDICARE

## 2018-05-01 VITALS
DIASTOLIC BLOOD PRESSURE: 74 MMHG | OXYGEN SATURATION: 95 % | RESPIRATION RATE: 18 BRPM | SYSTOLIC BLOOD PRESSURE: 119 MMHG | WEIGHT: 112.5 LBS | HEIGHT: 63 IN | BODY MASS INDEX: 19.93 KG/M2 | HEART RATE: 59 BPM

## 2018-05-01 DIAGNOSIS — D86.9 SARCOIDOSIS: ICD-10-CM

## 2018-05-01 DIAGNOSIS — I47.10 PAROXYSMAL SUPRAVENTRICULAR TACHYCARDIA (H): ICD-10-CM

## 2018-05-01 DIAGNOSIS — R09.02 HYPOXIA: Primary | ICD-10-CM

## 2018-05-01 LAB
6 MIN WALK (FT): 1400 FT
6 MIN WALK (M): 427 M

## 2018-05-01 PROCEDURE — G0463 HOSPITAL OUTPT CLINIC VISIT: HCPCS | Mod: ZF

## 2018-05-01 PROCEDURE — 0298T ZZC EXT ECG > 48HR TO 21 DAY REVIEW AND INTERPRETATN: CPT | Performed by: INTERNAL MEDICINE

## 2018-05-01 PROCEDURE — 0296T ZIO PATCH HOLTER: CPT | Mod: ZF

## 2018-05-01 NOTE — MR AVS SNAPSHOT
After Visit Summary   5/1/2018    Judith Nelson    MRN: [id number]           Patient Information     Date Of Birth          1960        Visit Information        Provider Department      5/1/2018 3:00 PM Gael Flaherty MD [facility]        Today's Diagnoses     Hypoxia    -  1    Sarcoidosis        Paroxysmal supraventricular tachycardia (H)           Follow-ups after your visit        Follow-up notes from your care team     Return in about 4 months (around 9/1/2018).      Your next 10 appointments already scheduled     May 21, 2018 10:15 AM CDT   Lab with  LAB   [facility] ([facility])    [address] [place] [address]  [address] [facility] [id number] [phone]            May 21, 2018 10:45 AM CDT   (Arrive by 10:30 AM)   RETURN PRIMARY PULMONARY with JAVAN Beaulieu [facility] ([facility])    [address] [place] [address]  [address] [place] [id number] [phone]            Aug 10, 2018 12:00 PM CDT   DX HIP/PELVIS/SPINE with UCDX1   [facility] ([facility])    [address] [place] [address]  [address] [facility] [id number] [phone]           Please do not take any of the following 24 hours prior to the day of your exam: vitamins, calcium tablets, antacids.  If possible, please wear clothes without metal (snaps, zippers). A sweatsuit works well.            Aug 10, 2018  1:00 PM CDT   (Arrive by 12:45 PM)   RETURN ENDOCRINE with Jenna Salas MD   [facility]ocrinology ([facility])    [address] [place] [address]  3rd Floor  [facility] [id number] [phone]            Aug 14, 2018  2:30 PM CDT   (Arrive by 2:15 PM)   Return Interstitial Lung with Gael Flaherty MD   [facility] ([facility])    [address] [place] [address]  [address] [place] [address]  "[id number] [phone]              Future tests that were ordered for you today     Open Future Orders        Priority Expected Expires Ordered    Overnight oximetry study Routine  5/31/2018 5/1/2018    Zio Patch Holter Routine 5/1/2018 6/15/2018 5/1/2018            Who to contact     If you have questions or need follow up information about today's clinic visit or your schedule please contact [facility] directly at [phone].  Normal or non-critical lab and imaging results will be communicated to you by [facility], letter or phone within 4 business days after the clinic has received the results. If you do not hear from us within 7 days, please contact the clinic through [employer] or phone. If you have a critical or abnormal lab result, we will notify you by phone as soon as possible.  Submit refill requests through [employer] or call your pharmacy and they will forward the refill request to us. Please allow 3 business days for your refill to be completed.          Additional Information About Your Visit        [employer] Information     [employer] gives you secure access to your electronic health record. If you see a primary care provider, you can also send messages to your care team and make appointments. If you have questions, please call your primary care clinic.  If you do not have a primary care provider, please call [phone] and they will assist you.        Care EveryWhere ID     This is your Care EveryWhere ID. This could be used by other organizations to access your [facility] medical records  [id number]        Your Vitals Were     Pulse Respirations Height Pulse Oximetry BMI (Body Mass Index)       59 18 1.6 m (5' 3\") 95% 19.93 kg/m2        Blood Pressure from Last 3 Encounters:   05/01/18 119/74   04/30/18 98/63   04/18/18 136/72    Weight from Last 3 Encounters:   05/01/18 51 kg (112 lb 8 oz)   04/30/18 51.7 kg (114 lb)   04/18/18 53.6 kg (118 lb 3.2 oz)               " Primary Care Provider Office Phone # Fax #    Anupama Babcock [phone]       [place] [address]        Equal Access to Services     LESLIE CHACKO : Hadii aad ku hadsantinoo Somarkali, waaxda luqadaha, qaybta kaalmada adeayalada, alysha wendyin hayaajohann reederchiquitafranklin abdi. So [facility] [phone].    ATENCIÓN: Si habla español, tiene a dhillon disposición servicios gratuitos de asistencia lingüística. Llame al [phone].    We comply with applicable federal civil rights laws and [place] laws. We do not discriminate on the basis of race, color, national origin, age, disability, sex, sexual orientation, or gender identity.            Thank you!     Thank you for choosing [facility]  for your care. Our goal is always to provide you with excellent care. Hearing back from our patients is one way we can continue to improve our services. Please take a few minutes to complete the written survey that you may receive in the mail after your visit with us. Thank you!             Your Updated Medication List - Protect others around you: Learn how to safely use, store and throw away your medicines at www.disposemymeds.org.          This list is accurate as of 5/1/18  5:03 PM.  Always use your most recent med list.                   Brand Name Dispense Instructions for use Diagnosis    acetaminophen 500 MG tablet    TYLENOL    45 tablet    Take 1-2 tablets (500-1,000 mg) by mouth every 6 hours as needed for pain (Take as needed for pain.  Do not exceed 4 grams (8 tablets) in a day)    Pulmonary hypertension       azaTHIOprine 50 MG tablet    IMURAN    90 tablet    Take 1 tablet (50 mg) by mouth daily    Sarcoidosis       buPROPion 200 MG 12 hr tablet    WELLBUTRIN SR    30 tablet    Take 1 tablet (200 mg) by mouth every morning    Major depressive disorder, single episode, moderate (H)       BUSPIRONE HCL PO      Take 15 mg by mouth 2 times  daily        calcitRIOL 0.25 MCG capsule    ROCALTROL    30 capsule    Take 1 capsule (0.25 mcg) by mouth daily        carvedilol 12.5 MG tablet    COREG    135 tablet    Take 0.5 tablets (6.25 mg) by mouth 2 times daily (with meals)    Chronic systolic heart failure (H)       denosumab 60 MG/ML Soln injection    PROLIA    1 mL    Inject 1 mL (60 mg) Subcutaneous every 6 months    Osteoporosis, postmenopausal       EYE VITAMINS PO      Take 1 tablet by mouth daily    Sarcoidosis       furosemide 40 MG tablet    LASIX    90 tablet    Take 40 MG M-W-F, 20 MG all other days.    Pulmonary hypertension, Stress-induced cardiomyopathy       LORazepam 0.5 MG tablet    ATIVAN    90 tablet    Take 0.5 mg (1 tab) every morning and 1 mg (2 tabs) every night at bedtime.    Major depressive disorder, single episode, severe with psychotic features (H)       OPSUMIT 10 MG tablet   Generic drug:  macitentan     30 tablet    TAKE ONE TABLET (10MG) BY MOUTH DAILY. DO NOT HANDLE IF PREGNANT. DO NOT SPLIT, CRUSH, OR CHEW. REVIEW MEDICATION GUIDE. CALL [phone]    Primary pulmonary hypertension (H)       order for DME     1 Device    Equipment being ordered: SHOWER CHAIR  FROM [facility]    Tremor, Generalized muscle weakness, Sarcoidosis       potassium chloride 10 MEQ tablet    K-TAB,KLOR-CON    90 tablet    Take 1 tablet (10 mEq) by mouth daily    Heart failure (H)       ranitidine 75 MG tablet    ranitidine    60 tablet    Take 1-2 tablets ( mg) by mouth 2 times daily    Primary pulmonary hypertension (H), Anemia, unspecified type, SOB (shortness of breath)       simvastatin 10 MG tablet    ZOCOR    90 tablet    Take 1 tablet by mouth At Bedtime.    Hypercholesterolemia       SODIUM BICARBONATE PO      Take by mouth 2 times daily    Pulmonary arterial hypertension       T.E.D. BELOW KNEE/M-REGULAR Misc     2 each    1 each daily    Lower extremity edema       tadalafil (PAH) 20 MG Tabs    ADCIRCA    60 tablet    Take 2  tablets (40 mg) by mouth daily    Pulmonary hypertension       TEMAZEPAM PO      Take by mouth At Bedtime        treprostinil diolamine ER 0.125 MG CR tablet    ORENITRAM    90 tablet    Take 7.5 mg by mouth 2 times daily (with meals)    Pulmonary arterial hypertension

## 2018-05-01 NOTE — LETTER
5/1/2018     RE: Judith Nelson  [address] [facility] [address]     Dear Colleague,    Thank you for referring your patient, Judith Nelson, to the [facility] FOR LUNG SCIENCE AND HEALTH at [facility]. Please see a copy of my visit note below.    Formerly [facility]  Pulmonary Medicine  Visit Clinic Note  May 1, 2018         ASSESSMENT & PLAN     Ms. Nelson is a 57yo female with PMHx of pulmonary sarcoidosis, severe pulmonary hypertension, CKD stage III, who presents for routine follow up.       1. Pulmonary Sarcoidosis- PFTs today with mild obstructive ventilatory defect with decreased diffusion capacity (corrected for hemoglobin). 6min walk test notable for desaturation from 95% at rest to 88% with exertion. Patient walked (1400 Ft / 427m) in 6 minutes. LLN = ( 1474Ft / 449m). Symptoms have been stable but the PFTs and desaturation with walk test are concerning. She is also having SOB that wakes her up in the AM which has been stable for the past year but is suspicious for possible hypoxic events at night. Will evaluate further with night oximetry test.     2. Extrapulmonary sarcoidosis with possible sarcoidosis pulmonary hypertension and renal involvement.   - She is actively followed by Cardiology for the severe pulmonary hypertension, on Opsumit, Tadakafil, and plan to transition from Orenitream to Uptravi pending prior authorization for new med.   - Cr 1.60, baseline around 1.5, previously has been as high as 1.75. Pt plans to follow up with Nephrologist.   - She follows with an ophthalmologist outside the [place] system (Dr. Granger at [facility] [place], last seen 3mo ago with stable vision).   - CBC, LFTs, and Ca normal. Last 1,25 (OH)2 vitamin D < 5 from 5/2017.   - Continue Imuran, will continue to check routine labs for drug monitoring     3. Possible irregular heart rate- pt was seen in ED day prior to clinic visit for suspected  irregular heart rate per her home health RN. EKG showed sinus rhythm. Reviewed other recent EKGs, no evidence of Afib and no hx of Afib listed in chart. CBC unremarkable. BMP with elevated Cr in setting of CKD and mildly elevated chloride, otherwise unremarkable. TSH and free T4 from February were within normal ranges. Pt was told to f/u today for possible heart monitor. Will further workup this possible irregular heart rate vs arrhythmia with Zio-Patch for 14 days.       RTC in 3 months with PFTs.     Patient seen and discussed with Dr. Flaherty.    Osvaldo Powell MD  Internal Medicine, PGY-1       Today's visit note:     Chief Complaint: Judith Nelson is a 58 year old year old female who is being seen for RECHECK (Interstitial Lung)      HISTORY OF PRESENT ILLNESS:  Ms. Nelson is a 57yo female with PMHx of pulmonary sarcoidosis, severe pulmonary hypertension, CKD stage III, who presents for routine follow up. She was last seen in 11/2017. Since that visit, she had a right heart cath 2wks ago that showed a mean PAP of 60mmHg, and normal right and left sided filling pressures. Saw Dr. Franco around the same time- per chart review, given that her pulmonary hypertension has not improved, plan to try a new medication Uptravi and possibly come off of the Orenitram. Appears to be awaiting prior authorization for the Uptravi. Continues to take Opsumit and Tadalafil.     She was in the ED yesterday- was sent by her home health RN who thought that pt had an irregular heart rate upon listening and feeling pt's pulse. Pt was asymptomatic. EKG in the ED showed sinus rhythm. CBC unremarkable. BMP with elevated Cr at 1.6 and chloride 112, otherwise unremarkable. No prior hx of Atrial Fibrillation. She denies chest pain or palpitations. Never has felt her heart skipping a beat. Was d/c to home and told to call Cardiology over the next two days for consideration of a Holter monitor.     She reports SOB around 5am each  morning that wakes her up. She has to sit upright in bed and the SOB resolves on its own in a few minutes. Occasionally also has nausea during these episodes. Does not have SOB at rest during the day. Does become SOB with exertion- has not exercised this winter and she feels deconditioned. Chronic non-productive cough that is unchanged. Chronic abdominal cramps and headaches that are unchanged. No weight changes. Continues to have hot flashes. Having loose stools, unable to afford probiotics. Has not taken her sodium bicarb due to financial issues. Denies f/c, chest pain, muscle or joint aches.            Past Medical and Surgical History:     Past Medical History:   Diagnosis Date     Anxiety      CKD (chronic kidney disease), stage III     biopsy suspicious for renal sarcoidosis      Hyperprolactinemia (H)      Lower extremity edema     chronic w/ chronic right ankle ulcer     Lumbar compression fracture (H)      Major depressive disorder     with psychotic features, followed by Dr. Rosales at [facility]     patient is post menopausal     MGUS (monoclonal gammopathy of unknown significance)     mild, followed by Dr. Long     Osteoporosis      Other seborrheic keratosis      Protrusio acetabuli      Pulmonary hypertension      Sarcoidosis      Stress-induced cardiomyopathy      Past Surgical History:   Procedure Laterality Date     Csection,  X 2       CYSTOSCOPY, RETROGRADES, INSERT STENT URETER(S), COMBINED  10/2/2013    Procedure: COMBINED CYSTOSCOPY, RETROGRADES, INSERT STENT URETER(S);  Left Retrograde Ureteropyelogram and Ureteral Stent Placement;  Surgeon: SONIA Martinez MD;  Location: [facility] OR     ENDOSCOPIC RETROGRADE CHOLANGIOPANCREATOGRAM  2012    Procedure:ENDOSCOPIC RETROGRADE CHOLANGIOPANCREATOGRAM; ERCP biliary sphincterotomy and stone removal; Surgeon:MARGIE RUGGIEROIQ; Location:[facility] OR     LAPAROSCOPIC CHOLECYSTECTOMY WITH CHOLANGIOGRAMS  2012     Procedure:LAPAROSCOPIC CHOLECYSTECTOMY WITH CHOLANGIOGRAMS; Surgeon:BRIANA PADILLA; Location:[facility] OR     ORTHOPEDIC SURGERY  10/1/2010    Right hip replacement     ZZ GASTROSCOPY,[place]  6/10    Erythematous duodenopathy           Family History:     Family History   Problem Relation Age of Onset     CANCER Mother       age 62 leukemia     GASTROINTESTINAL DISEASE Mother      diverticulitis     Hypertension Mother      Allergies Mother      Arthritis Mother      Depression Mother      Eye Disorder Mother      OSTEOPOROSIS Mother      C.A.D. Father      MI/CAD      CANCER Father      skin     Blood Disease Father      renal  problem     Hypertension Father      Anesthesia Reaction Father      Cardiovascular Father      Neurologic Disorder Father      Parkinson's disease     C.A.D. Maternal Grandmother       late 82s MI     DIABETES Maternal Grandmother      C.A.D. Maternal Grandfather       mid 80s MI     Alzheimer Disease Paternal Grandmother       80s     Alzheimer Disease Paternal Grandfather       80s     Neurologic Disorder Sister      migraines     Allergies Sister      DIABETES Other      AODM     Neurologic Disorder Sister      migraines     Allergies Sister      Anesthesia Reaction Son      Anesthesia Reaction Daughter      Anesthesia Reaction Daughter      DIABETES Sister      hypoglycemia              Social History:     Social History     Social History     Marital status:      Spouse name: N/A     Number of children: 3     Years of education: 12     Occupational History      Unemployed     Social History Main Topics     Smoking status: Never Smoker     Smokeless tobacco: Never Used     Alcohol use No      Comment: Occ.     Drug use: No     Sexual activity: No     Other Topics Concern     Parent/Sibling W/ Cabg, Mi Or Angioplasty Before 65f 55m? No     Social History Narrative            Medications:     Current Outpatient Prescriptions   Medication     acetaminophen  "(TYLENOL) 500 MG tablet     azaTHIOprine (IMURAN) 50 MG tablet     buPROPion (WELLBUTRIN SR) 200 MG 12 hr tablet     BUSPIRONE HCL PO     calcitRIOL (ROCALTROL) 0.25 MCG capsule     carvedilol (COREG) 12.5 MG tablet     denosumab (PROLIA) 60 MG/ML SOLN     Elastic Bandages & Supports (T.E.D. BELOW KNEE/M-REGULAR) MISC     furosemide (LASIX) 40 MG tablet     LORazepam (ATIVAN) 0.5 MG tablet     Multiple Vitamins-Minerals (EYE VITAMINS PO)     OPSUMIT 10 MG tablet     ORDER FOR DME     potassium chloride (K-TAB,KLOR-CON) 10 MEQ tablet     ranitidine (RANITIDINE) 75 MG tablet     simvastatin (ZOCOR) 10 MG tablet     SODIUM BICARBONATE PO     tadalafil, PAH, (ADCIRCA) 20 MG TABS     TEMAZEPAM PO     treprostinil diolamine ER (ORENITRAM) 0.125 MG CR tablet     No current facility-administered medications for this visit.             Review of Systems:     A complete review of systems was otherwise negative except as noted in the HPI.      PHYSICAL EXAM:  /74  Pulse 59  Resp 18  Ht 1.6 m (5' 3\")  Wt 51 kg (112 lb 8 oz)  SpO2 95%  BMI 19.93 kg/m2     General: Well developed, well nourished, No apparent distress  Eyes: Anicteric  Ears: Hearing grossly normal  Mouth: Oral mucosa is moist, without any lesions. No oropharyngeal exudate.  Respiratory: Good air movement. No crackles. No rhonchi. No wheezes  Cardiac: RRR, normal S1, S2. No murmurs. No JVD  Abdomen: Soft, NT/ND  Musculoskeletal: Extremities normal. No clubbing. No cyanosis. No edema.  Skin: No rash on limited exam  Neuro: Normal mentation. Normal speech.  Psych: Normal affect           Data:   All laboratory and imaging data reviewed.      PFT:   FEV1/FVC ratio 66%. FEV1 1.80 (77% predicted). FVC 2.73 (94% predicted). DLCO 61% of predicted.  6min walk test notable for SpO2 95% at rest and lowest SpO2 during test was 88%. Patient walked (1400 Ft / 427m) in 6 minutes. LLN = ( 1474Ft / 449m).     PFT Interpretation:  Mild obstructive ventilatory defect " with decreased diffusion capacity (corrected for hemoglobin).   Decreased from previous. FEV1 decreased by ~130cc.   Similar to recent best.  Valid Maneuver    CXR: n/a     Chest CT: n/a    Recent Results (from the past 168 hour(s))   CBC with platelets differential    Collection Time: 04/30/18  5:55 PM   Result Value Ref Range    WBC 5.3 4.0 - 11.0 10e9/L    RBC Count 4.40 3.8 - 5.2 10e12/L    Hemoglobin 13.6 11.7 - 15.7 g/dL    Hematocrit 40.1 35.0 - 47.0 %    MCV 91 78 - 100 fl    MCH 30.9 26.5 - 33.0 pg    MCHC 33.9 31.5 - 36.5 g/dL    RDW 12.9 10.0 - 15.0 %    Platelet Count 207 150 - 450 10e9/L    Diff Method Automated Method     % Neutrophils 65.1 %    % Lymphocytes 22.8 %    % Monocytes 8.5 %    % Eosinophils 2.6 %    % Basophils 0.8 %    % Immature Granulocytes 0.2 %    Absolute Neutrophil 3.5 1.6 - 8.3 10e9/L    Absolute Lymphocytes 1.2 0.8 - 5.3 10e9/L    Absolute Monocytes 0.5 0.0 - 1.3 10e9/L    Absolute Eosinophils 0.1 0.0 - 0.7 10e9/L    Absolute Basophils 0.0 0.0 - 0.2 10e9/L    Abs Immature Granulocytes 0.0 0 - 0.4 10e9/L   Comprehensive metabolic panel    Collection Time: 04/30/18  5:55 PM   Result Value Ref Range    Sodium 142 133 - 144 mmol/L    Potassium 4.1 3.4 - 5.3 mmol/L    Chloride 112 (H) 94 - 109 mmol/L    Carbon Dioxide 24 20 - 32 mmol/L    Anion Gap 6 3 - 14 mmol/L    Glucose 75 70 - 99 mg/dL    Urea Nitrogen 24 7 - 30 mg/dL    Creatinine 1.60 (H) 0.52 - 1.04 mg/dL    GFR Estimate 33 (L) >60 mL/min/1.7m2    GFR Estimate If Black 40 (L) >60 mL/min/1.7m2    Calcium 9.1 8.5 - 10.1 mg/dL    Bilirubin Total 0.4 0.2 - 1.3 mg/dL    Albumin 4.0 3.4 - 5.0 g/dL    Protein Total 7.3 6.8 - 8.8 g/dL    Alkaline Phosphatase 68 40 - 150 U/L    ALT 27 0 - 50 U/L    AST 27 0 - 45 U/L   Magnesium    Collection Time: 04/30/18  5:55 PM   Result Value Ref Range    Magnesium 2.0 1.6 - 2.3 mg/dL   6 minute walk test    Collection Time: 05/01/18 12:00 AM   Result Value Ref Range    6 min walk (FT) 1400 ft    6  Min Walk (M) 427 m   General PFT Lab (Please always keep checked)    Collection Time: 05/01/18  1:59 PM   Result Value Ref Range    FVC-Pred 2.89 L    FVC-Pre 2.73 L    FVC-%Pred-Pre 94 %    FEV1-Pre 1.80 L    FEV1-%Pred-Pre 77 %    FEV1FVC-Pred 79 %    FEV1FVC-Pre 66 %    FEFMax-Pred 6.29 L/sec    FEFMax-Pre 3.94 L/sec    FEFMax-%Pred-Pre 62 %    FEF2575-Pred 2.22 L/sec    FEF2575-Pre 0.99 L/sec    XHY5229-%Pred-Pre 44 %    ExpTime-Pre 9.34 sec    FIFMax-Pre 4.54 L/sec    VC-Pred 3.20 L    VC-Pre 2.45 L    VC-%Pred-Pre 76 %    IC-Pred 2.12 L    IC-Pre 1.30 L    IC-%Pred-Pre 61 %    ERV-Pred 1.08 L    ERV-Pre 1.15 L    ERV-%Pred-Pre 106 %    FEV1FEV6-Pred 81 %    FEV1FEV6-Pre 67 %    DLCOunc-Pred 21.53 ml/min/mmHg    DLCOunc-Pre 13.37 ml/min/mmHg    DLCOunc-%Pred-Pre 62 %    DLCOcor-Pre 13.28 ml/min/mmHg    DLCOcor-%Pred-Pre 61 %    VA-Pre 3.86 L    VA-%Pred-Pre 78 %    FEV1SVC-Pred 72 %    FEV1SVC-Pre 73 %         Attending statement:    The patient was seen and examined by me with house staff.  The case was discussed at length.    Vitals, lab results and imaging from today were reviewed.  The note reflects our joint assessment and plan. Possiblity of nocturnal hypoxa so will get overnight oximetry and consider oxygne supplementation. Also place on ziopathch. WIll discuss with cardiology    Gael Flaherty MD  Pulmonary, Critical Care Medicine

## 2018-05-01 NOTE — PROGRESS NOTES
[facility]  Pulmonary Medicine  Visit Clinic Note  May 1, 2018         ASSESSMENT & PLAN     Ms. Nelson is a 59yo female with PMHx of pulmonary sarcoidosis, severe pulmonary hypertension, CKD stage III, who presents for routine follow up.       1. Pulmonary Sarcoidosis- PFTs today with mild obstructive ventilatory defect with decreased diffusion capacity (corrected for hemoglobin). 6min walk test notable for desaturation from 95% at rest to 88% with exertion. Patient walked (1400 Ft / 427m) in 6 minutes. LLN = ( 1474Ft / 449m). Symptoms have been stable but the PFTs and desaturation with walk test are concerning. She is also having SOB that wakes her up in the AM which has been stable for the past year but is suspicious for possible hypoxic events at night. Will evaluate further with night oximetry test.     2. Extrapulmonary sarcoidosis with possible sarcoidosis pulmonary hypertension and renal involvement.   - She is actively followed by Cardiology for the severe pulmonary hypertension, on Opsumit, Tadakafil, and plan to transition from Orenitream to Uptravi pending prior authorization for new med.   - Cr 1.60, baseline around 1.5, previously has been as high as 1.75. Pt plans to follow up with Nephrologist.   - She follows with an ophthalmologist outside the [facility] system (Dr. Granger at [facility], last seen 3mo ago with stable vision).   - CBC, LFTs, and Ca normal. Last 1,25 (OH)2 vitamin D < 5 from 5/2017.   - Continue Imuran, will continue to check routine labs for drug monitoring     3. Possible irregular heart rate- pt was seen in ED day prior to clinic visit for suspected irregular heart rate per her home health RN. EKG showed sinus rhythm. Reviewed other recent EKGs, no evidence of Afib and no hx of Afib listed in chart. CBC unremarkable. BMP with elevated Cr in setting of CKD and mildly elevated chloride, otherwise unremarkable. TSH and free T4 from February were within  normal ranges. Pt was told to f/u today for possible heart monitor. Will further workup this possible irregular heart rate vs arrhythmia with Zio-Patch for 14 days.       RTC in 3 months with PFTs.     Patient seen and discussed with Dr. Flaherty.    Osvaldo Powell MD  Internal Medicine, PGY-1       Today's visit note:     Chief Complaint: Judith Nelson is a 58 year old year old female who is being seen for RECHECK (Interstitial Lung)      HISTORY OF PRESENT ILLNESS:  Ms. Nelson is a 59yo female with PMHx of pulmonary sarcoidosis, severe pulmonary hypertension, CKD stage III, who presents for routine follow up. She was last seen in 11/2017. Since that visit, she had a right heart cath 2wks ago that showed a mean PAP of 60mmHg, and normal right and left sided filling pressures. Saw Dr. Franco around the same time- per chart review, given that her pulmonary hypertension has not improved, plan to try a new medication Uptravi and possibly come off of the Orenitram. Appears to be awaiting prior authorization for the Uptravi. Continues to take Opsumit and Tadalafil.     She was in the ED yesterday- was sent by her home health RN who thought that pt had an irregular heart rate upon listening and feeling pt's pulse. Pt was asymptomatic. EKG in the ED showed sinus rhythm. CBC unremarkable. BMP with elevated Cr at 1.6 and chloride 112, otherwise unremarkable. No prior hx of Atrial Fibrillation. She denies chest pain or palpitations. Never has felt her heart skipping a beat. Was d/c to home and told to call Cardiology over the next two days for consideration of a Holter monitor.     She reports SOB around 5am each morning that wakes her up. She has to sit upright in bed and the SOB resolves on its own in a few minutes. Occasionally also has nausea during these episodes. Does not have SOB at rest during the day. Does become SOB with exertion- has not exercised this winter and she feels deconditioned. Chronic  non-productive cough that is unchanged. Chronic abdominal cramps and headaches that are unchanged. No weight changes. Continues to have hot flashes. Having loose stools, unable to afford probiotics. Has not taken her sodium bicarb due to financial issues. Denies f/c, chest pain, muscle or joint aches.            Past Medical and Surgical History:     Past Medical History:   Diagnosis Date     Anxiety      CKD (chronic kidney disease), stage III     biopsy suspicious for renal sarcoidosis      Hyperprolactinemia (H)      Lower extremity edema     chronic w/ chronic right ankle ulcer     Lumbar compression fracture (H)      Major depressive disorder     with psychotic features, followed by Dr. Rosales at [facility]     patient is post menopausal     MGUS (monoclonal gammopathy of unknown significance)     mild, followed by Dr. Long     Osteoporosis      Other seborrheic keratosis      Protrusio acetabuli      Pulmonary hypertension      Sarcoidosis      Stress-induced cardiomyopathy      Past Surgical History:   Procedure Laterality Date     Csection,  X 2       CYSTOSCOPY, RETROGRADES, INSERT STENT URETER(S), COMBINED  10/2/2013    Procedure: COMBINED CYSTOSCOPY, RETROGRADES, INSERT STENT URETER(S);  Left Retrograde Ureteropyelogram and Ureteral Stent Placement;  Surgeon: SONIA Martinez MD;  Location: [facility] OR     ENDOSCOPIC RETROGRADE CHOLANGIOPANCREATOGRAM  2012    Procedure:ENDOSCOPIC RETROGRADE CHOLANGIOPANCREATOGRAM; ERCP biliary sphincterotomy and stone removal; Surgeon:MARGIE RUGGIEROIQ; Location:[facility] OR     LAPAROSCOPIC CHOLECYSTECTOMY WITH CHOLANGIOGRAMS  2012    Procedure:LAPAROSCOPIC CHOLECYSTECTOMY WITH CHOLANGIOGRAMS; Surgeon:BRIANA PADILLA; Location:[facility] OR     ORTHOPEDIC SURGERY  10/1/2010    Right hip replacement     ZZ GASTROSCOPY,FL  6/10    Erythematous duodenopathy           Family History:     Family History   Problem Relation Age of Onset      CANCER Mother       age 62 leukemia     GASTROINTESTINAL DISEASE Mother      diverticulitis     Hypertension Mother      Allergies Mother      Arthritis Mother      Depression Mother      Eye Disorder Mother      OSTEOPOROSIS Mother      C.A.D. Father      MI/CAD      CANCER Father      skin     Blood Disease Father      renal  problem     Hypertension Father      Anesthesia Reaction Father      Cardiovascular Father      Neurologic Disorder Father      Parkinson's disease     C.A.D. Maternal Grandmother       late 82s MI     DIABETES Maternal Grandmother      C.A.D. Maternal Grandfather       mid 80s MI     Alzheimer Disease Paternal Grandmother       80s     Alzheimer Disease Paternal Grandfather       80s     Neurologic Disorder Sister      migraines     Allergies Sister      DIABETES Other      AODM     Neurologic Disorder Sister      migraines     Allergies Sister      Anesthesia Reaction Son      Anesthesia Reaction Daughter      Anesthesia Reaction Daughter      DIABETES Sister      hypoglycemia              Social History:     Social History     Social History     Marital status:      Spouse name: N/A     Number of children: 3     Years of education: 12     Occupational History      Unemployed     Social History Main Topics     Smoking status: Never Smoker     Smokeless tobacco: Never Used     Alcohol use No      Comment: Occ.     Drug use: No     Sexual activity: No     Other Topics Concern     Parent/Sibling W/ Cabg, Mi Or Angioplasty Before 65f 55m? No     Social History Narrative            Medications:     Current Outpatient Prescriptions   Medication     acetaminophen (TYLENOL) 500 MG tablet     azaTHIOprine (IMURAN) 50 MG tablet     buPROPion (WELLBUTRIN SR) 200 MG 12 hr tablet     BUSPIRONE HCL PO     calcitRIOL (ROCALTROL) 0.25 MCG capsule     carvedilol (COREG) 12.5 MG tablet     denosumab (PROLIA) 60 MG/ML SOLN     Elastic Bandages & Supports (T.E.D. BELOW  "KNEE/M-REGULAR) MISC     furosemide (LASIX) 40 MG tablet     LORazepam (ATIVAN) 0.5 MG tablet     Multiple Vitamins-Minerals (EYE VITAMINS PO)     OPSUMIT 10 MG tablet     ORDER FOR DME     potassium chloride (K-TAB,KLOR-CON) 10 MEQ tablet     ranitidine (RANITIDINE) 75 MG tablet     simvastatin (ZOCOR) 10 MG tablet     SODIUM BICARBONATE PO     tadalafil, PAH, (ADCIRCA) 20 MG TABS     TEMAZEPAM PO     treprostinil diolamine ER (ORENITRAM) 0.125 MG CR tablet     No current facility-administered medications for this visit.             Review of Systems:     A complete review of systems was otherwise negative except as noted in the HPI.      PHYSICAL EXAM:  /74  Pulse 59  Resp 18  Ht 1.6 m (5' 3\")  Wt 51 kg (112 lb 8 oz)  SpO2 95%  BMI 19.93 kg/m2     General: Well developed, well nourished, No apparent distress  Eyes: Anicteric  Ears: Hearing grossly normal  Mouth: Oral mucosa is moist, without any lesions. No oropharyngeal exudate.  Respiratory: Good air movement. No crackles. No rhonchi. No wheezes  Cardiac: RRR, normal S1, S2. No murmurs. No JVD  Abdomen: Soft, NT/ND  Musculoskeletal: Extremities normal. No clubbing. No cyanosis. No edema.  Skin: No rash on limited exam  Neuro: Normal mentation. Normal speech.  Psych: Normal affect           Data:   All laboratory and imaging data reviewed.      PFT:   FEV1/FVC ratio 66%. FEV1 1.80 (77% predicted). FVC 2.73 (94% predicted). DLCO 61% of predicted.  6min walk test notable for SpO2 95% at rest and lowest SpO2 during test was 88%. Patient walked (1400 Ft / 427m) in 6 minutes. LLN = ( 1474Ft / 449m).     PFT Interpretation:  Mild obstructive ventilatory defect with decreased diffusion capacity (corrected for hemoglobin).   Decreased from previous. FEV1 decreased by ~130cc.   Similar to recent best.  Valid Maneuver    CXR: n/a     Chest CT: n/a    Recent Results (from the past 168 hour(s))   CBC with platelets differential    Collection Time: 04/30/18  5:55 " PM   Result Value Ref Range    WBC 5.3 4.0 - 11.0 10e9/L    RBC Count 4.40 3.8 - 5.2 10e12/L    Hemoglobin 13.6 11.7 - 15.7 g/dL    Hematocrit 40.1 35.0 - 47.0 %    MCV 91 78 - 100 fl    MCH 30.9 26.5 - 33.0 pg    MCHC 33.9 31.5 - 36.5 g/dL    RDW 12.9 10.0 - 15.0 %    Platelet Count 207 150 - 450 10e9/L    Diff Method Automated Method     % Neutrophils 65.1 %    % Lymphocytes 22.8 %    % Monocytes 8.5 %    % Eosinophils 2.6 %    % Basophils 0.8 %    % Immature Granulocytes 0.2 %    Absolute Neutrophil 3.5 1.6 - 8.3 10e9/L    Absolute Lymphocytes 1.2 0.8 - 5.3 10e9/L    Absolute Monocytes 0.5 0.0 - 1.3 10e9/L    Absolute Eosinophils 0.1 0.0 - 0.7 10e9/L    Absolute Basophils 0.0 0.0 - 0.2 10e9/L    Abs Immature Granulocytes 0.0 0 - 0.4 10e9/L   Comprehensive metabolic panel    Collection Time: 04/30/18  5:55 PM   Result Value Ref Range    Sodium 142 133 - 144 mmol/L    Potassium 4.1 3.4 - 5.3 mmol/L    Chloride 112 (H) 94 - 109 mmol/L    Carbon Dioxide 24 20 - 32 mmol/L    Anion Gap 6 3 - 14 mmol/L    Glucose 75 70 - 99 mg/dL    Urea Nitrogen 24 7 - 30 mg/dL    Creatinine 1.60 (H) 0.52 - 1.04 mg/dL    GFR Estimate 33 (L) >60 mL/min/1.7m2    GFR Estimate If Black 40 (L) >60 mL/min/1.7m2    Calcium 9.1 8.5 - 10.1 mg/dL    Bilirubin Total 0.4 0.2 - 1.3 mg/dL    Albumin 4.0 3.4 - 5.0 g/dL    Protein Total 7.3 6.8 - 8.8 g/dL    Alkaline Phosphatase 68 40 - 150 U/L    ALT 27 0 - 50 U/L    AST 27 0 - 45 U/L   Magnesium    Collection Time: 04/30/18  5:55 PM   Result Value Ref Range    Magnesium 2.0 1.6 - 2.3 mg/dL   6 minute walk test    Collection Time: 05/01/18 12:00 AM   Result Value Ref Range    6 min walk (FT) 1400 ft    6 Min Walk (M) 427 m   General PFT Lab (Please always keep checked)    Collection Time: 05/01/18  1:59 PM   Result Value Ref Range    FVC-Pred 2.89 L    FVC-Pre 2.73 L    FVC-%Pred-Pre 94 %    FEV1-Pre 1.80 L    FEV1-%Pred-Pre 77 %    FEV1FVC-Pred 79 %    FEV1FVC-Pre 66 %    FEFMax-Pred 6.29 L/sec     FEFMax-Pre 3.94 L/sec    FEFMax-%Pred-Pre 62 %    FEF2575-Pred 2.22 L/sec    FEF2575-Pre 0.99 L/sec    PKO8251-%Pred-Pre 44 %    ExpTime-Pre 9.34 sec    FIFMax-Pre 4.54 L/sec    VC-Pred 3.20 L    VC-Pre 2.45 L    VC-%Pred-Pre 76 %    IC-Pred 2.12 L    IC-Pre 1.30 L    IC-%Pred-Pre 61 %    ERV-Pred 1.08 L    ERV-Pre 1.15 L    ERV-%Pred-Pre 106 %    FEV1FEV6-Pred 81 %    FEV1FEV6-Pre 67 %    DLCOunc-Pred 21.53 ml/min/mmHg    DLCOunc-Pre 13.37 ml/min/mmHg    DLCOunc-%Pred-Pre 62 %    DLCOcor-Pre 13.28 ml/min/mmHg    DLCOcor-%Pred-Pre 61 %    VA-Pre 3.86 L    VA-%Pred-Pre 78 %    FEV1SVC-Pred 72 %    FEV1SVC-Pre 73 %         Attending statement:    The patient was seen and examined by me with house staff.  The case was discussed at length.    Vitals, lab results and imaging from today were reviewed.  The note reflects our joint assessment and plan. Possiblity of nocturnal hypoxa so will get overnight oximetry and consider oxygne supplementation. Also place on ziopathch. WIll discuss with cardiology    Gael Flaherty MD  Pulmonary, Critical Care Medicine

## 2018-05-01 NOTE — MR AVS SNAPSHOT
After Visit Summary   5/1/2018    Judith Nelson    MRN: [id number]           Patient Information     Date Of Birth          1960        Visit Information        Provider Department      5/1/2018 4:00 PM Tech, [facility] Cvc Monitor, [facility]        Today's Diagnoses     Sarcoidosis        Paroxysmal supraventricular tachycardia (H)           Follow-ups after your visit        Your next 10 appointments already scheduled     May 21, 2018 10:15 AM CDT   Lab with  LAB   [facility] Lab ([facility])    [address] [place] [address]  [facility] [id number] [phone]            May 21, 2018 10:45 AM CDT   (Arrive by 10:30 AM)   RETURN PRIMARY PULMONARY with JAVAN Beaulieu [facility] ([facility])    [address] [place] [address]  [address] [place] [id number] [phone]            Aug 10, 2018 12:00 PM CDT   DX HIP/PELVIS/SPINE with UCDX1   [facility] Imaging Ulysses Dexa ([facility])    [address] [place] [address]  [facility] [id number] [phone]           Please do not take any of the following 24 hours prior to the day of your exam: vitamins, calcium tablets, antacids.  If possible, please wear clothes without metal (snaps, zippers). A sweatsuit works well.            Aug 10, 2018  1:00 PM CDT   (Arrive by 12:45 PM)   RETURN ENDOCRINE with Jenna Salas MD   [facility] Endocrinology ([facility])    [address] [place] [address]  3rd Floor  [facility] [id number] [phone]            Aug 14, 2018  2:30 PM CDT   (Arrive by 2:15 PM)   Return Interstitial Lung with Gael Flaherty MD   [facility] ([facility])    [address] [place] [address]  [address] [place] [id number] [phone]              Future tests that were ordered for you today     Open Future Orders        Priority  Expected Expires Ordered    Overnight oximetry study Routine  5/31/2018 5/1/2018            Who to contact     If you have questions or need follow up information about today's clinic visit or your schedule please contact [facility] directly at [phone].  Normal or non-critical lab and imaging results will be communicated to you by [employer], letter or phone within 4 business days after the clinic has received the results. If you do not hear from us within 7 days, please contact the clinic through [employer] or phone. If you have a critical or abnormal lab result, we will notify you by phone as soon as possible.  Submit refill requests through [employer] or call your pharmacy and they will forward the refill request to us. Please allow 3 business days for your refill to be completed.          Additional Information About Your Visit        [employer] AND [employer] Information     [employer] gives you secure access to your electronic health record. If you see a primary care provider, you can also send messages to your care team and make appointments. If you have questions, please call your primary care clinic.  If you do not have a primary care provider, please call [phone] and they will assist you.        Care EveryWhere ID     This is your Care EveryWhere ID. This could be used by other organizations to access your [place] medical records  [id number]         Blood Pressure from Last 3 Encounters:   05/01/18 119/74   04/30/18 98/63   04/18/18 136/72    Weight from Last 3 Encounters:   05/01/18 51 kg (112 lb 8 oz)   04/30/18 51.7 kg (114 lb)   04/18/18 53.6 kg (118 lb 3.2 oz)              We Performed the Following     Zio Patch Holter        Primary Care Provider Office Phone # Fax #    Anupama Babcock [phone]       [address] [address]        Equal Access to Services     LESLIE CHACKO AH: Flavio Leiva, natanael salas, zhao matthew  alysha garzanasir lilliantonio ellisaajohann ah. So [facility] [phone].    ATENCIÓN: Si kenrickla mode, tiene a dhillon disposición servicios gratuitos de asistencia lingüística. Miller al [phone].    We comply with applicable federal civil rights laws and [place] laws. We do not discriminate on the basis of race, color, national origin, age, disability, sex, sexual orientation, or gender identity.            Thank you!     Thank you for choosing [facility]  for your care. Our goal is always to provide you with excellent care. Hearing back from our patients is one way we can continue to improve our services. Please take a few minutes to complete the written survey that you may receive in the mail after your visit with us. Thank you!             Your Updated Medication List - Protect others around you: Learn how to safely use, store and throw away your medicines at www.disposemymeds.org.          This list is accurate as of 5/1/18  5:42 PM.  Always use your most recent med list.                   Brand Name Dispense Instructions for use Diagnosis    acetaminophen 500 MG tablet    TYLENOL    45 tablet    Take 1-2 tablets (500-1,000 mg) by mouth every 6 hours as needed for pain (Take as needed for pain.  Do not exceed 4 grams (8 tablets) in a day)    Pulmonary hypertension       azaTHIOprine 50 MG tablet    IMURAN    90 tablet    Take 1 tablet (50 mg) by mouth daily    Sarcoidosis       buPROPion 200 MG 12 hr tablet    WELLBUTRIN SR    30 tablet    Take 1 tablet (200 mg) by mouth every morning    Major depressive disorder, single episode, moderate (H)       BUSPIRONE HCL PO      Take 15 mg by mouth 2 times daily        calcitRIOL 0.25 MCG capsule    ROCALTROL    30 capsule    Take 1 capsule (0.25 mcg) by mouth daily        carvedilol 12.5 MG tablet    COREG    135 tablet    Take 0.5 tablets (6.25 mg) by mouth 2 times daily (with meals)    Chronic systolic heart failure (H)       denosumab 60 MG/ML Soln  injection    PROLIA    1 mL    Inject 1 mL (60 mg) Subcutaneous every 6 months    Osteoporosis, postmenopausal       EYE VITAMINS PO      Take 1 tablet by mouth daily    Sarcoidosis       furosemide 40 MG tablet    LASIX    90 tablet    Take 40 MG M-W-F, 20 MG all other days.    Pulmonary hypertension, Stress-induced cardiomyopathy       LORazepam 0.5 MG tablet    ATIVAN    90 tablet    Take 0.5 mg (1 tab) every morning and 1 mg (2 tabs) every night at bedtime.    Major depressive disorder, single episode, severe with psychotic features (H)       OPSUMIT 10 MG tablet   Generic drug:  macitentan     30 tablet    TAKE ONE TABLET (10MG) BY MOUTH DAILY. DO NOT HANDLE IF PREGNANT. DO NOT SPLIT, CRUSH, OR CHEW. REVIEW MEDICATION GUIDE. CALL [phone]    Primary pulmonary hypertension (H)       order for DME     1 Device    Equipment being ordered: SHOWER CHAIR  FROM [facility]    Tremor, Generalized muscle weakness, Sarcoidosis       potassium chloride 10 MEQ tablet    K-TAB,KLOR-CON    90 tablet    Take 1 tablet (10 mEq) by mouth daily    Heart failure (H)       ranitidine 75 MG tablet    ranitidine    60 tablet    Take 1-2 tablets ( mg) by mouth 2 times daily    Primary pulmonary hypertension (H), Anemia, unspecified type, SOB (shortness of breath)       simvastatin 10 MG tablet    ZOCOR    90 tablet    Take 1 tablet by mouth At Bedtime.    Hypercholesterolemia       SODIUM BICARBONATE PO      Take by mouth 2 times daily    Pulmonary arterial hypertension       T.E.D. BELOW KNEE/M-REGULAR Misc     2 each    1 each daily    Lower extremity edema       tadalafil (PAH) 20 MG Tabs    ADCIRCA    60 tablet    Take 2 tablets (40 mg) by mouth daily    Pulmonary hypertension       TEMAZEPAM PO      Take by mouth At Bedtime        treprostinil diolamine ER 0.125 MG CR tablet    ORENITRAM    90 tablet    Take 7.5 mg by mouth 2 times daily (with meals)    Pulmonary arterial hypertension

## 2018-05-01 NOTE — NURSING NOTE
Per Dr. Flaherty, patient to have 14 days ziopatch monitor placed.  Diagnosis: Sarcoidosis  Monitor placed: Yes  Patient Instructed: Yes  Patient verbalized understanding: Yes  Holter # [id number]  Placed by: Talita Colindres  Documented by: Bria Camarillo CMA

## 2018-05-02 ENCOUNTER — CARE COORDINATION (OUTPATIENT)
Dept: PULMONOLOGY | Facility: CLINIC | Age: 58
End: 2018-05-02

## 2018-05-02 DIAGNOSIS — J84.9 ILD (INTERSTITIAL LUNG DISEASE) (H): Primary | ICD-10-CM

## 2018-05-03 ENCOUNTER — TELEPHONE (OUTPATIENT)
Dept: CARDIOLOGY | Facility: CLINIC | Age: 58
End: 2018-05-03

## 2018-05-03 NOTE — TELEPHONE ENCOUNTER
PA Initiation    Medication: Uptravi  Insurance Company: [employer] Part D - Phone [phone] Fax [phone]  Pharmacy Filling the Rx: [facility] - [place], [place] - [address]  Filling Pharmacy Phone: [phone]  Start Date: 5/3/2018    Urgent request has been submitted to [facility] Pt. D via CoverMyMeds. [facility] report was included with request.

## 2018-05-08 NOTE — PROGRESS NOTES
Received call from Jamil at [facility] stating he received our order and has tried many times to reach patient.  Today he did speak with the patient and she told him that she had gotten messages from multiple companies for the same test, so she had to figure it out.  Jamil was calling me to let me know and he will place order on hold for now.    Spoke with patient fabiola stated she had also gotten a message from [facility] for an Overnight Oximetry per another Dr of hers.  She feels conflicted as to who to go with.  patient states the other provider is a [employer] [facility], so I advised her to just go with [facility] and as soon as the results are in the system, we will look at them.  Patient verbalized understanding, agreed with plan and denied any further questions. Keyanna Mccray RN on 5/8/2018 at 12:48 PM    Called Jamil at [facility] and updated him to cancel that overnight oximetry order, as she is going with another carrier.  He verbalized understanding and agreed with plan. Keyanna Mccray RN on 5/8/2018 at 12:49 PM

## 2018-05-09 LAB
DLCOCOR-%PRED-PRE: 61 %
DLCOCOR-PRE: 13.28 ML/MIN/MMHG
DLCOUNC-%PRED-PRE: 62 %
DLCOUNC-PRE: 13.37 ML/MIN/MMHG
DLCOUNC-PRED: 21.53 ML/MIN/MMHG
ERV-%PRED-PRE: 106 %
ERV-PRE: 1.15 L
ERV-PRED: 1.08 L
EXPTIME-PRE: 9.34 SEC
FEF2575-%PRED-PRE: 44 %
FEF2575-PRE: 0.99 L/SEC
FEF2575-PRED: 2.22 L/SEC
FEFMAX-%PRED-PRE: 62 %
FEFMAX-PRE: 3.94 L/SEC
FEFMAX-PRED: 6.29 L/SEC
FEV1-%PRED-PRE: 77 %
FEV1-PRE: 1.8 L
FEV1FEV6-PRE: 67 %
FEV1FEV6-PRED: 81 %
FEV1FVC-PRE: 66 %
FEV1FVC-PRED: 79 %
FEV1SVC-PRE: 73 %
FEV1SVC-PRED: 72 %
FIFMAX-PRE: 4.54 L/SEC
FVC-%PRED-PRE: 94 %
FVC-PRE: 2.73 L
FVC-PRED: 2.89 L
IC-%PRED-PRE: 61 %
IC-PRE: 1.3 L
IC-PRED: 2.12 L
VA-%PRED-PRE: 78 %
VA-PRE: 3.86 L
VC-%PRED-PRE: 76 %
VC-PRE: 2.45 L
VC-PRED: 3.2 L

## 2018-05-14 ENCOUNTER — TELEPHONE (OUTPATIENT)
Dept: CARDIOLOGY | Facility: CLINIC | Age: 58
End: 2018-05-14

## 2018-05-14 NOTE — TELEPHONE ENCOUNTER
Health Call Center    Phone Message    May a detailed message be left on voicemail: yes    Reason for Call: Other: Bria from [facility], Patient will be having an Overnight Oximetry, and the clinic has additional question in regards to this request. Please call Bria at Phone: [phone], OK to leave a message.      Action Taken: Message routed to:  [facility] ([facility] HEART

## 2018-05-15 ENCOUNTER — INFUSION THERAPY VISIT (OUTPATIENT)
Dept: INFUSION THERAPY | Facility: CLINIC | Age: 58
End: 2018-05-15
Attending: INTERNAL MEDICINE
Payer: MEDICARE

## 2018-05-15 VITALS — SYSTOLIC BLOOD PRESSURE: 106 MMHG | DIASTOLIC BLOOD PRESSURE: 50 MMHG

## 2018-05-15 DIAGNOSIS — D50.8 OTHER IRON DEFICIENCY ANEMIA: Primary | ICD-10-CM

## 2018-05-15 PROCEDURE — 25000128 H RX IP 250 OP 636: Performed by: INTERNAL MEDICINE

## 2018-05-15 PROCEDURE — 96374 THER/PROPH/DIAG INJ IV PUSH: CPT

## 2018-05-15 RX ADMIN — FERRIC CARBOXYMALTOSE INJECTION 750 MG: 50 INJECTION, SOLUTION INTRAVENOUS at 14:30

## 2018-05-15 RX ADMIN — SODIUM CHLORIDE 250 ML: 9 INJECTION, SOLUTION INTRAVENOUS at 14:30

## 2018-05-15 NOTE — TELEPHONE ENCOUNTER
Called Bria back and clarified that our office is ordering the overnight oximetry, it should be coming from [facility] to her home which she wears overnight and then returns the next day.  Bria verbalized understanding and stated she will call patient with the answers she was looking for.  Keyanna Mccray RN on 5/15/2018 at 1:55 PM

## 2018-05-15 NOTE — MR AVS SNAPSHOT
After Visit Summary   5/15/2018    Judith Nelson    MRN: [id number]           Patient Information     Date Of Birth          1960        Visit Information        Provider Department      5/15/2018 1:30 PM ROOM 1 [facility]        Today's Diagnoses     Other iron deficiency anemia    -  1       Follow-ups after your visit        Your next 10 appointments already scheduled     May 21, 2018 10:15 AM CDT   Lab with  LAB   [facility] ([facility])    [address]  1st Floor  [facility] [id number] [phone]            May 21, 2018 10:45 AM CDT   (Arrive by 10:30 AM)   RETURN PRIMARY PULMONARY with JAVAN Beaulieu [facility] ([facility])    [address]  [address]  [facility] [id number] [phone]            May 22, 2018  2:30 PM CDT   Level 1 with ROOM 3 [facility] ([facility])    [facility]  [address]  [facility] [address]   [phone]            Aug 10, 2018 12:00 PM CDT   DX HIP/PELVIS/SPINE with UCDX1   [facility] ([facility])    [address]  1st [address]  [facility] [id number] [phone]           Please do not take any of the following 24 hours prior to the day of your exam: vitamins, calcium tablets, antacids.  If possible, please wear clothes without metal (snaps, zippers). A sweatsuit works well.            Aug 10, 2018  1:00 PM CDT   (Arrive by 12:45 PM)   RETURN ENDOCRINE with Jenna Salas MD   [facility] ([facility])    [address]  3rd Floor  [facility] [id number] [phone]            Aug 14, 2018  2:30 PM CDT   (Arrive by 2:15 PM)   Return Interstitial Lung with Gael Flaherty MD   [facility] ([facility])    [address]  [address]  [facility] [address] [phone]              Who to contact     If you have questions or need follow up information about today's clinic visit or your schedule please contact [facility] directly at [phone].  Normal or non-critical lab and imaging results will be communicated to you by MyChart, letter or phone within 4 business days after the clinic has received the results. If you do not hear from us within 7 days, please contact the clinic through [employer] or phone. If you have a critical or abnormal lab result, we will notify you by phone as soon as possible.  Submit refill requests through [employer] or call your pharmacy and they will forward the refill request to us. Please allow 3 business days for your refill to be completed.          Additional Information About Your Visit        MyChart Information     [employer] gives you secure access to your electronic health record. If you see a primary care provider, you can also send messages to your care team and make appointments. If you have questions, please call your primary care clinic.  If you do not have a primary care provider, please call [phone] and they will assist you.        Care EveryWhere ID     This is your Care EveryWhere ID. This could be used by other organizations to access your [place] medical records  [id number]         Blood Pressure from Last 3 Encounters:   05/15/18 106/50   05/01/18 119/74   04/30/18 98/63    Weight from Last 3 Encounters:   05/01/18 51 kg (112 lb 8 oz)   04/30/18 51.7 kg (114 lb)   04/18/18 53.6 kg (118 lb 3.2 oz)              Today, you had the following     No orders found for display       Primary Care Provider Office Phone # Fax #    Anupama Babcock [phone]       [address] [address]        Equal Access to Services     LESLIE CHACKO : Flavio Leiva, natanael salas, zhao matthew  alysha garzanasir lilliantonio ellisaajohann ah. [facility] [place] [phone].    ATENCIÓN: Si kenrickla mode, tiene a dhillon disposición servicios gratuitos de asistencia lingüística. Miller al [phone].    We comply with applicable federal civil rights laws and [place] laws. We do not discriminate on the basis of race, color, national origin, age, disability, sex, sexual orientation, or gender identity.            Thank you!     Thank you for choosing [facility]  for your care. Our goal is always to provide you with excellent care. Hearing back from our patients is one way we can continue to improve our services. Please take a few minutes to complete the written survey that you may receive in the mail after your visit with us. Thank you!             Your Updated Medication List - Protect others around you: Learn how to safely use, store and throw away your medicines at www.disposemymeds.org.          This list is accurate as of 5/15/18  4:08 PM.  Always use your most recent med list.                   Brand Name Dispense Instructions for use Diagnosis    acetaminophen 500 MG tablet    TYLENOL    45 tablet    Take 1-2 tablets (500-1,000 mg) by mouth every 6 hours as needed for pain (Take as needed for pain.  Do not exceed 4 grams (8 tablets) in a day)    Pulmonary hypertension       azaTHIOprine 50 MG tablet    IMURAN    90 tablet    Take 1 tablet (50 mg) by mouth daily    Sarcoidosis       buPROPion 200 MG 12 hr tablet    WELLBUTRIN SR    30 tablet    Take 1 tablet (200 mg) by mouth every morning    Major depressive disorder, single episode, moderate (H)       BUSPIRONE HCL PO      Take 15 mg by mouth 2 times daily        calcitRIOL 0.25 MCG capsule    ROCALTROL    30 capsule    Take 1 capsule (0.25 mcg) by mouth daily        carvedilol 12.5 MG tablet    COREG    135 tablet    Take 0.5 tablets (6.25 mg) by mouth 2 times daily (with meals)    Chronic systolic heart failure (H)       denosumab 60 MG/ML Soln  injection    PROLIA    1 mL    Inject 1 mL (60 mg) Subcutaneous every 6 months    Osteoporosis, postmenopausal       EYE VITAMINS PO      Take 1 tablet by mouth daily    Sarcoidosis       furosemide 40 MG tablet    LASIX    90 tablet    Take 40 MG M-W-F, 20 MG all other days.    Pulmonary hypertension, Stress-induced cardiomyopathy       LORazepam 0.5 MG tablet    ATIVAN    90 tablet    Take 0.5 mg (1 tab) every morning and 1 mg (2 tabs) every night at bedtime.    Major depressive disorder, single episode, severe with psychotic features (H)       OPSUMIT 10 MG tablet   Generic drug:  macitentan     30 tablet    TAKE ONE TABLET (10MG) BY MOUTH DAILY. DO NOT HANDLE IF PREGNANT. DO NOT SPLIT, CRUSH, OR CHEW. REVIEW MEDICATION GUIDE. CALL [phone]    Primary pulmonary hypertension (H)       order for DME     1 Device    Equipment being ordered: SHOWER CHAIR  FROM [facility]    Tremor, Generalized muscle weakness, Sarcoidosis       potassium chloride 10 MEQ tablet    K-TAB,KLOR-CON    90 tablet    Take 1 tablet (10 mEq) by mouth daily    Heart failure (H)       ranitidine 75 MG tablet    ranitidine    60 tablet    Take 1-2 tablets ( mg) by mouth 2 times daily    Primary pulmonary hypertension (H), Anemia, unspecified type, SOB (shortness of breath)       simvastatin 10 MG tablet    ZOCOR    90 tablet    Take 1 tablet by mouth At Bedtime.    Hypercholesterolemia       SODIUM BICARBONATE PO      Take by mouth 2 times daily    Pulmonary arterial hypertension       T.E.D. BELOW KNEE/M-REGULAR Misc     2 each    1 each daily    Lower extremity edema       tadalafil (PAH) 20 MG Tabs    ADCIRCA    60 tablet    Take 2 tablets (40 mg) by mouth daily    Pulmonary hypertension       TEMAZEPAM PO      Take by mouth At Bedtime        treprostinil diolamine ER 0.125 MG CR tablet    ORENITRAM    90 tablet    Take 7.5 mg by mouth 2 times daily (with meals)    Pulmonary arterial hypertension

## 2018-05-15 NOTE — PROGRESS NOTES
Infusion Nursing Note:  Judith Nelson presents today for Injectafer.    Patient seen by provider today: No   present during visit today: Not Applicable.    Note: N/A.    Intravenous Access:  Peripheral IV placed.    Treatment Conditions:  Not Applicable.      Post Infusion Assessment:  Patient tolerated infusion without incident.  Patient observed for 30 minutes post Injectafer per protocol.    Discharge Plan:   Patient discharged in stable condition accompanied by: daughter.  Departure Mode: Ambulatory.    Doris Marquez RN

## 2018-05-16 ENCOUNTER — TELEPHONE (OUTPATIENT)
Dept: INFUSION THERAPY | Facility: CLINIC | Age: 58
End: 2018-05-16

## 2018-05-16 DIAGNOSIS — D50.8 OTHER IRON DEFICIENCY ANEMIA: Primary | ICD-10-CM

## 2018-05-16 NOTE — TELEPHONE ENCOUNTER
Called patient back and advised her that Dr. Franco did want her to have the injections, but given what her lab values were she could have just 1.  Patient states that when she saw Dr. Franco last he was very adamant that she receive 2 doses.  Agreed that Dr. Franco may have advised that, as many of our patient's benefit from the infusions due to the fatigue ad sob.  Advised patient I would clarify the orders to read a total of 2, for which she already has an appt scheduled next week.  Patient verbalized understanding, agreed with plan and denied any further questions. Keyanna Mccray RN on 5/16/2018 at 1:47 PM

## 2018-05-16 NOTE — TELEPHONE ENCOUNTER
Pt has a question about how many Injectafer doses she needs.  Orders were written for 1 dose, but patient was told she needed 1 dose weekly for 2 weeks.   1st dose was given on 5/15/18 at [facility].  Please sign new orders if additional dose is needed.  Also, contact the patient about the question.  Thanks,  Doris Marquez RN

## 2018-05-17 DIAGNOSIS — I27.0 PRIMARY PULMONARY HYPERTENSION (H): Primary | ICD-10-CM

## 2018-05-17 DIAGNOSIS — R06.02 SOB (SHORTNESS OF BREATH): ICD-10-CM

## 2018-05-21 ENCOUNTER — OFFICE VISIT (OUTPATIENT)
Dept: CARDIOLOGY | Facility: CLINIC | Age: 58
End: 2018-05-21
Attending: NURSE PRACTITIONER
Payer: MEDICARE

## 2018-05-21 ENCOUNTER — MEDICAL CORRESPONDENCE (OUTPATIENT)
Dept: HEALTH INFORMATION MANAGEMENT | Facility: CLINIC | Age: 58
End: 2018-05-21

## 2018-05-21 ENCOUNTER — TRANSFERRED RECORDS (OUTPATIENT)
Dept: HEALTH INFORMATION MANAGEMENT | Facility: CLINIC | Age: 58
End: 2018-05-21

## 2018-05-21 VITALS
SYSTOLIC BLOOD PRESSURE: 113 MMHG | DIASTOLIC BLOOD PRESSURE: 68 MMHG | HEART RATE: 64 BPM | WEIGHT: 116.5 LBS | BODY MASS INDEX: 19.89 KG/M2 | OXYGEN SATURATION: 93 % | HEIGHT: 64 IN

## 2018-05-21 DIAGNOSIS — I27.0 PRIMARY PULMONARY HYPERTENSION (H): ICD-10-CM

## 2018-05-21 DIAGNOSIS — H69.91 DYSFUNCTION OF RIGHT EUSTACHIAN TUBE: Primary | ICD-10-CM

## 2018-05-21 DIAGNOSIS — R06.02 SOB (SHORTNESS OF BREATH): ICD-10-CM

## 2018-05-21 DIAGNOSIS — I27.20 PULMONARY HYPERTENSION (H): ICD-10-CM

## 2018-05-21 DIAGNOSIS — R06.09 DYSPNEA ON EXERTION: ICD-10-CM

## 2018-05-21 LAB
ALBUMIN SERPL-MCNC: 3.8 G/DL (ref 3.4–5)
ALP SERPL-CCNC: 64 U/L (ref 40–150)
ALT SERPL W P-5'-P-CCNC: 29 U/L (ref 0–50)
ANION GAP SERPL CALCULATED.3IONS-SCNC: 8 MMOL/L (ref 3–14)
AST SERPL W P-5'-P-CCNC: 26 U/L (ref 0–45)
BILIRUB SERPL-MCNC: 0.5 MG/DL (ref 0.2–1.3)
BUN SERPL-MCNC: 24 MG/DL (ref 7–30)
CALCIUM SERPL-MCNC: 9.4 MG/DL (ref 8.5–10.1)
CHLORIDE SERPL-SCNC: 110 MMOL/L (ref 94–109)
CO2 SERPL-SCNC: 23 MMOL/L (ref 20–32)
CREAT SERPL-MCNC: 1.51 MG/DL (ref 0.52–1.04)
ERYTHROCYTE [DISTWIDTH] IN BLOOD BY AUTOMATED COUNT: 13 % (ref 10–15)
GFR SERPL CREATININE-BSD FRML MDRD: 35 ML/MIN/1.7M2
GLUCOSE SERPL-MCNC: 86 MG/DL (ref 70–99)
HCT VFR BLD AUTO: 41.1 % (ref 35–47)
HGB BLD-MCNC: 13.5 G/DL (ref 11.7–15.7)
MCH RBC QN AUTO: 31.1 PG (ref 26.5–33)
MCHC RBC AUTO-ENTMCNC: 32.8 G/DL (ref 31.5–36.5)
MCV RBC AUTO: 95 FL (ref 78–100)
NT-PROBNP SERPL-MCNC: 648 PG/ML (ref 0–125)
PLATELET # BLD AUTO: 186 10E9/L (ref 150–450)
POTASSIUM SERPL-SCNC: 4.1 MMOL/L (ref 3.4–5.3)
PROT SERPL-MCNC: 7 G/DL (ref 6.8–8.8)
RBC # BLD AUTO: 4.34 10E12/L (ref 3.8–5.2)
SODIUM SERPL-SCNC: 140 MMOL/L (ref 133–144)
WBC # BLD AUTO: 3.7 10E9/L (ref 4–11)

## 2018-05-21 PROCEDURE — 83880 ASSAY OF NATRIURETIC PEPTIDE: CPT | Performed by: NURSE PRACTITIONER

## 2018-05-21 PROCEDURE — G0463 HOSPITAL OUTPT CLINIC VISIT: HCPCS | Mod: ZF

## 2018-05-21 PROCEDURE — 36415 COLL VENOUS BLD VENIPUNCTURE: CPT | Performed by: NURSE PRACTITIONER

## 2018-05-21 PROCEDURE — 99214 OFFICE O/P EST MOD 30 MIN: CPT | Mod: ZP | Performed by: NURSE PRACTITIONER

## 2018-05-21 PROCEDURE — 80053 COMPREHEN METABOLIC PANEL: CPT | Performed by: NURSE PRACTITIONER

## 2018-05-21 PROCEDURE — 85027 COMPLETE CBC AUTOMATED: CPT | Performed by: NURSE PRACTITIONER

## 2018-05-21 RX ORDER — FLUTICASONE PROPIONATE 50 MCG
1 SPRAY, SUSPENSION (ML) NASAL DAILY
Qty: 1 BOTTLE | Refills: 0 | Status: SHIPPED | OUTPATIENT
Start: 2018-05-21 | End: 2018-07-26

## 2018-05-21 ASSESSMENT — PAIN SCALES - GENERAL: PAINLEVEL: NO PAIN (0)

## 2018-05-21 NOTE — NURSING NOTE
Chief Complaint   Patient presents with     Follow Up For     Return for 1 month PH F/U with labs and RHC prior (Needs IV meds)     Vitals were taken and medications were reconciled.     Bria Camarillo MA    10:55 AM

## 2018-05-21 NOTE — PROGRESS NOTES
May 21, 2018      Ms. Judith Nelson is a pleasant 57 year old female with a past medical history of pulmonary arterial hypertension, iron deficiency anemia, sarcoidosis, hyperprolactinemia, depression, MGUS, CKD, anxiety and depression.      Today, she is accompanied by: sister in law, Bessie    Current Symptoms  1. Any ER visits or hospital admissions since last appointment: No    2. Shortness of breath: Yes: shortness of breath upon exertion. Recent 6 minute walk with pulmonary; desat to 88% at one point, 427 meters.     3. Dizziness or lightheadedness: Yes: when bending forward at times. Will also wake up feeling flushed at times.  4. Any syncopal episodes or falls: No  5. Chest pain or chest tightness: No; recent Ziopatch monitoring for palpitations. Results not available.  6. Nausea, vomiting, diarrhea, constipation: No  7. Swelling in the legs: No  8. Bloating in the abdomen: No  9. PND; Waking up at night coughing, choking or SOB: No  10. Any medication intolerances: Will be switching from Orenitram to Uptravi  11. Reported headaches: Occasionally.  12. Jaw pain or bone/joint pain: No  13. On IV Prostacyclin: No; current dose: N/A    14. Current level of activity: Has been walking with her daughter.      PAST MEDICAL HISTORY:  Past Medical History:   Diagnosis Date     Anxiety      CKD (chronic kidney disease), stage III     biopsy suspicious for renal sarcoidosis      Hyperprolactinemia (H)      Lower extremity edema     chronic w/ chronic right ankle ulcer     Lumbar compression fracture (H)      Major depressive disorder     with psychotic features, followed by Dr. Rosales at [facility] in [place]     Menopause 2012    patient is post menopausal     MGUS (monoclonal gammopathy of unknown significance)     mild, followed by Dr. Long     Osteoporosis      Other seborrheic keratosis      Protrusio acetabuli      Pulmonary hypertension      Sarcoidosis      Stress-induced cardiomyopathy           FAMILY HISTORY:  Family History   Problem Relation Age of Onset     CANCER Mother       age 62 leukemia     GASTROINTESTINAL DISEASE Mother      diverticulitis     Hypertension Mother      Allergies Mother      Arthritis Mother      Depression Mother      Eye Disorder Mother      OSTEOPOROSIS Mother      C.A.D. Father      MI/CAD      CANCER Father      skin     Blood Disease Father      renal  problem     Hypertension Father      Anesthesia Reaction Father      Cardiovascular Father      Neurologic Disorder Father      Parkinson's disease     C.A.D. Maternal Grandmother       late 82s MI     DIABETES Maternal Grandmother      C.A.D. Maternal Grandfather       mid 80s MI     Alzheimer Disease Paternal Grandmother       80s     Alzheimer Disease Paternal Grandfather       80s     Neurologic Disorder Sister      migraines     Allergies Sister      DIABETES Other      AODM     Neurologic Disorder Sister      migraines     Allergies Sister      Anesthesia Reaction Son      Anesthesia Reaction Daughter      Anesthesia Reaction Daughter      DIABETES Sister      hypoglycemia         SOCIAL HISTORY:  Social History   Substance Use Topics     Smoking status: Never Smoker     Smokeless tobacco: Never Used     Alcohol use No      Comment: Occ.         CURRENT MEDICATIONS:  Current Outpatient Prescriptions   Medication Sig Dispense Refill     acetaminophen (TYLENOL) 500 MG tablet Take 1-2 tablets (500-1,000 mg) by mouth every 6 hours as needed for pain (Take as needed for pain.  Do not exceed 4 grams (8 tablets) in a day) 45 tablet 10     azaTHIOprine (IMURAN) 50 MG tablet Take 1 tablet (50 mg) by mouth daily 90 tablet 3     buPROPion (WELLBUTRIN SR) 200 MG 12 hr tablet Take 1 tablet (200 mg) by mouth every morning 30 tablet 0     BUSPIRONE HCL PO Take 15 mg by mouth 2 times daily       calcitRIOL (ROCALTROL) 0.25 MCG capsule Take 1 capsule (0.25 mcg) by mouth daily 30 capsule 1     carvedilol  "(COREG) 12.5 MG tablet Take 0.5 tablets (6.25 mg) by mouth 2 times daily (with meals) 135 tablet 3     denosumab (PROLIA) 60 MG/ML SOLN Inject 1 mL (60 mg) Subcutaneous every 6 months 1 mL 1     furosemide (LASIX) 40 MG tablet Take 40 MG M-W-F, 20 MG all other days. 90 tablet 3     LORazepam (ATIVAN) 0.5 MG tablet Take 0.5 mg (1 tab) every morning and 1 mg (2 tabs) every night at bedtime. 90 tablet 1     OPSUMIT 10 MG tablet TAKE ONE TABLET (10MG) BY MOUTH DAILY. DO NOT HANDLE IF PREGNANT. DO NOT SPLIT, CRUSH, OR CHEW. REVIEW MEDICATION GUIDE. CALL [phone] 30 tablet 10     ORDER FOR DME Equipment being ordered: SHOWER CHAIR  FROM [facility] 1 Device 0     potassium chloride (K-TAB,KLOR-CON) 10 MEQ tablet Take 1 tablet (10 mEq) by mouth daily 90 tablet 1     ranitidine (RANITIDINE) 75 MG tablet Take 1-2 tablets ( mg) by mouth 2 times daily 60 tablet 11     simvastatin (ZOCOR) 10 MG tablet Take 1 tablet by mouth At Bedtime. 90 tablet 1     tadalafil, PAH, (ADCIRCA) 20 MG TABS Take 2 tablets (40 mg) by mouth daily 60 tablet 11     TEMAZEPAM PO Take by mouth At Bedtime       treprostinil diolamine ER (ORENITRAM) 0.125 MG CR tablet Take 7.5 mg by mouth 2 times daily (with meals)  90 tablet 0     Elastic Bandages & Supports (T.E.D. BELOW KNEE/M-REGULAR) MISC 1 each daily (Patient not taking: Reported on 5/21/2018) 2 each 0     Multiple Vitamins-Minerals (EYE VITAMINS PO) Take 1 tablet by mouth daily       SODIUM BICARBONATE PO Take by mouth 2 times daily           ROS:   10 point ROS of systems including Constitutional, Eyes, Respiratory, Cardiovascular, Gastroenterology, Genitourinary, Integumentary, Muscularskeletal, Psychiatric were all negative except for pertinent positives noted in my HPI.    EXAM:  /68 (BP Location: Left arm, Patient Position: Chair, Cuff Size: Adult Regular)  Pulse 64  Ht 1.613 m (5' 3.5\")  Wt 52.8 kg (116 lb 8 oz)  SpO2 93%  BMI 20.31 kg/m2  General: appears comfortable, " alert and articulate  Head: normocephalic, atraumatic  Eyes: anicteric sclera, EOMI  Neck: no adenopathy  Orophyarynx: moist mucosa, no lesions, dentition intact  Heart: regular, S1/S2, no murmur, gallop, rub, estimated JVP wnl  Lungs: clear, no rales or wheezing  Abdomen: soft, non-tender, bowel sounds present, no hepatosplenomegaly  Extremities: no clubbing, cyanosis or edema  Neurological: normal speech and affect, no gross motor deficits      Weight  Wt Readings from Last 10 Encounters:   05/21/18 52.8 kg (116 lb 8 oz)   05/01/18 51 kg (112 lb 8 oz)   04/30/18 51.7 kg (114 lb)   04/18/18 53.6 kg (118 lb 3.2 oz)   03/21/18 52.2 kg (115 lb)   02/09/18 54.3 kg (119 lb 9.6 oz)   02/06/18 52.2 kg (115 lb)   11/16/17 53.3 kg (117 lb 8 oz)   11/01/17 54.4 kg (120 lb)   09/13/17 55.8 kg (123 lb 1.6 oz)         Labs:    CBC RESULTS:  Lab Results   Component Value Date    WBC 3.7 (L) 05/21/2018    RBC 4.34 05/21/2018    HGB 13.5 05/21/2018    HCT 41.1 05/21/2018    MCV 95 05/21/2018    MCH 31.1 05/21/2018    MCHC 32.8 05/21/2018    RDW 13.0 05/21/2018     05/21/2018       CMP RESULTS:  Lab Results   Component Value Date     04/30/2018    POTASSIUM 4.1 04/30/2018    CHLORIDE 112 (H) 04/30/2018    CO2 24 04/30/2018    ANIONGAP 6 04/30/2018    GLC 75 04/30/2018    BUN 24 04/30/2018    CR 1.60 (H) 04/30/2018    GFRESTIMATED 33 (L) 04/30/2018    GFRESTBLACK 40 (L) 04/30/2018    ISHMAEL 9.1 04/30/2018    BILITOTAL 0.4 04/30/2018    ALBUMIN 4.0 04/30/2018    ALKPHOS 68 04/30/2018    ALT 27 04/30/2018    AST 27 04/30/2018        INR RESULTS:  Lab Results   Component Value Date    INR 1.04 03/26/2013       BNP RESULTS:  Lab Results   Component Value Date    NTBNPI 413 04/12/2017     No results found for: BNP      Recent Tests:      Echocardiogram (3/21/2018):  Interpretation Summary  Paradoxical septal motion consistent with right ventricular pressure and  volume overload is present.  Mild to moderate right ventricular  dilation is present.  Global right ventricular function is normal.  Right ventricular systolic pressure is 73mmHg above the right atrial pressure.  No pericardial effusion is present.  Lower PA pressure when compared to prior study.      RHC (4/18/2018):        PFT (5/1/2018):   PFT Latest Ref Rng & Units 5/1/2018   FVC L 2.73   FEV1 L 1.80   FVC% % 94   FEV1% % 77         Assessment and Plan:   Ms. Judith Nelson is a 57 year old female with pulmonary arterial hypertension and sarcoidosis, WHO Group I, NYHA Functional Class II.     Mrs. Judith Nelson returns to clinic for follow up, with her Sister-in-law, Bessie.  She reports feeling well, and is currently in the paperwork process to switch off Orenitram to Uptravi.  The approval was confirmed this week, and she should be receiving the medication at home soon.  She also is due for an overnight oximetry test which we will be sending home with her today. Clinically, her vital signs and labs are stable; physical exam shows no signs of right heart failure or fluid overload with no JVD, lower leg swelling or abdominal bloating. Mrs Nelson was provided a titration schedule and will return to clinic for follow up, one week after starting Uptravi.     It was a pleasure seeing Ms. Judith Nelson at the [facility].         Sincerely,  Korin Hirsch, APRN, CNP.

## 2018-05-21 NOTE — MR AVS SNAPSHOT
After Visit Summary   5/21/2018    Judith Nelson    MRN: [id number]           Patient Information     Date Of Birth          1960        Visit Information        Provider Department      5/21/2018 10:45 AM Korin Hirsch, APRN CNP [facility]        Today's Diagnoses     Dysfunction of right eustachian tube    -  1    Pulmonary hypertension        Dyspnea on exertion          Care Instructions    Medication Changes:  Below is your Orenitram (treprostinil) to Uptravi (selesipag) titration (All doses are twice a day)   Uptravi (selesipag) twice daily  Orenitram (treprostinil) twice daily    Start 200 mcg 7 mg   2 weeks 400 mcg 6 mg   4 weeks 600 mcg 5 mg   6 weeks 800 mcg 4 mg   8 weeks 1000 mcg 3 mg   10 weeks  1200 mcg 2 mg   12 weeks  1400 mcg 1 mg   14 weeks  1600 mcg STOP Orenitram     Patient Instructions:  -Continue staying active and eat a heart healthy diet.    -We will have you complete an overnight oximetry study    Follow up Appointment Information:  1 week after you start the Uptravi (selesipag)       We are located on the third floor of the [facility] on the [facility].  Our address is     [address] [place] [address] [place] [address] [place] [address]    Thank you for allowing us to be a part of your care here at the [facility]    If you have questions or concerns please contact us at:    Keyanna Mccray, RN, BSN, PHN  Reji Escobar (Schedule,P.A.)  Nurse Coordinator     Clinic   Pulmonary Hypertension   Pulmonary Hypertension  [facility]  (P)[phone]    (P) [phone]        (F) [phone]    ** Please note that you will NOT receive a reminder call regarding your scheduled testing, reminder calls are for provider appointments only.  If you are scheduled for testing within the [facility] system  you may receive a call regarding pre-registration for billing purposes only.**     Remember to weigh yourself daily after voiding and before you consume any food or beverages and log the numbers.  If you have gained/lost 2 pounds overnight or 5 pounds in a week contact us immediately for medication adjustments or further instructions.   **Please call us immediately if you have any syncope, chest pain, edema, or decline in your functional status.    Support Group:  Pulmonary Hypertension Association  Https://www.phassociation.org/  **Look at the Events Tab** They even have Support Groups that you can call into    [facility] Support Group  First Saturday of the Month from 1-3 PM   Location: [address] [place] [address]  Leader: Michelet Garcia  Phone: [phone]  Email: [email or web address]          Follow-ups after your visit        Your next 10 appointments already scheduled     May 22, 2018  2:30 PM CDT   Level 1 with ROOM 3 [facility] ([facility])    [facility]  [address]  [facility] [address]   [phone]            Aug 10, 2018 12:00 PM CDT   DX HIP/PELVIS/SPINE with UCDX1   [facility] ([facility])    [address] [place] [address] [phone]           Please do not take any of the following 24 hours prior to the day of your exam: vitamins, calcium tablets, antacids.  If possible, please wear clothes without metal (snaps, zippers). A sweatsuit works well.            Aug 10, 2018  1:00 PM CDT   (Arrive by 12:45 PM)   RETURN ENDOCRINE with Jenna Salas MD   [facility] ([facility] [place])    [address] [place] [address] [phone]            Aug 14, 2018  2:30 PM CDT   (Arrive by 2:15 PM)   Return Interstitial Lung with Gael Flaherty MD   [facility] (  "[facility])    [address]  [address] [place] [address] [phone]              Who to contact     If you have questions or need follow up information about today's clinic visit or your schedule please contact [facility] directly at [phone].  Normal or non-critical lab and imaging results will be communicated to you by MyChart, letter or phone within 4 business days after the clinic has received the results. If you do not hear from us within 7 days, please contact the clinic through Affectvhart or phone. If you have a critical or abnormal lab result, we will notify you by phone as soon as possible.  Submit refill requests through Vitasoft or call your pharmacy and they will forward the refill request to us. Please allow 3 business days for your refill to be completed.          Additional Information About Your Visit        MyChart Information     [employer] gives you secure access to your electronic health record. If you see a primary care provider, you can also send messages to your care team and make appointments. If you have questions, please call your primary care clinic.  If you do not have a primary care provider, please call [phone] and they will assist you.        Care EveryWhere ID     This is your Care EveryWhere ID. This could be used by other organizations to access your [place] medical records  [id number]        Your Vitals Were     Pulse Height Pulse Oximetry BMI (Body Mass Index)          64 1.613 m (5' 3.5\") 93% 20.31 kg/m2         Blood Pressure from Last 3 Encounters:   05/21/18 113/68   05/15/18 106/50   05/01/18 119/74    Weight from Last 3 Encounters:   05/21/18 52.8 kg (116 lb 8 oz)   05/01/18 51 kg (112 lb 8 oz)   04/30/18 51.7 kg (114 lb)              We Performed the Following     Follow-Up with Cardiac Advanced Practice Provider          Today's Medication Changes          These changes are accurate as of 5/21/18 12:02 PM.  If you " have any questions, ask your nurse or doctor.               Start taking these medicines.        Dose/Directions    fluticasone 50 MCG/ACT spray   Commonly known as:  FLONASE   Used for:  Dysfunction of right eustachian tube   Started by:  Korin Hirsch APRN CNP        Dose:  1 spray   Spray 1 spray into both nostrils daily   Quantity:  1 Bottle   Refills:  0            Where to get your medicines      These medications were sent to [facility] [address] [address] AT [address]  [address] [facility] [address]     Phone:  [phone]     fluticasone 50 MCG/ACT spray                Primary Care Provider Office Phone # Fax #    Anupama Babcock [phone]       [facility] [address] [facility] [address]        Equal Access to Services     SHADI [facility] : Hadii pablo zuniga hadasho Soomaali, waaxda luqadaha, qaybta kaalmada adeegyada, alysha worley . So [facility] [phone].    ATENCIÓN: Si habla español, tiene a dhillon disposición servicios gratuitos de asistencia lingüística. Llame al [phone].    We comply with applicable federal civil rights laws and [place] laws. We do not discriminate on the basis of race, color, national origin, age, disability, sex, sexual orientation, or gender identity.            Thank you!     Thank you for choosing [facility]  for your care. Our goal is always to provide you with excellent care. Hearing back from our patients is one way we can continue to improve our services. Please take a few minutes to complete the written survey that you may receive in the mail after your visit with us. Thank you!             Your Updated Medication List - Protect others around you: Learn how to safely use, store and throw away your medicines at www.disposemymeds.org.          This list is accurate as of 5/21/18 12:02 PM.  Always use your most recent med  list.                   Brand Name Dispense Instructions for use Diagnosis    acetaminophen 500 MG tablet    TYLENOL    45 tablet    Take 1-2 tablets (500-1,000 mg) by mouth every 6 hours as needed for pain (Take as needed for pain.  Do not exceed 4 grams (8 tablets) in a day)    Pulmonary hypertension       azaTHIOprine 50 MG tablet    IMURAN    90 tablet    Take 1 tablet (50 mg) by mouth daily    Sarcoidosis       buPROPion 200 MG 12 hr tablet    WELLBUTRIN SR    30 tablet    Take 1 tablet (200 mg) by mouth every morning    Major depressive disorder, single episode, moderate (H)       BUSPIRONE HCL PO      Take 15 mg by mouth 2 times daily        calcitRIOL 0.25 MCG capsule    ROCALTROL    30 capsule    Take 1 capsule (0.25 mcg) by mouth daily        carvedilol 12.5 MG tablet    COREG    135 tablet    Take 0.5 tablets (6.25 mg) by mouth 2 times daily (with meals)    Chronic systolic heart failure (H)       denosumab 60 MG/ML Soln injection    PROLIA    1 mL    Inject 1 mL (60 mg) Subcutaneous every 6 months    Osteoporosis, postmenopausal       EYE VITAMINS PO      Take 1 tablet by mouth daily    Sarcoidosis       fluticasone 50 MCG/ACT spray    FLONASE    1 Bottle    Spray 1 spray into both nostrils daily    Dysfunction of right eustachian tube       furosemide 40 MG tablet    LASIX    90 tablet    Take 40 MG M-W-F, 20 MG all other days.    Pulmonary hypertension, Stress-induced cardiomyopathy       LORazepam 0.5 MG tablet    ATIVAN    90 tablet    Take 0.5 mg (1 tab) every morning and 1 mg (2 tabs) every night at bedtime.    Major depressive disorder, single episode, severe with psychotic features (H)       OPSUMIT 10 MG tablet   Generic drug:  macitentan     30 tablet    TAKE ONE TABLET (10MG) BY MOUTH DAILY. DO NOT HANDLE IF PREGNANT. DO NOT SPLIT, CRUSH, OR CHEW. REVIEW MEDICATION GUIDE. CALL [phone]    Primary pulmonary hypertension (H)       order for DME     1 Device    Equipment being ordered:  SHOWER CHAIR  FROM [facility]    Tremor, Generalized muscle weakness, Sarcoidosis       potassium chloride 10 MEQ tablet    K-TAB,KLOR-CON    90 tablet    Take 1 tablet (10 mEq) by mouth daily    Heart failure (H)       ranitidine 75 MG tablet    ranitidine    60 tablet    Take 1-2 tablets ( mg) by mouth 2 times daily    Primary pulmonary hypertension (H), Anemia, unspecified type, SOB (shortness of breath)       simvastatin 10 MG tablet    ZOCOR    90 tablet    Take 1 tablet by mouth At Bedtime.    Hypercholesterolemia       SODIUM BICARBONATE PO      Take by mouth 2 times daily    Pulmonary arterial hypertension       T.E.D. BELOW KNEE/M-REGULAR Misc     2 each    1 each daily    Lower extremity edema       tadalafil (PAH) 20 MG Tabs    ADCIRCA    60 tablet    Take 2 tablets (40 mg) by mouth daily    Pulmonary hypertension       TEMAZEPAM PO      Take by mouth At Bedtime        treprostinil diolamine ER 0.125 MG CR tablet    ORENITRAM    90 tablet    Take 7.5 mg by mouth 2 times daily (with meals)    Pulmonary arterial hypertension

## 2018-05-21 NOTE — NURSING NOTE
Discussed purpose, preparation, procedure and when to expect results reported back to the patient. Patient demonstrated understanding of this information and agreed to call with further questions or concerns.    Med Reconcile: Reviewed and verified all current medications with the patient. The updated medication list was printed and given to the patient.    New Medication: Patient was educated regarding newly prescribed medication, including discussion of  the indication, administration, side effects, and when to report to MD or RN. Patient demonstrated understanding of this information and agreed to call with further questions or concerns.    Labs: Patient was given results of the laboratory testing obtained today. Patient demonstrated understanding of this information and agreed to call with further questions or concerns.     Diet: Patient instructed regarding a heart healthy diet, including discussion of reduced fat and sodium intake. Patient demonstrated understanding of this information and agreed to call with further questions or concerns.    Return Appointment: Patient given instructions regarding scheduling next clinic visit. Patient demonstrated understanding of this information and agreed to call with further questions or concerns.    Patient stated she understood all health information given and agreed to call with further questions or concerns.    Medication Changes:  Below is your Orenitram (treprostinil) to Uptravi (selesipag) titration (All doses are twice a day)   Uptravi (selesipag) twice daily  Orenitram (treprostinil) twice daily    Start 200 mcg 7 mg   2 weeks 400 mcg 6 mg   4 weeks 600 mcg 5 mg   6 weeks 800 mcg 4 mg   8 weeks 1000 mcg 3 mg   10 weeks  1200 mcg 2 mg   12 weeks  1400 mcg 1 mg   14 weeks  1600 mcg STOP Orenitram     Patient Instructions:  -Continue staying active and eat a heart healthy diet.    -We will have you complete an overnight oximetry study    Follow up Appointment  Information:  1 week after you start the Uptravi (selesipag)

## 2018-05-21 NOTE — PATIENT INSTRUCTIONS
Medication Changes:  Below is your Orenitram (treprostinil) to Uptravi (selesipag) titration (All doses are twice a day)   Uptravi (selesipag) twice daily  Orenitram (treprostinil) twice daily    Start 200 mcg 7 mg   2 weeks 400 mcg 6 mg   4 weeks 600 mcg 5 mg   6 weeks 800 mcg 4 mg   8 weeks 1000 mcg 3 mg   10 weeks  1200 mcg 2 mg   12 weeks  1400 mcg 1 mg   14 weeks  1600 mcg STOP Orenitram     Patient Instructions:  -Continue staying active and eat a heart healthy diet.    -We will have you complete an overnight oximetry study    Follow up Appointment Information:  1 week after you start the Uptravi (selesipag)       We are located on the third floor of the [facility] ([facility] on the [facility].  Our address is     [address] [place] [address] on [address] [place] [address]    Thank you for allowing us to be a part of your care here at the [facility]    If you have questions or concerns please contact us at:    Keyanna Mccray, RN, BSN, PHN  Reji Escobar (Schedule,P.A.)  Nurse Coordinator     Clinic   Pulmonary Hypertension   Pulmonary Hypertension  [facility]  (P)[phone]    (P) [phone]        (F) [phone]    ** Please note that you will NOT receive a reminder call regarding your scheduled testing, reminder calls are for provider appointments only.  If you are scheduled for testing within the [facility] system you may receive a call regarding pre-registration for billing purposes only.**     Remember to weigh yourself daily after voiding and before you consume any food or beverages and log the numbers.  If you have gained/lost 2 pounds overnight or 5 pounds in a week contact us immediately for medication adjustments or further instructions.   **Please call us immediately if you have any syncope, chest pain, edema, or decline in your functional  status.    Support Group:  Pulmonary Hypertension Association  Https://www.phassociation.org/  **Look at the Events Tab** They even have Support Groups that you can call into    [facility] Support Group  First Saturday of the Month from 1-3 PM   Location: [facility] [address]  Leader: Michelet Garcia  Phone: [phone]  Email: [email or web address]

## 2018-05-21 NOTE — LETTER
5/21/2018      RE: Judith Nelson  [address] [place] [address]       Dear Colleague,    Thank you for the opportunity to participate in the care of your patient, Judith Nelson, at the [facility]. Please see a copy of my visit note below.    May 21, 2018      Ms. Judith Nelson is a pleasant 57 year old female with a past medical history of pulmonary arterial hypertension, iron deficiency anemia, sarcoidosis, hyperprolactinemia, depression, MGUS, CKD, anxiety and depression.      Today, she is accompanied by: sister in law, Bessie    Current Symptoms  1. Any ER visits or hospital admissions since last appointment: No    2. Shortness of breath: Yes: shortness of breath upon exertion. Recent 6 minute walk with pulmonary; desat to 88% at one point, 427 meters.     3. Dizziness or lightheadedness: Yes: when bending forward at times. Will also wake up feeling flushed at times.  4. Any syncopal episodes or falls: No  5. Chest pain or chest tightness: No; recent Ziopatch monitoring for palpitations. Results not available.  6. Nausea, vomiting, diarrhea, constipation: No  7. Swelling in the legs: No  8. Bloating in the abdomen: No  9. PND; Waking up at night coughing, choking or SOB: No  10. Any medication intolerances: Will be switching from Orenitram to Uptravi  11. Reported headaches: Occasionally.  12. Jaw pain or bone/joint pain: No  13. On IV Prostacyclin: No; current dose: N/A    14. Current level of activity: Has been walking with her daughter.      PAST MEDICAL HISTORY:  Past Medical History:   Diagnosis Date     Anxiety      CKD (chronic kidney disease), stage III     biopsy suspicious for renal sarcoidosis      Hyperprolactinemia (H)      Lower extremity edema     chronic w/ chronic right ankle ulcer     Lumbar compression fracture (H)      Major depressive disorder     with psychotic features, followed by Dr. Rosales at [facility] in [place]     Menopause 2012    patient is post menopausal     MGUS (monoclonal gammopathy of unknown significance)     mild, followed by Dr. Long     Osteoporosis      Other seborrheic keratosis      Protrusio acetabuli      Pulmonary hypertension      Sarcoidosis      Stress-induced cardiomyopathy          FAMILY HISTORY:  Family History   Problem Relation Age of Onset     CANCER Mother       age 62 leukemia     GASTROINTESTINAL DISEASE Mother      diverticulitis     Hypertension Mother      Allergies Mother      Arthritis Mother      Depression Mother      Eye Disorder Mother      OSTEOPOROSIS Mother      C.A.D. Father      MI/CAD      CANCER Father      skin     Blood Disease Father      renal  problem     Hypertension Father      Anesthesia Reaction Father      Cardiovascular Father      Neurologic Disorder Father      Parkinson's disease     C.A.D. Maternal Grandmother       late 82s MI     DIABETES Maternal Grandmother      C.A.D. Maternal Grandfather       mid 80s MI     Alzheimer Disease Paternal Grandmother       80s     Alzheimer Disease Paternal Grandfather       80s     Neurologic Disorder Sister      migraines     Allergies Sister      DIABETES Other      AODM     Neurologic Disorder Sister      migraines     Allergies Sister      Anesthesia Reaction Son      Anesthesia Reaction Daughter      Anesthesia Reaction Daughter      DIABETES Sister      hypoglycemia         SOCIAL HISTORY:  Social History   Substance Use Topics     Smoking status: Never Smoker     Smokeless tobacco: Never Used     Alcohol use No      Comment: Occ.         CURRENT MEDICATIONS:  Current Outpatient Prescriptions   Medication Sig Dispense Refill     acetaminophen (TYLENOL) 500 MG tablet Take 1-2 tablets (500-1,000 mg) by mouth every 6 hours as needed for pain (Take as needed for pain.  Do not exceed 4 grams (8 tablets) in a day) 45 tablet 10     azaTHIOprine (IMURAN) 50 MG tablet Take 1 tablet (50  mg) by mouth daily 90 tablet 3     buPROPion (WELLBUTRIN SR) 200 MG 12 hr tablet Take 1 tablet (200 mg) by mouth every morning 30 tablet 0     BUSPIRONE HCL PO Take 15 mg by mouth 2 times daily       calcitRIOL (ROCALTROL) 0.25 MCG capsule Take 1 capsule (0.25 mcg) by mouth daily 30 capsule 1     carvedilol (COREG) 12.5 MG tablet Take 0.5 tablets (6.25 mg) by mouth 2 times daily (with meals) 135 tablet 3     denosumab (PROLIA) 60 MG/ML SOLN Inject 1 mL (60 mg) Subcutaneous every 6 months 1 mL 1     furosemide (LASIX) 40 MG tablet Take 40 MG M-W-F, 20 MG all other days. 90 tablet 3     LORazepam (ATIVAN) 0.5 MG tablet Take 0.5 mg (1 tab) every morning and 1 mg (2 tabs) every night at bedtime. 90 tablet 1     OPSUMIT 10 MG tablet TAKE ONE TABLET (10MG) BY MOUTH DAILY. DO NOT HANDLE IF PREGNANT. DO NOT SPLIT, CRUSH, OR CHEW. REVIEW MEDICATION GUIDE. CALL [phone] 30 tablet 10     ORDER FOR DME Equipment being ordered: SHOWER CHAIR  FROM [employer] 1 Device 0     potassium chloride (K-TAB,KLOR-CON) 10 MEQ tablet Take 1 tablet (10 mEq) by mouth daily 90 tablet 1     ranitidine (RANITIDINE) 75 MG tablet Take 1-2 tablets ( mg) by mouth 2 times daily 60 tablet 11     simvastatin (ZOCOR) 10 MG tablet Take 1 tablet by mouth At Bedtime. 90 tablet 1     tadalafil, PAH, (ADCIRCA) 20 MG TABS Take 2 tablets (40 mg) by mouth daily 60 tablet 11     TEMAZEPAM PO Take by mouth At Bedtime       treprostinil diolamine ER (ORENITRAM) 0.125 MG CR tablet Take 7.5 mg by mouth 2 times daily (with meals)  90 tablet 0     Elastic Bandages & Supports (T.E.D. BELOW KNEE/M-REGULAR) MISC 1 each daily (Patient not taking: Reported on 5/21/2018) 2 each 0     Multiple Vitamins-Minerals (EYE VITAMINS PO) Take 1 tablet by mouth daily       SODIUM BICARBONATE PO Take by mouth 2 times daily           ROS:   10 point ROS of systems including Constitutional, Eyes, Respiratory, Cardiovascular, Gastroenterology, Genitourinary, Integumentary,  "Muscularskeletal, Psychiatric were all negative except for pertinent positives noted in my HPI.    EXAM:  /68 (BP Location: Left arm, Patient Position: Chair, Cuff Size: Adult Regular)  Pulse 64  Ht 1.613 m (5' 3.5\")  Wt 52.8 kg (116 lb 8 oz)  SpO2 93%  BMI 20.31 kg/m2  General: appears comfortable, alert and articulate  Head: normocephalic, atraumatic  Eyes: anicteric sclera, EOMI  Neck: no adenopathy  Orophyarynx: moist mucosa, no lesions, dentition intact  Heart: regular, S1/S2, no murmur, gallop, rub, estimated JVP wnl  Lungs: clear, no rales or wheezing  Abdomen: soft, non-tender, bowel sounds present, no hepatosplenomegaly  Extremities: no clubbing, cyanosis or edema  Neurological: normal speech and affect, no gross motor deficits      Weight  Wt Readings from Last 10 Encounters:   05/21/18 52.8 kg (116 lb 8 oz)   05/01/18 51 kg (112 lb 8 oz)   04/30/18 51.7 kg (114 lb)   04/18/18 53.6 kg (118 lb 3.2 oz)   03/21/18 52.2 kg (115 lb)   02/09/18 54.3 kg (119 lb 9.6 oz)   02/06/18 52.2 kg (115 lb)   11/16/17 53.3 kg (117 lb 8 oz)   11/01/17 54.4 kg (120 lb)   09/13/17 55.8 kg (123 lb 1.6 oz)         Labs:    CBC RESULTS:  Lab Results   Component Value Date    WBC 3.7 (L) 05/21/2018    RBC 4.34 05/21/2018    HGB 13.5 05/21/2018    HCT 41.1 05/21/2018    MCV 95 05/21/2018    MCH 31.1 05/21/2018    MCHC 32.8 05/21/2018    RDW 13.0 05/21/2018     05/21/2018       CMP RESULTS:  Lab Results   Component Value Date     04/30/2018    POTASSIUM 4.1 04/30/2018    CHLORIDE 112 (H) 04/30/2018    CO2 24 04/30/2018    ANIONGAP 6 04/30/2018    GLC 75 04/30/2018    BUN 24 04/30/2018    CR 1.60 (H) 04/30/2018    GFRESTIMATED 33 (L) 04/30/2018    GFRESTBLACK 40 (L) 04/30/2018    ISHMAEL 9.1 04/30/2018    BILITOTAL 0.4 04/30/2018    ALBUMIN 4.0 04/30/2018    ALKPHOS 68 04/30/2018    ALT 27 04/30/2018    AST 27 04/30/2018        INR RESULTS:  Lab Results   Component Value Date    INR 1.04 03/26/2013       BNP " RESULTS:  Lab Results   Component Value Date    NTBNPI 413 04/12/2017     No results found for: BNP      Recent Tests:      Echocardiogram (3/21/2018):  Interpretation Summary  Paradoxical septal motion consistent with right ventricular pressure and  volume overload is present.  Mild to moderate right ventricular dilation is present.  Global right ventricular function is normal.  Right ventricular systolic pressure is 73mmHg above the right atrial pressure.  No pericardial effusion is present.  Lower PA pressure when compared to prior study.      RHC (4/18/2018):        PFT (5/1/2018):   PFT Latest Ref Rng & Units 5/1/2018   FVC L 2.73   FEV1 L 1.80   FVC% % 94   FEV1% % 77         Assessment and Plan:   Ms. Judith Nelson is a 57 year old female with pulmonary arterial hypertension and sarcoidosis, WHO Group I, NYHA Functional Class II.     Mrs. Judith Nelson returns to clinic for follow up, with her Sister-in-law, Bessie.  She reports feeling well, and is currently in the paperwork process to switch off Orenitram to Uptravi.  The approval was confirmed this week, and she should be receiving the medication at home soon.  She also is due for an overnight oximetry test which we will be sending home with her today. Clinically, her vital signs and labs are stable; physical exam shows no signs of right heart failure or fluid overload with no JVD, lower leg swelling or abdominal bloating. Mrs Nelson was provided a titration schedule and will return to clinic for follow up, one week after starting Uptravi.     It was a pleasure seeing Ms. Judith Nelson at the [facility].         Sincerely,  Korin Hirsch, APRN, CNP.                    yes

## 2018-05-22 ENCOUNTER — INFUSION THERAPY VISIT (OUTPATIENT)
Dept: INFUSION THERAPY | Facility: CLINIC | Age: 58
End: 2018-05-22
Attending: INTERNAL MEDICINE
Payer: MEDICARE

## 2018-05-22 VITALS
DIASTOLIC BLOOD PRESSURE: 47 MMHG | SYSTOLIC BLOOD PRESSURE: 91 MMHG | TEMPERATURE: 99.2 F | RESPIRATION RATE: 16 BRPM | HEART RATE: 63 BPM | OXYGEN SATURATION: 97 %

## 2018-05-22 DIAGNOSIS — Z79.899 ENCOUNTER FOR LONG-TERM CURRENT USE OF MEDICATION: ICD-10-CM

## 2018-05-22 DIAGNOSIS — N18.4 CHRONIC KIDNEY DISEASE, STAGE IV (SEVERE) (H): ICD-10-CM

## 2018-05-22 DIAGNOSIS — Z92.29 PERSONAL HISTORY OF DRUG THERAPY: Primary | ICD-10-CM

## 2018-05-22 DIAGNOSIS — N18.4 CKD (CHRONIC KIDNEY DISEASE) STAGE 4, GFR 15-29 ML/MIN (H): ICD-10-CM

## 2018-05-22 DIAGNOSIS — D50.8 OTHER IRON DEFICIENCY ANEMIA: ICD-10-CM

## 2018-05-22 DIAGNOSIS — M81.0 OSTEOPOROSIS, POSTMENOPAUSAL: ICD-10-CM

## 2018-05-22 DIAGNOSIS — M81.0 SENILE OSTEOPOROSIS: ICD-10-CM

## 2018-05-22 PROCEDURE — 96374 THER/PROPH/DIAG INJ IV PUSH: CPT

## 2018-05-22 PROCEDURE — 25000128 H RX IP 250 OP 636: Performed by: INTERNAL MEDICINE

## 2018-05-22 RX ADMIN — FERRIC CARBOXYMALTOSE INJECTION 750 MG: 50 INJECTION, SOLUTION INTRAVENOUS at 15:05

## 2018-05-22 RX ADMIN — SODIUM CHLORIDE 250 ML: 9 INJECTION, SOLUTION INTRAVENOUS at 15:05

## 2018-05-22 NOTE — MR AVS SNAPSHOT
After Visit Summary   5/22/2018    Judith Nelson    MRN: [id number]           Patient Information     Date Of Birth          1960        Visit Information        Provider Department      5/22/2018 2:30 PM ROOM 3 [facility]        Today's Diagnoses     Personal history of drug therapy    -  1    Chronic kidney disease, stage IV (severe) (H)        Other iron deficiency anemia        CKD (chronic kidney disease) stage 4, GFR 15-29 ml/min (H)        Senile osteoporosis        Osteoporosis, postmenopausal        Encounter for long-term current use of medication           Follow-ups after your visit        Your next 10 appointments already scheduled     Aug 10, 2018 12:00 PM CDT   DX HIP/PELVIS/SPINE with UCDX1   [facility] ([facility])    [address]  1st Floor  [place] [address] [phone]           Please do not take any of the following 24 hours prior to the day of your exam: vitamins, calcium tablets, antacids.  If possible, please wear clothes without metal (snaps, zippers). A sweatsuit works well.            Aug 10, 2018  1:00 PM CDT   (Arrive by 12:45 PM)   RETURN ENDOCRINE with Jenna Salas MD   [facility] ([facility])    [address]  3rd Floor  [place] [address] [phone]            Aug 14, 2018  2:30 PM CDT   (Arrive by 2:15 PM)   Return Interstitial Lung with Gael Flaherty MD   [facility] ([facility])    [address] [place] [address]  [address]  [place] [address] [phone]              Who to contact     If you have questions or need follow up information about today's clinic visit or your schedule please contact [facility] directly at [phone].  Normal or non-critical lab and imaging results will be communicated to you by MyChart, letter or phone within 4 business  days after the clinic has received the results. If you do not hear from us within 7 days, please contact the clinic through [employer] or phone. If you have a critical or abnormal lab result, we will notify you by phone as soon as possible.  Submit refill requests through [employer] or call your pharmacy and they will forward the refill request to us. Please allow 3 business days for your refill to be completed.          Additional Information About Your Visit        [facility] Information     [employer] gives you secure access to your electronic health record. If you see a primary care provider, you can also send messages to your care team and make appointments. If you have questions, please call your primary care clinic.  If you do not have a primary care provider, please call [phone] and they will assist you.        Care EveryWhere ID     This is your Care EveryWhere ID. This could be used by other organizations to access your [place] medical records  [id number]        Your Vitals Were     Pulse Temperature Respirations Pulse Oximetry          63 99.2  F (37.3  C) (Tympanic) 16 97%         Blood Pressure from Last 3 Encounters:   05/22/18 91/47   05/21/18 113/68   05/15/18 106/50    Weight from Last 3 Encounters:   05/21/18 52.8 kg (116 lb 8 oz)   05/01/18 51 kg (112 lb 8 oz)   04/30/18 51.7 kg (114 lb)              Today, you had the following     No orders found for display       Primary Care Provider Office Phone # Fax #    Anupama Cecilia Babcock [phone]       [place] [address]        Equal Access to Services     [facility] : Hadii aad nadia hadasho Soomaali, waaxda luqadaha, qaybta kaalmada alysha garza . So [facility] [phone].    ATENCIÓN: Si habla español, tiene a dhillon disposición servicios gratuitos de asistencia lingüística. Llame al [phone].    We comply with applicable federal civil rights laws and  [place] laws. We do not discriminate on the basis of race, color, national origin, age, disability, sex, sexual orientation, or gender identity.            Thank you!     Thank you for choosing [facility]  for your care. Our goal is always to provide you with excellent care. Hearing back from our patients is one way we can continue to improve our services. Please take a few minutes to complete the written survey that you may receive in the mail after your visit with us. Thank you!             Your Updated Medication List - Protect others around you: Learn how to safely use, store and throw away your medicines at www.disposemymeds.org.          This list is accurate as of 5/22/18  4:08 PM.  Always use your most recent med list.                   Brand Name Dispense Instructions for use Diagnosis    acetaminophen 500 MG tablet    TYLENOL    45 tablet    Take 1-2 tablets (500-1,000 mg) by mouth every 6 hours as needed for pain (Take as needed for pain.  Do not exceed 4 grams (8 tablets) in a day)    Pulmonary hypertension       azaTHIOprine 50 MG tablet    IMURAN    90 tablet    Take 1 tablet (50 mg) by mouth daily    Sarcoidosis       buPROPion 200 MG 12 hr tablet    WELLBUTRIN SR    30 tablet    Take 1 tablet (200 mg) by mouth every morning    Major depressive disorder, single episode, moderate (H)       BUSPIRONE HCL PO      Take 15 mg by mouth 2 times daily        calcitRIOL 0.25 MCG capsule    ROCALTROL    30 capsule    Take 1 capsule (0.25 mcg) by mouth daily        carvedilol 12.5 MG tablet    COREG    135 tablet    Take 0.5 tablets (6.25 mg) by mouth 2 times daily (with meals)    Chronic systolic heart failure (H)       denosumab 60 MG/ML Soln injection    PROLIA    1 mL    Inject 1 mL (60 mg) Subcutaneous every 6 months    Osteoporosis, postmenopausal       EYE VITAMINS PO      Take 1 tablet by mouth daily    Sarcoidosis       fluticasone 50 MCG/ACT spray    FLONASE    1 Bottle    Spray 1 spray  into both nostrils daily    Dysfunction of right eustachian tube       furosemide 40 MG tablet    LASIX    90 tablet    Take 40 MG M-W-F, 20 MG all other days.    Pulmonary hypertension, Stress-induced cardiomyopathy       LORazepam 0.5 MG tablet    ATIVAN    90 tablet    Take 0.5 mg (1 tab) every morning and 1 mg (2 tabs) every night at bedtime.    Major depressive disorder, single episode, severe with psychotic features (H)       OPSUMIT 10 MG tablet   Generic drug:  macitentan     30 tablet    TAKE ONE TABLET (10MG) BY MOUTH DAILY. DO NOT HANDLE IF PREGNANT. DO NOT SPLIT, CRUSH, OR CHEW. REVIEW MEDICATION GUIDE. CALL [phone]    Primary pulmonary hypertension (H)       order for DME     1 Device    Equipment being ordered: SHOWER CHAIR  FROM [facility]    Tremor, Generalized muscle weakness, Sarcoidosis       potassium chloride 10 MEQ tablet    K-TAB,KLOR-CON    90 tablet    Take 1 tablet (10 mEq) by mouth daily    Heart failure (H)       ranitidine 75 MG tablet    ranitidine    60 tablet    Take 1-2 tablets ( mg) by mouth 2 times daily    Primary pulmonary hypertension (H), Anemia, unspecified type, SOB (shortness of breath)       simvastatin 10 MG tablet    ZOCOR    90 tablet    Take 1 tablet by mouth At Bedtime.    Hypercholesterolemia       SODIUM BICARBONATE PO      Take by mouth 2 times daily    Pulmonary arterial hypertension       T.E.D. BELOW KNEE/M-REGULAR Misc     2 each    1 each daily    Lower extremity edema       tadalafil (PAH) 20 MG Tabs    ADCIRCA    60 tablet    Take 2 tablets (40 mg) by mouth daily    Pulmonary hypertension       TEMAZEPAM PO      Take by mouth At Bedtime        treprostinil diolamine ER 0.125 MG CR tablet    ORENITRAM    90 tablet    Take 7.5 mg by mouth 2 times daily (with meals)    Pulmonary arterial hypertension

## 2018-05-22 NOTE — PROGRESS NOTES
Order for overnight oximetry was faxed to [facility].  patient left clinic with overnight Oximetry device yesterday.  Keyanna Mccray RN on 5/22/2018 at 5:03 PM

## 2018-05-22 NOTE — PROGRESS NOTES
Infusion Nursing Note:  Judith Nelson presents today for Injectafer.    Patient seen by provider today: No   present during visit today: Not Applicable.    Note: N/A.    Intravenous Access:  Peripheral IV placed.    Treatment Conditions:  Not Applicable.      Post Infusion Assessment:  Patient tolerated infusion without incident.  Patient observed for 30 minutes post Venofer per protocol.  Site patent and intact, free from redness, edema or discomfort.  No evidence of extravasations.  Access discontinued per protocol.    Discharge Plan:   Patient discharged in stable condition accompanied by: self.  Departure Mode: Ambulatory.    Ashely Krishnan RN

## 2018-05-25 ENCOUNTER — TELEPHONE (OUTPATIENT)
Dept: CARDIOLOGY | Facility: CLINIC | Age: 58
End: 2018-05-25

## 2018-05-25 NOTE — TELEPHONE ENCOUNTER
Patient called to let us know that she is considering coming into the hospital.  Her daughter just this week dropped out of High School and has other social issues that are causing her great stress and has increased her anxiety.  She is concerned that she might get distracted and do something wrong with the medication while attempting to titrate.  Patient states she completed the overnight oximetry and is wondering if we have the results yet.  Advised Judith that unfortunately the results of her overnight oximetry have not been received yet.      Patient will call [facility] and attempt to inquire into coverage for hospitalization for transition from Orenitram to Uptravi.  Patient also has a person with the [facility] that helps her with her financial resources that she will ask for help from.    Reviewed plan with patient as follows;    1) We have received approval of the Prior Authorization for the new medication.  2) Judith will call her Insurance and speak with her  with the [facility] to figure out if she will owe anything if she chooses to be admitted for the transition of the medications.  3) Once she gets all the information, she will call me back and let me know if she will be admitted, or proceed outpt with transition.  4) I will send myself a reminder to check back in with her next Friday 6/1/18.  5) Reji will wait to submit HUB form until we hear from patient.    Patient verbalized understanding, agreed with plan and denied any further questions. Keyanna Mccray RN on 5/25/2018 at 12:03 PM

## 2018-06-01 NOTE — TELEPHONE ENCOUNTER
Left patient a message following up on her decision to either come into hospital or manage transition from 1 med to another at home.  Asked pt to call me back and update me on this status.  Keyanna Mccray RN on 6/1/2018 at 10:28 AM

## 2018-06-01 NOTE — TELEPHONE ENCOUNTER
Patient called back and she informed me that her insurance would cover all her expenses for an inpatient stay.  patient and I agreed she would come to be admitted on June 15th when Dr. Franco starts his hospital rotation, with an admit time of 1pm.  Patient verbalized understanding, agreed with plan and denied any further questions. Keyanna Mccray RN on 6/1/2018 at 10:39 AM    Admission set up.   Scheduled Admit Number: [id number]   Patient Last Name: Nelson   Patient First Name: Judith   Requesting Location: Cardiology Clinic

## 2018-06-06 NOTE — TELEPHONE ENCOUNTER
Prior Authorization Approval    Authorization Effective Date: 2/2/2018  Authorization Expiration Date: 5/3/2019  Medication: Uptravi  Insurance Company: [employer] Part D - Phone [phone] Fax [phone]  Expected CoPay: N/A      CoPay Card Available:      Foundation Assistance Needed:    Which Pharmacy is filling the prescription (Not needed for infusion/clinic administered): [address] form completed and faxed to [employer] [facility] on 6/6/18 for expedited processing. Approval information included.

## 2018-06-07 ENCOUNTER — RECORDS - HEALTHEAST (OUTPATIENT)
Dept: LAB | Facility: CLINIC | Age: 58
End: 2018-06-07

## 2018-06-07 LAB
ALBUMIN SERPL-MCNC: 3.9 G/DL (ref 3.5–5)
ALP SERPL-CCNC: 60 U/L (ref 45–120)
ALT SERPL W P-5'-P-CCNC: 19 U/L (ref 0–45)
ANION GAP SERPL CALCULATED.3IONS-SCNC: 7 MMOL/L (ref 5–18)
AST SERPL W P-5'-P-CCNC: 22 U/L (ref 0–40)
BILIRUB SERPL-MCNC: 0.5 MG/DL (ref 0–1)
BUN SERPL-MCNC: 19 MG/DL (ref 8–22)
CALCIUM SERPL-MCNC: 9.3 MG/DL (ref 8.5–10.5)
CHLORIDE BLD-SCNC: 114 MMOL/L (ref 98–107)
CHOLEST SERPL-MCNC: 155 MG/DL
CO2 SERPL-SCNC: 21 MMOL/L (ref 22–31)
CREAT SERPL-MCNC: 1.34 MG/DL (ref 0.6–1.1)
FASTING STATUS PATIENT QL REPORTED: ABNORMAL
GFR SERPL CREATININE-BSD FRML MDRD: 41 ML/MIN/1.73M2
GLUCOSE BLD-MCNC: 79 MG/DL (ref 70–125)
HDLC SERPL-MCNC: 47 MG/DL
LDLC SERPL CALC-MCNC: 80 MG/DL
POTASSIUM BLD-SCNC: 3.8 MMOL/L (ref 3.5–5)
PROT SERPL-MCNC: 6.2 G/DL (ref 6–8)
SODIUM SERPL-SCNC: 142 MMOL/L (ref 136–145)
TRIGL SERPL-MCNC: 140 MG/DL

## 2018-06-15 ENCOUNTER — HOSPITAL ENCOUNTER (INPATIENT)
Facility: CLINIC | Age: 58
LOS: 4 days | Discharge: HOME | DRG: 950 | End: 2018-06-21
Attending: INTERNAL MEDICINE | Admitting: INTERNAL MEDICINE
Payer: MEDICARE

## 2018-06-15 ENCOUNTER — TELEPHONE (OUTPATIENT)
Dept: FAMILY MEDICINE | Facility: CLINIC | Age: 58
End: 2018-06-15

## 2018-06-15 DIAGNOSIS — I27.21 PAH (PULMONARY ARTERIAL HYPERTENSION) WITH PORTAL HYPERTENSION (H): Primary | ICD-10-CM

## 2018-06-15 DIAGNOSIS — K80.50 GALL STONES, COMMON BILE DUCT: ICD-10-CM

## 2018-06-15 DIAGNOSIS — K76.6 PAH (PULMONARY ARTERIAL HYPERTENSION) WITH PORTAL HYPERTENSION (H): Primary | ICD-10-CM

## 2018-06-15 LAB
ANION GAP SERPL CALCULATED.3IONS-SCNC: 10 MMOL/L (ref 3–14)
BUN SERPL-MCNC: 22 MG/DL (ref 7–30)
CALCIUM SERPL-MCNC: 8.8 MG/DL (ref 8.5–10.1)
CHLORIDE SERPL-SCNC: 106 MMOL/L (ref 94–109)
CO2 SERPL-SCNC: 24 MMOL/L (ref 20–32)
CREAT SERPL-MCNC: 1.6 MG/DL (ref 0.52–1.04)
ERYTHROCYTE [DISTWIDTH] IN BLOOD BY AUTOMATED COUNT: 13.5 % (ref 10–15)
GFR SERPL CREATININE-BSD FRML MDRD: 33 ML/MIN/1.7M2
GLUCOSE SERPL-MCNC: 151 MG/DL (ref 70–99)
HCT VFR BLD AUTO: 38.9 % (ref 35–47)
HGB BLD-MCNC: 12.9 G/DL (ref 11.7–15.7)
MCH RBC QN AUTO: 31.4 PG (ref 26.5–33)
MCHC RBC AUTO-ENTMCNC: 33.2 G/DL (ref 31.5–36.5)
MCV RBC AUTO: 95 FL (ref 78–100)
PLATELET # BLD AUTO: 151 10E9/L (ref 150–450)
POTASSIUM SERPL-SCNC: 3.5 MMOL/L (ref 3.4–5.3)
RBC # BLD AUTO: 4.11 10E12/L (ref 3.8–5.2)
SODIUM SERPL-SCNC: 140 MMOL/L (ref 133–144)
WBC # BLD AUTO: 3.5 10E9/L (ref 4–11)

## 2018-06-15 PROCEDURE — A9270 NON-COVERED ITEM OR SERVICE: HCPCS | Mod: GY | Performed by: INTERNAL MEDICINE

## 2018-06-15 PROCEDURE — 25000132 ZZH RX MED GY IP 250 OP 250 PS 637: Mod: GY | Performed by: INTERNAL MEDICINE

## 2018-06-15 PROCEDURE — 25000131 ZZH RX MED GY IP 250 OP 636 PS 637: Mod: GY | Performed by: INTERNAL MEDICINE

## 2018-06-15 PROCEDURE — 80048 BASIC METABOLIC PNL TOTAL CA: CPT | Performed by: INTERNAL MEDICINE

## 2018-06-15 PROCEDURE — 40000141 ZZH STATISTIC PERIPHERAL IV START W/O US GUIDANCE

## 2018-06-15 PROCEDURE — 36415 COLL VENOUS BLD VENIPUNCTURE: CPT | Performed by: INTERNAL MEDICINE

## 2018-06-15 PROCEDURE — 99223 1ST HOSP IP/OBS HIGH 75: CPT | Mod: AI | Performed by: INTERNAL MEDICINE

## 2018-06-15 PROCEDURE — 85027 COMPLETE CBC AUTOMATED: CPT | Performed by: INTERNAL MEDICINE

## 2018-06-15 PROCEDURE — 21400006 ZZH R&B CCU INTERMEDIATE UMMC

## 2018-06-15 RX ORDER — LORAZEPAM 0.5 MG/1
0.5 TABLET ORAL EVERY MORNING
Status: DISCONTINUED | OUTPATIENT
Start: 2018-06-16 | End: 2018-06-21 | Stop reason: HOSPADM

## 2018-06-15 RX ORDER — CARVEDILOL 6.25 MG/1
6.25 TABLET ORAL 2 TIMES DAILY WITH MEALS
Status: DISCONTINUED | OUTPATIENT
Start: 2018-06-15 | End: 2018-06-21 | Stop reason: HOSPADM

## 2018-06-15 RX ORDER — CALCITRIOL 0.25 UG/1
0.25 CAPSULE, LIQUID FILLED ORAL DAILY
Status: DISCONTINUED | OUTPATIENT
Start: 2018-06-15 | End: 2018-06-21 | Stop reason: HOSPADM

## 2018-06-15 RX ORDER — TADALAFIL 20 MG/1
40 TABLET ORAL DAILY
Status: DISCONTINUED | OUTPATIENT
Start: 2018-06-15 | End: 2018-06-21 | Stop reason: HOSPADM

## 2018-06-15 RX ORDER — SIMVASTATIN 10 MG
10 TABLET ORAL AT BEDTIME
Status: DISCONTINUED | OUTPATIENT
Start: 2018-06-15 | End: 2018-06-21 | Stop reason: HOSPADM

## 2018-06-15 RX ORDER — LIDOCAINE 40 MG/G
CREAM TOPICAL
Status: DISCONTINUED | OUTPATIENT
Start: 2018-06-15 | End: 2018-06-21 | Stop reason: HOSPADM

## 2018-06-15 RX ORDER — BUPROPION HYDROCHLORIDE 200 MG/1
200 TABLET, EXTENDED RELEASE ORAL EVERY MORNING
Status: DISCONTINUED | OUTPATIENT
Start: 2018-06-16 | End: 2018-06-21 | Stop reason: HOSPADM

## 2018-06-15 RX ORDER — SODIUM BICARBONATE 325 MG/1
325 TABLET ORAL 2 TIMES DAILY
Status: DISCONTINUED | OUTPATIENT
Start: 2018-06-15 | End: 2018-06-21 | Stop reason: HOSPADM

## 2018-06-15 RX ORDER — ACETAMINOPHEN 325 MG/1
650 TABLET ORAL EVERY 4 HOURS PRN
Status: DISCONTINUED | OUTPATIENT
Start: 2018-06-15 | End: 2018-06-17

## 2018-06-15 RX ORDER — BUSPIRONE HYDROCHLORIDE 15 MG/1
15 TABLET ORAL 2 TIMES DAILY
Status: DISCONTINUED | OUTPATIENT
Start: 2018-06-15 | End: 2018-06-21 | Stop reason: HOSPADM

## 2018-06-15 RX ORDER — LORAZEPAM 1 MG/1
1 TABLET ORAL AT BEDTIME
Status: DISCONTINUED | OUTPATIENT
Start: 2018-06-15 | End: 2018-06-21 | Stop reason: HOSPADM

## 2018-06-15 RX ORDER — FUROSEMIDE 20 MG
20 TABLET ORAL
Status: DISCONTINUED | OUTPATIENT
Start: 2018-06-16 | End: 2018-06-21 | Stop reason: HOSPADM

## 2018-06-15 RX ORDER — FUROSEMIDE 40 MG
40 TABLET ORAL
Status: DISCONTINUED | OUTPATIENT
Start: 2018-06-18 | End: 2018-06-21 | Stop reason: HOSPADM

## 2018-06-15 RX ORDER — POTASSIUM CHLORIDE 750 MG/1
10 TABLET, EXTENDED RELEASE ORAL DAILY
Status: DISCONTINUED | OUTPATIENT
Start: 2018-06-15 | End: 2018-06-21 | Stop reason: HOSPADM

## 2018-06-15 RX ORDER — AZATHIOPRINE 50 MG/1
50 TABLET ORAL DAILY
Status: DISCONTINUED | OUTPATIENT
Start: 2018-06-15 | End: 2018-06-21 | Stop reason: HOSPADM

## 2018-06-15 RX ADMIN — SELEXIPAG 200 MCG: 200 TABLET, COATED ORAL at 18:49

## 2018-06-15 RX ADMIN — CALCITRIOL 0.25 MCG: 0.25 CAPSULE, LIQUID FILLED ORAL at 16:16

## 2018-06-15 RX ADMIN — TADALAFIL 40 MG: 20 TABLET, FILM COATED ORAL at 16:16

## 2018-06-15 RX ADMIN — AZATHIOPRINE 50 MG: 50 TABLET ORAL at 16:16

## 2018-06-15 RX ADMIN — POTASSIUM CHLORIDE 10 MEQ: 750 TABLET, EXTENDED RELEASE ORAL at 16:44

## 2018-06-15 RX ADMIN — SIMVASTATIN 10 MG: 10 TABLET, FILM COATED ORAL at 22:28

## 2018-06-15 RX ADMIN — CARVEDILOL 6.25 MG: 6.25 TABLET, FILM COATED ORAL at 18:08

## 2018-06-15 RX ADMIN — SODIUM BICARBONATE 325 MG: 325 TABLET ORAL at 20:52

## 2018-06-15 RX ADMIN — RANITIDINE 150 MG: 75 TABLET ORAL at 20:54

## 2018-06-15 RX ADMIN — BUSPIRONE HYDROCHLORIDE 15 MG: 15 TABLET ORAL at 20:52

## 2018-06-15 RX ADMIN — TREPROSTINIL 7 MG: 2.5 TABLET, EXTENDED RELEASE ORAL at 18:49

## 2018-06-15 RX ADMIN — LORAZEPAM 1 MG: 1 TABLET ORAL at 22:28

## 2018-06-15 NOTE — IP AVS SNAPSHOT
MRN:[id number]                      After Visit Summary   6/15/2018    Judith Nelson    MRN: [id number]           Thank you!     Thank you for choosing [facility] for your care. Our goal is always to provide you with excellent care. Hearing back from our patients is one way we can continue to improve our services. Please take a few minutes to complete the written survey that you may receive in the mail after you visit with us. Thank you!        Patient Information     Date Of Birth          1960        Designated Caregiver       Most Recent Value    Caregiver    Will someone help with your care after discharge? no      About your hospital stay     You were admitted on:  Melva 15, 2018 You last received care in the:  [address] [facility] [place]    You were discharged on:  June 21, 2018        Reason for your hospital stay       Pulmonary hypertension                  Who to Call     For medical emergencies, please call 911.  For non-urgent questions about your medical care, please call your primary care provider or clinic, [phone]          Attending Provider     Provider Specialty    Norm Franco MD Cardiology       Primary Care Provider Office Phone # Fax #    Anupama Babcock [phone]      After Care Instructions     Activity       Your activity upon discharge: activity as tolerated            Diet       Follow this diet upon discharge: Orders Placed This Encounter      Regular Diet Adult                  Follow-up Appointments     Adult [facility] Follow-up and recommended labs and tests       Follow up with primary care provider, Anupama Babcock, within 7 days for hospital follow- up.  No follow up labs or test are needed.    Follow-up with cardiology in 2 weeks.       Appointments on [place] and/or [facility] (with [facility] or [facility] provider or service). Call [phone] if you haven't heard regarding these appointments within 7 days of discharge.                   Your next 10 appointments already scheduled     Aug 10, 2018 12:00 PM CDT   DX HIP/PELVIS/SPINE with UCDX1   [facility] ([facility])    [address]  1st Floor  [place] [address] [phone]           Please do not take any of the following 24 hours prior to the day of your exam: vitamins, calcium tablets, antacids.  If possible, please wear clothes without metal (snaps, zippers). A sweatsuit works well.            Aug 10, 2018  1:00 PM CDT   (Arrive by 12:45 PM)   RETURN ENDOCRINE with Jenna Salas MD   [facility] ([facility])    [address]  3rd Floor  [place] [address] [phone]            Aug 14, 2018  2:30 PM CDT   (Arrive by 2:15 PM)   Return Interstitial Lung with Gael Flaherty MD   [facility] ([facility])    [address]  [address]  [place] [address] [phone]              Additional Services     Home care nursing referral       [facility]   Phone: [phone]   Fax: [phone]    For RN romero post hospitalization.   Resume previous home care orders.   Assess: vital signs, respiratory and cardiac status, activity tolerance, hydration, nutritional status.   Med set up and management.        Your provider has ordered home care nursing services. If you have not been contacted within 2 days of your discharge please call the inpatient department phone number at [phone] .                  Pending Results     No orders found from 6/13/2018 to 6/16/2018.            Statement of Approval     Ordered          06/21/18 1241  I have reviewed and agree with all the recommendations and orders detailed in this document.  EFFECTIVE NOW     Approved and electronically signed by:  Iman tMz MD             Admission Information     Date & Time Provider Department Dept.  "Phone    6/15/2018 Norm Franco MD [address] [facility] [phone]      Your Vitals Were     Blood Pressure Temperature Respirations Height Weight Pulse Oximetry    102/62 (BP Location: Left arm) 97.8  F (36.6  C) (Oral) 16 1.6 m (5' 3\") 50.4 kg (111 lb 3.2 oz) 97%    BMI (Body Mass Index)                   19.7 kg/m2           [employer] Information     [employer] gives you secure access to your electronic health record. If you see a primary care provider, you can also send messages to your care team and make appointments. If you have questions, please call your primary care clinic.  If you do not have a primary care provider, please call [phone] and they will assist you.        Care EveryWhere ID     This is your Care EveryWhere ID. This could be used by other organizations to access your [facility] medical records  [id number]        Equal Access to Services     LESLIE CHACKO : Flavio Leiva, natanael salas, zhao garza, alysha worley . So [facility] [phone].    ATENCIÓN: Si habla español, tiene a dhillon disposición servicios gratuitos de asistencia lingüística. Llame al [phone].    We comply with applicable federal civil rights laws and [place] laws. We do not discriminate on the basis of race, color, national origin, age, disability, sex, sexual orientation, or gender identity.               Review of your medicines      START taking        Dose / Directions    selexipag 400 mcg, selexipag 800 mcg        Dose:  1200 mcg   Take 1,200 mcg by mouth every 12 hours   Quantity:  30 tablet   Refills:  0         CONTINUE these medicines which have NOT CHANGED        Dose / Directions    acetaminophen 500 MG tablet   Commonly known as:  TYLENOL   Used for:  Pulmonary hypertension        Dose:  500-1000 mg   Take 1-2 tablets (500-1,000 mg) by mouth every 6 hours as needed for pain (Take as needed for pain.  Do not exceed 4 grams (8 tablets) in a day) "   Quantity:  45 tablet   Refills:  10       azaTHIOprine 50 MG tablet   Commonly known as:  IMURAN   Used for:  Sarcoidosis        Dose:  50 mg   Take 1 tablet (50 mg) by mouth daily   Quantity:  90 tablet   Refills:  3       buPROPion 200 MG 12 hr tablet   Commonly known as:  WELLBUTRIN SR   Used for:  Major depressive disorder, single episode, moderate (H)        Dose:  200 mg   Take 1 tablet (200 mg) by mouth every morning   Quantity:  30 tablet   Refills:  0       BUSPIRONE HCL PO        Dose:  15 mg   Take 15 mg by mouth 2 times daily   Refills:  0       calcitRIOL 0.25 MCG capsule   Commonly known as:  ROCALTROL        Dose:  0.25 mcg   Take 1 capsule (0.25 mcg) by mouth daily   Quantity:  30 capsule   Refills:  1       carvedilol 12.5 MG tablet   Commonly known as:  COREG   Used for:  Chronic systolic heart failure (H)        Dose:  6.25 mg   Take 0.5 tablets (6.25 mg) by mouth 2 times daily (with meals)   Quantity:  135 tablet   Refills:  3       denosumab 60 MG/ML Soln injection   Commonly known as:  PROLIA   Used for:  Osteoporosis, postmenopausal        Dose:  60 mg   Inject 1 mL (60 mg) Subcutaneous every 6 months   Quantity:  1 mL   Refills:  1       EYE VITAMINS PO   Used for:  Sarcoidosis        Dose:  1 tablet   Take 1 tablet by mouth daily   Refills:  0       fluticasone 50 MCG/ACT spray   Commonly known as:  FLONASE   Used for:  Dysfunction of right eustachian tube        Dose:  1 spray   Spray 1 spray into both nostrils daily   Quantity:  1 Bottle   Refills:  0       furosemide 40 MG tablet   Commonly known as:  LASIX   Used for:  Pulmonary hypertension, Stress-induced cardiomyopathy        Take 40 MG M-W-F, 20 MG all other days.   Quantity:  90 tablet   Refills:  3       LORazepam 0.5 MG tablet   Commonly known as:  ATIVAN   Used for:  Major depressive disorder, single episode, severe with psychotic features (H)        Take 0.5 mg (1 tab) every morning and 1 mg (2 tabs) every night at bedtime.    Quantity:  90 tablet   Refills:  1       OPSUMIT 10 MG tablet   Used for:  Primary pulmonary hypertension (H)   Generic drug:  macitentan        TAKE ONE TABLET (10MG) BY MOUTH DAILY. DO NOT HANDLE IF PREGNANT. DO NOT SPLIT, CRUSH, OR CHEW. REVIEW MEDICATION GUIDE. CALL [phone]   Quantity:  30 tablet   Refills:  10       order for DME   Used for:  Tremor, Generalized muscle weakness, Sarcoidosis        Equipment being ordered: SHOWER CHAIR  FROM [employer]   Quantity:  1 Device   Refills:  0       potassium chloride 10 MEQ tablet   Commonly known as:  K-TAB,KLOR-CON   Used for:  Heart failure (H)        Dose:  10 mEq   Take 1 tablet (10 mEq) by mouth daily   Quantity:  90 tablet   Refills:  1       ranitidine 75 MG tablet   Commonly known as:  ranitidine   Used for:  Primary pulmonary hypertension (H), Anemia, unspecified type, SOB (shortness of breath)        Dose:   mg   Take 1-2 tablets ( mg) by mouth 2 times daily   Quantity:  60 tablet   Refills:  11       simvastatin 10 MG tablet   Commonly known as:  ZOCOR   Used for:  Hypercholesterolemia        Dose:  10 mg   Take 1 tablet by mouth At Bedtime.   Quantity:  90 tablet   Refills:  1       SODIUM BICARBONATE PO   Used for:  Pulmonary arterial hypertension        Take by mouth 2 times daily   Refills:  0       T.E.D. BELOW KNEE/M-REGULAR Misc   Used for:  Lower extremity edema        Dose:  1 each   1 each daily   Quantity:  2 each   Refills:  0       tadalafil (PAH) 20 MG Tabs   Commonly known as:  ADCIRCA   Used for:  Pulmonary hypertension        Dose:  40 mg   Take 2 tablets (40 mg) by mouth daily   Quantity:  60 tablet   Refills:  11       TEMAZEPAM PO        Take by mouth At Bedtime   Refills:  0         STOP taking     treprostinil diolamine ER 0.125 MG CR tablet   Commonly known as:  ORENITRAM                Where to get your medicines      Some of these will need a paper prescription and others can be bought over the counter. Ask  your nurse if you have questions.     Bring a paper prescription for each of these medications     selexipag 400 mcg, selexipag 800 mcg                Protect others around you: Learn how to safely use, store and throw away your medicines at www.disposemymeds.org.             Medication List: This is a list of all your medications and when to take them. Check marks below indicate your daily home schedule. Keep this list as a reference.      Medications           Morning Afternoon Evening Bedtime As Needed    acetaminophen 500 MG tablet   Commonly known as:  TYLENOL   Take 1-2 tablets (500-1,000 mg) by mouth every 6 hours as needed for pain (Take as needed for pain.  Do not exceed 4 grams (8 tablets) in a day)   Last time this was given:  975 mg on 6/20/2018  8:14 PM                                   azaTHIOprine 50 MG tablet   Commonly known as:  IMURAN   Take 1 tablet (50 mg) by mouth daily   Last time this was given:  50 mg on 6/21/2018  8:46 AM                                   buPROPion 200 MG 12 hr tablet   Commonly known as:  WELLBUTRIN SR   Take 1 tablet (200 mg) by mouth every morning   Last time this was given:  200 mg on 6/21/2018  8:45 AM                                   BUSPIRONE HCL PO   Take 15 mg by mouth 2 times daily   Last time this was given:  15 mg on 6/21/2018  8:48 AM                                      calcitRIOL 0.25 MCG capsule   Commonly known as:  ROCALTROL   Take 1 capsule (0.25 mcg) by mouth daily   Last time this was given:  0.25 mcg on 6/21/2018  8:45 AM                                   carvedilol 12.5 MG tablet   Commonly known as:  COREG   Take 0.5 tablets (6.25 mg) by mouth 2 times daily (with meals)   Last time this was given:  6.25 mg on 6/21/2018  8:50 AM                                      denosumab 60 MG/ML Soln injection   Commonly known as:  PROLIA   Inject 1 mL (60 mg) Subcutaneous every 6 months                                EYE VITAMINS PO   Take 1 tablet by mouth  daily                                fluticasone 50 MCG/ACT spray   Commonly known as:  FLONASE   Spray 1 spray into both nostrils daily                                furosemide 40 MG tablet   Commonly known as:  LASIX   Take 40 MG M-W-F, 20 MG all other days.   Last time this was given:  20 mg on 6/21/2018  8:49 AM                                   LORazepam 0.5 MG tablet   Commonly known as:  ATIVAN   Take 0.5 mg (1 tab) every morning and 1 mg (2 tabs) every night at bedtime.   Last time this was given:  0.5 mg on 6/21/2018  8:48 AM            0.5 mg               1 mg           OPSUMIT 10 MG tablet   TAKE ONE TABLET (10MG) BY MOUTH DAILY. DO NOT HANDLE IF PREGNANT. DO NOT SPLIT, CRUSH, OR CHEW. REVIEW MEDICATION GUIDE. CALL [phone]   Last time this was given:  10 mg on 6/21/2018  8:48 AM   Generic drug:  macitentan                                   order for DME   Equipment being ordered: SHOWER CHAIR  FROM [employer]                                potassium chloride 10 MEQ tablet   Commonly known as:  K-TAB,KLOR-CON   Take 1 tablet (10 mEq) by mouth daily                                   ranitidine 75 MG tablet   Commonly known as:  ranitidine   Take 1-2 tablets ( mg) by mouth 2 times daily   Last time this was given:  150 mg on 6/20/2018  5:39 PM                                      selexipag 400 mcg, selexipag 800 mcg   Take 1,200 mcg by mouth every 12 hours   Last time this was given:  6/21/2018  8:49 AM                                      simvastatin 10 MG tablet   Commonly known as:  ZOCOR   Take 1 tablet by mouth At Bedtime.   Last time this was given:  10 mg on 6/20/2018  9:33 PM                                   SODIUM BICARBONATE PO   Take by mouth 2 times daily   Last time this was given:  325 mg on 6/21/2018  8:48 AM                                      T.E.D. BELOW KNEE/M-REGULAR Misc   1 each daily                                tadalafil (PAH) 20 MG Tabs   Commonly known as:   ADCIRCA   Take 2 tablets (40 mg) by mouth daily                                   TEMAZEPAM PO   Take by mouth At Bedtime

## 2018-06-15 NOTE — LETTER
Transition Communication Hand-off for Care Transitions to Next Level of Care Provider    Name: Judith Nelson  : 1960  MRN #: [id number]  Primary Care Provider: Anupama Babcock     Primary Clinic: [address] [address]     Reason for Hospitalization:  PULMONARY HYPERTENSION  PAH (pulmonary arterial hypertension) with portal hypertension (H)  PAH (pulmonary arterial hypertension) with portal hypertension (H)  PAH (pulmonary arterial hypertension) with portal hypertension (H)  Admit Date/Time: 6/15/2018  2:04 PM  Discharge Date: 2018  Payor Source: Payor: MEDICARE / Plan: MEDICARE / Product Type: Medicare /     Readmission Assessment Measure (MAYKEL) Risk Score/category: Average  Reason for Communication Hand-off Referral: Continuity of care  Discharge Plan: Discharge to home with resumption of home care and clinic follow up as recommended.    Concern for non-adherence with plan of care:  No  Discharge Needs Assessment:  Needs       Most Recent Value    Home Care [facility] [phone], Fax: [phone]        Follow-up specialty is recommended: Yes    Follow-up plan:  Future Appointments  Date Time Provider Department Center   8/10/2018 12:00 PM UCDX1 [facility]   8/10/2018 1:00 PM Jenna Salas MD [facility]   2018 2:30 PM Gael Flaherty MD [facility]       Any outstanding tests or procedures:        Referrals     Future Labs/Procedures    Home care nursing referral     Comments:    [facility]   Phone: [phone]   Fax: [phone]    For RN eval post hospitalization.   Resume previous home care orders.   Assess: vital signs, respiratory and cardiac status, activity tolerance, hydration, nutritional status.   Med set up and management.        Your provider has ordered home care nursing services. If you have not been contacted within 2 days of your discharge please call the inpatient department phone  number at [phone] .            [place] Recommendations:  Post hospitalization follow up.  Valentina Pandey RN BSN  6C Unit Care Coordinator  Phone number: [phone]  Pager: [phone]

## 2018-06-15 NOTE — IP AVS SNAPSHOT
[address] [address]    Phone:  [phone]                                       After Visit Summary   6/15/2018    Judith Nelson    MRN: [id number]           After Visit Summary Signature Page     I have received my discharge instructions, and my questions have been answered. I have discussed any challenges I see with this plan with the nurse or doctor.    ..........................................................................................................................................  Patient/Patient Representative Signature      ..........................................................................................................................................  Patient Representative Print Name and Relationship to Patient    ..................................................               ................................................  Date                                            Time    ..........................................................................................................................................  Reviewed by Signature/Title    ...................................................              ..............................................  Date                                                            Time

## 2018-06-15 NOTE — H&P
Cardiology History and Physical  Judith Nelson MRN: [id number]  Age: 58 year old, : 1960  Primary care provider: Anupama Babcock           Chief Complaint:     conversion of treprostinil to selesipag          History of Present Illness:     History is obtained from the patient     Judith Nelson is 58 year old female with a PMHx of WHO I PAH, sarcoidosis, CKD and anxiety/depression who presents for conversion of treprostinil to selesipag.     Pt with recent RHC ~2 months ago showing markedly elevated PA pressure of 60 mmHg c/w with severe PAH. Subsequent plan is for conversion of treprostinil to selesipag. Pt notes chronic stable JUAN over past year, worse as day goes on. Notes she is able to do stairs in house in AM however fatigues over course of day. States she has been in normal state of health otherwise.      Review of Systems:   A comprehensive review of systems is negative except that which is listed in the HPI.           Past Medical History:     Past Medical History:   Diagnosis Date     Anxiety      CKD (chronic kidney disease), stage III     biopsy suspicious for renal sarcoidosis      Hyperprolactinemia (H)      Lower extremity edema     chronic w/ chronic right ankle ulcer     Lumbar compression fracture (H)      Major depressive disorder     with psychotic features, followed by Dr. Rosales at [facility] in [place]     Menopause 2012    patient is post menopausal     MGUS (monoclonal gammopathy of unknown significance)     mild, followed by Dr. Long     Osteoporosis      Other seborrheic keratosis      Protrusio acetabuli      Pulmonary hypertension      Sarcoidosis      Stress-induced cardiomyopathy               Past Surgical History:      Past Surgical History:   Procedure Laterality Date     Csection,  X 2       CYSTOSCOPY, RETROGRADES, INSERT STENT URETER(S), COMBINED  10/2/2013    Procedure: COMBINED CYSTOSCOPY, RETROGRADES, INSERT STENT URETER(S);  Left  Retrograde Ureteropyelogram and Ureteral Stent Placement;  Surgeon: SONIA Martinez MD;  Location: [facility]     ENDOSCOPIC RETROGRADE CHOLANGIOPANCREATOGRAM  2012    Procedure:ENDOSCOPIC RETROGRADE CHOLANGIOPANCREATOGRAM; ERCP biliary sphincterotomy and stone removal; Surgeon:MARGIE RUGGIERO; Location:[facility]     LAPAROSCOPIC CHOLECYSTECTOMY WITH CHOLANGIOGRAMS  2012    Procedure:LAPAROSCOPIC CHOLECYSTECTOMY WITH CHOLANGIOGRAMS; Surgeon:BRIANA PADILLA; Location:[facility]     ORTHOPEDIC SURGERY  10/1/2010    Right hip replacement     ZZ GASTROSCOPY,FL  6/10    Erythematous duodenopathy              Social History:     Social History   Substance Use Topics     Smoking status: Never Smoker     Smokeless tobacco: Never Used     Alcohol use No      Comment: Occ.              Family History:     Family History   Problem Relation Age of Onset     CANCER Mother       age 62 leukemia     GASTROINTESTINAL DISEASE Mother      diverticulitis     Hypertension Mother      Allergies Mother      Arthritis Mother      Depression Mother      Eye Disorder Mother      OSTEOPOROSIS Mother      C.A.D. Father      MI/CAD      CANCER Father      skin     Blood Disease Father      renal  problem     Hypertension Father      Anesthesia Reaction Father      Cardiovascular Father      Neurologic Disorder Father      Parkinson's disease     C.A.D. Maternal Grandmother       late 82s MI     DIABETES Maternal Grandmother      C.A.D. Maternal Grandfather       mid 80s MI     Alzheimer Disease Paternal Grandmother       80s     Alzheimer Disease Paternal Grandfather       80s     Neurologic Disorder Sister      migraines     Allergies Sister      DIABETES Other      AODM     Neurologic Disorder Sister      migraines     Allergies Sister      Anesthesia Reaction Son      Anesthesia Reaction Daughter      Anesthesia Reaction Daughter      DIABETES Sister      hypoglycemia     Family history reviewed and updated in  EPIC          Allergies:     Allergies   Allergen Reactions     Latex Rash     From gloves              Medications:     Current Facility-Administered Medications   Medication     acetaminophen (TYLENOL) tablet 650 mg     azaTHIOprine (IMURAN) tablet 50 mg     [START ON 6/16/2018] buPROPion (WELLBUTRIN SR) 12 hr tablet 200 mg     busPIRone (BUSPAR) tablet 15 mg     calcitRIOL (ROCALTROL) capsule 0.25 mcg     carvedilol (COREG) tablet 6.25 mg     [START ON 6/18/2018] furosemide (LASIX) tablet 40 mg     lidocaine (LMX4) kit     lidocaine 1 % 1 mL     [START ON 6/16/2018] LORazepam (ATIVAN) tablet 0.5 mg     macitentan (OPSUMIT) tablet 10 mg     medication instruction     potassium chloride (K-TAB,KLOR-CON) CR tablet 10 mEq     ranitidine (ZANTAC) tablet  mg     simvastatin (ZOCOR) tablet 10 mg     sodium chloride (PF) 0.9% PF flush 3 mL     sodium chloride (PF) 0.9% PF flush 3 mL     tadalafil (PAH) TABS 40 mg                    Physical Exam:     B/P: 115/74, T: 98.4, P: Data Unavailable, R: 16    Wt Readings from Last 4 Encounters:   06/15/18 52.3 kg (115 lb 3.2 oz)   05/21/18 52.8 kg (116 lb 8 oz)   05/01/18 51 kg (112 lb 8 oz)   04/30/18 51.7 kg (114 lb)       No intake or output data in the 24 hours ending 06/15/18 1445    Gen: AA&Ox3, no acute distress  HEENT:AT/ NC, PERRL b/l, EOM grossly intact, mucous membranes pink, moist without plaque or exudate, JVP at base of neck.   BACK: no CVA tenderness, no midline bony tenderness  PULM/THORAX: Clear to auscultation bilaterally, no rales/rhonchi/wheezes  CV:RRR, S1 and S2 appreciated, no extra heart sounds, murmurs or rub auscultated. No JVD  ABD: obese, soft, nontender, nondistended. Normoactive bowel sounds x 4, no HSM appreciated.  EXT: Trace LE edema, no clubbing or cyanosis. No asymmetrical edema or tenderness to palpation in calves bilaterally.  NEURO: No focal deficits.             Data:     Labs Reviewed on Admission  Pertinent for:  Lab Results    Component Value Date    TROPI <0.015 02/06/2018    TROPI 0.467 (HH) 04/19/2014    TROPI 0.646 (HH) 04/18/2014    TROPI 0.685 (HH) 04/18/2014    TROPI 0.207 (HH) 04/18/2014         Most Recent Imaging:       Echo: 3/21/18  Interpretation Summary  Paradoxical septal motion consistent with right ventricular pressure and  volume overload is present.  Mild to moderate right ventricular dilation is present.  Global right ventricular function is normal.  Right ventricular systolic pressure is 73mmHg above the right atrial pressure.  No pericardial effusion is present.  Lower PA pressure when compared to prior study.    RHC 4/18/18:             Assessment and Plan:     Judith Nelson is 58 year old female with a PMHx of WHO I PAH, sarcoidosis, CKD and anxiety/depression who presents for conversion of treprostinil to selesipag.     # PAH  # Sarcoidosis with possible sarcoidosis pulmonary hypertension and renal involvement.   RHC April '18 showing markedly elevated PA pressure of 60 mmHg c/w with severe PAH.   -transition from Orenitream to Uptravi     Uptravi (selesipag) twice daily  Orenitram (treprostinil) twice daily    Start 200 mcg 7 mg   2 weeks 400 mcg 6 mg   4 weeks 600 mcg 5 mg   6 weeks 800 mcg 4 mg   8 weeks 1000 mcg 3 mg   10 weeks  1200 mcg 2 mg   12 weeks  1400 mcg 1 mg   14 weeks  1600 mcg STOP Orenitram     -continue PTA Opsumit and Tadalafil   -continue PTA lasix  -continue PTA imuran     # CKD  Baseline Cr 1.5, at 1.5 on admit.   -continue PTA sodium bicarb and Ca   -daily BMP    # Anxiety/Depression   -continue PTA Wellbutrin, Buspar and Lorazepam      # HLD  -continue PTA simvastatin     FEN: Regular diet   PPX: Ambulatory     Code Status: FULL CODE     Patient discussed with staff attending, Dr. Franco.  Please feel free to page with questions.    Tirso Higuera MD  PGY-2 Internal Medicine  Cardiology Service  Pager: [phone]              Faculty Attestation  Norm Franco M.D.    I  personally saw and examined this patient, reviewed recent laboratories and imaging studies, discussed the case with the housestaff, and conveyed plan to the patient.  I answered all questions from patient and/or family. I agree with the examination, assessment and plan outlined here.

## 2018-06-15 NOTE — PLAN OF CARE
Problem: Patient Care Overview  Goal: Plan of Care/Patient Progress Review  Focus:  Admission  Diagnosis:   Admitted from: scheduled admit, Dr. Franco, for PHTN medication adjustment  Accompanied by: sister in law  Belongings: Placed in closet; valuables sent home with family.   Admission paperwork: complete.   Teaching: Call don't fall, use of console, meal times, visiting hours, orientation to unit, when to call for the RN (angina/sob/dizzyness, etc.), and stressed the importance of safety.   Access: PIV   Telemetry: Placed on pt   Ht./Wt.: complete

## 2018-06-16 LAB
ANION GAP SERPL CALCULATED.3IONS-SCNC: 9 MMOL/L (ref 3–14)
BASOPHILS # BLD AUTO: 0 10E9/L (ref 0–0.2)
BASOPHILS NFR BLD AUTO: 0.4 %
BUN SERPL-MCNC: 21 MG/DL (ref 7–30)
CALCIUM SERPL-MCNC: 9 MG/DL (ref 8.5–10.1)
CHLORIDE SERPL-SCNC: 112 MMOL/L (ref 94–109)
CO2 SERPL-SCNC: 23 MMOL/L (ref 20–32)
CREAT SERPL-MCNC: 1.47 MG/DL (ref 0.52–1.04)
DIFFERENTIAL METHOD BLD: ABNORMAL
EOSINOPHIL # BLD AUTO: 0.1 10E9/L (ref 0–0.7)
EOSINOPHIL NFR BLD AUTO: 2.8 %
ERYTHROCYTE [DISTWIDTH] IN BLOOD BY AUTOMATED COUNT: 13.7 % (ref 10–15)
GFR SERPL CREATININE-BSD FRML MDRD: 37 ML/MIN/1.7M2
GLUCOSE SERPL-MCNC: 87 MG/DL (ref 70–99)
HCT VFR BLD AUTO: 38.9 % (ref 35–47)
HGB BLD-MCNC: 12.6 G/DL (ref 11.7–15.7)
IMM GRANULOCYTES # BLD: 0 10E9/L (ref 0–0.4)
IMM GRANULOCYTES NFR BLD: 0 %
LYMPHOCYTES # BLD AUTO: 0.9 10E9/L (ref 0.8–5.3)
LYMPHOCYTES NFR BLD AUTO: 36.7 %
MAGNESIUM SERPL-MCNC: 2.2 MG/DL (ref 1.6–2.3)
MCH RBC QN AUTO: 31 PG (ref 26.5–33)
MCHC RBC AUTO-ENTMCNC: 32.4 G/DL (ref 31.5–36.5)
MCV RBC AUTO: 96 FL (ref 78–100)
MONOCYTES # BLD AUTO: 0.2 10E9/L (ref 0–1.3)
MONOCYTES NFR BLD AUTO: 9.2 %
NEUTROPHILS # BLD AUTO: 1.3 10E9/L (ref 1.6–8.3)
NEUTROPHILS NFR BLD AUTO: 50.9 %
PLATELET # BLD AUTO: 135 10E9/L (ref 150–450)
POTASSIUM SERPL-SCNC: 3.9 MMOL/L (ref 3.4–5.3)
RBC # BLD AUTO: 4.07 10E12/L (ref 3.8–5.2)
SODIUM SERPL-SCNC: 143 MMOL/L (ref 133–144)
WBC # BLD AUTO: 2.7 10E9/L (ref 4–11)

## 2018-06-16 PROCEDURE — 80048 BASIC METABOLIC PNL TOTAL CA: CPT | Performed by: INTERNAL MEDICINE

## 2018-06-16 PROCEDURE — A9270 NON-COVERED ITEM OR SERVICE: HCPCS | Mod: GY | Performed by: STUDENT IN AN ORGANIZED HEALTH CARE EDUCATION/TRAINING PROGRAM

## 2018-06-16 PROCEDURE — 25000132 ZZH RX MED GY IP 250 OP 250 PS 637: Mod: GY | Performed by: INTERNAL MEDICINE

## 2018-06-16 PROCEDURE — 25000131 ZZH RX MED GY IP 250 OP 636 PS 637: Mod: GY | Performed by: INTERNAL MEDICINE

## 2018-06-16 PROCEDURE — 85027 COMPLETE CBC AUTOMATED: CPT | Performed by: INTERNAL MEDICINE

## 2018-06-16 PROCEDURE — 99233 SBSQ HOSP IP/OBS HIGH 50: CPT | Mod: GC | Performed by: INTERNAL MEDICINE

## 2018-06-16 PROCEDURE — 25000132 ZZH RX MED GY IP 250 OP 250 PS 637: Mod: GY | Performed by: STUDENT IN AN ORGANIZED HEALTH CARE EDUCATION/TRAINING PROGRAM

## 2018-06-16 PROCEDURE — 36415 COLL VENOUS BLD VENIPUNCTURE: CPT | Performed by: INTERNAL MEDICINE

## 2018-06-16 PROCEDURE — A9270 NON-COVERED ITEM OR SERVICE: HCPCS | Mod: GY | Performed by: INTERNAL MEDICINE

## 2018-06-16 PROCEDURE — 85004 AUTOMATED DIFF WBC COUNT: CPT | Performed by: INTERNAL MEDICINE

## 2018-06-16 PROCEDURE — 21400006 ZZH R&B CCU INTERMEDIATE UMMC

## 2018-06-16 PROCEDURE — 83735 ASSAY OF MAGNESIUM: CPT | Performed by: INTERNAL MEDICINE

## 2018-06-16 RX ORDER — ONDANSETRON 2 MG/ML
4 INJECTION INTRAMUSCULAR; INTRAVENOUS EVERY 6 HOURS PRN
Status: DISCONTINUED | OUTPATIENT
Start: 2018-06-16 | End: 2018-06-21 | Stop reason: HOSPADM

## 2018-06-16 RX ORDER — ALUMINA, MAGNESIA, AND SIMETHICONE 2400; 2400; 240 MG/30ML; MG/30ML; MG/30ML
30 SUSPENSION ORAL EVERY 6 HOURS PRN
Status: DISCONTINUED | OUTPATIENT
Start: 2018-06-16 | End: 2018-06-21 | Stop reason: HOSPADM

## 2018-06-16 RX ADMIN — SELEXIPAG 400 MCG: 200 TABLET, COATED ORAL at 17:42

## 2018-06-16 RX ADMIN — BUPROPION HYDROCHLORIDE 200 MG: 200 TABLET, FILM COATED, EXTENDED RELEASE ORAL at 08:13

## 2018-06-16 RX ADMIN — TREPROSTINIL 7 MG: 2.5 TABLET, EXTENDED RELEASE ORAL at 08:27

## 2018-06-16 RX ADMIN — SELEXIPAG 200 MCG: 200 TABLET, COATED ORAL at 08:15

## 2018-06-16 RX ADMIN — CALCITRIOL 0.25 MCG: 0.25 CAPSULE, LIQUID FILLED ORAL at 08:16

## 2018-06-16 RX ADMIN — TREPROSTINIL 6 MG: 2.5 TABLET, EXTENDED RELEASE ORAL at 17:42

## 2018-06-16 RX ADMIN — SIMVASTATIN 10 MG: 10 TABLET, FILM COATED ORAL at 22:01

## 2018-06-16 RX ADMIN — FUROSEMIDE 20 MG: 20 TABLET ORAL at 08:13

## 2018-06-16 RX ADMIN — ACETAMINOPHEN 650 MG: 325 TABLET, FILM COATED ORAL at 19:59

## 2018-06-16 RX ADMIN — SODIUM BICARBONATE 325 MG: 325 TABLET ORAL at 19:59

## 2018-06-16 RX ADMIN — BUSPIRONE HYDROCHLORIDE 15 MG: 15 TABLET ORAL at 08:13

## 2018-06-16 RX ADMIN — RANITIDINE 150 MG: 150 TABLET, FILM COATED ORAL at 12:02

## 2018-06-16 RX ADMIN — CARVEDILOL 6.25 MG: 6.25 TABLET, FILM COATED ORAL at 17:49

## 2018-06-16 RX ADMIN — POTASSIUM CHLORIDE 10 MEQ: 750 TABLET, EXTENDED RELEASE ORAL at 08:16

## 2018-06-16 RX ADMIN — TADALAFIL 40 MG: 20 TABLET, FILM COATED ORAL at 08:14

## 2018-06-16 RX ADMIN — CARVEDILOL 6.25 MG: 6.25 TABLET, FILM COATED ORAL at 08:16

## 2018-06-16 RX ADMIN — LORAZEPAM 1 MG: 1 TABLET ORAL at 22:01

## 2018-06-16 RX ADMIN — BUSPIRONE HYDROCHLORIDE 15 MG: 15 TABLET ORAL at 19:59

## 2018-06-16 RX ADMIN — AZATHIOPRINE 50 MG: 50 TABLET ORAL at 08:16

## 2018-06-16 RX ADMIN — ALUMINUM HYDROXIDE, MAGNESIUM HYDROXIDE, AND DIMETHICONE 30 ML: 400; 400; 40 SUSPENSION ORAL at 23:45

## 2018-06-16 RX ADMIN — MACITENTAN 10 MG: 10 TABLET, FILM COATED ORAL at 08:15

## 2018-06-16 RX ADMIN — SODIUM BICARBONATE 325 MG: 325 TABLET ORAL at 08:12

## 2018-06-16 RX ADMIN — LORAZEPAM 0.5 MG: 0.5 TABLET ORAL at 08:15

## 2018-06-16 ASSESSMENT — PAIN DESCRIPTION - DESCRIPTORS: DESCRIPTORS: HEADACHE

## 2018-06-16 NOTE — PLAN OF CARE
Problem: Cardiac Output Decreased (Adult)  Goal: Identify Related Risk Factors and Signs and Symptoms  Related risk factors and signs and symptoms are identified upon initiation of Human Response Clinical Practice Guideline (CPG).   Outcome: No Change  Neuro: A&Ox4.   Cardiac: SR.BP soft   Respiratory: Sating 90-94% on RA. Mds aware and okay to keep off 02 at 90%. Lungs clear.   GI/: Adequate urine output.   Diet/appetite: Tolerating Reg diet. Eating well.  Activity:  Up independent  Pain: denies  Skin: No new deficits noted.  LDA's: piv saline locked    Plan: Continue with POC. Notify primary team with changes. Daughter here to visit today.

## 2018-06-16 NOTE — PLAN OF CARE
Problem: Patient Care Overview  Goal: Plan of Care/Patient Progress Review  Outcome: No Change  D: Pt with hx sarcoidosis, pHTN admitted 6/15 for med transition from PO treprostenil to selexipag.   I/A: SB/SR on monitor 50's-60's at rest. Pt denies any symptoms/changes with first dose titration. Ambulated 4 laps (6C/6D) in halls with RN before HS. Some JUAN (baseline, per pt). Pt denies SOB at rest- sats 88-90% at HS, placed on 2L O2 per NC with sats improved to 95%. Pt reports breathing easier with HOB elevated.   P: Continue med titration. Monitor and assess pt condition and contact treatment team with questions or concerns.

## 2018-06-16 NOTE — PROGRESS NOTES
Cardiology Progress Note  Judith Nelson MRN: [id number]  Age: 58 year old, : 1960  Date: @todaysdater@             Subjective     DEVON overnight. Denies any chest pain, cough, SOB, f/c or LE swelling. Ambulating without trouble. Tolerating PO intake.           Objective     B/P: 102/65, T: 98.2, P: Data Unavailable, R: 18    Intake/Output Summary (Last 24 hours) at 18 1249  Last data filed at 18 1159   Gross per 24 hour   Intake              960 ml   Output             2485 ml   Net            -1525 ml     Vitals:    06/15/18 1419 18 0600   Weight: 52.3 kg (115 lb 3.2 oz) 51.3 kg (113 lb)     Gen: AA&Ox3, NAD  HEENT: JVP at base of neck.   PULM/THORAX: Clear to auscultation bilaterally, no rales/rhonchi/wheezes  CV:RRR, S1 and S2 appreciated, no extra heart sounds, murmurs or rub auscultated. No JVD  ABD: soft, nontender, nondistended. Normoactive bowel sounds x 4, no HSM appreciated.  EXT: Trace LE edema, no clubbing or cyanosis. No asymmetrical edema or tenderness to palpation in calves bilaterally.  NEURO: No focal deficits.             Data:     LABS reviewed and pertinent for:       Lab Results   Component Value Date     2018    Lab Results   Component Value Date    CHLORIDE 112 2018    Lab Results   Component Value Date    BUN 21 2018      Lab Results   Component Value Date    POTASSIUM 3.9 2018    Lab Results   Component Value Date    CO2 23 2018    Lab Results   Component Value Date    CR 1.47 2018        Lab Results   Component Value Date    WBC 2.7 (L) 2018    HGB 12.6 2018    HCT 38.9 2018    MCV 96 2018     (L) 2018             Medications     Current Facility-Administered Medications:      acetaminophen (TYLENOL) tablet 650 mg, 650 mg, Oral, Q4H PRN, Navarro Higuera MD     azaTHIOprine (IMURAN) tablet 50 mg, 50 mg, Oral, Daily, Navarro Higuera MD, 50 mg at  06/16/18 0816     buPROPion (WELLBUTRIN SR) 12 hr tablet 200 mg, 200 mg, Oral, Kalen DIAS Joseph J, MD, 200 mg at 06/16/18 0813     busPIRone (BUSPAR) tablet 15 mg, 15 mg, Oral, BID, Navarro Higuera MD, 15 mg at 06/16/18 0813     calcitRIOL (ROCALTROL) capsule 0.25 mcg, 0.25 mcg, Oral, Daily, Navarro Higuera MD, 0.25 mcg at 06/16/18 0816     carvedilol (COREG) tablet 6.25 mg, 6.25 mg, Oral, BID w/meals, Navarro Higuera MD, 6.25 mg at 06/16/18 0816     furosemide (LASIX) tablet 20 mg, 20 mg, Oral, Once per day on Sun Tue Thu Sat, Navarro Higuera MD, 20 mg at 06/16/18 0813     [START ON 6/18/2018] furosemide (LASIX) tablet 40 mg, 40 mg, Oral, Q Mon Wed Fri AM, Navarro Higuera MD     lidocaine (LMX4) kit, , Topical, Q1H PRN, Navarro Higuera MD     lidocaine 1 % 1 mL, 1 mL, Other, Q1H PRN, Navarro Higuera MD     LORazepam (ATIVAN) tablet 0.5 mg, 0.5 mg, Oral, Kalen DIAS Joseph J, MD, 0.5 mg at 06/16/18 0815     LORazepam (ATIVAN) tablet 1 mg, 1 mg, Oral, At Bedtime, Navarro Higuera MD, 1 mg at 06/15/18 2228     macitentan (OPSUMIT) tablet 10 mg, 10 mg, Oral, Daily, Navarro Higuera MD, 10 mg at 06/16/18 0815     medication instruction, , Does not apply, Continuous PRN, Navarro Higuera MD     potassium chloride SA (K-DUR/KLOR-CON M) CR tablet 10 mEq, 10 mEq, Oral, Daily, Navarro Higuera MD, 10 mEq at 06/16/18 0816     ranitidine (ZANTAC) tablet 150 mg, 150 mg, Oral, Daily, Kacie Hawthorne MD, 150 mg at 06/16/18 1202     selexipag (UPTRAVI) tablet 200 mcg, 200 mcg, Oral, BID, Navarro Higuera MD, 200 mcg at 06/16/18 0815     simvastatin (ZOCOR) tablet 10 mg, 10 mg, Oral, At Bedtime, Navarro Higuera MD, 10 mg at 06/15/18 2228     sodium bicarbonate tablet 325 mg, 325 mg, Oral, BID, Navarro Higuera MD, 325 mg at 06/16/18 0812     sodium chloride (PF) 0.9% PF flush 3 mL, 3 mL, Intracatheter, Q1H PRN, Navarro Higuera MD     sodium chloride (PF) 0.9% PF flush 3 mL, 3 mL,  Intracatheter, Q8H, Navarro Higuera MD, 3 mL at 06/16/18 0825     tadalafil (ADCIRCA/CIALIS) tablet, 40 mg, Oral, Daily, Navarro Higuera MD, 40 mg at 06/16/18 0814     Treprostinil Diolamine ER (ORENITRAM) CR tablet 7 mg, 7 mg, Oral, BID w/meals, Navarro Higuera MD, 7 mg at 06/16/18 0827        Assessment and Plan:     Judith Nelson is 58 year old female with a PMHx of WHO I PAH, sarcoidosis, CKD and anxiety/depression who presents for conversion of treprostinil to selesipag.      # PAH  # Sarcoidosis with possible sarcoidosis pulmonary hypertension and renal involvement.   RHC April '18 showing markedly elevated PA pressure of 60 mmHg c/w with severe PAH.   -transition from Orenitream to Uptravi     Uptravi (selesipag) twice daily  Orenitram (treprostinil) twice daily    Start 200 mcg 7 mg   2 weeks 400 mcg 6 mg   4 weeks 600 mcg 5 mg   6 weeks 800 mcg 4 mg   8 weeks 1000 mcg 3 mg   10 weeks  1200 mcg 2 mg   12 weeks  1400 mcg 1 mg   14 weeks  1600 mcg STOP Orenitram      -continue PTA Opsumit and Tadalafil   -continue PTA lasix  -continue PTA imuran      # CKD  Baseline Cr 1.5, at 1.5 on admit.   -continue PTA sodium bicarb and Ca   -daily BMP     # Anxiety/Depression   -continue PTA Wellbutrin, Buspar and Lorazepam       # HLD  -continue PTA simvastatin     # Neutropenia  Has been low in past, however trending down since admit. Per pharmacy, unlikely SE of selesipag. Imuran possibly contributing. Will monitor for now, consider heme/onc consult.   -daily CBC with differential       FEN: Regular diet   PPX: Ambulatory      Code Status: FULL CODE      Patient discussed with staff attending, Dr. Franco.  Please feel free to page with questions.     Tirso Higurea MD  PGY-2 Internal Medicine  Cardiology Service  Pager: [phone]                    I personally provided care for this patient, reviewed chart, discussed course with patient, housestaff and consulting physicians.  I answered all  questions.    Norm Franco M.D.  Division of Cardiology  Department of Medicine

## 2018-06-16 NOTE — UTILIZATION REVIEW
"  Admission Status; Secondary Review Determination     Admission Date: 6/15/2018  2:04 PM      Under the authority of the Utilization Management Committee, the utilization review process indicated a secondary review on the above patient.  The review outcome is based on review of the medical records, discussions with staff, and applying clinical experience noted on the date of the review.        ()      Inpatient Status Appropriate - This patient's medical care is consistent with medical management for inpatient care and reasonable inpatient medical practice.      (x) Observation Status Appropriate - This patient does not meet hospital inpatient criteria and is placed in observation status. If this patient's primary payer is Medicare and was admitted as an inpatient, Condition Code 44 should be used and patient status changed to \"observation\".   () Admission Status NOT Appropriate - This patient's medical care is not consistent with medical management for Inpatient or Observation Status.          RATIONALE FOR DETERMINATION   Patient is a 58-year-old female with a past medical history significant for chronic kidney disease, anxiety, depression, sarcoidosis, and pulmonary hypertension.  She is brought to the hospital for conversion from oral treprostinil to oral selexipag.  She is placed in the hospital for monitoring of response to conversion.  She was initially placed in the hospital on inpatient status.  However, observation status at the hospital is appropriate hospital status to monitor for possible symptoms of medication change.      The severity of illness, intensity of service provided, expected LOS and risk for adverse outcome make the care appropriate for observation level care at the hospital.        The information on this document is developed by the utilization review team in order for the business office to ensure compliance.  This only denotes the appropriateness of proper admission status and does not " reflect the quality of care rendered.         The definitions of Inpatient Status and Observation Status used in making the determination above are those provided in the CMS Coverage Manual, Chapter 1 and Chapter 6, section 70.4.      Sincerely,     Joey Mcneil D.O.  Utilization Review/ Case Management  [facility].

## 2018-06-17 LAB
ANION GAP SERPL CALCULATED.3IONS-SCNC: 11 MMOL/L (ref 3–14)
BASOPHILS # BLD AUTO: 0 10E9/L (ref 0–0.2)
BASOPHILS NFR BLD AUTO: 0.6 %
BUN SERPL-MCNC: 31 MG/DL (ref 7–30)
CALCIUM SERPL-MCNC: 9.1 MG/DL (ref 8.5–10.1)
CHLORIDE SERPL-SCNC: 109 MMOL/L (ref 94–109)
CO2 SERPL-SCNC: 24 MMOL/L (ref 20–32)
CREAT SERPL-MCNC: 1.5 MG/DL (ref 0.52–1.04)
DIFFERENTIAL METHOD BLD: ABNORMAL
EOSINOPHIL # BLD AUTO: 0.1 10E9/L (ref 0–0.7)
EOSINOPHIL NFR BLD AUTO: 3.3 %
ERYTHROCYTE [DISTWIDTH] IN BLOOD BY AUTOMATED COUNT: 13.5 % (ref 10–15)
GFR SERPL CREATININE-BSD FRML MDRD: 36 ML/MIN/1.7M2
GLUCOSE SERPL-MCNC: 90 MG/DL (ref 70–99)
HCT VFR BLD AUTO: 40.6 % (ref 35–47)
HGB BLD-MCNC: 13.5 G/DL (ref 11.7–15.7)
IMM GRANULOCYTES # BLD: 0 10E9/L (ref 0–0.4)
IMM GRANULOCYTES NFR BLD: 0 %
LYMPHOCYTES # BLD AUTO: 1 10E9/L (ref 0.8–5.3)
LYMPHOCYTES NFR BLD AUTO: 29.9 %
MAGNESIUM SERPL-MCNC: 2.7 MG/DL (ref 1.6–2.3)
MCH RBC QN AUTO: 31.4 PG (ref 26.5–33)
MCHC RBC AUTO-ENTMCNC: 33.3 G/DL (ref 31.5–36.5)
MCV RBC AUTO: 94 FL (ref 78–100)
MONOCYTES # BLD AUTO: 0.3 10E9/L (ref 0–1.3)
MONOCYTES NFR BLD AUTO: 8.5 %
NEUTROPHILS # BLD AUTO: 1.9 10E9/L (ref 1.6–8.3)
NEUTROPHILS NFR BLD AUTO: 57.7 %
NRBC # BLD AUTO: 0 10*3/UL
NRBC BLD AUTO-RTO: 0 /100
PLATELET # BLD AUTO: 157 10E9/L (ref 150–450)
POTASSIUM SERPL-SCNC: 4.1 MMOL/L (ref 3.4–5.3)
RBC # BLD AUTO: 4.3 10E12/L (ref 3.8–5.2)
SODIUM SERPL-SCNC: 144 MMOL/L (ref 133–144)
WBC # BLD AUTO: 3.3 10E9/L (ref 4–11)

## 2018-06-17 PROCEDURE — 80048 BASIC METABOLIC PNL TOTAL CA: CPT | Performed by: INTERNAL MEDICINE

## 2018-06-17 PROCEDURE — 25000132 ZZH RX MED GY IP 250 OP 250 PS 637: Mod: GY | Performed by: INTERNAL MEDICINE

## 2018-06-17 PROCEDURE — 25000132 ZZH RX MED GY IP 250 OP 250 PS 637: Mod: GY | Performed by: STUDENT IN AN ORGANIZED HEALTH CARE EDUCATION/TRAINING PROGRAM

## 2018-06-17 PROCEDURE — A9270 NON-COVERED ITEM OR SERVICE: HCPCS | Mod: GY | Performed by: INTERNAL MEDICINE

## 2018-06-17 PROCEDURE — G0378 HOSPITAL OBSERVATION PER HR: HCPCS

## 2018-06-17 PROCEDURE — 83735 ASSAY OF MAGNESIUM: CPT | Performed by: INTERNAL MEDICINE

## 2018-06-17 PROCEDURE — 21400006 ZZH R&B CCU INTERMEDIATE UMMC

## 2018-06-17 PROCEDURE — 36415 COLL VENOUS BLD VENIPUNCTURE: CPT | Performed by: INTERNAL MEDICINE

## 2018-06-17 PROCEDURE — 85025 COMPLETE CBC W/AUTO DIFF WBC: CPT | Performed by: INTERNAL MEDICINE

## 2018-06-17 PROCEDURE — A9270 NON-COVERED ITEM OR SERVICE: HCPCS | Mod: GY | Performed by: STUDENT IN AN ORGANIZED HEALTH CARE EDUCATION/TRAINING PROGRAM

## 2018-06-17 PROCEDURE — 25000131 ZZH RX MED GY IP 250 OP 636 PS 637: Mod: GY | Performed by: INTERNAL MEDICINE

## 2018-06-17 PROCEDURE — 99233 SBSQ HOSP IP/OBS HIGH 50: CPT | Mod: GC | Performed by: INTERNAL MEDICINE

## 2018-06-17 RX ORDER — ACETAMINOPHEN 325 MG/1
975 TABLET ORAL EVERY 4 HOURS PRN
Status: DISCONTINUED | OUTPATIENT
Start: 2018-06-17 | End: 2018-06-17

## 2018-06-17 RX ORDER — NALOXONE HYDROCHLORIDE 0.4 MG/ML
.1-.4 INJECTION, SOLUTION INTRAMUSCULAR; INTRAVENOUS; SUBCUTANEOUS
Status: DISCONTINUED | OUTPATIENT
Start: 2018-06-17 | End: 2018-06-21 | Stop reason: HOSPADM

## 2018-06-17 RX ORDER — ACETAMINOPHEN 325 MG/1
975 TABLET ORAL 3 TIMES DAILY
Status: DISCONTINUED | OUTPATIENT
Start: 2018-06-17 | End: 2018-06-21 | Stop reason: HOSPADM

## 2018-06-17 RX ORDER — TRAMADOL HYDROCHLORIDE 50 MG/1
50 TABLET ORAL EVERY 6 HOURS PRN
Status: DISCONTINUED | OUTPATIENT
Start: 2018-06-17 | End: 2018-06-21 | Stop reason: HOSPADM

## 2018-06-17 RX ADMIN — ACETAMINOPHEN 325 MG: 325 TABLET, FILM COATED ORAL at 14:14

## 2018-06-17 RX ADMIN — AZATHIOPRINE 50 MG: 50 TABLET ORAL at 09:11

## 2018-06-17 RX ADMIN — TREPROSTINIL 6 MG: 2.5 TABLET, EXTENDED RELEASE ORAL at 09:10

## 2018-06-17 RX ADMIN — TADALAFIL 40 MG: 20 TABLET, FILM COATED ORAL at 09:10

## 2018-06-17 RX ADMIN — FUROSEMIDE 20 MG: 20 TABLET ORAL at 09:11

## 2018-06-17 RX ADMIN — TREPROSTINIL 6 MG: 2.5 TABLET, EXTENDED RELEASE ORAL at 17:50

## 2018-06-17 RX ADMIN — MACITENTAN 10 MG: 10 TABLET, FILM COATED ORAL at 09:10

## 2018-06-17 RX ADMIN — RANITIDINE 150 MG: 150 TABLET, FILM COATED ORAL at 08:53

## 2018-06-17 RX ADMIN — ACETAMINOPHEN 650 MG: 325 TABLET, FILM COATED ORAL at 12:46

## 2018-06-17 RX ADMIN — TRAMADOL HYDROCHLORIDE 50 MG: 50 TABLET, COATED ORAL at 14:54

## 2018-06-17 RX ADMIN — CALCITRIOL 0.25 MCG: 0.25 CAPSULE, LIQUID FILLED ORAL at 09:11

## 2018-06-17 RX ADMIN — LORAZEPAM 0.5 MG: 0.5 TABLET ORAL at 08:33

## 2018-06-17 RX ADMIN — LORAZEPAM 1 MG: 1 TABLET ORAL at 21:31

## 2018-06-17 RX ADMIN — BUSPIRONE HYDROCHLORIDE 15 MG: 15 TABLET ORAL at 09:11

## 2018-06-17 RX ADMIN — SODIUM BICARBONATE 325 MG: 325 TABLET ORAL at 08:34

## 2018-06-17 RX ADMIN — SELEXIPAG 400 MCG: 200 TABLET, COATED ORAL at 09:10

## 2018-06-17 RX ADMIN — CARVEDILOL 6.25 MG: 6.25 TABLET, FILM COATED ORAL at 17:46

## 2018-06-17 RX ADMIN — ACETAMINOPHEN 975 MG: 325 TABLET, FILM COATED ORAL at 19:50

## 2018-06-17 RX ADMIN — RANITIDINE 150 MG: 150 TABLET, FILM COATED ORAL at 17:46

## 2018-06-17 RX ADMIN — CARVEDILOL 6.25 MG: 6.25 TABLET, FILM COATED ORAL at 09:12

## 2018-06-17 RX ADMIN — SIMVASTATIN 10 MG: 10 TABLET, FILM COATED ORAL at 21:31

## 2018-06-17 RX ADMIN — SODIUM BICARBONATE 325 MG: 325 TABLET ORAL at 19:50

## 2018-06-17 RX ADMIN — SELEXIPAG 400 MCG: 200 TABLET, COATED ORAL at 17:51

## 2018-06-17 RX ADMIN — BUSPIRONE HYDROCHLORIDE 15 MG: 15 TABLET ORAL at 19:50

## 2018-06-17 RX ADMIN — BUPROPION HYDROCHLORIDE 200 MG: 200 TABLET, FILM COATED, EXTENDED RELEASE ORAL at 09:11

## 2018-06-17 RX ADMIN — POTASSIUM CHLORIDE 10 MEQ: 750 TABLET, EXTENDED RELEASE ORAL at 09:11

## 2018-06-17 ASSESSMENT — PAIN DESCRIPTION - DESCRIPTORS
DESCRIPTORS: ACHING
DESCRIPTORS: ACHING

## 2018-06-17 NOTE — PLAN OF CARE
Problem: Patient Care Overview  Goal: Plan of Care/Patient Progress Review  Pt now Observation status. Obs document brought to room, but pt ambulating with family.

## 2018-06-17 NOTE — PLAN OF CARE
Problem: Patient Care Overview  Goal: Plan of Care/Patient Progress Review  Outcome: No Change  D: Pt with hx sarcoidosis, pHTN admitted 6/15 for med transition from PO treprostenil to selexipag.   I/A: SB/SR on monitor 50's-70's at rest. Pt c/o headache at 2000. Tylenol and cold packs given with near-relief. Pt later c/o nausea and indigestion. Cross cover paged for PRN orders. Nausea improved with aromatherapy, cold washcloths, sips of ginger ale. Indigestion treated with PRN Mylanta. Ambulated in halls before HS. Some JUAN (baseline, per pt). Pt denies SOB at rest- sats 89-90% at HS, placed on 2L O2 per NC with sats improved to 92%+.   P: Continue med titration. Monitor and assess pt condition and contact treatment team with questions or concerns.

## 2018-06-17 NOTE — PLAN OF CARE
Problem: Patient Care Overview  Goal: Plan of Care/Patient Progress Review  Pt informed of return to inpatient status.

## 2018-06-17 NOTE — PROGRESS NOTES
Cardiology Progress Note  Judith Nelson MRN: [id number]  Age: 58 year old, : 1960  Date: 2018              Subjective     DEVON overnight. Denies any chest pain, cough, SOB, f/c or LE swelling. Ambulating without trouble. Tolerating PO intake. Notes GERD overnight, would prefer ranitidine around dinner time.           Objective     B/P: 102/65, T: 98.2, P: Data Unavailable, R: 18    Intake/Output Summary (Last 24 hours) at 18 1249  Last data filed at 18 1159   Gross per 24 hour   Intake              960 ml   Output             2485 ml   Net            -1525 ml     Vitals:    06/15/18 1419 18 0600 18 0601   Weight: 52.3 kg (115 lb 3.2 oz) 51.3 kg (113 lb) 51.3 kg (113 lb)     Gen: AA&Ox3, NAD  HEENT: JVP at base of neck.   PULM/THORAX: Clear to auscultation bilaterally, no rales/rhonchi/wheezes  CV:RRR, S1 and S2 appreciated, no extra heart sounds, murmurs or rub auscultated. No JVD  ABD: soft, nontender, nondistended. Normoactive bowel sounds x 4, no HSM appreciated.  EXT: Trace LE edema, no clubbing or cyanosis. No asymmetrical edema or tenderness to palpation in calves bilaterally.  NEURO: No focal deficits.             Data:     LABS reviewed and pertinent for:       Lab Results   Component Value Date     2018    Lab Results   Component Value Date    CHLORIDE 112 2018    Lab Results   Component Value Date    BUN 21 2018      Lab Results   Component Value Date    POTASSIUM 3.9 2018    Lab Results   Component Value Date    CO2 23 2018    Lab Results   Component Value Date    CR 1.47 2018        Lab Results   Component Value Date    WBC 3.3 (L) 2018    HGB 13.5 2018    HCT 40.6 2018    MCV 94 2018     2018             Medications     Current Facility-Administered Medications:      acetaminophen (TYLENOL) tablet 650 mg, 650 mg, Oral, Q4H PRN, Navarro Higuera MD, 650 mg  at 06/16/18 1959     alum & mag hydroxide-simethicone (MYLANTA ES/MAALOX  ES) suspension 30 mL, 30 mL, Oral, Q6H PRN, Chato Yang MD, 30 mL at 06/16/18 2345     azaTHIOprine (IMURAN) tablet 50 mg, 50 mg, Oral, Daily, Navarro Higuera MD, 50 mg at 06/17/18 0911     buPROPion (WELLBUTRIN SR) 12 hr tablet 200 mg, 200 mg, Oral, Kalen DIAS Joseph J, MD, 200 mg at 06/17/18 0911     busPIRone (BUSPAR) tablet 15 mg, 15 mg, Oral, BID, Navarro Higuera MD, 15 mg at 06/17/18 0911     calcitRIOL (ROCALTROL) capsule 0.25 mcg, 0.25 mcg, Oral, Daily, Navarro Higuera MD, 0.25 mcg at 06/17/18 0911     carvedilol (COREG) tablet 6.25 mg, 6.25 mg, Oral, BID w/meals, Navarro Higuera MD, 6.25 mg at 06/17/18 0912     furosemide (LASIX) tablet 20 mg, 20 mg, Oral, Once per day on Sun Tue Thu Sat, Navarro Higuera MD, 20 mg at 06/17/18 0911     [START ON 6/18/2018] furosemide (LASIX) tablet 40 mg, 40 mg, Oral, Q Mon Wed Fri AM, Navarro Higuera MD     lidocaine (LMX4) kit, , Topical, Q1H PRN, Navarro Higuera MD     lidocaine 1 % 1 mL, 1 mL, Other, Q1H PRN, Navarro Higuera MD     LORazepam (ATIVAN) tablet 0.5 mg, 0.5 mg, Oral, Kalen DIAS Joseph J, MD, 0.5 mg at 06/17/18 0833     LORazepam (ATIVAN) tablet 1 mg, 1 mg, Oral, At Bedtime, Navarro Higuera MD, 1 mg at 06/16/18 2201     macitentan (OPSUMIT) tablet 10 mg, 10 mg, Oral, Daily, Navarro Higuera MD, 10 mg at 06/17/18 0910     medication instruction, , Does not apply, Continuous PRN, Navarro Higuera MD     ondansetron (ZOFRAN) injection 4 mg, 4 mg, Intravenous, Q6H PRN, Chato Yang MD     potassium chloride SA (K-DUR/KLOR-CON M) CR tablet 10 mEq, 10 mEq, Oral, Daily, Navarro Higuera MD, 10 mEq at 06/17/18 0911     ranitidine (ZANTAC) tablet 150 mg, 150 mg, Oral, Daily with olegerKalen Joseph J, MD     selexipag (UPTRAVI) tablet 400 mcg, 400 mcg, Oral, BID, Kacie Hawthorne MD, 400 mcg at 06/17/18 0910     simvastatin (ZOCOR)  tablet 10 mg, 10 mg, Oral, At Bedtime, Navarro Higuera MD, 10 mg at 06/16/18 2201     sodium bicarbonate tablet 325 mg, 325 mg, Oral, BID, Navarro Higuera MD, 325 mg at 06/17/18 0834     sodium chloride (PF) 0.9% PF flush 3 mL, 3 mL, Intracatheter, Q1H PRN, Navarro Higuera MD     sodium chloride (PF) 0.9% PF flush 3 mL, 3 mL, Intracatheter, Q8H, Navarro Higuera MD, 3 mL at 06/17/18 0912     tadalafil (ADCIRCA/CIALIS) tablet, 40 mg, Oral, Daily, Navarro Higuera MD, 40 mg at 06/17/18 0910     Treprostinil Diolamine ER (ORENITRAM) CR tablet 6 mg, 6 mg, Oral, BID w/meals, Kacie Hawthorne MD, 6 mg at 06/17/18 0910        Assessment and Plan:     Judith Nelson is 58 year old female with a PMHx of WHO I PAH, sarcoidosis, CKD and anxiety/depression who presents for conversion of treprostinil to selesipag.      # PAH  # Sarcoidosis with possible sarcoidosis pulmonary hypertension and renal involvement.   RHC April '18 showing markedly elevated PA pressure of 60 mmHg c/w with severe PAH.   -transition from Orenitream to Uptravi     Uptravi (selesipag) twice daily  Orenitram (treprostinil) twice daily    Start  200 mcg 7 mg   6/17 400 mcg 6 mg   6/18 800 mcg 4 mg           -continue PTA Opsumit and Tadalafil   -continue PTA lasix  -continue PTA imuran      # CKD  Baseline Cr 1.5, at 1.5 on admit.   -continue PTA sodium bicarb and Ca   -daily BMP     # Anxiety/Depression   -continue PTA Wellbutrin, Buspar and Lorazepam       # HLD  -continue PTA simvastatin     # Neutropenia  Has been low in past, however trending down since admit. Per pharmacy, unlikely SE of selesipag. Imuran possibly contributing. Will monitor for now, consider heme/onc consult if worsens.   -daily CBC with differential       # GERD  -ranitidine QPM with dinner     FEN: Regular diet   PPX: Ambulatory      Code Status: FULL CODE      Patient discussed with staff attending, Dr. Franco.  Please feel free to page with  questions.     Tirso Higuera MD  PGY-2 Internal Medicine  Cardiology Service  Pager: [phone]                I personally provided care for this patient, reviewed chart, discussed course with patient, housestaff and consulting physicians.  I answered all questions.    Norm Franco M.D.  Division of Cardiology  Department of Medicine

## 2018-06-17 NOTE — PLAN OF CARE
Problem: Patient Care Overview  Goal: Plan of Care/Patient Progress Review  D: Pt stable, SR with longer QT at am and noon vitals, back to 0.40 at 1500. Headache pain partially relieved with tylenol and tramadol. Flushed face/neck relieved with ice packs. No c/o nausea. No shortness of breath noted.   I: Monitored/assessed pt. Assisted with cares.  A: Pt stable and comfortable.  P: Continue to monitor/assess pt, contact provider with concerns.

## 2018-06-17 NOTE — PROGRESS NOTES
"Norm Franco M.D.  Cardiovascular Medicine    I personally saw and examined this patient, discussed care with housestaff and other consultants, reviewed current laboratories and imaging studies, and conveyed impression and diagnostic/therapeutic plan to patient.        History  No events overnight. Nursing notes reviewed.    Objective  BP 98/62  Temp 97.4  F (36.3  C) (Oral)  Resp 16  Ht 1.6 m (5' 3\")  Wt 51.3 kg (113 lb)  SpO2 93%  BMI 20.02 kg/m2   PERRLA, EOM full, normal gait and station, normal mentation, skin without lesions, chest is clear, no evidence of right or left ventricular overactivity, sternum stable, no carotid bruits, regular rhythm, S1, S2, no murmurs, abdomen without tenderness, rebound guarding or masses, no edema, no focal neurologic defects.    Intake/Output Summary (Last 24 hours) at 06/17/18 1258  Last data filed at 06/17/18 1122   Gross per 24 hour   Intake             1600 ml   Output             2450 ml   Net             -850 ml     Wt Readings from Last 5 Encounters:   06/17/18 51.3 kg (113 lb)   05/21/18 52.8 kg (116 lb 8 oz)   05/01/18 51 kg (112 lb 8 oz)   04/30/18 51.7 kg (114 lb)   04/18/18 53.6 kg (118 lb 3.2 oz)       Meds    azaTHIOprine  50 mg Oral Daily     buPROPion  200 mg Oral QAM     busPIRone (BUSPAR) tablet 15 mg  15 mg Oral BID     calcitRIOL  0.25 mcg Oral Daily     carvedilol  6.25 mg Oral BID w/meals     furosemide  20 mg Oral Once per day on Sun Tue Thu Sat     [START ON 6/18/2018] furosemide  40 mg Oral Q Mon Wed Fri AM     LORazepam  0.5 mg Oral QAM     LORazepam  1 mg Oral At Bedtime     macitentan  10 mg Oral Daily     potassium chloride SA  10 mEq Oral Daily     ranitidine  150 mg Oral Daily with supper     selexipag  400 mcg Oral BID     [START ON 6/18/2018] selexipag  800 mcg Oral Q12H     simvastatin  10 mg Oral At Bedtime     sodium bicarbonate  325 mg Oral BID     sodium chloride (PF)  3 mL Intracatheter Q8H     tadalafil  40 mg Oral Daily     " [START ON 6/18/2018] treprostinil diolamine ER  4 mg Oral BID w/meals     treprostinil diolamine ER  6 mg Oral BID w/meals       Labs  CMP  Recent Labs  Lab 06/17/18 0614 06/16/18  0615 06/15/18  1530    143 140   POTASSIUM 4.1 3.9 3.5   CHLORIDE 109 112* 106   CO2 24 23 24   ANIONGAP 11 9 10   GLC 90 87 151*   BUN 31* 21 22   CR 1.50* 1.47* 1.60*   GFRESTIMATED 36* 37* 33*   GFRESTBLACK 43* 44* 40*   ISHMAEL 9.1 9.0 8.8   MAG 2.7* 2.2  --      CBC  Recent Labs  Lab 06/17/18 0614 06/16/18  0615 06/15/18  1530   WBC 3.3* 2.7* 3.5*   RBC 4.30 4.07 4.11   HGB 13.5 12.6 12.9   HCT 40.6 38.9 38.9   MCV 94 96 95   MCH 31.4 31.0 31.4   MCHC 33.3 32.4 33.2   RDW 13.5 13.7 13.5    135* 151     INRNo lab results found in last 7 days.  Arterial Blood GasNo lab results found in last 7 days.        Assessment/Plan     Stable titration of medication, parenteral iron replaced.

## 2018-06-17 NOTE — PLAN OF CARE
Problem: Patient Care Overview  Goal: Plan of Care/Patient Progress Review  Observation status document given to and discussed with pt. Pt verbalized understanding and stated that she will call her insurance company to find out about her coverage.

## 2018-06-18 LAB
ANION GAP SERPL CALCULATED.3IONS-SCNC: 11 MMOL/L (ref 3–14)
BASOPHILS # BLD AUTO: 0 10E9/L (ref 0–0.2)
BASOPHILS NFR BLD AUTO: 0.9 %
BUN SERPL-MCNC: 26 MG/DL (ref 7–30)
CALCIUM SERPL-MCNC: 8.8 MG/DL (ref 8.5–10.1)
CHLORIDE SERPL-SCNC: 110 MMOL/L (ref 94–109)
CO2 SERPL-SCNC: 20 MMOL/L (ref 20–32)
CREAT SERPL-MCNC: 1.31 MG/DL (ref 0.52–1.04)
DIFFERENTIAL METHOD BLD: ABNORMAL
EOSINOPHIL # BLD AUTO: 0.1 10E9/L (ref 0–0.7)
EOSINOPHIL NFR BLD AUTO: 4.1 %
ERYTHROCYTE [DISTWIDTH] IN BLOOD BY AUTOMATED COUNT: 13.7 % (ref 10–15)
GFR SERPL CREATININE-BSD FRML MDRD: 42 ML/MIN/1.7M2
GLUCOSE SERPL-MCNC: 78 MG/DL (ref 70–99)
HCT VFR BLD AUTO: 40.4 % (ref 35–47)
HGB BLD-MCNC: 13.2 G/DL (ref 11.7–15.7)
IMM GRANULOCYTES # BLD: 0 10E9/L (ref 0–0.4)
IMM GRANULOCYTES NFR BLD: 0 %
LYMPHOCYTES # BLD AUTO: 1 10E9/L (ref 0.8–5.3)
LYMPHOCYTES NFR BLD AUTO: 32.3 %
MAGNESIUM SERPL-MCNC: 2.3 MG/DL (ref 1.6–2.3)
MCH RBC QN AUTO: 31.3 PG (ref 26.5–33)
MCHC RBC AUTO-ENTMCNC: 32.7 G/DL (ref 31.5–36.5)
MCV RBC AUTO: 96 FL (ref 78–100)
MONOCYTES # BLD AUTO: 0.2 10E9/L (ref 0–1.3)
MONOCYTES NFR BLD AUTO: 6.6 %
NEUTROPHILS # BLD AUTO: 1.8 10E9/L (ref 1.6–8.3)
NEUTROPHILS NFR BLD AUTO: 56.1 %
NRBC # BLD AUTO: 0 10*3/UL
NRBC BLD AUTO-RTO: 0 /100
PLATELET # BLD AUTO: 161 10E9/L (ref 150–450)
POTASSIUM SERPL-SCNC: 3.8 MMOL/L (ref 3.4–5.3)
RBC # BLD AUTO: 4.22 10E12/L (ref 3.8–5.2)
SODIUM SERPL-SCNC: 142 MMOL/L (ref 133–144)
WBC # BLD AUTO: 3.2 10E9/L (ref 4–11)

## 2018-06-18 PROCEDURE — A9270 NON-COVERED ITEM OR SERVICE: HCPCS | Mod: GY | Performed by: INTERNAL MEDICINE

## 2018-06-18 PROCEDURE — 83735 ASSAY OF MAGNESIUM: CPT | Performed by: INTERNAL MEDICINE

## 2018-06-18 PROCEDURE — 21400006 ZZH R&B CCU INTERMEDIATE UMMC

## 2018-06-18 PROCEDURE — 80048 BASIC METABOLIC PNL TOTAL CA: CPT | Performed by: INTERNAL MEDICINE

## 2018-06-18 PROCEDURE — 99233 SBSQ HOSP IP/OBS HIGH 50: CPT | Mod: GC | Performed by: INTERNAL MEDICINE

## 2018-06-18 PROCEDURE — 25000132 ZZH RX MED GY IP 250 OP 250 PS 637: Mod: GY | Performed by: INTERNAL MEDICINE

## 2018-06-18 PROCEDURE — 25000132 ZZH RX MED GY IP 250 OP 250 PS 637: Mod: GY | Performed by: STUDENT IN AN ORGANIZED HEALTH CARE EDUCATION/TRAINING PROGRAM

## 2018-06-18 PROCEDURE — 85025 COMPLETE CBC W/AUTO DIFF WBC: CPT | Performed by: INTERNAL MEDICINE

## 2018-06-18 PROCEDURE — A9270 NON-COVERED ITEM OR SERVICE: HCPCS | Mod: GY | Performed by: STUDENT IN AN ORGANIZED HEALTH CARE EDUCATION/TRAINING PROGRAM

## 2018-06-18 PROCEDURE — 25000131 ZZH RX MED GY IP 250 OP 636 PS 637: Mod: GY | Performed by: INTERNAL MEDICINE

## 2018-06-18 PROCEDURE — 36415 COLL VENOUS BLD VENIPUNCTURE: CPT | Performed by: INTERNAL MEDICINE

## 2018-06-18 RX ADMIN — TREPROSTINIL 4 MG: 2.5 TABLET, EXTENDED RELEASE ORAL at 18:11

## 2018-06-18 RX ADMIN — LORAZEPAM 0.5 MG: 0.5 TABLET ORAL at 08:17

## 2018-06-18 RX ADMIN — BUPROPION HYDROCHLORIDE 200 MG: 200 TABLET, FILM COATED, EXTENDED RELEASE ORAL at 08:29

## 2018-06-18 RX ADMIN — ACETAMINOPHEN 975 MG: 325 TABLET, FILM COATED ORAL at 20:17

## 2018-06-18 RX ADMIN — SELEXIPAG 800 MCG: 800 TABLET, COATED ORAL at 08:19

## 2018-06-18 RX ADMIN — SODIUM BICARBONATE 325 MG: 325 TABLET ORAL at 08:16

## 2018-06-18 RX ADMIN — TADALAFIL 40 MG: 20 TABLET, FILM COATED ORAL at 08:16

## 2018-06-18 RX ADMIN — LORAZEPAM 1 MG: 1 TABLET ORAL at 22:08

## 2018-06-18 RX ADMIN — ALUMINUM HYDROXIDE, MAGNESIUM HYDROXIDE, AND DIMETHICONE 30 ML: 400; 400; 40 SUSPENSION ORAL at 08:30

## 2018-06-18 RX ADMIN — SELEXIPAG 800 MCG: 800 TABLET, COATED ORAL at 20:18

## 2018-06-18 RX ADMIN — AZATHIOPRINE 50 MG: 50 TABLET ORAL at 08:17

## 2018-06-18 RX ADMIN — BUSPIRONE HYDROCHLORIDE 15 MG: 15 TABLET ORAL at 20:18

## 2018-06-18 RX ADMIN — FUROSEMIDE 40 MG: 40 TABLET ORAL at 08:17

## 2018-06-18 RX ADMIN — ACETAMINOPHEN 975 MG: 325 TABLET, FILM COATED ORAL at 08:17

## 2018-06-18 RX ADMIN — CARVEDILOL 6.25 MG: 6.25 TABLET, FILM COATED ORAL at 08:17

## 2018-06-18 RX ADMIN — POTASSIUM CHLORIDE 10 MEQ: 750 TABLET, EXTENDED RELEASE ORAL at 08:17

## 2018-06-18 RX ADMIN — TREPROSTINIL 4 MG: 2.5 TABLET, EXTENDED RELEASE ORAL at 08:18

## 2018-06-18 RX ADMIN — BUSPIRONE HYDROCHLORIDE 15 MG: 15 TABLET ORAL at 08:17

## 2018-06-18 RX ADMIN — CALCITRIOL 0.25 MCG: 0.25 CAPSULE, LIQUID FILLED ORAL at 08:17

## 2018-06-18 RX ADMIN — RANITIDINE 150 MG: 150 TABLET, FILM COATED ORAL at 17:26

## 2018-06-18 RX ADMIN — SIMVASTATIN 10 MG: 10 TABLET, FILM COATED ORAL at 22:08

## 2018-06-18 RX ADMIN — MACITENTAN 10 MG: 10 TABLET, FILM COATED ORAL at 08:29

## 2018-06-18 RX ADMIN — SODIUM BICARBONATE 325 MG: 325 TABLET ORAL at 20:17

## 2018-06-18 RX ADMIN — ACETAMINOPHEN 975 MG: 325 TABLET, FILM COATED ORAL at 14:48

## 2018-06-18 RX ADMIN — CARVEDILOL 6.25 MG: 6.25 TABLET, FILM COATED ORAL at 18:11

## 2018-06-18 ASSESSMENT — PAIN DESCRIPTION - DESCRIPTORS: DESCRIPTORS: ACHING

## 2018-06-18 NOTE — PLAN OF CARE
Problem: Patient Care Overview  Goal: Plan of Care/Patient Progress Review  Outcome: No Change  D: Pt with hx sarcoidosis, pHTN admitted 6/15 for med transition from PO treprostenil to selexipag.   I/A: SB/SR on monitor 50's-70's at rest. Deepti tylenol for headache. Pt denies nausea, indigestion this shift. Enjoyed visitors before HS. Some JUAN (baseline, per pt). Pt denies SOB at rest- sats 92-94% on RA.   P: Continue med titration per orders. Monitor and assess pt condition and contact treatment team with questions or concerns.

## 2018-06-18 NOTE — PLAN OF CARE
"Problem: Patient Care Overview  Goal: Plan of Care/Patient Progress Review  Outcome: No Change  Alert and oriented x 4. Vital signs stable.sinus rhythm. On room air. Good oral intake.ambulates independently. Patient reported that she was trying to get her purse out from the closet and  a wooden board slide down to her right foot. No bleeding, skin intact. CMS intact.  Declined pain medications \"I am ok\" . Applied cold pack . Resting in bed. Call light within reach. Plan:Continue care.      "

## 2018-06-18 NOTE — PROGRESS NOTES
Cardiology Progress Note  Judith Nelson MRN: [id number]  Age: 58 year old, : 1960  Date: 2018              Subjective     No changes in breathing and JUAN since changing meds. She walks around the halls. Headache improved. Some nausea and gerd         Objective     B/P: 102/65, T: 98.2, P: Data Unavailable, R: 18    Intake/Output Summary (Last 24 hours) at 18 1249  Last data filed at 18 1159   Gross per 24 hour   Intake              960 ml   Output             2485 ml   Net            -1525 ml     Vitals:    18 0600 18 0601 18 0555   Weight: 51.3 kg (113 lb) 51.3 kg (113 lb) 51.3 kg (113 lb 3.2 oz)     Gen: AA&Ox3, NAD  PULM/THORAX: Clear to auscultation bilaterally, no rales/rhonchi/wheezes  CV:RRR, S1 and S2 appreciated, no extra heart sounds, murmurs or rub auscultated  ABD: soft, nontender, nondistended.   EXT: Trace LE edema, no clubbing or cyanosis. No asymmetrical edema or tenderness to palpation in calves bilaterally.  NEURO: No focal deficits.             Data:     LABS reviewed and pertinent for:       Lab Results   Component Value Date     2018    Lab Results   Component Value Date    CHLORIDE 112 2018    Lab Results   Component Value Date    BUN 21 2018      Lab Results   Component Value Date    POTASSIUM 3.9 2018    Lab Results   Component Value Date    CO2 23 2018    Lab Results   Component Value Date    CR 1.47 2018        Lab Results   Component Value Date    WBC 3.2 (L) 2018    HGB 13.2 2018    HCT 40.4 2018    MCV 96 2018     2018                Assessment and Plan:     Judith Nelson is 58 year old female with a PMHx of WHO I PAH, sarcoidosis, CKD and anxiety/depression who presents for conversion of treprostinil to selexipag.     Today:   No other changes. Tomorrow will need to readjust doses of Orenitream to Uptravi    # PAH  # Sarcoidosis  with possible sarcoidosis pulmonary hypertension and renal involvement.   RHC April '18 showing markedly elevated PA pressure of 60 mmHg c/w with severe PAH.   -transition from Orenitream to Uptravi      Uptravi (selesipag) twice daily  Orenitram (treprostinil) twice daily    Start  200 mcg 7 mg   6/17 400 mcg 6 mg   6/18 800 mcg 4 mg   6/19         -continue PTA Opsumit and Tadalafil   -continue PTA lasix  -continue PTA imuran      # CKD  Baseline Cr 1.5, at 1.5 on admit.   -continue PTA sodium bicarb and Ca   -daily BMP     # Anxiety/Depression   -continue PTA Wellbutrin, Buspar and Lorazepam       # HLD  -continue PTA simvastatin     # Neutropenia  Has been low in past, however trending down since admit. Per pharmacy, unlikely SE of selesipag. Imuran possibly contributing. Will monitor for now, consider heme/onc consult if worsens.   -daily CBC with differential       # GERD  -ranitidine QPM with dinner     FEN: Regular diet   PPX: Ambulatory      Code Status: FULL CODE      Patient discussed with staff attending, Dr. Franco.  Please feel free to page with questions.     Jordyn Menendez MD  Internal Medicine PGY2  Pager: [phone]           Faculty Attestation  Norm Franco M.D.    I personally saw and examined this patient, reviewed recent laboratories and imaging studies, discussed the case with the housestaff, and conveyed plan to the patient.  I answered all questions from patient and/or family. I agree with the examination, assessment and plan outlined here.

## 2018-06-19 LAB
ANION GAP SERPL CALCULATED.3IONS-SCNC: 9 MMOL/L (ref 3–14)
BASOPHILS # BLD AUTO: 0 10E9/L (ref 0–0.2)
BASOPHILS NFR BLD AUTO: 0.9 %
BUN SERPL-MCNC: 27 MG/DL (ref 7–30)
CALCIUM SERPL-MCNC: 8.9 MG/DL (ref 8.5–10.1)
CHLORIDE SERPL-SCNC: 109 MMOL/L (ref 94–109)
CO2 SERPL-SCNC: 24 MMOL/L (ref 20–32)
CREAT SERPL-MCNC: 1.52 MG/DL (ref 0.52–1.04)
DIFFERENTIAL METHOD BLD: ABNORMAL
EOSINOPHIL # BLD AUTO: 0.1 10E9/L (ref 0–0.7)
EOSINOPHIL NFR BLD AUTO: 2.6 %
ERYTHROCYTE [DISTWIDTH] IN BLOOD BY AUTOMATED COUNT: 13.5 % (ref 10–15)
GFR SERPL CREATININE-BSD FRML MDRD: 35 ML/MIN/1.7M2
GLUCOSE SERPL-MCNC: 81 MG/DL (ref 70–99)
HCT VFR BLD AUTO: 42 % (ref 35–47)
HGB BLD-MCNC: 14.2 G/DL (ref 11.7–15.7)
IMM GRANULOCYTES # BLD: 0 10E9/L (ref 0–0.4)
IMM GRANULOCYTES NFR BLD: 0.3 %
LYMPHOCYTES # BLD AUTO: 1.1 10E9/L (ref 0.8–5.3)
LYMPHOCYTES NFR BLD AUTO: 32.1 %
MAGNESIUM SERPL-MCNC: 2.6 MG/DL (ref 1.6–2.3)
MCH RBC QN AUTO: 31.6 PG (ref 26.5–33)
MCHC RBC AUTO-ENTMCNC: 33.8 G/DL (ref 31.5–36.5)
MCV RBC AUTO: 94 FL (ref 78–100)
MONOCYTES # BLD AUTO: 0.3 10E9/L (ref 0–1.3)
MONOCYTES NFR BLD AUTO: 8.2 %
NEUTROPHILS # BLD AUTO: 1.9 10E9/L (ref 1.6–8.3)
NEUTROPHILS NFR BLD AUTO: 55.9 %
NRBC # BLD AUTO: 0 10*3/UL
NRBC BLD AUTO-RTO: 0 /100
PLATELET # BLD AUTO: 154 10E9/L (ref 150–450)
POTASSIUM SERPL-SCNC: 4.3 MMOL/L (ref 3.4–5.3)
RBC # BLD AUTO: 4.49 10E12/L (ref 3.8–5.2)
SODIUM SERPL-SCNC: 142 MMOL/L (ref 133–144)
WBC # BLD AUTO: 3.4 10E9/L (ref 4–11)

## 2018-06-19 PROCEDURE — 99233 SBSQ HOSP IP/OBS HIGH 50: CPT | Mod: GC | Performed by: INTERNAL MEDICINE

## 2018-06-19 PROCEDURE — 83735 ASSAY OF MAGNESIUM: CPT | Performed by: INTERNAL MEDICINE

## 2018-06-19 PROCEDURE — 25000132 ZZH RX MED GY IP 250 OP 250 PS 637: Mod: GY | Performed by: INTERNAL MEDICINE

## 2018-06-19 PROCEDURE — A9270 NON-COVERED ITEM OR SERVICE: HCPCS | Mod: GY | Performed by: INTERNAL MEDICINE

## 2018-06-19 PROCEDURE — 36415 COLL VENOUS BLD VENIPUNCTURE: CPT | Performed by: INTERNAL MEDICINE

## 2018-06-19 PROCEDURE — 80048 BASIC METABOLIC PNL TOTAL CA: CPT | Performed by: INTERNAL MEDICINE

## 2018-06-19 PROCEDURE — 85025 COMPLETE CBC W/AUTO DIFF WBC: CPT | Performed by: INTERNAL MEDICINE

## 2018-06-19 PROCEDURE — 25000131 ZZH RX MED GY IP 250 OP 636 PS 637: Mod: GY | Performed by: INTERNAL MEDICINE

## 2018-06-19 PROCEDURE — 21400006 ZZH R&B CCU INTERMEDIATE UMMC

## 2018-06-19 RX ADMIN — POTASSIUM CHLORIDE 10 MEQ: 750 TABLET, EXTENDED RELEASE ORAL at 08:59

## 2018-06-19 RX ADMIN — LORAZEPAM 0.5 MG: 0.5 TABLET ORAL at 09:01

## 2018-06-19 RX ADMIN — BUPROPION HYDROCHLORIDE 200 MG: 200 TABLET, FILM COATED, EXTENDED RELEASE ORAL at 08:59

## 2018-06-19 RX ADMIN — RANITIDINE 150 MG: 150 TABLET, FILM COATED ORAL at 17:49

## 2018-06-19 RX ADMIN — SODIUM BICARBONATE 325 MG: 325 TABLET ORAL at 19:37

## 2018-06-19 RX ADMIN — CALCITRIOL 0.25 MCG: 0.25 CAPSULE, LIQUID FILLED ORAL at 09:00

## 2018-06-19 RX ADMIN — SODIUM BICARBONATE 325 MG: 325 TABLET ORAL at 08:59

## 2018-06-19 RX ADMIN — BUSPIRONE HYDROCHLORIDE 15 MG: 15 TABLET ORAL at 09:01

## 2018-06-19 RX ADMIN — TADALAFIL 40 MG: 20 TABLET, FILM COATED ORAL at 08:59

## 2018-06-19 RX ADMIN — LORAZEPAM 1 MG: 1 TABLET ORAL at 21:30

## 2018-06-19 RX ADMIN — CARVEDILOL 6.25 MG: 6.25 TABLET, FILM COATED ORAL at 09:01

## 2018-06-19 RX ADMIN — CARVEDILOL 6.25 MG: 6.25 TABLET, FILM COATED ORAL at 17:49

## 2018-06-19 RX ADMIN — ACETAMINOPHEN 975 MG: 325 TABLET, FILM COATED ORAL at 08:59

## 2018-06-19 RX ADMIN — MACITENTAN 10 MG: 10 TABLET, FILM COATED ORAL at 09:01

## 2018-06-19 RX ADMIN — ACETAMINOPHEN 975 MG: 325 TABLET, FILM COATED ORAL at 19:37

## 2018-06-19 RX ADMIN — AZATHIOPRINE 50 MG: 50 TABLET ORAL at 09:01

## 2018-06-19 RX ADMIN — TREPROSTINIL 3 MG: 1 TABLET, EXTENDED RELEASE ORAL at 17:49

## 2018-06-19 RX ADMIN — SIMVASTATIN 10 MG: 10 TABLET, FILM COATED ORAL at 21:30

## 2018-06-19 RX ADMIN — TREPROSTINIL 3 MG: 1 TABLET, EXTENDED RELEASE ORAL at 08:59

## 2018-06-19 RX ADMIN — SELEXIPAG 1000 MCG: 800 TABLET, COATED ORAL at 08:58

## 2018-06-19 RX ADMIN — SELEXIPAG 1000 MCG: 800 TABLET, COATED ORAL at 19:37

## 2018-06-19 RX ADMIN — BUSPIRONE HYDROCHLORIDE 15 MG: 15 TABLET ORAL at 19:37

## 2018-06-19 RX ADMIN — FUROSEMIDE 20 MG: 20 TABLET ORAL at 09:02

## 2018-06-19 RX ADMIN — ACETAMINOPHEN 975 MG: 325 TABLET, FILM COATED ORAL at 15:59

## 2018-06-19 NOTE — PROGRESS NOTES
Care Coordinator Progress Note    Admission Date/Time:  6/15/2018  Attending MD:  Norm Franco  Data  Chart reviewed, discussed with interdisciplinary team.   Patient with h/o WHO I PAH, sarcoidosis, CKD and anxiety/depression; admitted for conversion of treprostinil to selexipag.     Concerns with insurance coverage for discharge needs: None.  Current Living Situation: Patient said that she lives with her 19 yr old daughter, Saritha.   Support System: Supportive and Involved daughter.   Services Involved: [facility] [place] [facility].   Transportation at Discharge: Pt said that her sister-in-law, Bessie will provide pt with a ride home upon discharge.   Transportation to Medical Appointments: family to provide.   Barriers to Discharge: completion of transition from Treprostinil to Selexipag.     Coordination of Care and Referrals: Provided patient/family with options for resumption of home care.    Assessment     Pt said that she is already set up for home care with [facility] for RN visits every 2 weeks for assess and med set up. Pt said that she wants to resume their services.      Per Reji Escobar's note, pt has prior auth for home Selexipag. Pt has already had a home delivery of Selexipag 800 mcg and 200 mcg tablets from [facility].   Intervention:   Arrangements made with [facility] (Ph: [phone] Fax: [phone]) for RN eval post hospitalization, assess vital signs, respiratory and cardiac status, activity tolerance, hydration, nutritional status, med set up and management.    Plan  Anticipated Discharge Date:  TBD  Anticipated Discharge Plan:  Discharge to home with home care.     ANALI BRASWELL RN BSN  Care Coordinator Unit   [phone]

## 2018-06-19 NOTE — PLAN OF CARE
Problem: Patient Care Overview  Goal: Plan of Care/Patient Progress Review  Outcome: No Change  D: Pt with hx sarcoidosis, pHTN admitted 6/15 for med transition from PO treprostenil to selexipag.   I/A: SB/SR on monitor 50's-70's at rest. Pt denies nausea, indigestion this shift. Some JUAN (baseline, per pt). Pt denies SOB at rest- sats 92-94% on RA. Pt reported nose bleed this AM, resolved by holding pressure.   P: Continue med titration per orders. Monitor and assess pt condition and contact treatment team with questions or concerns.

## 2018-06-19 NOTE — PROGRESS NOTES
"         Subjective     No changes in breathing and JUAN since changing meds. Doing well and no new sx since last seen         Objective     /66 (BP Location: Right arm)  Temp 97.9  F (36.6  C) (Oral)  Resp 16  Ht 1.6 m (5' 3\")  Wt 50.8 kg (112 lb)  SpO2 92%  BMI 19.84 kg/m2   Gross per 24 hour   Intake              960 ml   Output             2485 ml   Net            -1525 ml     Vitals:    06/17/18 0601 06/18/18 0555 06/19/18 0600   Weight: 51.3 kg (113 lb) 51.3 kg (113 lb 3.2 oz) 50.8 kg (112 lb)     Gen: AA&Ox3, NAD  PULM/THORAX: Clear to auscultation bilaterally  CV:RRR  ABD: soft, nontender, nondistended  EXT: no edema  NEURO: No focal deficits.             Data:   Labs and images reviewed         Assessment and Plan:     Judith Nelson is 58 year old female with a PMHx of WHO I PAH, sarcoidosis, CKD and anxiety/depression who presents for conversion of treprostinil to selexipag.     Today:   - Titration of Orenitream to Uptravi daily  - Friday BNP, troponin, echo  - Likely d/c on Friday    # PAH  # Sarcoidosis with possible sarcoidosis pulmonary hypertension and renal involvement.   RHC April '18 showing markedly elevated PA pressure of 60 mmHg c/w with severe PAH.   -transition from Orenitream to Uptravi        Uptravi (selexipag) twice daily  Orenitram (treprostinil) twice daily    Start 200 mcg 7 mg   6/17 400 mcg 6 mg   6/18 800 mcg 4 mg   6/19 1000 mcg 3 mg   6/20 1000 mcg 2mg   6/21 1200 mcg Stop Orenitram       -continue PTA Opsumit and Tadalafil   -continue PTA lasix  -continue PTA imuran      # CKD  Baseline Cr 1.5, at 1.5 on admit.   -continue PTA sodium bicarb and Ca   -daily BMP     # Anxiety/Depression   -continue PTA Wellbutrin, Buspar and Lorazepam       # HLD  -continue PTA simvastatin     # Neutropenia  Has been low in past, however trending down since admit. Per pharmacy, unlikely SE of selesipag. Imuran possibly contributing. Will monitor for now, consider heme/onc consult if " worsens.   -daily CBC with differential       # GERD  -ranitidine QPM with dinner     FEN: Regular diet   PPX: Ambulatory      Code Status: FULL CODE      Patient discussed with staff attending, Dr. Franco.  Please feel free to page with questions.     Jordyn Menendez MD  Internal Medicine PGY2  Pager: [phone]           I personally provided care for this patient, reviewed chart, discussed course with patient, housestaff and consulting physicians.  I answered all questions.    Norm Franco M.D.  Division of Cardiology  Department of Medicine

## 2018-06-19 NOTE — PLAN OF CARE
Problem: Patient Care Overview  Goal: Plan of Care/Patient Progress Review  RN  1. Pt will tolerate transition from treprostinil to selexipag  2> pt will be hemodynamically stable  D-Pt with sarcoidosis and pulmonary hypertension admitted on 06/17/18 to transition from treprostinil to selexipag. Has had some headaches with transition. Denies pain this evening. Tearful. Verbalizes outside stressors involving ex  and son.  I-Scheduled tylenol. Spent time talking with pt about avoiding stressors from outside while in the hospital. Suggested not answering calls from ex  and focusing on optimizing her health. Offered essential oil inhalers, calm and lavender.  A-Appears to be tolerating medication transition at this time. Expressed appreciation for listening to her frustrations with family.  P-Continue with current poc. Offer support.

## 2018-06-20 LAB
ANION GAP SERPL CALCULATED.3IONS-SCNC: 10 MMOL/L (ref 3–14)
BASOPHILS # BLD AUTO: 0 10E9/L (ref 0–0.2)
BASOPHILS NFR BLD AUTO: 0.6 %
BUN SERPL-MCNC: 28 MG/DL (ref 7–30)
CALCIUM SERPL-MCNC: 9.3 MG/DL (ref 8.5–10.1)
CHLORIDE SERPL-SCNC: 108 MMOL/L (ref 94–109)
CO2 SERPL-SCNC: 22 MMOL/L (ref 20–32)
CREAT SERPL-MCNC: 1.5 MG/DL (ref 0.52–1.04)
DIFFERENTIAL METHOD BLD: ABNORMAL
EOSINOPHIL # BLD AUTO: 0.1 10E9/L (ref 0–0.7)
EOSINOPHIL NFR BLD AUTO: 3 %
ERYTHROCYTE [DISTWIDTH] IN BLOOD BY AUTOMATED COUNT: 13.5 % (ref 10–15)
GFR SERPL CREATININE-BSD FRML MDRD: 36 ML/MIN/1.7M2
GLUCOSE SERPL-MCNC: 82 MG/DL (ref 70–99)
HCT VFR BLD AUTO: 42.9 % (ref 35–47)
HGB BLD-MCNC: 14.2 G/DL (ref 11.7–15.7)
IMM GRANULOCYTES # BLD: 0 10E9/L (ref 0–0.4)
IMM GRANULOCYTES NFR BLD: 0 %
LYMPHOCYTES # BLD AUTO: 0.9 10E9/L (ref 0.8–5.3)
LYMPHOCYTES NFR BLD AUTO: 28.2 %
MAGNESIUM SERPL-MCNC: 2.5 MG/DL (ref 1.6–2.3)
MCH RBC QN AUTO: 31.3 PG (ref 26.5–33)
MCHC RBC AUTO-ENTMCNC: 33.1 G/DL (ref 31.5–36.5)
MCV RBC AUTO: 95 FL (ref 78–100)
MONOCYTES # BLD AUTO: 0.3 10E9/L (ref 0–1.3)
MONOCYTES NFR BLD AUTO: 8.8 %
NEUTROPHILS # BLD AUTO: 2 10E9/L (ref 1.6–8.3)
NEUTROPHILS NFR BLD AUTO: 59.4 %
NRBC # BLD AUTO: 0 10*3/UL
NRBC BLD AUTO-RTO: 0 /100
PLATELET # BLD AUTO: 156 10E9/L (ref 150–450)
POTASSIUM SERPL-SCNC: 4.1 MMOL/L (ref 3.4–5.3)
RBC # BLD AUTO: 4.53 10E12/L (ref 3.8–5.2)
SODIUM SERPL-SCNC: 141 MMOL/L (ref 133–144)
WBC # BLD AUTO: 3.3 10E9/L (ref 4–11)

## 2018-06-20 PROCEDURE — 25000132 ZZH RX MED GY IP 250 OP 250 PS 637: Mod: GY | Performed by: INTERNAL MEDICINE

## 2018-06-20 PROCEDURE — 25000131 ZZH RX MED GY IP 250 OP 636 PS 637: Mod: GY | Performed by: INTERNAL MEDICINE

## 2018-06-20 PROCEDURE — 99233 SBSQ HOSP IP/OBS HIGH 50: CPT | Mod: GC | Performed by: INTERNAL MEDICINE

## 2018-06-20 PROCEDURE — A9270 NON-COVERED ITEM OR SERVICE: HCPCS | Mod: GY | Performed by: INTERNAL MEDICINE

## 2018-06-20 PROCEDURE — 85025 COMPLETE CBC W/AUTO DIFF WBC: CPT | Performed by: INTERNAL MEDICINE

## 2018-06-20 PROCEDURE — 83735 ASSAY OF MAGNESIUM: CPT | Performed by: INTERNAL MEDICINE

## 2018-06-20 PROCEDURE — 36415 COLL VENOUS BLD VENIPUNCTURE: CPT | Performed by: INTERNAL MEDICINE

## 2018-06-20 PROCEDURE — 80048 BASIC METABOLIC PNL TOTAL CA: CPT | Performed by: INTERNAL MEDICINE

## 2018-06-20 PROCEDURE — 21400006 ZZH R&B CCU INTERMEDIATE UMMC

## 2018-06-20 RX ORDER — DIPHENHYDRAMINE HYDROCHLORIDE AND LIDOCAINE HYDROCHLORIDE AND ALUMINUM HYDROXIDE AND MAGNESIUM HYDRO
10 KIT EVERY 6 HOURS PRN
Status: DISCONTINUED | OUTPATIENT
Start: 2018-06-20 | End: 2018-06-21 | Stop reason: HOSPADM

## 2018-06-20 RX ADMIN — MACITENTAN 10 MG: 10 TABLET, FILM COATED ORAL at 08:18

## 2018-06-20 RX ADMIN — SELEXIPAG 1000 MCG: 800 TABLET, COATED ORAL at 08:32

## 2018-06-20 RX ADMIN — TREPROSTINIL 2 MG: 1 TABLET, EXTENDED RELEASE ORAL at 17:39

## 2018-06-20 RX ADMIN — ACETAMINOPHEN 975 MG: 325 TABLET, FILM COATED ORAL at 20:14

## 2018-06-20 RX ADMIN — FUROSEMIDE 40 MG: 40 TABLET ORAL at 08:22

## 2018-06-20 RX ADMIN — ACETAMINOPHEN 975 MG: 325 TABLET, FILM COATED ORAL at 11:32

## 2018-06-20 RX ADMIN — SODIUM BICARBONATE 325 MG: 325 TABLET ORAL at 20:15

## 2018-06-20 RX ADMIN — RANITIDINE 150 MG: 150 TABLET, FILM COATED ORAL at 17:39

## 2018-06-20 RX ADMIN — ACETAMINOPHEN 975 MG: 325 TABLET, FILM COATED ORAL at 15:35

## 2018-06-20 RX ADMIN — CALCITRIOL 0.25 MCG: 0.25 CAPSULE, LIQUID FILLED ORAL at 08:22

## 2018-06-20 RX ADMIN — BUSPIRONE HYDROCHLORIDE 15 MG: 15 TABLET ORAL at 20:16

## 2018-06-20 RX ADMIN — LORAZEPAM 0.5 MG: 0.5 TABLET ORAL at 08:18

## 2018-06-20 RX ADMIN — SODIUM BICARBONATE 325 MG: 325 TABLET ORAL at 08:18

## 2018-06-20 RX ADMIN — SELEXIPAG 1000 MCG: 800 TABLET, COATED ORAL at 20:15

## 2018-06-20 RX ADMIN — BUPROPION HYDROCHLORIDE 200 MG: 200 TABLET, FILM COATED, EXTENDED RELEASE ORAL at 08:19

## 2018-06-20 RX ADMIN — TADALAFIL 40 MG: 20 TABLET, FILM COATED ORAL at 08:18

## 2018-06-20 RX ADMIN — LORAZEPAM 1 MG: 1 TABLET ORAL at 21:32

## 2018-06-20 RX ADMIN — CARVEDILOL 6.25 MG: 6.25 TABLET, FILM COATED ORAL at 08:19

## 2018-06-20 RX ADMIN — BUSPIRONE HYDROCHLORIDE 15 MG: 15 TABLET ORAL at 08:22

## 2018-06-20 RX ADMIN — CARVEDILOL 6.25 MG: 6.25 TABLET, FILM COATED ORAL at 17:39

## 2018-06-20 RX ADMIN — TREPROSTINIL 2 MG: 1 TABLET, EXTENDED RELEASE ORAL at 08:32

## 2018-06-20 RX ADMIN — POTASSIUM CHLORIDE 10 MEQ: 750 TABLET, EXTENDED RELEASE ORAL at 08:22

## 2018-06-20 RX ADMIN — SIMVASTATIN 10 MG: 10 TABLET, FILM COATED ORAL at 21:33

## 2018-06-20 RX ADMIN — AZATHIOPRINE 50 MG: 50 TABLET ORAL at 08:19

## 2018-06-20 ASSESSMENT — PAIN DESCRIPTION - DESCRIPTORS
DESCRIPTORS: HEADACHE
DESCRIPTORS: SORE
DESCRIPTORS: SORE

## 2018-06-20 NOTE — PLAN OF CARE
Problem: Patient Care Overview  Goal: Plan of Care/Patient Progress Review  RN  1. Pt will tolerate transition from treprostinil to selexipag  2 Pt will be hemodynamically stable   Outcome: Improving  D/A/I:  Patient A&O x4, denied palpitations, dizziness, and nausea.  Reported JUAN, lung sounds clear to auscultation.  Little to no edema noted in legs and feet, patient stated she has noticed an improvement.  Had good urine output after scheduled furosemide, see I&O flowsheet.  In sinus rhythm with HR 60s-70s, SBP 100s-110s, SaO2 92-95% on room air.  Reported right upper toothache that was managed with scheduled acetaminophen.  Ambulated independently with steady gait.    P:  Continue to monitor pain, VS, heart rhythm, fluid status, cardiac and respiratory status.  Notify care team of changes in patient condition or other concerns.  Expected to have echocardiogram 6/22 with potential discharge afterward pending acceptable results.

## 2018-06-20 NOTE — PROGRESS NOTES
"         Subjective     Doing well. No concerns this am.          Objective     BP 91/45 (BP Location: Left arm)  Temp 97.9  F (36.6  C) (Oral)  Resp 14  Ht 1.6 m (5' 3\")  Wt 50.9 kg (112 lb 3.2 oz)  SpO2 92%  BMI 19.88 kg/m2   Gross per 24 hour   Intake              960 ml   Output             2485 ml   Net            -1525 ml     Vitals:    06/18/18 0555 06/19/18 0600 06/20/18 0454   Weight: 51.3 kg (113 lb 3.2 oz) 50.8 kg (112 lb) 50.9 kg (112 lb 3.2 oz)     Gen: AA&Ox3, NAD  PULM/THORAX: Clear to auscultation bilaterally  CV:RRR  ABD: soft, nontender, nondistended  EXT: no edema  NEURO: No focal deficits.             Data:   Labs and images reviewed         Assessment and Plan:     Judith Nelson is 58 year old female with a PMHx of WHO I PAH, sarcoidosis, CKD and anxiety/depression who presents for conversion of treprostinil to selexipag.     Today:   - Titration of Orenitream to Uptravi daily  - Friday BNP, troponin, echo  - Likely d/c on Friday    # PAH  # Sarcoidosis with possible sarcoidosis pulmonary hypertension and renal involvement.   RHC April '18 showing markedly elevated PA pressure of 60 mmHg c/w with severe PAH.   -transition from Orenitream to Uptravi        Uptravi (selexipag) twice daily  Orenitram (treprostinil) twice daily    Start 200 mcg 7 mg   6/17 400 mcg 6 mg   6/18 800 mcg 4 mg   6/19 1000 mcg 3 mg   6/20 1000 mcg 2mg   6/21 1200 mcg Stop Orenitram       -continue PTA Opsumit and Tadalafil   -continue PTA lasix  -continue PTA imuran      # CKD  Baseline Cr 1.1-1.5, at 1.5 on admit.   -continue PTA sodium bicarb and Ca   -daily BMP     # Anxiety/Depression   -continue PTA Wellbutrin, Buspar and Lorazepam       # HLD  -continue PTA simvastatin     # Neutropenia  Has been low in past, however trending down since admit. Per pharmacy, unlikely SE of selesipag. Imuran possibly contributing. Will monitor for now, consider heme/onc consult if worsens.   -daily CBC with differential   "     # GERD  -ranitidine QPM with dinner     FEN: Regular diet   PPX: Ambulatory      Code Status: FULL CODE      Patient discussed with staff attending, Dr. Franco.  Please feel free to page with questions.     Jordyn Menendez MD  Internal Medicine PGY2  Pager: [phone]           Transition going well.  I personally provided care for this patient, reviewed chart, discussed course with patient, housestaff and consulting physicians.  I answered all questions.    Norm Franco M.D.  Division of Cardiology  Department of Medicine

## 2018-06-20 NOTE — PLAN OF CARE
Problem: Patient Care Overview  Goal: Plan of Care/Patient Progress Review  RN  1. Pt will tolerate transition from treprostinil to selexipag  2 Pt will be hemodynamically stable   Outcome: Improving  D: Patient admitted 6/15/18 with Pulmonary Hypertension for transition from Treprostinil to Seleisipag for PH management due to elevated PA pressure seen on RHC 4/2018. PMHx CKD (baseline Cr 1.5), anxiety/depression, HLD, neutropenia, and GERD.    I: Monitored vitals and assessed patient status. A0x4. VSS. Sinus arrhythmia. Room air, JUAN. Afebrile. Urinating adequately. Up with standby assist. Patient's home supply of Uptravi put in envelope and sent to security.    P: Continue to monitor patient status and report changes to Cards 2. Continue medication titration/converstion. Echo planned for Friday and likely discharge to home that day.

## 2018-06-20 NOTE — PLAN OF CARE
Problem: Patient Care Overview  Goal: Plan of Care/Patient Progress Review  RN  1. Pt will tolerate transition from treprostinil to selexipag  2 Pt will be hemodynamically stable   Outcome: No Change  D: Medication change from treprostinil to selexipag d/t increased PA pressure.     I: Receiving doses of treprostinil and seleipag as ordered. Scheduled tylenol for headache prevention.     A: VSS, A&O, up independently. Pt denies SOB, oxygen stable on RA. Tele SR, HR 60's. Pt reporting mild headache overnight, denied additional pain medication, resolved with sleep. Pt appeared to sleep well between cares.     P: Change medication doses as indicated. Plan for echo and discharge home on Friday. Will continue to monitor and notify MD of pertinent changes.

## 2018-06-21 ENCOUNTER — CARE COORDINATION (OUTPATIENT)
Dept: CARE COORDINATION | Facility: CLINIC | Age: 58
End: 2018-06-21

## 2018-06-21 ENCOUNTER — APPOINTMENT (OUTPATIENT)
Dept: CARDIOLOGY | Facility: CLINIC | Age: 58
DRG: 950 | End: 2018-06-21
Attending: INTERNAL MEDICINE
Payer: MEDICARE

## 2018-06-21 VITALS
OXYGEN SATURATION: 97 % | BODY MASS INDEX: 19.7 KG/M2 | SYSTOLIC BLOOD PRESSURE: 102 MMHG | WEIGHT: 111.2 LBS | DIASTOLIC BLOOD PRESSURE: 62 MMHG | RESPIRATION RATE: 16 BRPM | HEIGHT: 63 IN | TEMPERATURE: 97.8 F

## 2018-06-21 LAB
BASOPHILS # BLD AUTO: 0 10E9/L (ref 0–0.2)
BASOPHILS NFR BLD AUTO: 0.5 %
DIFFERENTIAL METHOD BLD: ABNORMAL
EOSINOPHIL # BLD AUTO: 0.1 10E9/L (ref 0–0.7)
EOSINOPHIL NFR BLD AUTO: 3.2 %
ERYTHROCYTE [DISTWIDTH] IN BLOOD BY AUTOMATED COUNT: 13.5 % (ref 10–15)
HCT VFR BLD AUTO: 40.2 % (ref 35–47)
HGB BLD-MCNC: 13.8 G/DL (ref 11.7–15.7)
IMM GRANULOCYTES # BLD: 0 10E9/L (ref 0–0.4)
IMM GRANULOCYTES NFR BLD: 0 %
LYMPHOCYTES # BLD AUTO: 0.9 10E9/L (ref 0.8–5.3)
LYMPHOCYTES NFR BLD AUTO: 24.3 %
MAGNESIUM SERPL-MCNC: 2.3 MG/DL (ref 1.6–2.3)
MCH RBC QN AUTO: 31.9 PG (ref 26.5–33)
MCHC RBC AUTO-ENTMCNC: 34.3 G/DL (ref 31.5–36.5)
MCV RBC AUTO: 93 FL (ref 78–100)
MONOCYTES # BLD AUTO: 0.3 10E9/L (ref 0–1.3)
MONOCYTES NFR BLD AUTO: 9 %
NEUTROPHILS # BLD AUTO: 2.4 10E9/L (ref 1.6–8.3)
NEUTROPHILS NFR BLD AUTO: 63 %
NRBC # BLD AUTO: 0 10*3/UL
NRBC BLD AUTO-RTO: 0 /100
NT-PROBNP SERPL-MCNC: 278 PG/ML (ref 0–900)
PLATELET # BLD AUTO: 156 10E9/L (ref 150–450)
RBC # BLD AUTO: 4.33 10E12/L (ref 3.8–5.2)
TROPONIN I SERPL-MCNC: <0.015 UG/L (ref 0–0.04)
WBC # BLD AUTO: 3.8 10E9/L (ref 4–11)

## 2018-06-21 PROCEDURE — 83735 ASSAY OF MAGNESIUM: CPT | Performed by: INTERNAL MEDICINE

## 2018-06-21 PROCEDURE — 83880 ASSAY OF NATRIURETIC PEPTIDE: CPT | Performed by: STUDENT IN AN ORGANIZED HEALTH CARE EDUCATION/TRAINING PROGRAM

## 2018-06-21 PROCEDURE — 84484 ASSAY OF TROPONIN QUANT: CPT | Performed by: INTERNAL MEDICINE

## 2018-06-21 PROCEDURE — 25000131 ZZH RX MED GY IP 250 OP 636 PS 637: Mod: GY | Performed by: INTERNAL MEDICINE

## 2018-06-21 PROCEDURE — A9270 NON-COVERED ITEM OR SERVICE: HCPCS | Mod: GY | Performed by: STUDENT IN AN ORGANIZED HEALTH CARE EDUCATION/TRAINING PROGRAM

## 2018-06-21 PROCEDURE — 25000132 ZZH RX MED GY IP 250 OP 250 PS 637: Mod: GY | Performed by: INTERNAL MEDICINE

## 2018-06-21 PROCEDURE — 83880 ASSAY OF NATRIURETIC PEPTIDE: CPT | Performed by: INTERNAL MEDICINE

## 2018-06-21 PROCEDURE — 25000132 ZZH RX MED GY IP 250 OP 250 PS 637: Mod: GY | Performed by: STUDENT IN AN ORGANIZED HEALTH CARE EDUCATION/TRAINING PROGRAM

## 2018-06-21 PROCEDURE — 93308 TTE F-UP OR LMTD: CPT | Mod: 26 | Performed by: INTERNAL MEDICINE

## 2018-06-21 PROCEDURE — 93321 DOPPLER ECHO F-UP/LMTD STD: CPT | Mod: 26 | Performed by: INTERNAL MEDICINE

## 2018-06-21 PROCEDURE — 93321 DOPPLER ECHO F-UP/LMTD STD: CPT

## 2018-06-21 PROCEDURE — 99239 HOSP IP/OBS DSCHRG MGMT >30: CPT | Mod: GC | Performed by: INTERNAL MEDICINE

## 2018-06-21 PROCEDURE — 36415 COLL VENOUS BLD VENIPUNCTURE: CPT | Performed by: INTERNAL MEDICINE

## 2018-06-21 PROCEDURE — 93325 DOPPLER ECHO COLOR FLOW MAPG: CPT | Mod: 26 | Performed by: INTERNAL MEDICINE

## 2018-06-21 PROCEDURE — 85025 COMPLETE CBC W/AUTO DIFF WBC: CPT | Performed by: INTERNAL MEDICINE

## 2018-06-21 PROCEDURE — A9270 NON-COVERED ITEM OR SERVICE: HCPCS | Mod: GY | Performed by: INTERNAL MEDICINE

## 2018-06-21 RX ADMIN — POTASSIUM CHLORIDE 10 MEQ: 750 TABLET, EXTENDED RELEASE ORAL at 08:49

## 2018-06-21 RX ADMIN — FUROSEMIDE 20 MG: 20 TABLET ORAL at 08:49

## 2018-06-21 RX ADMIN — BUSPIRONE HYDROCHLORIDE 15 MG: 15 TABLET ORAL at 08:48

## 2018-06-21 RX ADMIN — MACITENTAN 10 MG: 10 TABLET, FILM COATED ORAL at 08:48

## 2018-06-21 RX ADMIN — LORAZEPAM 0.5 MG: 0.5 TABLET ORAL at 08:48

## 2018-06-21 RX ADMIN — TADALAFIL 40 MG: 20 TABLET, FILM COATED ORAL at 08:45

## 2018-06-21 RX ADMIN — ALUMINUM HYDROXIDE, MAGNESIUM HYDROXIDE, AND DIMETHICONE 30 ML: 400; 400; 40 SUSPENSION ORAL at 13:38

## 2018-06-21 RX ADMIN — CARVEDILOL 6.25 MG: 6.25 TABLET, FILM COATED ORAL at 08:50

## 2018-06-21 RX ADMIN — SELEXIPAG 1200 MCG: 800 TABLET, COATED ORAL at 08:49

## 2018-06-21 RX ADMIN — AZATHIOPRINE 50 MG: 50 TABLET ORAL at 08:46

## 2018-06-21 RX ADMIN — SODIUM BICARBONATE 325 MG: 325 TABLET ORAL at 08:48

## 2018-06-21 RX ADMIN — CALCITRIOL 0.25 MCG: 0.25 CAPSULE, LIQUID FILLED ORAL at 08:45

## 2018-06-21 RX ADMIN — BUPROPION HYDROCHLORIDE 200 MG: 200 TABLET, FILM COATED, EXTENDED RELEASE ORAL at 08:45

## 2018-06-21 NOTE — DISCHARGE SUMMARY
[facility]   Cardiology II Service / Advanced Heart Failure  Discharge Summary     Judith Nelson MRN# [id number]   YOB: 1960 Age: 58 year old     DATE OF ADMISSION:  6/15/2018  DATE OF DISCHARGE: 6/21/2018  ADMITTING PROVIDER: Norm Franco MD  DISCHARGE PROVIDER: Norm Franco  PRIMARY PROVIDER: Anupama Babcock         Reason for Admission:   Judith Nelson is 58 year old female with a PMHx of WHO I PAH, sarcoidosis, CKD and anxiety/depression who presents for conversion of treprostinil to selesipag.    **See H&P for full history and physical**           Discharge Diagnosis:   # PAH  # Sarcoidosis with possible sarcoidosis pulmonary hypertension and renal involvement  # CKD         Follow Up:   1. Follow-up with PCP in 1 week  2. Follow-up with Cardiology in 2 weeks          Pending Results:   None.         Hospital Course by Problem:    Judith Nelson is 58 year old female with a PMHx of WHO I PAH, sarcoidosis, CKD and anxiety/depression who presents for conversion of treprostinil to selexipag.      # PAH  # Sarcoidosis with possible sarcoidosis pulmonary hypertension and renal involvement.   RHC April '18 showing markedly elevated PA pressure of 60 mmHg c/w with severe PAH. Completed transition from Orenitream to Uptravi as outlined below. Continued home opsumit, tadalafil, lasix and imuran.         Uptravi (selexipag) twice daily  Orenitram (treprostinil) twice daily    Start 200 mcg 7 mg   6/17 400 mcg 6 mg   6/18 800 mcg 4 mg   6/19 1000 mcg 3 mg   6/20 1000 mcg 2mg   6/21 1200 mcg Stop Orenitram         # CKD  Baseline Cr 1.5, at 1.5 on admit. Continued home sodium bicarb and calcium.       # Anxiety/Depression: continued home Wellbutrin, Buspar and Lorazepam        # HLD: continued home simvastatin      # Neutropenia  Has been low in past, however trending down since admit. Per pharmacy, unlikely SE of selesipag. Imuran possibly contributing. Will  "monitor for now, consider heme/onc consult if worsens.         # GERD: ranitidine QPM with dinner       Physical Exam on day of Discharge:  Blood pressure 102/62, temperature 97.8  F (36.6  C), temperature source Oral, resp. rate 16, height 1.6 m (5' 3\"), weight 50.4 kg (111 lb 3.2 oz), SpO2 97 %, not currently breastfeeding.  GENERAL: lying in bed, NAD  PULM/THORAX: Clear to auscultation bilaterally  CV: RRR  ABD: soft, nontender, nondistended. +BS  EXT: no edema  NEURO: No focal deficits.     Lab Studies on Day of Discharge:   Last CBC:   Recent Labs   Lab Test  06/21/18   0604   WBC  3.8*   RBC  4.33   HGB  13.8   HCT  40.2   MCV  93   MCH  31.9   MCHC  34.3   RDW  13.5   PLT  156       Last CMP:  Recent Labs   Lab Test  06/20/18   0617   05/21/18   1043   NA  141   < >  140   POTASSIUM  4.1   < >  4.1   CHLORIDE  108   < >  110*   ISHMAEL  9.3   < >  9.4   CO2  22   < >  23   BUN  28   < >  24   CR  1.50*   < >  1.51*   GLC  82   < >  86   AST   --    --   26   ALT   --    --   29   BILITOTAL   --    --   0.5   ALBUMIN   --    --   3.8   PROTTOTAL   --    --   7.0   ALKPHOS   --    --   64    < > = values in this interval not displayed.            Procedures & Significant Findings:   Echo 6/21:  The RV is moderately dilated. There is mild right ventricular hypertrophy.  Global right ventricular function is either normal or mildly reduced.  Global and regional left ventricular function is normal with an EF of 60-65%.  Severe pulmonary hypertension. Right ventricular systolic pressure is 94 mmHg  above the right atrial pressure.  The inferior vena cava is normal.          Consultations:   None.         Discharge Medications:     Current Discharge Medication List      START taking these medications    Details   selexipag 400 mcg, selexipag 800 mcg Take 1,200 mcg by mouth every 12 hours  Qty: 30 tablet, Refills: 0    Associated Diagnoses: PAH (pulmonary arterial hypertension) with portal hypertension (H)       "   CONTINUE these medications which have NOT CHANGED    Details   acetaminophen (TYLENOL) 500 MG tablet Take 1-2 tablets (500-1,000 mg) by mouth every 6 hours as needed for pain (Take as needed for pain.  Do not exceed 4 grams (8 tablets) in a day)  Qty: 45 tablet, Refills: 10    Associated Diagnoses: Pulmonary hypertension      azaTHIOprine (IMURAN) 50 MG tablet Take 1 tablet (50 mg) by mouth daily  Qty: 90 tablet, Refills: 3    Associated Diagnoses: Sarcoidosis      buPROPion (WELLBUTRIN SR) 200 MG 12 hr tablet Take 1 tablet (200 mg) by mouth every morning  Qty: 30 tablet, Refills: 0    Associated Diagnoses: Major depressive disorder, single episode, moderate (H)      BUSPIRONE HCL PO Take 15 mg by mouth 2 times daily      calcitRIOL (ROCALTROL) 0.25 MCG capsule Take 1 capsule (0.25 mcg) by mouth daily  Qty: 30 capsule, Refills: 1      carvedilol (COREG) 12.5 MG tablet Take 0.5 tablets (6.25 mg) by mouth 2 times daily (with meals)  Qty: 135 tablet, Refills: 3    Associated Diagnoses: Chronic systolic heart failure (H)      denosumab (PROLIA) 60 MG/ML SOLN Inject 1 mL (60 mg) Subcutaneous every 6 months  Qty: 1 mL, Refills: 1    Comments: To be given in Endocrinology Clinic.  Associated Diagnoses: Osteoporosis, postmenopausal      Elastic Bandages & Supports (T.E.D. BELOW KNEE/M-REGULAR) MISC 1 each daily  Qty: 2 each, Refills: 0    Associated Diagnoses: Lower extremity edema      fluticasone (FLONASE) 50 MCG/ACT spray Spray 1 spray into both nostrils daily  Qty: 1 Bottle, Refills: 0    Associated Diagnoses: Dysfunction of right eustachian tube      furosemide (LASIX) 40 MG tablet Take 40 MG M-W-F, 20 MG all other days.  Qty: 90 tablet, Refills: 3    Associated Diagnoses: Pulmonary hypertension; Stress-induced cardiomyopathy      LORazepam (ATIVAN) 0.5 MG tablet Take 0.5 mg (1 tab) every morning and 1 mg (2 tabs) every night at bedtime.  Qty: 90 tablet, Refills: 1    Associated Diagnoses: Major depressive disorder,  single episode, severe with psychotic features (H)      Multiple Vitamins-Minerals (EYE VITAMINS PO) Take 1 tablet by mouth daily    Associated Diagnoses: Sarcoidosis      OPSUMIT 10 MG tablet TAKE ONE TABLET (10MG) BY MOUTH DAILY. DO NOT HANDLE IF PREGNANT. DO NOT SPLIT, CRUSH, OR CHEW. REVIEW MEDICATION GUIDE. CALL [phone]  Qty: 30 tablet, Refills: 10    Associated Diagnoses: Primary pulmonary hypertension (H)      ORDER FOR DME Equipment being ordered: SHOWER CHAIR  FROM [email or web address]  Qty: 1 Device, Refills: 0    Associated Diagnoses: Tremor; Generalized muscle weakness; Sarcoidosis      potassium chloride (K-TAB,KLOR-CON) 10 MEQ tablet Take 1 tablet (10 mEq) by mouth daily  Qty: 90 tablet, Refills: 1    Associated Diagnoses: Heart failure (H)      ranitidine (RANITIDINE) 75 MG tablet Take 1-2 tablets ( mg) by mouth 2 times daily  Qty: 60 tablet, Refills: 11    Comments: This replaces Omeprazole  Associated Diagnoses: Primary pulmonary hypertension (H); Anemia, unspecified type; SOB (shortness of breath)      simvastatin (ZOCOR) 10 MG tablet Take 1 tablet by mouth At Bedtime.  Qty: 90 tablet, Refills: 1    Associated Diagnoses: Hypercholesterolemia      SODIUM BICARBONATE PO Take by mouth 2 times daily    Associated Diagnoses: Pulmonary arterial hypertension      tadalafil, PAH, (ADCIRCA) 20 MG TABS Take 2 tablets (40 mg) by mouth daily  Qty: 60 tablet, Refills: 11    Associated Diagnoses: Pulmonary hypertension      TEMAZEPAM PO Take by mouth At Bedtime         STOP taking these medications       treprostinil diolamine ER (ORENITRAM) 0.125 MG CR tablet Comments:   Reason for Stopping:                    Discharge Instructions and Follow-Up:     Discharge Procedure Orders  Home care nursing referral   Referral Type: Home Health Therapies & Aides     Reason for your hospital stay   Order Comments: Pulmonary hypertension     Adult [facility] Follow-up and recommended labs and tests   Order Comments:  Follow up with primary care provider, Anupama Babcock, within 7 days for hospital follow- up.  No follow up labs or test are needed.    Follow-up with cardiology in 2 weeks.       Appointments on [place] and/or [facility] (with [facility] or [facility] provider or service). Call [phone] if you haven't heard regarding these appointments within 7 days of discharge.     Activity   Order Comments: Your activity upon discharge: activity as tolerated   Order Specific Question Answer Comments   Is discharge order? Yes      Diet   Order Comments: Follow this diet upon discharge: Orders Placed This Encounter     Regular Diet Adult   Order Specific Question Answer Comments   Is discharge order? Yes              Discharge Disposition:   Good.          Condition on Discharge:   Discharge condition: Stable   Code status on discharge: Full Code        Date of service: 6/21/2018    The patient was discussed with Dr. Franco.    60 minutes spent in discharge, including >50% in counseling and coordination of care, medication review and plan of care recommended on follow up. Questions were answered.      It was our pleasure to care for Judith Nelson during this hospitalization. Please do not hesitate to contact me should there be questions regarding the hospital course or discharge plan.      Iman Mtz MD  Internal Medicine, PGY-1  Pager: [phone]  I personally provided care for this patient, reviewed chart, discussed course with patient, housestaff and consulting physicians.  I answered all questions.    Norm Franco M.D.  Division of Cardiology  Department of Medicine

## 2018-06-21 NOTE — PLAN OF CARE
Problem: Patient Care Overview  Goal: Plan of Care/Patient Progress Review  RN  1. Pt will tolerate transition from treprostinil to selexipag  2 Pt will be hemodynamically stable   Outcome: Adequate for Discharge Date Met: 06/21/18  D/A/I:  Patient A&O x4, denied pain, palpitations, dizziness, and nausea.  Reported JUAN, lung sounds clear to auscultation, no abnormal heart sounds noted.  Had good urine output after receiving scheduled furosemide, see I&O flowsheet.  In sinus rhythm with HR 60s-80s, SBP 90s-100s, SaO2 94-97% on room air.  Echocardiogram performed during shift, see Chart Review for results.  Ambulated independently with steady gait.  Met with [facility] representative for education on selexipag.    P:  Prepare for discharge.  Ensure patient understands follow-up cares and appointments.  Ensure patient understands and recognizes signs/symptoms that indicate a need for further medical consultation.

## 2018-06-21 NOTE — PROGRESS NOTES
DISCHARGE   Discharged to: Home  Via: Automobile  Accompanied by: Sister-in-law  Discharge Instructions: principal diagnosis, major findings/procedures, diet, activity, medications, follow up appointments, when to call the MD, and what to watchout for (i.e. s/s of infection, increasing SOB, palpitations, Angina). Pt states understanding of all discharge instructions.   Prescriptions: No new prescriptions; [facility] will resupply selexipag as needed, patient has phone number to call.  Follow Up Appointments: with PCP in 1 week, with cardiology in 2 weeks  Belongings: All sent with pt. Pt verifies that they are taking all belongings with them.   IV: discontinued.   Telemetry: discontinued.   Pt exhibits understanding of above discharge instructions; all questions answered.  Discharge Paperwork: faxed to discharge planner.

## 2018-06-21 NOTE — PROGRESS NOTES
"                              Cardiology Progress Note  Judith Nelson MRN: [id number]  Age: 58 year old, : 18             Subjective     Nursing notes reviewed. No acute events overnight. This morning, patient denies any headaches, nausea, vomiting, peripheral edema or rashes. Slept well overnight for the first time since admission.          Objective     BP 97/57 (BP Location: Right arm)  Temp 97.8  F (36.6  C) (Oral)  Resp 14  Ht 1.6 m (5' 3\")  Wt 50.4 kg (111 lb 3.2 oz)  SpO2 93%  BMI 19.7 kg/m2    Intake/Output Summary (Last 24 hours) at 18 0759  Last data filed at 18 0620   Gross per 24 hour   Intake             1440 ml   Output             3575 ml   Net            -2135 ml       Vitals:    18 0600 18 0454 18 0620   Weight: 50.8 kg (112 lb) 50.9 kg (112 lb 3.2 oz) 50.4 kg (111 lb 3.2 oz)     Gen: lying in bed, NAD  PULM/THORAX: Clear to auscultation bilaterally  CV: RRR  ABD: soft, nontender, nondistended. +BS  EXT: no edema  NEURO: No focal deficits.             Data:   Labs and images reviewed         Assessment and Plan:     Judith Nelson is 58 year old female with a PMHx of WHO I PAH, sarcoidosis, CKD and anxiety/depression who presents for conversion of treprostinil to selexipag.     Today:   - Friday BNP, troponin, echo  - Likely d/c on Friday    # PAH  # Sarcoidosis with possible sarcoidosis pulmonary hypertension and renal involvement.   RHC  showing markedly elevated PA pressure of 60 mmHg c/w with severe PAH.   - Completed transition from Orenitream to Uptravi        Uptravi (selexipag) twice daily  Orenitram (treprostinil) twice daily    Start 200 mcg 7 mg    400 mcg 6 mg    800 mcg 4 mg    1000 mcg 3 mg    1000 mcg 2mg    1200 mcg Stop Orenitram       - Continue PTA Opsumit and Tadalafil   - Continue PTA lasix  - Continue PTA imuran      # CKD  Baseline Cr 1.5, at 1.5 on admit.   -continue PTA sodium bicarb " and Ca   -daily BMP     # Anxiety/Depression   -continue PTA Wellbutrin, Buspar and Lorazepam       # HLD  -continue PTA simvastatin     # Neutropenia  Has been low in past, however trending down since admit. Per pharmacy, unlikely SE of selesipag. Imuran possibly contributing. Will monitor for now, consider heme/onc consult if worsens.   -daily CBC with differential       # GERD  -ranitidine QPM with dinner     FEN: Regular diet   PPX: Ambulatory   CODE STATUS: Full Code       Patient discussed with staff attending, Dr. Franco.  Please feel free to page with questions.     Iman Mtz MD  Internal Medicine PGY1  Pager: [phone]           The patient has successfully transition from oral prostanoid to prostacyclin receptor agonist.  She is being discharge to be followed and outpatient.  45 minute discharge.

## 2018-06-21 NOTE — PLAN OF CARE
Problem: Patient Care Overview  Goal: Plan of Care/Patient Progress Review  RN  1. Pt will tolerate transition from treprostinil to selexipag  2 Pt will be hemodynamically stable   Outcome: Improving    D: Pt admitted 6/15 for transition from treprostinil to selexipag. PMH: pulmonary HTN, sarcoidosis, CKD, anxiety, depression.     I/A: No acute events overnight. A&Ox4. Vital signs stable on RA. Monitor shows sinus bradycardia/sinus rhythm, HR 50's-70's. Jaw/tooth pain managed well with scheduled Tylenol. Voiding with adequate output. No BM overnight. Up ad iliana. Slept well between cares, making needs known.     P: Selexipag dose to increase to 1200 mcg today. Echo on Friday. Continue to monitor. Notify Cards 2 with changes/concerns.

## 2018-06-21 NOTE — PROGRESS NOTES
Care Coordinator - Discharge Planning    Admission Date/Time:  6/15/2018  Attending MD:  Norm Franco     Data  Date of initial CC assessment: 6/19/2018  Chart reviewed, discussed with interdisciplinary team.   Patient was admitted for:   1. PAH (pulmonary arterial hypertension) with portal hypertension (H)    2. Gall stones, common bile duct      Assessment   Full assessment completed in previous note.   Concerns with insurance coverage for discharge needs: None.  Current Living Situation: Patient said that she lives with her 19 yr old daughter, Saritha.   Support System: Supportive and Involved daughter.   Services Involved: [facility].   Transportation at Discharge: Pt said that her sister-in-law, Bessie will provide pt with a ride home upon discharge.   Transportation to Medical Appointments: family to provide.     Coordination of Care and Referrals: Provided patient/family with options for resumption of home care.  This writer called and update Kj at [facility] [place] that [place] is discharging to home today.  [facility].  Intervention  Arrangements made with [facility] (Ph: [phone] Fax: [phone]) for RN eval post hospitalization, assess vital signs, respiratory and cardiac status, activity tolerance, hydration, nutritional status, med set up and management.        Plan  Anticipated Discharge Date: 6/21/2018  Anticipated Discharge Plan: Discharge to home with resumption of home care.    CTS Handoff completed:  YES  Valentina Pandey RN BSN  6C Unit Care Coordinator  Phone number: [phone]  Pager: [phone]

## 2018-06-22 NOTE — PROGRESS NOTES
Patient was contacted by an RN for post DC follow up so no duplicate post DC follow up call will be made    Arrangements made with [facility] (Ph: [phone] Fax: [phone]) for RN eval post hospitalization, assess vital signs, respiratory and cardiac status, activity tolerance, hydration, nutritional status, med set up and management.        Plan  Anticipated Discharge Date: 6/21/2018  Anticipated Discharge Plan: Discharge to home with resumption of home care.

## 2018-07-02 DIAGNOSIS — R06.02 SOB (SHORTNESS OF BREATH): ICD-10-CM

## 2018-07-02 DIAGNOSIS — I27.0 PRIMARY PULMONARY HYPERTENSION (H): Primary | ICD-10-CM

## 2018-07-02 DIAGNOSIS — G47.34 NOCTURNAL HYPOXIA: Primary | ICD-10-CM

## 2018-07-10 ENCOUNTER — OFFICE VISIT (OUTPATIENT)
Dept: CARDIOLOGY | Facility: CLINIC | Age: 58
End: 2018-07-10
Attending: NURSE PRACTITIONER
Payer: MEDICARE

## 2018-07-10 VITALS
OXYGEN SATURATION: 96 % | DIASTOLIC BLOOD PRESSURE: 65 MMHG | SYSTOLIC BLOOD PRESSURE: 106 MMHG | HEIGHT: 63 IN | HEART RATE: 68 BPM | BODY MASS INDEX: 20.91 KG/M2 | WEIGHT: 118 LBS

## 2018-07-10 DIAGNOSIS — I27.0 PRIMARY PULMONARY HYPERTENSION (H): ICD-10-CM

## 2018-07-10 DIAGNOSIS — R06.09 DYSPNEA ON EXERTION: ICD-10-CM

## 2018-07-10 DIAGNOSIS — I27.20 PULMONARY HYPERTENSION (H): Primary | ICD-10-CM

## 2018-07-10 DIAGNOSIS — R06.02 SOB (SHORTNESS OF BREATH): ICD-10-CM

## 2018-07-10 LAB
ALBUMIN SERPL-MCNC: 4 G/DL (ref 3.4–5)
ALP SERPL-CCNC: 73 U/L (ref 40–150)
ALT SERPL W P-5'-P-CCNC: 21 U/L (ref 0–50)
ANION GAP SERPL CALCULATED.3IONS-SCNC: 8 MMOL/L (ref 3–14)
AST SERPL W P-5'-P-CCNC: 16 U/L (ref 0–45)
BILIRUB SERPL-MCNC: 0.4 MG/DL (ref 0.2–1.3)
BUN SERPL-MCNC: 21 MG/DL (ref 7–30)
CALCIUM SERPL-MCNC: 9.2 MG/DL (ref 8.5–10.1)
CHLORIDE SERPL-SCNC: 111 MMOL/L (ref 94–109)
CO2 SERPL-SCNC: 24 MMOL/L (ref 20–32)
CREAT SERPL-MCNC: 1.37 MG/DL (ref 0.52–1.04)
ERYTHROCYTE [DISTWIDTH] IN BLOOD BY AUTOMATED COUNT: 13.2 % (ref 10–15)
GFR SERPL CREATININE-BSD FRML MDRD: 40 ML/MIN/1.7M2
GLUCOSE SERPL-MCNC: 79 MG/DL (ref 70–99)
HCT VFR BLD AUTO: 43.1 % (ref 35–47)
HGB BLD-MCNC: 14.5 G/DL (ref 11.7–15.7)
MCH RBC QN AUTO: 31.6 PG (ref 26.5–33)
MCHC RBC AUTO-ENTMCNC: 33.6 G/DL (ref 31.5–36.5)
MCV RBC AUTO: 94 FL (ref 78–100)
NT-PROBNP SERPL-MCNC: 612 PG/ML (ref 0–125)
PLATELET # BLD AUTO: 146 10E9/L (ref 150–450)
POTASSIUM SERPL-SCNC: 4 MMOL/L (ref 3.4–5.3)
PROT SERPL-MCNC: 6.9 G/DL (ref 6.8–8.8)
RBC # BLD AUTO: 4.59 10E12/L (ref 3.8–5.2)
SODIUM SERPL-SCNC: 143 MMOL/L (ref 133–144)
WBC # BLD AUTO: 6.4 10E9/L (ref 4–11)

## 2018-07-10 PROCEDURE — 83880 ASSAY OF NATRIURETIC PEPTIDE: CPT | Performed by: NURSE PRACTITIONER

## 2018-07-10 PROCEDURE — G0463 HOSPITAL OUTPT CLINIC VISIT: HCPCS | Mod: ZF

## 2018-07-10 PROCEDURE — 36415 COLL VENOUS BLD VENIPUNCTURE: CPT | Performed by: NURSE PRACTITIONER

## 2018-07-10 PROCEDURE — 80053 COMPREHEN METABOLIC PANEL: CPT | Performed by: NURSE PRACTITIONER

## 2018-07-10 PROCEDURE — 85027 COMPLETE CBC AUTOMATED: CPT | Performed by: NURSE PRACTITIONER

## 2018-07-10 PROCEDURE — 99214 OFFICE O/P EST MOD 30 MIN: CPT | Mod: ZP | Performed by: NURSE PRACTITIONER

## 2018-07-10 ASSESSMENT — PAIN SCALES - GENERAL: PAINLEVEL: NO PAIN (0)

## 2018-07-10 NOTE — NURSING NOTE
Chief Complaint   Patient presents with     Follow Up For     Return for 3 week PH F/U with labs after transitioning to Uptravi      Vitals were taken and medications were reconciled.     Bebe Ly, KASI  2:00 PM

## 2018-07-10 NOTE — PROGRESS NOTES
July 10, 2018      Ms. Judith Nelson is a pleasant 58 year old female with a past medical history of pulmonary arterial hypertension, iron deficiency anemia, sarcoidosis, hyperprolactinemia, depression, MGUS, CKD, anxiety and depression.       Today, she is accompanied by: sister in law, Bessie    Current Symptoms  1. Any ER visits or hospital admissions since last appointment: Yes: transitioned off Orenitram to Uptravi while admitted 6/15-6/21/2018.     2. Shortness of breath: Yes: with exertion, no worse than when on Orenitram.  She finds that by afternoons she is very tired; she doesn't nap that she will rest.    3. Dizziness or lightheadedness: Occasional, quick position changes  4. Any syncopal episodes or falls: No  5. Chest pain or chest tightness: Yes: with overexertion yesterday while in the [employer] [place]  6. Nausea, vomiting, diarrhea, constipation: No  7. Swelling in the legs: No  8. Bloating in the abdomen: No  9. PND; Waking up at night coughing, choking or SOB: No  10. Any medication intolerances: No  11. Reported headaches: Yes: in the hospital transitioning medication, but has improved  12. Jaw pain or bone/joint pain: No  13. On IV Prostacyclin: No; current dose: N/A    14. Current level of activity: active      PAST MEDICAL HISTORY:  Past Medical History:   Diagnosis Date     Anxiety      CKD (chronic kidney disease), stage III     biopsy suspicious for renal sarcoidosis      Hyperprolactinemia (H)      Lower extremity edema     chronic w/ chronic right ankle ulcer     Lumbar compression fracture (H)      Major depressive disorder     with psychotic features, followed by Dr. Rosales at [facility] in [place]     Menopause 2012    patient is post menopausal     MGUS (monoclonal gammopathy of unknown significance)     mild, followed by Dr. Long     Osteoporosis      Other seborrheic keratosis      Protrusio acetabuli      Pulmonary hypertension      Sarcoidosis      Stress-induced  cardiomyopathy          FAMILY HISTORY:  Family History   Problem Relation Age of Onset     Cancer Mother       age 62 leukemia     GASTROINTESTINAL DISEASE Mother      diverticulitis     Hypertension Mother      Allergies Mother      Arthritis Mother      Depression Mother      Eye Disorder Mother      Osteoperosis Mother      C.A.D. Father      MI/CAD      Cancer Father      skin     Blood Disease Father      renal  problem     Hypertension Father      Anesthesia Reaction Father      Cardiovascular Father      Neurologic Disorder Father      Parkinson's disease     C.A.D. Maternal Grandmother       late 82s MI     Diabetes Maternal Grandmother      C.A.D. Maternal Grandfather       mid 80s MI     Alzheimer Disease Paternal Grandmother       80s     Alzheimer Disease Paternal Grandfather       80s     Neurologic Disorder Sister      migraines     Allergies Sister      Diabetes Other      AODM     Neurologic Disorder Sister      migraines     Allergies Sister      Anesthesia Reaction Son      Anesthesia Reaction Daughter      Anesthesia Reaction Daughter      Diabetes Sister      hypoglycemia         SOCIAL HISTORY:  Social History   Substance Use Topics     Smoking status: Never Smoker     Smokeless tobacco: Never Used     Alcohol use No      Comment: Occ.         CURRENT MEDICATIONS:  Current Outpatient Prescriptions   Medication Sig Dispense Refill     acetaminophen (TYLENOL) 500 MG tablet Take 1-2 tablets (500-1,000 mg) by mouth every 6 hours as needed for pain (Take as needed for pain.  Do not exceed 4 grams (8 tablets) in a day) 45 tablet 10     azaTHIOprine (IMURAN) 50 MG tablet Take 1 tablet (50 mg) by mouth daily 90 tablet 3     buPROPion (WELLBUTRIN SR) 200 MG 12 hr tablet Take 1 tablet (200 mg) by mouth every morning 30 tablet 0     BUSPIRONE HCL PO Take 15 mg by mouth 2 times daily       calcitRIOL (ROCALTROL) 0.25 MCG capsule Take 1 capsule (0.25 mcg) by mouth daily 30 capsule 1      "carvedilol (COREG) 12.5 MG tablet Take 0.5 tablets (6.25 mg) by mouth 2 times daily (with meals) 135 tablet 3     denosumab (PROLIA) 60 MG/ML SOLN Inject 1 mL (60 mg) Subcutaneous every 6 months 1 mL 1     furosemide (LASIX) 40 MG tablet Take 40 MG M-W-F, 20 MG all other days. 90 tablet 3     LORazepam (ATIVAN) 0.5 MG tablet Take 0.5 mg (1 tab) every morning and 1 mg (2 tabs) every night at bedtime. 90 tablet 1     OPSUMIT 10 MG tablet TAKE ONE TABLET (10MG) BY MOUTH DAILY. DO NOT HANDLE IF PREGNANT. DO NOT SPLIT, CRUSH, OR CHEW. REVIEW MEDICATION GUIDE. CALL [phone] 30 tablet 10     ORDER FOR DME Equipment being ordered: SHOWER CHAIR  FROM [employer] 1 Device 0     potassium chloride (K-TAB,KLOR-CON) 10 MEQ tablet Take 1 tablet (10 mEq) by mouth daily 90 tablet 1     ranitidine (RANITIDINE) 75 MG tablet Take 1-2 tablets ( mg) by mouth 2 times daily 60 tablet 11     selexipag 400 mcg, selexipag 800 mcg Take 1,200 mcg by mouth every 12 hours 30 tablet 0     simvastatin (ZOCOR) 10 MG tablet Take 1 tablet by mouth At Bedtime. 90 tablet 1     SODIUM BICARBONATE PO Take by mouth 2 times daily       tadalafil, PAH, (ADCIRCA) 20 MG TABS Take 2 tablets (40 mg) by mouth daily 60 tablet 11     TEMAZEPAM PO Take by mouth At Bedtime       fluticasone (FLONASE) 50 MCG/ACT spray Spray 1 spray into both nostrils daily (Patient not taking: Reported on 7/10/2018) 1 Bottle 0     Multiple Vitamins-Minerals (EYE VITAMINS PO) Take 1 tablet by mouth daily           ROS:   10 point ROS of systems including Constitutional, Eyes, Respiratory, Cardiovascular, Gastroenterology, Genitourinary, Integumentary, Muscularskeletal, Psychiatric were all negative except for pertinent positives noted in my HPI.    EXAM:  /65 (BP Location: Left arm, Cuff Size: Adult Small)  Pulse 68  Ht 1.6 m (5' 3\")  Wt 53.5 kg (118 lb)  SpO2 96%  BMI 20.9 kg/m2  General: appears comfortable, alert and articulate  Head: normocephalic, " atraumatic  Eyes: anicteric sclera, EOMI  Neck: no adenopathy  Orophyarynx: moist mucosa, no lesions, dentition intact  Heart: regular, S1/S2, no murmur, gallop, rub, estimated JVP wnl  Lungs: clear, no rales or wheezing  Abdomen: soft, non-tender, bowel sounds present, no hepatosplenomegaly  Extremities: no clubbing, cyanosis or edema  Neurological: normal speech and affect, no gross motor deficits      Weight  Wt Readings from Last 10 Encounters:   07/10/18 53.5 kg (118 lb)   06/21/18 50.4 kg (111 lb 3.2 oz)   05/21/18 52.8 kg (116 lb 8 oz)   05/01/18 51 kg (112 lb 8 oz)   04/30/18 51.7 kg (114 lb)   04/18/18 53.6 kg (118 lb 3.2 oz)   03/21/18 52.2 kg (115 lb)   02/09/18 54.3 kg (119 lb 9.6 oz)   02/06/18 52.2 kg (115 lb)   11/16/17 53.3 kg (117 lb 8 oz)         Labs:    CBC RESULTS:  Lab Results   Component Value Date    WBC 6.4 07/10/2018    RBC 4.59 07/10/2018    HGB 14.5 07/10/2018    HCT 43.1 07/10/2018    MCV 94 07/10/2018    MCH 31.6 07/10/2018    MCHC 33.6 07/10/2018    RDW 13.2 07/10/2018     (L) 07/10/2018       CMP RESULTS:  Lab Results   Component Value Date     07/10/2018    POTASSIUM 4.0 07/10/2018    CHLORIDE 111 (H) 07/10/2018    CO2 24 07/10/2018    ANIONGAP 8 07/10/2018    GLC 79 07/10/2018    BUN 21 07/10/2018    CR 1.37 (H) 07/10/2018    GFRESTIMATED 40 (L) 07/10/2018    GFRESTBLACK 48 (L) 07/10/2018    ISHMAEL 9.2 07/10/2018    BILITOTAL 0.4 07/10/2018    ALBUMIN 4.0 07/10/2018    ALKPHOS 73 07/10/2018    ALT 21 07/10/2018    AST 16 07/10/2018        INR RESULTS:  Lab Results   Component Value Date    INR 1.04 03/26/2013       BNP RESULTS:  Lab Results   Component Value Date    NTBNPI 278 06/21/2018     No results found for: BNP      Recent Tests:      Echocardiogram (6/15/2018):  Interpretation Summary  The RV is moderately dilated. There is mild right ventricular hypertrophy.  Global right ventricular function is either normal or mildly reduced.  Global and regional left ventricular  function is normal with an EF of 60-65%.  Severe pulmonary hypertension. Right ventricular systolic pressure is 94 mmHg  above the right atrial pressure.  The inferior vena cava is normal.     Left Ventricle  Global and regional left ventricular function is normal with an EF of 60-65%.  Left ventricular size is normal. Left ventricular wall thickness is normal.  Paradoxical septal motion consistent with right ventricular pressure overload  is present.     Right Ventricle  The RV is moderately dilated. There is mild right ventricular hypertrophy.  Global right ventricular function is mildly reduced.     Atria  Severe biatrial enlargement is present.     Mitral Valve  The mitral valve is normal. Trace mitral insufficiency is present.        Aortic Valve  Aortic valve is normal in structure and function.     Tricuspid Valve  The tricuspid valve is normal. Moderate tricuspid insufficiency is present.  Severe pulmonary hypertension is present. Right ventricular systolic pressure  is 94mmHg above the right atrial pressure.     Pulmonic Valve  The pulmonic valve is normal.     Vessels  The aorta root is normal. The inferior vena cava is normal. Estimated mean  right atrial pressure is 3 mmHg (normal).     Pericardium  Trivial pericardial effusion is present        Assessment and Plan:   Ms. Judith Nelson is a 58 year old female with pulmonary arterial hypertension and sarcoidosis, WHO Group I, NYHA Functional Class II.      #PAH  Mrs. Judith Nelson returns to clinic for follow up, with her Sister-in-law, Bessie.  She reports feeling well, and tolerating Uptravi 1200 mcg BID since switching off Orenitram.  The diarrhea she was suffering from has also ceased, which she now associates with Orenitram.  The overnight oximetry test she completed show desaturations below 90% for most of the night.  This was discussed and she was referred to a sleep specialist to complete a formal sleep study.  We did discuss the need for  possible overnight oxygen supplementation.  Clinically, her vital signs and labs are stable; physical exam shows no signs of right heart failure or fluid overload with no JVD, lower leg swelling or abdominal bloating.  She will return to clinic in 2 months with labs prior.       It was a pleasure seeing Ms. Judith MARY Nelson at the [facility].           Sincerely,  Korin Hirsch, APRN, CNP.

## 2018-07-10 NOTE — PATIENT INSTRUCTIONS
Medication Changes:  Continue Current dose of Uptravi (selexipag)    Patient Instructions:  -Continue staying active and eat a heart healthy diet.      Follow up Appointment Information:  2 months with Labs prior    Results:  Component      Latest Ref Rng & Units 7/10/2018   Sodium      133 - 144 mmol/L 143   Potassium      3.4 - 5.3 mmol/L 4.0   Chloride      94 - 109 mmol/L 111 (H)   Carbon Dioxide      20 - 32 mmol/L 24   Anion Gap      3 - 14 mmol/L 8   Glucose      70 - 99 mg/dL 79   Urea Nitrogen      7 - 30 mg/dL 21   Creatinine      0.52 - 1.04 mg/dL 1.37 (H)   GFR Estimate      >60 mL/min/1.7m2 40 (L)   GFR Estimate If Black      >60 mL/min/1.7m2 48 (L)   Calcium      8.5 - 10.1 mg/dL 9.2   Bilirubin Total      0.2 - 1.3 mg/dL 0.4   Albumin      3.4 - 5.0 g/dL 4.0   Protein Total      6.8 - 8.8 g/dL 6.9   Alkaline Phosphatase      40 - 150 U/L 73   ALT      0 - 50 U/L 21   AST      0 - 45 U/L 16   WBC      4.0 - 11.0 10e9/L 6.4   RBC Count      3.8 - 5.2 10e12/L 4.59   Hemoglobin      11.7 - 15.7 g/dL 14.5   Hematocrit      35.0 - 47.0 % 43.1   MCV      78 - 100 fl 94   MCH      26.5 - 33.0 pg 31.6   MCHC      31.5 - 36.5 g/dL 33.6   RDW      10.0 - 15.0 % 13.2   Platelet Count      150 - 450 10e9/L 146 (L)   N-Terminal Pro Bnp      0 - 125 pg/mL 612 (H)     We are located on the third floor of the [facility] ([facility] [place] the [facility].  Our address is     [address] [place] [address] on 3rd Floor   [place] [id number]    Thank you for allowing us to be a part of your care here at the [facility]    If you have questions or concerns please contact us at:    Keyanna Mccray, RN, BSN, PHN  Reji Escobar (Schedule,P.A.)  Nurse Coordinator     Clinic   Pulmonary Hypertension   Pulmonary Hypertension  [facility]  (P)[phone]    (P) [phone]        (F)  [phone]    ** Please note that you will NOT receive a reminder call regarding your scheduled testing, reminder calls are for provider appointments only.  If you are scheduled for testing within the [facility] system you may receive a call regarding pre-registration for billing purposes only.**     Remember to weigh yourself daily after voiding and before you consume any food or beverages and log the numbers.  If you have gained/lost 2 pounds overnight or 5 pounds in a week contact us immediately for medication adjustments or further instructions.   **Please call us immediately if you have any syncope, chest pain, edema, or decline in your functional status.    Support Group:  Pulmonary Hypertension Association  [email or web address].phassociation.org/  **Look at the Events Tab** They even have Support Groups that you can call into    [facility] PH Support Group  First Saturday of the Month from 1-3 PM   Location: [address] [place] [id number]  Leader: Michelet Garcia  Phone: [phone]  Email: [email or web address]

## 2018-07-10 NOTE — LETTER
7/10/2018      RE: Judith Nelson  [address] [address]       Dear Colleague,    Thank you for the opportunity to participate in the care of your patient, Judith Nelson, at the [facility]. Please see a copy of my visit note below.    July 10, 2018      Ms. Judith Nelson is a pleasant 58 year old female with a past medical history of pulmonary arterial hypertension, iron deficiency anemia, sarcoidosis, hyperprolactinemia, depression, MGUS, CKD, anxiety and depression.       Today, she is accompanied by: sister in law, Bessie    Current Symptoms  1. Any ER visits or hospital admissions since last appointment: Yes: transitioned off Orenitram to Uptravi while admitted 6/15-6/21/2018.     2. Shortness of breath: Yes: with exertion, no worse than when on Orenitram.  She finds that by afternoons she is very tired; she doesn't nap that she will rest.    3. Dizziness or lightheadedness: Occasional, quick position changes  4. Any syncopal episodes or falls: No  5. Chest pain or chest tightness: Yes: with overexertion yesterday while in the [employer] [place]  6. Nausea, vomiting, diarrhea, constipation: No  7. Swelling in the legs: No  8. Bloating in the abdomen: No  9. PND; Waking up at night coughing, choking or SOB: No  10. Any medication intolerances: No  11. Reported headaches: Yes: in the hospital transitioning medication, but has improved  12. Jaw pain or bone/joint pain: No  13. On IV Prostacyclin: No; current dose: N/A    14. Current level of activity: active      PAST MEDICAL HISTORY:  Past Medical History:   Diagnosis Date     Anxiety      CKD (chronic kidney disease), stage III     biopsy suspicious for renal sarcoidosis      Hyperprolactinemia (H)      Lower extremity edema     chronic w/ chronic right ankle ulcer     Lumbar compression fracture (H)      Major depressive disorder     with psychotic features, followed by   Connie at [facility] in [place]     Menopause 2012    patient is post menopausal     MGUS (monoclonal gammopathy of unknown significance)     mild, followed by Dr. Long     Osteoporosis      Other seborrheic keratosis      Protrusio acetabuli      Pulmonary hypertension      Sarcoidosis      Stress-induced cardiomyopathy          FAMILY HISTORY:  Family History   Problem Relation Age of Onset     Cancer Mother       age 62 leukemia     GASTROINTESTINAL DISEASE Mother      diverticulitis     Hypertension Mother      Allergies Mother      Arthritis Mother      Depression Mother      Eye Disorder Mother      Osteoperosis Mother      C.A.D. Father      MI/CAD      Cancer Father      skin     Blood Disease Father      renal  problem     Hypertension Father      Anesthesia Reaction Father      Cardiovascular Father      Neurologic Disorder Father      Parkinson's disease     C.A.D. Maternal Grandmother       late 82s MI     Diabetes Maternal Grandmother      C.A.D. Maternal Grandfather       mid 80s MI     Alzheimer Disease Paternal Grandmother       80s     Alzheimer Disease Paternal Grandfather       80s     Neurologic Disorder Sister      migraines     Allergies Sister      Diabetes Other      AODM     Neurologic Disorder Sister      migraines     Allergies Sister      Anesthesia Reaction Son      Anesthesia Reaction Daughter      Anesthesia Reaction Daughter      Diabetes Sister      hypoglycemia         SOCIAL HISTORY:  Social History   Substance Use Topics     Smoking status: Never Smoker     Smokeless tobacco: Never Used     Alcohol use No      Comment: Occ.         CURRENT MEDICATIONS:  Current Outpatient Prescriptions   Medication Sig Dispense Refill     acetaminophen (TYLENOL) 500 MG tablet Take 1-2 tablets (500-1,000 mg) by mouth every 6 hours as needed for pain (Take as needed for pain.  Do not exceed 4 grams (8 tablets) in a day) 45 tablet 10     azaTHIOprine (IMURAN) 50 MG tablet  Take 1 tablet (50 mg) by mouth daily 90 tablet 3     buPROPion (WELLBUTRIN SR) 200 MG 12 hr tablet Take 1 tablet (200 mg) by mouth every morning 30 tablet 0     BUSPIRONE HCL PO Take 15 mg by mouth 2 times daily       calcitRIOL (ROCALTROL) 0.25 MCG capsule Take 1 capsule (0.25 mcg) by mouth daily 30 capsule 1     carvedilol (COREG) 12.5 MG tablet Take 0.5 tablets (6.25 mg) by mouth 2 times daily (with meals) 135 tablet 3     denosumab (PROLIA) 60 MG/ML SOLN Inject 1 mL (60 mg) Subcutaneous every 6 months 1 mL 1     furosemide (LASIX) 40 MG tablet Take 40 MG M-W-F, 20 MG all other days. 90 tablet 3     LORazepam (ATIVAN) 0.5 MG tablet Take 0.5 mg (1 tab) every morning and 1 mg (2 tabs) every night at bedtime. 90 tablet 1     OPSUMIT 10 MG tablet TAKE ONE TABLET (10MG) BY MOUTH DAILY. DO NOT HANDLE IF PREGNANT. DO NOT SPLIT, CRUSH, OR CHEW. REVIEW MEDICATION GUIDE. CALL [phone] 30 tablet 10     ORDER FOR DME Equipment being ordered: SHOWER CHAIR  FROM [employer] 1 Device 0     potassium chloride (K-TAB,KLOR-CON) 10 MEQ tablet Take 1 tablet (10 mEq) by mouth daily 90 tablet 1     ranitidine (RANITIDINE) 75 MG tablet Take 1-2 tablets ( mg) by mouth 2 times daily 60 tablet 11     selexipag 400 mcg, selexipag 800 mcg Take 1,200 mcg by mouth every 12 hours 30 tablet 0     simvastatin (ZOCOR) 10 MG tablet Take 1 tablet by mouth At Bedtime. 90 tablet 1     SODIUM BICARBONATE PO Take by mouth 2 times daily       tadalafil, PAH, (ADCIRCA) 20 MG TABS Take 2 tablets (40 mg) by mouth daily 60 tablet 11     TEMAZEPAM PO Take by mouth At Bedtime       fluticasone (FLONASE) 50 MCG/ACT spray Spray 1 spray into both nostrils daily (Patient not taking: Reported on 7/10/2018) 1 Bottle 0     Multiple Vitamins-Minerals (EYE VITAMINS PO) Take 1 tablet by mouth daily           ROS:   10 point ROS of systems including Constitutional, Eyes, Respiratory, Cardiovascular, Gastroenterology, Genitourinary, Integumentary,  "Muscularskeletal, Psychiatric were all negative except for pertinent positives noted in my HPI.    EXAM:  /65 (BP Location: Left arm, Cuff Size: Adult Small)  Pulse 68  Ht 1.6 m (5' 3\")  Wt 53.5 kg (118 lb)  SpO2 96%  BMI 20.9 kg/m2  General: appears comfortable, alert and articulate  Head: normocephalic, atraumatic  Eyes: anicteric sclera, EOMI  Neck: no adenopathy  Orophyarynx: moist mucosa, no lesions, dentition intact  Heart: regular, S1/S2, no murmur, gallop, rub, estimated JVP wnl  Lungs: clear, no rales or wheezing  Abdomen: soft, non-tender, bowel sounds present, no hepatosplenomegaly  Extremities: no clubbing, cyanosis or edema  Neurological: normal speech and affect, no gross motor deficits      Weight  Wt Readings from Last 10 Encounters:   07/10/18 53.5 kg (118 lb)   06/21/18 50.4 kg (111 lb 3.2 oz)   05/21/18 52.8 kg (116 lb 8 oz)   05/01/18 51 kg (112 lb 8 oz)   04/30/18 51.7 kg (114 lb)   04/18/18 53.6 kg (118 lb 3.2 oz)   03/21/18 52.2 kg (115 lb)   02/09/18 54.3 kg (119 lb 9.6 oz)   02/06/18 52.2 kg (115 lb)   11/16/17 53.3 kg (117 lb 8 oz)         Labs:    CBC RESULTS:  Lab Results   Component Value Date    WBC 6.4 07/10/2018    RBC 4.59 07/10/2018    HGB 14.5 07/10/2018    HCT 43.1 07/10/2018    MCV 94 07/10/2018    MCH 31.6 07/10/2018    MCHC 33.6 07/10/2018    RDW 13.2 07/10/2018     (L) 07/10/2018       CMP RESULTS:  Lab Results   Component Value Date     07/10/2018    POTASSIUM 4.0 07/10/2018    CHLORIDE 111 (H) 07/10/2018    CO2 24 07/10/2018    ANIONGAP 8 07/10/2018    GLC 79 07/10/2018    BUN 21 07/10/2018    CR 1.37 (H) 07/10/2018    GFRESTIMATED 40 (L) 07/10/2018    GFRESTBLACK 48 (L) 07/10/2018    ISHMAEL 9.2 07/10/2018    BILITOTAL 0.4 07/10/2018    ALBUMIN 4.0 07/10/2018    ALKPHOS 73 07/10/2018    ALT 21 07/10/2018    AST 16 07/10/2018      INR RESULTS:  Lab Results   Component Value Date    INR 1.04 03/26/2013     BNP RESULTS:  Lab Results   Component Value Date "    NTBNPI 278 06/21/2018     No results found for: BNP    Recent Tests:    Echocardiogram (6/15/2018):  Interpretation Summary  The RV is moderately dilated. There is mild right ventricular hypertrophy.  Global right ventricular function is either normal or mildly reduced.  Global and regional left ventricular function is normal with an EF of 60-65%.  Severe pulmonary hypertension. Right ventricular systolic pressure is 94 mmHg  above the right atrial pressure.  The inferior vena cava is normal.     Left Ventricle  Global and regional left ventricular function is normal with an EF of 60-65%.  Left ventricular size is normal. Left ventricular wall thickness is normal.  Paradoxical septal motion consistent with right ventricular pressure overload  is present.     Right Ventricle  The RV is moderately dilated. There is mild right ventricular hypertrophy.  Global right ventricular function is mildly reduced.     Atria  Severe biatrial enlargement is present.     Mitral Valve  The mitral valve is normal. Trace mitral insufficiency is present.      Aortic Valve  Aortic valve is normal in structure and function.     Tricuspid Valve  The tricuspid valve is normal. Moderate tricuspid insufficiency is present.  Severe pulmonary hypertension is present. Right ventricular systolic pressure  is 94mmHg above the right atrial pressure.     Pulmonic Valve  The pulmonic valve is normal.     Vessels  The aorta root is normal. The inferior vena cava is normal. Estimated mean  right atrial pressure is 3 mmHg (normal).     Pericardium  Trivial pericardial effusion is present    Assessment and Plan:   Ms. Judith Nelson is a 58 year old female with pulmonary arterial hypertension and sarcoidosis, WHO Group I, NYHA Functional Class II.      #PAH  Mrs. Judith Nelson returns to clinic for follow up, with her Sister-in-law, Bessie.  She reports feeling well, and tolerating Uptravi 1200 mcg BID since switching off Orenitram.  The diarrhea  she was suffering from has also ceased, which she now associates with Orenitram.  The overnight oximetry test she completed show desaturations below 90% for most of the night.  This was discussed and she was referred to a sleep specialist to complete a formal sleep study.  We did discuss the need for possible overnight oxygen supplementation.  Clinically, her vital signs and labs are stable; physical exam shows no signs of right heart failure or fluid overload with no JVD, lower leg swelling or abdominal bloating.  She will return to clinic in 2 months with labs prior.       It was a pleasure seeing Ms. Judith Nelson at the [facility].        Sincerely,    Korin Hirsch, APRN, CNP

## 2018-07-10 NOTE — MR AVS SNAPSHOT
After Visit Summary   7/10/2018    Judith Nelson    MRN: [id number]           Patient Information     Date Of Birth          1960        Visit Information        Provider Department      7/10/2018 1:45 PM Korin Hirsch, JAVAN [facility]        Today's Diagnoses     Pulmonary hypertension    -  1    Dyspnea on exertion          Care Instructions    Medication Changes:  Continue Current dose of Uptravi (selexipag)    Patient Instructions:  -Continue staying active and eat a heart healthy diet.      Follow up Appointment Information:  2 months with Labs prior    Results:  Component      Latest Ref Rng & Units 7/10/2018   Sodium      133 - 144 mmol/L 143   Potassium      3.4 - 5.3 mmol/L 4.0   Chloride      94 - 109 mmol/L 111 (H)   Carbon Dioxide      20 - 32 mmol/L 24   Anion Gap      3 - 14 mmol/L 8   Glucose      70 - 99 mg/dL 79   Urea Nitrogen      7 - 30 mg/dL 21   Creatinine      0.52 - 1.04 mg/dL 1.37 (H)   GFR Estimate      >60 mL/min/1.7m2 40 (L)   GFR Estimate If Black      >60 mL/min/1.7m2 48 (L)   Calcium      8.5 - 10.1 mg/dL 9.2   Bilirubin Total      0.2 - 1.3 mg/dL 0.4   Albumin      3.4 - 5.0 g/dL 4.0   Protein Total      6.8 - 8.8 g/dL 6.9   Alkaline Phosphatase      40 - 150 U/L 73   ALT      0 - 50 U/L 21   AST      0 - 45 U/L 16   WBC      4.0 - 11.0 10e9/L 6.4   RBC Count      3.8 - 5.2 10e12/L 4.59   Hemoglobin      11.7 - 15.7 g/dL 14.5   Hematocrit      35.0 - 47.0 % 43.1   MCV      78 - 100 fl 94   MCH      26.5 - 33.0 pg 31.6   MCHC      31.5 - 36.5 g/dL 33.6   RDW      10.0 - 15.0 % 13.2   Platelet Count      150 - 450 10e9/L 146 (L)   N-Terminal Pro Bnp      0 - 125 pg/mL 612 (H)     We are located on the third floor of the [facility] ([facility]) on the [facility].  Our address is     [address] [place] [address] [place] [address] [place] [address]    Thank you for allowing us to be a part of your care  here at the [facility]    If you have questions or concerns please contact us at:    Keyanna Mccray RN, BSN, PHN  Reji Escobar (Schedule,P.A.)  Nurse Coordinator     Clinic   Pulmonary Hypertension   Pulmonary Hypertension  [facility]  [phone])[phone]    (P) [phone]        (F) [phone]    ** Please note that you will NOT receive a reminder call regarding your scheduled testing, reminder calls are for provider appointments only.  If you are scheduled for testing within the [employer] system you may receive a call regarding pre-registration for billing purposes only.**     Remember to weigh yourself daily after voiding and before you consume any food or beverages and log the numbers.  If you have gained/lost 2 pounds overnight or 5 pounds in a week contact us immediately for medication adjustments or further instructions.   **Please call us immediately if you have any syncope, chest pain, edema, or decline in your functional status.    Support Group:  Pulmonary Hypertension Association  Https://www.phassociation.org/  **Look at the Events Tab** They even have Support Groups that you can call into    [facility] PH Support Group  First Saturday of the Month from 1-3 PM   Location: [address] [place] [address]  Leader: Michelet Garcia  Phone: [phone]  Email: [email or web address]            Follow-ups after your visit        Additional Services     Follow-Up with Cardiac Advanced Practice Provider                 Your next 10 appointments already scheduled     Aug 10, 2018 12:00 PM CDT   DX HIP/PELVIS/SPINE with UCDX1   [facility] ([facility])    [address] [address] [phone]           Please do not take any of the following 24 hours prior to the day of your exam: vitamins, calcium tablets, antacids.  If  possible, please wear clothes without metal (snaps, zippers). A sweatsuit works well.            Aug 10, 2018  1:00 PM CDT   (Arrive by 12:45 PM)   RETURN ENDOCRINE with Jenna Salas MD   [facility] ([facility])    [address]  3rd Floor  [facility] [id number] [phone]            Aug 14, 2018  2:30 PM CDT   (Arrive by 2:15 PM)   Return Interstitial Lung with Gael Flaherty MD   [facility] ([facility])    [address]  [address]  [facility] [id number] [phone]            Sep 10, 2018 12:30 PM CDT   Lab with  LAB   [facility] ([facility])    [address]  1st Floor  [facility] [id number] [phone]            Sep 10, 2018  1:00 PM CDT   (Arrive by 12:45 PM)   RETURN PRIMARY PULMONARY with JAVAN Beaulieu CNP   [facility] ([facility])    [address]  [address]  [facility] [id number] [phone]              Future tests that were ordered for you today     Open Future Orders        Priority Expected Expires Ordered    Comprehensive metabolic panel Routine  7/10/2019 7/10/2018    N terminal pro BNP outpatient Routine  7/10/2019 7/10/2018    CBC with platelets Routine  7/10/2019 7/10/2018    Follow-Up with Cardiac Advanced Practice Provider Routine  7/10/2019 7/10/2018            Who to contact     If you have questions or need follow up information about today's clinic visit or your schedule please contact [facility] directly at [phone].  Normal or non-critical lab and imaging results will be communicated to you by MyChart, letter or phone within 4 business days after the clinic has received the results. If you do not hear from us within 7 days, please contact the clinic through MyChart or phone. If you have a critical or abnormal lab result, we will notify you  "by phone as soon as possible.  Submit refill requests through [employer] or call your pharmacy and they will forward the refill request to us. Please allow 3 business days for your refill to be completed.          Additional Information About Your Visit        [employer] Information     [employer] gives you secure access to your electronic health record. If you see a primary care provider, you can also send messages to your care team and make appointments. If you have questions, please call your primary care clinic.  If you do not have a primary care provider, please call [phone] and they will assist you.        Care EveryWhere ID     This is your Care EveryWhere ID. This could be used by other organizations to access your [facility] medical records  [id number]        Your Vitals Were     Pulse Height Pulse Oximetry BMI (Body Mass Index)          68 1.6 m (5' 3\") 96% 20.9 kg/m2         Blood Pressure from Last 3 Encounters:   07/10/18 106/65   06/21/18 102/62   05/22/18 91/47    Weight from Last 3 Encounters:   07/10/18 53.5 kg (118 lb)   06/21/18 50.4 kg (111 lb 3.2 oz)   05/21/18 52.8 kg (116 lb 8 oz)               Primary Care Provider Office Phone # Fax #    Anupama Babcock [phone]       [place] [phone]        Equal Access to Services     LESLIE CHACKO AH: Hadii pablo ku hadasho Somarsha, waaxda luqadaha, qaybta kaalmada premayaveronique, alysha worley . So [facility] [phone].    ATENCIÓN: Si habla español, tiene a dhillon disposición servicios gratuitos de asistencia lingüística. [place] [phone].    We comply with applicable federal civil rights laws and [place] laws. We do not discriminate on the basis of race, color, national origin, age, disability, sex, sexual orientation, or gender identity.            Thank you!     Thank you for choosing [facility]  for your care. Our goal is always to provide you with excellent " care. Hearing back from our patients is one way we can continue to improve our services. Please take a few minutes to complete the written survey that you may receive in the mail after your visit with us. Thank you!             Your Updated Medication List - Protect others around you: Learn how to safely use, store and throw away your medicines at www.disposemymeds.org.          This list is accurate as of 7/10/18  3:12 PM.  Always use your most recent med list.                   Brand Name Dispense Instructions for use Diagnosis    acetaminophen 500 MG tablet    TYLENOL    45 tablet    Take 1-2 tablets (500-1,000 mg) by mouth every 6 hours as needed for pain (Take as needed for pain.  Do not exceed 4 grams (8 tablets) in a day)    Pulmonary hypertension       azaTHIOprine 50 MG tablet    IMURAN    90 tablet    Take 1 tablet (50 mg) by mouth daily    Sarcoidosis       buPROPion 200 MG 12 hr tablet    WELLBUTRIN SR    30 tablet    Take 1 tablet (200 mg) by mouth every morning    Major depressive disorder, single episode, moderate (H)       BUSPIRONE HCL PO      Take 15 mg by mouth 2 times daily        calcitRIOL 0.25 MCG capsule    ROCALTROL    30 capsule    Take 1 capsule (0.25 mcg) by mouth daily        carvedilol 12.5 MG tablet    COREG    135 tablet    Take 0.5 tablets (6.25 mg) by mouth 2 times daily (with meals)    Chronic systolic heart failure (H)       denosumab 60 MG/ML Soln injection    PROLIA    1 mL    Inject 1 mL (60 mg) Subcutaneous every 6 months    Osteoporosis, postmenopausal       EYE VITAMINS PO      Take 1 tablet by mouth daily    Sarcoidosis       fluticasone 50 MCG/ACT spray    FLONASE    1 Bottle    Spray 1 spray into both nostrils daily    Dysfunction of right eustachian tube       furosemide 40 MG tablet    LASIX    90 tablet    Take 40 MG M-W-F, 20 MG all other days.    Pulmonary hypertension, Stress-induced cardiomyopathy       LORazepam 0.5 MG tablet    ATIVAN    90 tablet    Take 0.5 mg  (1 tab) every morning and 1 mg (2 tabs) every night at bedtime.    Major depressive disorder, single episode, severe with psychotic features (H)       OPSUMIT 10 MG tablet   Generic drug:  macitentan     30 tablet    TAKE ONE TABLET (10MG) BY MOUTH DAILY. DO NOT HANDLE IF PREGNANT. DO NOT SPLIT, CRUSH, OR CHEW. REVIEW MEDICATION GUIDE. CALL [phone]    Primary pulmonary hypertension (H)       order for DME     1 Device    Equipment being ordered: SHOWER CHAIR  FROM [facility]    Tremor, Generalized muscle weakness, Sarcoidosis       potassium chloride 10 MEQ tablet    K-TAB,KLOR-CON    90 tablet    Take 1 tablet (10 mEq) by mouth daily    Heart failure (H)       ranitidine 75 MG tablet    ranitidine    60 tablet    Take 1-2 tablets ( mg) by mouth 2 times daily    Primary pulmonary hypertension (H), Anemia, unspecified type, SOB (shortness of breath)       selexipag 400 mcg, selexipag 800 mcg     30 tablet    Take 1,200 mcg by mouth every 12 hours    PAH (pulmonary arterial hypertension) with portal hypertension (H)       simvastatin 10 MG tablet    ZOCOR    90 tablet    Take 1 tablet by mouth At Bedtime.    Hypercholesterolemia       SODIUM BICARBONATE PO      Take by mouth 2 times daily    Pulmonary arterial hypertension       tadalafil (PAH) 20 MG Tabs    ADCIRCA    60 tablet    Take 2 tablets (40 mg) by mouth daily    Pulmonary hypertension       TEMAZEPAM PO      Take by mouth At Bedtime

## 2018-07-22 ENCOUNTER — HEALTH MAINTENANCE LETTER (OUTPATIENT)
Age: 58
End: 2018-07-22

## 2018-07-24 ENCOUNTER — CARE COORDINATION (OUTPATIENT)
Dept: CARDIOLOGY | Facility: CLINIC | Age: 58
End: 2018-07-24

## 2018-07-24 NOTE — PROGRESS NOTES
[facility] and they are able to service her Adcirca (tadalafil) 40 mg Daily and Opsumit (macitentan) 10 mg Daily.  I have requested the prescriptions be transferred from [facility] to [facility] per the pt request.   Also contacted [facility] to request the hub form be redirected.  Called and left voice message for pt regarding the Pharmacy Change. Lynne Tamayo RN on 7/24/2018 at 3:17 PM

## 2018-07-26 ENCOUNTER — TELEPHONE (OUTPATIENT)
Dept: CARDIOLOGY | Facility: CLINIC | Age: 58
End: 2018-07-26

## 2018-07-26 DIAGNOSIS — H69.91 DYSFUNCTION OF RIGHT EUSTACHIAN TUBE: ICD-10-CM

## 2018-07-26 NOTE — TELEPHONE ENCOUNTER
----- Message from Lynne Tamayo RN sent at 7/20/2018  9:14 AM CDT -----  We need to follow up with Dr. Franco to see if he would like to still up titrate her.  She is at 1200 mcg twice daily.      Lynne Tamayo RN

## 2018-07-26 NOTE — TELEPHONE ENCOUNTER
RENE from Dr. Franco; [facility] Uptravi to max dose of 1600mcg BID per normal protocol. Keyanna Mccray RN on 7/25/2018 at 11:06 AM

## 2018-07-27 ENCOUNTER — TELEPHONE (OUTPATIENT)
Dept: CARDIOLOGY | Facility: CLINIC | Age: 58
End: 2018-07-27

## 2018-07-27 RX ORDER — FLUTICASONE PROPIONATE 50 MCG
1 SPRAY, SUSPENSION (ML) NASAL DAILY
Qty: 1 BOTTLE | Refills: 0 | Status: SHIPPED | OUTPATIENT
Start: 2018-07-27 | End: 2018-08-14

## 2018-07-31 NOTE — TELEPHONE ENCOUNTER
Dr. Franco is not familiar with any of the Dentists listed, so they can just pick one. Keyanna Mccray RN on 7/31/2018 at 5:32 PM  --------------------------  LM for Dr. Dupont's office advising them that he needs to send a referral to [facility] for an oral surgeon of his choosing.  Left my direct dial number if they have any questions.  -------------------------  Spoke with patient and advised her I spoke with Joan Armstrong and left a Vm for her dentist letting them know they needed to send the referral. Keyanna Mccray RN on 8/2/2018 at 12:22 PM

## 2018-08-07 ENCOUNTER — TELEPHONE (OUTPATIENT)
Dept: CARDIOLOGY | Facility: CLINIC | Age: 58
End: 2018-08-07

## 2018-08-07 NOTE — TELEPHONE ENCOUNTER
Patient left a VM stating that her dentist will send the referral to an oral surgeon at the [facility], but they recommend that Dr. Franco write a letter to her insurance company as to why she needs sedation for the procedure.  She also wanted to verify she is supposed to start her final dose of Uptravi 1600 mcg BID this week. Keyanna Mccray RN on 8/7/2018 at 3:29 PM  ----------------------------  Patient asked that I call her dentist and talk with them about exactly what they are recommending because she is confused and feels like she's just going around in circles.  Agreed to call her dentist directly and call her back with an update once I figure it out.    Also clarified with patient that she only increases her Uptravi dose every 2 weeks.   Patient states she started the 1400 mcg dose last week, so she was advised to wait one more week before increasing to her final dose of 1600 mcg BID.  Patient verbalized understanding, agreed with plan and denied any further questions. Keyanna Mccray RN on 8/7/2018 at 3:41 PM  ----------------------------  Spoke with Keyanna at Dr. August's office (Primary dentist) who states she did send the referral over to the [facility] and wrote on it that patient needs General Anesthesia per her Cardiologist.  [facility] Appt number is [phone].  Was advised that they are required to place the referral online, and it takes about a week before the patient is contacted to make the appt.  -----------------------------  As documented in a different encounter, I called Oral surgeon and was advised they have the referral but patient needs to call to make appt. Agreed with plan.  ------------------------------  Called patient and advised her of their response.  Patient verbalized understanding, agreed with plan and denied any further questions. Keyanna Mccray RN on 8/20/2018 at 9:54 AM

## 2018-08-08 DIAGNOSIS — I27.0 PRIMARY PULMONARY HYPERTENSION (H): ICD-10-CM

## 2018-08-08 NOTE — TELEPHONE ENCOUNTER
Medication Refill double check:    Last office visit was on 7/10/18 with Korin Hirsch CNP.    Follow up was recommended for 2 months.    Any additional encounters with changes to requested med? no    Authorizing provider is: Dr. Joan Smith was approved.     Additional orders/notes: pepito Mccray RN on 8/8/2018 at 12:38 PM

## 2018-08-09 ENCOUNTER — TELEPHONE (OUTPATIENT)
Dept: CARDIOLOGY | Facility: CLINIC | Age: 58
End: 2018-08-09

## 2018-08-10 ENCOUNTER — TELEPHONE (OUTPATIENT)
Dept: ENDOCRINOLOGY | Facility: CLINIC | Age: 58
End: 2018-08-10

## 2018-08-10 DIAGNOSIS — I27.0 PRIMARY PULMONARY HYPERTENSION (H): Primary | ICD-10-CM

## 2018-08-10 NOTE — LETTER
Patient:  Judith Nelson  :   1960  MRN:     [id number]        Ms.Pamela MARY Nelson  [address] [place] [id number]        August 10, 2018    Dear Judith    I see that you do not have a follow-up appointment in endocrinology. I would recommend that you be evaluated by an endocrinologist to minimize complications. If you like to be seen at the [facility], please call [phone] select option #1 for an appointment.    Regards    Jenna Salas MD

## 2018-08-14 ENCOUNTER — OFFICE VISIT (OUTPATIENT)
Dept: PULMONOLOGY | Facility: CLINIC | Age: 58
End: 2018-08-14
Attending: INTERNAL MEDICINE
Payer: MEDICARE

## 2018-08-14 VITALS
OXYGEN SATURATION: 96 % | DIASTOLIC BLOOD PRESSURE: 69 MMHG | HEART RATE: 61 BPM | SYSTOLIC BLOOD PRESSURE: 113 MMHG | RESPIRATION RATE: 16 BRPM

## 2018-08-14 DIAGNOSIS — I27.20 PULMONARY HYPERTENSION (H): ICD-10-CM

## 2018-08-14 DIAGNOSIS — D86.9 SARCOIDOSIS: ICD-10-CM

## 2018-08-14 DIAGNOSIS — J84.9 ILD (INTERSTITIAL LUNG DISEASE) (H): Primary | ICD-10-CM

## 2018-08-14 DIAGNOSIS — I27.20 PULMONARY HYPERTENSION (H): Primary | ICD-10-CM

## 2018-08-14 LAB
6 MIN WALK (FT): NORMAL FT
6 MIN WALK (M): NORMAL M

## 2018-08-14 RX ORDER — LEVALBUTEROL TARTRATE 45 UG/1
2 AEROSOL, METERED ORAL EVERY 6 HOURS PRN
Qty: 1 INHALER | Refills: 1 | Status: SHIPPED | OUTPATIENT
Start: 2018-08-14 | End: 2018-12-06

## 2018-08-14 RX ORDER — LEVALBUTEROL TARTRATE 45 UG/1
2 AEROSOL, METERED ORAL EVERY 6 HOURS PRN
Status: DISCONTINUED | OUTPATIENT
Start: 2018-08-14 | End: 2018-08-14

## 2018-08-14 ASSESSMENT — PAIN SCALES - GENERAL: PAINLEVEL: NO PAIN (0)

## 2018-08-14 NOTE — MR AVS SNAPSHOT
After Visit Summary   8/14/2018    Judith Nelson    MRN: [id number]           Patient Information     Date Of Birth          1960        Visit Information        Provider Department      8/14/2018 2:30 PM Gael Flaherty MD [facility]        Today's Diagnoses     Pulmonary hypertension    -  1       Follow-ups after your visit        Follow-up notes from your care team     Return in about 3 months (around 11/14/2018).      Your next 10 appointments already scheduled     Sep 10, 2018 12:30 PM CDT   Lab with  LAB   [facility] ([facility])    [address]  1st Floor  [facility] [address] [phone]            Sep 10, 2018  1:00 PM CDT   (Arrive by 12:45 PM)   RETURN PRIMARY PULMONARY with JAVAN Beaulieu [facility] ([facility])    [address] [place] [address]  [address]  [facility] [address] [phone]            Dec 19, 2018  9:00 AM CST   SIX MINUTE WALK with UC PFL B, UC PFL 6 MINUTE WALK 2   [facility] Pulmonary Function Testing ([facility])    [address]  3rd Floor  [facility] [address] [phone]            Dec 19, 2018 10:30 AM CST   (Arrive by 10:15 AM)   Return Interstitial Lung with Gael Flaherty MD   [facility] ([facility])    [address] [place] [address]  [address] [place] [id number] [phone]              Who to contact     If you have questions or need follow up information about today's clinic visit or your schedule please contact [facility] directly at [phone].  Normal or non-critical lab and imaging results will be communicated to you by MyChart, letter or phone within 4 business days after the clinic has received the results. If you do not hear from us within 7 days, please contact the clinic  through [employer] or phone. If you have a critical or abnormal lab result, we will notify you by phone as soon as possible.  Submit refill requests through [employer] or call your pharmacy and they will forward the refill request to us. Please allow 3 business days for your refill to be completed.          Additional Information About Your Visit        [employer] Information     [employer] gives you secure access to your electronic health record. If you see a primary care provider, you can also send messages to your care team and make appointments. If you have questions, please call your primary care clinic.  If you do not have a primary care provider, please call [phone] and they will assist you.        Care EveryWhere ID     This is your Care EveryWhere ID. This could be used by other organizations to access your [facility] medical records  [id number]        Your Vitals Were     Pulse Respirations Pulse Oximetry             61 16 96%          Blood Pressure from Last 3 Encounters:   08/14/18 113/69   07/10/18 106/65   06/21/18 102/62    Weight from Last 3 Encounters:   07/10/18 53.5 kg (118 lb)   06/21/18 50.4 kg (111 lb 3.2 oz)   05/21/18 52.8 kg (116 lb 8 oz)                 Today's Medication Changes          These changes are accurate as of 8/14/18 11:59 PM.  If you have any questions, ask your nurse or doctor.               Start taking these medicines.        Dose/Directions    levalbuterol 45 MCG/ACT Inhaler   Commonly known as:  XOPENEX HFA   Used for:  Pulmonary hypertension   Started by:  Gael Flaherty MD        Dose:  2 puff   Inhale 2 puffs into the lungs every 6 hours as needed for shortness of breath / dyspnea or wheezing (priro to exertion/exercise.)   Quantity:  1 Inhaler   Refills:  1         These medicines have changed or have updated prescriptions.        Dose/Directions    selexipag 400 mcg, selexipag 800 mcg   This may have changed:  how much to take   Used for:  PAH (pulmonary arterial  hypertension) with portal hypertension (H)        Dose:  1200 mcg   Take 1,200 mcg by mouth every 12 hours   Quantity:  30 tablet   Refills:  0            Where to get your medicines      These medications were sent to [place], [place] - [address]  [address], [facility] [address]    Hours:  TRANSPLANT PHONE NUMBER [phone] Phone:  [phone]     levalbuterol 45 MCG/ACT Inhaler                Primary Care Provider Office Phone # Fax #    Anupama Babcock [phone]       [facility] [address] [address]        Equal Access to Services     LESLIE CHACKO AH: Hadii pablo zuniga hadsantinoo Somarsha, waaxda luqadaha, qaybta kaalmada adeegyada, alysha abdi. So [facility] [phone].    ATENCIÓN: Si habla español, tiene a dhillon disposición servicios gratuitos de asistencia lingüística. Llame al [phone].    We comply with applicable federal civil rights laws and [place] laws. We do not discriminate on the basis of race, color, national origin, age, disability, sex, sexual orientation, or gender identity.            Thank you!     Thank you for choosing [facility]  for your care. Our goal is always to provide you with excellent care. Hearing back from our patients is one way we can continue to improve our services. Please take a few minutes to complete the written survey that you may receive in the mail after your visit with us. Thank you!             Your Updated Medication List - Protect others around you: Learn how to safely use, store and throw away your medicines at www.disposemymeds.org.          This list is accurate as of 8/14/18 11:59 PM.  Always use your most recent med list.                   Brand Name Dispense Instructions for use Diagnosis    acetaminophen 500 MG tablet    TYLENOL    45 tablet    Take 1-2 tablets (500-1,000 mg) by mouth  every 6 hours as needed for pain (Take as needed for pain.  Do not exceed 4 grams (8 tablets) in a day)    Pulmonary hypertension       azaTHIOprine 50 MG tablet    IMURAN    90 tablet    Take 1 tablet (50 mg) by mouth daily    Sarcoidosis       buPROPion 200 MG 12 hr tablet    WELLBUTRIN SR    30 tablet    Take 1 tablet (200 mg) by mouth every morning    Major depressive disorder, single episode, moderate (H)       BUSPIRONE HCL PO      Take 15 mg by mouth 2 times daily        calcitRIOL 0.25 MCG capsule    ROCALTROL    30 capsule    Take 1 capsule (0.25 mcg) by mouth daily        carvedilol 12.5 MG tablet    COREG    135 tablet    Take 0.5 tablets (6.25 mg) by mouth 2 times daily (with meals)    Chronic systolic heart failure (H)       denosumab 60 MG/ML Soln injection    PROLIA    1 mL    Inject 1 mL (60 mg) Subcutaneous every 6 months    Osteoporosis, postmenopausal       EYE VITAMINS PO      Take 1 tablet by mouth daily    Sarcoidosis       furosemide 40 MG tablet    LASIX    90 tablet    Take 40 MG M-W-F, 20 MG all other days.    Pulmonary hypertension, Stress-induced cardiomyopathy       levalbuterol 45 MCG/ACT Inhaler    XOPENEX HFA    1 Inhaler    Inhale 2 puffs into the lungs every 6 hours as needed for shortness of breath / dyspnea or wheezing (priro to exertion/exercise.)    Pulmonary hypertension       LORazepam 0.5 MG tablet    ATIVAN    90 tablet    Take 0.5 mg (1 tab) every morning and 1 mg (2 tabs) every night at bedtime.    Major depressive disorder, single episode, severe with psychotic features (H)       macitentan 10 MG tablet    OPSUMIT    30 tablet    Take 1 tablet (10 mg) by mouth daily    Primary pulmonary hypertension (H)       order for DME     1 Device    Equipment being ordered: SHOWER CHAIR  FROM [facility]    Tremor, Generalized muscle weakness, Sarcoidosis       potassium chloride 10 MEQ tablet    K-TAB,KLOR-CON    90 tablet    Take 1 tablet (10 mEq) by mouth daily    Heart failure  (H)       ranitidine 75 MG tablet    ranitidine    60 tablet    Take 1-2 tablets ( mg) by mouth 2 times daily    Primary pulmonary hypertension (H), Anemia, unspecified type, SOB (shortness of breath)       selexipag 400 mcg, selexipag 800 mcg     30 tablet    Take 1,200 mcg by mouth every 12 hours    PAH (pulmonary arterial hypertension) with portal hypertension (H)       simvastatin 10 MG tablet    ZOCOR    90 tablet    Take 1 tablet by mouth At Bedtime.    Hypercholesterolemia       SODIUM BICARBONATE PO      Take by mouth 2 times daily    Pulmonary arterial hypertension       tadalafil (PAH) 20 MG Tabs    ADCIRCA    60 tablet    Take 2 tablets (40 mg) by mouth daily    Pulmonary hypertension       TEMAZEPAM PO      Take by mouth At Bedtime
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
## 2018-08-15 ENCOUNTER — TELEPHONE (OUTPATIENT)
Dept: CARDIOLOGY | Facility: CLINIC | Age: 58
End: 2018-08-15

## 2018-08-15 NOTE — TELEPHONE ENCOUNTER
"Patient left a  for Reji asking for him to have me call her; no reason given.  -------------------------------  Patient states that she had a 6mwt test yesterday and the staff person who performed the walk test criticized the way she walked like having a limp, told her she carried her arm funny and was just very negative.  Patient states that she has had MANY 6mwt performed and it has never been done the way this person did it, so she doesn't have a lot of confidence it was done correctly.  Patient ended up up crying because of her poor experience. Patient was very adamant that, \"I would never want to have her again\"!      Patient states didn't feel good all weekend, and she is very fatigued.  Patient was in clinic yesterday, but did not have any labs drawn.    --------------------------------  As documented in another encounter, patient is currently going through some big emotionally stressing activities with her father's impending passing.  Her fatigue hasn't gotten any better, but she believes that it is related to her depression with her father being ill.  Reminded patient to continue to take her meds, eat and drink enough fluids; she needs to remember to take care of herself.  Patient verbalized understanding, agreed with plan and denied any further questions. Keyanna Mccray RN on 8/20/2018 at 9:53 AM    Patient gave permission for me to discuss this negative experience with my supervisor and she would be willing to talk with anyone if they had questions.  "

## 2018-08-20 ENCOUNTER — MYC MEDICAL ADVICE (OUTPATIENT)
Dept: CARDIOLOGY | Facility: CLINIC | Age: 58
End: 2018-08-20

## 2018-08-20 NOTE — TELEPHONE ENCOUNTER
Verified Referral was received, but patient is supposed to call to schedule.  Verified understanding.  --------------------------  Patient was crying due to her dad being on hospice and was told he only has a few days left.  He has end stage Parkinson's and was admitted after having a significant fall.  Tomorrow is 20 years since her mother , and is her main go-to person.    Advised patient I called on the dental referral and they advised me she needs to call to make the appt.  Advised patient she should call and make an appt for after her daughter's wedding at the end of September, so then it's at least on the books, and the big events in her life will have passed.  Patient verbalized understanding, agreed with plan and denied any further questions. Keyanna Mccray RN on 2018 at 9:48 AM

## 2018-08-29 ENCOUNTER — TELEPHONE (OUTPATIENT)
Dept: ENDOCRINOLOGY | Facility: CLINIC | Age: 58
End: 2018-08-29

## 2018-08-29 NOTE — LETTER
Patient:  Judith Nelson  :   1960  MRN:     [id number]        MsKecia Nelson  [address] [facility] [address]        2018    Dear Mendy    I see that you do not have a follow-up appointment in endocrinology. I would recommend that you be evaluated by an endocrinologist to minimize complications. If you like to be seen at the [facility], please call [phone] select option #1 for an appointment.    Regards    Jenna Salas MD

## 2018-09-10 ENCOUNTER — CARE COORDINATION (OUTPATIENT)
Dept: PULMONOLOGY | Facility: CLINIC | Age: 58
End: 2018-09-10

## 2018-09-10 ENCOUNTER — OFFICE VISIT (OUTPATIENT)
Dept: CARDIOLOGY | Facility: CLINIC | Age: 58
End: 2018-09-10
Attending: NURSE PRACTITIONER
Payer: MEDICARE

## 2018-09-10 VITALS
SYSTOLIC BLOOD PRESSURE: 119 MMHG | WEIGHT: 117.6 LBS | HEIGHT: 63 IN | HEART RATE: 64 BPM | OXYGEN SATURATION: 94 % | DIASTOLIC BLOOD PRESSURE: 75 MMHG | BODY MASS INDEX: 20.84 KG/M2

## 2018-09-10 DIAGNOSIS — R06.09 DYSPNEA ON EXERTION: ICD-10-CM

## 2018-09-10 DIAGNOSIS — I27.20 PULMONARY HYPERTENSION (H): ICD-10-CM

## 2018-09-10 LAB
ALBUMIN SERPL-MCNC: 4.1 G/DL (ref 3.4–5)
ALP SERPL-CCNC: 61 U/L (ref 40–150)
ALT SERPL W P-5'-P-CCNC: 22 U/L (ref 0–50)
ANION GAP SERPL CALCULATED.3IONS-SCNC: 8 MMOL/L (ref 3–14)
AST SERPL W P-5'-P-CCNC: 16 U/L (ref 0–45)
BILIRUB SERPL-MCNC: 0.6 MG/DL (ref 0.2–1.3)
BUN SERPL-MCNC: 20 MG/DL (ref 7–30)
CALCIUM SERPL-MCNC: 9.3 MG/DL (ref 8.5–10.1)
CHLORIDE SERPL-SCNC: 108 MMOL/L (ref 94–109)
CO2 SERPL-SCNC: 25 MMOL/L (ref 20–32)
CREAT SERPL-MCNC: 1.42 MG/DL (ref 0.52–1.04)
ERYTHROCYTE [DISTWIDTH] IN BLOOD BY AUTOMATED COUNT: 12.5 % (ref 10–15)
GFR SERPL CREATININE-BSD FRML MDRD: 38 ML/MIN/1.7M2
GLUCOSE SERPL-MCNC: 81 MG/DL (ref 70–99)
HCT VFR BLD AUTO: 46.9 % (ref 35–47)
HGB BLD-MCNC: 15.7 G/DL (ref 11.7–15.7)
MCH RBC QN AUTO: 32.1 PG (ref 26.5–33)
MCHC RBC AUTO-ENTMCNC: 33.5 G/DL (ref 31.5–36.5)
MCV RBC AUTO: 96 FL (ref 78–100)
NT-PROBNP SERPL-MCNC: 775 PG/ML (ref 0–125)
PLATELET # BLD AUTO: 185 10E9/L (ref 150–450)
POTASSIUM SERPL-SCNC: 4 MMOL/L (ref 3.4–5.3)
PROT SERPL-MCNC: 7.4 G/DL (ref 6.8–8.8)
RBC # BLD AUTO: 4.89 10E12/L (ref 3.8–5.2)
SODIUM SERPL-SCNC: 141 MMOL/L (ref 133–144)
WBC # BLD AUTO: 4.6 10E9/L (ref 4–11)

## 2018-09-10 PROCEDURE — 99214 OFFICE O/P EST MOD 30 MIN: CPT | Mod: ZP | Performed by: NURSE PRACTITIONER

## 2018-09-10 PROCEDURE — G0463 HOSPITAL OUTPT CLINIC VISIT: HCPCS | Mod: ZF

## 2018-09-10 PROCEDURE — 85027 COMPLETE CBC AUTOMATED: CPT | Performed by: NURSE PRACTITIONER

## 2018-09-10 PROCEDURE — 36415 COLL VENOUS BLD VENIPUNCTURE: CPT | Performed by: NURSE PRACTITIONER

## 2018-09-10 PROCEDURE — 80053 COMPREHEN METABOLIC PANEL: CPT | Performed by: NURSE PRACTITIONER

## 2018-09-10 PROCEDURE — 83880 ASSAY OF NATRIURETIC PEPTIDE: CPT | Performed by: NURSE PRACTITIONER

## 2018-09-10 ASSESSMENT — PAIN SCALES - GENERAL: PAINLEVEL: NO PAIN (0)

## 2018-09-10 NOTE — LETTER
9/10/2018      RE: Judith Nelson  [address] [facility] [address]       Dear Colleague,    Thank you for the opportunity to participate in the care of your patient, Judith Nelson, at the [facility] at [facility]. Please see a copy of my visit note below.    Sept 10, 2018      Ms. Judith Nelson is a pleasant 58 year old female with a past medical history of pulmonary arterial hypertension, iron deficiency anemia, sarcoidosis, hyperprolactinemia, depression, MGUS, CKD, anxiety and depression.       Today, she is accompanied by: sister in law, Bessie    Current Symptoms  1. Any ER visits or hospital admissions since last appointment: No.  2. Shortness of breath: Yes: with exertion, has not used rescue inhaler, feels that things are the same  3. Dizziness or lightheadedness: Occasional, quick position changes  4. Any syncopal episodes or falls: No  5. Chest pain or chest tightness: No  6. Nausea, vomiting, diarrhea, constipation: No  7. Swelling in the legs: Trace  8. Bloating in the abdomen: Yes, has felt increased bloating, however had Chinese Buffet yesterday  9. PND; Waking up at night coughing, choking or SOB: No  10. Any medication intolerances: No; feels the Uptravi is much more compatible with her body  11. Reported headaches: No  12. Jaw pain or bone/joint pain: Yes, chronic right hip pain, post replacement  13. On IV Prostacyclin: No; current dose: N/A    14. Current level of activity: active    PAST MEDICAL HISTORY:  Past Medical History:   Diagnosis Date     Anxiety      CKD (chronic kidney disease), stage III     biopsy suspicious for renal sarcoidosis      Hyperprolactinemia (H)      Lower extremity edema     chronic w/ chronic right ankle ulcer     Lumbar compression fracture (H)      Major depressive disorder     with psychotic features, followed by Dr. Rosales at [facility] in [place]     Menopause 2012    patient is post  menopausal     MGUS (monoclonal gammopathy of unknown significance)     mild, followed by Dr. Long     Osteoporosis      Other seborrheic keratosis      Protrusio acetabuli      Pulmonary hypertension      Sarcoidosis      Stress-induced cardiomyopathy          FAMILY HISTORY:  Family History   Problem Relation Age of Onset     Cancer Mother       age 62 leukemia     GASTROINTESTINAL DISEASE Mother      diverticulitis     Hypertension Mother      Allergies Mother      Arthritis Mother      Depression Mother      Eye Disorder Mother      Osteoporosis Mother      C.A.D. Father      MI/CAD      Cancer Father      skin     Blood Disease Father      renal  problem     Hypertension Father      Anesthesia Reaction Father      Cardiovascular Father      Neurologic Disorder Father      Parkinson's disease     C.A.D. Maternal Grandmother       late 82s MI     Diabetes Maternal Grandmother      C.A.D. Maternal Grandfather       mid 80s MI     Alzheimer Disease Paternal Grandmother       80s     Alzheimer Disease Paternal Grandfather       80s     Neurologic Disorder Sister      migraines     Allergies Sister      Diabetes Other      AODM     Neurologic Disorder Sister      migraines     Allergies Sister      Anesthesia Reaction Son      Anesthesia Reaction Daughter      Anesthesia Reaction Daughter      Diabetes Sister      hypoglycemia         SOCIAL HISTORY:  Social History   Substance Use Topics     Smoking status: Never Smoker     Smokeless tobacco: Never Used     Alcohol use No      Comment: Occ.         CURRENT MEDICATIONS:  Current Outpatient Prescriptions   Medication Sig Dispense Refill     acetaminophen (TYLENOL) 500 MG tablet Take 1-2 tablets (500-1,000 mg) by mouth every 6 hours as needed for pain (Take as needed for pain.  Do not exceed 4 grams (8 tablets) in a day) 45 tablet 10     azaTHIOprine (IMURAN) 50 MG tablet Take 1 tablet (50 mg) by mouth daily 90 tablet 3     buPROPion (WELLBUTRIN SR)  200 MG 12 hr tablet Take 1 tablet (200 mg) by mouth every morning 30 tablet 0     BUSPIRONE HCL PO Take 15 mg by mouth 2 times daily       calcitRIOL (ROCALTROL) 0.25 MCG capsule Take 1 capsule (0.25 mcg) by mouth daily 30 capsule 1     carvedilol (COREG) 12.5 MG tablet Take 0.5 tablets (6.25 mg) by mouth 2 times daily (with meals) 135 tablet 3     denosumab (PROLIA) 60 MG/ML SOLN Inject 1 mL (60 mg) Subcutaneous every 6 months 1 mL 1     furosemide (LASIX) 40 MG tablet Take 40 MG M-W-F, 20 MG all other days. 90 tablet 3     levalbuterol (XOPENEX HFA) 45 MCG/ACT Inhaler Inhale 2 puffs into the lungs every 6 hours as needed for shortness of breath / dyspnea or wheezing (priro to exertion/exercise.) 1 Inhaler 1     LORazepam (ATIVAN) 0.5 MG tablet Take 0.5 mg (1 tab) every morning and 1 mg (2 tabs) every night at bedtime. 90 tablet 1     macitentan (OPSUMIT) 10 MG tablet Take 1 tablet (10 mg) by mouth daily 30 tablet 11     ORDER FOR DME Equipment being ordered: SHOWER CHAIR  FROM [employer] 1 Device 0     potassium chloride (K-TAB,KLOR-CON) 10 MEQ tablet Take 1 tablet (10 mEq) by mouth daily 90 tablet 1     ranitidine (RANITIDINE) 75 MG tablet Take 1-2 tablets ( mg) by mouth 2 times daily 60 tablet 11     selexipag 400 mcg, selexipag 800 mcg Take 1,200 mcg by mouth every 12 hours (Patient taking differently: Take 1,600 mcg by mouth every 12 hours ) 30 tablet 0     simvastatin (ZOCOR) 10 MG tablet Take 1 tablet by mouth At Bedtime. 90 tablet 1     SODIUM BICARBONATE PO Take by mouth 2 times daily       tadalafil, PAH, (ADCIRCA) 20 MG TABS Take 2 tablets (40 mg) by mouth daily 60 tablet 11     TEMAZEPAM PO Take by mouth At Bedtime       Multiple Vitamins-Minerals (EYE VITAMINS PO) Take 1 tablet by mouth daily           ROS:   10 point ROS of systems including Constitutional, Eyes, Respiratory, Cardiovascular, Gastroenterology, Genitourinary, Integumentary, Muscularskeletal, Psychiatric were all negative except  "for pertinent positives noted in my HPI.    EXAM:  /75 (BP Location: Left arm, Patient Position: Chair, Cuff Size: Adult Regular)  Pulse 64  Ht 1.6 m (5' 3\")  Wt 53.3 kg (117 lb 9.6 oz)  SpO2 94%  BMI 20.83 kg/m2  General: appears comfortable, alert and articulate  Head: normocephalic, atraumatic  Eyes: anicteric sclera, EOMI  Neck: no adenopathy  Orophyarynx: moist mucosa, no lesions, dentition intact  Heart: regular, S1/S2, no murmur, gallop, rub, estimated JVP wnl  Lungs: clear, no rales or wheezing  Abdomen: soft, non-tender, bowel sounds present, no hepatosplenomegaly  Extremities: no clubbing, cyanosis or edema  Neurological: normal speech and affect, no gross motor deficits      Weight  Wt Readings from Last 10 Encounters:   09/10/18 53.3 kg (117 lb 9.6 oz)   07/10/18 53.5 kg (118 lb)   06/21/18 50.4 kg (111 lb 3.2 oz)   05/21/18 52.8 kg (116 lb 8 oz)   05/01/18 51 kg (112 lb 8 oz)   04/30/18 51.7 kg (114 lb)   04/18/18 53.6 kg (118 lb 3.2 oz)   03/21/18 52.2 kg (115 lb)   02/09/18 54.3 kg (119 lb 9.6 oz)   02/06/18 52.2 kg (115 lb)     Up 4 lbs at home scale.    Labs:    CBC RESULTS:  Lab Results   Component Value Date    WBC 4.6 09/10/2018    RBC 4.89 09/10/2018    HGB 15.7 09/10/2018    HCT 46.9 09/10/2018    MCV 96 09/10/2018    MCH 32.1 09/10/2018    MCHC 33.5 09/10/2018    RDW 12.5 09/10/2018     09/10/2018       CMP RESULTS:  Lab Results   Component Value Date     09/10/2018    POTASSIUM 4.0 09/10/2018    CHLORIDE 108 09/10/2018    CO2 25 09/10/2018    ANIONGAP 8 09/10/2018    GLC 81 09/10/2018    BUN 20 09/10/2018    CR 1.42 (H) 09/10/2018    GFRESTIMATED 38 (L) 09/10/2018    GFRESTBLACK 46 (L) 09/10/2018    ISHMAEL 9.3 09/10/2018    BILITOTAL 0.6 09/10/2018    ALBUMIN 4.1 09/10/2018    ALKPHOS 61 09/10/2018    ALT 22 09/10/2018    AST 16 09/10/2018        INR RESULTS:  Lab Results   Component Value Date    INR 1.04 03/26/2013       BNP RESULTS:  Lab Results   Component Value Date "    NTBNPI 278 06/21/2018     No results found for: BNP      Recent Tests:      Echocardiogram (6/15/2018):  Interpretation Summary  The RV is moderately dilated. There is mild right ventricular hypertrophy.  Global right ventricular function is either normal or mildly reduced.  Global and regional left ventricular function is normal with an EF of 60-65%.  Severe pulmonary hypertension. Right ventricular systolic pressure is 94 mmHg  above the right atrial pressure.  The inferior vena cava is normal.     Left Ventricle  Global and regional left ventricular function is normal with an EF of 60-65%.  Left ventricular size is normal. Left ventricular wall thickness is normal.  Paradoxical septal motion consistent with right ventricular pressure overload  is present.     Right Ventricle  The RV is moderately dilated. There is mild right ventricular hypertrophy.  Global right ventricular function is mildly reduced.     Atria  Severe biatrial enlargement is present.     Mitral Valve  The mitral valve is normal. Trace mitral insufficiency is present.        Aortic Valve  Aortic valve is normal in structure and function.     Tricuspid Valve  The tricuspid valve is normal. Moderate tricuspid insufficiency is present.  Severe pulmonary hypertension is present. Right ventricular systolic pressure  is 94mmHg above the right atrial pressure.     Pulmonic Valve  The pulmonic valve is normal.     Vessels  The aorta root is normal. The inferior vena cava is normal. Estimated mean  right atrial pressure is 3 mmHg (normal).     Pericardium  Trivial pericardial effusion is present        Assessment and Plan:   Ms. Judith Nelson is a 58 year old female with pulmonary arterial hypertension and sarcoidosis, WHO Group I, NYHA Functional Class II.      #PAH  Mrs. Judith Nelson returns to clinic for follow up, with her Sister-in-law, Bessie.  She reports feeling well, however she had Chinese Buffet the day before and subsequently feels  bloated with an increased body weight up 4 lbs.  She also had a recent overnight oximetry study that recommended nocturnal oxygen. Her 6 minute walk test results today showed improved ambulation on 2L NC, however Ms. Nelson is very resistant to adding supplemental oxygen when exerting herself. She is however, more open to trying nocturnal oxygen and although she was aware of this recommendation, her father recently passed and she has been overwhelmed with the transition and grieving.  Clinically, her vital signs and labs are stable; physical exam shows no signs of right heart failure or fluid overload with no JVD, lower leg swelling, but positive abdominal bloating. Her Lasix was therefore increased for several days, along with PO potassium.  She will have nocturnal oxygen set up through [facility] by our office, and she will return to clinic in 2 months with labs prior.               Korin Hirsch, APRN, CNP.

## 2018-09-10 NOTE — NURSING NOTE
Chief Complaint   Patient presents with     Follow Up For     Return for 2 month PH F/U with labs prior (uptitrating Uptravi)     Vitals were taken and medications were reconciled.     Bria Camarillo MA    1:07 PM

## 2018-09-10 NOTE — NURSING NOTE
Med Reconcile: Reviewed and verified all current medications with the patient. The updated medication list was printed and given to the patient.    Labs: Patient was given results of the laboratory testing obtained today. Patient demonstrated understanding of this information and agreed to call with further questions or concerns.     Diet: Patient instructed regarding a heart healthy diet, including discussion of reduced fat and sodium intake. Patient demonstrated understanding of this information and agreed to call with further questions or concerns.    Return Appointment: Patient given instructions regarding scheduling next clinic visit. Patient demonstrated understanding of this information and agreed to call with further questions or concerns.    Patient stated she understood all health information given and agreed to call with further questions or concerns.    Medication Changes:  1. Take an extra 40 mg Lasix this afternoon, then take 40 mg tomorrow as well (Tuesday). Restart your normal schedule for Wednesday.  2. Take an extra Potassium pill, today and tomorrow.    Patient Instructions:  1. Continue staying active and eat a heart healthy diet.  2. Please keep a current list of medications with you at all times.  3. We will call [facility] to set up overnight oxygen.    Follow up Appointment Information:  1. Follow up with Dr. Franco in 2 months, with labs    Results:  Component      Latest Ref Rng & Units 9/10/2018   Sodium      133 - 144 mmol/L 141   Potassium      3.4 - 5.3 mmol/L 4.0   Chloride      94 - 109 mmol/L 108   Carbon Dioxide      20 - 32 mmol/L 25   Anion Gap      3 - 14 mmol/L 8   Glucose      70 - 99 mg/dL 81   Urea Nitrogen      7 - 30 mg/dL 20   Creatinine      0.52 - 1.04 mg/dL 1.42 (H)   GFR Estimate      >60 mL/min/1.7m2 38 (L)   GFR Estimate If Black      >60 mL/min/1.7m2 46 (L)   Calcium      8.5 - 10.1 mg/dL 9.3   Bilirubin Total      0.2 - 1.3 mg/dL 0.6   Albumin      3.4 - 5.0 g/dL  4.1   Protein Total      6.8 - 8.8 g/dL 7.4   Alkaline Phosphatase      40 - 150 U/L 61   ALT      0 - 50 U/L 22   AST      0 - 45 U/L 16   WBC      4.0 - 11.0 10e9/L 4.6   RBC Count      3.8 - 5.2 10e12/L 4.89   Hemoglobin      11.7 - 15.7 g/dL 15.7   Hematocrit      35.0 - 47.0 % 46.9   MCV      78 - 100 fl 96   MCH      26.5 - 33.0 pg 32.1   MCHC      31.5 - 36.5 g/dL 33.5   RDW      10.0 - 15.0 % 12.5   Platelet Count      150 - 450 10e9/L 185   N-Terminal Pro Bnp      0 - 125 pg/mL 775 (H)

## 2018-09-10 NOTE — MR AVS SNAPSHOT
After Visit Summary   9/10/2018    Judith Nelson    MRN: [id number]           Patient Information     Date Of Birth          1960        Visit Information        Provider Department      9/10/2018 1:00 PM Korin Hirsch, APRN CNP [facility]        Today's Diagnoses     Pulmonary hypertension        Dyspnea on exertion          Care Instructions    Medication Changes:  1. Take an extra 40 mg Lasix this afternoon, then take 40 mg tomorrow as well (Tuesday). Restart your normal schedule for Wednesday.  2. Take an extra Potassium pill, today and tomorrow.    Patient Instructions:  1. Continue staying active and eat a heart healthy diet.  2. Please keep a current list of medications with you at all times.  3. We will call [facility] to set up overnight oxygen.    Follow up Appointment Information:  1. Follow up with Dr. Franco in 2 months, with labs    Results:  Component      Latest Ref Rng & Units 9/10/2018   Sodium      133 - 144 mmol/L 141   Potassium      3.4 - 5.3 mmol/L 4.0   Chloride      94 - 109 mmol/L 108   Carbon Dioxide      20 - 32 mmol/L 25   Anion Gap      3 - 14 mmol/L 8   Glucose      70 - 99 mg/dL 81   Urea Nitrogen      7 - 30 mg/dL 20   Creatinine      0.52 - 1.04 mg/dL 1.42 (H)   GFR Estimate      >60 mL/min/1.7m2 38 (L)   GFR Estimate If Black      >60 mL/min/1.7m2 46 (L)   Calcium      8.5 - 10.1 mg/dL 9.3   Bilirubin Total      0.2 - 1.3 mg/dL 0.6   Albumin      3.4 - 5.0 g/dL 4.1   Protein Total      6.8 - 8.8 g/dL 7.4   Alkaline Phosphatase      40 - 150 U/L 61   ALT      0 - 50 U/L 22   AST      0 - 45 U/L 16   WBC      4.0 - 11.0 10e9/L 4.6   RBC Count      3.8 - 5.2 10e12/L 4.89   Hemoglobin      11.7 - 15.7 g/dL 15.7   Hematocrit      35.0 - 47.0 % 46.9   MCV      78 - 100 fl 96   MCH      26.5 - 33.0 pg 32.1   MCHC      31.5 - 36.5 g/dL 33.5   RDW      10.0 - 15.0 % 12.5   Platelet Count      150 - 450 10e9/L 185   N-Terminal Pro Bnp      0 - 125  pg/mL 775 (H)     We are located on the third floor of the [facility] ([facility]) on the [facility].  Our address is     [address] [place] [address] [place] [address] [place] [address]    Thank you for allowing us to be a part of your care here at the [facility]    If you have questions or concerns please contact us at:    Keyanna Mccray RN, BSN, PHN  Reji Escobar (Schedule,Prior Auth)  Nurse Coordinator     Clinic   Pulmonary Hypertension   Pulmonary Hypertension  [facility]  [phone])[phone]    (P) [phone]        (F) [phone]    ** Please note that you will NOT receive a reminder call regarding your scheduled testing, reminder calls are for provider appointments only.  If you are scheduled for testing within the [employer] system you may receive a call regarding pre-registration for billing purposes only.**     Remember to weigh yourself daily after voiding and before you consume any food or beverages and log the numbers.  If you have gained/lost 2 pounds overnight or 5 pounds in a week contact us immediately for medication adjustments or further instructions.   **Please call us immediately if you have any syncope, chest pain, edema, or decline in your functional status.    Support Group:  Pulmonary Hypertension Association  Https://www.phassociation.org/  **Look at the Events Tab** They even have Support Groups that you can call into    [facility] Support Group  First Saturday of the Month from 1-3 PM   Location: [address] [place] [address]  Leader: Michelet Garcia  Phone: [phone]  Email: [email or web address]            Follow-ups after your visit        Additional Services     Follow-Up with Pulmonary Hypertension Clinic       W/labs prior                  Your next 10 appointments already scheduled     Nov 07, 2018  2:30 PM CST   Lab with  [facility]    [facility] ([facility])    [address]  1st Floor  [facility] [address]   [phone]            Nov 07, 2018  3:00 PM CST   (Arrive by 2:45 PM)   RETURN PRIMARY PULMONARY with Norm Franco MD   [facility] ([facility])    [address]  [address]  [facility] [address]   [phone]            Dec 19, 2018  9:00 AM CST   SIX MINUTE WALK with  PFL B,  PFL 6 MINUTE WALK 2   [facility] Pulmonary Function Testing ([facility])    [address]  3rd Floor  [facility] [address]   [phone]            Dec 19, 2018 10:30 AM CST   (Arrive by 10:15 AM)   Return Interstitial Lung with Gael Flaherty MD   [facility] ([facility])    [address]  [address]  [facility] [address] [phone]              Future tests that were ordered for you today     Open Future Orders        Priority Expected Expires Ordered    N terminal pro BNP outpatient Routine 11/7/2018 9/10/2019 9/10/2018    CBC with platelets Routine 11/7/2018 9/10/2019 9/10/2018    Comprehensive metabolic panel Routine 11/7/2018 9/10/2019 9/10/2018    Follow-Up with Pulmonary Hypertension Clinic Routine 11/7/2018 9/10/2019 9/10/2018            Who to contact     If you have questions or need follow up information about today's clinic visit or your schedule please contact [facility] directly at [phone].  Normal or non-critical lab and imaging results will be communicated to you by MyChart, letter or phone within 4 business days after the clinic has received the results. If you do not hear from us within 7 days, please contact the clinic through MyChart or phone. If you have a critical or abnormal lab result, we will notify you by phone as soon as possible.  Submit refill requests through [employer] or call your pharmacy and they will  "forward the refill request to us. Please allow 3 business days for your refill to be completed.          Additional Information About Your Visit        [employer] Information     [employer] gives you secure access to your electronic health record. If you see a primary care provider, you can also send messages to your care team and make appointments. If you have questions, please call your primary care clinic.  If you do not have a primary care provider, please call [phone] and they will assist you.        Care EveryWhere ID     This is your Care EveryWhere ID. This could be used by other organizations to access your [place] medical records  [id number]        Your Vitals Were     Pulse Height Pulse Oximetry BMI (Body Mass Index)          64 1.6 m (5' 3\") 94% 20.83 kg/m2         Blood Pressure from Last 3 Encounters:   09/10/18 119/75   08/14/18 113/69   07/10/18 106/65    Weight from Last 3 Encounters:   09/10/18 53.3 kg (117 lb 9.6 oz)   07/10/18 53.5 kg (118 lb)   06/21/18 50.4 kg (111 lb 3.2 oz)              We Performed the Following     Follow-Up with Cardiac Advanced Practice Provider          Today's Medication Changes          These changes are accurate as of 9/10/18  2:40 PM.  If you have any questions, ask your nurse or doctor.               These medicines have changed or have updated prescriptions.        Dose/Directions    selexipag 400 mcg, selexipag 800 mcg   This may have changed:  how much to take   Used for:  PAH (pulmonary arterial hypertension) with portal hypertension (H)        Dose:  1200 mcg   Take 1,200 mcg by mouth every 12 hours   Quantity:  30 tablet   Refills:  0                Primary Care Provider Office Phone # Fax #    Anupama Babcock [phone]       [address] [address]        Equal Access to Services     LESLIE CHACKO AH: Flavio Leiva, natanael salas, alysha ernst " lillichiquitafranklin worley ah. So [facility] [phone].    ATENCIÓN: Si bruna coello, tiene a dhillon disposición servicios gratuitos de asistencia lingüística. [employer] fox [phone].    We comply with applicable federal civil rights laws and [place] laws. We do not discriminate on the basis of race, color, national origin, age, disability, sex, sexual orientation, or gender identity.            Thank you!     Thank you for choosing [facility]  for your care. Our goal is always to provide you with excellent care. Hearing back from our patients is one way we can continue to improve our services. Please take a few minutes to complete the written survey that you may receive in the mail after your visit with us. Thank you!             Your Updated Medication List - Protect others around you: Learn how to safely use, store and throw away your medicines at www.disposemymeds.org.          This list is accurate as of 9/10/18  2:40 PM.  Always use your most recent med list.                   Brand Name Dispense Instructions for use Diagnosis    acetaminophen 500 MG tablet    TYLENOL    45 tablet    Take 1-2 tablets (500-1,000 mg) by mouth every 6 hours as needed for pain (Take as needed for pain.  Do not exceed 4 grams (8 tablets) in a day)    Pulmonary hypertension       azaTHIOprine 50 MG tablet    IMURAN    90 tablet    Take 1 tablet (50 mg) by mouth daily    Sarcoidosis       buPROPion 200 MG 12 hr tablet    WELLBUTRIN SR    30 tablet    Take 1 tablet (200 mg) by mouth every morning    Major depressive disorder, single episode, moderate (H)       BUSPIRONE HCL PO      Take 15 mg by mouth 2 times daily        calcitRIOL 0.25 MCG capsule    ROCALTROL    30 capsule    Take 1 capsule (0.25 mcg) by mouth daily        carvedilol 12.5 MG tablet    COREG    135 tablet    Take 0.5 tablets (6.25 mg) by mouth 2 times daily (with meals)    Chronic systolic heart failure (H)       denosumab 60 MG/ML Soln injection    PROLIA    1 mL    Inject  1 mL (60 mg) Subcutaneous every 6 months    Osteoporosis, postmenopausal       furosemide 40 MG tablet    LASIX    90 tablet    Take 40 MG M-W-F, 20 MG all other days.    Pulmonary hypertension, Stress-induced cardiomyopathy       levalbuterol 45 MCG/ACT Inhaler    XOPENEX HFA    1 Inhaler    Inhale 2 puffs into the lungs every 6 hours as needed for shortness of breath / dyspnea or wheezing (priro to exertion/exercise.)    Pulmonary hypertension       LORazepam 0.5 MG tablet    ATIVAN    90 tablet    Take 0.5 mg (1 tab) every morning and 1 mg (2 tabs) every night at bedtime.    Major depressive disorder, single episode, severe with psychotic features (H)       macitentan 10 MG tablet    OPSUMIT    30 tablet    Take 1 tablet (10 mg) by mouth daily    Primary pulmonary hypertension (H)       order for DME     1 Device    Equipment being ordered: SHOWER CHAIR  FROM [facility]    Tremor, Generalized muscle weakness, Sarcoidosis       potassium chloride 10 MEQ tablet    K-TAB,KLOR-CON    90 tablet    Take 1 tablet (10 mEq) by mouth daily    Heart failure (H)       ranitidine 75 MG tablet    ranitidine    60 tablet    Take 1-2 tablets ( mg) by mouth 2 times daily    Primary pulmonary hypertension (H), Anemia, unspecified type, SOB (shortness of breath)       selexipag 400 mcg, selexipag 800 mcg     30 tablet    Take 1,200 mcg by mouth every 12 hours    PAH (pulmonary arterial hypertension) with portal hypertension (H)       simvastatin 10 MG tablet    ZOCOR    90 tablet    Take 1 tablet by mouth At Bedtime.    Hypercholesterolemia       SODIUM BICARBONATE PO      Take by mouth 2 times daily    Pulmonary arterial hypertension       tadalafil (PAH) 20 MG Tabs    ADCIRCA    60 tablet    Take 2 tablets (40 mg) by mouth daily    Pulmonary hypertension       TEMAZEPAM PO      Take by mouth At Bedtime

## 2018-09-10 NOTE — PATIENT INSTRUCTIONS
Medication Changes:  1. Take an extra 40 mg Lasix this afternoon, then take 40 mg tomorrow as well (Tuesday). Restart your normal schedule for Wednesday.  2. Take an extra Potassium pill, today and tomorrow.    Patient Instructions:  1. Continue staying active and eat a heart healthy diet.  2. Please keep a current list of medications with you at all times.  3. We will call [facility] to set up overnight oxygen.    Follow up Appointment Information:  1. Follow up with Dr. Farnco in 2 months, with labs    Results:  Component      Latest Ref Rng & Units 9/10/2018   Sodium      133 - 144 mmol/L 141   Potassium      3.4 - 5.3 mmol/L 4.0   Chloride      94 - 109 mmol/L 108   Carbon Dioxide      20 - 32 mmol/L 25   Anion Gap      3 - 14 mmol/L 8   Glucose      70 - 99 mg/dL 81   Urea Nitrogen      7 - 30 mg/dL 20   Creatinine      0.52 - 1.04 mg/dL 1.42 (H)   GFR Estimate      >60 mL/min/1.7m2 38 (L)   GFR Estimate If Black      >60 mL/min/1.7m2 46 (L)   Calcium      8.5 - 10.1 mg/dL 9.3   Bilirubin Total      0.2 - 1.3 mg/dL 0.6   Albumin      3.4 - 5.0 g/dL 4.1   Protein Total      6.8 - 8.8 g/dL 7.4   Alkaline Phosphatase      40 - 150 U/L 61   ALT      0 - 50 U/L 22   AST      0 - 45 U/L 16   WBC      4.0 - 11.0 10e9/L 4.6   RBC Count      3.8 - 5.2 10e12/L 4.89   Hemoglobin      11.7 - 15.7 g/dL 15.7   Hematocrit      35.0 - 47.0 % 46.9   MCV      78 - 100 fl 96   MCH      26.5 - 33.0 pg 32.1   MCHC      31.5 - 36.5 g/dL 33.5   RDW      10.0 - 15.0 % 12.5   Platelet Count      150 - 450 10e9/L 185   N-Terminal Pro Bnp      0 - 125 pg/mL 775 (H)     We are located on the third floor of the [facility] ([facility] on the [facility].  Our address is     [address] [place] [address] on [address]rd [place] [address]    Thank you for allowing us to be a part of your care here at the [facility]    If you have questions or concerns please contact us at:    Keyanna  Mahendra, RN, BSN, PHN  Reji Escobar (Schedule,Prior Auth)  Nurse Coordinator     Clinic   Pulmonary Hypertension   Pulmonary Hypertension  [facility]  (P)[phone]    (P) [phone]        (F) [phone]    ** Please note that you will NOT receive a reminder call regarding your scheduled testing, reminder calls are for provider appointments only.  If you are scheduled for testing within the [facility] system you may receive a call regarding pre-registration for billing purposes only.**     Remember to weigh yourself daily after voiding and before you consume any food or beverages and log the numbers.  If you have gained/lost 2 pounds overnight or 5 pounds in a week contact us immediately for medication adjustments or further instructions.   **Please call us immediately if you have any syncope, chest pain, edema, or decline in your functional status.    Support Group:  Pulmonary Hypertension Association  Https://www.phassociation.org/  **Look at the Events Tab** They even have Support Groups that you can call into    [facility] PH Support Group  First Saturday of the Month from 1-3 PM   Location: [address] [place] [address]  Leader: Michelet Garcia  Phone: [phone]  Email: [email or web address]

## 2018-09-10 NOTE — NURSING NOTE
Message was sent to the [facility] nurse pool asking them to contact patient to set up nocturnal oxygen.  Patient was advised of this.  Keyanna Mccray RN on 9/10/2018 at 2:43 PM

## 2018-09-10 NOTE — PROGRESS NOTES
Sept 10, 2018      Ms. Judith Nelson is a pleasant 58 year old female with a past medical history of pulmonary arterial hypertension, iron deficiency anemia, sarcoidosis, hyperprolactinemia, depression, MGUS, CKD, anxiety and depression.       Today, she is accompanied by: sister in law, Bessie    Current Symptoms  1. Any ER visits or hospital admissions since last appointment: No.  2. Shortness of breath: Yes: with exertion, has not used rescue inhaler, feels that things are the same  3. Dizziness or lightheadedness: Occasional, quick position changes  4. Any syncopal episodes or falls: No  5. Chest pain or chest tightness: No  6. Nausea, vomiting, diarrhea, constipation: No  7. Swelling in the legs: Trace  8. Bloating in the abdomen: Yes, has felt increased bloating, however had Chinese Buffet yesterday  9. PND; Waking up at night coughing, choking or SOB: No  10. Any medication intolerances: No; feels the Uptravi is much more compatible with her body  11. Reported headaches: No  12. Jaw pain or bone/joint pain: Yes, chronic right hip pain, post replacement  13. On IV Prostacyclin: No; current dose: N/A    14. Current level of activity: active    PAST MEDICAL HISTORY:  Past Medical History:   Diagnosis Date     Anxiety      CKD (chronic kidney disease), stage III     biopsy suspicious for renal sarcoidosis      Hyperprolactinemia (H)      Lower extremity edema     chronic w/ chronic right ankle ulcer     Lumbar compression fracture (H)      Major depressive disorder     with psychotic features, followed by Dr. Rosales at [facility] in [place]     Menopause 2012    patient is post menopausal     MGUS (monoclonal gammopathy of unknown significance)     mild, followed by Dr. Long     Osteoporosis      Other seborrheic keratosis      Protrusio acetabuli      Pulmonary hypertension      Sarcoidosis      Stress-induced cardiomyopathy          FAMILY HISTORY:  Family History   Problem Relation Age of Onset      Cancer Mother       age 62 leukemia     GASTROINTESTINAL DISEASE Mother      diverticulitis     Hypertension Mother      Allergies Mother      Arthritis Mother      Depression Mother      Eye Disorder Mother      Osteoporosis Mother      C.A.D. Father      MI/CAD      Cancer Father      skin     Blood Disease Father      renal  problem     Hypertension Father      Anesthesia Reaction Father      Cardiovascular Father      Neurologic Disorder Father      Parkinson's disease     C.A.D. Maternal Grandmother       late 82s MI     Diabetes Maternal Grandmother      C.A.D. Maternal Grandfather       mid 80s MI     Alzheimer Disease Paternal Grandmother       80s     Alzheimer Disease Paternal Grandfather       80s     Neurologic Disorder Sister      migraines     Allergies Sister      Diabetes Other      AODM     Neurologic Disorder Sister      migraines     Allergies Sister      Anesthesia Reaction Son      Anesthesia Reaction Daughter      Anesthesia Reaction Daughter      Diabetes Sister      hypoglycemia         SOCIAL HISTORY:  Social History   Substance Use Topics     Smoking status: Never Smoker     Smokeless tobacco: Never Used     Alcohol use No      Comment: Occ.         CURRENT MEDICATIONS:  Current Outpatient Prescriptions   Medication Sig Dispense Refill     acetaminophen (TYLENOL) 500 MG tablet Take 1-2 tablets (500-1,000 mg) by mouth every 6 hours as needed for pain (Take as needed for pain.  Do not exceed 4 grams (8 tablets) in a day) 45 tablet 10     azaTHIOprine (IMURAN) 50 MG tablet Take 1 tablet (50 mg) by mouth daily 90 tablet 3     buPROPion (WELLBUTRIN SR) 200 MG 12 hr tablet Take 1 tablet (200 mg) by mouth every morning 30 tablet 0     BUSPIRONE HCL PO Take 15 mg by mouth 2 times daily       calcitRIOL (ROCALTROL) 0.25 MCG capsule Take 1 capsule (0.25 mcg) by mouth daily 30 capsule 1     carvedilol (COREG) 12.5 MG tablet Take 0.5 tablets (6.25 mg) by mouth 2 times daily (with  "meals) 135 tablet 3     denosumab (PROLIA) 60 MG/ML SOLN Inject 1 mL (60 mg) Subcutaneous every 6 months 1 mL 1     furosemide (LASIX) 40 MG tablet Take 40 MG M-W-F, 20 MG all other days. 90 tablet 3     levalbuterol (XOPENEX HFA) 45 MCG/ACT Inhaler Inhale 2 puffs into the lungs every 6 hours as needed for shortness of breath / dyspnea or wheezing (priro to exertion/exercise.) 1 Inhaler 1     LORazepam (ATIVAN) 0.5 MG tablet Take 0.5 mg (1 tab) every morning and 1 mg (2 tabs) every night at bedtime. 90 tablet 1     macitentan (OPSUMIT) 10 MG tablet Take 1 tablet (10 mg) by mouth daily 30 tablet 11     ORDER FOR DME Equipment being ordered: SHOWER CHAIR  FROM [employer] 1 Device 0     potassium chloride (K-TAB,KLOR-CON) 10 MEQ tablet Take 1 tablet (10 mEq) by mouth daily 90 tablet 1     ranitidine (RANITIDINE) 75 MG tablet Take 1-2 tablets ( mg) by mouth 2 times daily 60 tablet 11     selexipag 400 mcg, selexipag 800 mcg Take 1,200 mcg by mouth every 12 hours (Patient taking differently: Take 1,600 mcg by mouth every 12 hours ) 30 tablet 0     simvastatin (ZOCOR) 10 MG tablet Take 1 tablet by mouth At Bedtime. 90 tablet 1     SODIUM BICARBONATE PO Take by mouth 2 times daily       tadalafil, PAH, (ADCIRCA) 20 MG TABS Take 2 tablets (40 mg) by mouth daily 60 tablet 11     TEMAZEPAM PO Take by mouth At Bedtime       Multiple Vitamins-Minerals (EYE VITAMINS PO) Take 1 tablet by mouth daily           ROS:   10 point ROS of systems including Constitutional, Eyes, Respiratory, Cardiovascular, Gastroenterology, Genitourinary, Integumentary, Muscularskeletal, Psychiatric were all negative except for pertinent positives noted in my HPI.    EXAM:  /75 (BP Location: Left arm, Patient Position: Chair, Cuff Size: Adult Regular)  Pulse 64  Ht 1.6 m (5' 3\")  Wt 53.3 kg (117 lb 9.6 oz)  SpO2 94%  BMI 20.83 kg/m2  General: appears comfortable, alert and articulate  Head: normocephalic, atraumatic  Eyes: anicteric " sclera, EOMI  Neck: no adenopathy  Orophyarynx: moist mucosa, no lesions, dentition intact  Heart: regular, S1/S2, no murmur, gallop, rub, estimated JVP wnl  Lungs: clear, no rales or wheezing  Abdomen: soft, non-tender, bowel sounds present, no hepatosplenomegaly  Extremities: no clubbing, cyanosis or edema  Neurological: normal speech and affect, no gross motor deficits      Weight  Wt Readings from Last 10 Encounters:   09/10/18 53.3 kg (117 lb 9.6 oz)   07/10/18 53.5 kg (118 lb)   06/21/18 50.4 kg (111 lb 3.2 oz)   05/21/18 52.8 kg (116 lb 8 oz)   05/01/18 51 kg (112 lb 8 oz)   04/30/18 51.7 kg (114 lb)   04/18/18 53.6 kg (118 lb 3.2 oz)   03/21/18 52.2 kg (115 lb)   02/09/18 54.3 kg (119 lb 9.6 oz)   02/06/18 52.2 kg (115 lb)     Up 4 lbs at home scale.    Labs:    CBC RESULTS:  Lab Results   Component Value Date    WBC 4.6 09/10/2018    RBC 4.89 09/10/2018    HGB 15.7 09/10/2018    HCT 46.9 09/10/2018    MCV 96 09/10/2018    MCH 32.1 09/10/2018    MCHC 33.5 09/10/2018    RDW 12.5 09/10/2018     09/10/2018       CMP RESULTS:  Lab Results   Component Value Date     09/10/2018    POTASSIUM 4.0 09/10/2018    CHLORIDE 108 09/10/2018    CO2 25 09/10/2018    ANIONGAP 8 09/10/2018    GLC 81 09/10/2018    BUN 20 09/10/2018    CR 1.42 (H) 09/10/2018    GFRESTIMATED 38 (L) 09/10/2018    GFRESTBLACK 46 (L) 09/10/2018    ISHMAEL 9.3 09/10/2018    BILITOTAL 0.6 09/10/2018    ALBUMIN 4.1 09/10/2018    ALKPHOS 61 09/10/2018    ALT 22 09/10/2018    AST 16 09/10/2018        INR RESULTS:  Lab Results   Component Value Date    INR 1.04 03/26/2013       BNP RESULTS:  Lab Results   Component Value Date    NTBNPI 278 06/21/2018     No results found for: BNP      Recent Tests:      Echocardiogram (6/15/2018):  Interpretation Summary  The RV is moderately dilated. There is mild right ventricular hypertrophy.  Global right ventricular function is either normal or mildly reduced.  Global and regional left ventricular function  is normal with an EF of 60-65%.  Severe pulmonary hypertension. Right ventricular systolic pressure is 94 mmHg  above the right atrial pressure.  The inferior vena cava is normal.     Left Ventricle  Global and regional left ventricular function is normal with an EF of 60-65%.  Left ventricular size is normal. Left ventricular wall thickness is normal.  Paradoxical septal motion consistent with right ventricular pressure overload  is present.     Right Ventricle  The RV is moderately dilated. There is mild right ventricular hypertrophy.  Global right ventricular function is mildly reduced.     Atria  Severe biatrial enlargement is present.     Mitral Valve  The mitral valve is normal. Trace mitral insufficiency is present.        Aortic Valve  Aortic valve is normal in structure and function.     Tricuspid Valve  The tricuspid valve is normal. Moderate tricuspid insufficiency is present.  Severe pulmonary hypertension is present. Right ventricular systolic pressure  is 94mmHg above the right atrial pressure.     Pulmonic Valve  The pulmonic valve is normal.     Vessels  The aorta root is normal. The inferior vena cava is normal. Estimated mean  right atrial pressure is 3 mmHg (normal).     Pericardium  Trivial pericardial effusion is present        Assessment and Plan:   Ms. Judith Nelson is a 58 year old female with pulmonary arterial hypertension and sarcoidosis, WHO Group I, NYHA Functional Class II.      #PAH  Mrs. Judith Nelson returns to clinic for follow up, with her Sister-in-law, Bessie.  She reports feeling well, however she had Chinese Buffet the day before and subsequently feels bloated with an increased body weight up 4 lbs.  She also had a recent overnight oximetry study that recommended nocturnal oxygen. Her 6 minute walk test results today showed improved ambulation on 2L NC, however Ms. Nelson is very resistant to adding supplemental oxygen when exerting herself. She is however, more open to  trying nocturnal oxygen and although she was aware of this recommendation, her father recently passed and she has been overwhelmed with the transition and grieving.  Clinically, her vital signs and labs are stable; physical exam shows no signs of right heart failure or fluid overload with no JVD, lower leg swelling, but positive abdominal bloating. Her Lasix was therefore increased for several days, along with PO potassium.  She will have nocturnal oxygen set up through [facility] by our office, and she will return to clinic in 2 months with labs prior.       It was a pleasure seeing Ms. Judith Nelson at the [facility].           Sincerely,  Korin Hirsch, APRN, CNP.

## 2018-09-11 NOTE — PROGRESS NOTES
Received message from Cardiology nurse that patient is ready to start oxygen and asking for help getting her set up. After reviewing chart appears oxygen orders were already submitted. Left message with patient to contact for clarification.   Spoke with patient. Agreed to start oxygen at night only at this time. Discussed how the oxygen company may deliver tanks too since she does qualify and talked about how it would be best to use oxygen with activity. Left message for [employer] to contact patient.

## 2018-09-21 DIAGNOSIS — I50.9 HEART FAILURE (H): ICD-10-CM

## 2018-09-21 DIAGNOSIS — D86.9 SARCOIDOSIS: ICD-10-CM

## 2018-09-21 RX ORDER — POTASSIUM CHLORIDE 750 MG/1
10 TABLET, EXTENDED RELEASE ORAL DAILY
Qty: 90 TABLET | Refills: 1 | Status: SHIPPED | OUTPATIENT
Start: 2018-09-21 | End: 2019-04-23

## 2018-09-21 RX ORDER — AZATHIOPRINE 50 MG/1
50 TABLET ORAL DAILY
Qty: 90 TABLET | Refills: 3 | Status: SHIPPED | OUTPATIENT
Start: 2018-09-21 | End: 2020-08-19

## 2018-10-01 LAB — FIO2-PRE: 28 %

## 2018-10-15 NOTE — TELEPHONE ENCOUNTER
Patient left a Vm today saying she has been so busy the last few months with weddings and funerals, that she can't remember where we left off with getting her [facility]?  She is asking for a call back reminder her what was next.  -----------------------------  Documentation shows I had sent her a [email or web address] message with the phone number to call and make the appt to have the teeth pulled.  The message was never viewed.  I called and left a VM with the phone number so patient could call and make the appt to have the necessary teeth pulled. Keyanna Mccray RN on 10/15/2018 at 2:28 PM

## 2018-10-19 ENCOUNTER — TRANSFERRED RECORDS (OUTPATIENT)
Dept: HEALTH INFORMATION MANAGEMENT | Facility: CLINIC | Age: 58
End: 2018-10-19

## 2018-11-06 ENCOUNTER — PATIENT OUTREACH (OUTPATIENT)
Dept: CARE COORDINATION | Facility: CLINIC | Age: 58
End: 2018-11-06

## 2018-11-07 ENCOUNTER — OFFICE VISIT (OUTPATIENT)
Dept: CARDIOLOGY | Facility: CLINIC | Age: 58
End: 2018-11-07
Attending: INTERNAL MEDICINE
Payer: MEDICARE

## 2018-11-07 VITALS
RESPIRATION RATE: 17 BRPM | HEIGHT: 63 IN | BODY MASS INDEX: 20.91 KG/M2 | WEIGHT: 118 LBS | DIASTOLIC BLOOD PRESSURE: 65 MMHG | HEART RATE: 62 BPM | SYSTOLIC BLOOD PRESSURE: 103 MMHG | OXYGEN SATURATION: 95 %

## 2018-11-07 DIAGNOSIS — I27.21 PAH (PULMONARY ARTERIAL HYPERTENSION) WITH PORTAL HYPERTENSION (H): ICD-10-CM

## 2018-11-07 DIAGNOSIS — R06.09 DYSPNEA ON EXERTION: ICD-10-CM

## 2018-11-07 DIAGNOSIS — K76.6 PAH (PULMONARY ARTERIAL HYPERTENSION) WITH PORTAL HYPERTENSION (H): ICD-10-CM

## 2018-11-07 DIAGNOSIS — I27.20 PULMONARY HYPERTENSION (H): Primary | ICD-10-CM

## 2018-11-07 DIAGNOSIS — I27.20 PULMONARY HYPERTENSION (H): ICD-10-CM

## 2018-11-07 LAB
ALBUMIN SERPL-MCNC: 3.8 G/DL (ref 3.4–5)
ALP SERPL-CCNC: 54 U/L (ref 40–150)
ALT SERPL W P-5'-P-CCNC: 20 U/L (ref 0–50)
ANION GAP SERPL CALCULATED.3IONS-SCNC: 7 MMOL/L (ref 3–14)
AST SERPL W P-5'-P-CCNC: 17 U/L (ref 0–45)
BILIRUB SERPL-MCNC: 0.4 MG/DL (ref 0.2–1.3)
BUN SERPL-MCNC: 22 MG/DL (ref 7–30)
CALCIUM SERPL-MCNC: 8.8 MG/DL (ref 8.5–10.1)
CHLORIDE SERPL-SCNC: 108 MMOL/L (ref 94–109)
CO2 SERPL-SCNC: 26 MMOL/L (ref 20–32)
CREAT SERPL-MCNC: 1.58 MG/DL (ref 0.52–1.04)
ERYTHROCYTE [DISTWIDTH] IN BLOOD BY AUTOMATED COUNT: 12 % (ref 10–15)
GFR SERPL CREATININE-BSD FRML MDRD: 34 ML/MIN/1.7M2
GLUCOSE SERPL-MCNC: 86 MG/DL (ref 70–99)
HCT VFR BLD AUTO: 43.6 % (ref 35–47)
HGB BLD-MCNC: 14.2 G/DL (ref 11.7–15.7)
MCH RBC QN AUTO: 31.6 PG (ref 26.5–33)
MCHC RBC AUTO-ENTMCNC: 32.6 G/DL (ref 31.5–36.5)
MCV RBC AUTO: 97 FL (ref 78–100)
NT-PROBNP SERPL-MCNC: 558 PG/ML (ref 0–125)
PLATELET # BLD AUTO: 156 10E9/L (ref 150–450)
POTASSIUM SERPL-SCNC: 3.6 MMOL/L (ref 3.4–5.3)
PROT SERPL-MCNC: 7 G/DL (ref 6.8–8.8)
RBC # BLD AUTO: 4.5 10E12/L (ref 3.8–5.2)
SODIUM SERPL-SCNC: 141 MMOL/L (ref 133–144)
WBC # BLD AUTO: 4.7 10E9/L (ref 4–11)

## 2018-11-07 PROCEDURE — G0463 HOSPITAL OUTPT CLINIC VISIT: HCPCS | Mod: ZF

## 2018-11-07 PROCEDURE — 83880 ASSAY OF NATRIURETIC PEPTIDE: CPT | Performed by: NURSE PRACTITIONER

## 2018-11-07 PROCEDURE — 85027 COMPLETE CBC AUTOMATED: CPT | Performed by: NURSE PRACTITIONER

## 2018-11-07 PROCEDURE — 80053 COMPREHEN METABOLIC PANEL: CPT | Performed by: NURSE PRACTITIONER

## 2018-11-07 PROCEDURE — 99213 OFFICE O/P EST LOW 20 MIN: CPT | Mod: ZP | Performed by: INTERNAL MEDICINE

## 2018-11-07 PROCEDURE — 36415 COLL VENOUS BLD VENIPUNCTURE: CPT | Performed by: NURSE PRACTITIONER

## 2018-11-07 RX ORDER — LIDOCAINE 40 MG/G
CREAM TOPICAL
Status: CANCELLED | OUTPATIENT
Start: 2018-11-07

## 2018-11-07 ASSESSMENT — PAIN SCALES - GENERAL: PAINLEVEL: NO PAIN (0)

## 2018-11-07 NOTE — MR AVS SNAPSHOT
After Visit Summary   11/7/2018    Judith Nelson    MRN: [id number]           Patient Information     Date Of Birth          1960        Visit Information        Provider Department      11/7/2018 3:00 PM Norm Franco MD [facility]        Today's Diagnoses     Pulmonary hypertension (H)    -  1    Dyspnea on exertion        PAH (pulmonary arterial hypertension) with portal hypertension (H)          Care Instructions    Medication Changes:  -No medication changes at this time. Please continue current medication regiment.    Patient Instructions:  1. Continue staying active and eat a heart healthy diet.    2. Please keep current list of medications with you at all times.    3. Remember to weigh yourself daily after voiding and before you consume any food or beverages and log the numbers.  If you have gained/lost 2 pounds overnight or 5 pounds in a week contact us immediately for medication adjustments or further instructions.    4. **Please call us immediately if you have any syncope, chest pain, edema, or decline in your functional status.    Check-In  Time Check-In Location Estimated Length Procedure   Name     12/5/18 @ 8am   [facility]  waiting room 60-90 minutes Right Heart Catheterization**     Procedure Preparations & Instructions     This is an invasive procedure that DOES require preparation:    - Nothing to eat for 6 hours   - You may have clear liquids up until the time of your procedure  - A ride should be arranged for you in the instance you are unable to drive home, however you should be able to function as you normally would after the procedure       Follow up Appointment Information:  -follow up with Dr. Franco after RHC at Noon  -follow up with Dr. Hidalgo within 2 weeks for Kidneys    Results:  Component      Latest Ref Rng & Units 11/7/2018   Sodium      133 - 144 mmol/L 141   Potassium      3.4 - 5.3 mmol/L 3.6   Chloride      94 - 109 mmol/L 108   Carbon  Dioxide      20 - 32 mmol/L 26   Anion Gap      3 - 14 mmol/L 7   Glucose      70 - 99 mg/dL 86   Urea Nitrogen      7 - 30 mg/dL 22   Creatinine      0.52 - 1.04 mg/dL 1.58 (H)   GFR Estimate      >60 mL/min/1.7m2 34 (L)   GFR Estimate If Black      >60 mL/min/1.7m2 41 (L)   Calcium      8.5 - 10.1 mg/dL 8.8   Bilirubin Total      0.2 - 1.3 mg/dL 0.4   Albumin      3.4 - 5.0 g/dL 3.8   Protein Total      6.8 - 8.8 g/dL 7.0   Alkaline Phosphatase      40 - 150 U/L 54   ALT      0 - 50 U/L 20   AST      0 - 45 U/L 17   WBC      4.0 - 11.0 10e9/L 4.7   RBC Count      3.8 - 5.2 10e12/L 4.50   Hemoglobin      11.7 - 15.7 g/dL 14.2   Hematocrit      35.0 - 47.0 % 43.6   MCV      78 - 100 fl 97   MCH      26.5 - 33.0 pg 31.6   MCHC      31.5 - 36.5 g/dL 32.6   RDW      10.0 - 15.0 % 12.0   Platelet Count      150 - 450 10e9/L 156   N-Terminal Pro Bnp      0 - 125 pg/mL 558 (H)     We are located on the third floor of [facility] on the [facility].  Our address is     [address] [place] [address] on 3rd Floor   [place] [phone]    Thank you for allowing us to be a part of your care here at the [facility]    If you have questions or concerns please contact us at:    Keyanna Mccray, RN, BSN, PHN  Reji Escobar (Schedule,Prior Auth)  Nurse Coordinator     Clinic   Pulmonary Hypertension   Pulmonary Hypertension  [facility]  (P)[phone]    (P) [phone]        (F) [phone]    ** Please note that you will NOT receive a reminder call regarding your scheduled testing, reminder calls are for provider appointments only.  If you are scheduled for testing within the [employer] system you may receive a call regarding pre-registration for billing purposes only.**     Remember to weigh yourself daily after voiding and before you consume any food or beverages and  log the numbers.  If you have gained/lost 2 pounds overnight or 5 pounds in a week contact us immediately for medication adjustments or further instructions.   **Please call us immediately if you have any syncope, chest pain, edema, or decline in your functional status.    Support Group:  Pulmonary Hypertension Association  Https://www.phassociation.org/  **Look at the Events Tab** They even have Support Groups that you can call into    [facility] Support Group  First Saturday of the Month from 1-3 PM   Location: [address] [place] [id number]  Leader: Michelet Garcia  Phone: [phone]  Email: [email or web address]            Follow-ups after your visit        Your next 10 appointments already scheduled     Dec 05, 2018 12:00 PM CST   (Arrive by 11:45 AM)   RETURN PRIMARY PULMONARY with Norm Franco MD   [facility] ([facility])    [address] [place] [address]  [address] [place] [address] [id number] [phone]            Dec 19, 2018  9:00 AM CST   SIX MINUTE WALK with UC PFL B, UC PFL 6 MINUTE WALK 2   [facility] ([facility])    [address] [facility]  3rd Floor  [facility] [id number] [phone]            Dec 19, 2018 10:30 AM CST   (Arrive by 10:15 AM)   Return Interstitial Lung with Gael Flaherty MD   [facility] ([facility])    [address] [place] [address]  [address] [place] [id number] [phone]              Future tests that were ordered for you today     Open Future Orders        Priority Expected Expires Ordered    Heart Cath Right heart cath Routine 12/5/2018 11/7/2019 11/7/2018            Who to contact     If you have questions or need follow up information about today's clinic visit or your schedule please contact [facility] directly at [phone].  Normal or non-critical lab and imaging results will be communicated to you by  "MyChart, letter or phone within 4 business days after the clinic has received the results. If you do not hear from us within 7 days, please contact the clinic through [employer] or phone. If you have a critical or abnormal lab result, we will notify you by phone as soon as possible.  Submit refill requests through [employer] or call your pharmacy and they will forward the refill request to us. Please allow 3 business days for your refill to be completed.          Additional Information About Your Visit        [facility] Information     [employer] gives you secure access to your electronic health record. If you see a primary care provider, you can also send messages to your care team and make appointments. If you have questions, please call your primary care clinic.  If you do not have a primary care provider, please call [phone] and they will assist you.        Care EveryWhere ID     This is your Care EveryWhere ID. This could be used by other organizations to access your [place] medical records  [id number]        Your Vitals Were     Pulse Respirations Height Pulse Oximetry BMI (Body Mass Index)       62 17 1.6 m (5' 3\") 95% 20.9 kg/m2        Blood Pressure from Last 3 Encounters:   11/07/18 103/65   09/10/18 119/75   08/14/18 113/69    Weight from Last 3 Encounters:   11/07/18 53.5 kg (118 lb)   09/10/18 53.3 kg (117 lb 9.6 oz)   07/10/18 53.5 kg (118 lb)              We Performed the Following     Follow-Up with Pulmonary Hypertension Clinic          Today's Medication Changes          These changes are accurate as of 11/7/18  4:09 PM.  If you have any questions, ask your nurse or doctor.               These medicines have changed or have updated prescriptions.        Dose/Directions    furosemide 40 MG tablet   Commonly known as:  LASIX   This may have changed:    - how much to take  - when to take this  - additional instructions   Used for:  Pulmonary hypertension (H), Stress-induced cardiomyopathy        Take 40 " MG M-W-F, 20 MG all other days.   Quantity:  90 tablet   Refills:  3                Primary Care Provider Office Phone # Fax #    Anupama Babcock [phone]       [facility] [address] [facility] [address]        Equal Access to Services     LESLIE CHACKO : Hadii aad ku hadasho Soomaali, waaxda luqadaha, qaybta kaalmada adeegyada, waxay wendyin hayaan adenasir reederchiquitafranklin abdi. So [facility] [phone].    ATENCIÓN: Si habla español, tiene a dhillon disposición servicios gratuitos de asistencia lingüística. Llame al [phone].    We comply with applicable federal civil rights laws and [place] laws. We do not discriminate on the basis of race, color, national origin, age, disability, sex, sexual orientation, or gender identity.            Thank you!     Thank you for choosing [facility]  for your care. Our goal is always to provide you with excellent care. Hearing back from our patients is one way we can continue to improve our services. Please take a few minutes to complete the written survey that you may receive in the mail after your visit with us. Thank you!             Your Updated Medication List - Protect others around you: Learn how to safely use, store and throw away your medicines at www.disposemymeds.org.          This list is accurate as of 11/7/18  4:09 PM.  Always use your most recent med list.                   Brand Name Dispense Instructions for use Diagnosis    acetaminophen 500 MG tablet    TYLENOL    45 tablet    Take 1-2 tablets (500-1,000 mg) by mouth every 6 hours as needed for pain (Take as needed for pain.  Do not exceed 4 grams (8 tablets) in a day)    Pulmonary hypertension (H)       azaTHIOprine 50 MG tablet    IMURAN    90 tablet    Take 1 tablet (50 mg) by mouth daily    Sarcoidosis       buPROPion 200 MG 12 hr tablet    WELLBUTRIN SR    30 tablet    Take 1 tablet (200 mg) by mouth every morning    Major depressive disorder, single episode,  moderate (H)       BUSPIRONE HCL PO      Take 15 mg by mouth 2 times daily        calcitRIOL 0.25 MCG capsule    ROCALTROL    30 capsule    Take 1 capsule (0.25 mcg) by mouth daily        carvedilol 12.5 MG tablet    COREG    135 tablet    Take 0.5 tablets (6.25 mg) by mouth 2 times daily (with meals)    Chronic systolic heart failure (H)       denosumab 60 MG/ML Soln injection    PROLIA    1 mL    Inject 1 mL (60 mg) Subcutaneous every 6 months    Osteoporosis, postmenopausal       furosemide 40 MG tablet    LASIX    90 tablet    Take 40 MG M-W-F, 20 MG all other days.    Pulmonary hypertension (H), Stress-induced cardiomyopathy       levalbuterol 45 MCG/ACT Inhaler    XOPENEX HFA    1 Inhaler    Inhale 2 puffs into the lungs every 6 hours as needed for shortness of breath / dyspnea or wheezing (priro to exertion/exercise.)    Pulmonary hypertension (H)       LORazepam 0.5 MG tablet    ATIVAN    90 tablet    Take 0.5 mg (1 tab) every morning and 1 mg (2 tabs) every night at bedtime.    Major depressive disorder, single episode, severe with psychotic features (H)       macitentan 10 MG tablet    OPSUMIT    30 tablet    Take 1 tablet (10 mg) by mouth daily    Primary pulmonary hypertension (H)       order for DME     1 Device    Equipment being ordered: SHOWER CHAIR  FROM [facility]    Tremor, Generalized muscle weakness, Sarcoidosis       potassium chloride 10 MEQ tablet    K-TAB,KLOR-CON    90 tablet    Take 1 tablet (10 mEq) by mouth daily    Heart failure (H)       ranitidine 75 MG tablet    ranitidine    60 tablet    Take 1-2 tablets ( mg) by mouth 2 times daily    Primary pulmonary hypertension (H), Anemia, unspecified type, SOB (shortness of breath)       selexipag 1600 MCG tablet    UPTRAVI     Take 1 tablet (1,600 mcg) by mouth every 12 hours    Pulmonary hypertension (H)       simvastatin 10 MG tablet    ZOCOR    90 tablet    Take 1 tablet by mouth At Bedtime.    Hypercholesterolemia       SODIUM  BICARBONATE PO      Take by mouth 2 times daily    Pulmonary arterial hypertension (H)       tadalafil (PAH) 20 MG Tabs    ADCIRCA    60 tablet    Take 2 tablets (40 mg) by mouth daily    Pulmonary hypertension (H)       TEMAZEPAM PO      Take by mouth At Bedtime

## 2018-11-07 NOTE — LETTER
"11/7/2018      RE: Judith Nelson  [address] [facility] [address]       Dear Colleague,    Thank you for the opportunity to participate in the care of your patient, Judith Nelson, at the [facility] HEART [facility]. Please see a copy of my visit note below.    Norm Franco M.D.  Cardiovascular Medicine    I personally saw and examined this patient, discussed care with housestaff and other consultants, reviewed current laboratories and imaging studies, and conveyed impression and diagnostic/therapeutic plan to patient.    istory    The patient returns for follow-up of pulmonary hypertension.  There is no interim history of chest pain, tightness, paroxysmal nocturnal dyspnea, orthopnea, peripheral edema, palpitation, pre-syncope, syncope, device discharge.  Exercise tolerance is stable.  The patient is attempting to exercise regularly and following a sodium restricted, calorically appropriate diet.  Medications are reviewed and the patient is taking medications as prescribed.  The patient is generally sleeping well.   Objective   /65  Pulse 62  Resp 17  Ht 1.6 m (5' 3\")  Wt 53.5 kg (118 lb)  SpO2 95%  BMI 20.9 kg/m2   Constitutional: alert, oriented, normal gait and station, normal mentation.  Oral: moist mucous membranes  Lymph: without pathologic adenopathy  Chest: clear to ausculation and percussion  Cor: No evidence of left or right ventricular activity.  Rhythm is regular.  S1 normal, S2 split physiologically. Murmurs are not present  Abdomen: without tenderness, rebound, guarding, masses, ascites  Extremities: Edema not present  Neuro: no focal defects, normal mentation  Skin: without open lesions  Psych: oriented, verbal, mental status in tact  Wt Readings from Last 5 Encounters:   11/07/18 53.5 kg (118 lb)   09/10/18 53.3 kg (117 lb 9.6 oz)   07/10/18 53.5 kg (118 lb)   06/21/18 50.4 kg (111 lb 3.2 oz)   05/21/18 52.8 kg (116 lb 8 oz) "     Meds  Current Outpatient Prescriptions   Medication     acetaminophen (TYLENOL) 500 MG tablet     azaTHIOprine (IMURAN) 50 MG tablet     buPROPion (WELLBUTRIN SR) 200 MG 12 hr tablet     BUSPIRONE HCL PO     calcitRIOL (ROCALTROL) 0.25 MCG capsule     carvedilol (COREG) 12.5 MG tablet     denosumab (PROLIA) 60 MG/ML SOLN     furosemide (LASIX) 40 MG tablet     levalbuterol (XOPENEX HFA) 45 MCG/ACT Inhaler     LORazepam (ATIVAN) 0.5 MG tablet     macitentan (OPSUMIT) 10 MG tablet     ORDER FOR DME     potassium chloride (K-TAB,KLOR-CON) 10 MEQ tablet     ranitidine (RANITIDINE) 75 MG tablet     selexipag 400 mcg, selexipag 800 mcg     simvastatin (ZOCOR) 10 MG tablet     SODIUM BICARBONATE PO     tadalafil, PAH, (ADCIRCA) 20 MG TABS     TEMAZEPAM PO     No current facility-administered medications for this visit.      Labs  Results for ANALI GÓMEZ MARY (MRN [id number]) as of 11/7/2018 15:37   Ref. Range 7/10/2018 13:38 8/14/2018 00:00 8/14/2018 16:15 9/10/2018 12:42 11/7/2018 13:51   Sodium Latest Ref Range: 133 - 144 mmol/L 143   141 141   Potassium Latest Ref Range: 3.4 - 5.3 mmol/L 4.0   4.0 3.6   Chloride Latest Ref Range: 94 - 109 mmol/L 111 (H)   108 108   Carbon Dioxide Latest Ref Range: 20 - 32 mmol/L 24   25 26   Urea Nitrogen Latest Ref Range: 7 - 30 mg/dL 21   20 22   Creatinine Latest Ref Range: 0.52 - 1.04 mg/dL 1.37 (H)   1.42 (H) 1.58 (H)   GFR Estimate Latest Ref Range: >60 mL/min/1.7m2 40 (L)   38 (L) 34 (L)   GFR Estimate If Black Latest Ref Range: >60 mL/min/1.7m2 48 (L)   46 (L) 41 (L)   Calcium Latest Ref Range: 8.5 - 10.1 mg/dL 9.2   9.3 8.8   Anion Gap Latest Ref Range: 3 - 14 mmol/L 8   8 7   Albumin Latest Ref Range: 3.4 - 5.0 g/dL 4.0   4.1 3.8   Protein Total Latest Ref Range: 6.8 - 8.8 g/dL 6.9   7.4 7.0   Bilirubin Total Latest Ref Range: 0.2 - 1.3 mg/dL 0.4   0.6 0.4   Alkaline Phosphatase Latest Ref Range: 40 - 150 U/L 73   61 54   ALT Latest Ref Range: 0 - 50 U/L 21   22 20   AST  Latest Ref Range: 0 - 45 U/L 16   16 17   N-Terminal Pro Bnp Latest Ref Range: 0 - 125 pg/mL 612 (H)   775 (H) 558 (H)   Glucose Latest Ref Range: 70 - 99 mg/dL 79   81 86   WBC Latest Ref Range: 4.0 - 11.0 10e9/L 6.4   4.6 4.7   Hemoglobin Latest Ref Range: 11.7 - 15.7 g/dL 14.5   15.7 14.2   Hematocrit Latest Ref Range: 35.0 - 47.0 % 43.1   46.9 43.6   Platelet Count Latest Ref Range: 150 - 450 10e9/L 146 (L)   185 156   RBC Count Latest Ref Range: 3.8 - 5.2 10e12/L 4.59   4.89 4.50   MCV Latest Ref Range: 78 - 100 fl 94   96 97   MCH Latest Ref Range: 26.5 - 33.0 pg 31.6   32.1 31.6   MCHC Latest Ref Range: 31.5 - 36.5 g/dL 33.6   33.5 32.6   RDW Latest Ref Range: 10.0 - 15.0 % 13.2   12.5 12.0   6 MINUTE WALK TEST Unknown  Attch          Imaging       [facility],[place]  Echocardiography Laboratory  [address] [place] [address]     Name: ANALI GÓMEZ  MRN: [id number]  : 1960  Study Date: 2018 09:47 AM  Age: 58 yrs  Gender: Female  [place] [facility] [place] Study: Pulmonary Hypertension  Ordering Physician: LE SWAN  Referring Physician: MINE CASTRO  Performed By: Steve Patel     BSA: 1.5 m2  Height: 63 in  Weight: 112 lb  BP: 97/57 mmHg  _____________________________________________________________________________  __        Procedure  Echocardiogram with two-dimensional, color and spectral Doppler performed.  _____________________________________________________________________________  __        Interpretation Summary  The RV is moderately dilated. There is mild right ventricular hypertrophy.  Global right ventricular function is either normal or mildly reduced.  Global and regional left ventricular function is normal with an EF of 60-65%.  Severe pulmonary hypertension. Right ventricular systolic pressure is 94 mmHg  above the right atrial pressure.  The inferior vena cava is  normal.  _____________________________________________________________________________  __        Left Ventricle  Global and regional left ventricular function is normal with an EF of 60-65%.  Left ventricular size is normal. Left ventricular wall thickness is normal.  Paradoxical septal motion consistent with right ventricular pressure overload  is present.     Right Ventricle  The RV is moderately dilated. There is mild right ventricular hypertrophy.  Global right ventricular function is mildly reduced.     Atria  Severe biatrial enlargement is present.     Mitral Valve  The mitral valve is normal. Trace mitral insufficiency is present.        Aortic Valve  Aortic valve is normal in structure and function.     Tricuspid Valve  The tricuspid valve is normal. Moderate tricuspid insufficiency is present.  Severe pulmonary hypertension is present. Right ventricular systolic pressure  is 94mmHg above the right atrial pressure.     Pulmonic Valve  The pulmonic valve is normal.     Vessels  The aorta root is normal. The inferior vena cava is normal. Estimated mean  right atrial pressure is 3 mmHg (normal).     Pericardium  Trivial pericardial effusion is present.        Compared to Previous Study  This study was compared with the study from 3/21/2018 . There has been no  change.  _____________________________________________________________________________  __  MMode/2D Measurements & Calculations     IVSd: 1.1 cm  LVIDd: 3.9 cm  LVIDs: 1.6 cm  LVPWd: 1.1 cm  FS: 58.0 %  LV mass(C)d: 141.2 grams  LV mass(C)dI: 93.4 grams/m2  LA dimension: 3.7 cm  asc Aorta Diam: 2.5 cm  LVOT diam: 1.8 cm  LVOT area: 2.6 cm2  LA Volume (BP): 68.4 ml  RWT: 0.59  TAPSE: 1.9 cm     Assessment/Plan   1. Maximum medical management suggest that repeat RHC necessary  2. Diarrhea has resolved off of po prostacyclin  3. Follow-up laboratories for renal function (BMP_)          Please do not hesitate to contact me if you have any  questions/concerns.     Sincerely,     Norm Franco MD

## 2018-11-07 NOTE — NURSING NOTE
Chief Complaint   Patient presents with     RECHECK      Return for 2 month PH F/U with labs.    Milagros Amaya, CMA

## 2018-11-07 NOTE — PATIENT INSTRUCTIONS
Medication Changes:  -No medication changes at this time. Please continue current medication regiment.    Patient Instructions:  1. Continue staying active and eat a heart healthy diet.    2. Please keep current list of medications with you at all times.    3. Remember to weigh yourself daily after voiding and before you consume any food or beverages and log the numbers.  If you have gained/lost 2 pounds overnight or 5 pounds in a week contact us immediately for medication adjustments or further instructions.    4. **Please call us immediately if you have any syncope, chest pain, edema, or decline in your functional status.    Check-In  Time Check-In Location Estimated Length Procedure   Name     12/5/18 @ 8am   [facility]  waiting room 60-90 minutes Right Heart Catheterization**     Procedure Preparations & Instructions     This is an invasive procedure that DOES require preparation:    - Nothing to eat for 6 hours   - You may have clear liquids up until the time of your procedure  - A ride should be arranged for you in the instance you are unable to drive home, however you should be able to function as you normally would after the procedure       Follow up Appointment Information:  -follow up with Dr. Franco after RHC at Noon  -follow up with Dr. Hidalgo within 2 weeks for Kidneys    Results:  Component      Latest Ref Rng & Units 11/7/2018   Sodium      133 - 144 mmol/L 141   Potassium      3.4 - 5.3 mmol/L 3.6   Chloride      94 - 109 mmol/L 108   Carbon Dioxide      20 - 32 mmol/L 26   Anion Gap      3 - 14 mmol/L 7   Glucose      70 - 99 mg/dL 86   Urea Nitrogen      7 - 30 mg/dL 22   Creatinine      0.52 - 1.04 mg/dL 1.58 (H)   GFR Estimate      >60 mL/min/1.7m2 34 (L)   GFR Estimate If Black      >60 mL/min/1.7m2 41 (L)   Calcium      8.5 - 10.1 mg/dL 8.8   Bilirubin Total      0.2 - 1.3 mg/dL 0.4   Albumin      3.4 - 5.0 g/dL 3.8   Protein Total      6.8 - 8.8 g/dL 7.0   Alkaline Phosphatase      40 - 150 U/L 54    ALT      0 - 50 U/L 20   AST      0 - 45 U/L 17   WBC      4.0 - 11.0 10e9/L 4.7   RBC Count      3.8 - 5.2 10e12/L 4.50   Hemoglobin      11.7 - 15.7 g/dL 14.2   Hematocrit      35.0 - 47.0 % 43.6   MCV      78 - 100 fl 97   MCH      26.5 - 33.0 pg 31.6   MCHC      31.5 - 36.5 g/dL 32.6   RDW      10.0 - 15.0 % 12.0   Platelet Count      150 - 450 10e9/L 156   N-Terminal Pro Bnp      0 - 125 pg/mL 558 (H)     We are located on the third floor of the [facility] on the [facility].  Our address is     [address] [place] [address] on [address] [place] [address]    Thank you for allowing us to be a part of your care here at the [facility]    If you have questions or concerns please contact us at:    Keyanna Mccray, RN, BSN, PHN  Reji Escobar (Schedule,Prior Auth)  Nurse Coordinator     Clinic   Pulmonary Hypertension   Pulmonary Hypertension  [facility]  (P)[phone]    (P) [phone]        (F) [phone]    ** Please note that you will NOT receive a reminder call regarding your scheduled testing, reminder calls are for provider appointments only.  If you are scheduled for testing within the [employer] system you may receive a call regarding pre-registration for billing purposes only.**     Remember to weigh yourself daily after voiding and before you consume any food or beverages and log the numbers.  If you have gained/lost 2 pounds overnight or 5 pounds in a week contact us immediately for medication adjustments or further instructions.   **Please call us immediately if you have any syncope, chest pain, edema, or decline in your functional status.    Support Group:  Pulmonary Hypertension Association  [email or web address].phassociation.org/  **Look at the Events Tab** They even have Support Groups that you can call into    [facility] Support  Group  First Saturday of the Month from 1-3 PM   Location: [address] [place] [address] [facility] [address]  Leader: Michelet Garcia  Phone: [phone]  Email: [email or web address]

## 2018-11-07 NOTE — PROGRESS NOTES
"Norm Franco M.D.  Cardiovascular Medicine    I personally saw and examined this patient, discussed care with housestaff and other consultants, reviewed current laboratories and imaging studies, and conveyed impression and diagnostic/therapeutic plan to patient.    istory    The patient returns for follow-up of pulmonary hypertension.  There is no interim history of chest pain, tightness, paroxysmal nocturnal dyspnea, orthopnea, peripheral edema, palpitation, pre-syncope, syncope, device discharge.  Exercise tolerance is stable.  The patient is attempting to exercise regularly and following a sodium restricted, calorically appropriate diet.  Medications are reviewed and the patient is taking medications as prescribed.  The patient is generally sleeping well.   Objective   /65  Pulse 62  Resp 17  Ht 1.6 m (5' 3\")  Wt 53.5 kg (118 lb)  SpO2 95%  BMI 20.9 kg/m2   Constitutional: alert, oriented, normal gait and station, normal mentation.  Oral: moist mucous membranes  Lymph: without pathologic adenopathy  Chest: clear to ausculation and percussion  Cor: No evidence of left or right ventricular activity.  Rhythm is regular.  S1 normal, S2 split physiologically. Murmurs are not present  Abdomen: without tenderness, rebound, guarding, masses, ascites  Extremities: Edema not present  Neuro: no focal defects, normal mentation  Skin: without open lesions  Psych: oriented, verbal, mental status in tact  Wt Readings from Last 5 Encounters:   11/07/18 53.5 kg (118 lb)   09/10/18 53.3 kg (117 lb 9.6 oz)   07/10/18 53.5 kg (118 lb)   06/21/18 50.4 kg (111 lb 3.2 oz)   05/21/18 52.8 kg (116 lb 8 oz)     Meds  Current Outpatient Prescriptions   Medication     acetaminophen (TYLENOL) 500 MG tablet     azaTHIOprine (IMURAN) 50 MG tablet     buPROPion (WELLBUTRIN SR) 200 MG 12 hr tablet     BUSPIRONE HCL PO     calcitRIOL (ROCALTROL) 0.25 MCG capsule     carvedilol (COREG) 12.5 MG tablet     denosumab (PROLIA) 60 MG/ML " SOLN     furosemide (LASIX) 40 MG tablet     levalbuterol (XOPENEX HFA) 45 MCG/ACT Inhaler     LORazepam (ATIVAN) 0.5 MG tablet     macitentan (OPSUMIT) 10 MG tablet     ORDER FOR DME     potassium chloride (K-TAB,KLOR-CON) 10 MEQ tablet     ranitidine (RANITIDINE) 75 MG tablet     selexipag 400 mcg, selexipag 800 mcg     simvastatin (ZOCOR) 10 MG tablet     SODIUM BICARBONATE PO     tadalafil, PAH, (ADCIRCA) 20 MG TABS     TEMAZEPAM PO     No current facility-administered medications for this visit.      Labs  Results for ANALI GÓMEZ (MRN [id number]) as of 11/7/2018 15:37   Ref. Range 7/10/2018 13:38 8/14/2018 00:00 8/14/2018 16:15 9/10/2018 12:42 11/7/2018 13:51   Sodium Latest Ref Range: 133 - 144 mmol/L 143   141 141   Potassium Latest Ref Range: 3.4 - 5.3 mmol/L 4.0   4.0 3.6   Chloride Latest Ref Range: 94 - 109 mmol/L 111 (H)   108 108   Carbon Dioxide Latest Ref Range: 20 - 32 mmol/L 24   25 26   Urea Nitrogen Latest Ref Range: 7 - 30 mg/dL 21   20 22   Creatinine Latest Ref Range: 0.52 - 1.04 mg/dL 1.37 (H)   1.42 (H) 1.58 (H)   GFR Estimate Latest Ref Range: >60 mL/min/1.7m2 40 (L)   38 (L) 34 (L)   GFR Estimate If Black Latest Ref Range: >60 mL/min/1.7m2 48 (L)   46 (L) 41 (L)   Calcium Latest Ref Range: 8.5 - 10.1 mg/dL 9.2   9.3 8.8   Anion Gap Latest Ref Range: 3 - 14 mmol/L 8   8 7   Albumin Latest Ref Range: 3.4 - 5.0 g/dL 4.0   4.1 3.8   Protein Total Latest Ref Range: 6.8 - 8.8 g/dL 6.9   7.4 7.0   Bilirubin Total Latest Ref Range: 0.2 - 1.3 mg/dL 0.4   0.6 0.4   Alkaline Phosphatase Latest Ref Range: 40 - 150 U/L 73   61 54   ALT Latest Ref Range: 0 - 50 U/L 21   22 20   AST Latest Ref Range: 0 - 45 U/L 16   16 17   N-Terminal Pro Bnp Latest Ref Range: 0 - 125 pg/mL 612 (H)   775 (H) 558 (H)   Glucose Latest Ref Range: 70 - 99 mg/dL 79   81 86   WBC Latest Ref Range: 4.0 - 11.0 10e9/L 6.4   4.6 4.7   Hemoglobin Latest Ref Range: 11.7 - 15.7 g/dL 14.5   15.7 14.2   Hematocrit Latest Ref  Range: 35.0 - 47.0 % 43.1   46.9 43.6   Platelet Count Latest Ref Range: 150 - 450 10e9/L 146 (L)   185 156   RBC Count Latest Ref Range: 3.8 - 5.2 10e12/L 4.59   4.89 4.50   MCV Latest Ref Range: 78 - 100 fl 94   96 97   MCH Latest Ref Range: 26.5 - 33.0 pg 31.6   32.1 31.6   MCHC Latest Ref Range: 31.5 - 36.5 g/dL 33.6   33.5 32.6   RDW Latest Ref Range: 10.0 - 15.0 % 13.2   12.5 12.0   6 MINUTE WALK TEST Unknown  Attch          Imaging       [facility]  Echocardiography Laboratory  [address] [place] [address]     Name: ANALI GÓMEZ  MRN: [id number]  : 1960  Study Date: 2018 09:47 AM  Age: 58 yrs  Gender: Female  [place] [facility] [place]  Reason For Study: Pulmonary Hypertension  Ordering Physician: LE SWAN  Referring Physician: MINE CASTRO  Performed By: Steve Patel     BSA: 1.5 m2  Height: 63 in  Weight: 112 lb  BP: 97/57 mmHg  _____________________________________________________________________________  __        Procedure  Echocardiogram with two-dimensional, color and spectral Doppler performed.  _____________________________________________________________________________  __        Interpretation Summary  The RV is moderately dilated. There is mild right ventricular hypertrophy.  Global right ventricular function is either normal or mildly reduced.  Global and regional left ventricular function is normal with an EF of 60-65%.  Severe pulmonary hypertension. Right ventricular systolic pressure is 94 mmHg  above the right atrial pressure.  The inferior vena cava is normal.  _____________________________________________________________________________  __        Left Ventricle  Global and regional left ventricular function is normal with an EF of 60-65%.  Left ventricular size is normal. Left ventricular wall thickness is normal.  Paradoxical septal motion consistent with right ventricular pressure overload  is  present.     Right Ventricle  The RV is moderately dilated. There is mild right ventricular hypertrophy.  Global right ventricular function is mildly reduced.     Atria  Severe biatrial enlargement is present.     Mitral Valve  The mitral valve is normal. Trace mitral insufficiency is present.        Aortic Valve  Aortic valve is normal in structure and function.     Tricuspid Valve  The tricuspid valve is normal. Moderate tricuspid insufficiency is present.  Severe pulmonary hypertension is present. Right ventricular systolic pressure  is 94mmHg above the right atrial pressure.     Pulmonic Valve  The pulmonic valve is normal.     Vessels  The aorta root is normal. The inferior vena cava is normal. Estimated mean  right atrial pressure is 3 mmHg (normal).     Pericardium  Trivial pericardial effusion is present.        Compared to Previous Study  This study was compared with the study from 3/21/2018 . There has been no  change.  _____________________________________________________________________________  __  MMode/2D Measurements & Calculations     IVSd: 1.1 cm  LVIDd: 3.9 cm  LVIDs: 1.6 cm  LVPWd: 1.1 cm  FS: 58.0 %  LV mass(C)d: 141.2 grams  LV mass(C)dI: 93.4 grams/m2  LA dimension: 3.7 cm  asc Aorta Diam: 2.5 cm  LVOT diam: 1.8 cm  LVOT area: 2.6 cm2  LA Volume (BP): 68.4 ml  RWT: 0.59  TAPSE: 1.9 cm     Assessment/Plan   1. Maximum medical management suggest that repeat RHC necessary  2. Diarrhea has resolved off of po prostacyclin  3. Follow-up laboratories for renal function (BMP_)

## 2018-11-08 ENCOUNTER — HOSPITAL ENCOUNTER (OUTPATIENT)
Facility: CLINIC | Age: 58
End: 2018-11-08
Attending: INTERNAL MEDICINE | Admitting: INTERNAL MEDICINE
Payer: MEDICARE

## 2018-11-23 ENCOUNTER — NURSE TRIAGE (OUTPATIENT)
Dept: NURSING | Facility: CLINIC | Age: 58
End: 2018-11-23

## 2018-11-23 ENCOUNTER — HOSPITAL ENCOUNTER (EMERGENCY)
Facility: CLINIC | Age: 58
Discharge: HOME | End: 2018-11-24
Attending: EMERGENCY MEDICINE | Admitting: EMERGENCY MEDICINE
Payer: MEDICARE

## 2018-11-23 DIAGNOSIS — M25.552 HIP PAIN, LEFT: ICD-10-CM

## 2018-11-23 DIAGNOSIS — S32.010A CLOSED COMPRESSION FRACTURE OF FIRST LUMBAR VERTEBRA, INITIAL ENCOUNTER: ICD-10-CM

## 2018-11-23 DIAGNOSIS — M48.56XA: ICD-10-CM

## 2018-11-23 PROCEDURE — 99284 EMERGENCY DEPT VISIT MOD MDM: CPT

## 2018-11-23 PROCEDURE — 99284 EMERGENCY DEPT VISIT MOD MDM: CPT | Mod: Z6 | Performed by: EMERGENCY MEDICINE

## 2018-11-23 NOTE — ED AVS SNAPSHOT
[facility] Emergency Department    [address] [facility] [address]    Phone:  [phone]    Fax:  [phone]                                       Judith Nelson   MRN: [id number]    Department:  [facility] Department   Date of Visit:  11/23/2018           After Visit Summary Signature Page     I have received my discharge instructions, and my questions have been answered. I have discussed any challenges I see with this plan with the nurse or doctor.    ..........................................................................................................................................  Patient/Patient Representative Signature      ..........................................................................................................................................  Patient Representative Print Name and Relationship to Patient    ..................................................               ................................................  Date                                   Time    ..........................................................................................................................................  Reviewed by Signature/Title    ...................................................              ..............................................  Date                                               Time          22EPIC Rev 08/18

## 2018-11-23 NOTE — ED AVS SNAPSHOT
[facility] Emergency Department    [address] [facility] [address]    Phone:  [phone]    Fax:  [phone]                                       Judith Nelson   MRN: [id number]    Department:  [facility] Emergency Department   Date of Visit:  11/23/2018           Patient Information     Date Of Birth          1960        Your diagnoses for this visit were:     Hip pain, left arthritis    Closed compression fracture of first lumbar vertebra, initial encounter (H)        You were seen by Tello Noyola MD.      Follow-up Information     Schedule an appointment as soon as possible for a visit with [facility].    Specialty:  Orthopedics    Why:  For follow up of your hip pain    Contact information:    [address]  [address]  [facility] [address] [phone]        Discharge Instructions         Arthralgia    Arthralgia is the term for pain in or around the joint. It is a symptom, not a disease. This pain may involve one or more joints. In some cases, the pain moves from joint to joint.  There are many causes for joint pain. These include:    Injury    Osteoarthritis (wearing out of the joint surface)    Gout (inflammation of the joint due to crystals in the joint fluid)    Infection inside the joint      Bursitis (inflammation of the fluid-filled sacs around the joint)    Autoimmune disorders such as rheumatoid arthritis or lupus    Tendonitis (inflammation of chords that attach muscle to bone)  Home care    Rest the involved joint(s) until your symptoms improve.     You may be prescribed pain medicine. If none is prescribed, you may use acetaminophen or ibuprofen to control pain and inflammation.  Follow-up care  Follow up with your healthcare provider or as advised.  When to seek medical advice  Contact your healthcare provider right away if any of the following occurs:    Pain, swelling, or redness of joint  increases    Pain worsens or recurs after a period of improvement    Pain moves to other joints    You cannot bear weight on the affected joint     You cannot move the affected joint    Joint appears deformed    New rash appears    Fever of 100.4 F (38 C) or higher, or as directed by your healthcare provider  Date Last Reviewed: 3/1/2017    [id number] [employer]. [address] [place] [address]. All rights reserved. This information is not intended as a substitute for professional medical care. Always follow your healthcare professional's instructions.          Back Fracture (Compression Fracture)  Your spine stretches from the base of your skull to your tailbone. It's composed of 33 bones (vertebrae) stacked on top of one another. These bones are strong enough to support the weight of your upper body. Certain injuries, however, can damage one or more of the vertebrae and cause them to collapse. A collapsed bone in your spine is known as a compression fracture.    Causes of compression fracture  Many compression fractures result from osteoporosis. This disease thins your bones and weakens so they can't withstand normal pressure and are more likely to break. Trauma from a car accident or hard fall can fracture even healthy vertebrae. In rare cases, vertebrae may fracture for unknown reasons.     When to go to the emergency room (ER)  Call 911 if you've been in an accident or had a fall and have neck or back pain, especially when pain occurs with any of these symptoms:    Loss of control over your bowels or bladder    Numbness or weakness    High fever    Unexplained back pain in a person with cancer   What to expect in the ER  A healthcare provider will ask about your health history and examine you. In some cases, you may have X-rays. You also may have other tests, such as computed tomography (CT) scan or magnetic resonance imaging (MRI) scan. These tests can provide detailed images of your  bones and spinal cord.  Treatment  Treatment will depend on the type and cause of the fracture. You will be given medicine for pain. Severe fractures or those that cause nerve problems may need surgery. Many compression fractures mend on their own.  Follow-up  As you improve, you may be given exercises to strengthen your bones. If you have osteoporosis, your healthcare provider may prescribe a medicine to treat it. Sometimes you may have pain even after the bone has healed. In that case, your healthcare provider will discuss your further options.  Date Last Reviewed: 4/1/2018 2000-2018 [employer]. [address] [place] [address]. All rights reserved. This information is not intended as a substitute for professional medical care. Always follow your healthcare professional's instructions.          Your next 10 appointments already scheduled     Dec 05, 2018  8:00 AM CST   LAB with [facility] LAB GOLD WAITING   [facility]    [address] [facility] [address]              Please do not eat 10-12 hours before your appointment if you are coming in fasting for labs on lipids, cholesterol, or glucose (sugar). This does not apply to pregnant women. Water, hot tea and black coffee (with nothing added) are okay. Do not drink other fluids, diet soda or chew gum.            Dec 05, 2018  8:30 AM CST   Procedure - 2.5 hour with U2A ROOM 6   [address] [facility] ([facility])    [address] [place] [id number]               Dec 05, 2018  9:30 AM CST   Heart Cath Right Heart Cath with [id number]   [facility] ([facility])    [address] [place] [id number]   [phone]            Dec 05, 2018 12:00 PM CST   (Arrive by 11:45 AM)   RETURN PRIMARY PULMONARY with Norm Franco MD   [facility] ([facility]  [facility])    [address] [facility]  [address] [place] [address] [phone]              24 Hour Appointment Hotline       To make an appointment at any [facility], call [phone] ([phone]). If you don't have a family doctor or clinic, we will help you find one. [facility] clinics are conveniently located to serve the needs of you and your family.             Review of your medicines      START taking        Dose / Directions Last dose taken    morphine 15 MG IR tablet   Commonly known as:  MSIR   Dose:  15 mg   Quantity:  8 tablet        Take 1 tablet (15 mg) by mouth every 4 hours as needed for moderate to severe pain   Refills:  0        ondansetron 4 MG ODT tab   Commonly known as:  ZOFRAN ODT   Dose:  4 mg   Quantity:  10 tablet        Take 1 tablet (4 mg) by mouth every 8 hours as needed for nausea or vomiting   Refills:  0          Our records show that you are taking the medicines listed below. If these are incorrect, please call your family doctor or clinic.        Dose / Directions Last dose taken    acetaminophen 500 MG tablet   Commonly known as:  TYLENOL   Dose:  500-1000 mg   Quantity:  45 tablet        Take 1-2 tablets (500-1,000 mg) by mouth every 6 hours as needed for pain (Take as needed for pain.  Do not exceed 4 grams (8 tablets) in a day)   Refills:  10        azaTHIOprine 50 MG tablet   Commonly known as:  IMURAN   Dose:  50 mg   Quantity:  90 tablet        Take 1 tablet (50 mg) by mouth daily   Refills:  3        buPROPion 200 MG 12 hr tablet   Commonly known as:  WELLBUTRIN SR   Dose:  200 mg   Quantity:  30 tablet        Take 1 tablet (200 mg) by mouth every morning   Refills:  0        BUSPIRONE HCL PO   Dose:  15 mg        Take 15 mg by mouth 2 times daily   Refills:  0        calcitRIOL 0.25 MCG capsule   Commonly known as:  ROCALTROL   Dose:  0.25 mcg   Quantity:  30 capsule        Take 1 capsule (0.25 mcg) by mouth daily   Refills:  1         carvedilol 12.5 MG tablet   Commonly known as:  COREG   Dose:  6.25 mg   Quantity:  135 tablet        Take 0.5 tablets (6.25 mg) by mouth 2 times daily (with meals)   Refills:  3        denosumab 60 MG/ML Soln injection   Commonly known as:  PROLIA   Dose:  60 mg   Quantity:  1 mL        Inject 1 mL (60 mg) Subcutaneous every 6 months   Refills:  1        furosemide 40 MG tablet   Commonly known as:  LASIX   Quantity:  90 tablet        Take 40 MG M-W-F, 20 MG all other days.   Refills:  3        levalbuterol 45 MCG/ACT inhaler   Commonly known as:  XOPENEX HFA   Dose:  2 puff   Quantity:  1 Inhaler        Inhale 2 puffs into the lungs every 6 hours as needed for shortness of breath / dyspnea or wheezing (priro to exertion/exercise.)   Refills:  1        LORazepam 0.5 MG tablet   Commonly known as:  ATIVAN   Quantity:  90 tablet        Take 0.5 mg (1 tab) every morning and 1 mg (2 tabs) every night at bedtime.   Refills:  1        macitentan 10 MG tablet   Commonly known as:  OPSUMIT   Dose:  10 mg   Quantity:  30 tablet        Take 1 tablet (10 mg) by mouth daily   Refills:  11        order for DME   Quantity:  1 Device        Equipment being ordered: SHOWER CHAIR  FROM [employer]   Refills:  0        potassium chloride 10 MEQ tablet   Commonly known as:  K-TAB,KLOR-CON   Dose:  10 mEq   Quantity:  90 tablet        Take 1 tablet (10 mEq) by mouth daily   Refills:  1        ranitidine 75 MG tablet   Commonly known as:  ranitidine   Dose:   mg   Quantity:  60 tablet        Take 1-2 tablets ( mg) by mouth 2 times daily   Refills:  11        selexipag 1600 MCG tablet   Commonly known as:  UPTRAVI   Dose:  1600 mcg        Take 1 tablet (1,600 mcg) by mouth every 12 hours   Refills:  0        simvastatin 10 MG tablet   Commonly known as:  ZOCOR   Dose:  10 mg   Quantity:  90 tablet        Take 1 tablet by mouth At Bedtime.   Refills:  1        SODIUM BICARBONATE PO        Take by mouth 2 times daily    Refills:  0        tadalafil (PAH) 20 MG Tabs   Commonly known as:  ADCIRCA   Dose:  40 mg   Quantity:  60 tablet        Take 2 tablets (40 mg) by mouth daily   Refills:  11        TEMAZEPAM PO        Take by mouth At Bedtime   Refills:  0                Information about OPIOIDS     PRESCRIPTION OPIOIDS: WHAT YOU NEED TO KNOW   We gave you an opioid (narcotic) pain medicine. It is important to manage your pain, but opioids are not always the best choice. You should first try all the other options your care team gave you. Take this medicine for as short a time (and as few doses) as possible.    Some activities can increase your pain, such as bandage changes or therapy sessions. It may help to take your pain medicine 30 to 60 minutes before these activities. Reduce your stress by getting enough sleep, working on hobbies you enjoy and practicing relaxation or meditation. Talk to your care team about ways to manage your pain beyond prescription opioids.    These medicines have risks:    DO NOT drive when on new or higher doses of pain medicine. These medicines can affect your alertness and reaction times, and you could be arrested for driving under the influence (DUI). If you need to use opioids long-term, talk to your care team about driving.    DO NOT operate heavy machinery    DO NOT do any other dangerous activities while taking these medicines.    DO NOT drink any alcohol while taking these medicines.     If the opioid prescribed includes acetaminophen, DO NOT take with any other medicines that contain acetaminophen. Read all labels carefully. Look for the word  acetaminophen  or  Tylenol.  Ask your pharmacist if you have questions or are unsure.    You can get addicted to pain medicines, especially if you have a history of addiction (chemical, alcohol or substance dependence). Talk to your care team about ways to reduce this risk.    All opioids tend to cause constipation. Drink plenty of water and eat foods  that have a lot of fiber, such as fruits, vegetables, prune juice, apple juice and high-fiber cereal. Take a laxative (Miralax, milk of magnesia, Colace, Senna) if you don t move your bowels at least every other day. Other side effects include upset stomach, sleepiness, dizziness, throwing up, tolerance (needing more of the medicine to have the same effect), physical dependence and slowed breathing.    Store your pills in a secure place, locked if possible. We will not replace any lost or stolen medicine. If you don t finish your medicine, please throw away (dispose) as directed by your pharmacist. The [place] [employer]ution [employer] has more information about safe disposal: https://www.pca.[facility].mn.us/living-green/managing-unwanted-medications        Prescriptions were sent or printed at these locations (2 Prescriptions)                   Other Prescriptions                Printed at Department/Unit printer (2 of 2)         morphine (MSIR) 15 MG IR tablet               ondansetron (ZOFRAN ODT) 4 MG ODT tab                Procedures and tests performed during your visit     Lumbar spine XR, 2-3 views    XR Pelvis and Hip Bilateral 2 Views      Orders Needing Specimen Collection     None      Pending Results     No orders found for last 3 day(s).            Pending Culture Results     No orders found for last 3 day(s).            Pending Results Instructions     If you had any lab results that were not finalized at the time of your Discharge, you can call the ED Lab Result RN at [phone]. You will be contacted by this team for any positive Lab results or changes in treatment. The nurses are available 7 days a week from 10A to 6:30P.  You can leave a message 24 hours per day and they will return your call.        Test Results From Your Hospital Stay        11/24/2018  1:46 AM      Narrative     XR PELVIS AND HIP BILATERAL 2 VIEWS  11/24/2018 1:25 AM     INDICATION: Pain in left hip.    COMPARISON: 10/30/2012.         Impression     IMPRESSION: No acute fracture or dislocation. Right hip arthroplasty  with screw fixation of acetabular component. 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 No significant  change.    LIO GÓMEZ MD         11/24/2018  1:46 AM      Narrative     XR LUMBAR SPINE 2-3 VIEWS  11/24/2018 1:25 AM     INDICATION: Left hip and leg pain.    COMPARISON: 4/16/2014.        Impression     IMPRESSION: Chronic compression fracture with moderate loss of height  of L1, slightly increased from the prior study. Otherwise negative.    LIO GÓMEZ MD                Thank you for choosing [facility]       Thank you for choosing [facility] for your care. Our goal is always to provide you with excellent care. Hearing back from our patients is one way we can continue to improve our services. Please take a few minutes to complete the written survey that you may receive in the mail after you visit with us. Thank you!        [employer] Information     The [employer] gives you secure access to your electronic health record. If you see a primary care provider, you can also send messages to your care team and make appointments. If you have questions, please call your primary care clinic.  If you do not have a primary care provider, please call [phone] and they will assist you.        Care EveryWhere ID     This is your Care EveryWhere ID. This could be used by other organizations to access your [facility] medical records  [id number]        Equal Access to Services     LESLIE CHACKO : Flavio Leiva, waaxda luqadaha, qaybta kaalmada adenasiryada, alysha abdi. So [facility] [phone].    ATENCIÓN: Si habla español, tiene a dhillon disposición servicios gratuitos de asistencia lingüística. Llame al [phone].    We comply with applicable federal civil rights laws and [place] laws. We do not discriminate on the basis of race, color, national origin, age,  disability, sex, sexual orientation, or gender identity.            After Visit Summary       This is your record. Keep this with you and show to your community pharmacist(s) and doctor(s) at your next visit.

## 2018-11-24 ENCOUNTER — APPOINTMENT (OUTPATIENT)
Dept: GENERAL RADIOLOGY | Facility: CLINIC | Age: 58
End: 2018-11-24
Attending: EMERGENCY MEDICINE
Payer: MEDICARE

## 2018-11-24 VITALS
DIASTOLIC BLOOD PRESSURE: 60 MMHG | OXYGEN SATURATION: 86 % | HEART RATE: 67 BPM | RESPIRATION RATE: 18 BRPM | SYSTOLIC BLOOD PRESSURE: 99 MMHG | TEMPERATURE: 97.7 F

## 2018-11-24 PROCEDURE — A9270 NON-COVERED ITEM OR SERVICE: HCPCS | Performed by: EMERGENCY MEDICINE

## 2018-11-24 PROCEDURE — 72100 X-RAY EXAM L-S SPINE 2/3 VWS: CPT

## 2018-11-24 PROCEDURE — 25000131 ZZH RX MED GY IP 250 OP 636 PS 637: Performed by: EMERGENCY MEDICINE

## 2018-11-24 PROCEDURE — 73523 X-RAY EXAM HIPS BI 5/> VIEWS: CPT

## 2018-11-24 PROCEDURE — 25000132 ZZH RX MED GY IP 250 OP 250 PS 637: Performed by: EMERGENCY MEDICINE

## 2018-11-24 RX ORDER — ONDANSETRON 4 MG/1
4 TABLET, ORALLY DISINTEGRATING ORAL EVERY 8 HOURS PRN
Qty: 10 TABLET | Refills: 0 | Status: ON HOLD | OUTPATIENT
Start: 2018-11-24 | End: 2019-04-09

## 2018-11-24 RX ORDER — MORPHINE SULFATE 15 MG/1
15 TABLET ORAL ONCE
Status: COMPLETED | OUTPATIENT
Start: 2018-11-24 | End: 2018-11-24

## 2018-11-24 RX ORDER — MORPHINE SULFATE 15 MG/1
15 TABLET ORAL EVERY 4 HOURS PRN
Qty: 8 TABLET | Refills: 0 | Status: SHIPPED | OUTPATIENT
Start: 2018-11-24 | End: 2018-12-05

## 2018-11-24 RX ORDER — ONDANSETRON 4 MG/1
4 TABLET, ORALLY DISINTEGRATING ORAL ONCE
Status: COMPLETED | OUTPATIENT
Start: 2018-11-24 | End: 2018-11-24

## 2018-11-24 RX ADMIN — MORPHINE SULFATE 15 MG: 15 TABLET ORAL at 00:46

## 2018-11-24 RX ADMIN — RANITIDINE 150 MG: 150 TABLET, FILM COATED ORAL at 00:46

## 2018-11-24 RX ADMIN — ONDANSETRON 4 MG: 4 TABLET, ORALLY DISINTEGRATING ORAL at 00:46

## 2018-11-24 NOTE — TELEPHONE ENCOUNTER
"Judith teary and sounds anxious on the phone to triager, calling in with concerns, \" I was in the ED in [facility] recently for this left hip pain and I was evaluated for a blood clot and that was ruled out and I was told that it would be safe for me to drive home to [place]. Other than that they did not do any imaging or evaluation. I have been doing everything that they told me to do and nothing is helping. I cant sleep and I can't relax or do anything. My pain is 9-10/10 and I have done hot and cold packs and alternated between tylenol and ibuprofen. I feel like the pain is causing me to be anxious and short of breath.\"       Disposition: Go to ED. Judith verbalizes understanding and will follow disposition. She has someone who can drive her and she will go in [facility].   Albania Luciano RN/FNA    Reason for Disposition    [1] SEVERE pain (e.g., excruciating, unable to do any normal activities) AND [2] not improved after 2 hours of pain medicine    Additional Information    Negative: Looks like a broken bone or dislocated joint (e.g., crooked or deformed)    Negative: Sounds like a life-threatening emergency to the triager    Negative: Followed a hip injury    Negative: Leg pain is main symptom    Negative: Back pain radiating (shooting) into hip    Negative: [1] SEVERE pain (e.g., excruciating, unable to do any normal activities) AND [2] fever    Negative: Can't stand (bear weight) or walk    Negative: [1] Red area or streak AND [2] fever    Negative: Patient sounds very sick or weak to the triager    Protocols used: HIP PAIN-ADULT-AH      "

## 2018-11-24 NOTE — ED PROVIDER NOTES
"  History     Chief Complaint   Patient presents with     Hip Pain     Started in calf- moved to hip. Has hx of \"bad hips that need to be replaced\".  Seen at [facility] today and had ultrasound     HPI  Judith Nelson is a 58 year old female with a history of osteoporosis presented for evaluation of left hip pain.  Patient reports pain in her left hip and thigh over the past roughly 5 days.  Patient reports she had pain starting shortly after lifting a heavy piece of furniture at home.  Since then she been having increasing pain in her left hip with difficulty walking due to the pain.  She reports the pain as at times radiated down into her thigh.  She was seen in [place] today and had an ultrasound for DVT which was negative.  Patient does report that she had a history of arthritis and had a right hip previously replaced and was previously told that her left hip would also need replacing.  Patient denies any numbness, tingling, or weakness.  Denies any urinary or bowel incontinence.  Denies any back pain.        Chart review:      US DOP VEIN LEG LT11/23/2018  [facility]  Result Narrative   EXAM:  US LEFT LOWER EXTREMITY VENOUS DUPLEX    INDICATION:  left leg pain    COMPARISON:  None    TECHNIQUE:  The venous structures of the left lower extremity are evaluated with two  dimensional US with transverse compression and augmentation techniques.  Color  and spectral doppler waveform analysis are performed.    FINDINGS:  The left common femoral, femoral, and popliteal veins are well visualized and  are normally compressible, with normal flow which augments with compression.  There is normal color-flow in the deep calf veins.        The contralateral common femoral vein is also normally compressible.    IMPRESSION:  No evidence for left lower extremity deep venous thrombosis.         Problem List:    Patient Active Problem List    Diagnosis Date Noted     Gall stones, common bile duct " 01/21/2012     Priority: High     PAH (pulmonary arterial hypertension) with portal hypertension (H) 06/15/2018     Priority: Medium     Dyspnea on exertion 10/30/2017     Priority: Medium     Cellulitis of left lower extremity 05/24/2017     Priority: Medium     Personal history of drug therapy 03/06/2017     Priority: Medium     Chronic kidney disease, stage IV (severe) (H) 02/18/2015     Priority: Medium     Chronic steroid use 10/23/2014     Priority: Medium     Iron deficiency anemia 08/14/2014     Priority: Medium     updating diagnosis code for icd10 cutover       Intestinal malabsorption 08/14/2014     Priority: Medium     Problem list name updated by automated process. Provider to review       Health Care Home (HEALTHCARE) 06/12/2014     Priority: Medium     Status: Closed 6/12/14  Care Coordinator:  Antoinette Hoff           Mixed hyperlipidemia 04/29/2014     Priority: Medium     GERD (gastroesophageal reflux disease) 04/29/2014     Priority: Medium     Major depressive disorder, single episode, severe with psychotic features (H) 04/29/2014     Priority: Medium     Thrush 04/29/2014     Priority: Medium     Stress-induced cardiomyopathy 04/22/2014     Priority: Medium     Elevated troponin 04/18/2014     Priority: Medium     Dehydration 04/16/2014     Priority: Medium     Compression fracture 04/16/2014     Priority: Medium     CKD (chronic kidney disease) stage 4, GFR 15-29 ml/min (H) 11/17/2013     Priority: Medium     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 Last visit 9/2014 - consult sent for scanning .  cgb 1/9/2015            Sarcoidosis 11/17/2013     Priority: Medium     Skin ulcer of ankle (H) 10/18/2013     Priority: Medium     Senile osteoporosis 09/24/2013     Priority: Medium     Osteoporosis, postmenopausal 09/20/2013     Priority: Medium     Encounter for long-term current use of medication 09/16/2013     Priority:  Medium     Problem list name updated by automated process. Provider to review       Personal history of other drug therapy 09/13/2013     Priority: Medium     Tremor 03/22/2012     Priority: Medium     Resolved with med change 4/2014       Elevated liver enzymes 01/13/2012     Priority: Medium     Hypomagnesemia 12/06/2011     Priority: Medium     Diagnosis updated by automated process. Provider to review and confirm.       Lower extremity edema 12/05/2011     Priority: Medium     Since hip surgery 11/10       Mental status change 12/02/2011     Priority: Medium     Acute renal failure (ARF) (H) 12/02/2011     Priority: Medium     Cr 1.8-2.8; granulomatous interstitial nephritis: Etiology unclear, though suspicious for sarcoidosis. Also considered AIN; subsequently dc'd PPI. Urine protein 0.27 on 3/1/13. Will continue tapering steroids as pt is having undesirable side effects, and unfortunately, they do not appear to improving renal function much - still trending 1.8-2.8.. Will have pt decrease dose by 5mg q2 wks until dc'd. Follows with Nephrology, Dr Garcia; PCP prophylaxis       CARDIOVASCULAR SCREENING; LDL GOAL LESS THAN 160 10/31/2010     Priority: Medium     Pulmonary hypertension (H) 09/19/2010     Priority: Medium       Echo:             12/4/12                  RVSP 38             5/22/12                  RVSP 121             8/26/10                  RVSP  39                                             LVEF     RHC:   7/29/14 RA 5   RV 74. 5   PA 72/30, 43   PAW 10    (7)   TCO 4.7 (3)   RUCHI 3.9 (2.5)  1/16/13 RA 7, 8, 5   RV 65, 6   PA 62/22, 38   PAW 10, 10, 9    (8.3)   COI 3.5 (2.2)  6/12/12            Basal                     Nitric oxide 20             Nitric oxide 40                   Nitric oxide 80             RA         8,6,5             RV         80,8             PA         80/28,45                     76/17,42                    76/16,40                              71/17,40              PAW     10,7,8             PVR       720(9.0)                                                                                                      660(8.3)             COI        3.6(2.3)                                                                                                       3.7(2.4)    10/14/10                RA      10,6,4               RV       43,8               PA        45/12,27               PAW    12,11,9               COI  3.6(2.3)   10/10 right heart cath pulm htn just at upper limits of normal     6MWT: -  10/22/13 381m/RA/lowest sat 93%  4/30/13  367m/RA/lowest 93%       Duodenitis hemorrhagic 07/01/2010     Priority: Medium     Hyperprolactinemia (H) 11/26/2009     Priority: Medium     Diagnosis updated by automated process. Provider to review and confirm.       Breast discharge 11/02/2009     Priority: Medium     Since 2000 after last child; normal labs and mammo       Major depressive disorder, single episode, moderate (H) 12/14/2006     Priority: Medium     2 hospitalizations- [facility] and [facility]; 10/06 had auditory hallucinations - Zyprexa added  Dr Jan Scherer - [facility] in [place]  11/07 readmitted to [facility] - increasing paranoia and anxiety. Geodon added.   2011: Risperdal, Cogentin, and Ativan  Psychologist - lAba Navarro Raven       Intrapelvic protrusion of acetabulum 12/14/2006     Priority: Medium     12-12-07 [facility]  [phone] x-ray for a diagnostic and therapeutic injection of her right hip. These treatment outlines are total hip arthroplasty.  Problem list name updated by automated process. Provider to review and confirm        Asymptomatic postmenopausal status      Priority: Medium     Since 2004,   Problem list name updated by automated process. Provider to review       GENERALIZED ANXIETY DISORDER 08/21/2007     Priority: Low        Past Medical History:    Past Medical History:   Diagnosis Date      Anxiety      CKD (chronic kidney disease), stage III (H)      Hyperprolactinemia (H)      Lower extremity edema      Lumbar compression fracture (H)      Major depressive disorder      Menopause      MGUS (monoclonal gammopathy of unknown significance)      Osteoporosis      Other seborrheic keratosis      Protrusio acetabuli      Pulmonary hypertension (H)      Sarcoidosis      Stress-induced cardiomyopathy        Past Surgical History:    Past Surgical History:   Procedure Laterality Date     Csection,  X 2       CYSTOSCOPY, RETROGRADES, INSERT STENT URETER(S), COMBINED  10/2/2013    Procedure: COMBINED CYSTOSCOPY, RETROGRADES, INSERT STENT URETER(S);  Left Retrograde Ureteropyelogram and Ureteral Stent Placement;  Surgeon: SONIA Martinez MD;  Location: [facility] OR     ENDOSCOPIC RETROGRADE CHOLANGIOPANCREATOGRAM  2012    Procedure:ENDOSCOPIC RETROGRADE CHOLANGIOPANCREATOGRAM; ERCP biliary sphincterotomy and stone removal; Surgeon:MARGIE RUGGIERO; Location:[facility] OR     LAPAROSCOPIC CHOLECYSTECTOMY WITH CHOLANGIOGRAMS  2012    Procedure:LAPAROSCOPIC CHOLECYSTECTOMY WITH CHOLANGIOGRAMS; Surgeon:BRIANA PADILLA; Location:[facility] OR     ORTHOPEDIC SURGERY  10/1/2010    Right hip replacement     ZZ GASTROSCOPY,FL  6/10    Erythematous duodenopathy       Family History:    Family History   Problem Relation Age of Onset     Cancer Mother       age 62 leukemia     GASTROINTESTINAL DISEASE Mother      diverticulitis     Hypertension Mother      Allergies Mother      Arthritis Mother      Depression Mother      Eye Disorder Mother      Osteoporosis Mother      C.A.D. Father      MI/CAD      Cancer Father      skin     Blood Disease Father      renal  problem     Hypertension Father      Anesthesia Reaction Father      Cardiovascular Father      Neurologic Disorder Father      Parkinson's disease     C.A.D. Maternal Grandmother       late 82s MI     Diabetes Maternal Grandmother      C.A.D. Maternal  Grandfather       mid 80s MI     Alzheimer Disease Paternal Grandmother       80s     Alzheimer Disease Paternal Grandfather       80s     Neurologic Disorder Sister      migraines     Allergies Sister      Diabetes Other      AODM     Neurologic Disorder Sister      migraines     Allergies Sister      Anesthesia Reaction Son      Anesthesia Reaction Daughter      Anesthesia Reaction Daughter      Diabetes Sister      hypoglycemia       Social History:  Marital Status:   [4]  Social History   Substance Use Topics     Smoking status: Never Smoker     Smokeless tobacco: Never Used     Alcohol use No      Comment: Occ.        Medications:      morphine (MSIR) 15 MG IR tablet   acetaminophen (TYLENOL) 500 MG tablet   azaTHIOprine (IMURAN) 50 MG tablet   buPROPion (WELLBUTRIN SR) 200 MG 12 hr tablet   BUSPIRONE HCL PO   calcitRIOL (ROCALTROL) 0.25 MCG capsule   carvedilol (COREG) 12.5 MG tablet   denosumab (PROLIA) 60 MG/ML SOLN   furosemide (LASIX) 40 MG tablet   levalbuterol (XOPENEX HFA) 45 MCG/ACT Inhaler   LORazepam (ATIVAN) 0.5 MG tablet   macitentan (OPSUMIT) 10 MG tablet   ORDER FOR DME   potassium chloride (K-TAB,KLOR-CON) 10 MEQ tablet   ranitidine (RANITIDINE) 75 MG tablet   selexipag (UPTRAVI) 1600 MCG tablet   simvastatin (ZOCOR) 10 MG tablet   SODIUM BICARBONATE PO   tadalafil, PAH, (ADCIRCA) 20 MG TABS   TEMAZEPAM PO         Review of Systems   Constitutional: Negative for appetite change, fatigue and fever.   HENT: Negative for congestion.    Respiratory: Negative for chest tightness and shortness of breath.    Cardiovascular: Negative for chest pain.   Gastrointestinal: Negative for abdominal pain and nausea.   Genitourinary: Negative for dysuria and enuresis.   Musculoskeletal: Positive for arthralgias (left hip). Negative for back pain and joint swelling.        Left leg pain from hip to calf   Skin: Negative for rash and wound.   Neurological: Negative for weakness and numbness.    All other systems reviewed and are negative.      Physical Exam   BP: 97/58  Pulse: 67  Heart Rate: 79  Temp: 97.7  F (36.5  C)  Resp: 18  SpO2: 92 %      Physical Exam   Constitutional: She is oriented to person, place, and time. She appears well-developed and well-nourished. No distress.   HENT:   Head: Normocephalic and atraumatic.   Cardiovascular: Normal rate.    Pulmonary/Chest: Effort normal.   Musculoskeletal: She exhibits no edema or tenderness.        Left hip: She exhibits decreased range of motion (due to increased pain). She exhibits no tenderness, no swelling, no crepitus and no deformity.        Left lower leg: She exhibits no tenderness, no swelling and no edema.   Neurological: She is alert and oriented to person, place, and time.   Skin: Skin is warm and dry.   Psychiatric: She has a normal mood and affect.   Nursing note and vitals reviewed.      ED Course     ED Course     Procedures             Results for orders placed or performed during the hospital encounter of 11/23/18   XR Pelvis and Hip Bilateral 2 Views    Narrative    XR PELVIS AND HIP BILATERAL 2 VIEWS  11/24/2018 1:25 AM     INDICATION: Pain in left hip.    COMPARISON: 10/30/2012.      Impression    IMPRESSION: No acute fracture or dislocation. Right hip arthroplasty  with screw fixation of acetabular component. 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 No significant  change.    LIO GÓMEZ MD   Lumbar spine XR, 2-3 views    Narrative    XR LUMBAR SPINE 2-3 VIEWS  11/24/2018 1:25 AM     INDICATION: Left hip and leg pain.    COMPARISON: 4/16/2014.      Impression    IMPRESSION: Chronic compression fracture with moderate loss of height  of L1, slightly increased from the prior study. Otherwise negative.    LIO GÓMEZ MD     *Note: Due to a large number of results and/or encounters for the requested time period, some results have not been displayed. A complete set of results can be  found in Results Review.         Medications   morphine (MSIR) IR tablet 15 mg (15 mg Oral Given 11/24/18 0046)   ranitidine (ZANTAC) tablet 150 mg (150 mg Oral Given 11/24/18 0046)   ondansetron (ZOFRAN-ODT) ODT tab 4 mg (4 mg Oral Given 11/24/18 0046)     2:42 AM: Patient reassessed.  Pain significant improved.  Patient reports she was able to rest.  Pain still rated 6/10 down from 9/10.  Reflux pains resolved.  Patient advised of x-ray findings and plan to attempt ambulation    2:52 AM patient able to ambulate easily to the bathroom with a very mild limp.  Did not require any assistance.  Patient reports pain is tolerable when walking at this time.      Assessments & Plan (with Medical Decision Making)  58-year-old female with a history of arthritis and previous right hip arthroplasty presenting for evaluation of left hip pain and leg pain.  Patient reports pain present over the past 4-5 days and seemed to originate in her left calf.  She was seen earlier in the day had a left leg ultrasound which was negative.  Patient reporting that although the pain does radiate through to her left calf at times, her pain seems to originate from the left hip.  Pain is improved with rest and worsened with movement.  No abnormalities on physical exam.  No low back pain or tenderness to suggest a fracture however her symptoms could be related to sciatica x-rays obtained of the hip as well as the lumbar spine.  X-rays showed evidence of a chronic compression fracture of L1 in the lumbar spine but no other acute abnormality there.  Did also show notable degenerative changes in the left hip and acetabulum consistent with chronic arthritis.  Patient was treated symptomatically with morphine for her pain.  She is chronically on Zantac and missed her dose tonight so was given that.  She also has a history of nausea associated with most pain medicine so was prophylactically given ondansetron.  Patient had significant improvement of her  pain and was able to ambulate and rest after pain medications.  Given a short course of pain medications along with Zofran to help with nausea and advised to follow-up with orthopedics for evaluation of possible right hip replacement.     I have reviewed the nursing notes.    I have reviewed the findings, diagnosis, plan and need for follow up with the patient.       Discharge Medication List as of 11/24/2018  3:15 AM      START taking these medications    Details   morphine (MSIR) 15 MG IR tablet Take 1 tablet (15 mg) by mouth every 4 hours as needed for moderate to severe pain, Disp-8 tablet, R-0, Local Print      ondansetron (ZOFRAN ODT) 4 MG ODT tab Take 1 tablet (4 mg) by mouth every 8 hours as needed for nausea or vomiting, Disp-10 tablet, R-0, Local Print             Final diagnoses:   Hip pain, left - arthritis   Closed compression fracture of first lumbar vertebra, initial encounter (H)       11/23/2018   [facility] EMERGENCY DEPARTMENT     Noyola, Tello García MD  11/29/18 3642

## 2018-11-24 NOTE — DISCHARGE INSTRUCTIONS
Arthralgia    Arthralgia is the term for pain in or around the joint. It is a symptom, not a disease. This pain may involve one or more joints. In some cases, the pain moves from joint to joint.  There are many causes for joint pain. These include:    Injury    Osteoarthritis (wearing out of the joint surface)    Gout (inflammation of the joint due to crystals in the joint fluid)    Infection inside the joint      Bursitis (inflammation of the fluid-filled sacs around the joint)    Autoimmune disorders such as rheumatoid arthritis or lupus    Tendonitis (inflammation of chords that attach muscle to bone)  Home care    Rest the involved joint(s) until your symptoms improve.     You may be prescribed pain medicine. If none is prescribed, you may use acetaminophen or ibuprofen to control pain and inflammation.  Follow-up care  Follow up with your healthcare provider or as advised.  When to seek medical advice  Contact your healthcare provider right away if any of the following occurs:    Pain, swelling, or redness of joint increases    Pain worsens or recurs after a period of improvement    Pain moves to other joints    You cannot bear weight on the affected joint     You cannot move the affected joint    Joint appears deformed    New rash appears    Fever of 100.4 F (38 C) or higher, or as directed by your healthcare provider  Date Last Reviewed: 3/1/2017    [id number] [employer]. [address] [place] [address], [place] [address]. All rights reserved. This information is not intended as a substitute for professional medical care. Always follow your healthcare professional's instructions.          Back Fracture (Compression Fracture)  Your spine stretches from the base of your skull to your tailbone. It's composed of 33 bones (vertebrae) stacked on top of one another. These bones are strong enough to support the weight of your upper body. Certain injuries, however, can damage one or more of the vertebrae and  cause them to collapse. A collapsed bone in your spine is known as a compression fracture.    Causes of compression fracture  Many compression fractures result from osteoporosis. This disease thins your bones and weakens so they can't withstand normal pressure and are more likely to break. Trauma from a car accident or hard fall can fracture even healthy vertebrae. In rare cases, vertebrae may fracture for unknown reasons.     When to go to the emergency room (ER)  Call 911 if you've been in an accident or had a fall and have neck or back pain, especially when pain occurs with any of these symptoms:    Loss of control over your bowels or bladder    Numbness or weakness    High fever    Unexplained back pain in a person with cancer   What to expect in the ER  A healthcare provider will ask about your health history and examine you. In some cases, you may have X-rays. You also may have other tests, such as computed tomography (CT) scan or magnetic resonance imaging (MRI) scan. These tests can provide detailed images of your bones and spinal cord.  Treatment  Treatment will depend on the type and cause of the fracture. You will be given medicine for pain. Severe fractures or those that cause nerve problems may need surgery. Many compression fractures mend on their own.  Follow-up  As you improve, you may be given exercises to strengthen your bones. If you have osteoporosis, your healthcare provider may prescribe a medicine to treat it. Sometimes you may have pain even after the bone has healed. In that case, your healthcare provider will discuss your further options.  Date Last Reviewed: 4/1/2018 2000-2018 [employer]. [address] [place] [address], [place] [address]. All rights reserved. This information is not intended as a substitute for professional medical care. Always follow your healthcare professional's instructions.

## 2018-11-24 NOTE — ED NOTES
Patient is resting comfortably. Patient's family went home approx 1 hour ago. Patient concerned she will have difficulty getting a ride home. Will try to call daughter for a ride.

## 2018-11-28 ENCOUNTER — TELEPHONE (OUTPATIENT)
Dept: CARDIOLOGY | Facility: CLINIC | Age: 58
End: 2018-11-28

## 2018-11-28 NOTE — TELEPHONE ENCOUNTER
Patient left  stating she had been to the ED for pain yesterday.  ----------------------  After chart review, patient was in the ED but released the same day for ramirez pain related to an acute on chronic Hip pain from chronic fracture.    Patient is currently scheduled to have RHC and office visit next week on 12/5/18.    Called patient back and she explained that she was advised that she may have to have hip surgery, but the MD doesn't feel comfortable doing that until she has the RHC completed.  Patient has been pretty bed-bound due to the severe pain, but her pain medicine had been changed from oral morphine (which gave her intractable vomiting) to Oxycodone Q6 hrs.  Advised patient she could also supplement that with ES tylenol, up to 4,000mg per day.  We discussed using both ice & heat, as well as making sure she is moving more or she may get so stiff that she has even more pain.  Reviewed next weeks plan of RHC and office visit after.  Patient verbalized understanding, agreed with plan and denied any further questions. Keyanna Mccray RN on 11/28/2018 at 10:44 AM

## 2018-11-29 ASSESSMENT — ENCOUNTER SYMPTOMS
ARTHRALGIAS: 1
FATIGUE: 0
SHORTNESS OF BREATH: 0
NUMBNESS: 0
NAUSEA: 0
DYSURIA: 0
BACK PAIN: 0
JOINT SWELLING: 0
WOUND: 0
CHEST TIGHTNESS: 0
FEVER: 0
WEAKNESS: 0
ABDOMINAL PAIN: 0
APPETITE CHANGE: 0

## 2018-12-04 ENCOUNTER — TELEPHONE (OUTPATIENT)
Dept: CARDIOLOGY | Facility: CLINIC | Age: 58
End: 2018-12-04

## 2018-12-04 NOTE — TELEPHONE ENCOUNTER
"Patient called to say that she believes she should reschedule her RHC tomorrow due to multiple reasons.  She has continued pain since she injured herself moving a dresser on Thanksgiving.  She is concerned that she won't wake up to her alarm due to the pain medications she is on for her hip, and she states that her daughter \"isn't a morning person\".     Advised patient she can reschedule appt, but we probably can't get back in until later January.  Advised her that sanjeev would call her later to re-schedule RHC when he gets the next CV schedule.  Patient verbalized understanding, agreed with plan and denied any further questions. Keyanna Mccray RN on 12/4/2018 at 12:21 PM    Patient states that she also needs to see an Orthopedist because she was told she probably needs a hip replacement, that is why she has so much pain.  She states she is hoping she can just get a cortisone injection to get her by for a while, as she had a very long recovery from her last THR.  Gave patient phone number to Ortho clinic to make appt and advised her to state she needs someone that specializes in Hip issues.  Asked her to call TODAY, so they can assess if she can receive the Injection.  Dr. Franco won't approve surgery until she has the RHC, so she shouldn't wait.  Keyanna Mccray RN on 12/4/2018 at 12:25 PM    -----------------------------  Note: Patient states she does have an appt on Jan 30th, with Oral surgery finally.      "

## 2018-12-04 NOTE — TELEPHONE ENCOUNTER
Called patient and clarified if she was going to still come to her PFT & Pulm appt tomorrow since she cancelled her RHC?  We discussed the arrival time of 11am and she believes she can make that.  Also discussed her seeing Dr. Franco even though she isn't having the RHC and agreed she might as well since she will be here.  Advised her she can also make the Ortho appt while she is in the bldg.  Labs will be done after our office visit to accommodate her morning schedule. Patient verbalized understanding, agreed with plan and denied any further questions. Keyanna Mccray RN on 12/4/2018 at 1:02 PM

## 2018-12-05 ENCOUNTER — OFFICE VISIT (OUTPATIENT)
Dept: CARDIOLOGY | Facility: CLINIC | Age: 58
End: 2018-12-05
Attending: INTERNAL MEDICINE
Payer: MEDICARE

## 2018-12-05 ENCOUNTER — OFFICE VISIT (OUTPATIENT)
Dept: PULMONOLOGY | Facility: CLINIC | Age: 58
End: 2018-12-05
Attending: INTERNAL MEDICINE
Payer: MEDICARE

## 2018-12-05 VITALS
HEIGHT: 63 IN | BODY MASS INDEX: 20.64 KG/M2 | DIASTOLIC BLOOD PRESSURE: 78 MMHG | SYSTOLIC BLOOD PRESSURE: 117 MMHG | HEART RATE: 65 BPM | OXYGEN SATURATION: 96 % | WEIGHT: 116.5 LBS

## 2018-12-05 VITALS
HEIGHT: 63 IN | DIASTOLIC BLOOD PRESSURE: 73 MMHG | SYSTOLIC BLOOD PRESSURE: 108 MMHG | OXYGEN SATURATION: 95 % | WEIGHT: 116.5 LBS | RESPIRATION RATE: 17 BRPM | HEART RATE: 75 BPM | BODY MASS INDEX: 20.64 KG/M2

## 2018-12-05 DIAGNOSIS — M25.552 HIP PAIN, LEFT: ICD-10-CM

## 2018-12-05 DIAGNOSIS — D86.9 SARCOIDOSIS: ICD-10-CM

## 2018-12-05 DIAGNOSIS — R06.09 DYSPNEA ON EXERTION: ICD-10-CM

## 2018-12-05 DIAGNOSIS — I27.20 PULMONARY HYPERTENSION (H): ICD-10-CM

## 2018-12-05 DIAGNOSIS — D86.9 SARCOIDOSIS: Primary | ICD-10-CM

## 2018-12-05 DIAGNOSIS — I27.20 PULMONARY HYPERTENSION (H): Primary | ICD-10-CM

## 2018-12-05 LAB
ALBUMIN SERPL-MCNC: 3.9 G/DL (ref 3.4–5)
ALP SERPL-CCNC: 62 U/L (ref 40–150)
ALT SERPL W P-5'-P-CCNC: 22 U/L (ref 0–50)
ANION GAP SERPL CALCULATED.3IONS-SCNC: 8 MMOL/L (ref 3–14)
AST SERPL W P-5'-P-CCNC: 19 U/L (ref 0–45)
BASOPHILS # BLD AUTO: 0 10E9/L (ref 0–0.2)
BASOPHILS NFR BLD AUTO: 0.9 %
BILIRUB DIRECT SERPL-MCNC: 0.1 MG/DL (ref 0–0.2)
BILIRUB SERPL-MCNC: 0.4 MG/DL (ref 0.2–1.3)
BUN SERPL-MCNC: 18 MG/DL (ref 7–30)
CALCIUM SERPL-MCNC: 9.1 MG/DL (ref 8.5–10.1)
CHLORIDE SERPL-SCNC: 105 MMOL/L (ref 94–109)
CO2 SERPL-SCNC: 26 MMOL/L (ref 20–32)
CREAT SERPL-MCNC: 1.57 MG/DL (ref 0.52–1.04)
DIFFERENTIAL METHOD BLD: ABNORMAL
DLCOUNC-%PRED-PRE: 64 %
DLCOUNC-PRE: 13.91 ML/MIN/MMHG
DLCOUNC-PRED: 21.47 ML/MIN/MMHG
EOSINOPHIL # BLD AUTO: 0.1 10E9/L (ref 0–0.7)
EOSINOPHIL NFR BLD AUTO: 1.7 %
ERV-%PRED-PRE: 105 %
ERV-PRE: 1.09 L
ERV-PRED: 1.03 L
ERYTHROCYTE [DISTWIDTH] IN BLOOD BY AUTOMATED COUNT: 12.6 % (ref 10–15)
EXPTIME-PRE: 7.21 SEC
FEF2575-%PRED-PRE: 45 %
FEF2575-PRE: 1 L/SEC
FEF2575-PRED: 2.2 L/SEC
FEFMAX-%PRED-PRE: 63 %
FEFMAX-PRE: 3.97 L/SEC
FEFMAX-PRED: 6.26 L/SEC
FEV1-%PRED-PRE: 77 %
FEV1-PRE: 1.8 L
FEV1FEV6-PRE: 67 %
FEV1FEV6-PRED: 81 %
FEV1FVC-PRE: 67 %
FEV1FVC-PRED: 79 %
FEV1SVC-PRE: 74 %
FEV1SVC-PRED: 72 %
FIFMAX-PRE: 4.36 L/SEC
FVC-%PRED-PRE: 93 %
FVC-PRE: 2.67 L
FVC-PRED: 2.87 L
GFR SERPL CREATININE-BSD FRML MDRD: 34 ML/MIN/1.7M2
GLUCOSE SERPL-MCNC: 88 MG/DL (ref 70–99)
HCT VFR BLD AUTO: 44.9 % (ref 35–47)
HGB BLD-MCNC: 15 G/DL (ref 11.7–15.7)
IC-%PRED-PRE: 62 %
IC-PRE: 1.34 L
IC-PRED: 2.16 L
IMM GRANULOCYTES # BLD: 0 10E9/L (ref 0–0.4)
IMM GRANULOCYTES NFR BLD: 0 %
LYMPHOCYTES # BLD AUTO: 1.5 10E9/L (ref 0.8–5.3)
LYMPHOCYTES NFR BLD AUTO: 42.3 %
MCH RBC QN AUTO: 32.1 PG (ref 26.5–33)
MCHC RBC AUTO-ENTMCNC: 33.4 G/DL (ref 31.5–36.5)
MCV RBC AUTO: 96 FL (ref 78–100)
MONOCYTES # BLD AUTO: 0.3 10E9/L (ref 0–1.3)
MONOCYTES NFR BLD AUTO: 9.6 %
NEUTROPHILS # BLD AUTO: 1.6 10E9/L (ref 1.6–8.3)
NEUTROPHILS NFR BLD AUTO: 45.5 %
NRBC # BLD AUTO: 0 10*3/UL
NRBC BLD AUTO-RTO: 0 /100
PLATELET # BLD AUTO: 188 10E9/L (ref 150–450)
POTASSIUM SERPL-SCNC: 4.4 MMOL/L (ref 3.4–5.3)
PROT SERPL-MCNC: 7.3 G/DL (ref 6.8–8.8)
RBC # BLD AUTO: 4.68 10E12/L (ref 3.8–5.2)
SODIUM SERPL-SCNC: 139 MMOL/L (ref 133–144)
VA-%PRED-PRE: 80 %
VA-PRE: 3.93 L
VC-%PRED-PRE: 76 %
VC-PRE: 2.43 L
VC-PRED: 3.19 L
WBC # BLD AUTO: 3.4 10E9/L (ref 4–11)

## 2018-12-05 PROCEDURE — 99215 OFFICE O/P EST HI 40 MIN: CPT | Mod: ZP | Performed by: INTERNAL MEDICINE

## 2018-12-05 PROCEDURE — G0463 HOSPITAL OUTPT CLINIC VISIT: HCPCS | Mod: ZF

## 2018-12-05 PROCEDURE — 36415 COLL VENOUS BLD VENIPUNCTURE: CPT | Performed by: INTERNAL MEDICINE

## 2018-12-05 RX ORDER — LEVALBUTEROL TARTRATE 45 UG/1
2 AEROSOL, METERED ORAL EVERY 6 HOURS PRN
Status: DISCONTINUED | OUTPATIENT
Start: 2018-12-05 | End: 2018-12-05 | Stop reason: CLARIF

## 2018-12-05 RX ORDER — LEVALBUTEROL TARTRATE 45 UG/1
2 AEROSOL, METERED ORAL EVERY 6 HOURS PRN
Qty: 1 INHALER | Refills: 11 | Status: SHIPPED | OUTPATIENT
Start: 2018-12-05 | End: 2019-03-25

## 2018-12-05 RX ORDER — LIDOCAINE 40 MG/G
CREAM TOPICAL
Status: CANCELLED | OUTPATIENT
Start: 2018-12-05

## 2018-12-05 RX ORDER — HYDROXYZINE PAMOATE 25 MG/1
CAPSULE ORAL
COMMUNITY
Start: 2018-11-26 | End: 2019-01-24

## 2018-12-05 ASSESSMENT — PAIN SCALES - GENERAL
PAINLEVEL: MODERATE PAIN (5)
PAINLEVEL: EXTREME PAIN (8)

## 2018-12-05 NOTE — NURSING NOTE
Procedures and/or Testing: Patient given instructions regarding  Orthopedic referral for left hip pain. Discussed purpose, preparation, procedure and when to expect results reported back to the patient. Patient demonstrated understanding of this information and agreed to call with further questions or concerns.    Right Heart Catheterization: Patient was instructed regarding right heart catheterization, including discussion of the procedure, preparation, intra-procedural steps, and recovery at home. Patient demonstrated understanding of this information and agreed to call with further questions or concerns.    Med Reconcile: Reviewed and verified all current medications with the patient. The updated medication list was printed and given to the patient.    Patient was instructed to return for the next laboratory testing today.     Diet: Patient instructed regarding a heart healthy diet, including discussion of reduced fat and sodium intake. Patient demonstrated understanding of this information and agreed to call with further questions or concerns.    Return Appointment: Patient given instructions regarding scheduling next clinic visit. Patient demonstrated understanding of this information and agreed to call with further questions or concerns.    Patient stated she understood all health information given and agreed to call with further questions or concerns.    Medication Changes:   No medication changes at this time. Please continue current medication regiment.    Patient Instructions:  1. Continue staying active and eat a heart healthy diet.    2. Please keep current list of medications with you at all times.    3. Remember to weigh yourself daily after voiding and before you consume any food or beverages and log the numbers.  If you have gained/lost 2 pounds overnight or 5 pounds in a week contact us immediately for medication adjustments or further instructions.    4. **Please call us immediately if you have any  syncope (fainting or passing out), chest pain, edema (swelling or weight gain), or decline in your functional status (general decline in how you are feeling).      Check-In  Time Check-In Location Estimated Length Procedure   Name        [facility]  waiting room 60-90 minutes Right Heart Catheterization**     Procedure Preparations & Instructions     This is an invasive procedure that DOES require preparation:    - Nothing to eat for 6 hours   - You may have clear liquids up until the time of your procedure  - A ride should be arranged for you in the instance you are unable to drive home, however you should be able to function as you normally would after the procedure     *For Patients with Diabetes: ? DO NOT take any oral diabetic medication, short-acting diabetes medications/insulin, humalog or regular insulin the morning of your test  ? Take   dose of long-acting insulin (Lantus, Levemir) the day of your test  ? Remember to  bring your glucometer and insulin with you to take after your test if needed     *For Patients on anticoagulants: ? Your Coumadin Clinic will give you instructions on medication adjustments or bridging prior to the procedure      Follow up Appointment Information:  Follow up after Right heart catheterization    Referral to Orthopedic Surgery Dr. Lai Wheeler - [phone]    **Pt was escorted to scheduling to make Orthopedic appt.  She was advised that Dr. Franco also wants her RHC done ASAP and it doesn't need to be done by only Dr. Angel.  Reji will call her with new date for RHC.  Patient verbalized understanding, agreed with plan and denied any further questions. Keyanna Mccray RN on 12/5/2018 at 2:33 PM    *New orders for RHC were placed as last set was discontinued when procedure was cancelled.

## 2018-12-05 NOTE — MR AVS SNAPSHOT
After Visit Summary   12/5/2018    Judith Nelson    MRN: [id number]           Patient Information     Date Of Birth          1960        Visit Information        Provider Department      12/5/2018 11:30 AM Gael Flaherty MD [facility]        Today's Diagnoses     Sarcoidosis    -  1       Follow-ups after your visit        Follow-up notes from your care team     Return in about 3 months (around 3/5/2019).      Your next 10 appointments already scheduled     Dec 27, 2018  1:15 PM CST   (Arrive by 1:00 PM)   NEW HIP with Lai Fernandez MD   [facility] [place])    [address] [place] [address]  4th [facility] [address] [phone]              Future tests that were ordered for you today     Open Future Orders        Priority Expected Expires Ordered    CBC with platelets differential Routine  1/6/2019 12/7/2018            Who to contact     If you have questions or need follow up information about today's clinic visit or your schedule please contact [facility] directly at [phone].  Normal or non-critical lab and imaging results will be communicated to you by Cyner[employer]hart, letter or phone within 4 business days after the clinic has received the results. If you do not hear from us within 7 days, please contact the clinic through Cynergenhart or phone. If you have a critical or abnormal lab result, we will notify you by phone as soon as possible.  Submit refill requests through [employer]Panda or call your pharmacy and they will forward the refill request to us. Please allow 3 business days for your refill to be completed.          Additional Information About Your Visit        Cyner[employer]hart Information     [employer] gives you secure access to your electronic health record. If you see a primary care provider, you can also send messages to your care team and make appointments. If you have questions,  "please call your primary care clinic.  If you do not have a primary care provider, please call [phone] and they will assist you.        Care EveryWhere ID     This is your Care EveryWhere ID. This could be used by other organizations to access your [facility] medical records  [id number]        Your Vitals Were     Pulse Respirations Height Pulse Oximetry BMI (Body Mass Index)       75 17 1.6 m (5' 3\") 95% 20.64 kg/m2        Blood Pressure from Last 3 Encounters:   12/05/18 117/78   12/05/18 108/73   11/24/18 99/60    Weight from Last 3 Encounters:   12/05/18 52.8 kg (116 lb 8 oz)   12/05/18 52.8 kg (116 lb 8 oz)   11/07/18 53.5 kg (118 lb)                 Today's Medication Changes          These changes are accurate as of 12/5/18 11:59 PM.  If you have any questions, ask your nurse or doctor.               Start taking these medicines.        Dose/Directions    levalbuterol 45 MCG/ACT inhaler   Commonly known as:  XOPENEX HFA   Used for:  Sarcoidosis   Started by:  Gael Flaherty MD        Dose:  2 puff   Inhale 2 puffs into the lungs every 6 hours as needed for shortness of breath / dyspnea or wheezing   Quantity:  1 Inhaler   Refills:  11         These medicines have changed or have updated prescriptions.        Dose/Directions    furosemide 40 MG tablet   Commonly known as:  LASIX   This may have changed:    - how much to take  - when to take this  - additional instructions   Used for:  Pulmonary hypertension (H), Stress-induced cardiomyopathy        Take 40 MG M-W-F, 20 MG all other days.   Quantity:  90 tablet   Refills:  3         Stop taking these medicines if you haven't already. Please contact your care team if you have questions.     morphine 15 MG IR tablet   Commonly known as:  MSIR   Stopped by:  Gael Flaherty MD                Where to get your medicines      These medications were sent to [facility] [address] [address]    Hours:  TRANSPLANT PHONE NUMBER [phone] Phone:  [phone]     levalbuterol 45 MCG/ACT inhaler               Information about OPIOIDS     PRESCRIPTION OPIOIDS: WHAT YOU NEED TO KNOW   We gave you an opioid (narcotic) pain medicine. It is important to manage your pain, but opioids are not always the best choice. You should first try all the other options your care team gave you. Take this medicine for as short a time (and as few doses) as possible.    Some activities can increase your pain, such as bandage changes or therapy sessions. It may help to take your pain medicine 30 to 60 minutes before these activities. Reduce your stress by getting enough sleep, working on hobbies you enjoy and practicing relaxation or meditation. Talk to your care team about ways to manage your pain beyond prescription opioids.    These medicines have risks:    DO NOT drive when on new or higher doses of pain medicine. These medicines can affect your alertness and reaction times, and you could be arrested for driving under the influence (DUI). If you need to use opioids long-term, talk to your care team about driving.    DO NOT operate heavy machinery    DO NOT do any other dangerous activities while taking these medicines.    DO NOT drink any alcohol while taking these medicines.     If the opioid prescribed includes acetaminophen, DO NOT take with any other medicines that contain acetaminophen. Read all labels carefully. Look for the word  acetaminophen  or  Tylenol.  Ask your pharmacist if you have questions or are unsure.    You can get addicted to pain medicines, especially if you have a history of addiction (chemical, alcohol or substance dependence). Talk to your care team about ways to reduce this risk.    All opioids tend to cause constipation. Drink plenty of water and eat foods that have a lot of fiber, such as fruits, vegetables, prune juice, apple juice and high-fiber cereal.  Take a laxative (Miralax, milk of magnesia, Colace, Senna) if you don t move your bowels at least every other day. Other side effects include upset stomach, sleepiness, dizziness, throwing up, tolerance (needing more of the medicine to have the same effect), physical dependence and slowed breathing.    Store your pills in a secure place, locked if possible. We will not replace any lost or stolen medicine. If you don t finish your medicine, please throw away (dispose) as directed by your pharmacist. The [place] Pollution Control Agency has more information about safe disposal: [email or web address]         Primary Care Provider Office Phone # Fax #    Anupama Babcock [phone]       [place] [id number]        Equal Access to Services     LESLIE [facility] Hospital[facility] : Hadmanuel jones Somarsha, waaxda josue, qaybta kaalmada kayla, alysha worley . So [facility] [phone].    ATENCIÓN: Si habla español, tiene a dhillon disposición servicios gratuitos de asistencia lingüística. Llame al [phone].    We comply with applicable federal civil rights laws and [place] laws. We do not discriminate on the basis of race, color, national origin, age, disability, sex, sexual orientation, or gender identity.            Thank you!     Thank you for choosing [facility]  for your care. Our goal is always to provide you with excellent care. Hearing back from our patients is one way we can continue to improve our services. Please take a few minutes to complete the written survey that you may receive in the mail after your visit with us. Thank you!             Your Updated Medication List - Protect others around you: Learn how to safely use, store and throw away your medicines at www.disposemymeds.org.          This list is accurate as of 12/5/18 11:59 PM.  Always use  your most recent med list.                   Brand Name Dispense Instructions for use Diagnosis    acetaminophen 500 MG tablet    TYLENOL    45 tablet    Take 1-2 tablets (500-1,000 mg) by mouth every 6 hours as needed for pain (Take as needed for pain.  Do not exceed 4 grams (8 tablets) in a day)    Pulmonary hypertension (H)       azaTHIOprine 50 MG tablet    IMURAN    90 tablet    Take 1 tablet (50 mg) by mouth daily    Sarcoidosis       buPROPion 200 MG 12 hr tablet    WELLBUTRIN SR    30 tablet    Take 1 tablet (200 mg) by mouth every morning    Major depressive disorder, single episode, moderate (H)       BUSPIRONE HCL PO      Take 15 mg by mouth 2 times daily        calcitRIOL 0.25 MCG capsule    ROCALTROL    30 capsule    Take 1 capsule (0.25 mcg) by mouth daily        carvedilol 12.5 MG tablet    COREG    135 tablet    Take 0.5 tablets (6.25 mg) by mouth 2 times daily (with meals)    Chronic systolic heart failure (H)       denosumab 60 MG/ML Soln injection    PROLIA    1 mL    Inject 1 mL (60 mg) Subcutaneous every 6 months    Osteoporosis, postmenopausal       furosemide 40 MG tablet    LASIX    90 tablet    Take 40 MG M-W-F, 20 MG all other days.    Pulmonary hypertension (H), Stress-induced cardiomyopathy       hydrOXYzine 25 MG capsule    VISTARIL     1 cap(s)        levalbuterol 45 MCG/ACT inhaler    XOPENEX HFA    1 Inhaler    Inhale 2 puffs into the lungs every 6 hours as needed for shortness of breath / dyspnea or wheezing    Sarcoidosis       LORazepam 0.5 MG tablet    ATIVAN    90 tablet    Take 0.5 mg (1 tab) every morning and 1 mg (2 tabs) every night at bedtime.    Major depressive disorder, single episode, severe with psychotic features (H)       macitentan 10 MG tablet    OPSUMIT    30 tablet    Take 1 tablet (10 mg) by mouth daily    Primary pulmonary hypertension (H)       order for DME     1 Device    Equipment being ordered: SHOWER CHAIR  FROM [facility] [employer]    Tremor, Generalized muscle  weakness, Sarcoidosis       OXYCODONE HCL PO      Take by mouth every 6 hours as needed    Sarcoidosis       potassium chloride ER 10 MEQ CR tablet    K-TAB/KLOR-CON    90 tablet    Take 1 tablet (10 mEq) by mouth daily    Heart failure (H)       ranitidine 75 MG tablet    ranitidine    60 tablet    Take 1-2 tablets ( mg) by mouth 2 times daily    Primary pulmonary hypertension (H), Anemia, unspecified type, SOB (shortness of breath)       selexipag 1600 MCG tablet    UPTRAVI     Take 1 tablet (1,600 mcg) by mouth every 12 hours    Pulmonary hypertension (H)       simvastatin 10 MG tablet    ZOCOR    90 tablet    Take 1 tablet by mouth At Bedtime.    Hypercholesterolemia       SODIUM BICARBONATE PO      Take by mouth 2 times daily    Pulmonary arterial hypertension (H)       tadalafil (PAH) 20 MG Tabs    ADCIRCA    60 tablet    Take 2 tablets (40 mg) by mouth daily    Pulmonary hypertension (H)       TEMAZEPAM PO      Take by mouth At Bedtime

## 2018-12-05 NOTE — PATIENT INSTRUCTIONS
Medication Changes:   No medication changes at this time. Please continue current medication regiment.    Patient Instructions:  1. Continue staying active and eat a heart healthy diet.    2. Please keep current list of medications with you at all times.    3. Remember to weigh yourself daily after voiding and before you consume any food or beverages and log the numbers.  If you have gained/lost 2 pounds overnight or 5 pounds in a week contact us immediately for medication adjustments or further instructions.    4. **Please call us immediately if you have any syncope (fainting or passing out), chest pain, edema (swelling or weight gain), or decline in your functional status (general decline in how you are feeling).      Check-In  Time Check-In Location Estimated Length Procedure   Name        [facility]  waiting room 60-90 minutes Right Heart Catheterization**     Procedure Preparations & Instructions     This is an invasive procedure that DOES require preparation:    - Nothing to eat for 6 hours   - You may have clear liquids up until the time of your procedure  - A ride should be arranged for you in the instance you are unable to drive home, however you should be able to function as you normally would after the procedure     *For Patients with Diabetes: ? DO NOT take any oral diabetic medication, short-acting diabetes medications/insulin, humalog or regular insulin the morning of your test  ? Take   dose of long-acting insulin (Lantus, Levemir) the day of your test  ? Remember to  bring your glucometer and insulin with you to take after your test if needed     *For Patients on anticoagulants: ? Your Coumadin Clinic will give you instructions on medication adjustments or bridging prior to the procedure      Follow up Appointment Information:  Follow up after Right heart catheterization    Referral to Orthopedic Surgery Dr. Lai Wheeler - [phone]    We are located on the third floor of [facility]  ([facility] on the [facility].  Our address is     [address] [place] [address] on [address]rd [place] [address]      Thank you for allowing us to be a part of your care here at the [facility]    If you have questions or concerns please contact us at:    Lynne Tamayo, RN, BSN    Reji Escobar (Schedule,Prior Auth)  Nurse Coordinator     Clinic   Pulmonary Hypertension   Pulmonary Hypertension  [facility]  [phone])[phone]    (P) [phone]        (F)[phone]                ** Please note that you will NOT receive a reminder call regarding your scheduled testing, reminder calls are for provider appointments only.  If you are scheduled for testing within the [facility] system you may receive a call regarding pre-registration for billing purposes only.**     Support Group:  Pulmonary Hypertension Association  Https://www.phassociation.org/  **Look at the Events Tab** They even have Support Groups that you can call into    [facility] PH Support Group  Second Saturday of the Month from 1-3 PM   Location: [address] [place] [address]  Leader: Michelet Garcia  Phone: [phone]  Email: [email or web address]

## 2018-12-05 NOTE — PROGRESS NOTES
Reason for Visit  Judith Nelson is a 58 year old year old female who is being seen for sarcoidosis.  HPI     The patient was seen and examined by Germán L. Vélez Reyes (MS3) and Dr. Gael Flaherty      Ms. Nelson was last seen in the Pulmonary Clinic on 08/2018, at which time she was on Imuran for the treatment of sarcoidosis and was prescribed xopenex for obstructive airways disease.     Today she comes to clinic for follow up. She says that on November 23, 2018 she had an incident at home, where she had a hip/back injury, for which she was seen in the ER. She was prescribed pain medication and is still experiencing significant pain. She has not being able to stay active or sleep well because of the hip and back pain. Ms Nelson also, lost the xopenex, so she is not sure if it helps as she only used it a couple of times. She has had some postnasal drip and non-productive cough in the past week.     Current Outpatient Prescriptions   Medication     acetaminophen (TYLENOL) 500 MG tablet     azaTHIOprine (IMURAN) 50 MG tablet     buPROPion (WELLBUTRIN SR) 200 MG 12 hr tablet     BUSPIRONE HCL PO     calcitRIOL (ROCALTROL) 0.25 MCG capsule     carvedilol (COREG) 12.5 MG tablet     denosumab (PROLIA) 60 MG/ML SOLN     furosemide (LASIX) 40 MG tablet     levalbuterol (XOPENEX HFA) 45 MCG/ACT inhaler     levalbuterol (XOPENEX HFA) 45 MCG/ACT Inhaler     LORazepam (ATIVAN) 0.5 MG tablet     macitentan (OPSUMIT) 10 MG tablet     ORDER FOR DME     OXYCODONE HCL PO     potassium chloride (K-TAB,KLOR-CON) 10 MEQ tablet     ranitidine (RANITIDINE) 75 MG tablet     selexipag (UPTRAVI) 1600 MCG tablet     simvastatin (ZOCOR) 10 MG tablet     SODIUM BICARBONATE PO     tadalafil, PAH, (ADCIRCA) 20 MG TABS     TEMAZEPAM PO     No current facility-administered medications for this visit.       PAST MEDICAL HISTORY:   Past Medical History:   Diagnosis Date     Anxiety      CKD (chronic kidney disease), stage III (H)      biopsy suspicious for renal sarcoidosis      Hyperprolactinemia (H)      Lower extremity edema     chronic w/ chronic right ankle ulcer     Lumbar compression fracture (H)      Major depressive disorder     with psychotic features, followed by Dr. Rosales at [facility] in [place]     Menopause     patient is post menopausal     MGUS (monoclonal gammopathy of unknown significance)     mild, followed by Dr. Long     Osteoporosis      Other seborrheic keratosis      Protrusio acetabuli      Pulmonary hypertension (H)      Sarcoidosis      Stress-induced cardiomyopathy        PAST SURGICAL HISTORY:   Past Surgical History:   Procedure Laterality Date     Csection,  X 2       CYSTOSCOPY, RETROGRADES, INSERT STENT URETER(S), COMBINED  10/2/2013    Procedure: COMBINED CYSTOSCOPY, RETROGRADES, INSERT STENT URETER(S);  Left Retrograde Ureteropyelogram and Ureteral Stent Placement;  Surgeon: SONIA Martinez MD;  Location: [facility]     ENDOSCOPIC RETROGRADE CHOLANGIOPANCREATOGRAM  2012    Procedure:ENDOSCOPIC RETROGRADE CHOLANGIOPANCREATOGRAM; ERCP biliary sphincterotomy and stone removal; Surgeon:MARGIE RUGGIEROIQ; [facility]     LAPAROSCOPIC CHOLECYSTECTOMY WITH CHOLANGIOGRAMS  2012    Procedure:LAPAROSCOPIC CHOLECYSTECTOMY WITH CHOLANGIOGRAMS; Surgeon:BRIANA PADILLA; Location:[facility]     ORTHOPEDIC SURGERY  10/1/2010    Right hip replacement     ZZ [place]STROSCOPY,[place]  6/10    Erythematous duodenopathy       FAMILY HISTORY:   Family History   Problem Relation Age of Onset     Cancer Mother       age 62 leukemia     GASTROINTESTINAL DISEASE Mother      diverticulitis     Hypertension Mother      Allergies Mother      Arthritis Mother      Depression Mother      Eye Disorder Mother      Osteoporosis Mother      C.A.D. Father      MI/CAD      Cancer Father      skin     Blood Disease Father      renal  problem     Hypertension Father      Anesthesia Reaction Father      Cardiovascular Father       Neurologic Disorder Father      Parkinson's disease     C.A.D. Maternal Grandmother       late 82s MI     Diabetes Maternal Grandmother      C.A.D. Maternal Grandfather       mid 80s MI     Alzheimer Disease Paternal Grandmother       80s     Alzheimer Disease Paternal Grandfather       80s     Neurologic Disorder Sister      migraines     Allergies Sister      Diabetes Other      AODM     Neurologic Disorder Sister      migraines     Allergies Sister      Anesthesia Reaction Son      Anesthesia Reaction Daughter      Anesthesia Reaction Daughter      Diabetes Sister      hypoglycemia       SOCIAL HISTORY:   Social History   Substance Use Topics     Smoking status: Never Smoker     Smokeless tobacco: Never Used     Alcohol use No      Comment: Occ.          ROS Pulmonary  A complete ROS was otherwise negative except as noted in the HPI.  /73 (BP Location: Right arm, Patient Position: Chair, Cuff Size: Adult Regular)  Pulse 75  Resp 17  Wt 52.8 kg (116lb)  SpO2 95%  BMI 20.64 kg/m2  Exam:   GENERAL APPEARANCE: Alert, and in no apparent distress.  EYES: PERRL, EOMI  MOUTH: Oral mucosa is moist, without any lesions, no oropharyngeal exudate.  NECK: supple, no masses, no thyromegaly.  LYMPHATICS: No significant axillary, cervical, or supraclavicular nodes.  RESP: normal percussion, good air flow throughout.  No crackles. No rhonchi. No wheezes.  CV: Normal S1, S2, regular rhythm, normal rate. No murmur.  No rub. No gallop. No LE edema.   MS: extremities normal. No clubbing. No cyanosis.  SKIN: no rash on limited exam  NEURO: Mentation intact, speech normal, normal strength and tone, normal gait and stance  Results:PFTs done today were reviewed by me with the patient.  FVC is 2.87 liters, which is 93% of predicted.  FEV1 is 1.80 liters, which is 77% of predicted.  The ratio is 67.  DLCO not corrected for hemoglobin is 64% of predicted. There has not been significant changes since last  spirometry on May 2018.  My interpretation is that spirometry shows mild small airway obstruction with mildly decreased diffusion capacity.    Assessment and plan: Ms. Nelson is a pleasant 58-year-old female with sarcoidosis based on documentation of granulomatous inflammation on a kidney biopsy, hypercalcemia with minimal pulmonary disease.  She has a longstanding history of pulmonary hypertension possiblely SAPH for which she is on 3 medications. She is currently on 50 mg per day of Imuran. Anxiety continues to be a problem, along with therapy adherence.    1.  Pulmonary sarcoidosis.  We will continue to closely monitor with PFTs. Besides possible SAPH, no clear pulmonary involvement. Obstruction could be related. WIll provide another prescription of xopenex  2. Extrapulmonary Sarcoidosis: Follows with Dr Garcia for CKD, Also seess Dr. Franco for pulmonary HTN.   3. Imuran toxicities monitoring: she will do labs today post-visit. I have ordered a BMP, CBC with differential and glucose levels.   4. Injury: she will follow up with Orthopedics in the upcoming weeks.       Attending statement:    The patient was seen and examined by me with German Vales-Reyes  The case was discussed at length.   Vitals, lab results and imaging from today were reviewed.  The note reflects our joint assessment and plan.    Gael Flaherty MD  Pulmonary, Critical Care Medicine

## 2018-12-05 NOTE — LETTER
12/5/2018       RE: Judith Nelson  [address] [facility] [id number]     Dear Colleague,    Thank you for referring your patient, Judith Nelson, to the [facility] at [facility]. Please see a copy of my visit note below.    Reason for Visit  Judith Nelson is a 58 year old year old female who is being seen for sarcoidosis.  HPI     The patient was seen and examined by Germán L. Vélez Reyes (MS3) and Dr. Gael Flaherty      Ms. Nelson was last seen in the Pulmonary Clinic on 08/2018, at which time she was on Imuran for the treatment of sarcoidosis and was prescribed xopenex for obstructive airways disease.     Today she comes to clinic for follow up. She says that on November 23, 2018 she had an incident at home, where she had a hip/back injury, for which she was seen in the ER. She was prescribed pain medication and is still experiencing significant pain. She has not being able to stay active or sleep well because of the hip and back pain. Ms Nelson also, lost the xopenex, so she is not sure if it helps as she only used it a couple of times. She has had some postnasal drip and non-productive cough in the past week.     Current Outpatient Prescriptions   Medication     acetaminophen (TYLENOL) 500 MG tablet     azaTHIOprine (IMURAN) 50 MG tablet     buPROPion (WELLBUTRIN SR) 200 MG 12 hr tablet     BUSPIRONE HCL PO     calcitRIOL (ROCALTROL) 0.25 MCG capsule     carvedilol (COREG) 12.5 MG tablet     denosumab (PROLIA) 60 MG/ML SOLN     furosemide (LASIX) 40 MG tablet     levalbuterol (XOPENEX HFA) 45 MCG/ACT inhaler     levalbuterol (XOPENEX HFA) 45 MCG/ACT Inhaler     LORazepam (ATIVAN) 0.5 MG tablet     macitentan (OPSUMIT) 10 MG tablet     ORDER FOR DME     OXYCODONE HCL PO     potassium chloride (K-TAB,KLOR-CON) 10 MEQ tablet     ranitidine (RANITIDINE) 75 MG tablet     selexipag (UPTRAVI) 1600 MCG tablet     simvastatin  (ZOCOR) 10 MG tablet     SODIUM BICARBONATE PO     tadalafil, PAH, (ADCIRCA) 20 MG TABS     TEMAZEPAM PO     No current facility-administered medications for this visit.       PAST MEDICAL HISTORY:   Past Medical History:   Diagnosis Date     Anxiety      CKD (chronic kidney disease), stage III (H)     biopsy suspicious for renal sarcoidosis      Hyperprolactinemia (H)      Lower extremity edema     chronic w/ chronic right ankle ulcer     Lumbar compression fracture (H)      Major depressive disorder     with psychotic features, followed by Dr. Rosales at [facility] in [place]     Menopause 2012    patient is post menopausal     MGUS (monoclonal gammopathy of unknown significance)     mild, followed by Dr. Long     Osteoporosis      Other seborrheic keratosis      Protrusio acetabuli      Pulmonary hypertension (H)      Sarcoidosis      Stress-induced cardiomyopathy        PAST SURGICAL HISTORY:   Past Surgical History:   Procedure Laterality Date     Csection,  X 2       CYSTOSCOPY, RETROGRADES, INSERT STENT URETER(S), COMBINED  10/2/2013    Procedure: COMBINED CYSTOSCOPY, RETROGRADES, INSERT STENT URETER(S);  Left Retrograde Ureteropyelogram and Ureteral Stent Placement;  Surgeon: SONIA Martinez MD;  Location: [facility]     ENDOSCOPIC RETROGRADE CHOLANGIOPANCREATOGRAM  2012    Procedure:ENDOSCOPIC RETROGRADE CHOLANGIOPANCREATOGRAM; ERCP biliary sphincterotomy and stone removal; Surgeon:MARGIE RUGGIEROIQ; [facility]     LAPAROSCOPIC CHOLECYSTECTOMY WITH CHOLANGIOGRAMS  2012    Procedure:LAPAROSCOPIC CHOLECYSTECTOMY WITH CHOLANGIOGRAMS; Surgeon:BRIANA PADILLA; Location:[facility]     ORTHOPEDIC SURGERY  10/1/2010    Right hip replacement     ZZ [place]STROSCOPY[place]  6/10    Erythematous duodenopathy       FAMILY HISTORY:   Family History   Problem Relation Age of Onset     Cancer Mother       age 62 leukemia     GASTROINTESTINAL DISEASE Mother      diverticulitis     Hypertension Mother       Allergies Mother      Arthritis Mother      Depression Mother      Eye Disorder Mother      Osteoporosis Mother      C.A.D. Father      MI/CAD      Cancer Father      skin     Blood Disease Father      renal  problem     Hypertension Father      Anesthesia Reaction Father      Cardiovascular Father      Neurologic Disorder Father      Parkinson's disease     C.A.D. Maternal Grandmother       late 82s MI     Diabetes Maternal Grandmother      C.A.D. Maternal Grandfather       mid 80s MI     Alzheimer Disease Paternal Grandmother       80s     Alzheimer Disease Paternal Grandfather       80s     Neurologic Disorder Sister      migraines     Allergies Sister      Diabetes Other      AODM     Neurologic Disorder Sister      migraines     Allergies Sister      Anesthesia Reaction Son      Anesthesia Reaction Daughter      Anesthesia Reaction Daughter      Diabetes Sister      hypoglycemia       SOCIAL HISTORY:   Social History   Substance Use Topics     Smoking status: Never Smoker     Smokeless tobacco: Never Used     Alcohol use No      Comment: Occ.          ROS Pulmonary  A complete ROS was otherwise negative except as noted in the HPI.  /73 (BP Location: Right arm, Patient Position: Chair, Cuff Size: Adult Regular)  Pulse 75  Resp 17  Wt 52.8 kg (116lb)  SpO2 95%  BMI 20.64 kg/m2  Exam:   GENERAL APPEARANCE: Alert, and in no apparent distress.  EYES: PERRL, EOMI  MOUTH: Oral mucosa is moist, without any lesions, no oropharyngeal exudate.  NECK: supple, no masses, no thyromegaly.  LYMPHATICS: No significant axillary, cervical, or supraclavicular nodes.  RESP: normal percussion, good air flow throughout.  No crackles. No rhonchi. No wheezes.  CV: Normal S1, S2, regular rhythm, normal rate. No murmur.  No rub. No gallop. No LE edema.   MS: extremities normal. No clubbing. No cyanosis.  SKIN: no rash on limited exam  NEURO: Mentation intact, speech normal, normal strength and tone, normal  gait and stance  Results:PFTs done today were reviewed by me with the patient.  FVC is 2.87 liters, which is 93% of predicted.  FEV1 is 1.80 liters, which is 77% of predicted.  The ratio is 67.  DLCO not corrected for hemoglobin is 64% of predicted. There has not been significant changes since last spirometry on May 2018.  My interpretation is that spirometry shows mild small airway obstruction with mildly decreased diffusion capacity.    Assessment and plan: Ms. Nelson is a pleasant 58-year-old female with sarcoidosis based on documentation of granulomatous inflammation on a kidney biopsy, hypercalcemia with minimal pulmonary disease.  She has a longstanding history of pulmonary hypertension possiblely SAPH for which she is on 3 medications. She is currently on 50 mg per day of Imuran. Anxiety continues to be a problem, along with therapy adherence.    1.  Pulmonary sarcoidosis.  We will continue to closely monitor with PFTs. Besides possible SAPH, no clear pulmonary involvement. Obstruction could be related. WIll provide another prescription of xopenex  2. Extrapulmonary Sarcoidosis: Follows with Dr Garcia for CKD, Also seess Dr. Franco for pulmonary HTN.   3. Imuran toxicities monitoring: she will do labs today post-visit. I have ordered a BMP, CBC with differential and glucose levels.   4. Injury: she will follow up with Orthopedics in the upcoming weeks.       Attending statement:    The patient was seen and examined by me with German Vales-Reyes  The case was discussed at length.   Vitals, lab results and imaging from today were reviewed.  The note reflects our joint assessment and plan.    Gael Flaherty MD  Pulmonary, Critical Care Medicine

## 2018-12-05 NOTE — PROGRESS NOTES
"Norm Franco M.D.  Cardiovascular Medicine    I personally saw and examined this patient, discussed care with housestaff and other consultants, reviewed current laboratories and imaging studies, and conveyed impression and diagnostic/therapeutic plan to patient.    istory    The patient returns for follow-up of pulmonary hypertension.  There is no interim history of chest pain, tightness, paroxysmal nocturnal dyspnea, orthopnea, peripheral edema, palpitation, pre-syncope, syncope, device discharge.  Exercise tolerance is stable.  The patient is attempting to exercise regularly and following a sodium restricted, calorically appropriate diet.  Medications are reviewed and the patient is taking medications as prescribed.  The patient is generally not sleeping well secondary to pain.        Objective   /78 (BP Location: Left arm, Patient Position: Chair, Cuff Size: Adult Regular)  Pulse 65  Ht 1.6 m (5' 3\")  Wt 52.8 kg (116 lb 8 oz)  SpO2 96%  BMI 20.64 kg/m2   Constitutional: alert, oriented, normal gait and station, normal mentation.  Oral: moist mucous membranes  Lymph: without pathologic adenopathy  Chest: clear to ausculation and percussion  Cor: No evidence of left or right ventricular activity.  Rhythm is regular.  S1 normal, S2 split physiologically. Murmurs are not present  Abdomen: without tenderness, rebound, guarding, masses, ascites  Extremities: Edema not present  Neuro: no focal defects, normal mentation  Skin: without open lesions  Psych: oriented, verbal, mental status in tact  Wt Readings from Last 5 Encounters:   12/05/18 52.8 kg (116 lb 8 oz)   12/05/18 52.8 kg (116 lb 8 oz)   11/07/18 53.5 kg (118 lb)   09/10/18 53.3 kg (117 lb 9.6 oz)   07/10/18 53.5 kg (118 lb)     Meds  Current Outpatient Prescriptions   Medication     acetaminophen (TYLENOL) 500 MG tablet     azaTHIOprine (IMURAN) 50 MG tablet     buPROPion (WELLBUTRIN SR) 200 MG 12 hr tablet     BUSPIRONE HCL PO     calcitRIOL " (ROCALTROL) 0.25 MCG capsule     carvedilol (COREG) 12.5 MG tablet     denosumab (PROLIA) 60 MG/ML SOLN     furosemide (LASIX) 40 MG tablet     hydrOXYzine (VISTARIL) 25 MG capsule     LORazepam (ATIVAN) 0.5 MG tablet     macitentan (OPSUMIT) 10 MG tablet     ORDER FOR DME     OXYCODONE HCL PO     potassium chloride (K-TAB,KLOR-CON) 10 MEQ tablet     ranitidine (RANITIDINE) 75 MG tablet     selexipag (UPTRAVI) 1600 MCG tablet     simvastatin (ZOCOR) 10 MG tablet     SODIUM BICARBONATE PO     tadalafil, PAH, (ADCIRCA) 20 MG TABS     TEMAZEPAM PO     levalbuterol (XOPENEX HFA) 45 MCG/ACT inhaler     levalbuterol (XOPENEX HFA) 45 MCG/ACT Inhaler     No current facility-administered medications for this visit.      Labs  Results for ANALI GÓMEZ (MRN [id number]) as of 11/7/2018 15:37   Ref. Range 7/10/2018 13:38 8/14/2018 00:00 8/14/2018 16:15 9/10/2018 12:42 11/7/2018 13:51   Sodium Latest Ref Range: 133 - 144 mmol/L 143   141 141   Potassium Latest Ref Range: 3.4 - 5.3 mmol/L 4.0   4.0 3.6   Chloride Latest Ref Range: 94 - 109 mmol/L 111 (H)   108 108   Carbon Dioxide Latest Ref Range: 20 - 32 mmol/L 24   25 26   Urea Nitrogen Latest Ref Range: 7 - 30 mg/dL 21   20 22   Creatinine Latest Ref Range: 0.52 - 1.04 mg/dL 1.37 (H)   1.42 (H) 1.58 (H)   GFR Estimate Latest Ref Range: >60 mL/min/1.7m2 40 (L)   38 (L) 34 (L)   GFR Estimate If Black Latest Ref Range: >60 mL/min/1.7m2 48 (L)   46 (L) 41 (L)   Calcium Latest Ref Range: 8.5 - 10.1 mg/dL 9.2   9.3 8.8   Anion Gap Latest Ref Range: 3 - 14 mmol/L 8   8 7   Albumin Latest Ref Range: 3.4 - 5.0 g/dL 4.0   4.1 3.8   Protein Total Latest Ref Range: 6.8 - 8.8 g/dL 6.9   7.4 7.0   Bilirubin Total Latest Ref Range: 0.2 - 1.3 mg/dL 0.4   0.6 0.4   Alkaline Phosphatase Latest Ref Range: 40 - 150 U/L 73   61 54   ALT Latest Ref Range: 0 - 50 U/L 21   22 20   AST Latest Ref Range: 0 - 45 U/L 16   16 17   N-Terminal Pro Bnp Latest Ref Range: 0 - 125 pg/mL 612 (H)   775 (H)  558 (H)   Glucose Latest Ref Range: 70 - 99 mg/dL 79   81 86   WBC Latest Ref Range: 4.0 - 11.0 10e9/L 6.4   4.6 4.7   Hemoglobin Latest Ref Range: 11.7 - 15.7 g/dL 14.5   15.7 14.2   Hematocrit Latest Ref Range: 35.0 - 47.0 % 43.1   46.9 43.6   Platelet Count Latest Ref Range: 150 - 450 10e9/L 146 (L)   185 156   RBC Count Latest Ref Range: 3.8 - 5.2 10e12/L 4.59   4.89 4.50   MCV Latest Ref Range: 78 - 100 fl 94   96 97   MCH Latest Ref Range: 26.5 - 33.0 pg 31.6   32.1 31.6   MCHC Latest Ref Range: 31.5 - 36.5 g/dL 33.6   33.5 32.6   RDW Latest Ref Range: 10.0 - 15.0 % 13.2   12.5 12.0   6 MINUTE WALK TEST Unknown  Attch          Imaging       2018  HEMODYNAMICS:  BSA 1.52  HR 60 bpm   /84/109 mmHg  RA 7/10/7  /10  /14/60    PCW 10/10/10  PA sat 58.8%   SpO2 97%  Hgb 11.8 g/dL   Assumed Dipti CO/CI 2.7/1.5  TD CO/CI 3.5/2.0  Measured VO2: 231  Measured Dipti CO/CO 3.9/2.2   PVR 12.8 (using measured Dipti)  TPR 15.4 (using measured Dipti)  SVR 2092 (using measured Dipti)        [facility],[place]  Echocardiography Laboratory  [address] [place] [id number]     Name: ANALI GÓMEZ  MRN: [id number]  : 1960  Study Date: 2018 09:47 AM  Age: 58 yrs  Gender: Female  [place] [facility] [place] For Study: Pulmonary Hypertension  Ordering Physician: LE SWAN  Referring Physician: MINE CASTRO  Performed By: Steve Patel     BSA: 1.5 m2  Height: 63 in  Weight: 112 lb  BP: 97/57 mmHg  _____________________________________________________________________________  __        Procedure  Echocardiogram with two-dimensional, color and spectral Doppler performed.  _____________________________________________________________________________  __        Interpretation Summary  The RV is moderately dilated. There is mild right ventricular hypertrophy.  Global right ventricular function is either normal or mildly reduced.  Global and  regional left ventricular function is normal with an EF of 60-65%.  Severe pulmonary hypertension. Right ventricular systolic pressure is 94 mmHg  above the right atrial pressure.  The inferior vena cava is normal.  _____________________________________________________________________________  __        Left Ventricle  Global and regional left ventricular function is normal with an EF of 60-65%.  Left ventricular size is normal. Left ventricular wall thickness is normal.  Paradoxical septal motion consistent with right ventricular pressure overload  is present.     Right Ventricle  The RV is moderately dilated. There is mild right ventricular hypertrophy.  Global right ventricular function is mildly reduced.     Atria  Severe biatrial enlargement is present.     Mitral Valve  The mitral valve is normal. Trace mitral insufficiency is present.        Aortic Valve  Aortic valve is normal in structure and function.     Tricuspid Valve  The tricuspid valve is normal. Moderate tricuspid insufficiency is present.  Severe pulmonary hypertension is present. Right ventricular systolic pressure  is 94mmHg above the right atrial pressure.     Pulmonic Valve  The pulmonic valve is normal.     Vessels  The aorta root is normal. The inferior vena cava is normal. Estimated mean  right atrial pressure is 3 mmHg (normal).     Pericardium  Trivial pericardial effusion is present.        Compared to Previous Study  This study was compared with the study from 3/21/2018 . There has been no  change.  _____________________________________________________________________________  __  MMode/2D Measurements & Calculations     IVSd: 1.1 cm  LVIDd: 3.9 cm  LVIDs: 1.6 cm  LVPWd: 1.1 cm  FS: 58.0 %  LV mass(C)d: 141.2 grams  LV mass(C)dI: 93.4 grams/m2  LA dimension: 3.7 cm  asc Aorta Diam: 2.5 cm  LVOT diam: 1.8 cm  LVOT area: 2.6 cm2  LA Volume (BP): 68.4 ml  RWT: 0.59  TAPSE: 1.9 cm     Plan:  1. RHC next week, late morning or early  afternoon  2. Please schedule appointment with Dr. Eduard Wheeler in ortho for consideration of hip replacement (soon is better)  3, Labs this afternoon

## 2018-12-05 NOTE — NURSING NOTE
Chief Complaint   Patient presents with     Follow Up For      Return for PH F/U after RHC & PFT's (Need Care Everywhere Signature.)     Medications reviewed and vitals performed.  Jojo Lynn CMA

## 2018-12-05 NOTE — LETTER
"12/5/2018      RE: Judith Nelson  [address] [facility] [address]       Dear Colleague,    Thank you for the opportunity to participate in the care of your patient, Judith Nelson, at the [facility] HEART [facility]. Please see a copy of my visit note below.    Norm Franco M.D.  Cardiovascular Medicine    I personally saw and examined this patient, discussed care with housestaff and other consultants, reviewed current laboratories and imaging studies, and conveyed impression and diagnostic/therapeutic plan to patient.    istory    The patient returns for follow-up of pulmonary hypertension.  There is no interim history of chest pain, tightness, paroxysmal nocturnal dyspnea, orthopnea, peripheral edema, palpitation, pre-syncope, syncope, device discharge.  Exercise tolerance is stable.  The patient is attempting to exercise regularly and following a sodium restricted, calorically appropriate diet.  Medications are reviewed and the patient is taking medications as prescribed.  The patient is generally not sleeping well secondary to pain.        Objective   /78 (BP Location: Left arm, Patient Position: Chair, Cuff Size: Adult Regular)  Pulse 65  Ht 1.6 m (5' 3\")  Wt 52.8 kg (116 lb 8 oz)  SpO2 96%  BMI 20.64 kg/m2   Constitutional: alert, oriented, normal gait and station, normal mentation.  Oral: moist mucous membranes  Lymph: without pathologic adenopathy  Chest: clear to ausculation and percussion  Cor: No evidence of left or right ventricular activity.  Rhythm is regular.  S1 normal, S2 split physiologically. Murmurs are not present  Abdomen: without tenderness, rebound, guarding, masses, ascites  Extremities: Edema not present  Neuro: no focal defects, normal mentation  Skin: without open lesions  Psych: oriented, verbal, mental status in tact  Wt Readings from Last 5 Encounters:   12/05/18 52.8 kg (116 lb 8 oz)   12/05/18 52.8 kg (116 lb 8 " oz)   11/07/18 53.5 kg (118 lb)   09/10/18 53.3 kg (117 lb 9.6 oz)   07/10/18 53.5 kg (118 lb)     Meds  Current Outpatient Prescriptions   Medication     acetaminophen (TYLENOL) 500 MG tablet     azaTHIOprine (IMURAN) 50 MG tablet     buPROPion (WELLBUTRIN SR) 200 MG 12 hr tablet     BUSPIRONE HCL PO     calcitRIOL (ROCALTROL) 0.25 MCG capsule     carvedilol (COREG) 12.5 MG tablet     denosumab (PROLIA) 60 MG/ML SOLN     furosemide (LASIX) 40 MG tablet     hydrOXYzine (VISTARIL) 25 MG capsule     LORazepam (ATIVAN) 0.5 MG tablet     macitentan (OPSUMIT) 10 MG tablet     ORDER FOR DME     OXYCODONE HCL PO     potassium chloride (K-TAB,KLOR-CON) 10 MEQ tablet     ranitidine (RANITIDINE) 75 MG tablet     selexipag (UPTRAVI) 1600 MCG tablet     simvastatin (ZOCOR) 10 MG tablet     SODIUM BICARBONATE PO     tadalafil, PAH, (ADCIRCA) 20 MG TABS     TEMAZEPAM PO     levalbuterol (XOPENEX HFA) 45 MCG/ACT inhaler     levalbuterol (XOPENEX HFA) 45 MCG/ACT Inhaler     No current facility-administered medications for this visit.      Labs  Results for ANALI GÓMEZ MARY (MRN [id number]) as of 11/7/2018 15:37   Ref. Range 7/10/2018 13:38 8/14/2018 00:00 8/14/2018 16:15 9/10/2018 12:42 11/7/2018 13:51   Sodium Latest Ref Range: 133 - 144 mmol/L 143   141 141   Potassium Latest Ref Range: 3.4 - 5.3 mmol/L 4.0   4.0 3.6   Chloride Latest Ref Range: 94 - 109 mmol/L 111 (H)   108 108   Carbon Dioxide Latest Ref Range: 20 - 32 mmol/L 24   25 26   Urea Nitrogen Latest Ref Range: 7 - 30 mg/dL 21   20 22   Creatinine Latest Ref Range: 0.52 - 1.04 mg/dL 1.37 (H)   1.42 (H) 1.58 (H)   GFR Estimate Latest Ref Range: >60 mL/min/1.7m2 40 (L)   38 (L) 34 (L)   GFR Estimate If Black Latest Ref Range: >60 mL/min/1.7m2 48 (L)   46 (L) 41 (L)   Calcium Latest Ref Range: 8.5 - 10.1 mg/dL 9.2   9.3 8.8   Anion Gap Latest Ref Range: 3 - 14 mmol/L 8   8 7   Albumin Latest Ref Range: 3.4 - 5.0 g/dL 4.0   4.1 3.8   Protein Total Latest Ref Range: 6.8 -  8.8 g/dL 6.9   7.4 7.0   Bilirubin Total Latest Ref Range: 0.2 - 1.3 mg/dL 0.4   0.6 0.4   Alkaline Phosphatase Latest Ref Range: 40 - 150 U/L 73   61 54   ALT Latest Ref Range: 0 - 50 U/L 21   22 20   AST Latest Ref Range: 0 - 45 U/L 16   16 17   N-Terminal Pro Bnp Latest Ref Range: 0 - 125 pg/mL 612 (H)   775 (H) 558 (H)   Glucose Latest Ref Range: 70 - 99 mg/dL 79   81 86   WBC Latest Ref Range: 4.0 - 11.0 10e9/L 6.4   4.6 4.7   Hemoglobin Latest Ref Range: 11.7 - 15.7 g/dL 14.5   15.7 14.2   Hematocrit Latest Ref Range: 35.0 - 47.0 % 43.1   46.9 43.6   Platelet Count Latest Ref Range: 150 - 450 10e9/L 146 (L)   185 156   RBC Count Latest Ref Range: 3.8 - 5.2 10e12/L 4.59   4.89 4.50   MCV Latest Ref Range: 78 - 100 fl 94   96 97   MCH Latest Ref Range: 26.5 - 33.0 pg 31.6   32.1 31.6   MCHC Latest Ref Range: 31.5 - 36.5 g/dL 33.6   33.5 32.6   RDW Latest Ref Range: 10.0 - 15.0 % 13.2   12.5 12.0   6 MINUTE WALK TEST Unknown  Attch          Imaging       2018  HEMODYNAMICS:  BSA 1.52  HR 60 bpm   /84/109 mmHg  RA 7107  /10  /14/60    PCW 1010/10  PA sat 58.8%   SpO2 97%  Hgb 11.8 g/dL   Assumed Dipti CO/CI 2.7/1.5  TD CO/CI 3.5/2.0  Measured VO2: 231  Measured Dipti CO/CO 3.9/2.2   PVR 12.8 (using measured Dipti)  TPR 15.4 (using measured Dipti)  SVR 2092 (using measured Dipti)        [facility],[place]  Echocardiography Laboratory  [address] [place] [id number]     Name: ANALI GÓMEZ  MRN: [id number]  : 1960  Study Date: 2018 09:47 AM  Age: 58 yrs  Gender: Female  [place] [facility] [place] For Study: Pulmonary Hypertension  Ordering Physician: LE SWAN  Referring Physician: MINE CASTRO  Performed By: Steve Patel     BSA: 1.5 m2  Height: 63 in  Weight: 112 lb  BP: 97/57 mmHg  _____________________________________________________________________________  __        Procedure  Echocardiogram with  two-dimensional, color and spectral Doppler performed.  _____________________________________________________________________________  __        Interpretation Summary  The RV is moderately dilated. There is mild right ventricular hypertrophy.  Global right ventricular function is either normal or mildly reduced.  Global and regional left ventricular function is normal with an EF of 60-65%.  Severe pulmonary hypertension. Right ventricular systolic pressure is 94 mmHg  above the right atrial pressure.  The inferior vena cava is normal.  _____________________________________________________________________________  __        Left Ventricle  Global and regional left ventricular function is normal with an EF of 60-65%.  Left ventricular size is normal. Left ventricular wall thickness is normal.  Paradoxical septal motion consistent with right ventricular pressure overload  is present.     Right Ventricle  The RV is moderately dilated. There is mild right ventricular hypertrophy.  Global right ventricular function is mildly reduced.     Atria  Severe biatrial enlargement is present.     Mitral Valve  The mitral valve is normal. Trace mitral insufficiency is present.        Aortic Valve  Aortic valve is normal in structure and function.     Tricuspid Valve  The tricuspid valve is normal. Moderate tricuspid insufficiency is present.  Severe pulmonary hypertension is present. Right ventricular systolic pressure  is 94mmHg above the right atrial pressure.     Pulmonic Valve  The pulmonic valve is normal.     Vessels  The aorta root is normal. The inferior vena cava is normal. Estimated mean  right atrial pressure is 3 mmHg (normal).     Pericardium  Trivial pericardial effusion is present.        Compared to Previous Study  This study was compared with the study from 3/21/2018 . There has been no  change.  _____________________________________________________________________________  __  MMode/2D Measurements &  Calculations     IVSd: 1.1 cm  LVIDd: 3.9 cm  LVIDs: 1.6 cm  LVPWd: 1.1 cm  FS: 58.0 %  LV mass(C)d: 141.2 grams  LV mass(C)dI: 93.4 grams/m2  LA dimension: 3.7 cm  asc Aorta Diam: 2.5 cm  LVOT diam: 1.8 cm  LVOT area: 2.6 cm2  LA Volume (BP): 68.4 ml  RWT: 0.59  TAPSE: 1.9 cm     Plan:  1. RHC next week, late morning or early afternoon  2. Please schedule appointment with Dr. Eduard Wheeler in ortho for consideration of hip replacement (soon is better)  3, Labs this afternoon          Please do not hesitate to contact me if you have any questions/concerns.     Sincerely,     Norm Franco MD

## 2018-12-07 ENCOUNTER — CARE COORDINATION (OUTPATIENT)
Dept: PULMONOLOGY | Facility: CLINIC | Age: 58
End: 2018-12-07

## 2018-12-07 DIAGNOSIS — D86.9 SARCOIDOSIS: Primary | ICD-10-CM

## 2018-12-07 NOTE — PROGRESS NOTES
Requested by Dr. Flaherty to contact patient regarding lab values.  WBC slightly low at 3.4 .  Dr. Flaherty would like patient to repeat CBCP with Diff in two weeks.  Patient is going to see her nephrologist on December 21 and would like to have labs done at that time. Orders faxed to [facility], Dr. Willi Garcia. Fax # [phone], phone [phone].

## 2018-12-10 ENCOUNTER — TELEPHONE (OUTPATIENT)
Dept: ENDOCRINOLOGY | Facility: CLINIC | Age: 58
End: 2018-12-10

## 2018-12-17 NOTE — TELEPHONE ENCOUNTER
FUTURE VISIT INFORMATION      FUTURE VISIT INFORMATION:    Date: 12/27/18    Time: 1315    Location: ORTHO  REFERRAL INFORMATION:    Referring provider:  DR CASTRO    Referring providers clinic:  CARDIOLOGY    Reason for visit/diagnosis  LEFT HIP PAIN     RECORDS REQUESTED FROM:       Clinic name Comments Records Status Imaging Status                                         RECORDS IN CHART, SEEN IN ED ON 11/23/18

## 2018-12-21 ENCOUNTER — TRANSFERRED RECORDS (OUTPATIENT)
Dept: HEALTH INFORMATION MANAGEMENT | Facility: CLINIC | Age: 58
End: 2018-12-21

## 2018-12-24 DIAGNOSIS — I27.20 PULMONARY HYPERTENSION (H): ICD-10-CM

## 2018-12-24 RX ORDER — TADALAFIL 20 MG/1
40 TABLET ORAL DAILY
Qty: 60 TABLET | Refills: 11 | Status: SHIPPED | OUTPATIENT
Start: 2018-12-24 | End: 2019-01-11

## 2018-12-27 ENCOUNTER — PRE VISIT (OUTPATIENT)
Dept: ORTHOPEDICS | Facility: CLINIC | Age: 58
End: 2018-12-27

## 2018-12-27 ENCOUNTER — OFFICE VISIT (OUTPATIENT)
Dept: ORTHOPEDICS | Facility: CLINIC | Age: 58
End: 2018-12-27
Payer: MEDICARE

## 2018-12-27 VITALS — HEIGHT: 63 IN | WEIGHT: 120.9 LBS | BODY MASS INDEX: 21.42 KG/M2

## 2018-12-27 DIAGNOSIS — M70.62 TROCHANTERIC BURSITIS OF LEFT HIP: ICD-10-CM

## 2018-12-27 ASSESSMENT — ENCOUNTER SYMPTOMS
NIGHT SWEATS: 1
INCREASED ENERGY: 1
FATIGUE: 1
NECK MASS: 0
SNORES LOUDLY: 1
DECREASED CONCENTRATION: 0
MUSCLE CRAMPS: 1
SHORTNESS OF BREATH: 1
SORE THROAT: 0
SLEEP DISTURBANCES DUE TO BREATHING: 1
HOARSE VOICE: 1
WEIGHT GAIN: 1
EXERCISE INTOLERANCE: 1
LEG PAIN: 1
SMELL DISTURBANCE: 0
POSTURAL DYSPNEA: 0
STIFFNESS: 1
MUSCLE WEAKNESS: 1
COUGH DISTURBING SLEEP: 1
TASTE DISTURBANCE: 1
POLYDIPSIA: 0
WEIGHT LOSS: 0
SYNCOPE: 0
ARTHRALGIAS: 1
TROUBLE SWALLOWING: 0
PALPITATIONS: 0
WHEEZING: 0
ORTHOPNEA: 1
LIGHT-HEADEDNESS: 1
MYALGIAS: 1
CHILLS: 1
HEMOPTYSIS: 0
PANIC: 0
FEVER: 0
DECREASED APPETITE: 0
POLYPHAGIA: 0
HYPERTENSION: 0
INSOMNIA: 1
SPUTUM PRODUCTION: 1
ALTERED TEMPERATURE REGULATION: 0
DYSPNEA ON EXERTION: 1
DEPRESSION: 1
SINUS CONGESTION: 1
NECK PAIN: 1
JOINT SWELLING: 1
HALLUCINATIONS: 0
SINUS PAIN: 0
HYPOTENSION: 1
NERVOUS/ANXIOUS: 1
BACK PAIN: 1
COUGH: 1

## 2018-12-27 ASSESSMENT — ACTIVITIES OF DAILY LIVING (ADL)
ADL_MEAN: 1.94
ADL_SUBSCALE_SCORE: 51.47
ADL_SUM: 33

## 2018-12-27 ASSESSMENT — HOOS S4: HOW SEVERE IS YOUR HIP JOINT STIFFNESS AFTER FIRST WAKENING IN THE MORNING?: SEVERE

## 2018-12-27 ASSESSMENT — MIFFLIN-ST. JEOR: SCORE: 1102.53

## 2018-12-27 NOTE — NURSING NOTE
"Chief Complaint   Patient presents with     Consult     Pt. states that she is here today for Left Hip Pain. She states that she had her Right Hip Replaced 10yrs ago, Done by Dr. Willi Blanc. Referring:  MINE CASTRO       58 year old  1960    Ht 1.6 m (5' 3\")   Wt 54.8 kg (120 lb 14.4 oz)   BMI 21.42 kg/m          Date/Surgery/Surgeon/Hospital:  1. 10yrs ago, Right Total Hip Replacement, Dr. Willi Blanc            [facility] [address] [place], [place] [id number] AT [address]    Allergies   Allergen Reactions     Dilaudid [Hydromorphone] Nausea and Vomiting     Morphine Nausea and Vomiting     Latex Rash     From gloves       Current Outpatient Medications   Medication     acetaminophen (TYLENOL) 500 MG tablet     azaTHIOprine (IMURAN) 50 MG tablet     buPROPion (WELLBUTRIN SR) 200 MG 12 hr tablet     BUSPIRONE HCL PO     calcitRIOL (ROCALTROL) 0.25 MCG capsule     carvedilol (COREG) 12.5 MG tablet     denosumab (PROLIA) 60 MG/ML SOLN     furosemide (LASIX) 40 MG tablet     hydrOXYzine (VISTARIL) 25 MG capsule     levalbuterol (XOPENEX HFA) 45 MCG/ACT inhaler     LORazepam (ATIVAN) 0.5 MG tablet     macitentan (OPSUMIT) 10 MG tablet     ORDER FOR DME     OXYCODONE HCL PO     potassium chloride (K-TAB,KLOR-CON) 10 MEQ tablet     ranitidine (RANITIDINE) 75 MG tablet     selexipag (UPTRAVI) 1600 MCG tablet     simvastatin (ZOCOR) 10 MG tablet     SODIUM BICARBONATE PO     tadalafil, PAH, (ADCIRCA) 20 MG TABS     TEMAZEPAM PO     No current facility-administered medications for this visit.          Questionnaires:    HOOS Hip Dysfunction & Osteoarthritis Outcome Questionnaire    Hip Dysfunction & Osteoarthritis Outcome Score (HOOS), English Version LK 2.0 ([place] Jung, Junior PAGAN, 2003) 12/27/2018   S1. Do you feel grinding, hear clicking or any other type of noise from your hip? Never   S2. Difficulties spreading legs wide apart Extreme   S3. Difficulties to " stride out when walking Moderate   S4. How severe is your hip joint stiffness after first wakening in the morning? Severe   S5. How severe is your hip stiffness after sitting, lying or resting LATER IN THE DAY? Moderate   Symptom Count 5   Symptom Sum 11   Symptom Mean 2.2   Symptom Subscale Score 45   P1. How often is your hip painful? Daily   P2. Straightening your hip fully Severe   P3. Bending your hip FULLY Moderate   P4. Walking on a flat surface Mild   P5. Going up or down stairs Mild   P6. At night while in bed Moderate   P7. Sitting or lying Moderate   P8. Standing upright Mild   P9. Walking on a hard surface (asphalt, concrete, etc.) Mild   P10. Walking on an uneven surface Moderate   Pain Count 10   Pain Sum 18   Pain Mean 1.8   Pain Subscale Score 55   A1. Descending stairs Moderate   A2. Ascending stairs Moderate   A3. Rising from sitting Mild   A4. Standing Moderate   A5. Bending to the floor/ an object Moderate   A6. Walking on a flat surface Moderate   A7. Getting in/out of car Moderate   A8. Going shopping Moderate   A9. Putting on socks/stockings Severe   A10. Rising from bed Moderate   A11. Taking off socks/stockings Moderate   A12. Lying in bed (turning over, maintaining hip position) Severe   A13. Getting in/out of bed Mild   A14. Sitting Mild   A15. Getting on/off toilet Mild   A16. Heavy domestic duties (moving heavy boxes, scrubbing floors, etc.) Severe   A17. Light domestic duties (cooking, dusting, etc.) Moderate   ADL Count 17   ADL Sum 33   ADL Mean 1.94   ADL Subscale Score 51.47   SP1. Squatting Severe   SP2. Running Extreme   SP3. Twisting/pivoting on loaded leg Severe   SP4. Walking on uneven surface Moderate   Sports/Rec Count 4   Sports/Rec Sum 12   Sports/Rec Mean 3   Sports/Rec Subscale Score 25   Q1. How often are you aware of your hip problem? Daily   Q2. Have you modified you life style to avoid activities potentially damaging to your hip? Totally   Q3. How much are you  troubled with lack of confidence in your hip? Extremely   Q4. In general, how much difficulty do you have with your hip? Extreme   QOL Count 4   QOL Sum 15   QOL Mean 3.75   Quality of Life Subscale Score 6.25            Promis 10 Assessment    PROMIS 10 12/27/2018   In general, would you say your health is: Fair   In general, would you say your quality of life is: Good   In general, how would you rate your physical health? Fair   In general, how would you rate your mental health, including your mood and your ability to think? Good   In general, how would you rate your satisfaction with your social activities and relationships? Fair   In general, please rate how well you carry out your usual social activities and roles Fair   To what extent are you able to carry out your everyday physical activities such as walking, climbing stairs, carrying groceries, or moving a chair? Mostly   How often have you been bothered by emotional problems such as feeling anxious, depressed or irritable? Sometimes   How would you rate your fatigue on average? Moderate   How would you rate your pain on average?   0 = No Pain  to  10 = Worst Imaginable Pain 6   In general, would you say your health is: 2   In general, would you say your quality of life is: 3   In general, how would you rate your physical health? 2   In general, how would you rate your mental health, including your mood and your ability to think? 3   In general, how would you rate your satisfaction with your social activities and relationships? 2   In general, please rate how well you carry out your usual social activities and roles. (This includes activities at home, at work and in your community, and responsibilities as a parent, child, spouse, employee, friend, etc.) 2   To what extent are you able to carry out your everyday physical activities such as walking, climbing stairs, carrying groceries, or moving a chair? 4   In the past 7 days, how often have you been bothered  by emotional problems such as feeling anxious, depressed, or irritable? 3   In the past 7 days, how would you rate your fatigue on average? 3   In the past 7 days, how would you rate your pain on average, where 0 means no pain, and 10 means worst imaginable pain? 6   Global Mental Health Score 11   Global Physical Health Score 12   PROMIS TOTAL - SUBSCORES 23   Some recent data might be hidden

## 2018-12-27 NOTE — LETTER
12/27/2018       RE: Jduith Nelson  [address] [facility] [address]     Dear Colleague,    Thank you for referring your patient, Judith Nelson, to the [facility]. Please see a copy of my visit note below.        [facility]  Orthopaedic Surgery, Joint Replacement Consultation  by Lai Fernandez M.D.    Judith Nelson MRN# [id number]   Age: 58 year old YOB: 1960     Requesting physician: Anupama Flores          Assessment and Plan:   Assessment:  Ms. Nelson is a 58 year old female with left trochanteric bursitis, bilateral acetabular protrusio     Plan:    Discussed possible causes of symptoms with patient including acetabular protrusio, osteoarthritis, and trochanteric bursitis    Given her physical exam and history, trochanteric bursitis is likely cause of her acute symptoms    We discussed management options including observation, NSAIDs, injections, and total hip arthroplasty    Left trochanteric bursa injection; see note below    Activity as tolerated    PROCEDURE DATE: 12/27/2018  Procedure: Injection left trochanteric bursa  After obtaining informed consent, under sterile precautions, the left trochanteric bursa was injected with lidocaine and steroid.  There were no complications.    Return in 6 months for recheck bilateral hips; repeat x-rays bilateral hips           History of Present Illness:   Ms. Nelson is a 58 year old female presenting to the office for evaluation of left hip pain and recheck of a right DMITRIY that was done 8 years ago.  Patient states that she has lateral left hip pain for 5 weeks; the pain began after moving a dresser.  She did have some mild chronic pain prior to that.  The pain is most significant when she sleeps on the left hip and when she tries to abduct the left lower extremity.    Current symptoms:  Problem: Left hip pain  Onset and duration: 1  "month  Awakens from sleep due to sx's:  Yes when lays on left hip  Precipitating Injury:  Moving a dresser    Other joints or sites painful:  No    Background history:  DX:  1. Sarcoidosis  2. Bilateral acetabular protrusio  3. Periprosthetic right femur fracture  4. Left trochanteric bursitis    TREATMENTS:  1. 10/1/2010:  Right DMITRIY; Dr. Willi Blanc  2. 10/2010: Non-operative management right periprosthetic femur fracture  3. 12/27/2018: Left trochanteric bursa injection; Dr. Fernandez         Physical Exam:   Weight: 120 pounds  Height: 5'3\"  BMI: 21kg/m2  RESP: non labored breathing   ABD: benign   SKIN: grossly normal   LYMPHATIC: grossly normal   NEURO: grossly normal   VASCULAR: satisfactory perfusion of all extremities   RLE: well healed hip incision    No discomfort with hip ROM    Thigh and leg compartments soft and compressible    +Quad/TA/GSC/EHL/FHL    SILT DP/SP/ynes/Saph/Tibial nerve distributions    Palpable dorsalis pedis pulse  LLE:    Point tenderness over greater trochanter    Discomfort and limited strength with hip abduction    Hip ROM: flexion 90degrees, internal rotation 5degrees, external rotation 5degrees    Thigh and leg compartments soft and compressible    +Quad/TA/GSC/EHL/FHL    SILT DP/SP/ynes/Saph/Tibial nerve distributions    Palpable dorsalis pedis pulse         Data:   Pelvis and hip x-rays 11/23/2018: post-op changes right DMITRIY, some lucency superior to the acetabular cup; left hip acetabular protrusio with coxa vara    Darrick Gaming MD  Orthopaedic Surgery, Adult Reconstruction Fellow  Pager [phone]    DATA for DOCUMENTATION:         Past Medical History:     Patient Active Problem List   Diagnosis     Asymptomatic postmenopausal status     Major depressive disorder, single episode, moderate (H)     Intrapelvic protrusion of acetabulum     GENERALIZED ANXIETY DISORDER     Breast discharge     Hyperprolactinemia (H)     Duodenitis hemorrhagic     Pulmonary hypertension (H)     " CARDIOVASCULAR SCREENING; LDL GOAL LESS THAN 160     Mental status change     Acute renal failure (ARF) (H)     Lower extremity edema     Hypomagnesemia     Elevated liver enzymes     Gall stones, common bile duct     Tremor     Personal history of other drug therapy     Encounter for long-term current use of medication     Osteoporosis, postmenopausal     Senile osteoporosis     Skin ulcer of ankle (H)     CKD (chronic kidney disease) stage 4, GFR 15-29 ml/min (H)     Sarcoidosis     Dehydration     Compression fracture     Elevated troponin     Stress-induced cardiomyopathy     Mixed hyperlipidemia     GERD (gastroesophageal reflux disease)     Major depressive disorder, single episode, severe with psychotic features (H)     [facility]     Iron deficiency anemia     Intestinal malabsorption     Chronic steroid use     Chronic kidney disease, stage IV (severe) (H)     Personal history of drug therapy     Cellulitis of left lower extremity     Dyspnea on exertion     PAH (pulmonary arterial hypertension) with portal hypertension (H)     Past Medical History:   Diagnosis Date     Anxiety      CKD (chronic kidney disease), stage III (H)     biopsy suspicious for renal sarcoidosis      Hyperprolactinemia (H)      Lower extremity edema     chronic w/ chronic right ankle ulcer     Lumbar compression fracture (H)      Major depressive disorder     with psychotic features, followed by Dr. Rosales at [facility] in [place]     Menopause 2012    patient is post menopausal     MGUS (monoclonal gammopathy of unknown significance)     mild, followed by Dr. Long     Osteoporosis      Other seborrheic keratosis      Protrusio acetabuli      Pulmonary hypertension (H)      Sarcoidosis      Stress-induced cardiomyopathy        Also see scanned health assessment forms.       Past Surgical History:     Past Surgical History:   Procedure Laterality Date     Csection,  X 2       CYSTOSCOPY,  RETROGRADES, INSERT STENT URETER(S), COMBINED  10/2/2013    Procedure: COMBINED CYSTOSCOPY, RETROGRADES, INSERT STENT URETER(S);  Left Retrograde Ureteropyelogram and Ureteral Stent Placement;  Surgeon: SONIA Martinez MD;  Location: [facility] OR     ENDOSCOPIC RETROGRADE CHOLANGIOPANCREATOGRAM  2012    Procedure:ENDOSCOPIC RETROGRADE CHOLANGIOPANCREATOGRAM; ERCP biliary sphincterotomy and stone removal; Surgeon:MARGIE RUGGIERO; Location:[facility]     LAPAROSCOPIC CHOLECYSTECTOMY WITH CHOLANGIOGRAMS  2012    Procedure:LAPAROSCOPIC CHOLECYSTECTOMY WITH CHOLANGIOGRAMS; Surgeon:BRIANA PADILLA; Location:[facility] OR     ORTHOPEDIC SURGERY  10/1/2010    Right hip replacement     ZZ GASTROSCOPY,FL  6/10    Erythematous duodenopathy            Social History:     Social History     Socioeconomic History     Marital status:      Spouse name: Not on file     Number of children: 3     Years of education: 12     Highest education level: Not on file   Social Needs     Financial resource strain: Not on file     Food insecurity - worry: Not on file     Food insecurity - inability: Not on file     Transportation needs - medical: Not on file     Transportation needs - non-medical: Not on file   Occupational History     Employer: UNEMPLOYED   Tobacco Use     Smoking status: Never Smoker     Smokeless tobacco: Never Used   Substance and Sexual Activity     Alcohol use: No     Alcohol/week: 0.0 oz     Comment: Occ.     Drug use: No     Sexual activity: No     Birth control/protection: Spermicide   Other Topics Concern     Parent/sibling w/ CABG, MI or angioplasty before 65F 55M? No   Social History Narrative     Not on file            Family History:       Family History   Problem Relation Age of Onset     Cancer Mother          age 62 leukemia     Gastrointestinal Disease Mother         diverticulitis     Hypertension Mother      Allergies Mother      Arthritis Mother      Depression Mother      Eye Disorder Mother       Osteoporosis Mother      C.A.D. Father         MI/CAD      Cancer Father         skin     Blood Disease Father         renal  problem     Hypertension Father      Anesthesia Reaction Father      Cardiovascular Father      Neurologic Disorder Father         Parkinson's disease     UMBERTOAJOSEP. Maternal Grandmother          late 82s MI     Diabetes Maternal Grandmother      UMBERTOAJOSEP. Maternal Grandfather          mid 80s MI     Alzheimer Disease Paternal Grandmother          80s     Alzheimer Disease Paternal Grandfather          80s     Neurologic Disorder Sister         migraines     Allergies Sister      Diabetes Other         AODM     Neurologic Disorder Sister         migraines     Allergies Sister      Anesthesia Reaction Son      Anesthesia Reaction Daughter      Anesthesia Reaction Daughter      Diabetes Sister         hypoglycemia            Medications:     Current Outpatient Medications   Medication Sig     acetaminophen (TYLENOL) 500 MG tablet Take 1-2 tablets (500-1,000 mg) by mouth every 6 hours as needed for pain (Take as needed for pain.  Do not exceed 4 grams (8 tablets) in a day)     azaTHIOprine (IMURAN) 50 MG tablet Take 1 tablet (50 mg) by mouth daily     buPROPion (WELLBUTRIN SR) 200 MG 12 hr tablet Take 1 tablet (200 mg) by mouth every morning     BUSPIRONE HCL PO Take 15 mg by mouth 2 times daily     calcitRIOL (ROCALTROL) 0.25 MCG capsule Take 1 capsule (0.25 mcg) by mouth daily     carvedilol (COREG) 12.5 MG tablet Take 0.5 tablets (6.25 mg) by mouth 2 times daily (with meals)     denosumab (PROLIA) 60 MG/ML SOLN Inject 1 mL (60 mg) Subcutaneous every 6 months     furosemide (LASIX) 40 MG tablet Take 40 MG --, 20 MG all other days. (Patient taking differently: 20 mg daily )     hydrOXYzine (VISTARIL) 25 MG capsule 1 cap(s)     levalbuterol (XOPENEX HFA) 45 MCG/ACT inhaler Inhale 2 puffs into the lungs every 6 hours as needed for shortness of breath / dyspnea or wheezing (Patient  not taking: Reported on 12/5/2018)     LORazepam (ATIVAN) 0.5 MG tablet Take 0.5 mg (1 tab) every morning and 1 mg (2 tabs) every night at bedtime.     macitentan (OPSUMIT) 10 MG tablet Take 1 tablet (10 mg) by mouth daily     ORDER FOR DME Equipment being ordered: SHOWER CHAIR  FROM [facility]     OXYCODONE HCL PO Take by mouth every 6 hours as needed     potassium chloride (K-TAB,KLOR-CON) 10 MEQ tablet Take 1 tablet (10 mEq) by mouth daily     ranitidine (RANITIDINE) 75 MG tablet Take 1-2 tablets ( mg) by mouth 2 times daily     selexipag (UPTRAVI) 1600 MCG tablet Take 1 tablet (1,600 mcg) by mouth every 12 hours     simvastatin (ZOCOR) 10 MG tablet Take 1 tablet by mouth At Bedtime.     SODIUM BICARBONATE PO Take by mouth 2 times daily     tadalafil, PAH, (ADCIRCA) 20 MG TABS Take 2 tablets (40 mg) by mouth daily     TEMAZEPAM PO Take by mouth At Bedtime     No current facility-administered medications for this visit.               Review of Systems:   A comprehensive 10 point review of systems (constitutional, ENT, cardiac, peripheral vascular, lymphatic, respiratory, GI, , Musculoskeletal, skin, Neurological) was performed and found to be negative except as described in this note.     See intake form completed by patient    Again, thank you for allowing me to participate in the care of your patient.      Sincerely,    Lai Fernandez MD

## 2018-12-27 NOTE — PROGRESS NOTES
[facility]  Orthopaedic Surgery, Joint Replacement Consultation  by Lai Fernandez M.D.    Judith Nelson MRN# [id number]   Age: 58 year old YOB: 1960     Requesting physician: Anupama Flores          Assessment and Plan:   Assessment:  Ms. Nelson is a 58 year old female with left trochanteric bursitis, bilateral acetabular protrusio     Plan:    Discussed possible causes of symptoms with patient including acetabular protrusio, osteoarthritis, and trochanteric bursitis    Given her physical exam and history, trochanteric bursitis is likely cause of her acute symptoms    We discussed management options including observation, NSAIDs, injections, and total hip arthroplasty    Left trochanteric bursa injection; see note below    Activity as tolerated    PROCEDURE DATE: 12/27/2018  Procedure: Injection left trochanteric bursa  After obtaining informed consent, under sterile precautions, the left trochanteric bursa was injected with lidocaine and steroid.  There were no complications.    Return in 6 months for recheck bilateral hips; repeat x-rays bilateral hips           History of Present Illness:   Ms. Nelson is a 58 year old female presenting to the office for evaluation of left hip pain and recheck of a right DMITRIY that was done 8 years ago.  Patient states that she has lateral left hip pain for 5 weeks; the pain began after moving a dresser.  She did have some mild chronic pain prior to that.  The pain is most significant when she sleeps on the left hip and when she tries to abduct the left lower extremity.    Current symptoms:  Problem: Left hip pain  Onset and duration: 1 month  Awakens from sleep due to sx's:  Yes when lays on left hip  Precipitating Injury:  Moving a dresser    Other joints or sites painful:  No    Background history:  DX:  1. Sarcoidosis  2. Bilateral acetabular protrusio  3. Periprosthetic right femur fracture  4. Left trochanteric  "bursitis    TREATMENTS:  1. 10/1/2010:  Right DMITRIY; Dr. Willi Blanc  2. 10/2010: Non-operative management right periprosthetic femur fracture  3. 12/27/2018: Left trochanteric bursa injection; Dr. Fernandez         Physical Exam:   Weight: 120 pounds  Height: 5'3\"  BMI: 21kg/m2  RESP: non labored breathing   ABD: benign   SKIN: grossly normal   LYMPHATIC: grossly normal   NEURO: grossly normal   VASCULAR: satisfactory perfusion of all extremities   RLE: well healed hip incision    No discomfort with hip ROM    Thigh and leg compartments soft and compressible    +Quad/TA/GSC/EHL/FHL    SILT DP/SP/ynes/Saph/Tibial nerve distributions    Palpable dorsalis pedis pulse  LLE:    Point tenderness over greater trochanter    Discomfort and limited strength with hip abduction    Hip ROM: flexion 90degrees, internal rotation 5degrees, external rotation 5degrees    Thigh and leg compartments soft and compressible    +Quad/TA/GSC/EHL/FHL    SILT DP/SP/ynes/Saph/Tibial nerve distributions    Palpable dorsalis pedis pulse         Data:   Pelvis and hip x-rays 11/23/2018: post-op changes right DMITRIY, some lucency superior to the acetabular cup; left hip acetabular protrusio with coxa vara    Darrick Gaming MD  Orthopaedic Surgery, Adult Reconstruction Fellow  Pager [phone]    DATA for DOCUMENTATION:         Past Medical History:     Patient Active Problem List   Diagnosis     Asymptomatic postmenopausal status     Major depressive disorder, single episode, moderate (H)     Intrapelvic protrusion of acetabulum     GENERALIZED ANXIETY DISORDER     Breast discharge     Hyperprolactinemia (H)     Duodenitis hemorrhagic     Pulmonary hypertension (H)     CARDIOVASCULAR SCREENING; LDL GOAL LESS THAN 160     Mental status change     Acute renal failure (ARF) (H)     Lower extremity edema     Hypomagnesemia     Elevated liver enzymes     Gall stones, common bile duct     Tremor     Personal history of other drug therapy     Encounter for " long-term current use of medication     Osteoporosis, postmenopausal     Senile osteoporosis     Skin ulcer of ankle (H)     CKD (chronic kidney disease) stage 4, GFR 15-29 ml/min (H)     Sarcoidosis     Dehydration     Compression fracture     Elevated troponin     Stress-induced cardiomyopathy     Mixed hyperlipidemia     GERD (gastroesophageal reflux disease)     Major depressive disorder, single episode, severe with psychotic features (H)     Thrush     Health Care Home (HEALTHCARE)     Iron deficiency anemia     Intestinal malabsorption     Chronic steroid use     Chronic kidney disease, stage IV (severe) (H)     Personal history of drug therapy     Cellulitis of left lower extremity     Dyspnea on exertion     PAH (pulmonary arterial hypertension) with portal hypertension (H)     Past Medical History:   Diagnosis Date     Anxiety      CKD (chronic kidney disease), stage III (H)     biopsy suspicious for renal sarcoidosis      Hyperprolactinemia (H)      Lower extremity edema     chronic w/ chronic right ankle ulcer     Lumbar compression fracture (H)      Major depressive disorder     with psychotic features, followed by Dr. Rosales at [facility] in [place]     Menopause 2012    patient is post menopausal     MGUS (monoclonal gammopathy of unknown significance)     mild, followed by Dr. Long     Osteoporosis      Other seborrheic keratosis      Protrusio acetabuli      Pulmonary hypertension (H)      Sarcoidosis      Stress-induced cardiomyopathy        Also see scanned health assessment forms.       Past Surgical History:     Past Surgical History:   Procedure Laterality Date     Csection,  X 2       CYSTOSCOPY, RETROGRADES, INSERT STENT URETER(S), COMBINED  10/2/2013    Procedure: COMBINED CYSTOSCOPY, RETROGRADES, INSERT STENT URETER(S);  Left Retrograde Ureteropyelogram and Ureteral Stent Placement;  Surgeon: SONIA Martinez MD;  Location: [facility]     ENDOSCOPIC RETROGRADE CHOLANGIOPANCREATOGRAM   2012    Procedure:ENDOSCOPIC RETROGRADE CHOLANGIOPANCREATOGRAM; ERCP biliary sphincterotomy and stone removal; Surgeon:MARGIE RUGGIERO; Location:[facility]     LAPAROSCOPIC CHOLECYSTECTOMY WITH CHOLANGIOGRAMS  2012    Procedure:LAPAROSCOPIC CHOLECYSTECTOMY WITH CHOLANGIOGRAMS; Surgeon:BRIANA PADILLA; Location:[facility]     ORTHOPEDIC SURGERY  10/1/2010    Right hip replacement     ZZ GASTROSCOPY,FL  6/10    Erythematous duodenopathy            Social History:     Social History     Socioeconomic History     Marital status:      Spouse name: Not on file     Number of children: 3     Years of education: 12     Highest education level: Not on file   Social Needs     Financial resource strain: Not on file     Food insecurity - worry: Not on file     Food insecurity - inability: Not on file     Transportation needs - medical: Not on file     Transportation needs - non-medical: Not on file   Occupational History     Employer: UNEMPLOYED   Tobacco Use     Smoking status: Never Smoker     Smokeless tobacco: Never Used   Substance and Sexual Activity     Alcohol use: No     Alcohol/week: 0.0 oz     Comment: Occ.     Drug use: No     Sexual activity: No     Birth control/protection: Spermicide   Other Topics Concern     Parent/sibling w/ CABG, MI or angioplasty before 65F 55M? No   Social History Narrative     Not on file            Family History:       Family History   Problem Relation Age of Onset     Cancer Mother          age 62 leukemia     Gastrointestinal Disease Mother         diverticulitis     Hypertension Mother      Allergies Mother      Arthritis Mother      Depression Mother      Eye Disorder Mother      Osteoporosis Mother      C.A.D. Father         MI/CAD      Cancer Father         skin     Blood Disease Father         renal  problem     Hypertension Father      Anesthesia Reaction Father      Cardiovascular Father      Neurologic Disorder Father         Parkinson's disease     C.A.D.  Maternal Grandmother          late 82s MI     Diabetes Maternal Grandmother      AMARA Maternal Grandfather          mid 80s MI     Alzheimer Disease Paternal Grandmother          80s     Alzheimer Disease Paternal Grandfather          80s     Neurologic Disorder Sister         migraines     Allergies Sister      Diabetes Other         AODM     Neurologic Disorder Sister         migraines     Allergies Sister      Anesthesia Reaction Son      Anesthesia Reaction Daughter      Anesthesia Reaction Daughter      Diabetes Sister         hypoglycemia            Medications:     Current Outpatient Medications   Medication Sig     acetaminophen (TYLENOL) 500 MG tablet Take 1-2 tablets (500-1,000 mg) by mouth every 6 hours as needed for pain (Take as needed for pain.  Do not exceed 4 grams (8 tablets) in a day)     azaTHIOprine (IMURAN) 50 MG tablet Take 1 tablet (50 mg) by mouth daily     buPROPion (WELLBUTRIN SR) 200 MG 12 hr tablet Take 1 tablet (200 mg) by mouth every morning     BUSPIRONE HCL PO Take 15 mg by mouth 2 times daily     calcitRIOL (ROCALTROL) 0.25 MCG capsule Take 1 capsule (0.25 mcg) by mouth daily     carvedilol (COREG) 12.5 MG tablet Take 0.5 tablets (6.25 mg) by mouth 2 times daily (with meals)     denosumab (PROLIA) 60 MG/ML SOLN Inject 1 mL (60 mg) Subcutaneous every 6 months     furosemide (LASIX) 40 MG tablet Take 40 MG --, 20 MG all other days. (Patient taking differently: 20 mg daily )     hydrOXYzine (VISTARIL) 25 MG capsule 1 cap(s)     levalbuterol (XOPENEX HFA) 45 MCG/ACT inhaler Inhale 2 puffs into the lungs every 6 hours as needed for shortness of breath / dyspnea or wheezing (Patient not taking: Reported on 2018)     LORazepam (ATIVAN) 0.5 MG tablet Take 0.5 mg (1 tab) every morning and 1 mg (2 tabs) every night at bedtime.     macitentan (OPSUMIT) 10 MG tablet Take 1 tablet (10 mg) by mouth daily     ORDER FOR DME Equipment being ordered: SHOWER CHAIR  FROM [facility]  MEDICAL     OXYCODONE HCL PO Take by mouth every 6 hours as needed     potassium chloride (K-TAB,KLOR-CON) 10 MEQ tablet Take 1 tablet (10 mEq) by mouth daily     ranitidine (RANITIDINE) 75 MG tablet Take 1-2 tablets ( mg) by mouth 2 times daily     selexipag (UPTRAVI) 1600 MCG tablet Take 1 tablet (1,600 mcg) by mouth every 12 hours     simvastatin (ZOCOR) 10 MG tablet Take 1 tablet by mouth At Bedtime.     SODIUM BICARBONATE PO Take by mouth 2 times daily     tadalafil, PAH, (ADCIRCA) 20 MG TABS Take 2 tablets (40 mg) by mouth daily     TEMAZEPAM PO Take by mouth At Bedtime     No current facility-administered medications for this visit.               Review of Systems:   A comprehensive 10 point review of systems (constitutional, ENT, cardiac, peripheral vascular, lymphatic, respiratory, GI, , Musculoskeletal, skin, Neurological) was performed and found to be negative except as described in this note.     See intake form completed by patient      Answers for HPI/ROS submitted by the patient on 12/27/2018   General Symptoms: Yes  Skin Symptoms: No  HENT Symptoms: Yes  EYE SYMPTOMS: No  HEART SYMPTOMS: Yes  LUNG SYMPTOMS: Yes  INTESTINAL SYMPTOMS: No  URINARY SYMPTOMS: No  GYNECOLOGIC SYMPTOMS: No  BREAST SYMPTOMS: No  SKELETAL SYMPTOMS: Yes  BLOOD SYMPTOMS: No  NERVOUS SYSTEM SYMPTOMS: No  MENTAL HEALTH SYMPTOMS: Yes  Fever: No  Loss of appetite: No  Weight loss: No  Weight gain: Yes  Fatigue: Yes  Night sweats: Yes  Chills: Yes  Increased stress: Yes  Excessive hunger: No  Excessive thirst: No  Feeling hot or cold when others believe the temperature is normal: No  Loss of height: No  Post-operative complications: No  Surgical site pain: No  Hallucinations: No  Change in or Loss of Energy: Yes  Hyperactivity: No  Confusion: No  Ear pain: No  Ear discharge: No  Hearing loss: No  Tinnitus: No  Nosebleeds: Yes  Congestion: Yes  Sinus pain: No  Trouble swallowing: No   Voice hoarseness: Yes  Mouth sores:  No  Sore throat: No  Tooth pain: No  Gum tenderness: Yes  Bleeding gums: Yes  Change in taste: Yes  Change in sense of smell: No  Dry mouth: No  Hearing aid used: No  Neck lump: No  Cough: Yes  Sputum or phlegm: Yes  Coughing up blood: No  Difficulty breating or shortness of breath: Yes  Snoring: Yes  Wheezing: No  Difficulty breathing on exertion: Yes  Nighttime Cough: Yes  Difficulty breathing when lying flat: No  Chest pain or pressure: No  Fast or irregular heartbeat: No  Pain in legs with walking: Yes  Trouble breathing while lying down: Yes  Fingers or toes appear blue: No  High blood pressure: No  Low blood pressure: Yes  Fainting: No  Murmurs: No  Pacemaker: No  Varicose veins: No  Edema or swelling: Yes  Wake up at night with shortness of breath: Yes  Light-headedness: Yes  Exercise intolerance: Yes  Back pain: Yes  Muscle aches: Yes  Neck pain: Yes  Swollen joints: Yes  Joint pain: Yes  Bone pain: Yes  Muscle cramps: Yes  Muscle weakness: Yes  Joint stiffness: Yes  Bone fracture: Yes  Nervous or Anxious: Yes  Depression: Yes  Trouble sleeping: Yes  Trouble thinking or concentrating: No  Mood changes: Yes  Panic attacks: No      Attending MD (Dr. Lai Fernandez) Attestation :  This patient was seen and evaluated by me including a history, exam, and interpretation of all imaging and/or lab data.  Either a training physician (resident/fellow), who also saw the patient, or scribe has documented the visit in the attached note.    MD Ann-Marie Bello Family Professor  Oncology and Adult Reconstructive Surgery  Dept Orthopaedic Surgery, [facility]  [phone] office, [phone] pager  [email or web address]

## 2018-12-29 NOTE — NURSING NOTE
[facility]  [address] [place] [id number]  Dept: [phone]  ______________________________________________________________________________    Patient: Judith Nelson   : 1960   MRN: [id number]   2018    INVASIVE PROCEDURE SAFETY CHECKLIST    Date: 2018   Procedure: Left Greater Trochanter Cortisone Steroid Injection   Patient Name: Judith Nelson  MRN: [id number]  YOB: 1960    Action: Complete sections as appropriate. Any discrepancy results in a HARD COPY until resolved.     PRE PROCEDURE:  Patient ID verified with 2 identifiers (name and  or MRN): Yes  Procedure and site verified with patient/designee (when able): Yes  Accurate consent documentation in medical record: Yes  H&P (or appropriate assessment) documented in medical record: Yes  H&P must be up to 20 days prior to procedure and updates within 24 hours of procedure as applicable: Yes  Relevant diagnostic and radiology test results appropriately labeled and displayed as applicable: Yes  Procedure site(s) marked with provider initials: Yes    TIMEOUT:  Time-Out performed immediately prior to starting procedure, including verbal and active participation of all team members addressing the following:Yes  * Correct patient identify  * Confirmed that the correct side and site are marked  * An accurate procedure consent form  * Agreement on the procedure to be done  * Correct patient position  * Relevant images and results are properly labeled and appropriately displayed  * The need to administer antibiotics or fluids for irrigation purposes during the procedure as applicable   * Safety precautions based on patient history or medication use    DURING PROCEDURE: Verification of correct person, site, and procedures any time the responsibility for care of the patient is transferred to another member of the care team.       The following medications were given:          Prior to injection, verified patient identity using patient's name and date of birth.  Due to injection administration, patient instructed to remain in clinic for 15 minutes  afterwards, and to report any adverse reaction to me immediately.    Joint injection was performed.    Medication Name: Lidocaine HCL 1%   Drug Amount Wasted:  Yes: 10 mg/ml   Vial/Syringe: Single dose vial  Expiration Date:  10/2020    Joint injection was performed.    Medication Name: Celestone Soluspan   Drug Amount Wasted:  Yes: 2.5 mg/ml   Vial/Syringe: Single dose vial  Expiration Date: 11/2019      Jenna Damian CMA

## 2019-01-07 DIAGNOSIS — I50.22 CHRONIC SYSTOLIC HEART FAILURE (H): Primary | ICD-10-CM

## 2019-01-09 DIAGNOSIS — I27.20 PULMONARY HYPERTENSION (H): Primary | ICD-10-CM

## 2019-01-11 RX ORDER — TADALAFIL 20 MG/1
40 TABLET ORAL DAILY
Qty: 180 TABLET | Refills: 3 | Status: SHIPPED | OUTPATIENT
Start: 2019-01-11 | End: 2020-01-02

## 2019-01-11 RX ORDER — CARVEDILOL 12.5 MG/1
6.25 TABLET ORAL 2 TIMES DAILY WITH MEALS
Qty: 90 TABLET | Refills: 1 | Status: ON HOLD | OUTPATIENT
Start: 2019-01-11 | End: 2019-04-09

## 2019-01-11 NOTE — TELEPHONE ENCOUNTER
Medication Refill double check:    Last office visit was on 12/5/18 with .    Follow up was recommended for after [facility] (Jan) .    Any additional encounters with changes to requested med? no    Authorizing provider is: Dr. Franco    Refill was approved.     Additional orders/notes:       Keyanna Mccray RN on 1/11/2019 at 4:53 PM

## 2019-01-15 PROBLEM — K76.6 PAH (PULMONARY ARTERIAL HYPERTENSION) WITH PORTAL HYPERTENSION (H): Chronic | Status: ACTIVE | Noted: 2018-06-15

## 2019-01-15 PROBLEM — R06.09 DYSPNEA ON EXERTION: Status: RESOLVED | Noted: 2017-10-30 | Resolved: 2019-01-15

## 2019-01-15 PROBLEM — M70.62 TROCHANTERIC BURSITIS OF LEFT HIP: Status: RESOLVED | Noted: 2018-12-27 | Resolved: 2019-01-15

## 2019-01-15 PROBLEM — L03.116 CELLULITIS OF LEFT LOWER EXTREMITY: Status: RESOLVED | Noted: 2017-05-24 | Resolved: 2019-01-15

## 2019-01-15 PROBLEM — I27.21 PAH (PULMONARY ARTERIAL HYPERTENSION) WITH PORTAL HYPERTENSION (H): Chronic | Status: ACTIVE | Noted: 2018-06-15

## 2019-01-16 ENCOUNTER — OFFICE VISIT (OUTPATIENT)
Dept: CARDIOLOGY | Facility: CLINIC | Age: 59
End: 2019-01-16
Attending: INTERNAL MEDICINE
Payer: MEDICARE

## 2019-01-16 ENCOUNTER — HOSPITAL ENCOUNTER (OUTPATIENT)
Facility: CLINIC | Age: 59
Discharge: HOME | End: 2019-01-16
Attending: INTERNAL MEDICINE | Admitting: INTERNAL MEDICINE
Payer: MEDICARE

## 2019-01-16 ENCOUNTER — APPOINTMENT (OUTPATIENT)
Dept: LAB | Facility: CLINIC | Age: 59
End: 2019-01-16
Attending: INTERNAL MEDICINE
Payer: MEDICARE

## 2019-01-16 ENCOUNTER — APPOINTMENT (OUTPATIENT)
Dept: MEDSURG UNIT | Facility: CLINIC | Age: 59
End: 2019-01-16
Attending: INTERNAL MEDICINE
Payer: MEDICARE

## 2019-01-16 VITALS
OXYGEN SATURATION: 97 % | RESPIRATION RATE: 18 BRPM | DIASTOLIC BLOOD PRESSURE: 77 MMHG | TEMPERATURE: 97.4 F | HEART RATE: 77 BPM | SYSTOLIC BLOOD PRESSURE: 131 MMHG

## 2019-01-16 VITALS
WEIGHT: 117.4 LBS | DIASTOLIC BLOOD PRESSURE: 74 MMHG | BODY MASS INDEX: 20.8 KG/M2 | HEIGHT: 63 IN | SYSTOLIC BLOOD PRESSURE: 112 MMHG | OXYGEN SATURATION: 91 % | HEART RATE: 104 BPM

## 2019-01-16 DIAGNOSIS — R06.02 SOB (SHORTNESS OF BREATH): ICD-10-CM

## 2019-01-16 DIAGNOSIS — I27.20 PULMONARY HYPERTENSION (H): Primary | ICD-10-CM

## 2019-01-16 DIAGNOSIS — I27.20 PULMONARY HYPERTENSION (H): ICD-10-CM

## 2019-01-16 LAB
ANION GAP SERPL CALCULATED.3IONS-SCNC: 9 MMOL/L (ref 3–14)
BUN SERPL-MCNC: 20 MG/DL (ref 7–30)
CALCIUM SERPL-MCNC: 9.2 MG/DL (ref 8.5–10.1)
CHLORIDE SERPL-SCNC: 109 MMOL/L (ref 94–109)
CO2 SERPL-SCNC: 23 MMOL/L (ref 20–32)
CREAT SERPL-MCNC: 1.34 MG/DL (ref 0.52–1.04)
ERYTHROCYTE [DISTWIDTH] IN BLOOD BY AUTOMATED COUNT: 13.3 % (ref 10–15)
GFR SERPL CREATININE-BSD FRML MDRD: 44 ML/MIN/{1.73_M2}
GLUCOSE SERPL-MCNC: 88 MG/DL (ref 70–99)
HCT VFR BLD AUTO: 45 % (ref 35–47)
HGB BLD-MCNC: 14.9 G/DL (ref 11.7–15.7)
MCH RBC QN AUTO: 32.3 PG (ref 26.5–33)
MCHC RBC AUTO-ENTMCNC: 33.1 G/DL (ref 31.5–36.5)
MCV RBC AUTO: 97 FL (ref 78–100)
PLATELET # BLD AUTO: 171 10E9/L (ref 150–450)
POTASSIUM SERPL-SCNC: 4.1 MMOL/L (ref 3.4–5.3)
RBC # BLD AUTO: 4.62 10E12/L (ref 3.8–5.2)
SODIUM SERPL-SCNC: 141 MMOL/L (ref 133–144)
WBC # BLD AUTO: 3.7 10E9/L (ref 4–11)

## 2019-01-16 PROCEDURE — 40000166 ZZH STATISTIC PP CARE STAGE 1

## 2019-01-16 PROCEDURE — 85027 COMPLETE CBC AUTOMATED: CPT | Performed by: PHYSICIAN ASSISTANT

## 2019-01-16 PROCEDURE — 80048 BASIC METABOLIC PNL TOTAL CA: CPT | Performed by: PHYSICIAN ASSISTANT

## 2019-01-16 PROCEDURE — A9270 NON-COVERED ITEM OR SERVICE: HCPCS | Mod: GY | Performed by: PHYSICIAN ASSISTANT

## 2019-01-16 PROCEDURE — 93451 RIGHT HEART CATH: CPT | Performed by: INTERNAL MEDICINE

## 2019-01-16 PROCEDURE — C1894 INTRO/SHEATH, NON-LASER: HCPCS | Performed by: INTERNAL MEDICINE

## 2019-01-16 PROCEDURE — 99213 OFFICE O/P EST LOW 20 MIN: CPT | Mod: ZP | Performed by: INTERNAL MEDICINE

## 2019-01-16 PROCEDURE — 27210794 ZZH OR GENERAL SUPPLY STERILE: Performed by: INTERNAL MEDICINE

## 2019-01-16 PROCEDURE — 25000125 ZZHC RX 250: Performed by: INTERNAL MEDICINE

## 2019-01-16 PROCEDURE — 27210788 ZZH MANIFOLD CR3: Performed by: INTERNAL MEDICINE

## 2019-01-16 PROCEDURE — G0463 HOSPITAL OUTPT CLINIC VISIT: HCPCS | Mod: 25,ZF

## 2019-01-16 PROCEDURE — 25000132 ZZH RX MED GY IP 250 OP 250 PS 637: Mod: GY | Performed by: PHYSICIAN ASSISTANT

## 2019-01-16 PROCEDURE — 93451 RIGHT HEART CATH: CPT | Mod: 26 | Performed by: INTERNAL MEDICINE

## 2019-01-16 RX ORDER — LIDOCAINE 40 MG/G
CREAM TOPICAL
Status: COMPLETED | OUTPATIENT
Start: 2019-01-16 | End: 2019-01-16

## 2019-01-16 RX ORDER — LORAZEPAM 0.5 MG/1
1 TABLET ORAL ONCE
Status: COMPLETED | OUTPATIENT
Start: 2019-01-16 | End: 2019-01-16

## 2019-01-16 RX ORDER — LIDOCAINE HYDROCHLORIDE 10 MG/ML
INJECTION, SOLUTION EPIDURAL; INFILTRATION; INTRACAUDAL; PERINEURAL
Status: DISCONTINUED | OUTPATIENT
Start: 2019-01-16 | End: 2019-01-16 | Stop reason: HOSPADM

## 2019-01-16 RX ADMIN — LIDOCAINE: 40 CREAM TOPICAL at 11:34

## 2019-01-16 RX ADMIN — LORAZEPAM 1 MG: 0.5 TABLET ORAL at 11:56

## 2019-01-16 ASSESSMENT — PAIN SCALES - GENERAL: PAINLEVEL: NO PAIN (0)

## 2019-01-16 ASSESSMENT — MIFFLIN-ST. JEOR: SCORE: 1086.65

## 2019-01-16 NOTE — Clinical Note
Potential access sites were evaluated for patency using ultrasound.   The internal jugular vein was selected.   Access was obtained under ultrasound guidance with direct visualization of needle entry.

## 2019-01-16 NOTE — PROCEDURES
PRELIMINARY CARDIAC CATH REPORT:     PROCEDURES PERFORMED:   Right Heart Catheterization    PHYSICIANS:  Attending Physician: Jeanne Angel MD  Cardiology Fellow: Willi Crowell MD    INDICATION:  Judith Nelson is a 58 year old female with pulmonary hypertension and sarcoidosis who presents for right heart catheterization for hemodynamic assessment.     DESCRIPTION:  1. Consent obtained with discussion of risks.  All questions were answered.  2. Sterile prep and procedure.  3. Location with Sheaths:   Rt IJ  7 Fr 10 cm [short]  4. Access: Local anesthetic with lidocaine.  A micropuncture 21 guage needle with ultrasound guidance was used to establish vascular access using a modified Seldinger technique.  5. Diagnostic Catheters:   7 Fr  Bancroft Lani  6. Estimated blood loss: < 5 mL    MEDICATIONS:  Heart rate, BP, respiration, oxygen saturation and patient responses were monitored throughout the procedure with the assistance of the RN under my supervision.    Procedures:    HEMODYNAMICS:  BSA 1.76  1. HR 67 bpm  2. /62/80 mmHg  3. RA 6/8/5   4. /10  5. /35/60   6. PCW 13   7. PA sat 62%   8. PCW sat not obtained  9. Hgb 13.4 g/dL   10. Estimated Dipti CO 2.3   11. Estimated Dipti CI 1.5   12. TD CO 3.5   13. TD CI 2.2  14. PVR 13.4         Sheath Removal:  The venous sheath was manually removed in the cardiac catheterization laboratory.    Fluoroscopy Time: 4.4 min    COMPLICATIONS:  1. None    SUMMARY:   >> Normal right sided filling pressures.  >> Normal left sided filling pressures.  >> Severe pulmonary artery hypertension  >> Mildly reduced cardiac output    PLAN:   >> Continued medical management and lifestyle modification for cardiovascular risk factor optimization.     The attending interventional cardiologist was present and supervised all critical aspects the procedure.      See CVIS report for final draft.    Willi Crowell MD   Cardiology Fellow    Jeanne Angel MD    Cardiology Staff

## 2019-01-16 NOTE — PROGRESS NOTES
Anupama Babcock MD.    [place] [address]      Dear Dr. Babcock:       I hope this finds you well.  [place] [address]  I saw Judith today and thin we should hold our course at the present time.  I would ask her to return to see me in three months, sooner if not doing well.     IMPRESSION:   1.  Pulmonary hypertension.   2.  History of profound depression.   3.  History of prolactin excess with galactorrhea (remote).  4. Sacroidosis  5. Iron deficiency anemia  6. GERD  7. Hyperlipidemia  8. Gallstones  9. Recent steroid injection hip    The patient returns for follow-up of pulmonary hypertension.  There is no interim history of chest pain, tightness, paroxysmal nocturnal dyspnea, orthopnea, peripheral edema, palpitation, pre-syncope, syncope, device discharge.  Exercise tolerance is stable.  The patient is attempting to exercise regularly and following a sodium restricted, calorically appropriate diet.  Medications are reviewed and the patient is taking medications as prescribed.  The patient is generally not sleeping well secondary to pain.    Constitutional: alert, oriented, normal gait and station, normal mentation.  Oral: moist mucous membrans  Lymph: without pathologic adenopathy  Chest: clear to ausculation and percussion  Cor: No evidence of left or right ventricular activity.  Rhythm is regular.  S1 normal, S2 split physiologically. Murmurs are not present  Abdomen: without tenderness, rebound, guarding, masses, ascites  Extremities: Edema not present  Neuro: no focal defects, normal mentation  Skin: without open lesions  Psych: oriented, verbal, mental status in tact      Name: JUDITH GÓMEZ  MRN: [id number]  : 1960  Study Date: 2018 09:47 AM  Age: 58 yrs  Gender: Female  Patient Location: [facility]  Reason For Study: Pulmonary Hypertension  Ordering Physician: LE SWAN  Physician: MINE CASTRO  Performed By: Steve Patel     BSA: 1.5 m2  Height: 63 in  Weight: 112 lb  BP: 97/57 mmHg  _____________________________________________________________________________  __        Procedure  Echocardiogram with two-dimensional, color and spectral Doppler performed.  _____________________________________________________________________________  __        Interpretation Summary  The RV is moderately dilated. There is mild right ventricular hypertrophy.  Global right ventricular function is either normal or mildly reduced.  Global and regional left ventricular function is normal with an EF of 60-65%.  Severe pulmonary hypertension. Right ventricular systolic pressure is 94 mmHg  above the right atrial pressure.  The inferior vena cava is normal.  _____________________________________________________________________________  __        Left Ventricle  Global and regional left ventricular function is normal with an EF of 60-65%.  Left ventricular size is normal. Left ventricular wall thickness is normal.  Paradoxical septal motion consistent with right ventricular pressure overload  is present.     Right Ventricle  The RV is moderately dilated. There is mild right ventricular hypertrophy.  Global right ventricular function is mildly reduced.     Atria  Severe biatrial enlargement is present.     Mitral Valve  The mitral valve is normal. Trace mitral insufficiency is present.        Aortic Valve  Aortic valve is normal in structure and function.     Tricuspid Valve  The tricuspid valve is normal. Moderate tricuspid insufficiency is present.  Severe pulmonary hypertension is present. Right ventricular systolic pressure  is 94mmHg above the right atrial pressure.     Pulmonic Valve  The pulmonic valve is normal.     Vessels  The aorta root is normal. The inferior vena cava is normal. Estimated mean  right atrial pressure is 3 mmHg (normal).     Pericardium  Trivial pericardial effusion is  present.        Compared to Previous Study  This study was compared with the study from 3/21/2018 . There has been no  change.  _____________________________________________________________________________  __  MMode/2D Measurements & Calculations     IVSd: 1.1 cm  LVIDd: 3.9 cm  LVIDs: 1.6 cm  LVPWd: 1.1 cm  FS: 58.0 %  LV mass(C)d: 141.2 grams  LV mass(C)dI: 93.4 grams/m2  LA dimension: 3.7 cm  asc Aorta Diam: 2.5 cm  LVOT diam: 1.8 cm  LVOT area: 2.6 cm2  LA Volume (BP): 68.4 ml  RWT: 0.59  TAPSE: 1.9 cm        Doppler Measurements & Calculations  PA acc time: 0.08 sec               1/19/2019  1. HR 67 bpm  2. /62/80 mmHg  3. RA 6/8/5   4. /10  5. /35/60   6. PCW 13   7. PA sat 62%   8. PCW sat not obtained  9. Hgb 13.4 g/dL   10. Estimated Dipti CO 2.3   11. Estimated Dipti CI 1.5   12. TD CO 3.5   13. TD CI 2.2  14. PVR 13.4       Results for ANALI GÓMEZ (MRN [id number]) as of 1/16/2019 14:08   Ref. Range 11/24/2018 01:25 12/5/2018 10:57 12/5/2018 14:55 1/16/2019 11:00 1/16/2019 12:55   Sodium Latest Ref Range: 133 - 144 mmol/L   139 141    Potassium Latest Ref Range: 3.4 - 5.3 mmol/L   4.4 4.1    Chloride Latest Ref Range: 94 - 109 mmol/L   105 109    Carbon Dioxide Latest Ref Range: 20 - 32 mmol/L   26 23    Urea Nitrogen Latest Ref Range: 7 - 30 mg/dL   18 20    Creatinine Latest Ref Range: 0.52 - 1.04 mg/dL   1.57 (H) 1.34 (H)    GFR Estimate Latest Ref Range: >60 mL/min/1.73_m2   34 (L) 44 (L)    GFR Estimate If Black Latest Ref Range: >60 mL/min/1.73_m2   41 (L) 51 (L)    Calcium Latest Ref Range: 8.5 - 10.1 mg/dL   9.1 9.2    Anion Gap Latest Ref Range: 3 - 14 mmol/L   8 9    Albumin Latest Ref Range: 3.4 - 5.0 g/dL   3.9     Protein Total Latest Ref Range: 6.8 - 8.8 g/dL   7.3     Bilirubin Total Latest Ref Range: 0.2 - 1.3 mg/dL   0.4     Alkaline Phosphatase Latest Ref Range: 40 - 150 U/L   62     ALT Latest Ref Range: 0 - 50 U/L   22     AST Latest Ref Range: 0 - 45 U/L   19      Bilirubin Direct Latest Ref Range: 0.0 - 0.2 mg/dL   0.1     Glucose Latest Ref Range: 70 - 99 mg/dL   88 88    WBC Latest Ref Range: 4.0 - 11.0 10e9/L   3.4 (L) 3.7 (L)    Hemoglobin Latest Ref Range: 11.7 - 15.7 g/dL   15.0 14.9    Hematocrit Latest Ref Range: 35.0 - 47.0 %   44.9 45.0    Platelet Count Latest Ref Range: 150 - 450 10e9/L   188 171    RBC Count Latest Ref Range: 3.8 - 5.2 10e12/L   4.68 4.62    MCV Latest Ref Range: 78 - 100 fl   96 97    MCH Latest Ref Range: 26.5 - 33.0 pg   32.1 32.3    MCHC Latest Ref Range: 31.5 - 36.5 g/dL   33.4 33.1    RDW Latest Ref Range: 10.0 - 15.0 %   12.6 13.3    Diff Method Unknown   Automated Method     % Neutrophils Latest Units: %   45.5     % Lymphocytes Latest Units: %   42.3     % Monocytes Latest Units: %   9.6     % Eosinophils Latest Units: %   1.7     % Basophils Latest Units: %   0.9     % Immature Granulocytes Latest Units: %   0.0     Nucleated RBCs Latest Ref Range: 0 /100   0     Absolute Neutrophil Latest Ref Range: 1.6 - 8.3 10e9/L   1.6     Absolute Lymphocytes Latest Ref Range: 0.8 - 5.3 10e9/L   1.5     Absolute Monocytes Latest Ref Range: 0.0 - 1.3 10e9/L   0.3     Absolute Eosinophils Latest Ref Range: 0.0 - 0.7 10e9/L   0.1     Absolute Basophils Latest Ref Range: 0.0 - 0.2 10e9/L   0.0     Abs Immature Granulocytes Latest Ref Range: 0 - 0.4 10e9/L   0.0     Absolute Nucleated RBC Unknown   0.0     XR LUMBAR SPINE 2-3 VIEWS Unknown Rpt       FVC-Pred Latest Units: L  2.87      FVC-Pre Latest Units: L  2.67      FVC-%Pred-Pre Latest Units: %  93      FEV1-Pre Latest Units: L  1.80      FEV1-%Pred-Pre Latest Units: %  77      FEV1FVC-Pred Latest Units: %  79      FEV1FVC-Pre Latest Units: %  67      FEV1SVC-Pred Latest Units: %  72      FEV1SVC-Pre Latest Units: %  74      FEV1FEV6-Pred Latest Units: %  81      FEV1FEV6-Pre Latest Units: %  67      FEFMax-Pred Latest Units: L/sec  6.26      FEFMax-Pre Latest Units: L/sec  3.97       FEFMax-%Pred-Pre Latest Units: %  63      FIFMax-Pre Latest Units: L/sec  4.36      ExpTime-Pre Latest Units: sec  7.21      ERV-Pred Latest Units: L  1.03      ERV-Pre Latest Units: L  1.09      ERV-%Pred-Pre Latest Units: %  105      IC-Pred Latest Units: L  2.16      IC-Pre Latest Units: L  1.34      IC-%Pred-Pre Latest Units: %  62      VC-Pred Latest Units: L  3.19      VC-Pre Latest Units: L  2.43      VC-%Pred-Pre Latest Units: %  76      DLCOunc-Pred Latest Units: ml/min/mmHg  21.47      DLCOunc-Pre Latest Units: ml/min/mmHg  13.91      DLCOunc-%Pred-Pre Latest Units: %  64      VA-Pre Latest Units: L  3.93      VA-%Pred-Pre Latest Units: %  80      CV CARDIAC CATHERIZATION Unknown     Rpt   PFT GENERAL LAB TESTING Unknown  Rpt      XQF5130-%Pred-Pre Latest Units: %  45      FEF2575-Pre Latest Units: L/sec  1.00      FEF2575-Pred Latest Units: L/sec  2.20          Examination:   MRI CARDIAC W/CONTRAST     Date:  9/26/2013 3:24 PM     Indication:  Sarcoidosis, Eval for cardiac sarcoid     Comparison: None     IMPRESSION  IMPRESSION:  1.  Normal left ventricular size and systolic function with a  visually estimated ejection fraction of  65 %.  2.  Normal right ventricular size and systolic function with a  visually estimated ejection fraction of 55%.  3.  On delayed enhancement imaging, there is no abnormal  hyperenhancement to suggest myocardial scar/inflammation/infiltration     Thank you for referring your patient for a cardiac MRI evaluation.     FINDINGS     CARDIAC MORPHOLOGY / FUNCTION:     1.  Normal left ventricular size and systolic function with a  visually estimated ejection fraction of  65 %.  2.  No regional wall motion abnormalities are noted. Normal left  ventricular mass.  3.  Normal right ventricular size and systolic function with a  visually estimated ejection fraction of 55%.  4.  Normal mitral, tricuspid, and aortic valvular function. Normal  sized aortic root.  5.  Mild ectasia of the  main pulmonary artery. It measures 3.5 cm in  diameter.  6.  Normal left atrial size, Normal  right atrial size.  7.  There is no evidence of myocardial edema on T2-weighted imaging.  8.  Rest first pass perfusion images do not reveal any segments of  hypoperfusion.  9.  On delayed enhancement imaging, there is no abnormal  hyperenhancement to suggest myocardial scar/inflammation/infiltration  10.  Normal pericardial thickness. No pericardial effusion  I am disappointed that the pressures are not lower and cardiac index has not increased.  The patient is not an ideal candidate for parenteral prostanoids.  We would consider utilizing actual immuno suppressive drugs such as tacrolimus or rapamycin.          1.Echocardiogram at [facility]  2. Pulmonary rehabilitation  3. Gael Flaherty M.D.  4. Jan Uribe M.D. (psychiatry)

## 2019-01-16 NOTE — PROGRESS NOTES
Patient returned to the unit after having a RHC. Right internal jugular site is dry and intact. Will be seeing Dr. Franco this afternoon. Eating and taking po fluids .

## 2019-01-16 NOTE — DISCHARGE INSTRUCTIONS
[facility]                        Interventional Cardiology  Discharge Instructions   Post Right Heart Cath      AFTER YOU GO HOME:    DO drink plenty of fluids    DO resume your regular diet and medications unless otherwise instructed by your Primary Physician    Do Not scrub the procedure site vigorously    No lotion or powder to the puncture site for 3 days    CALL YOUR PRIMARY PHYSICIAN IF: You may resume all normal activity.  Monitor neck site for bleeding, swelling, or voice changes. If you notice bleeding or swelling immediately apply pressure to the site and call number below to speak with Cardiology Fellow.  If you experience any changes in your breathing you should call your doctor immediately or come to the closest Emergency Department.  Do not drive yourself.    ADDITIONAL INSTRUCTIONS: Medications: You are to resume all home medications including anticoagulation therapy unless otherwise advised by your primary cardiologist or nurse coordinator.    Follow Up: Per your primary cardiology team    If you have any questions or concerns regarding your procedure site please call [phone] at anytime and ask for Cardiology Fellow on call.  They are available 24 hours a day.  You may also contact the Cardiology Clinic after hours number at [phone].                                                       Telephone Numbers [phone] Monday-Friday 8:00 am to 4:30 pm    [phone] After 4:30 pm Monday-Friday, Weekends & Holidays  Ask for Interventional Cardiologist on call. Someone is on call 24 hours/day   [facility] toll free number [phone] Monday-Friday 8:00 am to 4:30 pm   [facility] Emergency Dept [phone]

## 2019-01-16 NOTE — IP AVS SNAPSHOT
[facility],  Heart Cath Lab  [address] [address]  Phone:  [phone]                                    After Visit Summary   1/16/2019    Judith Nelson    MRN: [id number]           After Visit Summary Signature Page    I have received my discharge instructions, and my questions have been answered. I have discussed any challenges I see with this plan with the nurse or doctor.    ..........................................................................................................................................  Patient/Patient Representative Signature      ..........................................................................................................................................  Patient Representative Print Name and Relationship to Patient    ..................................................               ................................................  Date                                   Time    ..........................................................................................................................................  Reviewed by Signature/Title    ...................................................              ..............................................  Date                                               Time          22EPIC Rev 08/18

## 2019-01-16 NOTE — PROGRESS NOTES
[facility] Cardiac Catheterization Lab  Date: 01/16/2019    History of Present Illness:  Ms. Judith Nelson is a 58 year old female with PMH significant for pulmonary HTN, sarcoidosis, HLD, iron deficiency anemia, depression, UJLEE, and CKD stage IV. She presents today for RHC as she recently changed medications 6 months prior. The procedural details of the right heart catheterization were discussed in detail with the patient.  Risks and benefits were discussed; discussion included possible allergy to contrast dye in addition to:    Benefit: greater hemodynamic understanding of the patient's right heart/pulmonary arteries.  Risks:  - Radiation exposure (X-ray)   (100.0% of patients)  - Bleeding (femoral, retroperitoneal)  (up to 7.0% of patients)  - Transient/Permanent arrhythmia  (up to ~3.0% of patients)  - Dissection (coronaries, great vessels)  (1-3.0% of patients)  - Infection     (<1.0% of patients)  - Emergent open heart surgery  (<0.1% of patients)  - CVA (ischemic)    (~0.1% of patients)  - MI      (~0.1% of patients)  - Death     (<0.1% of patients)    Physical Exam:  Gen: anxious.  Alert and oriented x4, in no acute distress  CV: regular rate/rhythm  Pulm: CTAB  Neuro: equal  strength    Assessment: 58 year old female with severe pulmonary HTN. Patient expressed understanding and agrees to move forward with the procedure at this point in time.  All questions were answered.    Plan: proceed with RHC.      Reina Garnica PA-C  [facility]

## 2019-01-16 NOTE — PATIENT INSTRUCTIONS
Medication Changes:  -No medication changes at this time. Please continue current medication regiment.    Patient Instructions:  1. Continue staying active and eat a heart healthy diet.    2. Please keep current list of medications with you at all times.    3. Remember to weigh yourself daily after voiding and before you consume any food or beverages and log the numbers.  If you have gained/lost 2 pounds overnight or 5 pounds in a week contact us immediately for medication adjustments or further instructions.    4. **Please call us immediately if you have any syncope (fainting or passing out), chest pain, edema (swelling or weight gain), or decline in your functional status (general decline in how you are feeling).    Follow up Appointment Information:  -follow up with NP in 3 months w/labs prior  -Echo in next 1-2 weeks in [place]    Results:  Component      Latest Ref Rng & Units 1/16/2019   Sodium      133 - 144 mmol/L 141   Potassium      3.4 - 5.3 mmol/L 4.1   Chloride      94 - 109 mmol/L 109   Carbon Dioxide      20 - 32 mmol/L 23   Anion Gap      3 - 14 mmol/L 9   Glucose      70 - 99 mg/dL 88   Urea Nitrogen      7 - 30 mg/dL 20   Creatinine      0.52 - 1.04 mg/dL 1.34 (H)   GFR Estimate      >60 mL/min/1.73:m2 44 (L)   GFR Estimate If Black      >60 mL/min/1.73:m2 51 (L)   Calcium      8.5 - 10.1 mg/dL 9.2   WBC      4.0 - 11.0 10e9/L 3.7 (L)   RBC Count      3.8 - 5.2 10e12/L 4.62   Hemoglobin      11.7 - 15.7 g/dL 14.9   Hematocrit      35.0 - 47.0 % 45.0   MCV      78 - 100 fl 97   MCH      26.5 - 33.0 pg 32.3   MCHC      31.5 - 36.5 g/dL 33.1   RDW      10.0 - 15.0 % 13.3   Platelet Count      150 - 450 10e9/L 171     We are located on the third floor of the Clinic and [facility] Center ([facility]) on the [facility].  Our address is     [address] [place] [address] on [address] [place] [address]    Thank you for allowing us to be a part of your care here at the [facility]  [facility] [place]    If you have questions or concerns please contact us at:    Keyanna Mccray, RN, BSN, PHN  Reji Escobar (Schedule,Prior Auth)  Nurse Coordinator     Clinic   Pulmonary Hypertension   Pulmonary Hypertension  [facility] [place]  (P)[phone]    (P) [phone]        (F) [phone]    ** Please note that you will NOT receive a reminder call regarding your scheduled testing, reminder calls are for provider appointments only.  If you are scheduled for testing within the [employer] system you may receive a call regarding pre-registration for billing purposes only.**     Remember to weigh yourself daily after voiding and before you consume any food or beverages and log the numbers.  If you have gained/lost 2 pounds overnight or 5 pounds in a week contact us immediately for medication adjustments or further instructions.   **Please call us immediately if you have any syncope, chest pain, edema, or decline in your functional status.    Support Group:  Pulmonary Hypertension Association  Https://www.phassociation.org/  **Look at the Events Tab** They even have Support Groups that you can call into    [facility] PH Support Group  Second Saturday of the Month from 1-3 PM   Location: [address] [place] [address]  Leader: Addie Sanchez and Mendy Galo  Phone: [phone] or [phone]  Email: [email or web address]

## 2019-01-16 NOTE — NURSING NOTE
Chief Complaint   Patient presents with     Follow Up     Return for PH F/U after RHC & PFT's      Vitals were taken and medications were reconciled.   Bria Camarillo MA    1:54 PM

## 2019-01-16 NOTE — LETTER
1/16/2019      RE: Judith Nelson  [address] [facility] [address]       Dear Colleague,    Thank you for the opportunity to participate in the care of your patient, Judith Nelson, at the [facility] at [facility]. Please see a copy of my visit note below.      Anupama Babcock MD.    [place] [address]      Dear Dr. Babcock:       I hope this finds you well.  [place] [address]  I saw Judith today and thin we should hold our course at the present time.  I would ask her to return to see me in three months, sooner if not doing well.     IMPRESSION:   1.  Pulmonary hypertension.   2.  History of profound depression.   3.  History of prolactin excess with galactorrhea (remote).  4. Sacroidosis  5. Iron deficiency anemia  6. GERD  7. Hyperlipidemia  8. Gallstones  9. Recent steroid injection hip    The patient returns for follow-up of pulmonary hypertension.  There is no interim history of chest pain, tightness, paroxysmal nocturnal dyspnea, orthopnea, peripheral edema, palpitation, pre-syncope, syncope, device discharge.  Exercise tolerance is stable.  The patient is attempting to exercise regularly and following a sodium restricted, calorically appropriate diet.  Medications are reviewed and the patient is taking medications as prescribed.  The patient is generally not sleeping well secondary to pain.    Constitutional: alert, oriented, normal gait and station, normal mentation.  Oral: moist mucous membrans  Lymph: without pathologic adenopathy  Chest: clear to ausculation and percussion  Cor: No evidence of left or right ventricular activity.  Rhythm is regular.  S1 normal, S2 split physiologically. Murmurs are not present  Abdomen: without tenderness, rebound, guarding, masses, ascites  Extremities: Edema not present  Neuro: no focal defects, normal  mentation  Skin: without open lesions  Psych: oriented, verbal, mental status in tact      Name: ANALI GÓMEZ  MRN: [id number]  : 1960  Study Date: 2018 09:47 AM  Age: 58 yrs  Gender: Female  [place] [facility] [place] Study: Pulmonary Hypertension  Ordering Physician: LE SWAN  Referring Physician: MINE CASTRO  Performed By: Steve Patel     BSA: 1.5 m2  Height: 63 in  Weight: 112 lb  BP: 97/57 mmHg  _____________________________________________________________________________  __        Procedure  Echocardiogram with two-dimensional, color and spectral Doppler performed.  _____________________________________________________________________________  __        Interpretation Summary  The RV is moderately dilated. There is mild right ventricular hypertrophy.  Global right ventricular function is either normal or mildly reduced.  Global and regional left ventricular function is normal with an EF of 60-65%.  Severe pulmonary hypertension. Right ventricular systolic pressure is 94 mmHg  above the right atrial pressure.  The inferior vena cava is normal.  _____________________________________________________________________________  __        Left Ventricle  Global and regional left ventricular function is normal with an EF of 60-65%.  Left ventricular size is normal. Left ventricular wall thickness is normal.  Paradoxical septal motion consistent with right ventricular pressure overload  is present.     Right Ventricle  The RV is moderately dilated. There is mild right ventricular hypertrophy.  Global right ventricular function is mildly reduced.     Atria  Severe biatrial enlargement is present.     Mitral Valve  The mitral valve is normal. Trace mitral insufficiency is present.        Aortic Valve  Aortic valve is normal in structure and function.     Tricuspid Valve  The tricuspid valve is normal. Moderate tricuspid insufficiency is present.  Severe pulmonary  hypertension is present. Right ventricular systolic pressure  is 94mmHg above the right atrial pressure.     Pulmonic Valve  The pulmonic valve is normal.     Vessels  The aorta root is normal. The inferior vena cava is normal. Estimated mean  right atrial pressure is 3 mmHg (normal).     Pericardium  Trivial pericardial effusion is present.        Compared to Previous Study  This study was compared with the study from 3/21/2018 . There has been no  change.  _____________________________________________________________________________  __  MMode/2D Measurements & Calculations     IVSd: 1.1 cm  LVIDd: 3.9 cm  LVIDs: 1.6 cm  LVPWd: 1.1 cm  FS: 58.0 %  LV mass(C)d: 141.2 grams  LV mass(C)dI: 93.4 grams/m2  LA dimension: 3.7 cm  asc Aorta Diam: 2.5 cm  LVOT diam: 1.8 cm  LVOT area: 2.6 cm2  LA Volume (BP): 68.4 ml  RWT: 0.59  TAPSE: 1.9 cm        Doppler Measurements & Calculations  PA acc time: 0.08 sec               1/19/2019  1. HR 67 bpm  2. /62/80 mmHg  3. RA 6/8/5   4. /10  5. /35/60   6. PCW 13   7. PA sat 62%   8. PCW sat not obtained  9. Hgb 13.4 g/dL   10. Estimated Dipti CO 2.3   11. Estimated Dipti CI 1.5   12. TD CO 3.5   13. TD CI 2.2  14. PVR 13.4       Results for DALIA ANALI MARY (MRN [id number]) as of 1/16/2019 14:08   Ref. Range 11/24/2018 01:25 12/5/2018 10:57 12/5/2018 14:55 1/16/2019 11:00 1/16/2019 12:55   Sodium Latest Ref Range: 133 - 144 mmol/L   139 141    Potassium Latest Ref Range: 3.4 - 5.3 mmol/L   4.4 4.1    Chloride Latest Ref Range: 94 - 109 mmol/L   105 109    Carbon Dioxide Latest Ref Range: 20 - 32 mmol/L   26 23    Urea Nitrogen Latest Ref Range: 7 - 30 mg/dL   18 20    Creatinine Latest Ref Range: 0.52 - 1.04 mg/dL   1.57 (H) 1.34 (H)    GFR Estimate Latest Ref Range: >60 mL/min/1.73_m2   34 (L) 44 (L)    GFR Estimate If Black Latest Ref Range: >60 mL/min/1.73_m2   41 (L) 51 (L)    Calcium Latest Ref Range: 8.5 - 10.1 mg/dL   9.1 9.2    Anion Gap Latest Ref Range:  3 - 14 mmol/L   8 9    Albumin Latest Ref Range: 3.4 - 5.0 g/dL   3.9     Protein Total Latest Ref Range: 6.8 - 8.8 g/dL   7.3     Bilirubin Total Latest Ref Range: 0.2 - 1.3 mg/dL   0.4     Alkaline Phosphatase Latest Ref Range: 40 - 150 U/L   62     ALT Latest Ref Range: 0 - 50 U/L   22     AST Latest Ref Range: 0 - 45 U/L   19     Bilirubin Direct Latest Ref Range: 0.0 - 0.2 mg/dL   0.1     Glucose Latest Ref Range: 70 - 99 mg/dL   88 88    WBC Latest Ref Range: 4.0 - 11.0 10e9/L   3.4 (L) 3.7 (L)    Hemoglobin Latest Ref Range: 11.7 - 15.7 g/dL   15.0 14.9    Hematocrit Latest Ref Range: 35.0 - 47.0 %   44.9 45.0    Platelet Count Latest Ref Range: 150 - 450 10e9/L   188 171    RBC Count Latest Ref Range: 3.8 - 5.2 10e12/L   4.68 4.62    MCV Latest Ref Range: 78 - 100 fl   96 97    MCH Latest Ref Range: 26.5 - 33.0 pg   32.1 32.3    MCHC Latest Ref Range: 31.5 - 36.5 g/dL   33.4 33.1    RDW Latest Ref Range: 10.0 - 15.0 %   12.6 13.3    Diff Method Unknown   Automated Method     % Neutrophils Latest Units: %   45.5     % Lymphocytes Latest Units: %   42.3     % Monocytes Latest Units: %   9.6     % Eosinophils Latest Units: %   1.7     % Basophils Latest Units: %   0.9     % Immature Granulocytes Latest Units: %   0.0     Nucleated RBCs Latest Ref Range: 0 /100   0     Absolute Neutrophil Latest Ref Range: 1.6 - 8.3 10e9/L   1.6     Absolute Lymphocytes Latest Ref Range: 0.8 - 5.3 10e9/L   1.5     Absolute Monocytes Latest Ref Range: 0.0 - 1.3 10e9/L   0.3     Absolute Eosinophils Latest Ref Range: 0.0 - 0.7 10e9/L   0.1     Absolute Basophils Latest Ref Range: 0.0 - 0.2 10e9/L   0.0     Abs Immature Granulocytes Latest Ref Range: 0 - 0.4 10e9/L   0.0     Absolute Nucleated RBC Unknown   0.0     XR LUMBAR SPINE 2-3 VIEWS Unknown Rpt       FVC-Pred Latest Units: L  2.87      FVC-Pre Latest Units: L  2.67      FVC-%Pred-Pre Latest Units: %  93      FEV1-Pre Latest Units: L  1.80      FEV1-%Pred-Pre Latest Units: %   77      FEV1FVC-Pred Latest Units: %  79      FEV1FVC-Pre Latest Units: %  67      FEV1SVC-Pred Latest Units: %  72      FEV1SVC-Pre Latest Units: %  74      FEV1FEV6-Pred Latest Units: %  81      FEV1FEV6-Pre Latest Units: %  67      FEFMax-Pred Latest Units: L/sec  6.26      FEFMax-Pre Latest Units: L/sec  3.97      FEFMax-%Pred-Pre Latest Units: %  63      FIFMax-Pre Latest Units: L/sec  4.36      ExpTime-Pre Latest Units: sec  7.21      ERV-Pred Latest Units: L  1.03      ERV-Pre Latest Units: L  1.09      ERV-%Pred-Pre Latest Units: %  105      IC-Pred Latest Units: L  2.16      IC-Pre Latest Units: L  1.34      IC-%Pred-Pre Latest Units: %  62      VC-Pred Latest Units: L  3.19      VC-Pre Latest Units: L  2.43      VC-%Pred-Pre Latest Units: %  76      DLCOunc-Pred Latest Units: ml/min/mmHg  21.47      DLCOunc-Pre Latest Units: ml/min/mmHg  13.91      DLCOunc-%Pred-Pre Latest Units: %  64      VA-Pre Latest Units: L  3.93      VA-%Pred-Pre Latest Units: %  80      CV CARDIAC CATHERIZATION Unknown     Rpt   PFT GENERAL LAB TESTING Unknown  Rpt      WSV6587-%Pred-Pre Latest Units: %  45      FEF2575-Pre Latest Units: L/sec  1.00      FEF2575-Pred Latest Units: L/sec  2.20          Examination:   MRI CARDIAC W/CONTRAST     Date:  9/26/2013 3:24 PM     Indication:  Sarcoidosis, Eval for cardiac sarcoid     Comparison: None     IMPRESSION  IMPRESSION:  1.  Normal left ventricular size and systolic function with a  visually estimated ejection fraction of  65 %.  2.  Normal right ventricular size and systolic function with a  visually estimated ejection fraction of 55%.  3.  On delayed enhancement imaging, there is no abnormal  hyperenhancement to suggest myocardial scar/inflammation/infiltration     Thank you for referring your patient for a cardiac MRI evaluation.     FINDINGS     CARDIAC MORPHOLOGY / FUNCTION:     1.  Normal left ventricular size and systolic function with a  visually estimated ejection fraction  of  65 %.  2.  No regional wall motion abnormalities are noted. Normal left  ventricular mass.  3.  Normal right ventricular size and systolic function with a  visually estimated ejection fraction of 55%.  4.  Normal mitral, tricuspid, and aortic valvular function. Normal  sized aortic root.  5.  Mild ectasia of the main pulmonary artery. It measures 3.5 cm in  diameter.  6.  Normal left atrial size, Normal  right atrial size.  7.  There is no evidence of myocardial edema on T2-weighted imaging.  8.  Rest first pass perfusion images do not reveal any segments of  hypoperfusion.  9.  On delayed enhancement imaging, there is no abnormal  hyperenhancement to suggest myocardial scar/inflammation/infiltration  10.  Normal pericardial thickness. No pericardial effusion  I am disappointed that the pressures are not lower and cardiac index has not increased.  The patient is not an ideal candidate for parenteral prostanoids.  We would consider utilizing actual immuno suppressive drugs such as tacrolimus or rapamycin.      1.Echocardiogram at [facility]  2. Pulmonary rehabilitation  3. Gael Flaherty M.D.  4. Jan Uribe M.D. (psychiatry)      Norm Franco MD

## 2019-01-24 ENCOUNTER — APPOINTMENT (OUTPATIENT)
Dept: CT IMAGING | Facility: CLINIC | Age: 59
End: 2019-01-24
Payer: MEDICARE

## 2019-01-24 ENCOUNTER — HOSPITAL ENCOUNTER (OUTPATIENT)
Facility: CLINIC | Age: 59
Setting detail: OBSERVATION
Discharge: HOME | End: 2019-01-25
Attending: FAMILY MEDICINE | Admitting: INTERNAL MEDICINE
Payer: MEDICARE

## 2019-01-24 ENCOUNTER — APPOINTMENT (OUTPATIENT)
Dept: GENERAL RADIOLOGY | Facility: CLINIC | Age: 59
End: 2019-01-24
Payer: MEDICARE

## 2019-01-24 DIAGNOSIS — R94.31 ABNORMAL ELECTROCARDIOGRAM: ICD-10-CM

## 2019-01-24 DIAGNOSIS — I51.81 STRESS-INDUCED CARDIOMYOPATHY: ICD-10-CM

## 2019-01-24 DIAGNOSIS — R09.02 HYPOXIA: ICD-10-CM

## 2019-01-24 DIAGNOSIS — F41.9 ANXIETY: ICD-10-CM

## 2019-01-24 DIAGNOSIS — F32.3 MAJOR DEPRESSIVE DISORDER, SINGLE EPISODE, SEVERE WITH PSYCHOTIC FEATURES (H): ICD-10-CM

## 2019-01-24 DIAGNOSIS — R06.02 SHORTNESS OF BREATH: ICD-10-CM

## 2019-01-24 DIAGNOSIS — I27.21 PULMONARY ARTERY HYPERTENSION (H): ICD-10-CM

## 2019-01-24 DIAGNOSIS — I27.20 PULMONARY HYPERTENSION (H): ICD-10-CM

## 2019-01-24 DIAGNOSIS — R07.9 CHEST PAIN, UNSPECIFIED TYPE: ICD-10-CM

## 2019-01-24 LAB
ALBUMIN SERPL-MCNC: 4.1 G/DL (ref 3.4–5)
ALP SERPL-CCNC: 57 U/L (ref 40–150)
ALT SERPL W P-5'-P-CCNC: 18 U/L (ref 0–50)
ANION GAP SERPL CALCULATED.3IONS-SCNC: 9 MMOL/L (ref 3–14)
AST SERPL W P-5'-P-CCNC: 18 U/L (ref 0–45)
BASOPHILS # BLD AUTO: 0 10E9/L (ref 0–0.2)
BASOPHILS NFR BLD AUTO: 0.6 %
BILIRUB SERPL-MCNC: 0.6 MG/DL (ref 0.2–1.3)
BUN SERPL-MCNC: 21 MG/DL (ref 7–30)
CALCIUM SERPL-MCNC: 8.6 MG/DL (ref 8.5–10.1)
CHLORIDE SERPL-SCNC: 113 MMOL/L (ref 94–109)
CO2 SERPL-SCNC: 21 MMOL/L (ref 20–32)
CREAT SERPL-MCNC: 1.4 MG/DL (ref 0.52–1.04)
D DIMER PPP FEU-MCNC: 0.4 UG/ML FEU (ref 0–0.5)
DIFFERENTIAL METHOD BLD: NORMAL
EOSINOPHIL # BLD AUTO: 0 10E9/L (ref 0–0.7)
EOSINOPHIL NFR BLD AUTO: 0.6 %
ERYTHROCYTE [DISTWIDTH] IN BLOOD BY AUTOMATED COUNT: 13.2 % (ref 10–15)
GFR SERPL CREATININE-BSD FRML MDRD: 41 ML/MIN/{1.73_M2}
GLUCOSE SERPL-MCNC: 89 MG/DL (ref 70–99)
HCT VFR BLD AUTO: 45.2 % (ref 35–47)
HGB BLD-MCNC: 15.5 G/DL (ref 11.7–15.7)
IMM GRANULOCYTES # BLD: 0 10E9/L (ref 0–0.4)
IMM GRANULOCYTES NFR BLD: 0.4 %
LYMPHOCYTES # BLD AUTO: 1.3 10E9/L (ref 0.8–5.3)
LYMPHOCYTES NFR BLD AUTO: 18.6 %
MCH RBC QN AUTO: 32.6 PG (ref 26.5–33)
MCHC RBC AUTO-ENTMCNC: 34.3 G/DL (ref 31.5–36.5)
MCV RBC AUTO: 95 FL (ref 78–100)
MONOCYTES # BLD AUTO: 0.6 10E9/L (ref 0–1.3)
MONOCYTES NFR BLD AUTO: 8.6 %
NEUTROPHILS # BLD AUTO: 4.9 10E9/L (ref 1.6–8.3)
NEUTROPHILS NFR BLD AUTO: 71.2 %
NRBC # BLD AUTO: 0 10*3/UL
NRBC BLD AUTO-RTO: 0 /100
NT-PROBNP SERPL-MCNC: 472 PG/ML (ref 0–900)
PLATELET # BLD AUTO: 183 10E9/L (ref 150–450)
POTASSIUM SERPL-SCNC: 3.9 MMOL/L (ref 3.4–5.3)
PROT SERPL-MCNC: 7 G/DL (ref 6.8–8.8)
RBC # BLD AUTO: 4.76 10E12/L (ref 3.8–5.2)
SODIUM SERPL-SCNC: 143 MMOL/L (ref 133–144)
TROPONIN I SERPL-MCNC: <0.015 UG/L (ref 0–0.04)
WBC # BLD AUTO: 6.9 10E9/L (ref 4–11)

## 2019-01-24 PROCEDURE — 71260 CT THORAX DX C+: CPT

## 2019-01-24 PROCEDURE — 80053 COMPREHEN METABOLIC PANEL: CPT | Performed by: FAMILY MEDICINE

## 2019-01-24 PROCEDURE — 93005 ELECTROCARDIOGRAM TRACING: CPT | Performed by: FAMILY MEDICINE

## 2019-01-24 PROCEDURE — 84484 ASSAY OF TROPONIN QUANT: CPT | Performed by: FAMILY MEDICINE

## 2019-01-24 PROCEDURE — 96361 HYDRATE IV INFUSION ADD-ON: CPT | Performed by: FAMILY MEDICINE

## 2019-01-24 PROCEDURE — 25000128 H RX IP 250 OP 636: Performed by: FAMILY MEDICINE

## 2019-01-24 PROCEDURE — 71046 X-RAY EXAM CHEST 2 VIEWS: CPT

## 2019-01-24 PROCEDURE — 93010 ELECTROCARDIOGRAM REPORT: CPT | Mod: Z6 | Performed by: FAMILY MEDICINE

## 2019-01-24 PROCEDURE — 85379 FIBRIN DEGRADATION QUANT: CPT | Performed by: FAMILY MEDICINE

## 2019-01-24 PROCEDURE — 25000125 ZZHC RX 250: Performed by: FAMILY MEDICINE

## 2019-01-24 PROCEDURE — G0378 HOSPITAL OBSERVATION PER HR: HCPCS

## 2019-01-24 PROCEDURE — 96374 THER/PROPH/DIAG INJ IV PUSH: CPT | Mod: 59 | Performed by: FAMILY MEDICINE

## 2019-01-24 PROCEDURE — 83880 ASSAY OF NATRIURETIC PEPTIDE: CPT | Performed by: FAMILY MEDICINE

## 2019-01-24 PROCEDURE — 85025 COMPLETE CBC W/AUTO DIFF WBC: CPT | Performed by: FAMILY MEDICINE

## 2019-01-24 PROCEDURE — 99285 EMERGENCY DEPT VISIT HI MDM: CPT | Mod: 25 | Performed by: FAMILY MEDICINE

## 2019-01-24 RX ORDER — IOPAMIDOL 755 MG/ML
59 INJECTION, SOLUTION INTRAVASCULAR ONCE
Status: COMPLETED | OUTPATIENT
Start: 2019-01-24 | End: 2019-01-24

## 2019-01-24 RX ORDER — LORAZEPAM 2 MG/ML
1 INJECTION INTRAMUSCULAR ONCE
Status: COMPLETED | OUTPATIENT
Start: 2019-01-24 | End: 2019-01-24

## 2019-01-24 RX ADMIN — LORAZEPAM 1 MG: 2 INJECTION, SOLUTION INTRAMUSCULAR; INTRAVENOUS at 17:42

## 2019-01-24 RX ADMIN — SODIUM CHLORIDE, POTASSIUM CHLORIDE, SODIUM LACTATE AND CALCIUM CHLORIDE 1000 ML: 600; 310; 30; 20 INJECTION, SOLUTION INTRAVENOUS at 17:42

## 2019-01-24 RX ADMIN — IOPAMIDOL 59 ML: 755 INJECTION, SOLUTION INTRAVENOUS at 20:52

## 2019-01-24 RX ADMIN — SODIUM CHLORIDE 92 ML: 9 INJECTION, SOLUTION INTRAVENOUS at 20:52

## 2019-01-24 ASSESSMENT — MIFFLIN-ST. JEOR
SCORE: 1095.13
SCORE: 1075.77

## 2019-01-24 NOTE — ED PROVIDER NOTES
History     Chief Complaint   Patient presents with     Chest Pain     started an the past 45min     Shortness of Breath     HPI  Judith Nelson is a 58 year old female, past medical history significant for pulmonary artery hypertension, stage IV kidney disease, chronic steroid use, iron deficiency anemia, mixed hyperlipidemia, GERD, stress-induced cardiomyopathy, sarcoidosis, osteoporosis, duodenitis, depression, generalized anxiety disorder presents to the emergency department via EMS with concerns of chest pain and shortness of breath of approximately 45 minutes duration.  History is obtained from the patient who states that she was at rest and got up to go to the bathroom approximately 2 hours prior to presentation when she developed central sharp stabbing intermittent chest pain of around a 7/10 severity.  She also noted shortness of breath.  Perhaps some mild lightheadedness and heightened anxiety definitely.  Patient notes that she missed taking her Ativan this morning for anxiety.  She has been seen recently by her cardiologist for PAH evaluation on 1/16/19, heart cath at that time is also reviewed at the time of her presentation.  She was advised that should she experience any chest pain she should come immediately to the emergency department and not wait.  No medications were taken prior to arrival today.  Her chest pain is still present although much less intense she rates it around a 1-2/10 sharp and intermittent lasting seconds to minutes at a time.  Absent at the time of our interview.  She does not currently feel short of breath.  She states the plan for the upcoming week is to have a cardiac echo.  I reviewed echo from 6/18.  I have reviewed cardiology notes from with her primary cardiologist as well as the right heart catheterization cardiologist.  She additionally notes some diarrhea over the past 1-2 weeks which in general has improved having a been attributed to medication side effect in the  past.  She notes some cramping abdominal discomfort but nothing that she wanted to quantify his pain.  No nausea or vomiting.  No recent URI type symptoms.  The patient try to call her daughter when she had chest pain but realize she was at work and so came in by ambulance instead.    Allergies:  Allergies   Allergen Reactions     Dilaudid [Hydromorphone] Nausea and Vomiting     Morphine Nausea and Vomiting     Latex Rash     From gloves       Problem List:    Patient Active Problem List    Diagnosis Date Noted     Gall stones, common bile duct 01/21/2012     Priority: High     Chest pain 01/25/2019     Priority: Medium     Chronic hypoxemic respiratory failure (H) 01/25/2019     Priority: Medium     PAH (pulmonary arterial hypertension) with portal hypertension (H) 06/15/2018     Priority: Medium     Personal history of drug therapy 03/06/2017     Priority: Medium     Chronic kidney disease, stage IV (severe) (H) 02/18/2015     Priority: Medium     Chronic steroid use 10/23/2014     Priority: Medium     Iron deficiency anemia 08/14/2014     Priority: Medium     updating diagnosis code for icd10 [facility]       Health Care Home (HEALTHCARE) 06/12/2014     Priority: Medium     Status: Closed 6/12/14  Care Coordinator:  Antoinette Hoff           Mixed hyperlipidemia 04/29/2014     Priority: Medium     GERD (gastroesophageal reflux disease) 04/29/2014     Priority: Medium     Stress-induced cardiomyopathy 04/22/2014     Priority: Medium     CKD (chronic kidney disease) stage 4, GFR 15-29 ml/min (H) 11/17/2013     Priority: Medium     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 Last visit 9/2014 - consult sent for scanning .  cgb 1/9/2015            Sarcoidosis 11/17/2013     Priority: Medium     Skin ulcer of ankle (H) 10/18/2013     Priority: Medium     Senile osteoporosis 09/24/2013     Priority: Medium     Osteoporosis, postmenopausal 09/20/2013      Priority: Medium     Encounter for long-term current use of medication 09/16/2013     Priority: Medium     Problem list name updated by automated process. Provider to review       Personal history of other drug therapy 09/13/2013     Priority: Medium     Lower extremity edema 12/05/2011     Priority: Medium     Since hip surgery 11/10       Acute renal failure (ARF) (H) 12/02/2011     Priority: Medium     Cr 1.8-2.8; granulomatous interstitial nephritis: Etiology unclear, though suspicious for sarcoidosis. Also considered AIN; subsequently dc'd PPI. Urine protein 0.27 on 3/1/13. Will continue tapering steroids as pt is having undesirable side effects, and unfortunately, they do not appear to improving renal function much - still trending 1.8-2.8.. Will have pt decrease dose by 5mg q2 wks until dc'd. Follows with Nephrology, Dr Garcia; PCP prophylaxis       CARDIOVASCULAR SCREENING; LDL GOAL LESS THAN 160 10/31/2010     Priority: Medium     Pulmonary hypertension (H) 09/19/2010     Priority: Medium       Echo:             12/4/12                  RVSP 38             5/22/12                  RVSP 121             8/26/10                  RVSP  39                                             LVEF     RHC:   7/29/14 RA 5   RV 74. 5   PA 72/30, 43   PAW 10    (7)   TCO 4.7 (3)   RUCHI 3.9 (2.5)  1/16/13 RA 7, 8, 5   RV 65, 6   PA 62/22, 38   PAW 10, 10, 9    (8.3)   COI 3.5 (2.2)  6/12/12            Basal                     Nitric oxide 20             Nitric oxide 40                   Nitric oxide 80             RA         8,6,5             RV         80,8             PA         80/28,45                     76/17,42                    76/16,40                              71/17,40             PAW     10,7,8             PVR       720(9.0)                                                                                                      660(8.3)             COI        3.6(2.3)                                                                                                        3.7(2.4)    10/14/10                RA      10,6,4               RV       43,8               PA        45/12,27               PAW    12,11,9               COI  3.6(2.3)   10/10 right heart cath pulm htn just at upper limits of normal     6MWT: -  10/22/13 381m/RA/lowest sat 93%  4/30/13  367m/RA/lowest 93%       Duodenitis hemorrhagic 07/01/2010     Priority: Medium     Major depressive disorder, single episode, moderate (H) 12/14/2006     Priority: Medium     2 hospitalizations- [facility] and [facility]; 10/06 had auditory hallucinations - Zyprexa added  Dr Jan Scherer - [facility] in [place]  11/07 readmitted to [facility] - increasing paranoia and anxiety. Geodon added.   2011: Risperdal, Cogentin, and Ativan  Psychologist - Alba Navarro Raiford       Intrapelvic protrusion of acetabulum 12/14/2006     Priority: Medium     12-12-07 [facility]  [phone] x-ray for a diagnostic and therapeutic injection of her right hip. These treatment outlines are total hip arthroplasty.  Problem list name updated by automated process. Provider to review and confirm        Asymptomatic postmenopausal status      Priority: Medium     Since 2004,   Problem list name updated by automated process. Provider to review       Generalized anxiety disorder 08/21/2007     Priority: Low        Past Medical History:    Past Medical History:   Diagnosis Date     Anxiety      CKD (chronic kidney disease), stage III (H)      Hyperprolactinemia (H)      Lower extremity edema      Lumbar compression fracture (H)      Major depressive disorder      Menopause 2012     MGUS (monoclonal gammopathy of unknown significance)      Osteoporosis      Other seborrheic keratosis      Protrusio acetabuli      Pulmonary hypertension (H)      Sarcoidosis      Stress-induced cardiomyopathy        Past Surgical History:    Past Surgical History:    Procedure Laterality Date     C PELVIS/HIP JOINT SURGERY UNLISTED       Csection,  X 2       CV RIGHT HEART CATH N/A 2019    Procedure: 1130 RHC;  Surgeon: Jeanne Angel MD;  Location:  HEART CARDIAC CATH LAB     CYSTOSCOPY, RETROGRADES, INSERT STENT URETER(S), COMBINED  10/2/2013    Procedure: COMBINED CYSTOSCOPY, RETROGRADES, INSERT STENT URETER(S);  Left Retrograde Ureteropyelogram and Ureteral Stent Placement;  Surgeon: SONIA Martinez MD;  Location: [facility] OR     ENDOSCOPIC RETROGRADE CHOLANGIOPANCREATOGRAM  2012    Procedure:ENDOSCOPIC RETROGRADE CHOLANGIOPANCREATOGRAM; ERCP biliary sphincterotomy and stone removal; Surgeon:MARGIE RUGGIERO; Location: OR     LAPAROSCOPIC CHOLECYSTECTOMY WITH CHOLANGIOGRAMS  2012    Procedure:LAPAROSCOPIC CHOLECYSTECTOMY WITH CHOLANGIOGRAMS; Surgeon:BRIANA PADILLA; Location:[facility] OR     ORTHOPEDIC SURGERY  10/1/2010    Right hip replacement     ZZ GASTROSCOPY,FL  6/10    Erythematous duodenopathy       Family History:    Family History   Problem Relation Age of Onset     Cancer Mother          age 62 leukemia     Gastrointestinal Disease Mother         diverticulitis     Hypertension Mother      Allergies Mother      Arthritis Mother      Depression Mother      Eye Disorder Mother      Osteoporosis Mother      Anxiety Disorder Mother      Mental Illness Mother      Asthma Mother      Other Cancer Mother      Migraines Mother      C.A.D. Father         MI/CAD      Cancer Father         skin     Blood Disease Father         renal  problem     Hypertension Father      Anesthesia Reaction Father      Cardiovascular Father      Neurologic Disorder Father         Parkinson's disease     Anxiety Disorder Father      Other Cancer Father      Hyperlipidemia Father      Coronary Artery Disease Father      C.A.D. Maternal Grandmother          late 82s MI     Diabetes Maternal Grandmother      Osteoporosis Maternal Grandmother      C.A.D.  "Maternal Grandfather          mid 80s MI     Alzheimer Disease Paternal Grandmother          80s     Alzheimer Disease Paternal Grandfather          80s     Neurologic Disorder Sister         migraines     Allergies Sister      Diabetes Other         AODM     Neurologic Disorder Sister         migraines     Allergies Sister      Anesthesia Reaction Son      Anesthesia Reaction Daughter      Anesthesia Reaction Daughter      Diabetes Sister         hypoglycemia     Anxiety Disorder Son      Anxiety Disorder Sister      Substance Abuse Sister      Asthma Sister      Asthma Daughter      Depression Son      Hypertension Sister      Hyperlipidemia Sister      Migraines Sister        Social History:  Marital Status:   [4]  Social History     Tobacco Use     Smoking status: Never Smoker     Smokeless tobacco: Never Used   Substance Use Topics     Alcohol use: No     Alcohol/week: 0.0 oz     Comment: Occ.     Drug use: No        Medications:      acetaminophen (TYLENOL) 500 MG tablet   azaTHIOprine (IMURAN) 50 MG tablet   buPROPion (WELLBUTRIN SR) 200 MG 12 hr tablet   BUSPIRONE HCL PO   calcitRIOL (ROCALTROL) 0.25 MCG capsule   carvedilol (COREG) 12.5 MG tablet   Cholecalciferol (VITAMIN D PO)   furosemide (LASIX) 40 MG tablet   levalbuterol (XOPENEX HFA) 45 MCG/ACT inhaler   LORazepam (ATIVAN) 0.5 MG tablet   macitentan (OPSUMIT) 10 MG tablet   potassium chloride (K-TAB,KLOR-CON) 10 MEQ tablet   ranitidine (RANITIDINE) 75 MG tablet   selexipag (UPTRAVI) 1600 MCG tablet   simvastatin (ZOCOR) 10 MG tablet   SODIUM BICARBONATE PO   tadalafil, PAH, (ADCIRCA) 20 MG TABS   TEMAZEPAM PO   denosumab (PROLIA) 60 MG/ML SOLN   ORDER FOR DME         Review of Systems   All other systems reviewed and are negative.      Physical Exam   BP: 114/71  Pulse: 77  Heart Rate: 83  Temp: 97.8  F (36.6  C)  Resp: 18  Height: 160 cm (5' 3\")  Weight: 52.2 kg (115 lb)  SpO2: 92 %      Physical Exam   Constitutional: She is " oriented to person, place, and time. She appears well-developed and well-nourished.   Anxious.   HENT:   Head: Normocephalic and atraumatic.   Right Ear: External ear normal.   Left Ear: External ear normal.   Nose: Nose normal.   Mouth/Throat: Oropharynx is clear and moist.   Eyes: EOM are normal. Pupils are equal, round, and reactive to light.   Neck: Normal range of motion. Neck supple.   Cardiovascular: Normal rate, regular rhythm, normal heart sounds and intact distal pulses.   Pulmonary/Chest: Effort normal and breath sounds normal.   Abdominal: Soft. Bowel sounds are normal.   Musculoskeletal: Normal range of motion.   Neurological: She is alert and oriented to person, place, and time.   Skin: Skin is warm and dry. Capillary refill takes less than 2 seconds.   Psychiatric: She has a normal mood and affect. Her behavior is normal.   Nursing note and vitals reviewed.      ED Course        Procedures               EKG Interpretation:      Interpreted by Easton Conner  Time reviewed: Time obtained is 1656 time interpreted at 1701.  Comparison is the cardiogram dated 4/30/18.  Sinus rhythm 75 bpm right axis deviation flipped T's in V1 through V3 which are present at least with respect to V1 and V2 on cardiogram referenced.  I reviewed multiple previous EKGs and some do have flipped T waves in V3 as well.    Results for orders placed or performed during the hospital encounter of 01/24/19   XR Chest 2 Views    Narrative    CHEST TWO VIEWS  January 24, 2019 7:09 PM     HISTORY: Chest pain, shortness of air.    COMPARISON: 2/6/2018.      Impression    IMPRESSION: No acute abnormality. Lungs are well-inflated and clear.  Heart size is normal.    MARIAMA THAYER MD   CT Chest Pulmonary Embolism w Contrast    Narrative    CT CHEST PULMONARY EMBOLISM WITH CONTRAST   1/24/2019 9:02 PM     HISTORY: Shortness of breath. Pain.    TECHNIQUE: 59 mL Isovue-370. Radiation dose for this scan was reduced  using automated  exposure control, adjustment of the mA and/or kV  according to patient size, or iterative reconstruction technique.    COMPARISON: 5/22/2015, 4/21/2014    FINDINGS: There is no evidence of pulmonary embolism or acute thoracic  aortic abnormality. There is minimal coronary atherosclerosis. No  pneumothorax. No pleural or pericardial effusion. The main pulmonary  artery is enlarged, suggestive of pulmonary arterial hypertension.    No thoracic or axillary adenopathy. No pulmonary nodule or mass. No  airspace consolidation. Images through the upper abdomen demonstrate  left hydronephrosis, present on prior studies.      Impression    IMPRESSION:  1. No evidence of pulmonary embolism.  2. Enlarged main pulmonary arteries suggestive of pulmonary arterial  hypertension.  3. Chronic left hydronephrosis.    ELICEO ENGLISH MD     *Note: Due to a large number of results and/or encounters for the requested time period, some results have not been displayed. A complete set of results can be found in Results Review.     Labs Ordered and Resulted from Time of ED Arrival Up to the Time of Departure from the ED   COMPREHENSIVE METABOLIC PANEL - Abnormal; Notable for the following components:       Result Value    Chloride 113 (*)     Creatinine 1.40 (*)     GFR Estimate 41 (*)     GFR Estimate If Black 48 (*)     All other components within normal limits   CBC WITH PLATELETS DIFFERENTIAL   TROPONIN I   NT PROBNP INPATIENT   D DIMER QUANTITATIVE   PERIPHERAL IV CATHETER           Critical Care time:  none               No results found. However, due to the size of the patient record, not all encounters were searched. Please check Results Review for a complete set of results.  8:11 PM  I reviewed the patient with her cardiologist.  We discussed her workup thus far and he felt that we really should definitively exclude pulmonary embolism with a CT despite the negative d-dimer and negative chest x-ray.  He informed me that the patient  does have quite severe PAH and that even if her CT PE study is negative he would consider admitting her overnight as her social network and support system at home is quite poor.  10:00 PM  CT PE is negative.  Patient discussed with on-call hospitalist Dr. Wong.  Physician orders placed by myself in the emergency department.    Medications   lactated ringers BOLUS 1,000 mL (0 mLs Intravenous Stopped 1/24/19 1953)   LORazepam (ATIVAN) injection 1 mg (1 mg Intravenous Given 1/24/19 1742)   iopamidol (ISOVUE-370) solution 59 mL (59 mLs Intravenous Given 1/24/19 2052)   Saline Flush (92 mLs Intravenous Given 1/24/19 2052)   LORazepam (ATIVAN) tablet 0.5 mg (0.5 mg Oral Given 1/25/19 0938)       Assessments & Plan (with Medical Decision Making)   58-year-old female complex past medical history reviewed above who presents to the emergency department via EMS with concerns of chest pain and shortness of breath.  The patient has a significant cardiac history with moderate/severe PAH.  Notably anxious on exam,  reported hypoxia on room air EMS and also documented after arrival here in the nursing record.  Nonspecific exam.  No evidence for pneumothorax or pneumonia clinically.  EKG did demonstrate some T wave abnormalities which are discussed.  Doubtful significance.  Chest x-ray was acutely negative.  Has our cardiac troponin, d-dimer.  No significant abnormals on CBC or CMP.  The patient was discussed with her cardiologist who knows her very well please see the discussion at the time stamp above as far as recommendations.  CT PE was negative.  Per cardiology recommendations the patient will be admitted here for observation overnight and was discussed with the on-call hospitalist and transitional orders placed by myself in the emergency department.      Disclaimer: This note consists of symbols derived from keyboarding, dictation and/or voice recognition software. As a result, there may be errors in the script that have  gone undetected. Please consider this when interpreting information found in this chart.      I have reviewed the nursing notes.    I have reviewed the findings, diagnosis, plan and need for follow up with the patient.             Medication List      Modified    LORazepam 0.5 MG tablet  Commonly known as:  ATIVAN  1 mg, Oral, 2 TIMES DAILY  What changed:      how much to take    how to take this    when to take this    additional instructions            Final diagnoses:   Chest pain, unspecified type   Hypoxia   Pulmonary artery hypertension (H)   Anxiety       1/24/2019   [facility] EMERGENCY DEPARTMENT     Easton Conner MD  01/24/19 5939       Easton Conner MD  01/28/19 0730

## 2019-01-24 NOTE — ED NOTES
Pt home resting and started with CP, pain comes and goes on left side of chest, stabbing pain. As she walked to the EMS cart her 02 dropped 88, come in with 4L/NC, I had turned this off to see her base line, she was at 88-89 on RA, applied NC 2L she is now 92-93%, denies cough, NV, fever/chills. Is on home 02 at noc only unless she exercises during the day then she will wear for just a few min. She is very anxious, talking fast, her father just passed and has increased family issues due to his passing. She is a/o x 4. Refusing to get into a gown. Will monitor

## 2019-01-24 NOTE — ED NOTES
"Pt very anxious, is requesting H20, will talk with MD, I asked her what was going on, she seems so anxious, she stated \"i just feel so alone\" tried to reassure her that we would take care of her, she has call light in reach   "

## 2019-01-25 ENCOUNTER — APPOINTMENT (OUTPATIENT)
Dept: CARDIOLOGY | Facility: CLINIC | Age: 59
End: 2019-01-25
Payer: MEDICARE

## 2019-01-25 VITALS
HEIGHT: 63 IN | RESPIRATION RATE: 18 BRPM | TEMPERATURE: 97.8 F | OXYGEN SATURATION: 91 % | DIASTOLIC BLOOD PRESSURE: 61 MMHG | SYSTOLIC BLOOD PRESSURE: 114 MMHG | WEIGHT: 119.27 LBS | BODY MASS INDEX: 21.13 KG/M2 | HEART RATE: 64 BPM

## 2019-01-25 PROBLEM — R07.9 CHEST PAIN: Status: ACTIVE | Noted: 2019-01-25

## 2019-01-25 PROBLEM — J96.11 CHRONIC HYPOXEMIC RESPIRATORY FAILURE (H): Chronic | Status: ACTIVE | Noted: 2019-01-25

## 2019-01-25 LAB — TROPONIN I SERPL-MCNC: <0.015 UG/L (ref 0–0.04)

## 2019-01-25 PROCEDURE — 25000131 ZZH RX MED GY IP 250 OP 636 PS 637: Mod: GY | Performed by: FAMILY MEDICINE

## 2019-01-25 PROCEDURE — 25000131 ZZH RX MED GY IP 250 OP 636 PS 637: Mod: GY | Performed by: PHYSICIAN ASSISTANT

## 2019-01-25 PROCEDURE — A9270 NON-COVERED ITEM OR SERVICE: HCPCS | Mod: GY | Performed by: PHYSICIAN ASSISTANT

## 2019-01-25 PROCEDURE — 25000132 ZZH RX MED GY IP 250 OP 250 PS 637: Mod: GY | Performed by: PHYSICIAN ASSISTANT

## 2019-01-25 PROCEDURE — 93306 TTE W/DOPPLER COMPLETE: CPT | Mod: 26 | Performed by: INTERNAL MEDICINE

## 2019-01-25 PROCEDURE — 36415 COLL VENOUS BLD VENIPUNCTURE: CPT | Performed by: PHYSICIAN ASSISTANT

## 2019-01-25 PROCEDURE — 25000132 ZZH RX MED GY IP 250 OP 250 PS 637: Mod: GY | Performed by: INTERNAL MEDICINE

## 2019-01-25 PROCEDURE — 99207 ZZC CDG-CODE CATEGORY CHANGED: CPT | Performed by: PHYSICIAN ASSISTANT

## 2019-01-25 PROCEDURE — G0378 HOSPITAL OBSERVATION PER HR: HCPCS

## 2019-01-25 PROCEDURE — 84484 ASSAY OF TROPONIN QUANT: CPT | Performed by: PHYSICIAN ASSISTANT

## 2019-01-25 PROCEDURE — A9270 NON-COVERED ITEM OR SERVICE: HCPCS | Mod: GY | Performed by: INTERNAL MEDICINE

## 2019-01-25 PROCEDURE — 99236 HOSP IP/OBS SAME DATE HI 85: CPT | Performed by: PHYSICIAN ASSISTANT

## 2019-01-25 PROCEDURE — 93306 TTE W/DOPPLER COMPLETE: CPT

## 2019-01-25 RX ORDER — ONDANSETRON 2 MG/ML
4 INJECTION INTRAMUSCULAR; INTRAVENOUS EVERY 6 HOURS PRN
Status: DISCONTINUED | OUTPATIENT
Start: 2019-01-25 | End: 2019-01-25 | Stop reason: HOSPADM

## 2019-01-25 RX ORDER — LORAZEPAM 0.5 MG/1
0.5 TABLET ORAL EVERY 4 HOURS PRN
Status: DISCONTINUED | OUTPATIENT
Start: 2019-01-25 | End: 2019-01-25

## 2019-01-25 RX ORDER — TADALAFIL 20 MG/1
40 TABLET ORAL DAILY
Status: DISCONTINUED | OUTPATIENT
Start: 2019-01-25 | End: 2019-01-25 | Stop reason: HOSPADM

## 2019-01-25 RX ORDER — POTASSIUM CHLORIDE 750 MG/1
10 TABLET, EXTENDED RELEASE ORAL DAILY
Status: DISCONTINUED | OUTPATIENT
Start: 2019-01-25 | End: 2019-01-25 | Stop reason: HOSPADM

## 2019-01-25 RX ORDER — LORAZEPAM 0.5 MG/1
0.5 TABLET ORAL 2 TIMES DAILY
Status: DISCONTINUED | OUTPATIENT
Start: 2019-01-25 | End: 2019-01-25

## 2019-01-25 RX ORDER — ONDANSETRON 4 MG/1
4 TABLET, ORALLY DISINTEGRATING ORAL EVERY 6 HOURS PRN
Status: DISCONTINUED | OUTPATIENT
Start: 2019-01-25 | End: 2019-01-25 | Stop reason: HOSPADM

## 2019-01-25 RX ORDER — BUSPIRONE HYDROCHLORIDE 15 MG/1
15 TABLET ORAL 2 TIMES DAILY
Status: DISCONTINUED | OUTPATIENT
Start: 2019-01-25 | End: 2019-01-25 | Stop reason: HOSPADM

## 2019-01-25 RX ORDER — LORAZEPAM 1 MG/1
1 TABLET ORAL 2 TIMES DAILY
Status: DISCONTINUED | OUTPATIENT
Start: 2019-01-25 | End: 2019-01-25 | Stop reason: HOSPADM

## 2019-01-25 RX ORDER — LORAZEPAM 2 MG/ML
0.5 INJECTION INTRAMUSCULAR EVERY 4 HOURS PRN
Status: DISCONTINUED | OUTPATIENT
Start: 2019-01-25 | End: 2019-01-25

## 2019-01-25 RX ORDER — SIMVASTATIN 10 MG
10 TABLET ORAL AT BEDTIME
Status: DISCONTINUED | OUTPATIENT
Start: 2019-01-25 | End: 2019-01-25 | Stop reason: HOSPADM

## 2019-01-25 RX ORDER — LORAZEPAM 0.5 MG/1
1 TABLET ORAL 2 TIMES DAILY
Qty: 90 TABLET | Refills: 1 | Status: SHIPPED | OUTPATIENT
Start: 2019-01-25

## 2019-01-25 RX ORDER — FUROSEMIDE 20 MG
20 TABLET ORAL DAILY
Status: DISCONTINUED | OUTPATIENT
Start: 2019-01-25 | End: 2019-01-25 | Stop reason: HOSPADM

## 2019-01-25 RX ORDER — FUROSEMIDE 40 MG
20 TABLET ORAL DAILY
Qty: 30 TABLET | Refills: 1 | Status: SHIPPED | OUTPATIENT
Start: 2019-01-25 | End: 2019-04-17

## 2019-01-25 RX ORDER — CARVEDILOL 6.25 MG/1
6.25 TABLET ORAL 2 TIMES DAILY WITH MEALS
Status: DISCONTINUED | OUTPATIENT
Start: 2019-01-25 | End: 2019-01-25 | Stop reason: HOSPADM

## 2019-01-25 RX ORDER — AZATHIOPRINE 50 MG/1
50 TABLET ORAL DAILY
Status: DISCONTINUED | OUTPATIENT
Start: 2019-01-25 | End: 2019-01-25 | Stop reason: HOSPADM

## 2019-01-25 RX ORDER — BUPROPION HYDROCHLORIDE 100 MG/1
200 TABLET, EXTENDED RELEASE ORAL EVERY MORNING
Status: DISCONTINUED | OUTPATIENT
Start: 2019-01-25 | End: 2019-01-25 | Stop reason: HOSPADM

## 2019-01-25 RX ORDER — ACETAMINOPHEN 500 MG
500-1000 TABLET ORAL EVERY 6 HOURS PRN
Status: DISCONTINUED | OUTPATIENT
Start: 2019-01-25 | End: 2019-01-25 | Stop reason: HOSPADM

## 2019-01-25 RX ORDER — LORAZEPAM 0.5 MG/1
0.5 TABLET ORAL ONCE
Status: COMPLETED | OUTPATIENT
Start: 2019-01-25 | End: 2019-01-25

## 2019-01-25 RX ORDER — NALOXONE HYDROCHLORIDE 0.4 MG/ML
.1-.4 INJECTION, SOLUTION INTRAMUSCULAR; INTRAVENOUS; SUBCUTANEOUS
Status: DISCONTINUED | OUTPATIENT
Start: 2019-01-25 | End: 2019-01-25 | Stop reason: HOSPADM

## 2019-01-25 RX ADMIN — BUSPIRONE HYDROCHLORIDE 15 MG: 15 TABLET ORAL at 08:51

## 2019-01-25 RX ADMIN — TADALAFIL 40 MG: 20 TABLET ORAL at 09:01

## 2019-01-25 RX ADMIN — FUROSEMIDE 20 MG: 20 TABLET ORAL at 08:51

## 2019-01-25 RX ADMIN — LORAZEPAM 0.5 MG: 0.5 TABLET ORAL at 08:51

## 2019-01-25 RX ADMIN — BUSPIRONE HYDROCHLORIDE 15 MG: 15 TABLET ORAL at 01:13

## 2019-01-25 RX ADMIN — LORAZEPAM 0.5 MG: 0.5 TABLET ORAL at 09:38

## 2019-01-25 RX ADMIN — BUPROPION HYDROCHLORIDE 200 MG: 100 TABLET, EXTENDED RELEASE ORAL at 09:00

## 2019-01-25 RX ADMIN — CARVEDILOL 6.25 MG: 6.25 TABLET, FILM COATED ORAL at 08:51

## 2019-01-25 RX ADMIN — ACETAMINOPHEN 1000 MG: 500 TABLET ORAL at 09:38

## 2019-01-25 RX ADMIN — SIMVASTATIN 10 MG: 10 TABLET, FILM COATED ORAL at 01:13

## 2019-01-25 RX ADMIN — AZATHIOPRINE 50 MG: 50 TABLET ORAL at 08:51

## 2019-01-25 RX ADMIN — LORAZEPAM 0.5 MG: 0.5 TABLET ORAL at 01:13

## 2019-01-25 RX ADMIN — POTASSIUM CHLORIDE 10 MEQ: 750 TABLET, FILM COATED, EXTENDED RELEASE ORAL at 08:51

## 2019-01-25 RX ADMIN — CARVEDILOL 6.25 MG: 6.25 TABLET, FILM COATED ORAL at 01:13

## 2019-01-25 RX ADMIN — ONDANSETRON 4 MG: 4 TABLET, ORALLY DISINTEGRATING ORAL at 05:24

## 2019-01-25 NOTE — PLAN OF CARE
[facility] DISCHARGE NOTE    Patient discharged to home at 1:40 PM via wheel chair. Accompanied by daughter and staff. Discharge instructions reviewed with patient, opportunity offered to ask questions. Prescriptions sent to patients preferred pharmacy. All belongings sent with patient.    Keyanna Arreola

## 2019-01-25 NOTE — PHARMACY - DISCHARGE MEDICATION RECONCILIATION
Discharge medication review for this patient is complete. Pharmacist assisted with medication reconciliation of discharge medications with prior to admission medications.     The following changes were made to the discharge medication list based on pharmacist review:  Added:  none  Discontinued: none  Changed: none      Patient's Discharge Medication List  - medications as listed on After Visit Summary (AVS)     Review of your medicines      CONTINUE these medicines which may have CHANGED, or have new prescriptions. If we are uncertain of the size of tablets/capsules you have at home, strength may be listed as something that might have changed.      Dose / Directions   LORazepam 0.5 MG tablet  Commonly known as:  ATIVAN  This may have changed:      how much to take    how to take this    when to take this    additional instructions  Used for:  Major depressive disorder, single episode, severe with psychotic features (H)      Dose:  1 mg  Take 2 tablets (1 mg) by mouth 2 times daily  Quantity:  90 tablet  Refills:  1        CONTINUE these medicines which have NOT CHANGED      Dose / Directions   acetaminophen 500 MG tablet  Commonly known as:  TYLENOL  Used for:  Pulmonary hypertension (H)      Dose:  500-1000 mg  Take 1-2 tablets (500-1,000 mg) by mouth every 6 hours as needed for pain (Take as needed for pain.  Do not exceed 4 grams (8 tablets) in a day)  Quantity:  45 tablet  Refills:  10     azaTHIOprine 50 MG tablet  Commonly known as:  IMURAN      Dose:  50 mg  Take 1 tablet (50 mg) by mouth daily  Quantity:  90 tablet  Refills:  3     buPROPion 200 MG 12 hr tablet  Commonly known as:  WELLBUTRIN SR      Dose:  200 mg  Take 1 tablet (200 mg) by mouth every morning  Quantity:  30 tablet  Refills:  0     BUSPIRONE HCL PO      Dose:  15 mg  Take 15 mg by mouth 2 times daily  Refills:  0     calcitRIOL 0.25 MCG capsule  Commonly known as:  ROCALTROL  Notes to patient:  Resume      Dose:  0.25 mcg  Take 1 capsule  (0.25 mcg) by mouth daily  Quantity:  30 capsule  Refills:  1     carvedilol 12.5 MG tablet  Commonly known as:  COREG  Used for:  Chronic systolic heart failure (H)      Dose:  6.25 mg  Take 0.5 tablets (6.25 mg) by mouth 2 times daily (with meals)  Quantity:  90 tablet  Refills:  1     denosumab 60 MG/ML Soln injection  Commonly known as:  PROLIA  Used for:  Osteoporosis, postmenopausal  Notes to patient:  Resume      Dose:  60 mg  Inject 1 mL (60 mg) Subcutaneous every 6 months  Quantity:  1 mL  Refills:  1     furosemide 40 MG tablet  Commonly known as:  LASIX  Used for:  Pulmonary hypertension (H), Stress-induced cardiomyopathy      Dose:  20 mg  Take 0.5 tablets (20 mg) by mouth daily  Quantity:  30 tablet  Refills:  1     levalbuterol 45 MCG/ACT inhaler  Commonly known as:  XOPENEX HFA      Dose:  2 puff  Inhale 2 puffs into the lungs every 6 hours as needed for shortness of breath / dyspnea or wheezing  Quantity:  1 Inhaler  Refills:  11     macitentan 10 MG tablet  Commonly known as:  OPSUMIT  Used for:  Primary pulmonary hypertension (H)      Dose:  10 mg  Take 1 tablet (10 mg) by mouth daily  Quantity:  30 tablet  Refills:  11     order for DME  Used for:  Tremor, Generalized muscle weakness      Equipment being ordered: SHOWER CHAIR  FROM [employer]  Quantity:  1 Device  Refills:  0     potassium chloride ER 10 MEQ CR tablet  Commonly known as:  K-TAB/KLOR-CON  Used for:  Heart failure (H)      Dose:  10 mEq  Take 1 tablet (10 mEq) by mouth daily  Quantity:  90 tablet  Refills:  1     ranitidine 75 MG tablet  Commonly known as:  ranitidine  Used for:  Primary pulmonary hypertension (H), Anemia, unspecified type, SOB (shortness of breath)  Notes to patient:  Resume      Dose:   mg  Take 1-2 tablets ( mg) by mouth 2 times daily  Quantity:  60 tablet  Refills:  11     selexipag 1600 MCG tablet  Commonly known as:  UPTRAVI  Used for:  Pulmonary hypertension (H)      Dose:  1600 mcg  Take 1  tablet (1,600 mcg) by mouth every 12 hours  Refills:  0     simvastatin 10 MG tablet  Commonly known as:  ZOCOR  Used for:  Hypercholesterolemia      Dose:  10 mg  Take 1 tablet by mouth At Bedtime.  Quantity:  90 tablet  Refills:  1     SODIUM BICARBONATE PO  Used for:  Pulmonary arterial hypertension (H)  Notes to patient:  Resume      Take by mouth 2 times daily  Refills:  0     tadalafil (PAH) 20 MG Tabs  Commonly known as:  ADCIRCA  Used for:  Pulmonary hypertension (H)      Dose:  40 mg  Take 2 tablets (40 mg) by mouth daily  Quantity:  180 tablet  Refills:  3     TEMAZEPAM PO  Notes to patient:  Resume      Take by mouth At Bedtime  Refills:  0     VITAMIN D PO  Notes to patient:  Resume      Dose:  83245 Units  Take 50,000 Units by mouth once a week  Refills:  0           Where to get your medicines      These medications were sent to [facility] [id number] [place], [place] [address] [facility] AT [facility] OF [address] [place] [address]  [address] [facility] [address]    Phone:  [phone]     furosemide 40 MG tablet     Some of these will need a paper prescription and others can be bought over the counter. Ask your nurse if you have questions.    Bring a paper prescription for each of these medications    LORazepam 0.5 MG tablet

## 2019-01-25 NOTE — CONSULTS
CARE TRANSITION SOCIAL WORK INITIAL ASSESSMENT:      Met with: Patient.    DATA  Principal Problem:    Chest pain  Active Problems:    Major depressive disorder, single episode, moderate (H)    Lower extremity edema    CKD (chronic kidney disease) stage 4, GFR 15-29 ml/min (H)    Sarcoidosis    Mixed hyperlipidemia    GERD (gastroesophageal reflux disease)    PAH (pulmonary arterial hypertension) with portal hypertension (H)    Generalized anxiety disorder       Primary Care Clinic Name: ([facility])  Primary Care MD Name: (Anupama Babcock)  Contact information and PCP information verified: Yes      ASSESSMENT  Cognitive Status: awake, alert and oriented.          Other Resources: OP Mental Health           Description of Support System: Supportive, Involved   Who is your support system?: Children   Support Assessment: Adequate family and caregiver support, Adequate social supports   Insurance Concerns: No Insurance issues identified        This writer met with pt introduced self and role. Discussed discharge planning. Pt reports that she lives at home alone. She states that she does have some concerns in the community but did not want to discuss them. She stated that she sees a psychotherapist weekly at [facility] in [place]. Encouraged her to continue with that. No other CTS needs at this time.     PLAN    home        Marta NARAYAN, [facility] HospitalSW, [phone]

## 2019-01-25 NOTE — DISCHARGE SUMMARY
[facility]    Discharge Summary  Hospital Medicine    Date of Admission:  1/24/2019  Date of Discharge:  1/25/2019   Discharging Provider: Bib Shrestha  Date of Service: 1/25/2019      Primary Care     Anupama Babcock  [place] [id number] [facility] [id number]      Identification and Chief Compaint:  Judith Nelson is a 58 year old female admitted on 1/24/2019. She presented with sharp chest pain.      Discharge Diagnoses       Chest pain    Major depressive disorder, single episode, moderate (H)    Lower extremity edema    CKD (chronic kidney disease) stage 4, GFR 15-29 ml/min (H)    Sarcoidosis    Mixed hyperlipidemia    GERD (gastroesophageal reflux disease)    PAH (pulmonary arterial hypertension) with portal hypertension (H)    Chronic hypoxemic respiratory failure (H)    Generalized anxiety disorder    Discharge Disposition   Discharged to home    Discharge Orders      Reason for your hospital stay    You were admitted at the advice of your pulmonologist to evaluate some increased shortness of breath beyond your baseline, and to evaluate chest pain.  Workup here has determined that you did not have a heart attack, and did not have a blood clot to the lungs.  You feel almost back to baseline today, have follow-up scheduled to see your lung team, so you're stable for discharge home today.     Follow-up and recommended labs and tests     Follow up with primary care provider, Anupama Babcock, within 7 days for hospital follow- up.  No follow up labs or test are needed.  Please remember to confirm your upcoming appointments with the Pulmonary Hypertension Clinic and Pulmonary Clinic.     Activity    Your activity upon discharge: activity as tolerated.     Full Code     Oxygen Adult    Renew Home Oxygen Order  Renew previous prescription.  Expected treatment length is indefinite (99 months).    Attending Provider: Bib Shrestha MD  Physician  signature: See electronic signature associated with these discharge orders  Date of Order: January 25, 2019     Diet    Follow this diet upon discharge: Orders Placed This Encounter      Regular Diet Adult        Discharge Medications   Current Discharge Medication List      CONTINUE these medications which have CHANGED    Details   furosemide (LASIX) 40 MG tablet Take 0.5 tablets (20 mg) by mouth daily  Qty: 30 tablet, Refills: 1    Associated Diagnoses: Pulmonary hypertension (H); Stress-induced cardiomyopathy         CONTINUE these medications which have NOT CHANGED    Details   acetaminophen (TYLENOL) 500 MG tablet Take 1-2 tablets (500-1,000 mg) by mouth every 6 hours as needed for pain (Take as needed for pain.  Do not exceed 4 grams (8 tablets) in a day)  Qty: 45 tablet, Refills: 10    Associated Diagnoses: Pulmonary hypertension (H)      azaTHIOprine (IMURAN) 50 MG tablet Take 1 tablet (50 mg) by mouth daily  Qty: 90 tablet, Refills: 3    Associated Diagnoses: Sarcoidosis      buPROPion (WELLBUTRIN SR) 200 MG 12 hr tablet Take 1 tablet (200 mg) by mouth every morning  Qty: 30 tablet, Refills: 0    Associated Diagnoses: Major depressive disorder, single episode, moderate (H)      BUSPIRONE HCL PO Take 15 mg by mouth 2 times daily      calcitRIOL (ROCALTROL) 0.25 MCG capsule Take 1 capsule (0.25 mcg) by mouth daily  Qty: 30 capsule, Refills: 1      carvedilol (COREG) 12.5 MG tablet Take 0.5 tablets (6.25 mg) by mouth 2 times daily (with meals)  Qty: 90 tablet, Refills: 1    Associated Diagnoses: Chronic systolic heart failure (H)      Cholecalciferol (VITAMIN D PO) Take 50,000 Units by mouth once a week      levalbuterol (XOPENEX HFA) 45 MCG/ACT inhaler Inhale 2 puffs into the lungs every 6 hours as needed for shortness of breath / dyspnea or wheezing  Qty: 1 Inhaler, Refills: 11    Associated Diagnoses: Sarcoidosis      LORazepam (ATIVAN) 0.5 MG tablet Take 0.5 mg (1 tab) every morning and 1 mg (2 tabs) every  night at bedtime.  Qty: 90 tablet, Refills: 1    Associated Diagnoses: Major depressive disorder, single episode, severe with psychotic features (H)      macitentan (OPSUMIT) 10 MG tablet Take 1 tablet (10 mg) by mouth daily  Qty: 30 tablet, Refills: 11    Associated Diagnoses: Primary pulmonary hypertension (H)      potassium chloride (K-TAB,KLOR-CON) 10 MEQ tablet Take 1 tablet (10 mEq) by mouth daily  Qty: 90 tablet, Refills: 1    Associated Diagnoses: Heart failure (H)      ranitidine (RANITIDINE) 75 MG tablet Take 1-2 tablets ( mg) by mouth 2 times daily  Qty: 60 tablet, Refills: 11    Comments: This replaces Omeprazole  Associated Diagnoses: Primary pulmonary hypertension (H); Anemia, unspecified type; SOB (shortness of breath)      selexipag (UPTRAVI) 1600 MCG tablet Take 1 tablet (1,600 mcg) by mouth every 12 hours    Associated Diagnoses: Pulmonary hypertension (H)      simvastatin (ZOCOR) 10 MG tablet Take 1 tablet by mouth At Bedtime.  Qty: 90 tablet, Refills: 1    Associated Diagnoses: Hypercholesterolemia      SODIUM BICARBONATE PO Take by mouth 2 times daily    Associated Diagnoses: Pulmonary arterial hypertension (H)      tadalafil, PAH, (ADCIRCA) 20 MG TABS Take 2 tablets (40 mg) by mouth daily  Qty: 180 tablet, Refills: 3    Associated Diagnoses: Pulmonary hypertension (H)      TEMAZEPAM PO Take by mouth At Bedtime      denosumab (PROLIA) 60 MG/ML SOLN Inject 1 mL (60 mg) Subcutaneous every 6 months  Qty: 1 mL, Refills: 1    Comments: To be given in Endocrinology Clinic.  Associated Diagnoses: Osteoporosis, postmenopausal      ORDER FOR DME Equipment being ordered: SHOWER CHAIR  FROM [email or web address]  Qty: 1 Device, Refills: 0    Associated Diagnoses: Tremor; Generalized muscle weakness; Sarcoidosis           Allergies   Allergies   Allergen Reactions     Dilaudid [Hydromorphone] Nausea and Vomiting     Morphine Nausea and Vomiting     Latex Rash     From gloves       Consultations This [facility]  Stay   Consultations requested during this admission:    CARE TRANSITION RN/SW IP CONSULT    Significant Results and Procedures   Procedures    None.    Data   Results for orders placed or performed during the hospital encounter of 01/24/19   XR Chest 2 Views    Narrative    CHEST TWO VIEWS  January 24, 2019 7:09 PM     HISTORY: Chest pain, shortness of air.    COMPARISON: 2/6/2018.      Impression    IMPRESSION: No acute abnormality. Lungs are well-inflated and clear.  Heart size is normal.    MARIAMA THAYER MD   CT Chest Pulmonary Embolism w Contrast    Narrative    CT CHEST PULMONARY EMBOLISM WITH CONTRAST   1/24/2019 9:02 PM     HISTORY: Shortness of breath. Pain.    TECHNIQUE: 59 mL Isovue-370. Radiation dose for this scan was reduced  using automated exposure control, adjustment of the mA and/or kV  according to patient size, or iterative reconstruction technique.    COMPARISON: 5/22/2015, 4/21/2014    FINDINGS: There is no evidence of pulmonary embolism or acute thoracic  aortic abnormality. There is minimal coronary atherosclerosis. No  pneumothorax. No pleural or pericardial effusion. The main pulmonary  artery is enlarged, suggestive of pulmonary arterial hypertension.    No thoracic or axillary adenopathy. No pulmonary nodule or mass. No  airspace consolidation. Images through the upper abdomen demonstrate  left hydronephrosis, present on prior studies.      Impression    IMPRESSION:  1. No evidence of pulmonary embolism.  2. Enlarged main pulmonary arteries suggestive of pulmonary arterial  hypertension.  3. Chronic left hydronephrosis.    ELICEO ENGLISH MD     *Note: Due to a large number of results and/or encounters for the requested time period, some results have not been displayed. A complete set of results can be found in Results Review.     Echocardiogram: done, results pending at the time of discharge.    History of Present Illness   (From H&P) Judith Nelson is a 58 year old female who  presents with sharp, stabbing left-sided chest pain that began yesterday afternoon around 3 PM.  Onset was while she was walking back from the bathroom.  Pain was nonradiating and was accompanied by some mild dyspnea.  Pain was initially 9/10.  It was not affected by activity or rest.  No associated nausea or diaphoresis.  Felt a little lightheaded and anxious.  She acknowledges that she has missed some of her medications over the last few days including forgetting to take her morning Ativan on the day of admission and possibly the day before.  After arrival in the ED, her pain began to decrease.  She did not receive any pain medications or nitrates in the ED but she did receive 1 mg of IV Ativan.     She acknowledges that she has a problem with stress and anxiety and suspects that her pain may have been triggered by worsening anxiety.  She describes significant home stressors including the recent death of her father and poor relationship with her siblings, all of which have been contributing to her stress levels recently.  Her medications are managed by a home health nurse who visits 1-2 times per week and sets them up in a daily organizer, despite this she missed her evening meds 2 days in a row earlier this week.  And, as noted above, she missed her Ativan for the last couple of days.       Hospital Course   Judith Nelson was admitted on 1/24/2019.  The following problems were addressed during her hospitalization:    Chest pain  Sharp, stabbing, left-sided, nonradiating chest pain onset about 1500 the day prior to admission while walking back from the bathroom.  Was accompanied by some mild dyspnea.  No associated diaphoresis or nausea.  Was unchanged with rest.  ECG 1/24/19 showed inverted T waves V1 through V3, also seen on a 4/2018 study, with no other changes.  Serial troponins have been negative.  NT Pro , consistent with her baseline.  D-dimer 0.4.  CXR 1/24/19 negative.  CTA PE study 1/24/19  negative or PE or acute disease.  Has remained pain-free since admission.  Patient now feels back to baseline, requests discharge.  Will discharge to home today.     Pulmonary arterial hypertension with portal hypertension  Followed by Dr. Norm Franco at [facility], who saw her 1/16/19 and felt her condition was stable, no changes were recommended.  Last echo 6/21/18 showed good RV function with RV systolic pressure of 94 mmHg above atrial pressure.  She did desat to 88% in the ED and was on 1-2 L O2 overnight.  This morning SPO2 of 94% on room air at rest. She is on 2LPM at home at night.  Patient informed us that she is due for a routine surveillance echo; echo was performed while she was here, but has not been interpreted at the time of discharge, will require follow-up in [facility].  - Continue prior to admission Uptravi, Opsumit, Tadalafil, and carvedilol (off-label study med for RV dysfunction).     Lower extremity edema  No edema noted on exam today.  She denies any recent increase in her chronic edema.  - Continue preadmission furosemide 20 mg every day and KCl 10 mEq.     Sarcoidosis  Followed by Dr. Gael Flaherty at [facility], last visit 12/5/18.  PFTs at that time showed mild small airway obstruction with mildly decreased diffusion capacity.  - Continue preadmission azathioprine.     CKD stage 4  Renal biopsy indicates this is secondary to her sarcoidosis.  Followed by Dr. Garcia. Baseline GFR 34 - 38 recently, admission GFR 44, GFR today 41.  Renal function is at baseline.    - Follow serially.     Mixed hyperlipidemia  No recent lipid panels.  - Continue preadmission simvastatin 10 mg.     Generalized anxiety disorder  Major depressive disorder  Acknowledges increased family stress due to the recent death of her father and issues with her siblings.  - Continue preadmission Wellbutrin, Buspar, and Ativan.    Chronic hypoxemia  Has home O2, usual use is nocturnal only at 2 L/min.   SpO2 91 to 94% on room air at rest.  Ambulated here on room air, desaturated to mid-80's, which patient says is worse than baseline.  I can't find comparison exertional values in Epic.  - Patient will follow-up on this in [facility].  - Continue nocturnal O2 use unchanged.          Pending Results   Unresulted Labs Ordered in the Past 30 Days of this Admission     No orders found for last 61 day(s).          Physical Exam   Temp:  [96.2  F (35.7  C)-97.8  F (36.6  C)] 97.8  F (36.6  C)  Pulse:  [64-79] 64  Heart Rate:  [59-83] 73  Resp:  [11-24] 18  BP: ()/(46-88) 114/61  SpO2:  [90 %-95 %] 91 %  Vitals:    01/24/19 1652 01/24/19 2358   Weight: 52.2 kg (115 lb) 54.1 kg (119 lb 4.3 oz)       GENERAL: Pleasant, animated woman, eating lunch, looks comfortable.  NECK: Trachea midline, no stridor.    RESP: No accessory muscle use.  Symmetrical breath sounds.  Lungs clear throughout on inspiration and expiration.  Expiration not prolonged, no wheeze.  CV: Regular rate and rhythm, non-tachycardic.  Normal S1, S2 is not split, no murmur or extra sound.  No lower extremity edema with legs elevated.  ABDOMEN: Soft, non-tender, no guarding.  Liver and spleen not enlarged, no masses palpable.  Bowel sounds positive.  MS: No bony deformities noted.  No red or inflamed joints.  SKIN: Warm and dry, no rashes.  NEURO: Alert, oriented, conversant.  Cranial nerves III - XII grossly intact.  No gross motor or sensory deficits.    PSYCH: Alert, conversant.  Able to articulate logical thoughts, no tangential thoughts, no hallucinations or delusions.  Affect grossly normal.        The discharge plan was discussed with the patient and her daughter, Saritha, who express agreement.    Total time on this discharge was 35 minutes.    Bib Shrestha MD

## 2019-01-25 NOTE — H&P
[facility]    History and Physical - Hospitalist Service       Date of Admission:  1/24/2019    Assessment & Plan   Judith Nelson is a 58 year old female admitted on 1/24/2019. She presents with sharp chest pain.    Chest pain  Currently pain-free.  Sharp, stabbing, left-sided, nonradiating chest pain onset about 1500 yesterday while walking back from the bathroom. Accompanied by some mild dyspnea.  No associated diaphoresis or nausea.  Unchanged with rest/exertion.  ECG shows inverted T's in V1 through V3, consistent with old ECGs.  Serial troponins negative.  NT Pro , consistent with her baseline.  D-dimer 0.4.  CXR negative.  CTA PE negative.  - Telemetry  - Walk test    Pulmonary arterial hypertension with portal hypertension  Followed by Dr. Norm Franco at [facility], who saw her 1/16/19 and felt her condition was stable, no changes were recommended.  Last echo 6/21/18 showed good RV function with RV systolic pressure of 94 mmHg above atrial pressure.  She did desat to 88% in the ED and was on 1-2 L O2 overnight.  This morning SPO2 of 94% on room air. She is on 2LPM at home at night.  - Continue pta Uptravi, Opsumit, Tadalafil, and carvedilol (off-label study med for RV dysfunction).    Lower extremity edema  No edema noted on exam today.  She denies any recent increase in her chronic edema.  - Continue pta Lasix 20 mg and KCl 10 mEq.    Sarcoidosis  Followed by Dr. Gael Flaherty at [facility], last visit 12/5/18.  PFTs at that time showed mild small airway obstruction with mildly decreased diffusion capacity.  - Continue pta Imuran    CKD stage 4  Followed by Dr. Garcia. Creatinine 1.4 on admission, consistent with baseline.  Kidney biopsy suggests this is secondary to her sarcoidosis.  - Follow.    Mixed hyperlipidemia  No recent lipid panels.  - Continue pta simvastatin 10 mg.    Generalized anxiety disorder  Major depressive  disorder  Acknowledges increased family stress due to the recent death of her father and issues with her siblings.  - Continue pta Wellbutrin, Buspar, and Ativan.          Diet: Regular adult diet  DVT Prophylaxis: Low Risk/Ambulatory with no VTE prophylaxis indicated  Hernández Catheter: not present  Code Status: Full    Disposition Plan   Expected discharge: Today, recommended to prior living arrangement once all tests resulted.  Entered: Jose G Conway PA-C 01/25/2019, 6:23 AM     The patient's care was discussed with the Attending Physician, Dr. Shrestha and Patient.    Jose G Conway PA-C  [facility]    ______________________________________________________________________    Chief Complaint   Sharp chest pain with dyspnea    History is obtained from the patient    History of Present Illness   Judith Nelson is a 58 year old female who presents with sharp, stabbing left-sided chest pain that began yesterday afternoon around 3 PM.  Onset was while she was walking back from the bathroom.  Pain was nonradiating and was accompanied by some mild dyspnea.  Pain was initially 9/10.  It was not affected by activity or rest.  No associated nausea or diaphoresis.  Felt a little lightheaded and anxious.  She acknowledges that she has missed some of her medications over the last few days including forgetting to take her morning Ativan on the day of admission and possibly the day before.  After arrival in the ED, her pain began to decrease.  She did not receive any pain medications or nitrates in the ED but she did receive 1 mg of IV Ativan.    She acknowledges that she has a problem with stress and anxiety and suspects that her pain may have been triggered by worsening anxiety.  She describes significant home stressors including the recent death of her father and poor relationship with her siblings, all of which have been contributing to her stress levels recently.  Her medications are  managed by a home health nurse who visits 1-2 times per week and sets them up in a daily organizer, despite this she missed her evening meds 2 days in a row earlier this week.  And, as noted above, she missed her Ativan for the last couple of days.    Review of Systems      Denies recent fevers, chills, palpitations, orthopnea, PND, cough, increased lower extremity edema, unexpected changes in weight.    The 10 point Review of Systems is negative other than noted in the HPI or here.     Past Medical History    I have reviewed this patient's medical history and updated it with pertinent information if needed.   Past Medical History:   Diagnosis Date     Anxiety      CKD (chronic kidney disease), stage III (H)     biopsy suspicious for renal sarcoidosis      Hyperprolactinemia (H)      Lower extremity edema     chronic w/ chronic right ankle ulcer     Lumbar compression fracture (H)      Major depressive disorder     with psychotic features, followed by Dr. Rosales at [facility] in [facility]     patient is post menopausal     MGUS (monoclonal gammopathy of unknown significance)     mild, followed by Dr. Long     Osteoporosis      Other seborrheic keratosis      Protrusio acetabuli      Pulmonary hypertension (H)      Sarcoidosis      Stress-induced cardiomyopathy        Past Surgical History   I have reviewed this patient's surgical history and updated it with pertinent information if needed.  Past Surgical History:   Procedure Laterality Date     C PELVIS/HIP JOINT SURGERY UNLISTED       Csection,  X 2       CYSTOSCOPY, RETROGRADES, INSERT STENT URETER(S), COMBINED  10/2/2013    Procedure: COMBINED CYSTOSCOPY, RETROGRADES, INSERT STENT URETER(S);  Left Retrograde Ureteropyelogram and Ureteral Stent Placement;  Surgeon: SONIA Martinez MD;  Location: [facility] OR     ENDOSCOPIC RETROGRADE CHOLANGIOPANCREATOGRAM  2012    Procedure:ENDOSCOPIC RETROGRADE CHOLANGIOPANCREATOGRAM; ERCP biliary  sphincterotomy and stone removal; Surgeon:MARGIE RUGGIERO; Location:[facility]     LAPAROSCOPIC CHOLECYSTECTOMY WITH CHOLANGIOGRAMS  2012    Procedure:LAPAROSCOPIC CHOLECYSTECTOMY WITH CHOLANGIOGRAMS; Surgeon:BRIANA PADILLA; Location:[facility] OR     ORTHOPEDIC SURGERY  10/1/2010    Right hip replacement     ZZ GASTROSCOPY,FL  6/10    Erythematous duodenopathy       Social History   I have reviewed this patient's social history and updated it with pertinent information if needed.  Social History     Tobacco Use     Smoking status: Never Smoker     Smokeless tobacco: Never Used   Substance Use Topics     Alcohol use: No     Alcohol/week: 0.0 oz     Comment: Occ.     Drug use: No       Family History   I have reviewed this patient's family history and updated it with pertinent information if needed.   Family History   Problem Relation Age of Onset     Cancer Mother          age 62 leukemia     Gastrointestinal Disease Mother         diverticulitis     Hypertension Mother      Allergies Mother      Arthritis Mother      Depression Mother      Eye Disorder Mother      Osteoporosis Mother      Anxiety Disorder Mother      Mental Illness Mother      Asthma Mother      Other Cancer Mother      Migraines Mother      C.A.D. Father         MI/CAD      Cancer Father         skin     Blood Disease Father         renal  problem     Hypertension Father      Anesthesia Reaction Father      Cardiovascular Father      Neurologic Disorder Father         Parkinson's disease     Anxiety Disorder Father      Other Cancer Father      Hyperlipidemia Father      Coronary Artery Disease Father      C.A.D. Maternal Grandmother          late 82s MI     Diabetes Maternal Grandmother      Osteoporosis Maternal Grandmother      C.A.D. Maternal Grandfather          mid 80s MI     Alzheimer Disease Paternal Grandmother          80s     Alzheimer Disease Paternal Grandfather          80s     Neurologic Disorder Sister          migraines     Allergies Sister      Diabetes Other         AODM     Neurologic Disorder Sister         migraines     Allergies Sister      Anesthesia Reaction Son      Anesthesia Reaction Daughter      Anesthesia Reaction Daughter      Diabetes Sister         hypoglycemia     Anxiety Disorder Son      Anxiety Disorder Sister      Substance Abuse Sister      Asthma Sister      Asthma Daughter      Depression Son      Hypertension Sister      Hyperlipidemia Sister      Migraines Sister        Prior to Admission Medications   Prior to Admission Medications   Prescriptions Last Dose Informant Patient Reported? Taking?   BUSPIRONE HCL PO 1/24/2019 at am Self Yes Yes   Sig: Take 15 mg by mouth 2 times daily   Cholecalciferol (VITAMIN D PO) Past Week at Unknown time  Yes Yes   Sig: Take 50,000 Units by mouth once a week   LORazepam (ATIVAN) 0.5 MG tablet 1/23/2019 at Unknown time Self No Yes   Sig: Take 0.5 mg (1 tab) every morning and 1 mg (2 tabs) every night at bedtime.   ORDER FOR DME  Self No No   Sig: Equipment being ordered: SHOWER CHAIR  FROM [employer]   SODIUM BICARBONATE PO Past Month at Unknown time Self Yes Yes   Sig: Take by mouth 2 times daily   TEMAZEPAM PO 1/23/2019 at hs Self Yes Yes   Sig: Take by mouth At Bedtime   acetaminophen (TYLENOL) 500 MG tablet 1/23/2019 at Unknown time Self No Yes   Sig: Take 1-2 tablets (500-1,000 mg) by mouth every 6 hours as needed for pain (Take as needed for pain.  Do not exceed 4 grams (8 tablets) in a day)   azaTHIOprine (IMURAN) 50 MG tablet 1/24/2019 at am  No Yes   Sig: Take 1 tablet (50 mg) by mouth daily   buPROPion (WELLBUTRIN SR) 200 MG 12 hr tablet 1/24/2019 at am Self No Yes   Sig: Take 1 tablet (200 mg) by mouth every morning   calcitRIOL (ROCALTROL) 0.25 MCG capsule 1/23/2019 at Unknown time Self Yes Yes   Sig: Take 1 capsule (0.25 mcg) by mouth daily   carvedilol (COREG) 12.5 MG tablet 1/24/2019 at am  No Yes   Sig: Take 0.5 tablets (6.25 mg) by mouth 2  times daily (with meals)   denosumab (PROLIA) 60 MG/ML SOLN More than a month at Unknown time Self No No   Sig: Inject 1 mL (60 mg) Subcutaneous every 6 months   Patient not taking: Reported on 1/16/2019   furosemide (LASIX) 40 MG tablet 1/24/2019 at am Self No Yes   Sig: Take 40 MG M-W-F, 20 MG all other days.   Patient taking differently: Take 20 mg by mouth daily    levalbuterol (XOPENEX HFA) 45 MCG/ACT inhaler Past Week at Unknown time  No Yes   Sig: Inhale 2 puffs into the lungs every 6 hours as needed for shortness of breath / dyspnea or wheezing   macitentan (OPSUMIT) 10 MG tablet 1/24/2019 at am  No Yes   Sig: Take 1 tablet (10 mg) by mouth daily   potassium chloride (K-TAB,KLOR-CON) 10 MEQ tablet 1/24/2019 at Unknown time  No Yes   Sig: Take 1 tablet (10 mEq) by mouth daily   ranitidine (RANITIDINE) 75 MG tablet Past Month at Unknown time Self No Yes   Sig: Take 1-2 tablets ( mg) by mouth 2 times daily   selexipag (UPTRAVI) 1600 MCG tablet 1/24/2019 at am  Yes Yes   Sig: Take 1 tablet (1,600 mcg) by mouth every 12 hours   simvastatin (ZOCOR) 10 MG tablet 1/23/2019 at Unknown time Self No Yes   Sig: Take 1 tablet by mouth At Bedtime.   tadalafil, PAH, (ADCIRCA) 20 MG TABS 1/24/2019 at Unknown time  No Yes   Sig: Take 2 tablets (40 mg) by mouth daily      Facility-Administered Medications: None     Allergies   Allergies   Allergen Reactions     Dilaudid [Hydromorphone] Nausea and Vomiting     Morphine Nausea and Vomiting     Latex Rash     From gloves       Physical Exam   Vital Signs: Temp: 96.2  F (35.7  C) Temp src: Oral BP: 90/52 Pulse: 70 Heart Rate: 73 Resp: 18 SpO2: 94 % O2 Device: None (Room air) Oxygen Delivery: 1 LPM  Weight: 119 lbs 4.3 oz    General: Appears calm, comfortable, nontoxic. Answers questions quickly and appropriately with clear speech.   Skin: Pink, warm, dry.  Eyes: PERRL. Sclera are white.  HENT:  Normocephalic, atraumatic. Oropharynx is moist, without lesions or  exudate.  Lymph/Hematologic: No occipital, anterior/posterior cervical, supraclavicular, or axillary lymphadenopathy.  CV: RRR, clear S1/S2 without murmur, rub, or gallop. Radial pulses equal bilaterally. No lower extremity edema.  Respiratory: CTA and equal bilaterally. Normal respiratory effort.  GI: Soft, nontender. Normal bowel sounds.  Musculoskeletal: Moving all extremities well. Normal muscle bulk and tone.  Neuro: Memory and cognition appear normal. CN II - XII grossly intact. Symmetrical extremity strength.  Psych: Tearful when discussing home situation. Otherwise normal mood and affect.    Data   Data reviewed today: I reviewed all medications, new labs and imaging results over the last 24 hours. I personally reviewed the EKG tracing showing SR with old inverted T waves but no evidence of acute ischemia and the chest x-ray image(s) showing no effusions or infiltrates with normal cardiac silhouette.    Recent Labs   Lab 01/25/19  0630 01/24/19  1658   WBC  --  6.9   HGB  --  15.5   MCV  --  95   PLT  --  183   NA  --  143   POTASSIUM  --  3.9   CHLORIDE  --  113*   CO2  --  21   BUN  --  21   CR  --  1.40*   ANIONGAP  --  9   ISHMAEL  --  8.6   GLC  --  89   ALBUMIN  --  4.1   PROTTOTAL  --  7.0   BILITOTAL  --  0.6   ALKPHOS  --  57   ALT  --  18   AST  --  18   TROPI <0.015 <0.015     Recent Results (from the past 24 hour(s))   XR Chest 2 Views    Narrative    CHEST TWO VIEWS  January 24, 2019 7:09 PM     HISTORY: Chest pain, shortness of air.    COMPARISON: 2/6/2018.      Impression    IMPRESSION: No acute abnormality. Lungs are well-inflated and clear.  Heart size is normal.    MARIAMA THAYER MD   CT Chest Pulmonary Embolism w Contrast    Narrative    CT CHEST PULMONARY EMBOLISM WITH CONTRAST   1/24/2019 9:02 PM     HISTORY: Shortness of breath. Pain.    TECHNIQUE: 59 mL Isovue-370. Radiation dose for this scan was reduced  using automated exposure control, adjustment of the mA and/or kV  according to  patient size, or iterative reconstruction technique.    COMPARISON: 5/22/2015, 4/21/2014    FINDINGS: There is no evidence of pulmonary embolism or acute thoracic  aortic abnormality. There is minimal coronary atherosclerosis. No  pneumothorax. No pleural or pericardial effusion. The main pulmonary  artery is enlarged, suggestive of pulmonary arterial hypertension.    No thoracic or axillary adenopathy. No pulmonary nodule or mass. No  airspace consolidation. Images through the upper abdomen demonstrate  left hydronephrosis, present on prior studies.      Impression    IMPRESSION:  1. No evidence of pulmonary embolism.  2. Enlarged main pulmonary arteries suggestive of pulmonary arterial  hypertension.  3. Chronic left hydronephrosis.    ELICEO ENGLISH MD

## 2019-01-25 NOTE — ED NOTES
"..Patient has  [place] to Observation  order. Patient has been given the Observation brochure -  What does Observation mean to me.\"  Patient has been given the opportunity to ask questions about observation status and their plan of care.      Ho Arriaga  "

## 2019-01-25 NOTE — PLAN OF CARE
"[facility] ADMISSION NOTE    Patient admitted to room 2310 at approximately 0000 via cart from emergency room. Patient was accompanied by transport tech.     Verbal SBAR report received from KIRBY Chambers prior to patient arrival.     Patient ambulated to bed independently. Patient alert and oriented X 3. The patient is not having any pain. 0-10 Pain Scale: 5. Admission vital signs: Blood pressure 104/46, pulse 69, temperature 96.7  F (35.9  C), temperature source Oral, resp. rate 19, height 1.6 m (5' 3\"), weight 54.1 kg (119 lb 4.3 oz), SpO2 90 %, not currently breastfeeding. Patient was oriented to plan of care, call light, bed controls, tv, telephone, bathroom and visiting hours.     Risk Assessment    The following safety risks were identified during admission: none. Yellow risk band applied: NO.     Skin Initial Assessment    This writer admitted this patient and completed a full skin assessment and Terrance score in the Adult PCS flowsheet. Appropriate interventions initiated as needed.     Secondary skin check completed by Patient refused.    Terrance Risk Assessment  Sensory Perception: 4-->no impairment  Moisture: 4-->rarely moist  Activity: 4-->walks frequently  Mobility: 4-->no limitation  Nutrition: 3-->adequate  Friction and Shear: 3-->no apparent problem  Terrance Score: 22  Bed Support Surface: Atmos Air mattress  Reassessed using Bed Algorithm: Neyda Osullivan      "

## 2019-01-25 NOTE — PLAN OF CARE
"Patient was anxious this morning; asking \"What test is going to be done?  They told me I would get to go home this morning.\"  Also talking about daughter stating \"I worry about her.\"  Patient tearful this morning.  Medicated with lorazepam 1 mg oraly as scheduled.    Patient ambulated in hallway with nursing assistant.  Room air oxygen saturation was 83-84 % with ambulation.  Upon return to room and sitting down; oxygen saturation was 94  % initially.  Then as patient was talking and more anxious her oxygen saturation went down to 84-90 %.  Informed Robert Conway PA-C.  "

## 2019-01-25 NOTE — ED NOTES
Pt updated about tx plan and need for CT scan of chest. Pt expressed understanding. Pt informed this writer her daughter was coming and bringing her [employer] to eat. Pt informed she is not to eat until the provider states it is ok. Pt states the provider stated earlier she would be allowed to eat. Clarified with Dr Conner whom states pt is not to eat. Pt made aware and instructed not to eat. Pt expressed concern because the [employer] will get cold and become soggy. Reassured pt her health and well being is more important than eating [employer].

## 2019-01-28 ENCOUNTER — TELEPHONE (OUTPATIENT)
Dept: FAMILY MEDICINE | Facility: CLINIC | Age: 59
End: 2019-01-28

## 2019-01-28 NOTE — TELEPHONE ENCOUNTER
ED / Discharge Outreach Protocol    Patient Contact    Attempt # 1    Was call answered?  No.  Left message on voicemail with information to call me back.      Pt was admitted on 1/24/19 with chest pain.  She was discharged on 1/25/19 with no known source of the chest pain.    Pt has complex medical history.  She was advised:  1.  Follow up with her pulmonary team.  2.  Follow up with her pulmonary hypertension specialist.  3   Follow up with her PCP within 7 days; Dr Anupama Babcock.    Jenna Wright RN

## 2019-01-29 NOTE — TELEPHONE ENCOUNTER
Attempted to do the ED call back. Patient answered the phone said she didn't want to talk to us as she was treated horrible while here.  She was hungry and we told her she could eat and then we told her she couldn't eat.  Then patient hung up on me.  I have notified Patient relations, Destinee OLIVER RN

## 2019-02-01 ENCOUNTER — TELEPHONE (OUTPATIENT)
Dept: CARDIOLOGY | Facility: CLINIC | Age: 59
End: 2019-02-01

## 2019-02-01 NOTE — TELEPHONE ENCOUNTER
Prior Authorization Specialty Medication Request    Medication/Dose: Tadalafil 20mg  ICD code (if different than what is on RX):    Previously Tried and Failed:      Important Lab Values:   Rationale:     Insurance Name: Medicare  Insurance ID: [id number]    Insurance Name #2: [facility]  Insurance ID: [id number]    Pharmacy Information (if different than what is on RX)  Name:  [facility]  Phone: [phone]

## 2019-02-04 NOTE — TELEPHONE ENCOUNTER
PA Initiation    Medication: Tadalafil 20mg  Insurance Company: [employer] Part D - Phone [phone] Fax [phone]  [facility] Rx[facility] [address] [place] [address] [address] AT [facility] OF [address] [place] [address]  Filling Pharmacy Phone: [phone]  Filling Pharmacy Fax: [phone]  Start Date: 2/4/2019

## 2019-02-07 NOTE — TELEPHONE ENCOUNTER
Prior Authorization Approval    Authorization Effective Date: 1/1/2019  Authorization Expiration Date: 2/4/2020  Medication: Tadalafil 20mg  Approved Dose/Quantity: 60 Tabs/30 days  Insurance Company: [employer] Part D - Phone [phone] Fax [phone]  Which Pharmacy is filling the prescription (Not needed for infusion/clinic administered): [facility] [address] [place], [place] [address]E AT [address]  Pharmacy Notified:  Yes

## 2019-02-13 ENCOUNTER — TELEPHONE (OUTPATIENT)
Dept: ENDOCRINOLOGY | Facility: CLINIC | Age: 59
End: 2019-02-13

## 2019-02-21 ENCOUNTER — RECORDS - HEALTHEAST (OUTPATIENT)
Dept: ADMINISTRATIVE | Facility: OTHER | Age: 59
End: 2019-02-21

## 2019-02-27 ENCOUNTER — TELEPHONE (OUTPATIENT)
Dept: CARDIOLOGY | Facility: CLINIC | Age: 59
End: 2019-02-27

## 2019-02-27 NOTE — TELEPHONE ENCOUNTER
Prior Authorization Approval    Authorization Effective Date: 1/1/2019  Authorization Expiration Date: 2/27/2020  Medication: Opsumit 10 MG - Approved  Approved Dose/Quantity: 30 Tabs/30 days  Insurance Company: [employer] Part D - Phone [phone] Fax [phone]  Which Pharmacy is filling the prescription (Not needed for infusion/clinic administered): [facility] - [address]  Pharmacy Notified:  Yes    Per [facility], a new PA was required as Opsumit is no longer covered under [employer] formulary. PA approval information has been provided to [facility] for processing of the rx.

## 2019-02-27 NOTE — TELEPHONE ENCOUNTER
[facility] Call Center    Phone Message    May a detailed message be left on voicemail: yes    Reason for Call: Other: Per call from PT is requesting for a call back for ECHO results and PT wants to know when she needs to be seen again with Dr Franco.     Action Taken: Message routed to:  [facility] Cardiology   -----------------------------  Sent patient a Victort message answering both questions. Keyanna Mccray RN on 2/27/2019 at 11:47 AM

## 2019-02-28 ENCOUNTER — TELEPHONE (OUTPATIENT)
Dept: PULMONOLOGY | Facility: CLINIC | Age: 59
End: 2019-02-28

## 2019-02-28 NOTE — TELEPHONE ENCOUNTER
Prior Authorization Retail Medication Request    Medication/Dose: Levalbuterol HFA   ICD code (if different than what is on RX):    Previously Tried and Failed:    Rationale:      Insurance Name:    Insurance ID:        Pharmacy Information (if different than what is on RX)  Name:    Phone:

## 2019-03-01 NOTE — TELEPHONE ENCOUNTER
Prior Authorization Not Needed per Insurance    Medication: Levalbuterol-PA NOT NEEDED    Insurance Company: [employer] Part D - Phone [phone] Fax [phone]  Expected CoPay: No Co-pay     Pharmacy Filling the Rx: [facility] [place], [place] - [address] [place] [address] [phone]  Pharmacy Notified: Yes  Patient Notified: No    Pharmacy stated that PA is Not Needed and medication is covered for the brand Xopenex HFA. Patient picked up medication on 12/5/2019 quantity 1 inhaler for 25 day supply.

## 2019-03-18 ENCOUNTER — RECORDS - HEALTHEAST (OUTPATIENT)
Dept: LAB | Facility: CLINIC | Age: 59
End: 2019-03-18

## 2019-03-18 LAB
ALBUMIN SERPL-MCNC: 4.2 G/DL (ref 3.5–5)
ALP SERPL-CCNC: 53 U/L (ref 45–120)
ALT SERPL W P-5'-P-CCNC: 17 U/L (ref 0–45)
ANION GAP SERPL CALCULATED.3IONS-SCNC: 11 MMOL/L (ref 5–18)
AST SERPL W P-5'-P-CCNC: 16 U/L (ref 0–40)
BILIRUB SERPL-MCNC: 0.5 MG/DL (ref 0–1)
BUN SERPL-MCNC: 20 MG/DL (ref 8–22)
CALCIUM SERPL-MCNC: 9.9 MG/DL (ref 8.5–10.5)
CHLORIDE BLD-SCNC: 112 MMOL/L (ref 98–107)
CO2 SERPL-SCNC: 18 MMOL/L (ref 22–31)
CREAT SERPL-MCNC: 1.28 MG/DL (ref 0.6–1.1)
ERYTHROCYTE [DISTWIDTH] IN BLOOD BY AUTOMATED COUNT: 12.5 % (ref 11–14.5)
GFR SERPL CREATININE-BSD FRML MDRD: 43 ML/MIN/1.73M2
GLUCOSE BLD-MCNC: 78 MG/DL (ref 70–125)
HCT VFR BLD AUTO: 44.3 % (ref 35–47)
HGB BLD-MCNC: 14.4 G/DL (ref 12–16)
MCH RBC QN AUTO: 31.9 PG (ref 27–34)
MCHC RBC AUTO-ENTMCNC: 32.5 G/DL (ref 32–36)
MCV RBC AUTO: 98 FL (ref 80–100)
PLATELET # BLD AUTO: 158 THOU/UL (ref 140–440)
PMV BLD AUTO: 12 FL (ref 8.5–12.5)
POTASSIUM BLD-SCNC: 3.9 MMOL/L (ref 3.5–5)
PROT SERPL-MCNC: 6.4 G/DL (ref 6–8)
RBC # BLD AUTO: 4.51 MILL/UL (ref 3.8–5.4)
SODIUM SERPL-SCNC: 141 MMOL/L (ref 136–145)
WBC: 5 THOU/UL (ref 4–11)

## 2019-03-25 DIAGNOSIS — D86.9 SARCOIDOSIS: ICD-10-CM

## 2019-03-25 RX ORDER — LEVALBUTEROL TARTRATE 45 UG/1
2 AEROSOL, METERED ORAL EVERY 6 HOURS PRN
Qty: 1 INHALER | Refills: 11 | Status: ON HOLD | OUTPATIENT
Start: 2019-03-25 | End: 2019-04-09

## 2019-03-28 ENCOUNTER — HOSPITAL ENCOUNTER (INPATIENT)
Facility: CLINIC | Age: 59
LOS: 11 days | Discharge: HOME | DRG: 885 | End: 2019-04-09
Attending: EMERGENCY MEDICINE | Admitting: PSYCHIATRY & NEUROLOGY
Payer: MEDICARE

## 2019-03-28 DIAGNOSIS — D86.9 SARCOIDOSIS: ICD-10-CM

## 2019-03-28 DIAGNOSIS — I27.0 PRIMARY PULMONARY HYPERTENSION (H): ICD-10-CM

## 2019-03-28 DIAGNOSIS — R11.0 NAUSEA: Primary | ICD-10-CM

## 2019-03-28 DIAGNOSIS — R44.0 AUDITORY HALLUCINATIONS: ICD-10-CM

## 2019-03-28 DIAGNOSIS — I50.22 CHRONIC SYSTOLIC HEART FAILURE (H): ICD-10-CM

## 2019-03-28 DIAGNOSIS — F29 PSYCHOSIS, UNSPECIFIED PSYCHOSIS TYPE (H): ICD-10-CM

## 2019-03-28 DIAGNOSIS — F33.3 SEVERE EPISODE OF RECURRENT MAJOR DEPRESSIVE DISORDER, WITH PSYCHOTIC FEATURES (H): ICD-10-CM

## 2019-03-28 LAB
ALBUMIN SERPL-MCNC: 4.2 G/DL (ref 3.4–5)
ALP SERPL-CCNC: 56 U/L (ref 40–150)
ALT SERPL W P-5'-P-CCNC: 25 U/L (ref 0–50)
AMPHETAMINES UR QL SCN: NEGATIVE
ANION GAP SERPL CALCULATED.3IONS-SCNC: 12 MMOL/L (ref 3–14)
AST SERPL W P-5'-P-CCNC: 17 U/L (ref 0–45)
BARBITURATES UR QL: NEGATIVE
BASOPHILS # BLD AUTO: 0.1 10E9/L (ref 0–0.2)
BASOPHILS NFR BLD AUTO: 0.7 %
BENZODIAZ UR QL: NEGATIVE
BILIRUB SERPL-MCNC: 0.7 MG/DL (ref 0.2–1.3)
BUN SERPL-MCNC: 15 MG/DL (ref 7–30)
CALCIUM SERPL-MCNC: 9.3 MG/DL (ref 8.5–10.1)
CANNABINOIDS UR QL SCN: NEGATIVE
CHLORIDE SERPL-SCNC: 112 MMOL/L (ref 94–109)
CO2 SERPL-SCNC: 19 MMOL/L (ref 20–32)
COCAINE UR QL: NEGATIVE
CREAT SERPL-MCNC: 1.21 MG/DL (ref 0.52–1.04)
DIFFERENTIAL METHOD BLD: NORMAL
EOSINOPHIL # BLD AUTO: 0 10E9/L (ref 0–0.7)
EOSINOPHIL NFR BLD AUTO: 0.1 %
ERYTHROCYTE [DISTWIDTH] IN BLOOD BY AUTOMATED COUNT: 12.5 % (ref 10–15)
ETHANOL UR QL SCN: NEGATIVE
GFR SERPL CREATININE-BSD FRML MDRD: 49 ML/MIN/{1.73_M2}
GLUCOSE SERPL-MCNC: 90 MG/DL (ref 70–99)
HCT VFR BLD AUTO: 45.5 % (ref 35–47)
HGB BLD-MCNC: 15.5 G/DL (ref 11.7–15.7)
IMM GRANULOCYTES # BLD: 0 10E9/L (ref 0–0.4)
IMM GRANULOCYTES NFR BLD: 0.1 %
LYMPHOCYTES # BLD AUTO: 0.9 10E9/L (ref 0.8–5.3)
LYMPHOCYTES NFR BLD AUTO: 13.5 %
MCH RBC QN AUTO: 31.9 PG (ref 26.5–33)
MCHC RBC AUTO-ENTMCNC: 34.1 G/DL (ref 31.5–36.5)
MCV RBC AUTO: 94 FL (ref 78–100)
MONOCYTES # BLD AUTO: 0.7 10E9/L (ref 0–1.3)
MONOCYTES NFR BLD AUTO: 9.7 %
NEUTROPHILS # BLD AUTO: 5.1 10E9/L (ref 1.6–8.3)
NEUTROPHILS NFR BLD AUTO: 75.9 %
NRBC # BLD AUTO: 0 10*3/UL
NRBC BLD AUTO-RTO: 0 /100
OPIATES UR QL SCN: NEGATIVE
PLATELET # BLD AUTO: 165 10E9/L (ref 150–450)
POTASSIUM SERPL-SCNC: 4.1 MMOL/L (ref 3.4–5.3)
PROT SERPL-MCNC: 7.1 G/DL (ref 6.8–8.8)
RBC # BLD AUTO: 4.86 10E12/L (ref 3.8–5.2)
SODIUM SERPL-SCNC: 143 MMOL/L (ref 133–144)
WBC # BLD AUTO: 6.7 10E9/L (ref 4–11)

## 2019-03-28 PROCEDURE — 80307 DRUG TEST PRSMV CHEM ANLYZR: CPT | Performed by: EMERGENCY MEDICINE

## 2019-03-28 PROCEDURE — 99284 EMERGENCY DEPT VISIT MOD MDM: CPT | Mod: Z6 | Performed by: EMERGENCY MEDICINE

## 2019-03-28 PROCEDURE — 90791 PSYCH DIAGNOSTIC EVALUATION: CPT

## 2019-03-28 PROCEDURE — 25000132 ZZH RX MED GY IP 250 OP 250 PS 637: Mod: GY | Performed by: EMERGENCY MEDICINE

## 2019-03-28 PROCEDURE — A9270 NON-COVERED ITEM OR SERVICE: HCPCS | Mod: GY | Performed by: EMERGENCY MEDICINE

## 2019-03-28 PROCEDURE — 85025 COMPLETE CBC W/AUTO DIFF WBC: CPT | Performed by: EMERGENCY MEDICINE

## 2019-03-28 PROCEDURE — 81003 URINALYSIS AUTO W/O SCOPE: CPT | Performed by: EMERGENCY MEDICINE

## 2019-03-28 PROCEDURE — 80320 DRUG SCREEN QUANTALCOHOLS: CPT | Performed by: EMERGENCY MEDICINE

## 2019-03-28 PROCEDURE — 99285 EMERGENCY DEPT VISIT HI MDM: CPT | Mod: 25 | Performed by: EMERGENCY MEDICINE

## 2019-03-28 PROCEDURE — 80053 COMPREHEN METABOLIC PANEL: CPT | Performed by: EMERGENCY MEDICINE

## 2019-03-28 RX ORDER — OLANZAPINE 10 MG/1
10 TABLET, ORALLY DISINTEGRATING ORAL ONCE
Status: COMPLETED | OUTPATIENT
Start: 2019-03-28 | End: 2019-03-28

## 2019-03-28 RX ADMIN — OLANZAPINE 10 MG: 10 TABLET, ORALLY DISINTEGRATING ORAL at 22:48

## 2019-03-28 NOTE — ED TRIAGE NOTES
Pt arrived via EMS with a primary complaint of auditory hallucinations. Pt states that she is not sure if she is having auditory hallucinations. Pt states that she has been thinking that people are trapped and hurt in her attic.

## 2019-03-28 NOTE — ED NOTES
Bed: ED16B  Expected date: 3/28/19  Expected time: 4:25 PM  Means of arrival: Ambulance  Comments:  [place] [address] 58yo female, mental health eval

## 2019-03-29 PROBLEM — F29 PSYCHOSIS (H): Status: ACTIVE | Noted: 2019-03-29

## 2019-03-29 LAB
ALBUMIN UR-MCNC: NEGATIVE MG/DL
APPEARANCE UR: CLEAR
BILIRUB UR QL STRIP: NEGATIVE
COLOR UR AUTO: NORMAL
GLUCOSE UR STRIP-MCNC: NEGATIVE MG/DL
HGB UR QL STRIP: NEGATIVE
KETONES UR STRIP-MCNC: NEGATIVE MG/DL
LEUKOCYTE ESTERASE UR QL STRIP: NEGATIVE
NITRATE UR QL: NEGATIVE
PH UR STRIP: 6.5 PH (ref 5–7)
SOURCE: NORMAL
SP GR UR STRIP: 1 (ref 1–1.03)
UROBILINOGEN UR STRIP-MCNC: NORMAL MG/DL (ref 0–2)

## 2019-03-29 PROCEDURE — 25000132 ZZH RX MED GY IP 250 OP 250 PS 637: Mod: GY | Performed by: PHYSICIAN ASSISTANT

## 2019-03-29 PROCEDURE — 99222 1ST HOSP IP/OBS MODERATE 55: CPT | Mod: AI | Performed by: PSYCHIATRY & NEUROLOGY

## 2019-03-29 PROCEDURE — A9270 NON-COVERED ITEM OR SERVICE: HCPCS | Mod: GY | Performed by: PHYSICIAN ASSISTANT

## 2019-03-29 PROCEDURE — A9270 NON-COVERED ITEM OR SERVICE: HCPCS | Mod: GY | Performed by: EMERGENCY MEDICINE

## 2019-03-29 PROCEDURE — 25000132 ZZH RX MED GY IP 250 OP 250 PS 637: Mod: GY | Performed by: EMERGENCY MEDICINE

## 2019-03-29 PROCEDURE — 99222 1ST HOSP IP/OBS MODERATE 55: CPT | Performed by: PHYSICIAN ASSISTANT

## 2019-03-29 PROCEDURE — 25000131 ZZH RX MED GY IP 250 OP 636 PS 637: Mod: GY | Performed by: EMERGENCY MEDICINE

## 2019-03-29 PROCEDURE — 99207 ZZC CONSULT E&M CHANGED TO INITIAL LEVEL: CPT | Performed by: PHYSICIAN ASSISTANT

## 2019-03-29 PROCEDURE — 25000132 ZZH RX MED GY IP 250 OP 250 PS 637: Mod: GY | Performed by: PSYCHIATRY & NEUROLOGY

## 2019-03-29 PROCEDURE — G0177 OPPS/PHP; TRAIN & EDUC SERV: HCPCS

## 2019-03-29 PROCEDURE — A9270 NON-COVERED ITEM OR SERVICE: HCPCS | Mod: GY | Performed by: PSYCHIATRY & NEUROLOGY

## 2019-03-29 PROCEDURE — 12400001 ZZH R&B MH UMMC

## 2019-03-29 RX ORDER — OLANZAPINE 2.5 MG/1
2.5 TABLET, FILM COATED ORAL DAILY
Status: DISCONTINUED | OUTPATIENT
Start: 2019-03-29 | End: 2019-04-01

## 2019-03-29 RX ORDER — BISACODYL 10 MG
10 SUPPOSITORY, RECTAL RECTAL DAILY PRN
Status: DISCONTINUED | OUTPATIENT
Start: 2019-03-29 | End: 2019-04-09 | Stop reason: HOSPADM

## 2019-03-29 RX ORDER — LORAZEPAM 1 MG/1
1 TABLET ORAL 2 TIMES DAILY
Status: DISCONTINUED | OUTPATIENT
Start: 2019-03-29 | End: 2019-04-09 | Stop reason: HOSPADM

## 2019-03-29 RX ORDER — ONDANSETRON 4 MG/1
4 TABLET, ORALLY DISINTEGRATING ORAL EVERY 8 HOURS PRN
Status: DISCONTINUED | OUTPATIENT
Start: 2019-03-29 | End: 2019-04-09 | Stop reason: HOSPADM

## 2019-03-29 RX ORDER — CARVEDILOL 6.25 MG/1
6.25 TABLET ORAL 2 TIMES DAILY WITH MEALS
Status: DISCONTINUED | OUTPATIENT
Start: 2019-03-29 | End: 2019-04-02

## 2019-03-29 RX ORDER — LEVALBUTEROL TARTRATE 45 UG/1
2 AEROSOL, METERED ORAL EVERY 6 HOURS PRN
Status: DISCONTINUED | OUTPATIENT
Start: 2019-03-29 | End: 2019-04-09 | Stop reason: HOSPADM

## 2019-03-29 RX ORDER — AZATHIOPRINE 50 MG/1
50 TABLET ORAL DAILY
Status: DISCONTINUED | OUTPATIENT
Start: 2019-03-29 | End: 2019-04-09 | Stop reason: HOSPADM

## 2019-03-29 RX ORDER — OLANZAPINE 5 MG/1
5-10 TABLET, ORALLY DISINTEGRATING ORAL 3 TIMES DAILY PRN
Status: DISCONTINUED | OUTPATIENT
Start: 2019-03-29 | End: 2019-04-09 | Stop reason: HOSPADM

## 2019-03-29 RX ORDER — OLANZAPINE 10 MG/1
10 TABLET, ORALLY DISINTEGRATING ORAL ONCE
Status: DISCONTINUED | OUTPATIENT
Start: 2019-03-29 | End: 2019-03-29

## 2019-03-29 RX ORDER — BUPROPION HYDROCHLORIDE 200 MG/1
200 TABLET, EXTENDED RELEASE ORAL EVERY MORNING
Status: DISCONTINUED | OUTPATIENT
Start: 2019-03-29 | End: 2019-03-29

## 2019-03-29 RX ORDER — TADALAFIL 10 MG/1
40 TABLET ORAL DAILY
Status: DISCONTINUED | OUTPATIENT
Start: 2019-03-29 | End: 2019-04-09 | Stop reason: HOSPADM

## 2019-03-29 RX ORDER — FUROSEMIDE 20 MG
20 TABLET ORAL DAILY
Status: DISCONTINUED | OUTPATIENT
Start: 2019-03-29 | End: 2019-04-09 | Stop reason: HOSPADM

## 2019-03-29 RX ORDER — POTASSIUM CHLORIDE 750 MG/1
10 TABLET, EXTENDED RELEASE ORAL DAILY
Status: DISCONTINUED | OUTPATIENT
Start: 2019-03-29 | End: 2019-04-09 | Stop reason: HOSPADM

## 2019-03-29 RX ORDER — ALUMINA, MAGNESIA, AND SIMETHICONE 2400; 2400; 240 MG/30ML; MG/30ML; MG/30ML
30 SUSPENSION ORAL EVERY 4 HOURS PRN
Status: DISCONTINUED | OUTPATIENT
Start: 2019-03-29 | End: 2019-04-09 | Stop reason: HOSPADM

## 2019-03-29 RX ORDER — OLANZAPINE 5 MG/1
5 TABLET ORAL AT BEDTIME
Status: DISCONTINUED | OUTPATIENT
Start: 2019-03-29 | End: 2019-04-01

## 2019-03-29 RX ORDER — ACETAMINOPHEN 325 MG/1
650 TABLET ORAL EVERY 4 HOURS PRN
Status: DISCONTINUED | OUTPATIENT
Start: 2019-03-29 | End: 2019-04-09 | Stop reason: HOSPADM

## 2019-03-29 RX ORDER — TRAZODONE HYDROCHLORIDE 50 MG/1
50 TABLET, FILM COATED ORAL
Status: DISCONTINUED | OUTPATIENT
Start: 2019-03-29 | End: 2019-04-09 | Stop reason: HOSPADM

## 2019-03-29 RX ORDER — SIMVASTATIN 10 MG
10 TABLET ORAL AT BEDTIME
Status: DISCONTINUED | OUTPATIENT
Start: 2019-03-29 | End: 2019-04-09 | Stop reason: HOSPADM

## 2019-03-29 RX ORDER — HYDROXYZINE HYDROCHLORIDE 25 MG/1
25 TABLET, FILM COATED ORAL EVERY 4 HOURS PRN
Status: DISCONTINUED | OUTPATIENT
Start: 2019-03-29 | End: 2019-04-09 | Stop reason: HOSPADM

## 2019-03-29 RX ORDER — CALCITRIOL 0.25 UG/1
0.25 CAPSULE, LIQUID FILLED ORAL DAILY
Status: DISCONTINUED | OUTPATIENT
Start: 2019-03-29 | End: 2019-04-09 | Stop reason: HOSPADM

## 2019-03-29 RX ORDER — BUSPIRONE HYDROCHLORIDE 15 MG/1
15 TABLET ORAL 2 TIMES DAILY
Status: DISCONTINUED | OUTPATIENT
Start: 2019-03-29 | End: 2019-04-09 | Stop reason: HOSPADM

## 2019-03-29 RX ADMIN — CALCITRIOL CAPSULES 0.25 MCG 0.25 MCG: 0.25 CAPSULE ORAL at 07:54

## 2019-03-29 RX ADMIN — SIMVASTATIN 10 MG: 10 TABLET, FILM COATED ORAL at 20:50

## 2019-03-29 RX ADMIN — LORAZEPAM 1 MG: 1 TABLET ORAL at 20:47

## 2019-03-29 RX ADMIN — CARVEDILOL 6.25 MG: 6.25 TABLET, FILM COATED ORAL at 07:53

## 2019-03-29 RX ADMIN — OLANZAPINE 2.5 MG: 2.5 TABLET, FILM COATED ORAL at 14:16

## 2019-03-29 RX ADMIN — LORAZEPAM 1 MG: 1 TABLET ORAL at 07:52

## 2019-03-29 RX ADMIN — BUSPIRONE HYDROCHLORIDE 15 MG: 15 TABLET ORAL at 07:55

## 2019-03-29 RX ADMIN — BUSPIRONE HYDROCHLORIDE 15 MG: 15 TABLET ORAL at 20:47

## 2019-03-29 RX ADMIN — BUPROPION HYDROCHLORIDE 200 MG: 200 TABLET, EXTENDED RELEASE ORAL at 07:53

## 2019-03-29 RX ADMIN — AZATHIOPRINE 50 MG: 50 TABLET ORAL at 07:54

## 2019-03-29 RX ADMIN — POTASSIUM CHLORIDE 10 MEQ: 750 TABLET, EXTENDED RELEASE ORAL at 18:47

## 2019-03-29 RX ADMIN — SELEXIPAG 1600 MCG: 1600 TABLET, COATED ORAL at 18:48

## 2019-03-29 RX ADMIN — CARVEDILOL 6.25 MG: 6.25 TABLET, FILM COATED ORAL at 18:47

## 2019-03-29 RX ADMIN — OLANZAPINE 5 MG: 5 TABLET, FILM COATED ORAL at 20:48

## 2019-03-29 RX ADMIN — RANITIDINE 75 MG: 75 TABLET, COATED ORAL at 20:47

## 2019-03-29 RX ADMIN — TADALAFIL 40 MG: 10 TABLET, FILM COATED ORAL at 07:55

## 2019-03-29 RX ADMIN — SELEXIPAG 1600 MCG: 1600 TABLET, COATED ORAL at 08:44

## 2019-03-29 RX ADMIN — MACITENTAN 10 MG: 10 TABLET, FILM COATED ORAL at 09:01

## 2019-03-29 ASSESSMENT — ACTIVITIES OF DAILY LIVING (ADL)
DRESS: SCRUBS (BEHAVIORAL HEALTH)
DRESS: STREET CLOTHES
LAUNDRY: UNABLE TO COMPLETE
HYGIENE/GROOMING: INDEPENDENT
ORAL_HYGIENE: INDEPENDENT
ORAL_HYGIENE: INDEPENDENT
HYGIENE/GROOMING: INDEPENDENT;HANDWASHING;SHOWER
LAUNDRY: WITH SUPERVISION

## 2019-03-29 ASSESSMENT — ENCOUNTER SYMPTOMS
CONFUSION: 0
FEVER: 0
HALLUCINATIONS: 1
HEADACHES: 0
SHORTNESS OF BREATH: 0
COLOR CHANGE: 0
ABDOMINAL PAIN: 0
NECK STIFFNESS: 0
ARTHRALGIAS: 0
EYE REDNESS: 0
DIFFICULTY URINATING: 0

## 2019-03-29 NOTE — PROGRESS NOTES
A               Admission:  I am responsible for any personal items that are not sent to the safe or pharmacy.  Center is not responsible for loss, theft or damage of any property in my possession.    Signature:  _________________________________ Date: _______  Time: _____                                              Staff Signature:  ____________________________ Date: ________  Time: _____      2nd Staff person, if patient is unable/unwilling to sign:    Signature: ________________________________ Date: ________  Time: _____     Discharge:  Center has returned all of my personal belongings:    Signature: _________________________________ Date: ________  Time: _____                                          Staff Signature:  ____________________________ Date: ________  Time: _____
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
## 2019-03-29 NOTE — ED NOTES
Patient confused-in and out of her room. Requesting bathroom and phone. Patient redirected. Calm and cooperative.
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
## 2019-03-29 NOTE — ED NOTES
"Pt continues to state that her \"kids are out in the [place]\" and that she \"is in a long term\". Pt states is disoriented to place and time. Denies auditory hallucinations.   "
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
## 2019-03-29 NOTE — ED NOTES
ED to Behavioral Floor Handoff    SITUATION  Judith Nelson is a 59 year old female who speaks English and lives in a home with family members The patient arrived in the ED by ambulance from home with a complaint of Hallucinations  .The patient's current symptoms started/worsened 1 month(s) ago and during this time the symptoms have increased.   In the ED, pt was diagnosed with PT notes she uses 02 at home that is ordered for night only.  Final diagnoses:   Psychosis, unspecified psychosis type (H)   Auditory hallucinations        Initial vitals were: BP: 145/88  Pulse: 92  Temp: 97.3  F (36.3  C)  Resp: 16  SpO2: (!) 88 %   --------  Is the patient diabetic? No   If yes, last blood glucose? --     If yes, was this treated in the ED? --  --------  Is the patient inebriated (ETOH) No or Impaired on other substances? No  MSSA done? N/A  Last MSSA score: --    Were withdrawal symptoms treated? N/A  Does the patient have a seizure history? No. If yes, date of most recent seizure--  --------  Is the patient patient experiencing suicidal ideation? denies current or recent suicidal ideation     Homicidal ideation? denies current or recent homicidal ideation or behaviors.    Self-injurious behavior/urges? denies current or recent self injurious behavior or ideation.  ------  Was pt aggressive in the ED No  Was a code called No  Is the pt now cooperative? Yes  -------  Meds given in ED:   Medications   OLANZapine zydis (zyPREXA) ODT tab 10 mg (10 mg Oral Not Given 3/29/19 0424)   azaTHIOprine (IMURAN) tablet 50 mg (50 mg Oral Given 3/29/19 0754)   buPROPion (WELLBUTRIN SR) 12 hr tablet 200 mg (200 mg Oral Given 3/29/19 0753)   calcitRIOL (ROCALTROL) capsule 0.25 mcg (0.25 mcg Oral Given 3/29/19 0754)   carvedilol (COREG) tablet 6.25 mg (6.25 mg Oral Given 3/29/19 0753)   furosemide (LASIX) tablet 20 mg (20 mg Oral Not Given 3/29/19 0756)   levalbuterol (XOPENEX HFA) Inhaler 2 puff (not administered)   LORazepam (ATIVAN)  tablet 1 mg (1 mg Oral Given 3/29/19 0752)   macitentan (OPSUMIT) tablet 10 mg (10 mg Oral Given 3/29/19 0901)   ondansetron (ZOFRAN-ODT) ODT tab 4 mg (not administered)   ranitidine (ZANTAC) tablet  mg (75 mg Oral Not Given 3/29/19 0757)   selexipag (UPTRAVI) tablet 1,600 mcg (1,600 mcg Oral Given 3/29/19 0844)   simvastatin (ZOCOR) tablet 10 mg (not administered)   tadalafil (CIALIS/ADCIRCA) tablet (40 mg Oral Given 3/29/19 0755)   busPIRone (BUSPAR) tablet 15 mg (15 mg Oral Given 3/29/19 0755)   OLANZapine zydis (zyPREXA) ODT tab 10 mg (10 mg Oral Given 3/28/19 5778)      Family present during ED course? No  Family currently present? No    BACKGROUND  Does the patient have a cognitive impairment or developmental disability? No  Allergies:   Allergies   Allergen Reactions     Dilaudid [Hydromorphone] Nausea and Vomiting     Morphine Nausea and Vomiting     Latex Rash     From gloves   .   Social demographics are   Social History     Socioeconomic History     Marital status:      Spouse name: Not on file     Number of children: 3     Years of education: 12     Highest education level: Not on file   Occupational History     Employer: UNEMPLOYED   Social Needs     Financial resource strain: Not on file     Food insecurity:     Worry: Not on file     Inability: Not on file     Transportation needs:     Medical: Not on file     Non-medical: Not on file   Tobacco Use     Smoking status: Never Smoker     Smokeless tobacco: Never Used   Substance and Sexual Activity     Alcohol use: No     Alcohol/week: 0.0 oz     Comment: Occ.     Drug use: No     Sexual activity: No     Birth control/protection: Spermicide   Lifestyle     Physical activity:     Days per week: Not on file     Minutes per session: Not on file     Stress: Not on file   Relationships     Social connections:     Talks on phone: Not on file     Gets together: Not on file     Attends Sikh service: Not on file     Active member of club or  organization: Not on file     Attends meetings of clubs or organizations: Not on file     Relationship status: Not on file     Intimate partner violence:     Fear of current or ex partner: Not on file     Emotionally abused: Not on file     Physically abused: Not on file     Forced sexual activity: Not on file   Other Topics Concern     Parent/sibling w/ CABG, MI or angioplasty before 65F 55M? No   Social History Narrative     Not on file        ASSESSMENT  Labs results   Labs Ordered and Resulted from Time of ED Arrival Up to the Time of Departure from the ED   COMPREHENSIVE METABOLIC PANEL - Abnormal; Notable for the following components:       Result Value    Chloride 112 (*)     Carbon Dioxide 19 (*)     Creatinine 1.21 (*)     GFR Estimate 49 (*)     GFR Estimate If Black 57 (*)     All other components within normal limits   DRUG ABUSE SCREEN 6 CHEM DEP URINE ([facility])   CBC WITH PLATELETS DIFFERENTIAL   UA MACROSCOPIC WITH REFLEX TO MICRO AND CULTURE      Imaging Studies: No results found for this or any previous visit (from the past 24 hour(s)).   Most recent vital signs /64   Pulse 88   Temp 97.3  F (36.3  C) (Oral)   Resp 16   SpO2 90%    Abnormal labs/tests/findings requiring intervention:---   Pain control: pt had none  Nausea control: pt had none    RECOMMENDATION  Are any infection precautions needed (MRSA, VRE, etc.)? No If yes, what infection? --  ---  Does the patient have mobility issues? independently. If yes, what device does the pt use? ---  ---  Is patient on 72 hour hold or commitment? Yes If on 72 hour hold, have hold and rights been given to patient? Yes  Are admitting orders written if after 10 p.m. ?N/A  Tasks needing to be completed:---     ANALI VILLARREAL   [facility]-- [phone] [place] [facility]   [phone] [facility] ED
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
## 2019-03-29 NOTE — ED NOTES
Pt now trying to rest. Pt notes she uses 2l O2 NC at night for sleeping. O2 reapplied to pt. Encouraged pt to rest. Lights turned out for comfort.
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
## 2019-03-29 NOTE — CONSULTS
[facility], [place]  Consult Note - Hospitalist Service     Date of Admission:  3/28/2019  Consult Requested by: Dr. Hernandez  Reason for Consult: Medical co-management    Assessment & Plan   Judith Nelson is a 59 year old female with a history of pulmonary hypertension, stress cardiomyopathy, HLD, sarcoidosis, CKD stage III, GERD, iron deficiency anemia, and depression, who was admitted on 3/29/19 to station 30 from the ED for auditory hallucinations and disorganized behavior.     Auditory hallucinations  Depression  Reports hallucinations for the past 1-2 months. PTA on Wellbutrin, Buspar.   - Management per Psychiatry    Pulmonary arterial hypertension: She follows with Dr. Franco in [facility], last saw 1/6/19, will see again in May. Has been stable. Last echo on 1/25 with LVEF 60-65%, moderate TR, very severe RH (RVSP 111). PTA on supplemental oxygen of 2 LPM while sleeping, no needs with activity or at rest. Baseline O2 sats in the low 90s at rest. PTA managed on carvedilol, Opsumit, tadalafil, furosemide with K supplementation, Uptravi, and PRN albuterol.    - Supplemental oxygen per home regimen of 2 LPM while sleeping   - Respiratory Therapy consult for home O2 regimen   - Consider moving patient to room closer to main lobby areas to reduce dyspnea if this is possible   - Albuterol PRN for dyspnea, wheezing   - Continue PTA medication regimen of carvedilol, tadalafil, furosemide with K supplementation, Uptravi, and Opsumit    - Hold carvedilol, furosemide, tadalafil for SBP <100   - Low-sodium diet    - She is scheduled for follow-up in Pulmonary HTN clinic on 5/22    Sarcoidosis: Follows with Dr. Flaherty, last saw in December 2018. PFTs at that time with mild small airway obstruction and mildly decreased diffusion capacity.   - Continue PTA azathioprine    CKD stage III: Secondary to her sarcoidosis. Baseline Cr 1.3, currently at baseline. PTA on calcitriol  "and Prolia injections every 6 months.   - Continue PTA calcitriol    HLD: Continue on PTA simvastatin.     GERD: Continue on PTA Zantac.       The patient's care was discussed with the Bedside Nurse and Patient.    Medicine will sign off. Please page if new questions or concerns arise.     Margarita Oneal PA-C  [facility], [facility] Hospitalist Service  Pager: [phone]      ______________________________________________________________________    Chief Complaint   \"February was a scary month\"    History is obtained from the patient    History of Present Illness   Judith Nelson is a 59 year old female with a history of pulmonary hypertension, stress cardiomyopathy, HLD, sarcoidosis, CKD stage III, GERD, iron deficiency anemia, and depression, who was admitted on 3/29/19 to station 30 from the ED for auditory hallucinations and disorganized behavior. She reports hearing voices over the past 1-2 months, which has been very scary for her. She does not want to talk about this in additional detail.     Currently, she is feeling slightly dyspneic due to walking up and down the hallways here. She gets dyspneic with activity at baseline but typically does not do so much walking at home. She otherwise reports feeling well with no chest pain/pressure, palpitations, leg pain or swelling, nausea/vomiting, abdominal pain, urinary symptoms, cough, or fever/chills.     She has a history of pulmonary hypertension and stress cardiomyopathy and follows closely with Cardiology. Is due to see Cardiology in a couple of weeks. At home she uses supplemental oxygen of 2 LPM while sleeping. No O2 needs with activity or otherwise during the day. She reports she has been stable from a cardiac standpoint for the past couple of months, though did have a scare 3 months ago and thought she was having an MI. Work-up at that time was negative for MI, but did show a stress cardiomyopathy, which has since " resolved on echo.     Her history is otherwise notable for CKD and sarcoid, which have been stable. She has no medical concerns today.     Review of Systems   The 10 point Review of Systems is negative other than noted in the HPI or here.     Past Medical History    I have reviewed this patient's medical history and updated it with pertinent information if needed.   Past Medical History:   Diagnosis Date     Anxiety      CKD (chronic kidney disease), stage III (H)     biopsy suspicious for renal sarcoidosis      Hyperprolactinemia (H)      Lower extremity edema     chronic w/ chronic right ankle ulcer     Lumbar compression fracture (H)      Major depressive disorder     with psychotic features, followed by Dr. Rosales at [facility] in [facility]     patient is post menopausal     MGUS (monoclonal gammopathy of unknown significance)     mild, followed by Dr. Long     Osteoporosis      Other seborrheic keratosis      Protrusio acetabuli      Pulmonary hypertension (H)      Sarcoidosis      Stress-induced cardiomyopathy        Past Surgical History   I have reviewed this patient's surgical history and updated it with pertinent information if needed.  Past Surgical History:   Procedure Laterality Date     C PELVIS/HIP JOINT SURGERY UNLISTED       Csection,  X 2       CV RIGHT HEART CATH N/A 2019    Procedure: 1130 RHC;  Surgeon: Jeanne Angel MD;  Location:  HEART CARDIAC CATH LAB     CYSTOSCOPY, RETROGRADES, INSERT STENT URETER(S), COMBINED  10/2/2013    Procedure: COMBINED CYSTOSCOPY, RETROGRADES, INSERT STENT URETER(S);  Left Retrograde Ureteropyelogram and Ureteral Stent Placement;  Surgeon: SONIA Martinez MD;  Location: [facility] OR     ENDOSCOPIC RETROGRADE CHOLANGIOPANCREATOGRAM  2012    Procedure:ENDOSCOPIC RETROGRADE CHOLANGIOPANCREATOGRAM; ERCP biliary sphincterotomy and stone removal; Surgeon:MARGIE RUGGIEROIQ; Location:[facility] OR     LAPAROSCOPIC CHOLECYSTECTOMY WITH  CHOLANGIOGRAMS  2012    Procedure:LAPAROSCOPIC CHOLECYSTECTOMY WITH CHOLANGIOGRAMS; Surgeon:BRIANA PADILLA; Location:[facility] OR     ORTHOPEDIC SURGERY  10/1/2010    Right hip replacement     ZZ GASTROSCOPY,FL  6/10    Erythematous duodenopathy       Social History   I have reviewed this patient's social history and updated it with pertinent information if needed.  Social History     Tobacco Use     Smoking status: Never Smoker     Smokeless tobacco: Never Used   Substance Use Topics     Alcohol use: No     Alcohol/week: 0.0 oz     Comment: Occ.     Drug use: No       Family History   I have reviewed this patient's family history and updated it with pertinent information if needed.   Family History   Problem Relation Age of Onset     Cancer Mother          age 62 leukemia     Gastrointestinal Disease Mother         diverticulitis     Hypertension Mother      Allergies Mother      Arthritis Mother      Depression Mother      Eye Disorder Mother      Osteoporosis Mother      Anxiety Disorder Mother      Mental Illness Mother      Asthma Mother      Other Cancer Mother      Migraines Mother      C.A.D. Father         MI/CAD      Cancer Father         skin     Blood Disease Father         renal  problem     Hypertension Father      Anesthesia Reaction Father      Cardiovascular Father      Neurologic Disorder Father         Parkinson's disease     Anxiety Disorder Father      Other Cancer Father      Hyperlipidemia Father      Coronary Artery Disease Father      C.A.D. Maternal Grandmother          late 82s MI     Diabetes Maternal Grandmother      Osteoporosis Maternal Grandmother      C.A.D. Maternal Grandfather          mid 80s MI     Alzheimer Disease Paternal Grandmother          80s     Alzheimer Disease Paternal Grandfather          80s     Neurologic Disorder Sister         migraines     Allergies Sister      Diabetes Other         AODM     Neurologic Disorder Sister         migraines      Allergies Sister      Anesthesia Reaction Son      Anesthesia Reaction Daughter      Anesthesia Reaction Daughter      Diabetes Sister         hypoglycemia     Anxiety Disorder Son      Anxiety Disorder Sister      Substance Abuse Sister      Asthma Sister      Asthma Daughter      Depression Son      Hypertension Sister      Hyperlipidemia Sister      Migraines Sister        Medications   Current Facility-Administered Medications   Medication     acetaminophen (TYLENOL) tablet 650 mg     alum & mag hydroxide-simethicone (MYLANTA ES/MAALOX  ES) suspension 30 mL     azaTHIOprine (IMURAN) tablet 50 mg     bisacodyl (DULCOLAX) Suppository 10 mg     busPIRone (BUSPAR) tablet 15 mg     calcitRIOL (ROCALTROL) capsule 0.25 mcg     carvedilol (COREG) tablet 6.25 mg     furosemide (LASIX) tablet 20 mg     hydrOXYzine (ATARAX) tablet 25 mg     levalbuterol (XOPENEX HFA) Inhaler 2 puff     LORazepam (ATIVAN) tablet 1 mg     macitentan (OPSUMIT) tablet 10 mg     magnesium hydroxide (MILK OF MAGNESIA) suspension 30 mL     OLANZapine (zyPREXA) tablet 2.5 mg     OLANZapine (zyPREXA) tablet 5 mg     OLANZapine zydis (zyPREXA) ODT tab 5-10 mg     ondansetron (ZOFRAN-ODT) ODT tab 4 mg     ranitidine (ZANTAC) tablet  mg     selexipag (UPTRAVI) tablet 1,600 mcg     simvastatin (ZOCOR) tablet 10 mg     tadalafil (CIALIS/ADCIRCA) tablet     traZODone (DESYREL) tablet 50 mg       Allergies   Allergies   Allergen Reactions     Dilaudid [Hydromorphone] Nausea and Vomiting     Morphine Nausea and Vomiting     Latex Rash     From gloves       Physical Exam   Vital Signs: Temp: 97.3  F (36.3  C) Temp src: Oral BP: 100/64 Pulse: 88   Resp: 16 SpO2: 90 % O2 Device: None (Room air) Oxygen Delivery: 2 LPM  Weight: 0 lbs 0 oz    Constitutional: Awake and alert, in no apparent distress.   Eyes: Sclera clear, anicteric   ENT: Mucous membranes moist.   Respiratory: Breathing comfortably on room air. CTA bilaterally with no crackles,  wheezing, or rhonchi. Good air entry throughout.   Cardiovascular: RRR, normal S1, split S2. No rubs or murmurs. Intact bilateral pedal pulses. No peripheral edema.   GI: Soft, non-tender, non-distended. No palpable masses or hepatomegaly. Normoactive bowel sounds.   Skin: Good color. No jaundice. No visible rashes, lesions, or bruising of concern.   Neurologic: Alert and oriented. No focal deficits. Moves all extremities. No tremor. Facies symmetric.   Neuropsychiatric: Calm and cooperative.         Data    ROUTINE IP LABS (Last four results)  Recent Labs   Lab 03/28/19  1810      POTASSIUM 4.1   CHLORIDE 112*   CO2 19*   ANIONGAP 12   GLC 90   BUN 15   CR 1.21*   ISHMAEL 9.3   PROTTOTAL 7.1   ALBUMIN 4.2   BILITOTAL 0.7   ALKPHOS 56   AST 17   ALT 25     Recent Labs   Lab 03/28/19  1810   WBC 6.7   RBC 4.86   HGB 15.5   HCT 45.5   MCV 94   MCH 31.9   MCHC 34.1   RDW 12.5        No lab results found in last 7 days.     Glucose Values Latest Ref Rng & Units 3/28/2019   Bedside Glucose (mg/dl )  - --   GLUCOSE 70 - 99 mg/dL 90   Some recent data might be hidden
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
## 2019-03-29 NOTE — ED NOTES
Pt is currently boarding in the ED.  Pt was offered hygiene supplies: Yes.   Pt was offered to ambulate on unit with staff: Yes.  Meal tray ordered for pt: Yes.

## 2019-03-29 NOTE — PLAN OF CARE
"Patient attended 1 OT group with a focus on positive self-image using an acrostic poems. Brainstorming positive character traits for each letter of the alphabet used to increase structure to group and relevant vocabulary. She enjoyed coming up with vocabulary words and demonstrated a good vocabulary. She required repetition of instructions, appearing slightly confused, but she did miss the initial explanation at the beginning of group. She made several comments about how she hasn't drawn in so long and how bad her acrostic poem was turning out using stencils. She positively received feedback that there are always ways to problem solve any errors to look intentional and creative. She lingered in group and looked expectantly at writer. She shared positive feedback about her reaction to group. She shared with writer that the \"marching\" in the hallway made her feel nervous. After prompting she identified that she could use music or go to her room to feel better if she were nervous. SHe was informed about weighted blankets and essential oils as well and provided education on a grounding/calming strategy. Patient was pleasant with range of affect and appeared friendly. She appeared slightly disheveled but with no apparent hygiene concerns. She reported she hopes to get some sleep while in the hospital. She endorsed that she is here because she was not doing self-care and stressors built up to great.  "

## 2019-03-29 NOTE — PROGRESS NOTES
Sent to Security : Envelope [id number]  --> New Envelope # as of 4/5/2019 [id number]    94.00 Cash Bills (NEW TOTAL $54.00)  4.00 in quarters    Total CASH 98.00  [facility] [id number]    **On 4/5/2019 Judith gave her daughter Saritha $40.00 in [place] from her security envelope [id number]. Her new total is $54.00 in cash bills reflected on new envelope # [id number]**    In locker:    1 pair black sketchers  1 black I phone with a white cord   1 belt   1 black chamberlain clicker   1 brown purse with papers  1 brown wallet Joe Marcella   1 Crystal  Coat     Personal clothes were handed out on day shift.      03/29/19 1709   Patient Belongings   Did you bring any home meds/supplements to the hospital?  No   Patient Belongings locker;sent to security per site process   Patient Belongings Remaining with Patient clothing   Patient Belongings Put in Hospital Secure Location (Security or Locker, etc.) clothing   Belongings Search Yes   Clothing Search No

## 2019-03-30 PROCEDURE — 25000132 ZZH RX MED GY IP 250 OP 250 PS 637: Mod: GY | Performed by: PSYCHIATRY & NEUROLOGY

## 2019-03-30 PROCEDURE — 25000132 ZZH RX MED GY IP 250 OP 250 PS 637: Mod: GY | Performed by: EMERGENCY MEDICINE

## 2019-03-30 PROCEDURE — A9270 NON-COVERED ITEM OR SERVICE: HCPCS | Mod: GY | Performed by: PHYSICIAN ASSISTANT

## 2019-03-30 PROCEDURE — 25000132 ZZH RX MED GY IP 250 OP 250 PS 637: Mod: GY | Performed by: PHYSICIAN ASSISTANT

## 2019-03-30 PROCEDURE — A9270 NON-COVERED ITEM OR SERVICE: HCPCS | Mod: GY | Performed by: EMERGENCY MEDICINE

## 2019-03-30 PROCEDURE — 12400001 ZZH R&B MH UMMC

## 2019-03-30 PROCEDURE — H2032 ACTIVITY THERAPY, PER 15 MIN: HCPCS

## 2019-03-30 PROCEDURE — A9270 NON-COVERED ITEM OR SERVICE: HCPCS | Mod: GY | Performed by: PSYCHIATRY & NEUROLOGY

## 2019-03-30 PROCEDURE — 25000131 ZZH RX MED GY IP 250 OP 636 PS 637: Mod: GY | Performed by: EMERGENCY MEDICINE

## 2019-03-30 RX ADMIN — AZATHIOPRINE 50 MG: 50 TABLET ORAL at 09:05

## 2019-03-30 RX ADMIN — MACITENTAN 10 MG: 10 TABLET, FILM COATED ORAL at 09:06

## 2019-03-30 RX ADMIN — CARVEDILOL 6.25 MG: 6.25 TABLET, FILM COATED ORAL at 18:48

## 2019-03-30 RX ADMIN — TADALAFIL 40 MG: 10 TABLET, FILM COATED ORAL at 09:07

## 2019-03-30 RX ADMIN — SELEXIPAG 1600 MCG: 1600 TABLET, COATED ORAL at 09:07

## 2019-03-30 RX ADMIN — CARVEDILOL 6.25 MG: 6.25 TABLET, FILM COATED ORAL at 09:04

## 2019-03-30 RX ADMIN — RANITIDINE 75 MG: 75 TABLET, COATED ORAL at 20:36

## 2019-03-30 RX ADMIN — RANITIDINE 75 MG: 75 TABLET, COATED ORAL at 09:04

## 2019-03-30 RX ADMIN — POTASSIUM CHLORIDE 10 MEQ: 750 TABLET, EXTENDED RELEASE ORAL at 09:05

## 2019-03-30 RX ADMIN — LORAZEPAM 1 MG: 1 TABLET ORAL at 09:04

## 2019-03-30 RX ADMIN — SELEXIPAG 1600 MCG: 1600 TABLET, COATED ORAL at 18:48

## 2019-03-30 RX ADMIN — CALCITRIOL CAPSULES 0.25 MCG 0.25 MCG: 0.25 CAPSULE ORAL at 09:04

## 2019-03-30 RX ADMIN — BUSPIRONE HYDROCHLORIDE 15 MG: 15 TABLET ORAL at 20:35

## 2019-03-30 RX ADMIN — BUSPIRONE HYDROCHLORIDE 15 MG: 15 TABLET ORAL at 09:04

## 2019-03-30 RX ADMIN — LORAZEPAM 1 MG: 1 TABLET ORAL at 20:36

## 2019-03-30 RX ADMIN — OLANZAPINE 5 MG: 5 TABLET, FILM COATED ORAL at 20:36

## 2019-03-30 RX ADMIN — SIMVASTATIN 10 MG: 10 TABLET, FILM COATED ORAL at 20:36

## 2019-03-30 RX ADMIN — OLANZAPINE 2.5 MG: 2.5 TABLET, FILM COATED ORAL at 09:05

## 2019-03-30 ASSESSMENT — ACTIVITIES OF DAILY LIVING (ADL)
DRESS: INDEPENDENT
ORAL_HYGIENE: INDEPENDENT
HYGIENE/GROOMING: INDEPENDENT

## 2019-03-30 NOTE — PLAN OF CARE
Pt isolative and withdrawn for most of the shift. Only brief interactions with peers. Remains guarded and paranoid with many interactions. She appears responding and endorses auditory hallucinations.   Patient became angry when told she could not have shoes or clothes she had in her room because of elopement precautions. She felt singled out and did not understand reasons explained about elopement precautions. She did give clothes and shoes back, but remains guarded.   She was cooperative with medications and vitals.   Vital signs:      BP: 93/57 Pulse: 91   Resp: 18 SpO2: 90 % O2 Device: None (Room air)   Length of stay: 0     Assessment of mood, thought process, response to medications and potential side effects continues.     Patient encouraged to participate in therapeutic groups and develop self-care skills.     Appropriate precautions maintained.
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
## 2019-03-31 PROCEDURE — 25000132 ZZH RX MED GY IP 250 OP 250 PS 637: Mod: GY | Performed by: PHYSICIAN ASSISTANT

## 2019-03-31 PROCEDURE — 12400001 ZZH R&B MH UMMC

## 2019-03-31 PROCEDURE — A9270 NON-COVERED ITEM OR SERVICE: HCPCS | Mod: GY | Performed by: PHYSICIAN ASSISTANT

## 2019-03-31 PROCEDURE — 25000131 ZZH RX MED GY IP 250 OP 636 PS 637: Mod: GY | Performed by: EMERGENCY MEDICINE

## 2019-03-31 PROCEDURE — A9270 NON-COVERED ITEM OR SERVICE: HCPCS | Mod: GY | Performed by: PSYCHIATRY & NEUROLOGY

## 2019-03-31 PROCEDURE — 25000132 ZZH RX MED GY IP 250 OP 250 PS 637: Mod: GY | Performed by: PSYCHIATRY & NEUROLOGY

## 2019-03-31 PROCEDURE — A9270 NON-COVERED ITEM OR SERVICE: HCPCS | Mod: GY | Performed by: EMERGENCY MEDICINE

## 2019-03-31 PROCEDURE — 25000132 ZZH RX MED GY IP 250 OP 250 PS 637: Mod: GY | Performed by: EMERGENCY MEDICINE

## 2019-03-31 RX ORDER — SIMETHICONE 80 MG
80 TABLET,CHEWABLE ORAL 2 TIMES DAILY PRN
Status: DISCONTINUED | OUTPATIENT
Start: 2019-03-31 | End: 2019-04-09 | Stop reason: HOSPADM

## 2019-03-31 RX ADMIN — ACETAMINOPHEN 650 MG: 325 TABLET, FILM COATED ORAL at 01:03

## 2019-03-31 RX ADMIN — CALCITRIOL CAPSULES 0.25 MCG 0.25 MCG: 0.25 CAPSULE ORAL at 08:32

## 2019-03-31 RX ADMIN — TRAZODONE HYDROCHLORIDE 50 MG: 50 TABLET ORAL at 00:21

## 2019-03-31 RX ADMIN — BUSPIRONE HYDROCHLORIDE 15 MG: 15 TABLET ORAL at 20:15

## 2019-03-31 RX ADMIN — CARVEDILOL 6.25 MG: 6.25 TABLET, FILM COATED ORAL at 08:32

## 2019-03-31 RX ADMIN — CARVEDILOL 6.25 MG: 6.25 TABLET, FILM COATED ORAL at 18:38

## 2019-03-31 RX ADMIN — RANITIDINE 150 MG: 75 TABLET, COATED ORAL at 20:15

## 2019-03-31 RX ADMIN — MACITENTAN 10 MG: 10 TABLET, FILM COATED ORAL at 08:34

## 2019-03-31 RX ADMIN — BUSPIRONE HYDROCHLORIDE 15 MG: 15 TABLET ORAL at 08:32

## 2019-03-31 RX ADMIN — OLANZAPINE 2.5 MG: 2.5 TABLET, FILM COATED ORAL at 08:32

## 2019-03-31 RX ADMIN — SELEXIPAG 1600 MCG: 1600 TABLET, COATED ORAL at 20:15

## 2019-03-31 RX ADMIN — TADALAFIL 40 MG: 10 TABLET, FILM COATED ORAL at 09:00

## 2019-03-31 RX ADMIN — LORAZEPAM 1 MG: 1 TABLET ORAL at 08:32

## 2019-03-31 RX ADMIN — ACETAMINOPHEN 650 MG: 325 TABLET, FILM COATED ORAL at 14:46

## 2019-03-31 RX ADMIN — AZATHIOPRINE 50 MG: 50 TABLET ORAL at 08:33

## 2019-03-31 RX ADMIN — OLANZAPINE 5 MG: 5 TABLET, FILM COATED ORAL at 21:00

## 2019-03-31 RX ADMIN — RANITIDINE 75 MG: 75 TABLET, COATED ORAL at 08:32

## 2019-03-31 RX ADMIN — LORAZEPAM 1 MG: 1 TABLET ORAL at 21:00

## 2019-03-31 RX ADMIN — POTASSIUM CHLORIDE 10 MEQ: 750 TABLET, EXTENDED RELEASE ORAL at 08:32

## 2019-03-31 RX ADMIN — SELEXIPAG 1600 MCG: 1600 TABLET, COATED ORAL at 08:33

## 2019-03-31 RX ADMIN — SIMVASTATIN 10 MG: 10 TABLET, FILM COATED ORAL at 20:15

## 2019-03-31 ASSESSMENT — ACTIVITIES OF DAILY LIVING (ADL)
LAUNDRY: WITH SUPERVISION
DRESS: INDEPENDENT;SCRUBS (BEHAVIORAL HEALTH)
HYGIENE/GROOMING: INDEPENDENT
ORAL_HYGIENE: INDEPENDENT

## 2019-03-31 NOTE — PROGRESS NOTES
"Writer monitored pt on SIO this noc shift. Pt on 02 via nasal cannula per order. Pt was noted to have substantial difficulty falling asleep. Had numerous sleep arousals, sleeping for minutes in between arousals. Writer inquired to pt whether she typically has difficulty sleeping, to which pt indicated that maybe she should take her temazepam. Writer indicated that she is unaware of what medications she is prescribed and what she is able to take per medical status, but that unit RN would be notified of her request. PRN Trazodone administered by unit RN. Approximately 30 minutes post administration, pt expressed increased sleepiness. Resting comfortably with HOB elevated with pillows.  Pleasant and cooperative throughout noc shift. Did not make any paranoid statements to writer. Some short-term memory impairments noted.    Medical: (Please see unit RN's note for this shift). At approximately 0045, pt c/o of pain in L calf. Denied any other pain in extremities. Pt verbalized a hx of DVT and PE \"a long time ago, about 10 or 15 years ago.\" Writer notified unit RN who came promptly to assess pt's leg. Bilateral extremities warm to touch. Ankle region of L leg appears slightly larger. Bilateral legs have 1+ edema. Negative for increased swelling in reported area of pain. Reports pain along the outer lateral calf region. Describes as sharp in nature. C/o discomfort when unit RN touched pt's calf during assessment. Reports onset of calf discomfort as sometime in the evening. Unit/charge RN paged Umesh GAONA (see note), and indicates plan to notify if pain persists. PRN Tylenol administered by unit/charge RN. At about 0200, pt endorsed pain again. Indicated that pain was less notable when ambulating to restroom. Unit RN notified and came again to assess pt with writer. Assessment results the same, other than pt reporting pain as lessened. States that the pain \"comes and goes.\" Unit RN measured each of pt's calves in 3 " areas for comparison, with left leg larger in all three areas (see note). Writer inquired again about pt's hx of blood clots, and pt then denied any hx of DVT or PE, with a confused expression on her face (Although pt endorsed having blood clots earlier x2). Pt informs writer that she had leg pain about 1.5 weeks ago that she reported to a doctor, and that MD allegedly did not know the origin of her pain.  0715: Writer obtained pt's weight per unit RN direction. O2 discontinued per unit RN request. Pt endorses mild pain to L calf, reporting it as mostly improved. Writer asked for more information about her MD assessment of the pain, and pt then indicated uncertainty about whether her calf was assessed 1.5 weeks ago. Pt's confusion noted previously by writer.   Pt weight 113.8 pounds on digital scale and 113 lbs on manual scale. Requests medication for gas. Dayshift unit RN notified of this information.   Plan: Unit RN reports she will contact MD for IM Consult order as pain persists.

## 2019-03-31 NOTE — PROGRESS NOTES
Pt reports per home care plan for pulm htn, she weighs herself daily and calls her cardiologist if more than 3 pound weight gain in 24 hours. MD informed and orders recieved.

## 2019-03-31 NOTE — PROGRESS NOTES
Pt denies SI/SIB. Pt appeared guarded during check in writer had to assure her no one was in the [place] listening - she reported she is a private person. She began to become teary when she stated she has not spoken or seen family in a while due to her being so private - she stated it had been since Christmas.     03/30/19 2349   Behavioral Health   Hallucinations denies / not responding to hallucinations   Thinking paranoid  (guarded)   Orientation person: oriented   Memory baseline memory   Insight poor   Judgement intact   Affect blunted, flat   Mood other (see comments)   Suicidality other (see comments)  (pt denies)   Self Injury other (see comment)  (pt denies)   Activities of Daily Living   Hygiene/Grooming independent   Oral Hygiene independent   Dress independent   Room Organization independent   Behavioral Health Interventions   Social and Therapeutic Interventions (Psychotic Symptoms) encourage socialization with peers;encourage effective boundaries with peers;encourage participation in therapeutic groups and milieu activities

## 2019-03-31 NOTE — PROGRESS NOTES
MD notified of decrease in O2 sats, O2 85% on RA and lips noted to be slightly cyanotic. . H.O. Then called per on-call psychatrist order. H.O. Instructed to place on 2Lo2 per NC and re-page if O2 did not increase to >92%. Patient currently on O2, spot check reveals O2 92-94% on 2L. Lips pink.

## 2019-03-31 NOTE — PLAN OF CARE
"Patient displays a neutral to bright affect. Continues hearing low level voices. Friedly and attends groups. Having increased anxiety today,\"I want to be able to celebrate my daughter's birthday today on April 5th.\" States that she slept well. Patient has denies any pain in her leg today, states that it feels fine. Patient likes the Trazadone she tried last night.  "

## 2019-03-31 NOTE — PROGRESS NOTES
Participated in Music Therapy group with focus on mood elevation, validation and decreasing anxiety and improved group cohesiveness. Engaged and cooperative in music listening interventions.   Showed progress in session goals. In and out of group, at time laughing for no apparent reason, other times tearful.

## 2019-03-31 NOTE — PROGRESS NOTES
At 0045 pt began complaining of Left sharp calf pain. Writer and 1:1 staff RN assessed both legs and no visible difference between legs. Pt does have mild edema bilaterally with no pitting. Both legs equally warmth to the touch. 0103 pt was given 650mg tylenol. Pt continued to complain of pain but reported improvement. At 0145 writer obtained bilateral leg measurements to monitor swelling.     Left leg:     Right leg:   Ankle - 9 inch    Ankle - 8.5 inch   7.25 inch - 12.75 inch   7.25 inch - 12.25 inch        (from ankle)        (from ankle)   12.25 inch - 14.75 inch  12.25 inch - 14 inch        (from ankle)        (from ankle)    Night medical flyer contacted and requested her team monitor for increased swelling; if pain persists into day shift then further actions can be taken by medical.

## 2019-04-01 PROCEDURE — 25000132 ZZH RX MED GY IP 250 OP 250 PS 637: Mod: GY | Performed by: PHYSICIAN ASSISTANT

## 2019-04-01 PROCEDURE — 25000132 ZZH RX MED GY IP 250 OP 250 PS 637: Mod: GY | Performed by: PSYCHIATRY & NEUROLOGY

## 2019-04-01 PROCEDURE — 25000131 ZZH RX MED GY IP 250 OP 636 PS 637: Mod: GY | Performed by: EMERGENCY MEDICINE

## 2019-04-01 PROCEDURE — 25000132 ZZH RX MED GY IP 250 OP 250 PS 637: Mod: GY | Performed by: EMERGENCY MEDICINE

## 2019-04-01 PROCEDURE — A9270 NON-COVERED ITEM OR SERVICE: HCPCS | Mod: GY | Performed by: PHYSICIAN ASSISTANT

## 2019-04-01 PROCEDURE — A9270 NON-COVERED ITEM OR SERVICE: HCPCS | Mod: GY | Performed by: PSYCHIATRY & NEUROLOGY

## 2019-04-01 PROCEDURE — A9270 NON-COVERED ITEM OR SERVICE: HCPCS | Mod: GY | Performed by: EMERGENCY MEDICINE

## 2019-04-01 PROCEDURE — 99232 SBSQ HOSP IP/OBS MODERATE 35: CPT | Performed by: PHYSICIAN ASSISTANT

## 2019-04-01 PROCEDURE — 99233 SBSQ HOSP IP/OBS HIGH 50: CPT | Performed by: PSYCHIATRY & NEUROLOGY

## 2019-04-01 PROCEDURE — 12400001 ZZH R&B MH UMMC

## 2019-04-01 PROCEDURE — G0177 OPPS/PHP; TRAIN & EDUC SERV: HCPCS

## 2019-04-01 RX ORDER — OLANZAPINE 10 MG/1
10 TABLET ORAL AT BEDTIME
Status: DISCONTINUED | OUTPATIENT
Start: 2019-04-01 | End: 2019-04-02

## 2019-04-01 RX ORDER — FLUOXETINE 10 MG/1
10 CAPSULE ORAL DAILY
Status: DISCONTINUED | OUTPATIENT
Start: 2019-04-01 | End: 2019-04-05

## 2019-04-01 RX ADMIN — OLANZAPINE 2.5 MG: 2.5 TABLET, FILM COATED ORAL at 08:15

## 2019-04-01 RX ADMIN — RANITIDINE 75 MG: 75 TABLET, COATED ORAL at 08:14

## 2019-04-01 RX ADMIN — SELEXIPAG 1600 MCG: 1600 TABLET, COATED ORAL at 08:15

## 2019-04-01 RX ADMIN — BUSPIRONE HYDROCHLORIDE 15 MG: 15 TABLET ORAL at 08:15

## 2019-04-01 RX ADMIN — TADALAFIL 40 MG: 10 TABLET, FILM COATED ORAL at 08:14

## 2019-04-01 RX ADMIN — RANITIDINE 150 MG: 75 TABLET, COATED ORAL at 19:55

## 2019-04-01 RX ADMIN — FLUOXETINE 10 MG: 10 CAPSULE ORAL at 11:41

## 2019-04-01 RX ADMIN — MACITENTAN 10 MG: 10 TABLET, FILM COATED ORAL at 08:15

## 2019-04-01 RX ADMIN — LORAZEPAM 1 MG: 1 TABLET ORAL at 08:15

## 2019-04-01 RX ADMIN — SELEXIPAG 1600 MCG: 1600 TABLET, COATED ORAL at 19:55

## 2019-04-01 RX ADMIN — AZATHIOPRINE 50 MG: 50 TABLET ORAL at 08:16

## 2019-04-01 RX ADMIN — CALCITRIOL CAPSULES 0.25 MCG 0.25 MCG: 0.25 CAPSULE ORAL at 08:15

## 2019-04-01 RX ADMIN — POTASSIUM CHLORIDE 10 MEQ: 750 TABLET, EXTENDED RELEASE ORAL at 08:14

## 2019-04-01 RX ADMIN — LORAZEPAM 1 MG: 1 TABLET ORAL at 19:55

## 2019-04-01 RX ADMIN — CARVEDILOL 6.25 MG: 6.25 TABLET, FILM COATED ORAL at 19:49

## 2019-04-01 RX ADMIN — SIMVASTATIN 10 MG: 10 TABLET, FILM COATED ORAL at 19:55

## 2019-04-01 RX ADMIN — BUSPIRONE HYDROCHLORIDE 15 MG: 15 TABLET ORAL at 19:55

## 2019-04-01 RX ADMIN — CARVEDILOL 6.25 MG: 6.25 TABLET, FILM COATED ORAL at 08:14

## 2019-04-01 NOTE — PROGRESS NOTES
Writer spoke with on-call psychiatrist to renew SIO. Pt's medical status additionally discussed, including respiratory status, need for SpO2 monitoring orders, and pt's c/o unilateral calf pain last noc. (See previous notes as Umesh was made aware of this pain on night shift. Pain has resolved at this time).  MD requested that pt meet with IM in the AM to review medical status and check orders for completeness. IM order placed.   Order also placed for acquisition of SpO2 monitoring across all shifts. SpO2 monitoring to be Q4H plus additional monitoring PRN. Goal is for pt to have SpO2s of 92% or greater on room air or while on O2.  Order obtained for simethicone per pt c/o gas.    Addendum:  Pt's unit RN spoke with Umesh after she assessed pt and obtained a low SpO2 reading of 85% on room air. Pt tx with O2. Order placed by unit RN for O2 PRN (depending on O2 saturations).     SIO order was modified by writer to reflect new order for PRN O2. (Pt to have O2 administered as needed across all shifts based on saturations).
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
## 2019-04-02 ENCOUNTER — APPOINTMENT (OUTPATIENT)
Dept: CARDIOLOGY | Facility: CLINIC | Age: 59
DRG: 885 | End: 2019-04-02
Attending: PHYSICIAN ASSISTANT
Payer: MEDICARE

## 2019-04-02 ENCOUNTER — APPOINTMENT (OUTPATIENT)
Dept: GENERAL RADIOLOGY | Facility: CLINIC | Age: 59
DRG: 885 | End: 2019-04-02
Attending: PHYSICIAN ASSISTANT
Payer: MEDICARE

## 2019-04-02 LAB
ALBUMIN SERPL-MCNC: 3 G/DL (ref 3.4–5)
ALP SERPL-CCNC: 59 U/L (ref 40–150)
ALT SERPL W P-5'-P-CCNC: 17 U/L (ref 0–50)
ANION GAP SERPL CALCULATED.3IONS-SCNC: 4 MMOL/L (ref 3–14)
ANION GAP SERPL CALCULATED.3IONS-SCNC: 7 MMOL/L (ref 3–14)
AST SERPL W P-5'-P-CCNC: 16 U/L (ref 0–45)
BASOPHILS # BLD AUTO: 0 10E9/L (ref 0–0.2)
BASOPHILS NFR BLD AUTO: 0.6 %
BILIRUB SERPL-MCNC: 0.2 MG/DL (ref 0.2–1.3)
BUN SERPL-MCNC: 28 MG/DL (ref 7–30)
BUN SERPL-MCNC: 37 MG/DL (ref 7–30)
CALCIUM SERPL-MCNC: 7.8 MG/DL (ref 8.5–10.1)
CALCIUM SERPL-MCNC: 8.3 MG/DL (ref 8.5–10.1)
CHLORIDE SERPL-SCNC: 114 MMOL/L (ref 94–109)
CHLORIDE SERPL-SCNC: 121 MMOL/L (ref 94–109)
CO2 SERPL-SCNC: 17 MMOL/L (ref 20–32)
CO2 SERPL-SCNC: 24 MMOL/L (ref 20–32)
CREAT SERPL-MCNC: 1.11 MG/DL (ref 0.52–1.04)
CREAT SERPL-MCNC: 1.25 MG/DL (ref 0.52–1.04)
CRP SERPL-MCNC: <2.9 MG/L (ref 0–8)
DIFFERENTIAL METHOD BLD: ABNORMAL
EOSINOPHIL # BLD AUTO: 0.1 10E9/L (ref 0–0.7)
EOSINOPHIL NFR BLD AUTO: 1.8 %
ERYTHROCYTE [DISTWIDTH] IN BLOOD BY AUTOMATED COUNT: 12.5 % (ref 10–15)
ERYTHROCYTE [DISTWIDTH] IN BLOOD BY AUTOMATED COUNT: 12.7 % (ref 10–15)
GFR SERPL CREATININE-BSD FRML MDRD: 47 ML/MIN/{1.73_M2}
GFR SERPL CREATININE-BSD FRML MDRD: 55 ML/MIN/{1.73_M2}
GLUCOSE SERPL-MCNC: 111 MG/DL (ref 70–99)
GLUCOSE SERPL-MCNC: 85 MG/DL (ref 70–99)
HCT VFR BLD AUTO: 38.9 % (ref 35–47)
HCT VFR BLD AUTO: 39.2 % (ref 35–47)
HGB BLD-MCNC: 12.5 G/DL (ref 11.7–15.7)
HGB BLD-MCNC: 13 G/DL (ref 11.7–15.7)
IMM GRANULOCYTES # BLD: 0 10E9/L (ref 0–0.4)
IMM GRANULOCYTES NFR BLD: 0.2 %
LACTATE BLD-SCNC: 0.8 MMOL/L (ref 0.7–2)
LYMPHOCYTES # BLD AUTO: 1.2 10E9/L (ref 0.8–5.3)
LYMPHOCYTES NFR BLD AUTO: 25.4 %
MCH RBC QN AUTO: 31.4 PG (ref 26.5–33)
MCH RBC QN AUTO: 32.4 PG (ref 26.5–33)
MCHC RBC AUTO-ENTMCNC: 32.1 G/DL (ref 31.5–36.5)
MCHC RBC AUTO-ENTMCNC: 33.2 G/DL (ref 31.5–36.5)
MCV RBC AUTO: 98 FL (ref 78–100)
MCV RBC AUTO: 98 FL (ref 78–100)
MONOCYTES # BLD AUTO: 0.5 10E9/L (ref 0–1.3)
MONOCYTES NFR BLD AUTO: 10 %
NEUTROPHILS # BLD AUTO: 3 10E9/L (ref 1.6–8.3)
NEUTROPHILS NFR BLD AUTO: 62 %
NRBC # BLD AUTO: 0 10*3/UL
NRBC BLD AUTO-RTO: 0 /100
PLATELET # BLD AUTO: 111 10E9/L (ref 150–450)
PLATELET # BLD AUTO: 130 10E9/L (ref 150–450)
POTASSIUM SERPL-SCNC: 3.9 MMOL/L (ref 3.4–5.3)
POTASSIUM SERPL-SCNC: 4.2 MMOL/L (ref 3.4–5.3)
PROCALCITONIN SERPL-MCNC: <0.05 NG/ML
PROT SERPL-MCNC: 5.3 G/DL (ref 6.8–8.8)
RBC # BLD AUTO: 3.98 10E12/L (ref 3.8–5.2)
RBC # BLD AUTO: 4.01 10E12/L (ref 3.8–5.2)
SODIUM SERPL-SCNC: 142 MMOL/L (ref 133–144)
SODIUM SERPL-SCNC: 145 MMOL/L (ref 133–144)
WBC # BLD AUTO: 4.5 10E9/L (ref 4–11)
WBC # BLD AUTO: 4.9 10E9/L (ref 4–11)

## 2019-04-02 PROCEDURE — 99207 ZZC CDG-MDM COMPONENT: MEETS LOW - DOWN CODED: CPT | Performed by: PHYSICIAN ASSISTANT

## 2019-04-02 PROCEDURE — 85025 COMPLETE CBC W/AUTO DIFF WBC: CPT | Performed by: DERMATOLOGY

## 2019-04-02 PROCEDURE — A9270 NON-COVERED ITEM OR SERVICE: HCPCS | Mod: GY | Performed by: PHYSICIAN ASSISTANT

## 2019-04-02 PROCEDURE — 87040 BLOOD CULTURE FOR BACTERIA: CPT | Performed by: DERMATOLOGY

## 2019-04-02 PROCEDURE — 25000128 H RX IP 250 OP 636: Performed by: DERMATOLOGY

## 2019-04-02 PROCEDURE — 71046 X-RAY EXAM CHEST 2 VIEWS: CPT

## 2019-04-02 PROCEDURE — 25000132 ZZH RX MED GY IP 250 OP 250 PS 637: Mod: GY | Performed by: PHYSICIAN ASSISTANT

## 2019-04-02 PROCEDURE — 93306 TTE W/DOPPLER COMPLETE: CPT

## 2019-04-02 PROCEDURE — 25000131 ZZH RX MED GY IP 250 OP 636 PS 637: Mod: GY | Performed by: EMERGENCY MEDICINE

## 2019-04-02 PROCEDURE — 25000132 ZZH RX MED GY IP 250 OP 250 PS 637: Mod: GY | Performed by: DERMATOLOGY

## 2019-04-02 PROCEDURE — 86140 C-REACTIVE PROTEIN: CPT | Performed by: DERMATOLOGY

## 2019-04-02 PROCEDURE — 83605 ASSAY OF LACTIC ACID: CPT | Performed by: DERMATOLOGY

## 2019-04-02 PROCEDURE — 85027 COMPLETE CBC AUTOMATED: CPT | Performed by: DERMATOLOGY

## 2019-04-02 PROCEDURE — 12400001 ZZH R&B MH UMMC

## 2019-04-02 PROCEDURE — A9270 NON-COVERED ITEM OR SERVICE: HCPCS | Mod: GY | Performed by: PSYCHIATRY & NEUROLOGY

## 2019-04-02 PROCEDURE — A9270 NON-COVERED ITEM OR SERVICE: HCPCS | Mod: GY | Performed by: EMERGENCY MEDICINE

## 2019-04-02 PROCEDURE — 25000132 ZZH RX MED GY IP 250 OP 250 PS 637: Mod: GY | Performed by: PSYCHIATRY & NEUROLOGY

## 2019-04-02 PROCEDURE — 99232 SBSQ HOSP IP/OBS MODERATE 35: CPT | Performed by: PHYSICIAN ASSISTANT

## 2019-04-02 PROCEDURE — 80053 COMPREHEN METABOLIC PANEL: CPT | Performed by: DERMATOLOGY

## 2019-04-02 PROCEDURE — 84145 PROCALCITONIN (PCT): CPT | Performed by: DERMATOLOGY

## 2019-04-02 PROCEDURE — 93010 ELECTROCARDIOGRAM REPORT: CPT | Performed by: INTERNAL MEDICINE

## 2019-04-02 PROCEDURE — 25000132 ZZH RX MED GY IP 250 OP 250 PS 637: Mod: GY | Performed by: EMERGENCY MEDICINE

## 2019-04-02 PROCEDURE — 36415 COLL VENOUS BLD VENIPUNCTURE: CPT | Performed by: DERMATOLOGY

## 2019-04-02 PROCEDURE — 80048 BASIC METABOLIC PNL TOTAL CA: CPT | Performed by: DERMATOLOGY

## 2019-04-02 PROCEDURE — 93306 TTE W/DOPPLER COMPLETE: CPT | Mod: 26 | Performed by: INTERNAL MEDICINE

## 2019-04-02 PROCEDURE — A9270 NON-COVERED ITEM OR SERVICE: HCPCS | Mod: GY | Performed by: DERMATOLOGY

## 2019-04-02 PROCEDURE — 99233 SBSQ HOSP IP/OBS HIGH 50: CPT | Performed by: PSYCHIATRY & NEUROLOGY

## 2019-04-02 RX ORDER — OLANZAPINE 2.5 MG/1
2.5 TABLET, FILM COATED ORAL DAILY
Status: DISCONTINUED | OUTPATIENT
Start: 2019-04-03 | End: 2019-04-09 | Stop reason: HOSPADM

## 2019-04-02 RX ORDER — OLANZAPINE 5 MG/1
5 TABLET ORAL AT BEDTIME
Status: DISCONTINUED | OUTPATIENT
Start: 2019-04-02 | End: 2019-04-09 | Stop reason: HOSPADM

## 2019-04-02 RX ORDER — CARVEDILOL 3.12 MG/1
3.12 TABLET ORAL 2 TIMES DAILY WITH MEALS
Status: DISCONTINUED | OUTPATIENT
Start: 2019-04-02 | End: 2019-04-09 | Stop reason: HOSPADM

## 2019-04-02 RX ADMIN — SELEXIPAG 1600 MCG: 1600 TABLET, COATED ORAL at 08:40

## 2019-04-02 RX ADMIN — CARVEDILOL 3.12 MG: 3.12 TABLET, FILM COATED ORAL at 08:39

## 2019-04-02 RX ADMIN — SELEXIPAG 1600 MCG: 1600 TABLET, COATED ORAL at 21:14

## 2019-04-02 RX ADMIN — BUSPIRONE HYDROCHLORIDE 15 MG: 15 TABLET ORAL at 08:36

## 2019-04-02 RX ADMIN — LEVALBUTEROL TARTRATE 2 PUFF: 45 AEROSOL, METERED ORAL at 21:14

## 2019-04-02 RX ADMIN — BUSPIRONE HYDROCHLORIDE 15 MG: 15 TABLET ORAL at 21:13

## 2019-04-02 RX ADMIN — OLANZAPINE 5 MG: 5 TABLET, FILM COATED ORAL at 21:13

## 2019-04-02 RX ADMIN — SIMVASTATIN 10 MG: 10 TABLET, FILM COATED ORAL at 21:13

## 2019-04-02 RX ADMIN — TADALAFIL 40 MG: 10 TABLET, FILM COATED ORAL at 08:36

## 2019-04-02 RX ADMIN — LORAZEPAM 1 MG: 1 TABLET ORAL at 08:36

## 2019-04-02 RX ADMIN — FLUOXETINE 10 MG: 10 CAPSULE ORAL at 08:36

## 2019-04-02 RX ADMIN — RANITIDINE 75 MG: 75 TABLET, COATED ORAL at 21:13

## 2019-04-02 RX ADMIN — SODIUM CHLORIDE 1000 ML: 9 INJECTION, SOLUTION INTRAVENOUS at 03:27

## 2019-04-02 RX ADMIN — CALCITRIOL CAPSULES 0.25 MCG 0.25 MCG: 0.25 CAPSULE ORAL at 08:36

## 2019-04-02 RX ADMIN — OLANZAPINE 10 MG: 10 TABLET, FILM COATED ORAL at 00:13

## 2019-04-02 RX ADMIN — AZATHIOPRINE 50 MG: 50 TABLET ORAL at 08:39

## 2019-04-02 RX ADMIN — SODIUM CHLORIDE 1000 ML: 9 INJECTION, SOLUTION INTRAVENOUS at 00:41

## 2019-04-02 RX ADMIN — CARVEDILOL 3.12 MG: 3.12 TABLET, FILM COATED ORAL at 17:37

## 2019-04-02 RX ADMIN — MACITENTAN 10 MG: 10 TABLET, FILM COATED ORAL at 08:40

## 2019-04-02 RX ADMIN — RANITIDINE 150 MG: 75 TABLET, COATED ORAL at 08:37

## 2019-04-02 RX ADMIN — LORAZEPAM 1 MG: 1 TABLET ORAL at 21:13

## 2019-04-02 RX ADMIN — POTASSIUM CHLORIDE 10 MEQ: 750 TABLET, EXTENDED RELEASE ORAL at 08:36

## 2019-04-02 RX ADMIN — HYDROXYZINE HYDROCHLORIDE 25 MG: 25 TABLET ORAL at 17:37

## 2019-04-02 NOTE — PROGRESS NOTES
Oxygen saturation 86% on 2L. Wtr increased to 3L and rechecked; increased to 94%.     Wtr notified moonlighter of low bp (75/45) and lethargy. Moonlighter to come and assess patient. Labs/EKG ordered as well as bolus of normal saline.

## 2019-04-02 NOTE — PROVIDER NOTIFICATION
Writer noticed a discrepancy in the oxygen saturation goal for pt. (Two orders in the chart specified different SpO2 goals). IM today switched pt's SpO2 goal from 92% to 90% in the SpO2 monitoring order, while the O2 order indicated the goal of keeping sats above 92%. O2 order was noted to not specify parameters for how many Liters per minute to administer. Pt required 3 L per minute on nocs last night as IM's note from today indicates. Writer spoke with Umesh who resolved the discrepancy, and both orders now show SpO2 goal as 90%. Umesh modified O2 order to indicate that O2 should be titrated, starting with lowest needed dose. Attending psychiatrist notified of this information, and requests that IM be paged if pt requires more than 3 L per minute to keep O2 sats above 90%.   Pt's unit RN notified of this information.

## 2019-04-02 NOTE — PROGRESS NOTES
Bolus completed. Nearly 1,000ml NS infused. BP rechecked; 96/53. Tubing disconnected but saline lock remains. Spoke with batsheva about improved bp. Labs reordered for AM to further monitor.     Second bolus initiated at 3:57 due to ongoing hypotension. Normal saline to infuse 250ml/hr.     Vitals assessed at 0500: bp=81/46, hr 75. Wtr spoke with batsheva, who is continuing to follow patient.
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
## 2019-04-02 NOTE — PLAN OF CARE
Patient attended leisure health and coping group, engaged in a structured group activity. She required several cues and reminders for rules of the activity. She became slightly confused to the process a couple times. With prompting she participated and processed her turn, but required assistance to set up her space and remove confusing stimuli. She laughed several times. She appeared guarded but maintained a friendly demeanor with writer.

## 2019-04-02 NOTE — PROGRESS NOTES
"Brief Medicine Note    Hypotensive to 70s-80s/40s-50s overnight. Required 2L IVF bolus. BP improved to 115-127/70s today. Judith reports she was asymptomatic with hypotensive events overnight, but has been feeling \"a little\" light-headed today both at rest and with postural changes. Breathing unchanged from baseline.    /69   Pulse 83   Temp 97.4  F (36.3  C) (Oral)   Resp 16   Wt 55.2 kg (121 lb 12.8 oz)   SpO2 93%   BMI 21.58 kg/m    General - Awake. Non-toxic appearing. NAD.  HEENT - NC/AT. Anicteric sclera. Mucous membranes moist.  CV - RRR with split S2.  Respiratory - Normal effort on RA. Lungs CTAB without wheezes, rales, or rhonchi.  Skin - Warm and well perfused. No rashes or jaundice on exposed areas.    Assessment and Plan - Judith Nelson is a 59 year old female with a history of pulmonary HTN, stress cardiomyopathy, sarcoidosis, CKD III, iron deficiency anemia, HLD, GERD, and depression who was admitted to station 30 on 3/29/19 with auditory hallucinations and disorganized behavior.    Pulmonary Arterial Hypertension - Followed by Dr. Franco at [facility], last visit 1/6/19 without changes to plan of care. Last Echo (4/2/19) with LVEF 60-65%, mild RVH, normal RV fxn, mild RV dilation, moderate-severe TR, and very severe PH (RVSP 120 mmHg). On 2L O2 while sleeping at home with intermittent non-compliance. Baseline O2 sats in low 90s on RA during the day. Intermittently requiring 3L O2 overnight since inpatient psychiatry admission, daytime O2 sats and respiratory status at baseline.  - Discussed higher RVSP measurement on today's Echo with Cardiology with recommendations for gentle diuresis with PO Lasix 20 mg daily and close OP follow up.  - Monitor O2 sats q 4h.   - Supplemental O2 prn to maintain sats >90%.  - Coreg decreased to 3.125 mg BID in setting of hypotension. Resume home dosing (6.25 mg BID) as able.  - Continue PTA Opsumit, Tadalafil, Uptravi, and PRN Xopenex  - " Low-sodium diet   - Follow-up in Pulmonary HTN clinic as scheduled on 5/22. Will have HUC try to schedule sooner.     Hypotension, Resolved - BP 75-85/45-50 overnight from 4/1-4/2. Suspect due to medications (Carvedilol, Lasix, Tadalafil) with probable non-compliance PTA. S/p 2L IVF bolus. BP improved to 115-127/70s today. Afebrile. WBC and Lactic Acid wnl. Negative Procal and CRP. No localizing s/s of infection. Infectious work-up (Blood Cx x 2, CXR) negative to date. Repeat Echo 4/2 without new findings to explain hypotension.  - UA ordered to complete infectious work-up, not yet obtained.  - Continue medications as above with hold parameters.  - Monitor vitals q 4h.   - Caution with additional fluid boluses given severe underlying R-sided heart pathology.    CKD - Baseline Cr ~1.3-1.5. Cr improved from baseline this admission, most recently 1.11 (1.25) on 4/2.  - Trend BMP on 4/3    NAGMA - Bicarb 17 (19), Chloride 121 (112), Sodium 145 (143) today. Unclear etiology. No diarrhea and Cr improved from baseline without evidence of RTA.    - Trend BMP on 4/3    Medicine will continue to follow.    Bharti Kirby PA-C  Hospitalist Service  [phone]

## 2019-04-02 NOTE — PROGRESS NOTES
[place] note    Notified that patient's BP was 75/45 on last check, HR 92. Her evening carvedilol has previously been held for BP 99/51 but was given when recheck was 113/62. She is also on tadalafil for pulmonary hypertension (takes in AM).    Went to assess patient. She was sleeping but awoke to answer questions, at times requiring repeat stimulation. Some paranoid/delusional thoughts but otherwise oriented x3. No chest pain, SOB, nausea/vomiting. Has mild abdominal pain, she's not sure if this is new. She is on 3L NC which she wears while sleeping due to history of pulmonary htn.    Exam:  BP (!) 75/45   Pulse 92   Temp 97.5  F (36.4  C) (Oral)   Resp 16   Wt 51.7 kg (114 lb)   SpO2 94%   BMI 20.19 kg/m    Weight stable  52.1 --> 51.7 kg.  GEN: as above  PULM: CTAB  CV: RRR  Abdo: soft, non-distended, mildly tender to palpation L>R   Ext: no edema    EKG no ST elevation/depression or TWI. Similar to EKG from 1/24/19.    Most suspicious for hypotension 2/2 meds (carvedilol, on tadalafil) in absence of other signs/symptoms but will evaluate for alternate causes as well. She is not taking her lasix per MAR and appears euvolemic on exam.    Plan:  - 1L NS   - CBC, CMP, blood cultures, lactate    Blanca Blanchard MD  [facility]    Addendum: Recheck BP during fluid bolus 88/52. Labs largely reassuring. Normal WBC, normal lactate. Cr stable but slight elevation in BUN to 37. Hgb normal but did trend from 15.5 --> 13.0. Plts slightly low at 130, recently were 165 but have been intermittently low in past. Protein/albumin also lower than previous so possibly some dilution? Will plan to recheck labs in AM. Monitor for bleeding.    Addendum: Following fluid bolus, BP improved to 96/53 which is near patient's recent baseline (had higher pressures on admit but outpatient pressures often with SBP low 100s). Did keep carvedilol and tadalafil ordered in AM but low threshold to hold if BP remains soft. May need dose  adjustments if this is ongoing trend.    Addendum: Recheck BP 81/47 about 1 hour after completing fluid bolus. Other vitals stable. No other clinical changes. Patient was up to use bathroom but otherwise still sleeping. Will give another liter NS overnight. Reduced AM carvedilol to 3.125 mg BID. Continue to monitor.    Addendum: Recheck BP stably low at 81/46 about ~1/2 through 2nd fluid bolus. No other clinical changes. Continue remainder of fluid bolus and recheck labs in AM, at which time evening carvedilol will hopefully have warn off and patient will be no longer sleeping. If hypotension persists, could consider other unlikely causes of hypotension such as PE or further assess cardiac function with TTE but low suspicion with other stable vitals/lack of symptoms.     Appreciate nursing help.

## 2019-04-02 NOTE — PLAN OF CARE
Pt has been in and out of her room.  Attended one group this shift although did come out for meals.   Pt continues to be paranoid and also reported elevated anxiety.  Pt has been med compliant.  Pt did report feeling lightheaded and was encouraged to drink fluids.  Bp this shift was 127/69 and 119/77,  no hypotension this shift. No SI/SIB. Will continue to assess and provide for safety as needed.

## 2019-04-02 NOTE — PROGRESS NOTES
Pt 1800 VS were BP sitting 99/51 and standing 93/40, HR sitting 77 and standing 76. Writer held scheduled 1800 dose of Carvedilol 6.25 mg per order parameters SBP<100. Reassessed VS at 1946, BP and HR were within order parameters /62 and HR 83, so held dose of Carvedilol was administered.

## 2019-04-02 NOTE — PROVIDER NOTIFICATION
04/02/19 0749   Vital Signs   Temp 98.2  F (36.8  C)   Pulse 59   Pulse/Heart Rate Source Monitor   BP 97/49   BP Location Right arm     Bp rechecked.  Bolus completed, tubing disconnected, saline lock remains.  Pt reports  feeling tired.  A little paranoid this morning.  Will continue to assess pt.

## 2019-04-03 LAB
ANION GAP SERPL CALCULATED.3IONS-SCNC: 6 MMOL/L (ref 3–14)
BUN SERPL-MCNC: 25 MG/DL (ref 7–30)
CALCIUM SERPL-MCNC: 8.6 MG/DL (ref 8.5–10.1)
CHLORIDE SERPL-SCNC: 117 MMOL/L (ref 94–109)
CO2 SERPL-SCNC: 22 MMOL/L (ref 20–32)
CREAT SERPL-MCNC: 1.35 MG/DL (ref 0.52–1.04)
GFR SERPL CREATININE-BSD FRML MDRD: 43 ML/MIN/{1.73_M2}
GLUCOSE SERPL-MCNC: 88 MG/DL (ref 70–99)
POTASSIUM SERPL-SCNC: 4.2 MMOL/L (ref 3.4–5.3)
SODIUM SERPL-SCNC: 145 MMOL/L (ref 133–144)

## 2019-04-03 PROCEDURE — G0177 OPPS/PHP; TRAIN & EDUC SERV: HCPCS

## 2019-04-03 PROCEDURE — 25000131 ZZH RX MED GY IP 250 OP 636 PS 637: Mod: GY | Performed by: EMERGENCY MEDICINE

## 2019-04-03 PROCEDURE — A9270 NON-COVERED ITEM OR SERVICE: HCPCS | Mod: GY | Performed by: EMERGENCY MEDICINE

## 2019-04-03 PROCEDURE — A9270 NON-COVERED ITEM OR SERVICE: HCPCS | Mod: GY | Performed by: PHYSICIAN ASSISTANT

## 2019-04-03 PROCEDURE — A9270 NON-COVERED ITEM OR SERVICE: HCPCS | Mod: GY | Performed by: PSYCHIATRY & NEUROLOGY

## 2019-04-03 PROCEDURE — 25000132 ZZH RX MED GY IP 250 OP 250 PS 637: Mod: GY | Performed by: EMERGENCY MEDICINE

## 2019-04-03 PROCEDURE — 12400001 ZZH R&B MH UMMC

## 2019-04-03 PROCEDURE — 25000132 ZZH RX MED GY IP 250 OP 250 PS 637: Mod: GY | Performed by: PSYCHIATRY & NEUROLOGY

## 2019-04-03 PROCEDURE — 80048 BASIC METABOLIC PNL TOTAL CA: CPT | Performed by: PHYSICIAN ASSISTANT

## 2019-04-03 PROCEDURE — 25000132 ZZH RX MED GY IP 250 OP 250 PS 637: Mod: GY | Performed by: PHYSICIAN ASSISTANT

## 2019-04-03 PROCEDURE — 25000132 ZZH RX MED GY IP 250 OP 250 PS 637: Mod: GY | Performed by: DERMATOLOGY

## 2019-04-03 PROCEDURE — A9270 NON-COVERED ITEM OR SERVICE: HCPCS | Mod: GY | Performed by: DERMATOLOGY

## 2019-04-03 PROCEDURE — 36415 COLL VENOUS BLD VENIPUNCTURE: CPT | Performed by: PHYSICIAN ASSISTANT

## 2019-04-03 RX ADMIN — BUSPIRONE HYDROCHLORIDE 15 MG: 15 TABLET ORAL at 20:51

## 2019-04-03 RX ADMIN — TADALAFIL 40 MG: 10 TABLET, FILM COATED ORAL at 08:39

## 2019-04-03 RX ADMIN — SELEXIPAG 1600 MCG: 1600 TABLET, COATED ORAL at 08:40

## 2019-04-03 RX ADMIN — RANITIDINE 75 MG: 75 TABLET, COATED ORAL at 08:39

## 2019-04-03 RX ADMIN — CALCITRIOL CAPSULES 0.25 MCG 0.25 MCG: 0.25 CAPSULE ORAL at 08:39

## 2019-04-03 RX ADMIN — SELEXIPAG 1600 MCG: 1600 TABLET, COATED ORAL at 21:00

## 2019-04-03 RX ADMIN — MACITENTAN 10 MG: 10 TABLET, FILM COATED ORAL at 08:41

## 2019-04-03 RX ADMIN — AZATHIOPRINE 50 MG: 50 TABLET ORAL at 08:41

## 2019-04-03 RX ADMIN — SIMVASTATIN 10 MG: 10 TABLET, FILM COATED ORAL at 20:51

## 2019-04-03 RX ADMIN — POTASSIUM CHLORIDE 10 MEQ: 750 TABLET, EXTENDED RELEASE ORAL at 08:40

## 2019-04-03 RX ADMIN — OLANZAPINE 5 MG: 5 TABLET, FILM COATED ORAL at 20:51

## 2019-04-03 RX ADMIN — BUSPIRONE HYDROCHLORIDE 15 MG: 15 TABLET ORAL at 08:39

## 2019-04-03 RX ADMIN — RANITIDINE 150 MG: 75 TABLET, COATED ORAL at 20:51

## 2019-04-03 RX ADMIN — LORAZEPAM 1 MG: 1 TABLET ORAL at 20:51

## 2019-04-03 RX ADMIN — CARVEDILOL 3.12 MG: 3.12 TABLET, FILM COATED ORAL at 17:49

## 2019-04-03 RX ADMIN — OLANZAPINE 2.5 MG: 2.5 TABLET, FILM COATED ORAL at 08:39

## 2019-04-03 RX ADMIN — FUROSEMIDE 20 MG: 20 TABLET ORAL at 08:39

## 2019-04-03 RX ADMIN — LORAZEPAM 1 MG: 1 TABLET ORAL at 08:41

## 2019-04-03 RX ADMIN — CARVEDILOL 3.12 MG: 3.12 TABLET, FILM COATED ORAL at 08:39

## 2019-04-03 RX ADMIN — FLUOXETINE 10 MG: 10 CAPSULE ORAL at 08:39

## 2019-04-03 ASSESSMENT — ACTIVITIES OF DAILY LIVING (ADL)
LAUNDRY: WITH SUPERVISION
DRESS: INDEPENDENT
HYGIENE/GROOMING: INDEPENDENT
ORAL_HYGIENE: INDEPENDENT

## 2019-04-03 NOTE — PROGRESS NOTES
04/02/19 2100   Therapeutic Recreation   Type of Intervention structured groups   Activity game   Response Observes from a distance     Pt entered Therapeutic Recreation group with focus on stress reduction, creative expression, socialization, and leisure participation. Pt stated she did not want to participate in the group recreational game and asked if she could just watch. Pt stated she did not like to draw, which was a main component of the activity. This writer encouraged to try acting out the words instead of drawing for an adaption for the game, but pt chose just to observe. Pt stated she was waiting for a visit with her daughter several different times and began tearing up when she said her son likes to draw. Pt observed group for a few more minutes and then left group.

## 2019-04-03 NOTE — PROGRESS NOTES
Brief Medicine Note    Judith Nelson is a 59 year old female with a history of pulmonary HTN, stress cardiomyopathy, sarcoidosis, CKD III, iron deficiency anemia, HLD, GERD, and depression who was admitted to station 30 on 3/29/19 with auditory hallucinations and disorganized behavior. Medicine following for PAH, recent hypotension, CKD, and NAGMA.    Pulmonary Arterial Hypertension - Followed by Dr. Franco at [facility], last visit 1/6/19 without changes to plan of care. Last Echo (4/2/19) with LVEF 60-65%, mild RVH, normal RV fxn, mild RV dilation, moderate-severe TR, and very severe PH (RVSP 120 mmHg). Echo results d/w Cardiology 4/2 with recommendations for gentle diuresis and close OP follow up. Weight 121 lbs on 4/2 --> 120 lbs today. O2 requirements at baseline of 2L O2 with sleep and low 90s on RA during the day over the past 24h; has intermittently required 3L O2 with sleep since inpatient psychiatry admission.   - Monitor O2 sats q 4h.   - Supplemental O2 prn to maintain sats >90%.   - Continue PO Lasix 20 mg daily with KCl supplementation and daily weights.  - Coreg decreased to 3.125 mg BID 4/2 in setting of hypotension. Titrate to home dosing (6.25 mg BID) as able.  - Continue PTA Opsumit, Tadalafil, Uptravi, and PRN Xopenex  - Low-sodium diet   - Follow-up in Pulmonary HTN clinic as scheduled on 5/22. Will have HUC try to schedule sooner.     Hypotension, Resolved - Occurred overnight from 4/1-4/2. Suspect due to medications (Carvedilol, Lasix, Tadalafil) with probable non-compliance PTA. S/p 2L IVF bolus with improvement. Afebrile. WBC and Lactic Acid wnl. Negative Procal and CRP. No localizing s/s of infection. Infectious work-up (Blood Cx x 2, CXR) negative to date. Repeat Echo 4/2 without new findings to explain hypotension. One episode of hypotension ~0000 on 4/3, otherwise BP has been normotensive over past 24h.  - UA ordered to complete infectious work-up, not yet obtained.  -  Continue medications as above with hold parameters.  - Monitor vitals q 4h.   - Caution with additional fluid boluses given severe underlying R-sided heart pathology.    CKD - Baseline Cr ~1.3-1.5. Cr has remained at or below baseline this admission, last 1.35 (1.11) on 4/3.  - Trend BMP on 4/5    NAGMA, Resolved - Occurred 4/2 with Bicarb 17 & Chloride 121. Unclear etiology. No diarrhea or evidence of RTA. Bicarb normalized 4/3.  - Trend BMP on 4/5    Medicine will continue to follow.    NICOLLE CastilloC  Hospitalist Service  [phone]

## 2019-04-03 NOTE — PROGRESS NOTES
Pt expressing desire to sign 12 hour intent to leave. Spoke with patient about her concerns and reviewed possible outcomes of signing 12 hour intent. Patient expressed concerns over diet restrictions and concern about needing to pay bills. She also expressed frustration with length of stay. Informed patient that dietician would see her tomorrow about snack options and provided options for snacks available on the unit. Also discussed with patient if getting on the computer would ease worries about bills, she said it would. Order obtained from MD to allow patient computer access this evening.   Discussed reasons for continued stay (med changes and monitoring effects, continued paranoia symptoms etc). Encouraged patient to talk with Dr. Hernandez tomorrow about plan and treatment goals. Patient agreed and decided not to sign 12 hour intent.

## 2019-04-03 NOTE — PROGRESS NOTES
Behavioral Health  Note   Behavioral Health  Spirituality Group Note     Unit 30    Name: Judith Nelson    YOB: 1960   MRN: [id number]    Age: 59 year old     Patient attended -led group, which included discussion of spirituality, coping with illness and building resilience.   Patient attended group for 1.0 hrs.   The patient actively participated in group discussion     Katherinemskrystian [facility] [place]  Staff    Page [phone]

## 2019-04-03 NOTE — PROGRESS NOTES
Patient going to sleep and  placed on oxygenator at 2LNC. Patient placed on SIO secondary to medical equipment. Patient educated on need for SIO with medical equipment. Patient agreeable.

## 2019-04-03 NOTE — PROGRESS NOTES
BP 89/58 at 0015.  Patient denies light headedness and dizziness, is oriented to person place and time.  House officer contacted, suggested that as patient is asymptomatic, the patient should be encouraged to drink fluids.  Patient will be encouraged to drink fluids.  Will continue to monitor patient status closely and will recheck VS.  02 sats 92% on 2L NC.    At 0400. /64, HR 68, 02 sats 93% on 2L NC.  Will continue to monitor closely.

## 2019-04-03 NOTE — PROGRESS NOTES
Patient intermittently visible and active in the milieu - some late AM napping.  Peer interaction passive but pleasant and congenial.  Program engagement selective (2 of 3 so far this shift).  General presentation alert, calm, quiet, and cooperative with unit protocols.  Affect variable, brightening on approach.  Responsive to staff inquiries re MH status.  Continues to endorse moderate depression, low to moderate anxiety, and paranoia.  Denied SI, SIB urges, and AH's.  Is struggling to find hope re her future right now.      Pranav Santacruz   04/03/2019 04/03/19 1040   Sleep/Rest/Relaxation   Day/Evening Time Hours up all shift   Cognitive   Speech clear;spontaneous;logical   Best Language 0 - No aphasia   Mood/Behavior anxious;cooperative;behavior appropriate to situation   Behavioral Health   Hallucinations denies / not responding to hallucinations   Thinking paranoid   Orientation person: oriented;place: oriented   Insight admits / accepts;insight appropriate to situation   Judgement impaired   Eye Contact at examiner   Affect full range affect   Physical Appearance/Attire appears stated age;attire appropriate to age and situation;neat   Hygiene well groomed   Suicidality other (see comments)  (denied SI)   1. Wish to be Dead No   2. Non-Specific Active Suicidal Thoughts  No   Self Injury other (see comment)  (denied SIB urges)   Elopement (no indicators)   Activity withdrawn;other (see comment)  (visible, passively social, engaged in programming)   Speech clear;coherent   Psychomotor / Gait balanced;steady

## 2019-04-04 PROCEDURE — A9270 NON-COVERED ITEM OR SERVICE: HCPCS | Mod: GY | Performed by: DERMATOLOGY

## 2019-04-04 PROCEDURE — 25000132 ZZH RX MED GY IP 250 OP 250 PS 637: Mod: GY | Performed by: PHYSICIAN ASSISTANT

## 2019-04-04 PROCEDURE — A9270 NON-COVERED ITEM OR SERVICE: HCPCS | Mod: GY | Performed by: PHYSICIAN ASSISTANT

## 2019-04-04 PROCEDURE — A9270 NON-COVERED ITEM OR SERVICE: HCPCS | Mod: GY | Performed by: EMERGENCY MEDICINE

## 2019-04-04 PROCEDURE — 25000132 ZZH RX MED GY IP 250 OP 250 PS 637: Mod: GY | Performed by: EMERGENCY MEDICINE

## 2019-04-04 PROCEDURE — 25000132 ZZH RX MED GY IP 250 OP 250 PS 637: Mod: GY | Performed by: PSYCHIATRY & NEUROLOGY

## 2019-04-04 PROCEDURE — A9270 NON-COVERED ITEM OR SERVICE: HCPCS | Mod: GY | Performed by: PSYCHIATRY & NEUROLOGY

## 2019-04-04 PROCEDURE — 25000132 ZZH RX MED GY IP 250 OP 250 PS 637: Mod: GY | Performed by: DERMATOLOGY

## 2019-04-04 PROCEDURE — 25000131 ZZH RX MED GY IP 250 OP 636 PS 637: Mod: GY | Performed by: EMERGENCY MEDICINE

## 2019-04-04 PROCEDURE — 12400001 ZZH R&B MH UMMC

## 2019-04-04 PROCEDURE — 99233 SBSQ HOSP IP/OBS HIGH 50: CPT | Performed by: PSYCHIATRY & NEUROLOGY

## 2019-04-04 RX ADMIN — ACETAMINOPHEN 650 MG: 325 TABLET, FILM COATED ORAL at 12:05

## 2019-04-04 RX ADMIN — BUSPIRONE HYDROCHLORIDE 15 MG: 15 TABLET ORAL at 20:47

## 2019-04-04 RX ADMIN — POTASSIUM CHLORIDE 10 MEQ: 750 TABLET, EXTENDED RELEASE ORAL at 08:21

## 2019-04-04 RX ADMIN — OLANZAPINE 5 MG: 5 TABLET, FILM COATED ORAL at 20:48

## 2019-04-04 RX ADMIN — SIMVASTATIN 10 MG: 10 TABLET, FILM COATED ORAL at 20:48

## 2019-04-04 RX ADMIN — LORAZEPAM 1 MG: 1 TABLET ORAL at 08:20

## 2019-04-04 RX ADMIN — CARVEDILOL 3.12 MG: 3.12 TABLET, FILM COATED ORAL at 17:26

## 2019-04-04 RX ADMIN — TADALAFIL 40 MG: 10 TABLET, FILM COATED ORAL at 08:19

## 2019-04-04 RX ADMIN — LEVALBUTEROL TARTRATE 2 PUFF: 45 AEROSOL, METERED ORAL at 17:24

## 2019-04-04 RX ADMIN — FUROSEMIDE 20 MG: 20 TABLET ORAL at 08:21

## 2019-04-04 RX ADMIN — HYDROXYZINE HYDROCHLORIDE 25 MG: 25 TABLET ORAL at 17:26

## 2019-04-04 RX ADMIN — RANITIDINE 150 MG: 75 TABLET, COATED ORAL at 20:48

## 2019-04-04 RX ADMIN — MACITENTAN 10 MG: 10 TABLET, FILM COATED ORAL at 12:04

## 2019-04-04 RX ADMIN — OLANZAPINE 2.5 MG: 2.5 TABLET, FILM COATED ORAL at 08:21

## 2019-04-04 RX ADMIN — BUSPIRONE HYDROCHLORIDE 15 MG: 15 TABLET ORAL at 08:21

## 2019-04-04 RX ADMIN — SELEXIPAG 1600 MCG: 1600 TABLET, COATED ORAL at 08:21

## 2019-04-04 RX ADMIN — SELEXIPAG 1600 MCG: 1600 TABLET, COATED ORAL at 20:49

## 2019-04-04 RX ADMIN — AZATHIOPRINE 50 MG: 50 TABLET ORAL at 08:22

## 2019-04-04 RX ADMIN — LORAZEPAM 1 MG: 1 TABLET ORAL at 20:48

## 2019-04-04 RX ADMIN — FLUOXETINE 10 MG: 10 CAPSULE ORAL at 08:21

## 2019-04-04 RX ADMIN — CALCITRIOL CAPSULES 0.25 MCG 0.25 MCG: 0.25 CAPSULE ORAL at 08:21

## 2019-04-04 RX ADMIN — CARVEDILOL 3.12 MG: 3.12 TABLET, FILM COATED ORAL at 08:20

## 2019-04-04 RX ADMIN — RANITIDINE 75 MG: 75 TABLET, COATED ORAL at 08:20

## 2019-04-04 NOTE — PROGRESS NOTES
CLINICAL NUTRITION SERVICES - BRIEF NOTE     Nutrition Prescription    RECOMMENDATIONS FOR MDs/PROVIDERS TO ORDER:  Consider liberalizing diet to regular with no salt packets to allow pt more options with meal ordering, given pt's frustration with 2 g sodium diet and report of self-monitor sodium intake.     Future/Additional Recommendations:  None at this time.     Reason for RD visit: Patient/Family request: Low sodium diet. Upset with dietary choices and changes made.    NEW FINDINGS   Diet: 2 gm Na diet    Intake: Appetite is good. Pt reports not finding enough choices on menu, but declined snacks at this time. She reports she is often hungry after mealtimes due to limited food choices.      Wt trends: Wt is up 4 lbs compared to admit wt (114 lbs on 3/30--> 118 lbs on 4/4)    INTERVENTIONS  Implementation  -Nutrition education: Provided education on rationale of low sodium diet with her pulmonary HTN as pt reports she did not know why she was restricted to the extent she is. She watches her sodium intake at home and does not add salt to foods (instead uses no-sodium herbal seasonings).    -Collaboration with other providers: Left pt RN note to relay to primary team that pt is frustrated with current diet, feeling hungry after meals d/t limited choices, and that she manages Na intake at home on her own.     Monitoring/Evaluation  RD will sign off for now. If other nutrition concerns arise, please place nutrition consult. Thanks!    Evelyn Diana RD, LD  Pager: [phone]

## 2019-04-04 NOTE — PLAN OF CARE
Pt reporting feeling light headed or dizzy @ 1315 this afternoon. Pt assisted to  her room. Vs @ 1245 bp = 122/66 p = 97. Pt denies feeling SOB. 02 sat = 85% @ 1315. Pt placed on O2 1liter per nasal canula. Called placed to internal medicine who requested that we try 1 liter and titrate up til O2 sat of 90% or above obtained. O2 sat up to 89% on 1 liter titrated up to 2 liters and obtained O2 sat of 91 - 92%. When writer attempted to reduce to 1 liter O2 sats went down to 89% pt has continued on SIO while she has been on O2. Dr Hernandez informed. Writer spoke with hosp pharmacy regarding obtaining medication Uptravi. Pt has home supply that has less than 3 days left and hospital unable to order. Pt signed ALEXA for daughter. Writer called daughter ,Saritha, to ask about her bringing this medication to the hospital. She stated she will bring supply in tomorrow.

## 2019-04-04 NOTE — PROGRESS NOTES
"[facility], [place]   Psychiatric Progress Note  Hospital Day: 6        Interim History:   The patient's care was discussed with the treatment team during the daily team meeting and/or staff's chart notes were reviewed.  Staff report patient generally presents as a alert, calm, quiet, and cooperative with unit protocols.  Patient continues to endorse moderate depression, low to moderate anxiety, and paranoia.  Patient denies SI, SIB, and auditory hallucinations.  Patient reported that she is struggling to find hope regarding her future.  Patient expressing desire to leave the hospital and considered signing a 12-hour intent to leave.  She expressed concerns over diet restrictions and concerns about needing to pay bills.  She is also frustrated with regard to length of stay.  However, patient continues to voice paranoid thoughts and is highly suspicious.  She does appear to be gaining some insight, however.  She has not accepted that thoughts of poisoning and voices from the attic are not reality based, but has considered that they may be part of a disease.  Patient had visit with her daughter last evening which she enjoyed.  Last evening, patient did report intermittent auditory hallucinations.  No episodes of orthostatic hypotension last evening.    Upon interview, the patient reports that she is feeling anxious because she has bills to pay, and is supposed to have oral surgery and she believes she may have missed the surgery date. She said that her rent is due for her apartment and she is not able to pay it because they require a check. She said that she is tearful often because she \"misses people.\" She said that she has not seen her son and she feels that \"something is just not quite right.\" She added \"I am afraid if he is alive or dead. I am afraid that someone might be playing tricks on me or something.\" She is concerned that \"it might be my ex.\" She speaks with home over the phone, " "but she is concerned that it may only be a recording of her son's voice. She said that she is sad that he wouldn't have a proper  if he really is dead. She added \"the man or the people in the attic were saying that they [children] were all dead. She feels reassured about her youngest daughter as she has come to visit her. When discussing medication regimen, she notes that she had been on a total of 700 mg of Seroquel when she was first diagnosed a long time ago (\"in  or earlier\"). She feels that Zyprexa has \"toned down that voice,\" but also expressing concerns regarding weight gain.          Medications:       azaTHIOprine  50 mg Oral Daily     busPIRone  15 mg Oral BID     calcitRIOL  0.25 mcg Oral Daily     carvedilol  3.125 mg Oral BID w/meals     FLUoxetine  10 mg Oral Daily     furosemide  20 mg Oral Daily     LORazepam  1 mg Oral BID     macitentan  10 mg Oral Daily     OLANZapine  2.5 mg Oral Daily     OLANZapine  5 mg Oral At Bedtime     potassium chloride  10 mEq Oral Daily     ranitidine   mg Oral BID     selexipag  1,600 mcg Oral Q12H     simvastatin  10 mg Oral At Bedtime     tadalafil  40 mg Oral Daily          Allergies:     Allergies   Allergen Reactions     Dilaudid [Hydromorphone] Nausea and Vomiting     Morphine Nausea and Vomiting     Latex Rash     From gloves          Labs:     Recent Results (from the past 24 hour(s))   Basic metabolic panel    Collection Time: 19  7:57 AM   Result Value Ref Range    Sodium 145 (H) 133 - 144 mmol/L    Potassium 4.2 3.4 - 5.3 mmol/L    Chloride 117 (H) 94 - 109 mmol/L    Carbon Dioxide 22 20 - 32 mmol/L    Anion Gap 6 3 - 14 mmol/L    Glucose 88 70 - 99 mg/dL    Urea Nitrogen 25 7 - 30 mg/dL    Creatinine 1.35 (H) 0.52 - 1.04 mg/dL    GFR Estimate 43 (L) >60 mL/min/[1.73_m2]    GFR Estimate If Black 50 (L) >60 mL/min/[1.73_m2]    Calcium 8.6 8.5 - 10.1 mg/dL          Psychiatric Examination:     /58 (BP Location: Right arm)   Pulse " 62   Temp 98  F (36.7  C)   Resp 18   Wt 53.4 kg (117 lb 12.8 oz)   SpO2 91%   BMI 20.87 kg/m    Weight is 117 lbs 12.8 oz  Body mass index is 20.87 kg/m .                Sitting Orthostatic BP: 115/75      Sitting Orthostatic Pulse: 58 bpm      Standing Orthostatic BP: 103/64      Standing Orthostatic Pulse: 68 bpm       Weight over time:  Vitals:    03/30/19 1859 03/31/19 0730 03/31/19 0731 03/31/19 0801   Weight: 52.1 kg (114 lb 12.8 oz) 51.6 kg (113 lb 12.8 oz) 51.3 kg (113 lb) 51.7 kg (114 lb)    04/01/19 0700 04/02/19 0802 04/02/19 0900 04/03/19 0702   Weight: 51.7 kg (114 lb) 54 kg (119 lb) 55.2 kg (121 lb 12.8 oz) 54.7 kg (120 lb 9.6 oz)    04/04/19 0706   Weight: 53.4 kg (117 lb 12.8 oz)       Orthostatic Vitals       Most Recent      Sitting Orthostatic /75 03/31 0801    Sitting Orthostatic Pulse (bpm) 58 03/31 0801    Standing Orthostatic /64 03/31 0801    Standing Orthostatic Pulse (bpm) 68 03/31 0801            Cardiometabolic risk assessment. 04/01/19      Reviewed patient profile for cardiometabolic risk factors    Date taken /Value  REFERENCE RANGE   Abdominal Obesity  (Waist Circumference)   See nursing flowsheet Women ?35 in (88 cm)   Men ?40 in (102 cm)      Triglycerides  Triglycerides   Date Value Ref Range Status   10/09/2013 136 0 - 150 mg/dL Final       ?150 mg/dL (1.7 mmol/L) or current treatment for elevated triglycerides   HDL cholesterol  HDL Cholesterol   Date Value Ref Range Status   10/09/2013 75 50 - 110 mg/dL Final   ]   Women <50 mg/dL (1.3 mmol/L) in women or current treatment for low HDL cholesterol  Men <40 mg/dL (1 mmol/L) in men or current treatment for low HDL cholesterol     Fasting plasma glucose (FPG) Lab Results   Component Value Date    GLC 90 03/28/2019      FPG ?100 mg/dL (5.6 mmol/L) or treatment for elevated blood glucose   Blood pressure  BP Readings from Last 3 Encounters:   04/04/19 107/58   01/25/19 114/61   01/16/19 112/74    Blood pressure  "?130/85 mmHg or treatment for elevated blood pressure   Family History  See family history     Appearance: awake, thin, disheveled, appeared very tired  Attitude: cooperative and pleasant, anxious and suspicious   Eye Contact: good  Mood:  \"depressed\"  Affect: mood congruent and depressed, tearful, anxious  Speech: Noted speech latency  Language: fluent in English  Psychomotor Behavior:  no evidence of tardive dyskinesia, dystonia, or tics  Gait/Station: normal  Thought Process:  linear, illogical, goal oriented  Associations:  no loose associations  Thought Content: Patient reports ongoing noncommand AH, though improved. Denying SI/HI; patient does not appear to be responding to internal stimuli today  Insight: Fair  Judgement: Fair  Oriented to:  time, person, and place  Attention Span and Concentration: Impaired secondary to anxiety and paranoia  Recent and Remote Memory: Relatively intact  Fund of Knowledge: appropriate      Clinical Global Impressions  First:7     Most recent:6            Precautions:     Behavioral Orders   Procedures     Code 1 - Restrict to Unit     Code 2     Procedures only     Fall precautions     Routine Programming     As clinically indicated     Status 15     Every 15 minutes.     Status Individual Observation     Pt is to be on a SIO whenever she is receiving Oxygen.  See order regarding when to administer O2, based on pt's O2 Saturations.     Order Specific Question:   CONTINUOUS 24 hours / day     Answer:   5 feet     Order Specific Question:   Indications for SIO     Answer:   Medical equipment / ligature risk          Diagnoses:     Schizoaffective Disorder, decompensated vs MDD, recurrent, severe with psychotic features  Sarcoidosis         Assessment & Plan:     Assessment and hospital summary:  This patient is a 59 year old  female with history of Schizoaffective Disorder who presented to ED with acute psychosis. Inpatient psychiatric hospitalization is warranted at " this time for safety, stabilization, and possible adjustment in medications. Continues to exhibit signs of psychosis and depression.    Target psychiatric symptoms and interventions:  Continue Prozac 10 mg daily  Continue Zyprexa 5 mg at bedtime and 2.5 mg daily due to orthostatic hypotension experienced at higher dose of 10 mg. BP medications likely contributing as patient states that she had not been prescribed Lasix or Tadalifil prior to admission. Awaiting outside records to explore alternative neuroleptic treatment options if patient unable to tolerate Zyprexa.  Continue other medications without changes  Consider brain MRI when patient is more stable    Medical Problems and Treatments:  Per IM follow up note dated 4/3/19:  Pulmonary Arterial Hypertension - Followed by Dr. Franco at [facility], last visit 1/6/19 without changes to plan of care. Last Echo (4/2/19) with LVEF 60-65%, mild RVH, normal RV fxn, mild RV dilation, moderate-severe TR, and very severe PH (RVSP 120 mmHg). Echo results d/w Cardiology 4/2 with recommendations for gentle diuresis and close OP follow up. Weight 121 lbs on 4/2 --> 120 lbs today. O2 requirements at baseline of 2L O2 with sleep and low 90s on RA during the day over the past 24h; has intermittently required 3L O2 with sleep since inpatient psychiatry admission.   - Monitor O2 sats q 4h.   - Supplemental O2 prn to maintain sats >90%.   - Continue PO Lasix 20 mg daily with KCl supplementation and daily weights.  - Coreg decreased to 3.125 mg BID 4/2 in setting of hypotension. Titrate to home dosing (6.25 mg BID) as able.  - Continue PTA Opsumit, Tadalafil, Uptravi, and PRN Xopenex  - Low-sodium diet   - Follow-up in Pulmonary HTN clinic as scheduled on 5/22. Will have HUC try to schedule sooner.      Hypotension, Resolved - Occurred overnight from 4/1-4/2. Suspect due to medications (Carvedilol, Lasix, Tadalafil) with probable non-compliance PTA. S/p 2L IVF bolus  with improvement. Afebrile. WBC and Lactic Acid wnl. Negative Procal and CRP. No localizing s/s of infection. Infectious work-up (Blood Cx x 2, CXR) negative to date. Repeat Echo 4/2 without new findings to explain hypotension. One episode of hypotension ~0000 on 4/3, otherwise BP has been normotensive over past 24h.  - UA ordered to complete infectious work-up, not yet obtained.  - Continue medications as above with hold parameters.  - Monitor vitals q 4h.   - Caution with additional fluid boluses given severe underlying R-sided heart pathology.     CKD - Baseline Cr ~1.3-1.5. Cr has remained at or below baseline this admission, last 1.35 (1.11) on 4/3.  - Trend BMP on 4/5     NAGMA, Resolved - Occurred 4/2 with Bicarb 17 & Chloride 121. Unclear etiology. No diarrhea or evidence of RTA. Bicarb normalized 4/3.  - Trend BMP on 4/5    Behavioral/Psychological/Social:  Continue to encourage participation in groups    Legal: Voluntary    Safety:  - Continue precautions as noted above  - Status 15 minute checks  - 1:1 overnights due to need for patient to wear supplemental oxygen    Disposition: Pending clinical stabilization. Would benefit from day treatment/PHP program upon discharge. Currently, patient exhibiting ongoing significant anxiety, paranoia, and depressive symptoms. Given ongoing mental health concerns and active medical issues, I do not feel she is able to care for herself independently at this time.     Debra Hernandez MD  [facility]

## 2019-04-04 NOTE — PROGRESS NOTES
"    I approached pt to complete the following items:      Needs to sign the Medicare Important Message paperwork    Encourage pt to to sign ALEXA for james Melara. I would get collateral from Saritha and pharmacy needs to the ALEXA to talk to Tamara about bringing an expensive medication in.    Help her call her financial worker re: her inheritance    Talk to her about moving pulmonary appointment up  May 22, 2019  2:00 PM CDT  Lab with [facility]  [facility] ([facility]) [address]  [address]  [facility] [facility] [address] [phone]   May 22, 2019  2:30 PM CDT  (Arrive by 2:15 PM)  RETURN PRIMARY PULMONARY with Norm Franco MD  [facility] ([facility]) [address]  [address]  [place] [facility] [address] [phone]       Pt was in bed at lunch time. She looked quite fragile. I asked if she had eaten lunch and it seemed that she said something like \" I can't go out there.\" She agreed to have her lunch brought in to the room.    I called Nara.  Pt has an appointment with Dr. Rosales on April 10 at 2:40PM. They have a 24 hour cancellation policy if the pt is still in the hospital at this time.  Pt does not see a therapist there but did have an [facility] worker there in 2017.  Re: the records they have denied the request for medical records as the ALEXA was not filled out correctly. It did not list the select records that are being requested. They are sending their blank ALEXA. I have received this. Pt has signed this. I faxed this ALEXA to Nara.    PT signed ALEXA for james Melara so RN and Pharmacy can discuss the issue with the expensive medication. RN called Saritha about bringing in the medication and she agrees to do this tomorrow.  Pt signed the Medicare Important Message paperwork.    Pt remains in her room on oxygen with a SIO staff there. When she is not dizzy we will call the financial worker as she wants to do.          "

## 2019-04-04 NOTE — PROGRESS NOTES
Patient out of her room for breakfast and Community Meeting this AM and again briefly later in AM, otherwise has been in bed. No significant peer interaction noted.  Cooperative with all unit and personal care protocols. Complained of fatigue, dizziness, shortness of breath, chest pain (respiratory-related per patient), and general physical discomfort on approach.  Responses to staff inquiries were latent and guarded. Endorsed ongoing depression, anxiety, pervasive fear about her future, and a general sense of hopelessness.  She is not currently endorsing SI, SIB urges, or AH's.  Placed on SIO status due to low O-2 scores and O-2 supplementation.   Pranav Santacruz   04/04/2019

## 2019-04-04 NOTE — PLAN OF CARE
"Patient continues to voice paranoid thoughts and is highly suspicious. She is open to discussion and seems to be gaining some insight (voice in attic hallucination vs intruder). She has not accepted that thoughts of poisoning and voices from attic are not reality based, but has seems to consider that they may be part of disease. \"I was stable for so long, is this really just that my meds need changing?\"  Patient had visit from her daughter this evening which she enjoyed. She continues to express some fatigue. Intermittent auditory hallucinations. No suicidal thoughts or SIB urges. Appetite is good.   Vital signs:  Temp: 98  F (36.7  C) Temp src: Tympanic BP: 120/65 Pulse: 86   Resp: 18 SpO2: 90 % O2 Device: None (Room air)   Sats >/= to 90% for shift.   Length of stay: 5     Assessment of mood, thought process, response to medications and potential side effects continues.     Patient encouraged to participate in therapeutic groups and develop self-care skills.     Appropriate precautions maintained.           "

## 2019-04-05 LAB
ALBUMIN UR-MCNC: NEGATIVE MG/DL
ANION GAP SERPL CALCULATED.3IONS-SCNC: 10 MMOL/L (ref 3–14)
APPEARANCE UR: CLEAR
BILIRUB UR QL STRIP: NEGATIVE
BUN SERPL-MCNC: 29 MG/DL (ref 7–30)
CALCIUM SERPL-MCNC: 8.7 MG/DL (ref 8.5–10.1)
CHLORIDE SERPL-SCNC: 112 MMOL/L (ref 94–109)
CO2 SERPL-SCNC: 21 MMOL/L (ref 20–32)
COLOR UR AUTO: ABNORMAL
CREAT SERPL-MCNC: 1.2 MG/DL (ref 0.52–1.04)
GFR SERPL CREATININE-BSD FRML MDRD: 50 ML/MIN/{1.73_M2}
GLUCOSE SERPL-MCNC: 89 MG/DL (ref 70–99)
GLUCOSE UR STRIP-MCNC: NEGATIVE MG/DL
HGB UR QL STRIP: NEGATIVE
HYALINE CASTS #/AREA URNS LPF: 1 /LPF (ref 0–2)
KETONES UR STRIP-MCNC: NEGATIVE MG/DL
LEUKOCYTE ESTERASE UR QL STRIP: NEGATIVE
MUCOUS THREADS #/AREA URNS LPF: PRESENT /LPF
NITRATE UR QL: NEGATIVE
PH UR STRIP: 5.5 PH (ref 5–7)
POTASSIUM SERPL-SCNC: 4.1 MMOL/L (ref 3.4–5.3)
RBC #/AREA URNS AUTO: <1 /HPF (ref 0–2)
SODIUM SERPL-SCNC: 143 MMOL/L (ref 133–144)
SOURCE: ABNORMAL
SP GR UR STRIP: 1.01 (ref 1–1.03)
UROBILINOGEN UR STRIP-MCNC: NORMAL MG/DL (ref 0–2)
WBC #/AREA URNS AUTO: 1 /HPF (ref 0–5)

## 2019-04-05 PROCEDURE — A9270 NON-COVERED ITEM OR SERVICE: HCPCS | Mod: GY | Performed by: PSYCHIATRY & NEUROLOGY

## 2019-04-05 PROCEDURE — 25000132 ZZH RX MED GY IP 250 OP 250 PS 637: Mod: GY | Performed by: EMERGENCY MEDICINE

## 2019-04-05 PROCEDURE — H2032 ACTIVITY THERAPY, PER 15 MIN: HCPCS

## 2019-04-05 PROCEDURE — 25000132 ZZH RX MED GY IP 250 OP 250 PS 637: Mod: GY | Performed by: PHYSICIAN ASSISTANT

## 2019-04-05 PROCEDURE — 81001 URINALYSIS AUTO W/SCOPE: CPT | Performed by: PHYSICIAN ASSISTANT

## 2019-04-05 PROCEDURE — 80048 BASIC METABOLIC PNL TOTAL CA: CPT | Performed by: PHYSICIAN ASSISTANT

## 2019-04-05 PROCEDURE — A9270 NON-COVERED ITEM OR SERVICE: HCPCS | Mod: GY | Performed by: PHYSICIAN ASSISTANT

## 2019-04-05 PROCEDURE — 25000131 ZZH RX MED GY IP 250 OP 636 PS 637: Mod: GY | Performed by: EMERGENCY MEDICINE

## 2019-04-05 PROCEDURE — 36415 COLL VENOUS BLD VENIPUNCTURE: CPT | Performed by: PHYSICIAN ASSISTANT

## 2019-04-05 PROCEDURE — 90853 GROUP PSYCHOTHERAPY: CPT

## 2019-04-05 PROCEDURE — G0177 OPPS/PHP; TRAIN & EDUC SERV: HCPCS

## 2019-04-05 PROCEDURE — A9270 NON-COVERED ITEM OR SERVICE: HCPCS | Mod: GY | Performed by: DERMATOLOGY

## 2019-04-05 PROCEDURE — 25000132 ZZH RX MED GY IP 250 OP 250 PS 637: Mod: GY | Performed by: PSYCHIATRY & NEUROLOGY

## 2019-04-05 PROCEDURE — A9270 NON-COVERED ITEM OR SERVICE: HCPCS | Mod: GY | Performed by: EMERGENCY MEDICINE

## 2019-04-05 PROCEDURE — 25000132 ZZH RX MED GY IP 250 OP 250 PS 637: Mod: GY | Performed by: DERMATOLOGY

## 2019-04-05 PROCEDURE — 12400001 ZZH R&B MH UMMC

## 2019-04-05 PROCEDURE — 99233 SBSQ HOSP IP/OBS HIGH 50: CPT | Performed by: PSYCHIATRY & NEUROLOGY

## 2019-04-05 PROCEDURE — 93010 ELECTROCARDIOGRAM REPORT: CPT | Performed by: INTERNAL MEDICINE

## 2019-04-05 PROCEDURE — 93005 ELECTROCARDIOGRAM TRACING: CPT

## 2019-04-05 RX ADMIN — SELEXIPAG 1600 MCG: 1600 TABLET, COATED ORAL at 08:53

## 2019-04-05 RX ADMIN — OLANZAPINE 5 MG: 5 TABLET, FILM COATED ORAL at 21:05

## 2019-04-05 RX ADMIN — MACITENTAN 10 MG: 10 TABLET, FILM COATED ORAL at 08:54

## 2019-04-05 RX ADMIN — FLUOXETINE 10 MG: 10 CAPSULE ORAL at 08:51

## 2019-04-05 RX ADMIN — AZATHIOPRINE 50 MG: 50 TABLET ORAL at 08:50

## 2019-04-05 RX ADMIN — POTASSIUM CHLORIDE 10 MEQ: 750 TABLET, EXTENDED RELEASE ORAL at 08:52

## 2019-04-05 RX ADMIN — CARVEDILOL 3.12 MG: 3.12 TABLET, FILM COATED ORAL at 20:42

## 2019-04-05 RX ADMIN — OLANZAPINE 2.5 MG: 2.5 TABLET, FILM COATED ORAL at 08:52

## 2019-04-05 RX ADMIN — BUSPIRONE HYDROCHLORIDE 15 MG: 15 TABLET ORAL at 20:42

## 2019-04-05 RX ADMIN — CARVEDILOL 3.12 MG: 3.12 TABLET, FILM COATED ORAL at 08:51

## 2019-04-05 RX ADMIN — LORAZEPAM 1 MG: 1 TABLET ORAL at 20:42

## 2019-04-05 RX ADMIN — SELEXIPAG 1600 MCG: 1600 TABLET, COATED ORAL at 21:06

## 2019-04-05 RX ADMIN — FUROSEMIDE 20 MG: 20 TABLET ORAL at 08:51

## 2019-04-05 RX ADMIN — LORAZEPAM 1 MG: 1 TABLET ORAL at 08:51

## 2019-04-05 RX ADMIN — TADALAFIL 40 MG: 10 TABLET, FILM COATED ORAL at 10:14

## 2019-04-05 RX ADMIN — SIMVASTATIN 10 MG: 10 TABLET, FILM COATED ORAL at 20:42

## 2019-04-05 RX ADMIN — BUSPIRONE HYDROCHLORIDE 15 MG: 15 TABLET ORAL at 08:50

## 2019-04-05 RX ADMIN — CALCITRIOL CAPSULES 0.25 MCG 0.25 MCG: 0.25 CAPSULE ORAL at 08:51

## 2019-04-05 RX ADMIN — RANITIDINE 150 MG: 75 TABLET, COATED ORAL at 08:53

## 2019-04-05 RX ADMIN — RANITIDINE 150 MG: 75 TABLET, COATED ORAL at 20:42

## 2019-04-05 ASSESSMENT — ACTIVITIES OF DAILY LIVING (ADL)
HYGIENE/GROOMING: INDEPENDENT
ORAL_HYGIENE: INDEPENDENT
HYGIENE/GROOMING: INDEPENDENT
LAUNDRY: UNABLE TO COMPLETE
ORAL_HYGIENE: INDEPENDENT
DRESS: INDEPENDENT
DRESS: SCRUBS (BEHAVIORAL HEALTH)

## 2019-04-05 NOTE — PLAN OF CARE
Patient pleasant and cooperative; attended group meetings.  Bright affect, denies SI/SIB, hallucinations.  Patient on SIO at night and as needed due to 02 use.  This shift patient 02 sat dropped to 88% Ra.   Currently using 02 in room with SIO.  02 sat at this time 91- 94 % 2L 02 per nasal cannula.  Patient currently sleeping sound in room.  Staff to collect UA on patient when she wakes up.  Specimen cup in patient's bathroom.

## 2019-04-05 NOTE — PLAN OF CARE
BEHAVIORAL TEAM DISCUSSION    Participants:   Dr. Hernandez, Kyra LEWIS, Rachel Sheppard RN    Progress:   Pt was admitted for paranoia. This is improving.  Pt is able to see more reality and sign paperwork as needed.Pt signed ALEXA for daughter Saritha.  Pt may be ready for discharge early to mid week.  Pt has an expensive medication that we don't carry so daughter is bringing this in today.      Continued Stay Criteria/Rationale:   The pt will be discharged when she is able to maintain herself safely in the community.    Medical/Physical:   Pt has low oxygen levels and has been on an oxygen tank so needs 1:1 SIO for this.  Pt had prolonged QT so is getting a repeat EKG.      Precautions:   Behavioral Orders   Procedures    Code 1 - Restrict to Unit    Code 2     Procedures only    Fall precautions    Routine Programming     As clinically indicated    Status 15     Every 15 minutes.    Status Individual Observation     Pt is to be on a SIO whenever she is receiving Oxygen.  See order regarding when to administer O2, based on pt's O2 Saturations.     Order Specific Question:   CONTINUOUS 24 hours / day     Answer:   5 feet     Order Specific Question:   Indications for SIO     Answer:   Medical equipment / ligature risk     Plan:   Continue with medication adjustment  Repeat EKG  Probable discharge next week  Hope to obtain records from [facility]        Rationale for change in precautions or plan:   Pt has complex medication situation

## 2019-04-05 NOTE — PROGRESS NOTES
Patient Instructed on how to obtain clean catch ua. Given appropriate container and supplies to obtain. Patient to contact staff when task completed.

## 2019-04-05 NOTE — PROGRESS NOTES
Pt appeared tense and cautious.  Affect brightened when talking about her cat, Katya, and her family (specifically daughter and mother.)         04/05/19 1015   Dance Movement Therapy   Type of Intervention structured groups   Response participates with encouragement   Hours 0.5

## 2019-04-05 NOTE — PROGRESS NOTES
"[facility], [place]   Psychiatric Progress Note  Hospital Day: 7        Interim History:   The patient's care was discussed with the treatment team during the daily team meeting and/or staff's chart notes were reviewed.  Staff report patient has had no significant peer interaction.  She has reported several physical symptoms, including fatigue, dizziness, shortness of breath, and chest pain.  Responses to staff inquiries were latent and guarded.  Patient continues to endorse ongoing depression, anxiety, pervasive fear about her future, and a general sense of hopelessness.  She is not currently endorsing SI, SIB urges, or auditory hallucinations.  Patient required supplemental O2 during the day yesterday as she was de-satting into the mid 80s on room air.    Upon interview, the patient reports that she \"feels better\" than when she came into the hospital. Affect is brighter and she appears less anxious. She said that she spoke with her son over the phone \"to do a reality check and I am pretty sure he is okay.\" She said that this gave her reassurance. Patient notes that her depression and psychosis are \"very much intertwined.\" She said that she notes significant improvement in psychosis, and some improvement in depressive symptoms today.  Discussed repeat EKG and increase in Prozac to target depressive symptoms. Pt in agreement with this plan.          Medications:       azaTHIOprine  50 mg Oral Daily     busPIRone  15 mg Oral BID     calcitRIOL  0.25 mcg Oral Daily     carvedilol  3.125 mg Oral BID w/meals     FLUoxetine  10 mg Oral Daily     furosemide  20 mg Oral Daily     LORazepam  1 mg Oral BID     macitentan  10 mg Oral Daily     OLANZapine  2.5 mg Oral Daily     OLANZapine  5 mg Oral At Bedtime     potassium chloride  10 mEq Oral Daily     ranitidine   mg Oral BID     selexipag  1,600 mcg Oral Q12H     simvastatin  10 mg Oral At Bedtime     tadalafil  40 mg Oral Daily          " Allergies:     Allergies   Allergen Reactions     Dilaudid [Hydromorphone] Nausea and Vomiting     Morphine Nausea and Vomiting     Latex Rash     From gloves          Labs:     No results found for this or any previous visit (from the past 24 hour(s)).       Psychiatric Examination:     BP 90/55 (BP Location: Right arm)   Pulse 60   Temp 97.5  F (36.4  C) (Tympanic)   Resp 16   Wt 53.5 kg (118 lb)   SpO2 94%   BMI 20.90 kg/m    Weight is 118 lbs 0 oz  Body mass index is 20.9 kg/m .                Sitting Orthostatic BP: 115/75      Sitting Orthostatic Pulse: 58 bpm      Standing Orthostatic BP: 103/64      Standing Orthostatic Pulse: 68 bpm       Weight over time:  Vitals:    03/30/19 1859 03/31/19 0730 03/31/19 0731 03/31/19 0801   Weight: 52.1 kg (114 lb 12.8 oz) 51.6 kg (113 lb 12.8 oz) 51.3 kg (113 lb) 51.7 kg (114 lb)    04/01/19 0700 04/02/19 0802 04/02/19 0900 04/03/19 0702   Weight: 51.7 kg (114 lb) 54 kg (119 lb) 55.2 kg (121 lb 12.8 oz) 54.7 kg (120 lb 9.6 oz)    04/04/19 0706 04/04/19 0813   Weight: 53.4 kg (117 lb 12.8 oz) 53.5 kg (118 lb)       Orthostatic Vitals       Most Recent      Sitting Orthostatic /75 03/31 0801    Sitting Orthostatic Pulse (bpm) 58 03/31 0801    Standing Orthostatic /64 03/31 0801    Standing Orthostatic Pulse (bpm) 68 03/31 0801            Cardiometabolic risk assessment. 04/01/19      Reviewed patient profile for cardiometabolic risk factors    Date taken /Value  REFERENCE RANGE   Abdominal Obesity  (Waist Circumference)   See nursing flowsheet Women ?35 in (88 cm)   Men ?40 in (102 cm)      Triglycerides  Triglycerides   Date Value Ref Range Status   10/09/2013 136 0 - 150 mg/dL Final       ?150 mg/dL (1.7 mmol/L) or current treatment for elevated triglycerides   HDL cholesterol  HDL Cholesterol   Date Value Ref Range Status   10/09/2013 75 50 - 110 mg/dL Final   ]   Women <50 mg/dL (1.3 mmol/L) in women or current treatment for low HDL cholesterol  Men  "<40 mg/dL (1 mmol/L) in men or current treatment for low HDL cholesterol     Fasting plasma glucose (FPG) Lab Results   Component Value Date    GLC 90 03/28/2019      FPG ?100 mg/dL (5.6 mmol/L) or treatment for elevated blood glucose   Blood pressure  BP Readings from Last 3 Encounters:   04/05/19 90/55   01/25/19 114/61   01/16/19 112/74    Blood pressure ?130/85 mmHg or treatment for elevated blood pressure   Family History  See family history     Appearance: awake, thin, disheveled, appeared less tired  Attitude: cooperative and pleasant, less anxious and less suspicious   Eye Contact: good  Mood:  \"better\"  Affect: mood congruent, brighter, smiled intermittently. Not tearful.  Speech: Noted speech latency  Language: fluent in English  Psychomotor Behavior:  no evidence of tardive dyskinesia, dystonia, or tics  Gait/Station: normal  Thought Process:  linear, illogical at times though improved, goal oriented  Associations:  no loose associations  Thought Content: Denying SI/HI/AH; patient does not appear to be responding to internal stimuli today  Insight: Fair  Judgement: Intact  Oriented to:  time, person, and place  Attention Span and Concentration: intact  Recent and Remote Memory: Relatively intact  Fund of Knowledge: appropriate    Clinical Global Impressions  First:7     Most recent:6            Precautions:     Behavioral Orders   Procedures     Code 1 - Restrict to Unit     Code 2     Procedures only     Fall precautions     Routine Programming     As clinically indicated     Status 15     Every 15 minutes.     Status Individual Observation     Pt is to be on a SIO whenever she is receiving Oxygen.  See order regarding when to administer O2, based on pt's O2 Saturations.     Order Specific Question:   CONTINUOUS 24 hours / day     Answer:   5 feet     Order Specific Question:   Indications for SIO     Answer:   Medical equipment / ligature risk          Diagnoses:     Schizoaffective Disorder, " decompensated vs MDD, recurrent, severe with psychotic features  Sarcoidosis         Assessment & Plan:     Assessment and hospital summary:  This patient is a 59 year old  female with history of Schizoaffective Disorder who presented to ED with acute psychosis. Inpatient psychiatric hospitalization is warranted at this time for safety, stabilization, and possible adjustment in medications. Continues to exhibit signs of psychosis and depression.    Target psychiatric symptoms and interventions:  Increase Prozac to 20 mg daily  Continue Zyprexa 5 mg at bedtime and 2.5 mg daily due to orthostatic hypotension experienced at higher dose of 10 mg. BP medications likely contributing as patient states that she had not been prescribed Lasix or Tadalifil prior to admission. Awaiting outside records to explore alternative neuroleptic treatment options if patient unable to tolerate Zyprexa. Appears to be tolerating better.  Continue other medications without changes  Consider brain MRI when patient is more stable  QTc repeated today and was 462 (borderline prolonged). Will continue to monitor.     Medical Problems and Treatments:  Per IM follow up note dated 4/3/19:  Pulmonary Arterial Hypertension - Followed by Dr. Franco at [facility], last visit 1/6/19 without changes to plan of care. Last Echo (4/2/19) with LVEF 60-65%, mild RVH, normal RV fxn, mild RV dilation, moderate-severe TR, and very severe PH (RVSP 120 mmHg). Echo results d/w Cardiology 4/2 with recommendations for gentle diuresis and close OP follow up. Weight 121 lbs on 4/2 --> 120 lbs today. O2 requirements at baseline of 2L O2 with sleep and low 90s on RA during the day over the past 24h; has intermittently required 3L O2 with sleep since inpatient psychiatry admission.   - Monitor O2 sats q 4h.   - Supplemental O2 prn to maintain sats >90%.   - Continue PO Lasix 20 mg daily with KCl supplementation and daily weights.  - Coreg decreased  to 3.125 mg BID 4/2 in setting of hypotension. Titrate to home dosing (6.25 mg BID) as able.  - Continue PTA Opsumit, Tadalafil, Uptravi, and PRN Xopenex  - Low-sodium diet   - Follow-up in Pulmonary HTN clinic as scheduled on 5/22. Will have HUC try to schedule sooner.      Hypotension, Resolved - Occurred overnight from 4/1-4/2. Suspect due to medications (Carvedilol, Lasix, Tadalafil) with probable non-compliance PTA. S/p 2L IVF bolus with improvement. Afebrile. WBC and Lactic Acid wnl. Negative Procal and CRP. No localizing s/s of infection. Infectious work-up (Blood Cx x 2, CXR) negative to date. Repeat Echo 4/2 without new findings to explain hypotension. One episode of hypotension ~0000 on 4/3, otherwise BP has been normotensive over past 24h.  - UA ordered to complete infectious work-up, not yet obtained.  - Continue medications as above with hold parameters.  - Monitor vitals q 4h.   - Caution with additional fluid boluses given severe underlying R-sided heart pathology.     CKD - Baseline Cr ~1.3-1.5. Cr has remained at or below baseline this admission, last 1.35 (1.11) on 4/3.  - Trend BMP on 4/5     NAGMA, Resolved - Occurred 4/2 with Bicarb 17 & Chloride 121. Unclear etiology. No diarrhea or evidence of RTA. Bicarb normalized 4/3.  - Trend BMP on 4/5    Behavioral/Psychological/Social:  Continue to encourage participation in groups    Legal: Voluntary    Safety:  - Continue precautions as noted above  - Status 15 minute checks  - 1:1 overnights due to need for patient to wear supplemental oxygen    Disposition: Pending clinical stabilization. Would benefit from day treatment/PHP program upon discharge. Currently, patient exhibiting ongoing significant anxiety, paranoia, and depressive symptoms though improving.     Debra Hernandez MD  [facility]

## 2019-04-05 NOTE — PROGRESS NOTES
Pt asked in the morning if she could talk with me and I said we could.  I approached her in the afternoon and she was not feeling well. She was in her bed with the oxygen. She did not look like she had the energy to talk. We agreed we would meet on Monday.

## 2019-04-05 NOTE — PROGRESS NOTES
Brief Medicine Note    Judith Nelson is a 59 year old female with a history of pulmonary HTN, stress cardiomyopathy, sarcoidosis, CKD III, iron deficiency anemia, HLD, GERD, and depression who was admitted to [facility] on 3/29/19 with auditory hallucinations and disorganized behavior. Medicine following for PAH, recent hypotension, CKD, and NAGMA.    Vital signs, medications, and nursing notes were reviewed.     Pulmonary Arterial Hypertension - Followed by Dr. Franco at [facility], last visit 1/6/19 without changes to plan of care. Last Echo (4/2/19) with LVEF 60-65%, mild RVH, normal RV fxn, mild RV dilation, moderate-severe TR, and very severe PH (RVSP 120 mmHg). Echo results d/w Cardiology 4/2 with recommendations for gentle diuresis and close OP follow up. Weight stable. O2 requirements at baseline of 2L O2 with sleep and low 90s on RA, intermittently requiring 2L during the day for desaturations to the mid-80s. Suspect pt had been desaturating PTA due to disease progression, but just now being captured with regular monitoring.    - Monitor O2 sats q4h  - Daily weights  - Supplemental O2 prn to maintain sats >90%  - Anticipate will need O2 at 2L continuously during the day upon discharge until she can follow-up with Pulmonary HTN clinic  - Continue PO Lasix 20 mg daily with KCl supplementation  - Coreg decreased to 3.125 mg BID 4/2 in setting of hypotension. Titrate to home dosing (6.25 mg BID) as able.  - Continue PTA Opsumit, Tadalafil, Uptravi, and PRN Xopenex  - Low-sodium diet   - Follow-up in Pulmonary HTN clinic as scheduled on 5/22. Will have HUC try to schedule sooner.     Hypotension, improving: One episode of hypotension overnight on 4/5 with SBP of 92, otherwise normotensive over past 24hrs. Suspect due to medications (Carvedilol, Lasix, Tadalafil) with probable non-compliance PTA. S/p 2L IVF bolus with improvement. Afebrile, negative infectious work-up, though with UA still to be  obtained. No localizing s/s of infection.  Repeat Echo 4/2 without new findings to explain hypotension.   - UA to be collected today  - Continue medications as above with hold parameters.  - Monitor vitals q4h   - Caution with additional fluid boluses given severe underlying R-sided heart pathology.    CKD: Baseline Cr ~1.3-1.5. Cr has remained stable at/near baseline this admission, most recently 1.2 today.   - No indication for repeat lab monitoring at this time      Medicine will continue to follow.     Margarita Oneal PA-C  Hospitalist Service  Pager: [phone]

## 2019-04-06 PROCEDURE — 12400001 ZZH R&B MH UMMC

## 2019-04-06 PROCEDURE — A9270 NON-COVERED ITEM OR SERVICE: HCPCS | Mod: GY | Performed by: EMERGENCY MEDICINE

## 2019-04-06 PROCEDURE — A9270 NON-COVERED ITEM OR SERVICE: HCPCS | Mod: GY | Performed by: DERMATOLOGY

## 2019-04-06 PROCEDURE — 25000132 ZZH RX MED GY IP 250 OP 250 PS 637: Mod: GY | Performed by: PHYSICIAN ASSISTANT

## 2019-04-06 PROCEDURE — A9270 NON-COVERED ITEM OR SERVICE: HCPCS | Mod: GY | Performed by: PHYSICIAN ASSISTANT

## 2019-04-06 PROCEDURE — A9270 NON-COVERED ITEM OR SERVICE: HCPCS | Mod: GY | Performed by: PSYCHIATRY & NEUROLOGY

## 2019-04-06 PROCEDURE — 25000131 ZZH RX MED GY IP 250 OP 636 PS 637: Mod: GY | Performed by: EMERGENCY MEDICINE

## 2019-04-06 PROCEDURE — 25000132 ZZH RX MED GY IP 250 OP 250 PS 637: Mod: GY | Performed by: EMERGENCY MEDICINE

## 2019-04-06 PROCEDURE — 25000132 ZZH RX MED GY IP 250 OP 250 PS 637: Mod: GY | Performed by: PSYCHIATRY & NEUROLOGY

## 2019-04-06 PROCEDURE — 25000132 ZZH RX MED GY IP 250 OP 250 PS 637: Mod: GY | Performed by: DERMATOLOGY

## 2019-04-06 RX ADMIN — CARVEDILOL 3.12 MG: 3.12 TABLET, FILM COATED ORAL at 09:17

## 2019-04-06 RX ADMIN — CALCITRIOL CAPSULES 0.25 MCG 0.25 MCG: 0.25 CAPSULE ORAL at 09:17

## 2019-04-06 RX ADMIN — MACITENTAN 10 MG: 10 TABLET, FILM COATED ORAL at 09:19

## 2019-04-06 RX ADMIN — OLANZAPINE 5 MG: 5 TABLET, FILM COATED ORAL at 20:47

## 2019-04-06 RX ADMIN — POTASSIUM CHLORIDE 10 MEQ: 750 TABLET, EXTENDED RELEASE ORAL at 09:17

## 2019-04-06 RX ADMIN — SIMVASTATIN 10 MG: 10 TABLET, FILM COATED ORAL at 20:10

## 2019-04-06 RX ADMIN — BUSPIRONE HYDROCHLORIDE 15 MG: 15 TABLET ORAL at 20:10

## 2019-04-06 RX ADMIN — FUROSEMIDE 20 MG: 20 TABLET ORAL at 09:17

## 2019-04-06 RX ADMIN — RANITIDINE 75 MG: 75 TABLET, COATED ORAL at 09:17

## 2019-04-06 RX ADMIN — SELEXIPAG 1600 MCG: 1600 TABLET, COATED ORAL at 20:10

## 2019-04-06 RX ADMIN — FLUOXETINE 20 MG: 20 CAPSULE ORAL at 09:17

## 2019-04-06 RX ADMIN — TADALAFIL 40 MG: 10 TABLET, FILM COATED ORAL at 09:17

## 2019-04-06 RX ADMIN — OLANZAPINE 2.5 MG: 2.5 TABLET, FILM COATED ORAL at 09:17

## 2019-04-06 RX ADMIN — BUSPIRONE HYDROCHLORIDE 15 MG: 15 TABLET ORAL at 09:18

## 2019-04-06 RX ADMIN — SELEXIPAG 1600 MCG: 1600 TABLET, COATED ORAL at 09:18

## 2019-04-06 RX ADMIN — RANITIDINE 150 MG: 75 TABLET, COATED ORAL at 20:10

## 2019-04-06 RX ADMIN — CARVEDILOL 3.12 MG: 3.12 TABLET, FILM COATED ORAL at 17:58

## 2019-04-06 RX ADMIN — ACETAMINOPHEN 650 MG: 325 TABLET, FILM COATED ORAL at 19:06

## 2019-04-06 RX ADMIN — LORAZEPAM 1 MG: 1 TABLET ORAL at 20:10

## 2019-04-06 RX ADMIN — AZATHIOPRINE 50 MG: 50 TABLET ORAL at 09:18

## 2019-04-06 RX ADMIN — LORAZEPAM 1 MG: 1 TABLET ORAL at 09:17

## 2019-04-06 ASSESSMENT — ACTIVITIES OF DAILY LIVING (ADL)
DRESS: INDEPENDENT
HYGIENE/GROOMING: INDEPENDENT
ORAL_HYGIENE: INDEPENDENT
ORAL_HYGIENE: INDEPENDENT
LAUNDRY: WITH SUPERVISION
LAUNDRY: WITH SUPERVISION
HYGIENE/GROOMING: INDEPENDENT
DRESS: INDEPENDENT;STREET CLOTHES

## 2019-04-06 NOTE — PROGRESS NOTES
04/05/19 1700   Psycho Education   Type of Intervention structured groups   Response participates, initiates socially appropriate   Hours 1   Treatment Detail psychotherapy group   Patient actively participated in psychotherapy group which focused on personal resiliency by identifying individual strengths and positive coping skills.  Mendy presented as cheerful. She shared multiple strengths/coping skills and reports looking forward to walking the halls for some exercise. She learned and practiced a new breathing technique (square breathing) and reports finding it helpful.

## 2019-04-06 NOTE — PROGRESS NOTES
0144-Vital signs taken and bp 83/46, p: 63 and O2: 92% moonlighter notified and recommended that patient drink water and continue to monitor for safety and symptoms of hypotension.     Barbie Hauser RN on 4/6/2019 at 1:46 AM

## 2019-04-06 NOTE — PROGRESS NOTES
patient spent more than half the shift resting in her room. She complained of many different physical ailments, not the least of which was her difficulty breathing due to the low O2. Despite this, however, she denied SI and SIB.       04/06/19 1503   Behavioral Health   Hallucinations denies / not responding to hallucinations   Thinking intact   Orientation person: oriented;place: oriented;date: oriented;time: oriented   Memory baseline memory   Insight insight appropriate to situation;insight appropriate to events   Judgement impaired   Eye Contact at examiner   Affect blunted, flat   Mood anxious;mood is calm   Physical Appearance/Attire appears stated age;attire appropriate to age and situation   1. Wish to be Dead No   2. Non-Specific Active Suicidal Thoughts  No   Activity withdrawn   Speech clear;coherent   Medication Sensitivity no stated side effects;no observed side effects   Psychomotor / Gait unsteady   Psycho Education   Type of Intervention 1:1 intervention   Response participates, initiates socially appropriate   Hours 0.5   Activities of Daily Living   Hygiene/Grooming independent   Oral Hygiene independent   Dress independent   Laundry with supervision   Room Organization independent

## 2019-04-06 NOTE — PROGRESS NOTES
Pt. Denies SI/SIB. Pt visited with daughter. Pt stated she is feeling a little tired and is to ready to be off oxygen. Pt eldest daughter she reports had to deliver 8 week old baby - she reports daughter was not aware of being pregnant.     04/05/19 2101   Behavioral Health   Hallucinations denies / not responding to hallucinations   Thinking distractable   Orientation person: oriented;place: oriented   Memory baseline memory   Insight poor   Judgement impaired   Affect blunted, flat   Mood mood is calm   Suicidality other (see comments)  (pt denies)   Self Injury other (see comment)  (pt denies)   Activity isolative   Activities of Daily Living   Hygiene/Grooming independent   Oral Hygiene independent   Dress independent   Room Organization independent   Behavioral Health Interventions   Social and Therapeutic Interventions (Psychotic Symptoms) encourage socialization with peers;encourage effective boundaries with peers;encourage participation in therapeutic groups and milieu activities   .

## 2019-04-06 NOTE — PROGRESS NOTES
Patient has needed o2 at [facility]ln[facility] all evening tonight to keep sats at or above 93%. Patient's youngest daughter brought in her medications of Macitentan      and Selexipag. Patient gave this daughter 40.00 dollars [place] and now Mendy is very worried that this daughter may spend the money on marijuana. Mendy has needed a lot of reassurance this evening as she also found out her oldest daughter had an ectopic pregnancy and the next door neighbor passed away.

## 2019-04-07 PROCEDURE — A9270 NON-COVERED ITEM OR SERVICE: HCPCS | Performed by: PSYCHIATRY & NEUROLOGY

## 2019-04-07 PROCEDURE — 25000132 ZZH RX MED GY IP 250 OP 250 PS 637: Performed by: EMERGENCY MEDICINE

## 2019-04-07 PROCEDURE — 25000131 ZZH RX MED GY IP 250 OP 636 PS 637: Performed by: EMERGENCY MEDICINE

## 2019-04-07 PROCEDURE — A9270 NON-COVERED ITEM OR SERVICE: HCPCS | Performed by: PHYSICIAN ASSISTANT

## 2019-04-07 PROCEDURE — A9270 NON-COVERED ITEM OR SERVICE: HCPCS | Performed by: EMERGENCY MEDICINE

## 2019-04-07 PROCEDURE — 99232 SBSQ HOSP IP/OBS MODERATE 35: CPT | Performed by: PHYSICIAN ASSISTANT

## 2019-04-07 PROCEDURE — 25000132 ZZH RX MED GY IP 250 OP 250 PS 637: Performed by: PHYSICIAN ASSISTANT

## 2019-04-07 PROCEDURE — A9270 NON-COVERED ITEM OR SERVICE: HCPCS | Performed by: DERMATOLOGY

## 2019-04-07 PROCEDURE — 25000132 ZZH RX MED GY IP 250 OP 250 PS 637: Performed by: PSYCHIATRY & NEUROLOGY

## 2019-04-07 PROCEDURE — 12400001 ZZH R&B MH UMMC

## 2019-04-07 PROCEDURE — 25000132 ZZH RX MED GY IP 250 OP 250 PS 637: Performed by: DERMATOLOGY

## 2019-04-07 RX ADMIN — RANITIDINE 150 MG: 75 TABLET, COATED ORAL at 20:16

## 2019-04-07 RX ADMIN — AZATHIOPRINE 50 MG: 50 TABLET ORAL at 08:44

## 2019-04-07 RX ADMIN — CARVEDILOL 3.12 MG: 3.12 TABLET, FILM COATED ORAL at 08:43

## 2019-04-07 RX ADMIN — LORAZEPAM 1 MG: 1 TABLET ORAL at 08:44

## 2019-04-07 RX ADMIN — BUSPIRONE HYDROCHLORIDE 15 MG: 15 TABLET ORAL at 20:16

## 2019-04-07 RX ADMIN — BUSPIRONE HYDROCHLORIDE 15 MG: 15 TABLET ORAL at 08:44

## 2019-04-07 RX ADMIN — OLANZAPINE 2.5 MG: 2.5 TABLET, FILM COATED ORAL at 08:43

## 2019-04-07 RX ADMIN — FLUOXETINE 20 MG: 20 CAPSULE ORAL at 08:43

## 2019-04-07 RX ADMIN — SELEXIPAG 1600 MCG: 1600 TABLET, COATED ORAL at 20:17

## 2019-04-07 RX ADMIN — CARVEDILOL 3.12 MG: 3.12 TABLET, FILM COATED ORAL at 17:17

## 2019-04-07 RX ADMIN — POTASSIUM CHLORIDE 10 MEQ: 750 TABLET, EXTENDED RELEASE ORAL at 08:44

## 2019-04-07 RX ADMIN — CALCITRIOL CAPSULES 0.25 MCG 0.25 MCG: 0.25 CAPSULE ORAL at 08:43

## 2019-04-07 RX ADMIN — RANITIDINE 75 MG: 75 TABLET, COATED ORAL at 08:44

## 2019-04-07 RX ADMIN — FUROSEMIDE 20 MG: 20 TABLET ORAL at 08:43

## 2019-04-07 RX ADMIN — LORAZEPAM 1 MG: 1 TABLET ORAL at 20:16

## 2019-04-07 RX ADMIN — SELEXIPAG 1600 MCG: 1600 TABLET, COATED ORAL at 08:44

## 2019-04-07 RX ADMIN — OLANZAPINE 5 MG: 5 TABLET, FILM COATED ORAL at 20:18

## 2019-04-07 RX ADMIN — TADALAFIL 40 MG: 10 TABLET, FILM COATED ORAL at 08:43

## 2019-04-07 RX ADMIN — SIMVASTATIN 10 MG: 10 TABLET, FILM COATED ORAL at 20:16

## 2019-04-07 RX ADMIN — MACITENTAN 10 MG: 10 TABLET, FILM COATED ORAL at 08:45

## 2019-04-07 ASSESSMENT — ACTIVITIES OF DAILY LIVING (ADL)
ORAL_HYGIENE: INDEPENDENT
HYGIENE/GROOMING: INDEPENDENT
HYGIENE/GROOMING: INDEPENDENT
DRESS: INDEPENDENT
DRESS: INDEPENDENT
LAUNDRY: WITH SUPERVISION
LAUNDRY: WITH SUPERVISION
ORAL_HYGIENE: INDEPENDENT

## 2019-04-07 NOTE — PROGRESS NOTES
"Brief Medicine Note    Judith Nelson is a 59 year old female with a history of pulmonary HTN, stress cardiomyopathy, sarcoidosis, CKD III, iron deficiency anemia, HLD, GERD, and depression who was admitted to station [facility] on 3/29/19 with auditory hallucinations and disorganized behavior. Medicine following for PAH and recent hypotension.     Currently, Ms. Nelson reports she is feeling well. She endorses mild BLE edema. Breathing is feeling \"good.\"    /64 (BP Location: Right arm)   Pulse 63   Temp 98  F (36.7  C)   Resp 14   Wt 54.3 kg (119 lb 9.6 oz)   SpO2 (!) 86%   BMI 21.19 kg/m    General - Awake. Non-toxic appearing. NAD.  Respiratory - Normal effort on RA. Lungs CTAB.  Extremities - Trace non-pitting edema of BLE.    Assessment and Plan:  Pulmonary Arterial Hypertension - Followed by Dr. Franco at [facility], last visit 1/6/19 without changes to plan of care. Last Echo (4/2/19) with LVEF 60-65%, mild RVH, normal RV fxn, mild RV dilation, moderate-severe TR, and very severe PH (RVSP 120 mmHg). Echo results d/w Cardiology 4/2 with recommendations for gentle diuresis and close OP follow up. Weight stable ~118-119 lbs. At baseline, on 2L O2 with sleep (non-compliant PTA) and O2 sats in low 90s on RA during the day. Intermittent daytime O2 requirements of 2L since inpatient psychiatry admission likely d/t worsening of underlying disease processes (PAH, Sarcoidosis); CXR and Echo unrevealing of etiology.   - Monitor O2 sats q 4h. Supplemental O2 prn to maintain sats >90%. May need to discharge on 2L continuous O2 if ongoing daytime O2 requirements.  - Continue PO Lasix 20 mg daily with KCl supplementation and daily weights.  - Coreg decreased to 3.125 mg BID 4/2 in setting of hypotension. Titrate to home dosing (6.25 mg BID) as able.  - Continue PTA Opsumit, Tadalafil, Uptravi, and PRN Xopenex  - Low-sodium diet   - Follow-up in Pulmonary HTN clinic as scheduled on 5/22. Will have HUC " try to schedule sooner.     Hypotension, Improved - Significant hypotension overnight from 4/1-4/2 requiring 2L IVF bolus. Suspect due to medications (Carvedilol, Lasix, Tadalafil, Zyprexa, Ativan). Afebrile. WBC and Lactic Acid wnl. Negative Procal and CRP. No localizing s/s of infection. Infectious work-up (Blood Cx x 2, CXR, UA) negative to date. Repeat Echo 4/2 without new findings to explain hypotension. Ongoing nocturnal hypotension (occurring ~0000) which is likely due to bedtime Zyprexa dose with normotensive BP throughout the day.  - Continue medications as above with hold parameters. Defer ongoing use of psychiatric medications and HS Zyprexa to Psychiatry.  - Monitor vitals q 8h. No need to monitor vitals overnight unless symptomatic.  - Caution with additional fluid boluses given severe underlying R-sided heart pathology.  - Follow up on outstanding Blood Cx results    Medicine will continue to follow.    Bharti Kirby PA-C  Hospitalist Service  [phone]

## 2019-04-07 NOTE — PROGRESS NOTES
Pt calm and cooperative throughout shift. Pt denies SI and SIB, AH. Pt O2 stats were maintained throughout the shift, one time dropping around 85, oxygen was then used. Pt  Would like to go home tomorrow or by Wednesday the 10th as she mentioned it is her sons birthday and she would like to celebrate it with him.        04/07/19 1513   Behavioral Health   Hallucinations denies / not responding to hallucinations   Thinking intact   Orientation person: oriented;place: oriented;date: oriented;time: oriented   Memory baseline memory   Insight insight appropriate to situation   Judgement intact   Eye Contact at examiner   Affect full range affect   Mood mood is calm   Physical Appearance/Attire attire appropriate to age and situation   Hygiene well groomed   Suicidality other (see comments)  (no)   1. Wish to be Dead No   2. Non-Specific Active Suicidal Thoughts  No   Self Injury other (see comment)  (no)   Elopement   (no)   Activity other (see comment)  (visible in milieu)   Speech clear;coherent   Psychomotor / Gait balanced   Psycho Education   Type of Intervention 1:1 intervention   Response participates, initiates socially appropriate   Hours 0.5   Activities of Daily Living   Hygiene/Grooming independent   Oral Hygiene independent   Dress independent   Laundry with supervision   Room Organization independent

## 2019-04-07 NOTE — PROVIDER NOTIFICATION
"Judith mentions she was told today that her 26 yo daughter had an ectopic pregnancy discovered 1-2 days ago, had a procedure & was discharged home. She is quite tearful & anxious about this, stating \"she could have .\" I listened and validated her feelings. Her goal for today was \"to get a walk in\" and she has walked some on days and will do more this lewis. She is attending at least one group per day, and enjoys OT.    She denies AH, VH, racing thoughts, but does endorse some sadness & anxiety about her daughter's pregnancy. She feels the AH have disappeared in the past few days and she feels better since starting Zyprexa, but feels her appetite is increased. She states her sleep is good & has taken some naps the past couple of days.This shift she has divided time between being in the milieu, in her room and on the phone. She has been calm, pleasant, cooperative with staff and peers.       19   Behavioral Health   Thoughts/Cognition (WDL) WDL   Hallucinations denies / not responding to hallucinations   Thinking intact   Orientation person: oriented;place: oriented;date: oriented;time: oriented   Memory baseline memory   Insight insight appropriate to situation   Judgement impaired   Eye Contact at examiner   Affect/Mood (WDL) ex   Affect full range affect   Mood mood is calm;anxious   ADL Assessment (WDL) WDL   Physical Appearance/Attire neat;attire appropriate to age and situation   Hygiene well groomed   Suicidality (WDL) WDL   Suicidality other (see comments)  (denies)   1. Wish to be Dead No   2. Non-Specific Active Suicidal Thoughts  No   3. Active Sucidal Ideation with any Methods (Not Plan) Without Intent to Act  No   4. Active Suicidal Ideation with Some Intent to Act, Without Specific Plan  No   5. Active Suicidal Ideation with Specific Plan and Intent  No   Activity (WDL) WDL   Activity other (see comment)  (in milieu & also on phone to family)   Speech (WDL) WDL   Medication Sensitivity (WDL) " Ex.   Medication Sensitivity other (see comment)  (increased appetite, napping)   Psychomotor Gait (WDL) WDL   Psychomotor / Gait steady   Overt Agression (WDL) WDL   Substance Withdrawal   Substance Withdrawal None   Activities of Daily Living   Hygiene/Grooming independent   Oral Hygiene independent   Dress independent;street clothes   Laundry with supervision   Room Organization independent   Activity   Activity Assistance Provided independent   Significant Event   Significant Event Other (see comments)  (supplemental oxygen as needed)   Behavioral Health Interventions   Social and Therapeutic Interventions (Psychotic Symptoms) encourage participation in therapeutic groups and milieu activities

## 2019-04-08 LAB
BACTERIA SPEC CULT: NO GROWTH
BACTERIA SPEC CULT: NO GROWTH
INTERPRETATION ECG - MUSE: NORMAL
Lab: NORMAL
Lab: NORMAL
SPECIMEN SOURCE: NORMAL
SPECIMEN SOURCE: NORMAL

## 2019-04-08 PROCEDURE — A9270 NON-COVERED ITEM OR SERVICE: HCPCS | Performed by: PHYSICIAN ASSISTANT

## 2019-04-08 PROCEDURE — 25000132 ZZH RX MED GY IP 250 OP 250 PS 637: Performed by: DERMATOLOGY

## 2019-04-08 PROCEDURE — 25000132 ZZH RX MED GY IP 250 OP 250 PS 637: Performed by: EMERGENCY MEDICINE

## 2019-04-08 PROCEDURE — 25000132 ZZH RX MED GY IP 250 OP 250 PS 637: Performed by: PSYCHIATRY & NEUROLOGY

## 2019-04-08 PROCEDURE — 12400001 ZZH R&B MH UMMC

## 2019-04-08 PROCEDURE — 25000132 ZZH RX MED GY IP 250 OP 250 PS 637: Performed by: PHYSICIAN ASSISTANT

## 2019-04-08 PROCEDURE — G0177 OPPS/PHP; TRAIN & EDUC SERV: HCPCS

## 2019-04-08 PROCEDURE — A9270 NON-COVERED ITEM OR SERVICE: HCPCS | Performed by: EMERGENCY MEDICINE

## 2019-04-08 PROCEDURE — A9270 NON-COVERED ITEM OR SERVICE: HCPCS | Performed by: DERMATOLOGY

## 2019-04-08 PROCEDURE — A9270 NON-COVERED ITEM OR SERVICE: HCPCS | Performed by: PSYCHIATRY & NEUROLOGY

## 2019-04-08 PROCEDURE — 99232 SBSQ HOSP IP/OBS MODERATE 35: CPT | Performed by: PSYCHIATRY & NEUROLOGY

## 2019-04-08 PROCEDURE — 25000131 ZZH RX MED GY IP 250 OP 636 PS 637: Performed by: EMERGENCY MEDICINE

## 2019-04-08 RX ADMIN — SIMVASTATIN 10 MG: 10 TABLET, FILM COATED ORAL at 20:44

## 2019-04-08 RX ADMIN — OLANZAPINE 2.5 MG: 2.5 TABLET, FILM COATED ORAL at 08:32

## 2019-04-08 RX ADMIN — CARVEDILOL 3.12 MG: 3.12 TABLET, FILM COATED ORAL at 08:33

## 2019-04-08 RX ADMIN — SELEXIPAG 1600 MCG: 1600 TABLET, COATED ORAL at 20:44

## 2019-04-08 RX ADMIN — CARVEDILOL 3.12 MG: 3.12 TABLET, FILM COATED ORAL at 17:43

## 2019-04-08 RX ADMIN — POTASSIUM CHLORIDE 10 MEQ: 750 TABLET, EXTENDED RELEASE ORAL at 08:33

## 2019-04-08 RX ADMIN — CALCITRIOL CAPSULES 0.25 MCG 0.25 MCG: 0.25 CAPSULE ORAL at 08:32

## 2019-04-08 RX ADMIN — TADALAFIL 40 MG: 10 TABLET, FILM COATED ORAL at 08:32

## 2019-04-08 RX ADMIN — SELEXIPAG 1600 MCG: 1600 TABLET, COATED ORAL at 08:32

## 2019-04-08 RX ADMIN — BUSPIRONE HYDROCHLORIDE 15 MG: 15 TABLET ORAL at 20:44

## 2019-04-08 RX ADMIN — LORAZEPAM 1 MG: 1 TABLET ORAL at 20:45

## 2019-04-08 RX ADMIN — FUROSEMIDE 20 MG: 20 TABLET ORAL at 08:33

## 2019-04-08 RX ADMIN — AZATHIOPRINE 50 MG: 50 TABLET ORAL at 08:33

## 2019-04-08 RX ADMIN — RANITIDINE 150 MG: 75 TABLET, COATED ORAL at 20:44

## 2019-04-08 RX ADMIN — FLUOXETINE 20 MG: 20 CAPSULE ORAL at 08:33

## 2019-04-08 RX ADMIN — MACITENTAN 10 MG: 10 TABLET, FILM COATED ORAL at 08:32

## 2019-04-08 RX ADMIN — OLANZAPINE 5 MG: 5 TABLET, FILM COATED ORAL at 20:44

## 2019-04-08 RX ADMIN — LORAZEPAM 1 MG: 1 TABLET ORAL at 08:32

## 2019-04-08 RX ADMIN — BUSPIRONE HYDROCHLORIDE 15 MG: 15 TABLET ORAL at 08:32

## 2019-04-08 RX ADMIN — RANITIDINE 75 MG: 75 TABLET, COATED ORAL at 08:33

## 2019-04-08 NOTE — PROGRESS NOTES
04/08/19 1400   Behavioral Health   Hallucinations denies / not responding to hallucinations   Thinking intact;other (see comment)  (No return of delusional beliefs.)   Orientation person: oriented;place: oriented;date: oriented;time: oriented   Memory baseline memory   Insight insight appropriate to situation   Judgement intact   Eye Contact at examiner   Affect full range affect   Mood anxious;other (see comments)  (Sad re missing asult childrens birthdays.)   Physical Appearance/Attire attire appropriate to age and situation   Hygiene well groomed   Suicidality other (see comments)  (Denies.)   1. Wish to be Dead No   2. Non-Specific Active Suicidal Thoughts  No   Elopement   (Pt is not a risk.)   Activity   (Out in milieu at times.)   Speech clear;coherent   Medication Sensitivity no stated side effects;no observed side effects   Psychomotor / Gait balanced;steady
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
## 2019-04-08 NOTE — PLAN OF CARE
Date of group: 4/5    Patient participated in clinic group wanting to do something for her daughter for her birthday. She appeared nervous, doubting that she could be artistic in any skillful way. She agreed to make her daughter a bracelet. Writer explained the visual role of different types of beads and used visual examples. Patient chose the beads she wanted to use and worked off of the pattern writer helped her start. She required cues for consistency in pattern and to correct errors. She demonstrated attention for task.

## 2019-04-08 NOTE — PROGRESS NOTES
Pt's vitals obtained at 0130 (O2 90% on 2L via NC, BP 89/54, P64) and 0515 (O2 92 % on 2L via NC, 83/49, P61).  Pt's BP's trending low during night throughout much of hospitalization.  On-call MD notified of pt's BP per BP parameters and orders were given to encourage fluids--pt compliant w/ this. Pt appears to have slept through the night; 7 hrs total.  Will continue to monitor, maintain safety, and support plan of care.      ADDENDUM: Pt's vitals at 0710: O2 93% on RA, P 72, /55.  Pt currently sitting in lounge at this time drinking water.  SIO 1:1 utilized during night while pt was on O2; SIO 1:1 not currently utilized at this time as pt not currently on O2.

## 2019-04-08 NOTE — PROGRESS NOTES
"Brief medicine note:  - Met briefly with pt today and she states she feels \"great\". Feels has significantly improved and will most likely be discharge tomorrow. From medical standpoint, had another discussion regarding lower BPs but pt denies being symptomatic. States she gets intermittently lightheaded but states this is not new and had prior to starting Zyprexa, which per psychiatry, she needs as her mental health has improved after this was started. From medical standpoint, pt is medically clear for discharge, but pt should have follow up clinic appointment made with her PCP and primary cardiologist to be seen after discharge within one months time. Medicine will continue to follow pt peripherally via chart check daily until she discharges.        Chay Gonzalez PA-C  Internal Medicine Hospitalist   [facility] Hospitalist group  [phone]     "
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
## 2019-04-08 NOTE — PLAN OF CARE
Patient attended part of clinic and a health and coping OT leisure group. During OT clinic, she completed beading task with moderate assistance for consistency in her pattern and cues to recognize errors in patter which she was able to correct. She demonstrated improved pattern awareness and made independent adjustments to complete pattern. She was proud of her finished product and received many compliments. Writer pointed out that despite her earlier assertions, she completed a creative expression task and created something she felt really good about. During afternoon group, she participated in structured group task. Her affect was bright and she laughed. She required several repetitions of the rules with a visual reminder and corrections, demonstrating limitations in attention and perhaps functional short term memory. She enjoyed the activity and participated in reflection conversation initiated by another patient about risk assessment, predicting outcomes, the role of possibility and frustration.

## 2019-04-08 NOTE — PLAN OF CARE
O2 sat @ 1120 = 89% on room air. Pt now on O2 @ 2 L and SIO staff with her. Sats on 2 L = 83 %. Will recheck sats  @ lunch time with vitals.

## 2019-04-09 VITALS
OXYGEN SATURATION: 90 % | TEMPERATURE: 97.8 F | HEART RATE: 75 BPM | BODY MASS INDEX: 21.43 KG/M2 | RESPIRATION RATE: 16 BRPM | WEIGHT: 121 LBS | SYSTOLIC BLOOD PRESSURE: 114 MMHG | DIASTOLIC BLOOD PRESSURE: 77 MMHG

## 2019-04-09 DIAGNOSIS — I50.9 HEART FAILURE (H): Primary | ICD-10-CM

## 2019-04-09 PROCEDURE — A9270 NON-COVERED ITEM OR SERVICE: HCPCS | Performed by: PHYSICIAN ASSISTANT

## 2019-04-09 PROCEDURE — 25000131 ZZH RX MED GY IP 250 OP 636 PS 637: Performed by: EMERGENCY MEDICINE

## 2019-04-09 PROCEDURE — 25000132 ZZH RX MED GY IP 250 OP 250 PS 637: Performed by: PHYSICIAN ASSISTANT

## 2019-04-09 PROCEDURE — 25000132 ZZH RX MED GY IP 250 OP 250 PS 637: Performed by: DERMATOLOGY

## 2019-04-09 PROCEDURE — A9270 NON-COVERED ITEM OR SERVICE: HCPCS | Performed by: DERMATOLOGY

## 2019-04-09 PROCEDURE — A9270 NON-COVERED ITEM OR SERVICE: HCPCS | Performed by: PSYCHIATRY & NEUROLOGY

## 2019-04-09 PROCEDURE — A9270 NON-COVERED ITEM OR SERVICE: HCPCS | Performed by: EMERGENCY MEDICINE

## 2019-04-09 PROCEDURE — 25000132 ZZH RX MED GY IP 250 OP 250 PS 637: Performed by: EMERGENCY MEDICINE

## 2019-04-09 PROCEDURE — 25000132 ZZH RX MED GY IP 250 OP 250 PS 637: Performed by: PSYCHIATRY & NEUROLOGY

## 2019-04-09 RX ORDER — LEVALBUTEROL TARTRATE 45 UG/1
2 AEROSOL, METERED ORAL EVERY 6 HOURS PRN
Qty: 1 INHALER | Refills: 0 | Status: SHIPPED | OUTPATIENT
Start: 2019-04-09 | End: 2019-09-11

## 2019-04-09 RX ORDER — OLANZAPINE 2.5 MG/1
TABLET, FILM COATED ORAL
Qty: 90 TABLET | Refills: 0 | Status: SHIPPED | OUTPATIENT
Start: 2019-04-09 | End: 2019-12-13

## 2019-04-09 RX ORDER — ONDANSETRON 4 MG/1
4 TABLET, ORALLY DISINTEGRATING ORAL EVERY 8 HOURS PRN
Qty: 10 TABLET | Refills: 0
Start: 2019-04-09 | End: 2019-04-17

## 2019-04-09 RX ORDER — CARVEDILOL 3.12 MG/1
3.12 TABLET ORAL 2 TIMES DAILY WITH MEALS
Qty: 60 TABLET | Refills: 0 | Status: SHIPPED | OUTPATIENT
Start: 2019-04-09 | End: 2019-05-06

## 2019-04-09 RX ADMIN — FLUOXETINE 20 MG: 20 CAPSULE ORAL at 07:35

## 2019-04-09 RX ADMIN — POTASSIUM CHLORIDE 10 MEQ: 750 TABLET, EXTENDED RELEASE ORAL at 07:35

## 2019-04-09 RX ADMIN — CARVEDILOL 3.12 MG: 3.12 TABLET, FILM COATED ORAL at 07:35

## 2019-04-09 RX ADMIN — ACETAMINOPHEN 650 MG: 325 TABLET, FILM COATED ORAL at 09:57

## 2019-04-09 RX ADMIN — SELEXIPAG 1600 MCG: 1600 TABLET, COATED ORAL at 07:36

## 2019-04-09 RX ADMIN — FUROSEMIDE 20 MG: 20 TABLET ORAL at 07:35

## 2019-04-09 RX ADMIN — BUSPIRONE HYDROCHLORIDE 15 MG: 15 TABLET ORAL at 07:35

## 2019-04-09 RX ADMIN — CALCITRIOL CAPSULES 0.25 MCG 0.25 MCG: 0.25 CAPSULE ORAL at 07:35

## 2019-04-09 RX ADMIN — OLANZAPINE 2.5 MG: 2.5 TABLET, FILM COATED ORAL at 07:35

## 2019-04-09 RX ADMIN — TADALAFIL 40 MG: 10 TABLET, FILM COATED ORAL at 07:35

## 2019-04-09 RX ADMIN — AZATHIOPRINE 50 MG: 50 TABLET ORAL at 08:14

## 2019-04-09 RX ADMIN — MACITENTAN 10 MG: 10 TABLET, FILM COATED ORAL at 07:35

## 2019-04-09 RX ADMIN — LORAZEPAM 1 MG: 1 TABLET ORAL at 07:35

## 2019-04-09 RX ADMIN — RANITIDINE 75 MG: 75 TABLET, COATED ORAL at 07:35

## 2019-04-09 NOTE — PROGRESS NOTES
Pt appears to have slept majority of the night.  First set of vitals (0130) O2 91% on 2 L via NC, BP low at 85/54.  Per BP parameter orders, MD notified of low BP as systolic was below 90.  Per MD, continue to monitor BP, no further orders at this time.  Writer also encouraged fluids which pt was compliant with.  0255 VS: 90% on 2L via NC, BP 87/56.  Last set of vitals at 0540: O2 96% on 2 L via NC and BP 94/59.  SIO 1:1 currently in place at this time d/t oxygen usage.  Will continue to monitor and support plan of care.

## 2019-04-09 NOTE — DISCHARGE INSTRUCTIONS
Behavioral Discharge Planning and Instructions      Summary:  You were admitted on 3/28/2019  due to symptoms of psychosis such as paranoia.  You were treated by Dr. Cuca Hernandez MD.  Medications were started and your symptoms improved. You had a number of medical problems that were addressed while you were here.  You were discharged on 4/9/2019 from [facility] Home with your daughter.       Principal Diagnosis:   Schizoaffective Disorder, decompensated vs MDD, recurrent, severe with psychotic features  Sarcoidosis         Health Care Follow-up Appointments:   You have Medicare and signed the required  Important Promise from Medicare.  You also have [employer] medical assistance.  The [employer] Member Services number is [phone], Behavioral Health is option #4.   Use [employer] - [phone] - for transportation to your health care appointments.  Your [facility] economic assistance worker is Mendy Moreno @ [phone].      Return  to see your psychiatrist Dr. Rosales on Wednesday, April 10, 2019 at 2:40PM..  [facility]   [address],   [place] [address]  [phone]  The Health Unit Coordinator has faxed these discharge instructions to Fax: [phone]      Return to see your therapist at your standing appointment time on Wednesdays at 11AM.  [facility] [place]  Ph: [phone]        You will be seen by your home health care agency within 48 hours of discharge. They will contact you to set an appointment with you.  [facility] ([facility])  Ph: [phone]  Heavenly is your nurse  but she is currently out so Aung attempted to see you yesterday.  The Health Unit Coordinator has faxed these discharge instructions to fax:[phone]        It was recommended by medical staff that your pulmonary appointment be moved up. The [facility] did complete this.  You have a number of medical appointments  here at [facility] that are listed on the first pages of this After Visit Summary.      You have been referred to the 55 + Day Program. Please attend your intake session on Friday, April 12, 2019 at 9AM.  [facility]  [address]  [facility] Floor  [address] [place] [address]  Program: [phone]  : -[phone]      Attend all scheduled appointments with your outpatient providers. Call at least 24 hours in advance if you need to reschedule an appointment to ensure continued access to your outpatient providers.   Major Treatments, Procedures and Findings:  You were provided with: a psychiatric assessment, assessed for medical stability, medication evaluation and/or management, group therapy, milieu management and medical interventions      Your drug screen was negative.      You were seen by Internal Medicine:  Pulmonary arterial hypertension: She follows with Dr. Franco in [facility], last saw 1/6/19, will see again in May. Has been stable. Last echo on 1/25 with LVEF 60-65%, moderate TR, very severe RH (RVSP 111). PTA on supplemental oxygen of 2 LPM while sleeping, no needs with activity or at rest. Baseline O2 sats in the low 90s at rest. PTA managed on carvedilol, Opsumit, tadalafil, furosemide with K supplementation, Uptravi, and PRN albuterol.    - Supplemental oxygen per home regimen of 2 LPM while sleeping   - Respiratory Therapy consult for home O2 regimen   - Consider moving patient to room closer to main lobby areas to reduce dyspnea if this is possible   - Albuterol PRN for dyspnea, wheezing   - Continue PTA medication regimen of carvedilol, tadalafil, furosemide with K supplementation, Uptravi, and Opsumit    - Hold carvedilol, furosemide, tadalafil for SBP <100   - Low-sodium diet    - She is scheduled for follow-up in Pulmonary HTN clinic on 5/22      Sarcoidosis: Follows with Dr. Flaherty, last saw in December 2018. PFTs at  that time with mild small airway obstruction and mildly decreased diffusion capacity.   - Continue PTA azathioprine     CKD stage III: Secondary to her sarcoidosis. Baseline Cr 1.3, currently at baseline. PTA on calcitriol and Prolia injections every 6 months.   - Continue PTA calcitriol     HLD: Continue on PTA simvastatin.      GERD: Continue on PTA Zantac.       You had a chest x-ray on April 2, 2019.            Symptoms to Report: increased confusion or hearing things that aren't there    Early warning signs can include: increased unusual thinking, such as paranoia or hearing voices    Safety and Wellness:  Take all medicines as directed.  Make no changes unless your doctor suggests them.      Follow treatment recommendations.  Refrain from alcohol and non-prescribed drugs.  If there is a concern for safety, call [phone].    Resources:   National [place] on Mental Illness (www.[place].vasu.org): 410.751.2252 or 110-487-9079.      [facility] Rapid Response [phone]      [facility] ([facility])   [address] [place] [address] - Walk-ins Welcome - During certain hours  [phone],     Ask the crisis counselor about going to a crisis home if you feel that you need some stabilization but do not need to come to the hospital. [facility]'s crisis home is called [facility].  [facility] -   [address]  [place], [place]          The treatment team has appreciated the opportunity to work with you.     If you have any questions or concerns our unit number is [phone]  You may be receiving a follow-up phone call within the next three days from a representative from behavioral health.    You have identified the best phone number to reach you as [phone]

## 2019-04-09 NOTE — PROGRESS NOTES
I met with pt.  Pt is leaving today.  We tried calling her economic assistance worker and had to leave vm re: her concerns about her inheritance.  Pt signed ALEXA and I wrote letter for the landlord to excuse late payment on rent.  I showed pt the pulmonary appointments for tomorrow and pt wanted them cancelled. We left a vm for the clinic to cancel these.      I later got a vm from her therapist who was going to call the pt.    I am waiting for the pulmonary appointments to be cancelled and for the day treatment intake to be scheduled to complete the AVS.       Behavioral Discharge Planning and Instructions      Summary:  You were admitted on 3/28/2019  due to symptoms of psychosis such as paranoia.  You were treated by Dr. Cuca Hernandez MD.  Medications were started and your symptoms improved. You had a number of medical problems that were addressed while you were here.  You were discharged on 4/9/2019 from [facility] Home with your daughter.       Principal Diagnosis:   Schizoaffective Disorder, decompensated vs MDD, recurrent, severe with psychotic features  Sarcoidosis         Health Care Follow-up Appointments:   You have Medicare and signed the required  Important Promise from Medicare.  You also have [employer] medical assistance.  The [employer] Member Services number is [phone], Behavioral Health is option #4.   Use [employer] - [phone] - for transportation to your health care appointments.  Your [facility] economic assistance worker is Mendy Moreno @ [phone].      Return  to see your psychiatrist Dr. Rosales on Wednesday, April 10, 2019 at 2:40PM..  [facility]   [address],   [place] [address]  [phone]  The Health Unit Coordinator has faxed these discharge instructions to Fax: [phone]      Return to see your therapist at your standing appointment time on Wednesdays at 11AM.  Alba Dowell and Lenard rubalcava [place]  Ph:  [phone]        You will be seen by your home health care agency within 48 hours of discharge. They will contact you to set an appointment with you.  [facility] ([facility])  Ph: [phone]  Heavenly is your nurse  but she is currently out so Aung attempted to see you yesterday.  The Health Unit Coordinator has faxed these discharge instructions to fax:[phone]        It was recommended by medical staff that your pulmonary appointment be moved up. [facility] did complete this.  You have a number of medical appointments here at [facility] that are listed on the first pages of this After Visit Summary.      You have been referred to the 55 + [facility] Program. Please attend your intake session on Friday, April 12, 2019 at 9AM.  [facility]   [address]  [address], [place] [address]  Program: [phone]  : -[phone]      Attend all scheduled appointments with your outpatient providers. Call at least 24 hours in advance if you need to reschedule an appointment to ensure continued access to your outpatient providers.   Major Treatments, Procedures and Findings:  You were provided with: a psychiatric assessment, assessed for medical stability, medication evaluation and/or management, group therapy, milieu management and medical interventions      Your drug screen was negative.      You were seen by Internal Medicine:  Pulmonary arterial hypertension: She follows with Dr. Franco in [facility], last saw 1/6/19, will see again in May. Has been stable. Last echo on 1/25 with LVEF 60-65%, moderate TR, very severe RH (RVSP 111). PTA on supplemental oxygen of 2 LPM while sleeping, no needs with activity or at rest. Baseline O2 sats in the low 90s at rest. PTA managed on carvedilol, Opsumit, tadalafil, furosemide with K supplementation, Uptravi, and PRN albuterol.    - Supplemental oxygen per home  regimen of 2 LPM while sleeping   - Respiratory Therapy consult for home O2 regimen   - Consider moving patient to room closer to main lobby areas to reduce dyspnea if this is possible   - Albuterol PRN for dyspnea, wheezing   - Continue PTA medication regimen of carvedilol, tadalafil, furosemide with K supplementation, Uptravi, and Opsumit    - Hold carvedilol, furosemide, tadalafil for SBP <100   - Low-sodium diet    - She is scheduled for follow-up in Pulmonary HTN clinic on 5/22      Sarcoidosis: Follows with Dr. Flaherty, last saw in December 2018. PFTs at that time with mild small airway obstruction and mildly decreased diffusion capacity.   - Continue PTA azathioprine     CKD stage III: Secondary to her sarcoidosis. Baseline Cr 1.3, currently at baseline. PTA on calcitriol and Prolia injections every 6 months.   - Continue PTA calcitriol     HLD: Continue on PTA simvastatin.      GERD: Continue on PTA Zantac.       You had a chest x-ray on April 2, 2019.            Symptoms to Report: increased confusion or hearing things that aren't there    Early warning signs can include: increased unusual thinking, such as paranoia or hearing voices    Safety and Wellness:  Take all medicines as directed.  Make no changes unless your doctor suggests them.      Follow treatment recommendations.  Refrain from alcohol and non-prescribed drugs.  If there is a concern for safety, call 911.    Resources:   [employer] [place] [employer] Mental [employer]ness (www.[place].avsu.org): 866.430.1620 or 024-015-8045.      [facility] Rapid Response [phone]      [facility] ([facility])   [address] [place] [address] - Walk-ins Welcome - During certain hours  [phone],     Ask the crisis counselor about going to a crisis home if you feel that you need some stabilization but do not need to come to the hospital. [facility]'s crisis home is called [facility].  [facility] -    [address]  [place], [place]          The treatment team has appreciated the opportunity to work with you.     If you have any questions or concerns our unit number is [phone]  You may be receiving a follow-up phone call within the next three days from a representative from behavioral health.    You have identified the best phone number to reach you as [phone]

## 2019-04-09 NOTE — DISCHARGE SUMMARY
"Psychiatric Discharge Summary    Judith Nelson MRN# [id number]   Age: 59 year old YOB: 1960     Date of Admission:  3/28/2019  Date of Discharge:  4/9/2019  3:09 PM  Admitting Physician:  Cuca Hernandez MD  Discharge Physician:  Cuca Hernandez MD (Contact: [phone])         Event Leading to Hospitalization:   Per H&P:    Records indicate the patient is a  female who presented to [facility] via ambulance for assessment of psychotic symptoms.  Patient comes to ED by ambulance after she called 911 reporting that there was a man in her attic who was terrorizing her.  During her interview with the DEC , the patient had marked latency, intense stare, easily derailed in her thoughts, appeared to have internal interference.  She began to answer questions with some lucidity, then gets distracted, and asks questions such as \"why am I in federal retirement?\"  Or \"is that child dead?\"  She was restless, fearful, misperceiving actions of others.  She was unable to give clear explanations of behavior prior to her arrival and did ask several questions related to the safety of her children.  It was noted by ED provider that the patient would attempt to leave her room multiple times as she questioned whether or not she saw her children.     DEC  obtained collateral information from patient's daughter, Saritha, who reported the patient has a past history of mental problems, but has been stable for years.  In January this year she started to have symptoms again, was hearing voices, thinking people were in the town home, would think her children were missing (patient has 1 daughter living at home, her other daughter and son live elsewhere).  Saritha reported that patient was seen at several hospitals in February, did seem to get somewhat better, but was still \"talking to herself.\"  Patient's daughter noted that over the past few days the patient's symptoms have worsened again.  " "Saritha indicates that her mother does not use any illicit substances or alcohol, and has not expressed any suicidal thoughts or actions.  She did report that her father passed away in August and that was very difficult for her (her mother  shortly after Earnestinehs birthday 20 years ago).  Records indicate that the patient has been on disability for medical issues for a number of years.  They have lived in the same town home for the past 12 years with no recent changes in finances or housing.  Due to concerns about patient's ability to care for herself, disoriented and disorganized thought process, she was placed on 72-hour hold for inpatient admission.     Upon interview with the patient, the patient immediately questioned whether or not she knew who this writer was.  She said \"you look really familiar.\"  During our interview, she would frequently look at what I was typing in the computer and who was walking by outside of the door.  She also asked several times who this information would be shared with and whether or not it is confidential.  She appeared very paranoid, suspicious, and frightened.     When asked what led to this hospitalization, the patient reported that she last felt like her normal self in January of this year.  She states that shortly after January, she started feeling like she was \"drugged.\"  When asked what evidence there is to support this theory, she said that she \"all of the sudden felt like my dopamine and serotonin were off balance and I was extremely fatigued.  I have also been up all night and I really do think I have been drugged.\"  Patient mentions that she recently was evaluated by her PCP and requested that they check a urine drug screen to ensure that she had no unknown substances in her bloodstream.  When asked who she believes is \"dragging\" her, she replied \"I do not know who would do that or why they would want to do that, but I do know that there are people in my attic.\"  She " "states that she is unable to sleep because she often hears pounding in her attic.  She does not know why they are there, but does state that when she was out of her home and on her way to the hospital, the police discovered that there were indeed people living in her attic.  When asked whether or not her daughter has the same concerns, she said \"she said that she does not, but she might just be trying to make me feel better.\"  Patient reports that over the past month, these concerns have intensified and her anxiety, sleep, and auditory hallucinations have worsened.  She states that currently,  \"I have not felt this bad in a long time. She said that her \"body is carrying a lot of tension\" and she is \"thoroughly exhausted.\" She said that she is experiencing auditory hallucinations currently, but is unable to elaborate.  She does endorse paranoia, visual and auditory hallucinations, ideas of reference, and thought insertions.  She denies SI/HI.       See Admission note by Cuca Hernandez MD on 3/29/19 for additional details.          Diagnoses:     MDD, recurrent, severe with psychotic features  History of paranoid personality disorder  Anxiety, unspecified  Intermittent episodes of hypotension  Pulmonary arterial hypertension  Sarcoidosis         Labs:     Recent Results (from the past 504 hour(s))   Drug abuse screen 6 urine (tox)    Collection Time: 03/28/19  5:26 PM   Result Value Ref Range    Amphetamine Qual Urine Negative NEG^Negative    Barbiturates Qual Urine Negative NEG^Negative    Benzodiazepine Qual Urine Negative NEG^Negative    Cannabinoids Qual Urine Negative NEG^Negative    Cocaine Qual Urine Negative NEG^Negative    Ethanol Qual Urine Negative NEG^Negative    Opiates Qualitative Urine Negative NEG^Negative   UA reflex to Microscopic and Culture    Collection Time: 03/28/19  5:26 PM   Result Value Ref Range    Color Urine Light Yellow     Appearance Urine Clear     Glucose Urine Negative NEG^Negative " mg/dL    Bilirubin Urine Negative NEG^Negative    Ketones Urine Negative NEG^Negative mg/dL    Specific Gravity Urine 1.004 1.003 - 1.035    Blood Urine Negative NEG^Negative    pH Urine 6.5 5.0 - 7.0 pH    Protein Albumin Urine Negative NEG^Negative mg/dL    Urobilinogen mg/dL Normal 0.0 - 2.0 mg/dL    Nitrite Urine Negative NEG^Negative    Leukocyte Esterase Urine Negative NEG^Negative    Source Unspecified Urine    CBC with platelets differential    Collection Time: 03/28/19  6:10 PM   Result Value Ref Range    WBC 6.7 4.0 - 11.0 10e9/L    RBC Count 4.86 3.8 - 5.2 10e12/L    Hemoglobin 15.5 11.7 - 15.7 g/dL    Hematocrit 45.5 35.0 - 47.0 %    MCV 94 78 - 100 fl    MCH 31.9 26.5 - 33.0 pg    MCHC 34.1 31.5 - 36.5 g/dL    RDW 12.5 10.0 - 15.0 %    Platelet Count 165 150 - 450 10e9/L    Diff Method Automated Method     % Neutrophils 75.9 %    % Lymphocytes 13.5 %    % Monocytes 9.7 %    % Eosinophils 0.1 %    % Basophils 0.7 %    % Immature Granulocytes 0.1 %    Nucleated RBCs 0 0 /100    Absolute Neutrophil 5.1 1.6 - 8.3 10e9/L    Absolute Lymphocytes 0.9 0.8 - 5.3 10e9/L    Absolute Monocytes 0.7 0.0 - 1.3 10e9/L    Absolute Eosinophils 0.0 0.0 - 0.7 10e9/L    Absolute Basophils 0.1 0.0 - 0.2 10e9/L    Abs Immature Granulocytes 0.0 0 - 0.4 10e9/L    Absolute Nucleated RBC 0.0    Comprehensive metabolic panel    Collection Time: 03/28/19  6:10 PM   Result Value Ref Range    Sodium 143 133 - 144 mmol/L    Potassium 4.1 3.4 - 5.3 mmol/L    Chloride 112 (H) 94 - 109 mmol/L    Carbon Dioxide 19 (L) 20 - 32 mmol/L    Anion Gap 12 3 - 14 mmol/L    Glucose 90 70 - 99 mg/dL    Urea Nitrogen 15 7 - 30 mg/dL    Creatinine 1.21 (H) 0.52 - 1.04 mg/dL    GFR Estimate 49 (L) >60 mL/min/[1.73_m2]    GFR Estimate If Black 57 (L) >60 mL/min/[1.73_m2]    Calcium 9.3 8.5 - 10.1 mg/dL    Bilirubin Total 0.7 0.2 - 1.3 mg/dL    Albumin 4.2 3.4 - 5.0 g/dL    Protein Total 7.1 6.8 - 8.8 g/dL    Alkaline Phosphatase 56 40 - 150 U/L    ALT  25 0 - 50 U/L    AST 17 0 - 45 U/L   EKG 12-lead, complete    Collection Time: 04/02/19 12:43 AM   Result Value Ref Range    Interpretation ECG Click View Image link to view waveform and result    CBC with platelets differential    Collection Time: 04/02/19  1:09 AM   Result Value Ref Range    WBC 4.9 4.0 - 11.0 10e9/L    RBC Count 4.01 3.8 - 5.2 10e12/L    Hemoglobin 13.0 11.7 - 15.7 g/dL    Hematocrit 39.2 35.0 - 47.0 %    MCV 98 78 - 100 fl    MCH 32.4 26.5 - 33.0 pg    MCHC 33.2 31.5 - 36.5 g/dL    RDW 12.5 10.0 - 15.0 %    Platelet Count 130 (L) 150 - 450 10e9/L    Diff Method Automated Method     % Neutrophils 62.0 %    % Lymphocytes 25.4 %    % Monocytes 10.0 %    % Eosinophils 1.8 %    % Basophils 0.6 %    % Immature Granulocytes 0.2 %    Nucleated RBCs 0 0 /100    Absolute Neutrophil 3.0 1.6 - 8.3 10e9/L    Absolute Lymphocytes 1.2 0.8 - 5.3 10e9/L    Absolute Monocytes 0.5 0.0 - 1.3 10e9/L    Absolute Eosinophils 0.1 0.0 - 0.7 10e9/L    Absolute Basophils 0.0 0.0 - 0.2 10e9/L    Abs Immature Granulocytes 0.0 0 - 0.4 10e9/L    Absolute Nucleated RBC 0.0    Comprehensive metabolic panel    Collection Time: 04/02/19  1:09 AM   Result Value Ref Range    Sodium 142 133 - 144 mmol/L    Potassium 3.9 3.4 - 5.3 mmol/L    Chloride 114 (H) 94 - 109 mmol/L    Carbon Dioxide 24 20 - 32 mmol/L    Anion Gap 4 3 - 14 mmol/L    Glucose 111 (H) 70 - 99 mg/dL    Urea Nitrogen 37 (H) 7 - 30 mg/dL    Creatinine 1.25 (H) 0.52 - 1.04 mg/dL    GFR Estimate 47 (L) >60 mL/min/[1.73_m2]    GFR Estimate If Black 55 (L) >60 mL/min/[1.73_m2]    Calcium 8.3 (L) 8.5 - 10.1 mg/dL    Bilirubin Total 0.2 0.2 - 1.3 mg/dL    Albumin 3.0 (L) 3.4 - 5.0 g/dL    Protein Total 5.3 (L) 6.8 - 8.8 g/dL    Alkaline Phosphatase 59 40 - 150 U/L    ALT 17 0 - 50 U/L    AST 16 0 - 45 U/L   Blood culture    Collection Time: 04/02/19  1:09 AM   Result Value Ref Range    Specimen Description Blood Right Arm     Special Requests Aerobic and anaerobic bottles  received     Culture Micro No growth    Lactic acid whole blood    Collection Time: 04/02/19  1:09 AM   Result Value Ref Range    Lactic Acid 0.8 0.7 - 2.0 mmol/L   Blood culture    Collection Time: 04/02/19  1:17 AM   Result Value Ref Range    Specimen Description Blood Right Hand     Special Requests Aerobic and anaerobic bottles received     Culture Micro No growth    Basic metabolic panel    Collection Time: 04/02/19  7:49 AM   Result Value Ref Range    Sodium 145 (H) 133 - 144 mmol/L    Potassium 4.2 3.4 - 5.3 mmol/L    Chloride 121 (H) 94 - 109 mmol/L    Carbon Dioxide 17 (L) 20 - 32 mmol/L    Anion Gap 7 3 - 14 mmol/L    Glucose 85 70 - 99 mg/dL    Urea Nitrogen 28 7 - 30 mg/dL    Creatinine 1.11 (H) 0.52 - 1.04 mg/dL    GFR Estimate 55 (L) >60 mL/min/[1.73_m2]    GFR Estimate If Black 63 >60 mL/min/[1.73_m2]    Calcium 7.8 (L) 8.5 - 10.1 mg/dL   CBC with platelets    Collection Time: 04/02/19  7:49 AM   Result Value Ref Range    WBC 4.5 4.0 - 11.0 10e9/L    RBC Count 3.98 3.8 - 5.2 10e12/L    Hemoglobin 12.5 11.7 - 15.7 g/dL    Hematocrit 38.9 35.0 - 47.0 %    MCV 98 78 - 100 fl    MCH 31.4 26.5 - 33.0 pg    MCHC 32.1 31.5 - 36.5 g/dL    RDW 12.7 10.0 - 15.0 %    Platelet Count 111 (L) 150 - 450 10e9/L   CRP inflammation    Collection Time: 04/02/19  7:49 AM   Result Value Ref Range    CRP Inflammation <2.9 0.0 - 8.0 mg/L   Procalcitonin    Collection Time: 04/02/19  7:49 AM   Result Value Ref Range    Procalcitonin <0.05 ng/ml   Basic metabolic panel    Collection Time: 04/03/19  7:57 AM   Result Value Ref Range    Sodium 145 (H) 133 - 144 mmol/L    Potassium 4.2 3.4 - 5.3 mmol/L    Chloride 117 (H) 94 - 109 mmol/L    Carbon Dioxide 22 20 - 32 mmol/L    Anion Gap 6 3 - 14 mmol/L    Glucose 88 70 - 99 mg/dL    Urea Nitrogen 25 7 - 30 mg/dL    Creatinine 1.35 (H) 0.52 - 1.04 mg/dL    GFR Estimate 43 (L) >60 mL/min/[1.73_m2]    GFR Estimate If Black 50 (L) >60 mL/min/[1.73_m2]    Calcium 8.6 8.5 - 10.1 mg/dL    Basic metabolic panel    Collection Time: 04/05/19  8:02 AM   Result Value Ref Range    Sodium 143 133 - 144 mmol/L    Potassium 4.1 3.4 - 5.3 mmol/L    Chloride 112 (H) 94 - 109 mmol/L    Carbon Dioxide 21 20 - 32 mmol/L    Anion Gap 10 3 - 14 mmol/L    Glucose 89 70 - 99 mg/dL    Urea Nitrogen 29 7 - 30 mg/dL    Creatinine 1.20 (H) 0.52 - 1.04 mg/dL    GFR Estimate 50 (L) >60 mL/min/[1.73_m2]    GFR Estimate If Black 58 (L) >60 mL/min/[1.73_m2]    Calcium 8.7 8.5 - 10.1 mg/dL   EKG 12-lead, tracing only    Collection Time: 04/05/19 10:04 AM   Result Value Ref Range    Interpretation ECG Click View Image link to view waveform and result    UA with Microscopic reflex to Culture    Collection Time: 04/05/19  7:28 PM   Result Value Ref Range    Color Urine Light Yellow     Appearance Urine Clear     Glucose Urine Negative NEG^Negative mg/dL    Bilirubin Urine Negative NEG^Negative    Ketones Urine Negative NEG^Negative mg/dL    Specific Gravity Urine 1.009 1.003 - 1.035    Blood Urine Negative NEG^Negative    pH Urine 5.5 5.0 - 7.0 pH    Protein Albumin Urine Negative NEG^Negative mg/dL    Urobilinogen mg/dL Normal 0.0 - 2.0 mg/dL    Nitrite Urine Negative NEG^Negative    Leukocyte Esterase Urine Negative NEG^Negative    Source Midstream Urine     WBC Urine 1 0 - 5 /HPF    RBC Urine <1 0 - 2 /HPF    Mucous Urine Present (A) NEG^Negative /LPF    Hyaline Casts 1 0 - 2 /LPF             Consults:   Consultation during this admission received from internal medicine on 3/29/19:     Assessment & Plan     Judith Nelson is a 59 year old female with a history of pulmonary hypertension, stress cardiomyopathy, HLD, sarcoidosis, CKD stage III, GERD, iron deficiency anemia, and depression, who was admitted on 3/29/19 to station 30 from the ED for auditory hallucinations and disorganized behavior.      Auditory hallucinations  Depression  Reports hallucinations for the past 1-2 months. PTA on Wellbutrin, Buspar.   - Management  per Psychiatry     Pulmonary arterial hypertension: She follows with Dr. Franco in [facility], last saw 1/6/19, will see again in May. Has been stable. Last echo on 1/25 with LVEF 60-65%, moderate TR, very severe RH (RVSP 111). PTA on supplemental oxygen of 2 LPM while sleeping, no needs with activity or at rest. Baseline O2 sats in the low 90s at rest. PTA managed on carvedilol, Opsumit, tadalafil, furosemide with K supplementation, Uptravi, and PRN albuterol.    - Supplemental oxygen per home regimen of 2 LPM while sleeping   - Respiratory Therapy consult for home O2 regimen   - Consider moving patient to room closer to main lobby areas to reduce dyspnea if this is possible   - Albuterol PRN for dyspnea, wheezing   - Continue PTA medication regimen of carvedilol, tadalafil, furosemide with K supplementation, Uptravi, and Opsumit    - Hold carvedilol, furosemide, tadalafil for SBP <100   - Low-sodium diet    - She is scheduled for follow-up in Pulmonary HTN clinic on 5/22     Sarcoidosis: Follows with Dr. Flaherty, last saw in December 2018. PFTs at that time with mild small airway obstruction and mildly decreased diffusion capacity.   - Continue PTA azathioprine     CKD stage III: Secondary to her sarcoidosis. Baseline Cr 1.3, currently at baseline. PTA on calcitriol and Prolia injections every 6 months.   - Continue PTA calcitriol     HLD: Continue on PTA simvastatin.      GERD: Continue on PTA Zantac.         The patient's care was discussed with the Bedside Nurse and Patient.     Medicine will sign off. Please page if new questions or concerns arise.      Margarita Oneal PA-C      Internal Medicine follow from internal medicine on 4/1/19:    Pulmonary Arterial Hypertension - Followed by Dr. Davies at [facility], last visit 1/6/19 without changes to plan of care. Last Echo (1/25/19) with mild-moderate RV dilation, mildly reduced RV fxn, LVEF 60-65%, 2+ tricuspid regurgitation,  very severe PH. On 2L O2 while sleeping at home with intermittent non-compliance. Required 3L overnight 3/31-4/1, O2 needs returned to baseline of low 90s during the day on 4/1. Reports breathing improvement from baseline.  - Monitor O2 sats q 4h.   - Supplemental O2 prn to maintain sats >90%.  - Continue PTA Carvedilol, Opsumit, Tadalafil, Furosemide with K supplementation, Uptravi, and PRN Xopenex  - Low-sodium diet   - Consider repeat Echo +/- lung imaging if O2 needs persistently increased from home regimen.  - Follow-up in Pulmonary HTN clinic on 5/22     Medicine will continue to follow O2 sats and supplemental O2 requirements.     Bharti Kirby PA-C    Internal Medicine follow from internal medicine on 4/1/19:    Assessment and Plan - Judith Nelson is a 59 year old female with a history of pulmonary HTN, stress cardiomyopathy, sarcoidosis, CKD III, iron deficiency anemia, HLD, GERD, and depression who was admitted to station 30 on 3/29/19 with auditory hallucinations and disorganized behavior.     Pulmonary Arterial Hypertension - Followed by Dr. Franco at [facility], last visit 1/6/19 without changes to plan of care. Last Echo (4/2/19) with LVEF 60-65%, mild RVH, normal RV fxn, mild RV dilation, moderate-severe TR, and very severe PH (RVSP 120 mmHg). On 2L O2 while sleeping at home with intermittent non-compliance. Baseline O2 sats in low 90s on RA during the day. Intermittently requiring 3L O2 overnight since inpatient psychiatry admission, daytime O2 sats and respiratory status at baseline.  - Discussed higher RVSP measurement on today's Echo with Cardiology with recommendations for gentle diuresis with PO Lasix 20 mg daily and close OP follow up.  - Monitor O2 sats q 4h.   - Supplemental O2 prn to maintain sats >90%.  - Coreg decreased to 3.125 mg BID in setting of hypotension. Resume home dosing (6.25 mg BID) as able.  - Continue PTA Opsumit, Tadalafil, Uptravi, and PRN Xopenex  -  Low-sodium diet   - Follow-up in Pulmonary HTN clinic as scheduled on 5/22. Will have HUC try to schedule sooner.      Hypotension, Resolved - BP 75-85/45-50 overnight from 4/1-4/2. Suspect due to medications (Carvedilol, Lasix, Tadalafil) with probable non-compliance PTA. S/p 2L IVF bolus. BP improved to 115-127/70s today. Afebrile. WBC and Lactic Acid wnl. Negative Procal and CRP. No localizing s/s of infection. Infectious work-up (Blood Cx x 2, CXR) negative to date. Repeat Echo 4/2 without new findings to explain hypotension.  - UA ordered to complete infectious work-up, not yet obtained.  - Continue medications as above with hold parameters.  - Monitor vitals q 4h.   - Caution with additional fluid boluses given severe underlying R-sided heart pathology.     CKD - Baseline Cr ~1.3-1.5. Cr improved from baseline this admission, most recently 1.11 (1.25) on 4/2.  - Trend BMP on 4/3     NAGMA - Bicarb 17 (19), Chloride 121 (112), Sodium 145 (143) today. Unclear etiology. No diarrhea and Cr improved from baseline without evidence of RTA.    - Trend BMP on 4/3     Medicine will continue to follow.     Bharti Kirby PA-C    Internal Medicine follow from internal medicine on 4/3/19:    Brief Medicine Note     Judith Nelson is a 59 year old female with a history of pulmonary HTN, stress cardiomyopathy, sarcoidosis, CKD III, iron deficiency anemia, HLD, GERD, and depression who was admitted to station 30 on 3/29/19 with auditory hallucinations and disorganized behavior. Medicine following for PAH, recent hypotension, CKD, and NAGMA.     Pulmonary Arterial Hypertension - Followed by Dr. Franco at [facility], last visit 1/6/19 without changes to plan of care. Last Echo (4/2/19) with LVEF 60-65%, mild RVH, normal RV fxn, mild RV dilation, moderate-severe TR, and very severe PH (RVSP 120 mmHg). Echo results d/w Cardiology 4/2 with recommendations for gentle diuresis and close OP follow up. Weight 121 lbs on  4/2 --> 120 lbs today. O2 requirements at baseline of 2L O2 with sleep and low 90s on RA during the day over the past 24h; has intermittently required 3L O2 with sleep since inpatient psychiatry admission.   - Monitor O2 sats q 4h.   - Supplemental O2 prn to maintain sats >90%.   - Continue PO Lasix 20 mg daily with KCl supplementation and daily weights.  - Coreg decreased to 3.125 mg BID 4/2 in setting of hypotension. Titrate to home dosing (6.25 mg BID) as able.  - Continue PTA Opsumit, Tadalafil, Uptravi, and PRN Xopenex  - Low-sodium diet   - Follow-up in Pulmonary HTN clinic as scheduled on 5/22. Will have HUC try to schedule sooner.      Hypotension, Resolved - Occurred overnight from 4/1-4/2. Suspect due to medications (Carvedilol, Lasix, Tadalafil) with probable non-compliance PTA. S/p 2L IVF bolus with improvement. Afebrile. WBC and Lactic Acid wnl. Negative Procal and CRP. No localizing s/s of infection. Infectious work-up (Blood Cx x 2, CXR) negative to date. Repeat Echo 4/2 without new findings to explain hypotension. One episode of hypotension ~0000 on 4/3, otherwise BP has been normotensive over past 24h.  - UA ordered to complete infectious work-up, not yet obtained.  - Continue medications as above with hold parameters.  - Monitor vitals q 4h.   - Caution with additional fluid boluses given severe underlying R-sided heart pathology.     CKD - Baseline Cr ~1.3-1.5. Cr has remained at or below baseline this admission, last 1.35 (1.11) on 4/3.  - Trend BMP on 4/5     NAGMA, Resolved - Occurred 4/2 with Bicarb 17 & Chloride 121. Unclear etiology. No diarrhea or evidence of RTA. Bicarb normalized 4/3.  - Trend BMP on 4/5     Medicine will continue to follow.     Bharti Kirby PA-C    Internal Medicine follow from internal medicine on 4/5/19:    Brief Medicine Note     Judith Nelson is a 59 year old female with a history of pulmonary HTN, stress cardiomyopathy, sarcoidosis, CKD III, iron deficiency  anemia, HLD, GERD, and depression who was admitted to station 30 on 3/29/19 with auditory hallucinations and disorganized behavior. Medicine following for PAH, recent hypotension, CKD, and NAGMA.     Vital signs, medications, and nursing notes were reviewed.      Pulmonary Arterial Hypertension - Followed by Dr. Franco at [facility], last visit 1/6/19 without changes to plan of care. Last Echo (4/2/19) with LVEF 60-65%, mild RVH, normal RV fxn, mild RV dilation, moderate-severe TR, and very severe PH (RVSP 120 mmHg). Echo results d/w Cardiology 4/2 with recommendations for gentle diuresis and close OP follow up. Weight stable. O2 requirements at baseline of 2L O2 with sleep and low 90s on RA, intermittently requiring 2L during the day for desaturations to the mid-80s. Suspect pt had been desaturating PTA due to disease progression, but just now being captured with regular monitoring.    - Monitor O2 sats q4h  - Daily weights  - Supplemental O2 prn to maintain sats >90%  - Anticipate will need O2 at 2L continuously during the day upon discharge until she can follow-up with Pulmonary HTN clinic  - Continue PO Lasix 20 mg daily with KCl supplementation  - Coreg decreased to 3.125 mg BID 4/2 in setting of hypotension. Titrate to home dosing (6.25 mg BID) as able.  - Continue PTA Opsumit, Tadalafil, Uptravi, and PRN Xopenex  - Low-sodium diet   - Follow-up in Pulmonary HTN clinic as scheduled on 5/22. Will have HUC try to schedule sooner.      Hypotension, improving: One episode of hypotension overnight on 4/5 with SBP of 92, otherwise normotensive over past 24hrs. Suspect due to medications (Carvedilol, Lasix, Tadalafil) with probable non-compliance PTA. S/p 2L IVF bolus with improvement. Afebrile, negative infectious work-up, though with UA still to be obtained. No localizing s/s of infection.  Repeat Echo 4/2 without new findings to explain hypotension.   - UA to be collected today  - Continue  medications as above with hold parameters.  - Monitor vitals q4h   - Caution with additional fluid boluses given severe underlying R-sided heart pathology.     CKD: Baseline Cr ~1.3-1.5. Cr has remained stable at/near baseline this admission, most recently 1.2 today.   - No indication for repeat lab monitoring at this time      Medicine will continue to follow.      Margarita Oneal PA-C    Internal Medicine follow from internal medicine on 4/7/19:     Assessment and Plan:  Pulmonary Arterial Hypertension - Followed by Dr. Franco at [facility], last visit 1/6/19 without changes to plan of care. Last Echo (4/2/19) with LVEF 60-65%, mild RVH, normal RV fxn, mild RV dilation, moderate-severe TR, and very severe PH (RVSP 120 mmHg). Echo results d/w Cardiology 4/2 with recommendations for gentle diuresis and close OP follow up. Weight stable ~118-119 lbs. At baseline, on 2L O2 with sleep (non-compliant PTA) and O2 sats in low 90s on RA during the day. Intermittent daytime O2 requirements of 2L since inpatient psychiatry admission likely d/t worsening of underlying disease processes (PAH, Sarcoidosis); CXR and Echo unrevealing of etiology.   - Monitor O2 sats q 4h. Supplemental O2 prn to maintain sats >90%. May need to discharge on 2L continuous O2 if ongoing daytime O2 requirements.  - Continue PO Lasix 20 mg daily with KCl supplementation and daily weights.  - Coreg decreased to 3.125 mg BID 4/2 in setting of hypotension. Titrate to home dosing (6.25 mg BID) as able.  - Continue PTA Opsumit, Tadalafil, Uptravi, and PRN Xopenex  - Low-sodium diet   - Follow-up in Pulmonary HTN clinic as scheduled on 5/22. Will have HUC try to schedule sooner.      Hypotension, Improved - Significant hypotension overnight from 4/1-4/2 requiring 2L IVF bolus. Suspect due to medications (Carvedilol, Lasix, Tadalafil, Zyprexa, Ativan). Afebrile. WBC and Lactic Acid wnl. Negative Procal and CRP. No localizing s/s of infection.  "Infectious work-up (Blood Cx x 2, CXR, UA) negative to date. Repeat Echo 4/2 without new findings to explain hypotension. Ongoing nocturnal hypotension (occurring ~0000) which is likely due to bedtime Zyprexa dose with normotensive BP throughout the day.  - Continue medications as above with hold parameters. Defer ongoing use of psychiatric medications and HS Zyprexa to Psychiatry.  - Monitor vitals q 8h. No need to monitor vitals overnight unless symptomatic.  - Caution with additional fluid boluses given severe underlying R-sided heart pathology.  - Follow up on outstanding Blood Cx results     Medicine will continue to follow.     Bharti Kirby PA-C    Internal Medicine follow from internal medicine on 4/8/19:    Brief medicine note:  - Met briefly with pt today and she states she feels \"great\". Feels has significantly improved and will most likely be discharge tomorrow. From medical standpoint, had another discussion regarding lower BPs but pt denies being symptomatic. States she gets intermittently lightheaded but states this is not new and had prior to starting Zyprexa, which per psychiatry, she needs as her mental health has improved after this was started. From medical standpoint, pt is medically clear for discharge, but pt should have follow up clinic appointment made with her PCP and primary cardiologist to be seen after discharge within one months time. Medicine will continue to follow pt peripherally via chart check daily until she discharges.       Chay Gonzalez PA-C         Hospital Course:   Judith Nelson was admitted to [facility] with attending Cuca Hernandez MD as a voluntary patient. The patient was placed under status 15 (15 minute checks) to ensure patient safety, as well as a 1:1 when using supplemental oxygen device.    On admission, patient exhibited severe symptoms of depression and psychosis. She expressed significant concerns about \"people living in my attic.\" She reported hearing their " "voices, and that the voices would tell her that her children were in danger. She said that she was concerned that the people in the attic were closely monitoring her at home to the extent that she did not feel safe showering and was unable to sleep throughout the night. She expressed concerns about \"being drugged\" because she often felt exhausted and sedated. She also questioned whether or not the people in the attic were starting fires given her low 02 sats and possible olfactory hallucinations of smoke odors. She said that she called the police multiple times for these reasons. Patient reported feeling depressed for two months prior to admission with low mood, anhedonia, and feelings of hopelessness.    On admission, Prozac and Zyprexa were initiated after R/B/A were discussed. She was titrated to a Zyprexa dose of 10 mg daily, though was unable to tolerate due to orthostatic hypotension (80s/40s) overnight. Dose was reduced to a total of 7.5 mg daily with overall improvement in hypotension. She reported occasional episodes of lightheadedness, but also noted that this was longstanding and unchanged since admission. She was encouraged to drink fluids and remain well hydrated. Blood pressures remained soft overnight, though stable. Anti-hypertensives were adjusted (see above). Prozac was titrated to current dose of 20 mg daily.     Over the course of hospitalization, symptoms of depression and psychosis improved. Patient exhibited improved insight. She noted improvement in sleep, auditory hallucinations, insight, energy level, and mood. She denied SI thoughts throughout hospitalization. She exhibited residual symptoms of paranoia though insight was much improved. She attended to ADLs. She was able to discuss indications and doses of all medications. She does have a staff member come to arrange her medications for her every two weeks. She is living with her daughter, Saritha, age 22 who has assisted with medications " and appointments. Pt was offered referral for [facility] worker and case management though declined. She did express interest in engaging in day treatment programming and referral was placed.    On the day of discharge, I had a meeting with Saritha and patient for 1 hour.  During this time, I discussed importance of medication adherence, oxygen monitoring (via oximeter) and appropriate use of supplemental oxygen, appointment attendance. Discussed importance of seeking help when needed. I educated Saritha on warning signs and seeking help if emergence of these symptoms. She expressed understanding. She will ensure that patient follows up with scheduled appointments. She denied safety concerns or concerns about patient's ability to care for herself. She felt comfortable with plan for discharge today. Discussed diagnostic impressions and medication changes at length with mother and her daughter.    Judith Nelson did participate in groups and was visible in the milieu.     The patient's symptoms of psychosis and depression improved.     Judith Nelson was released to home. At the time of discharge Judith Nelson was determined to not be a danger to herself or others.     SUICIDE RISK ASSESSMENT:  Today Judith Nelson denies SI, SIB, and HI.  She has notable risk factors for self-harm including previous suicide attempt, recent psych inpt stay, financial/legal stress, relationship conflict, new/ worsening medical issue and mild paranoia.  However, risk is mitigated by no plan or intent, no h/o risky impulsive behavior, no access to lethal means, describes a safety plan, h/o seeking help when needed, symptom improvement, future oriented, feeling hopeful, none to minimal alcohol use , commitment to family, good social support  , Yazidism beliefs, stable housing and participation in DBT or day program.  Based on all available evidence she does not appear to be at imminent risk for self-harm therefore does not meet  criteria for a 72-hr hold/ involuntary hospitalization.  However, based on degree of symptoms day treatment and close psych FU was recommended which the pt did agree to. Patient also agreed to call 911/present to ED if any imminent safety concerns arise, including re-emergence of SI. Patient was provided with crisis resources at the time of discharge. Patient agreed to further reduce risk of self-harm by completely abstaining from illicit substances and alcohol, and agreed to remain medication adherent. Expressed understanding of the risks associated with alcohol use, illicit drug use, and medication non-adherence.             Discharge Medications:     Discharge Medication List as of 4/9/2019  1:54 PM      START taking these medications    Details   FLUoxetine (PROZAC) 20 MG capsule Take 1 capsule (20 mg) by mouth daily, Disp-30 capsule, R-1, E-Prescribe      OLANZapine (ZYPREXA) 2.5 MG tablet Take 1 tab (2.5 mg) in the morning and 2 tabs (5 mg) at bedtime for a total daily dose of 7.5 mg, Disp-90 tablet, R-0, E-Prescribe         CONTINUE these medications which have CHANGED    Details   carvedilol (COREG) 3.125 MG tablet Take 1 tablet (3.125 mg) by mouth 2 times daily (with meals), Disp-60 tablet, R-0, E-Prescribe      levalbuterol (XOPENEX HFA) 45 MCG/ACT inhaler Inhale 2 puffs into the lungs every 6 hours as needed for shortness of breath / dyspnea or wheezing, Disp-1 Inhaler, R-0, E-Prescribe      macitentan (OPSUMIT) 10 MG tablet Take 1 tablet (10 mg) by mouth daily, Disp-30 tablet, R-0, E-Prescribe      ondansetron (ZOFRAN ODT) 4 MG ODT tab Take 1 tablet (4 mg) by mouth every 8 hours as needed for nausea or vomiting, Disp-10 tablet, R-0, No Print Out         CONTINUE these medications which have NOT CHANGED    Details   azaTHIOprine (IMURAN) 50 MG tablet Take 1 tablet (50 mg) by mouth daily, Disp-90 tablet, R-3, E-Prescribe      BUSPIRONE HCL PO Take 15 mg by mouth 2 times daily, Historical      calcitRIOL  (ROCALTROL) 0.25 MCG capsule Take 1 capsule (0.25 mcg) by mouth daily, Disp-30 capsule, R-1, Historical      Cholecalciferol (VITAMIN D PO) Take 50,000 Units by mouth once a week, Historical      LORazepam (ATIVAN) 0.5 MG tablet Take 2 tablets (1 mg) by mouth 2 times daily, Disp-90 tablet, R-1, Local Print      potassium chloride (K-TAB,KLOR-CON) 10 MEQ tablet Take 1 tablet (10 mEq) by mouth daily, Disp-90 tablet, R-1, E-Prescribe      ranitidine (RANITIDINE) 75 MG tablet Take 1-2 tablets ( mg) by mouth 2 times daily, Disp-60 tablet, R-11, E-PrescribeThis replaces Omeprazole      selexipag (UPTRAVI) 1600 MCG tablet Take 1 tablet (1,600 mcg) by mouth every 12 hours, Historical      tadalafil, PAH, (ADCIRCA) 20 MG TABS Take 2 tablets (40 mg) by mouth daily, Disp-180 tablet, R-3, E-Prescribe      acetaminophen (TYLENOL) 500 MG tablet Take 1-2 tablets (500-1,000 mg) by mouth every 6 hours as needed for pain (Take as needed for pain.  Do not exceed 4 grams (8 tablets) in a day), Disp-45 tablet, R-10, Fax      denosumab (PROLIA) 60 MG/ML SOLN Inject 1 mL (60 mg) Subcutaneous every 6 months, Disp-1 mL, R-1, No Print OutTo be given in Endocrinology Clinic.      furosemide (LASIX) 40 MG tablet Take 0.5 tablets (20 mg) by mouth daily, Disp-30 tablet, R-1, E-Prescribe      ORDER FOR DME Equipment being ordered: SHOWER CHAIR  FROM [employer] Device, R-0, Normal      simvastatin (ZOCOR) 10 MG tablet Take 1 tablet by mouth At Bedtime., Disp-90 tablet, R-1, E-Prescribe      SODIUM BICARBONATE PO Take by mouth 2 times daily, Historical         STOP taking these medications       buPROPion (WELLBUTRIN SR) 200 MG 12 hr tablet Comments:   Reason for Stopping:         TEMAZEPAM PO Comments:   Reason for Stopping:                    Psychiatric Examination:   Appearance:  awake, alert and adequately groomed  Attitude:  cooperative  Eye Contact:  good  Mood:  better  Affect:  appropriate and in normal range and mood  congruent  Speech:  clear, coherent  Psychomotor Behavior:  no evidence of tardive dyskinesia, dystonia, or tics  Thought Process:  logical, linear and goal oriented, evidence of mild paranoia with intact insight  Associations:  no loose associations  Thought Content:  no evidence of suicidal ideation or homicidal ideation . Does not appear to be responding to internal stimuli  Insight:  good  Judgment:  intact  Oriented to:  time, person, and place  Attention Span and Concentration:  intact  Recent and Remote Memory:  intact  Language: Able to name objects, Able to repeat phrases and Able to read and write  Fund of Knowledge: appropriate  Muscle Strength and Tone: normal  Gait and Station: slight limp         Discharge Plan:      Health Care Follow-up Appointments:   You have Medicare and signed the required  Important Promise from Medicare.  You also have [employer] medical assistance.  The [employer] Member Services number is [phone], Behavioral Health is option #4.   Use [employer] Ride - [phone] - for transportation to your health care appointments.  Your [facility] economic assistance worker is Mendy Moreno @ [phone].        Return  to see your psychiatrist Dr. Rosales on Wednesday, April 10, 2019 at 2:40PM..  [facility]   [address],   [place] [id number]  [phone]  The Health Unit Coordinator has faxed these discharge instructions to Fax: [phone]        Return to see your therapist at your standing appointment time on Wednesdays at 11AM.  [facility] [place]  Ph: [phone]           You will be seen by your home health care agency within 48 hours of discharge. They will contact you to set an appointment with you.  [facility] (formerly [facility])  Ph: [phone]  Heavenly is your nurse  but she is currently out so Aung attempted to see you yesterday.  The Health Unit Coordinator has faxed  these discharge instructions to fax:[phone]           It was recommended by medical staff that your pulmonary appointment be moved up. [facility] did complete this.  You have a number of medical appointments here at [facility] that are listed on the first pages of this After Visit Summary.        You have been referred to the 55 + Day Program. Please attend your intake session on Friday, April 12, 2019 at 9AM.  [facility]   [address]  [facility] [address]  [address] [place] [address]  Program: [phone]  : [phone]        Attend all scheduled appointments with your outpatient providers. Call at least 24 hours in advance if you need to reschedule an appointment to ensure continued access to your outpatient providers.   Major Treatments, Procedures and Findings:  You were provided with: a psychiatric assessment, assessed for medical stability, medication evaluation and/or management, group therapy, milieu management and medical interventions        Your drug screen was negative.        You were seen by Internal Medicine:  Pulmonary arterial hypertension: She follows with Dr. Franco in [facility], last saw 1/6/19, will see again in May. Has been stable. Last echo on 1/25 with LVEF 60-65%, moderate TR, very severe RH (RVSP 111). PTA on supplemental oxygen of 2 LPM while sleeping, no needs with activity or at rest. Baseline O2 sats in the low 90s at rest. PTA managed on carvedilol, Opsumit, tadalafil, furosemide with K supplementation, Uptravi, and PRN albuterol.    - Supplemental oxygen per home regimen of 2 LPM while sleeping   - Respiratory Therapy consult for home O2 regimen   - Consider moving patient to room closer to main lobby areas to reduce dyspnea if this is possible   - Albuterol PRN for dyspnea, wheezing   - Continue PTA medication regimen of carvedilol, tadalafil, furosemide with K supplementation, Uptravi, and Opsumit    - Hold  carvedilol, furosemide, tadalafil for SBP <100   - Low-sodium diet    - She is scheduled for follow-up in Pulmonary HTN clinic on 5/22        Sarcoidosis: Follows with Dr. Flaherty, last saw in December 2018. PFTs at that time with mild small airway obstruction and mildly decreased diffusion capacity.   - Continue PTA azathioprine     CKD stage III: Secondary to her sarcoidosis. Baseline Cr 1.3, currently at baseline. PTA on calcitriol and Prolia injections every 6 months.   - Continue PTA calcitriol     HLD: Continue on PTA simvastatin.      GERD: Continue on PTA Zantac.         You had a chest x-ray on April 2, 2019.                 Symptoms to Report: increased confusion or hearing things that aren't there     Early warning signs can include: increased unusual thinking, such as paranoia or hearing voices     Safety and Wellness:  Take all medicines as directed.  Make no changes unless your doctor suggests them.      Follow treatment recommendations.  Refrain from alcohol and non-prescribed drugs.  If there is a concern for safety, call 911.     Resources:   [employer] [place] on Mental Illness (www.[place].vasu.org): 667.174.8474 or 679-892-9961.        [facility] Rapid [facility] [phone]        [facility] ([facility])   [address] [place] [address] - Walk-ins Welcome - During certain hours  [phone],      Ask the crisis counselor about going to a crisis home if you feel that you need some stabilization but do not need to come to the hospital. [facility]'s crisis home is called [facility].  [facility] -   [address] [place], [place]              The treatment team has appreciated the opportunity to work with you.     If you have any questions or concerns our unit number is [phone]  You may be receiving a follow-up phone call within the next three days from a representative from behavioral health.    You have identified the best  phone number to reach you as [phone]    Attestation:  The patient has been seen and evaluated by me,  Cuca Hernandez MD

## 2019-04-09 NOTE — PROGRESS NOTES
Pt seemed to have a good evening. She was happy because her oxigene level was at 96 this morning and could take a walk in the hallway. Pt ate her supper. One of her daughter came visit. Pt told staff that her older daughter had a miscarage  and stay in bed all day. Pt was going to call her daughter to encourage her.

## 2019-04-09 NOTE — PLAN OF CARE
Patient has a full range of affect. Patient knows her discharge medication regime and her discharge plan for appointments.Patient displays a full range of affect. Had a meeting with her daughter and Dr. Hernandez prior to discharge.Denies any suicidal ideation or self harm.

## 2019-04-10 RX ORDER — POTASSIUM CHLORIDE 750 MG/1
10 TABLET, EXTENDED RELEASE ORAL DAILY
Qty: 90 TABLET | Refills: 2 | Status: SHIPPED | OUTPATIENT
Start: 2019-04-10 | End: 2019-04-24

## 2019-04-10 NOTE — TELEPHONE ENCOUNTER
Last Clinic Visit: 1/16/2019  [facility]      Medication is active on medication list

## 2019-04-17 ENCOUNTER — OFFICE VISIT (OUTPATIENT)
Dept: CARDIOLOGY | Facility: CLINIC | Age: 59
End: 2019-04-17
Attending: INTERNAL MEDICINE
Payer: MEDICARE

## 2019-04-17 ENCOUNTER — TELEPHONE (OUTPATIENT)
Dept: CARDIOLOGY | Facility: CLINIC | Age: 59
End: 2019-04-17

## 2019-04-17 VITALS
HEIGHT: 63 IN | OXYGEN SATURATION: 92 % | WEIGHT: 119 LBS | HEART RATE: 82 BPM | DIASTOLIC BLOOD PRESSURE: 76 MMHG | BODY MASS INDEX: 21.09 KG/M2 | SYSTOLIC BLOOD PRESSURE: 124 MMHG

## 2019-04-17 DIAGNOSIS — I27.20 PULMONARY HYPERTENSION (H): ICD-10-CM

## 2019-04-17 DIAGNOSIS — R06.02 SOB (SHORTNESS OF BREATH): ICD-10-CM

## 2019-04-17 DIAGNOSIS — I27.20 PULMONARY HYPERTENSION (H): Primary | ICD-10-CM

## 2019-04-17 DIAGNOSIS — I51.81 STRESS-INDUCED CARDIOMYOPATHY: ICD-10-CM

## 2019-04-17 LAB
ALBUMIN SERPL-MCNC: 3.7 G/DL (ref 3.4–5)
ALP SERPL-CCNC: 72 U/L (ref 40–150)
ALT SERPL W P-5'-P-CCNC: 28 U/L (ref 0–50)
ANION GAP SERPL CALCULATED.3IONS-SCNC: 7 MMOL/L (ref 3–14)
AST SERPL W P-5'-P-CCNC: 13 U/L (ref 0–45)
BILIRUB SERPL-MCNC: 0.6 MG/DL (ref 0.2–1.3)
BUN SERPL-MCNC: 15 MG/DL (ref 7–30)
CALCIUM SERPL-MCNC: 8.8 MG/DL (ref 8.5–10.1)
CHLORIDE SERPL-SCNC: 114 MMOL/L (ref 94–109)
CO2 SERPL-SCNC: 19 MMOL/L (ref 20–32)
CREAT SERPL-MCNC: 1.13 MG/DL (ref 0.52–1.04)
ERYTHROCYTE [DISTWIDTH] IN BLOOD BY AUTOMATED COUNT: 12.5 % (ref 10–15)
GFR SERPL CREATININE-BSD FRML MDRD: 53 ML/MIN/{1.73_M2}
GLUCOSE SERPL-MCNC: 93 MG/DL (ref 70–99)
HCT VFR BLD AUTO: 43.2 % (ref 35–47)
HGB BLD-MCNC: 14.1 G/DL (ref 11.7–15.7)
MCH RBC QN AUTO: 32.2 PG (ref 26.5–33)
MCHC RBC AUTO-ENTMCNC: 32.6 G/DL (ref 31.5–36.5)
MCV RBC AUTO: 99 FL (ref 78–100)
NT-PROBNP SERPL-MCNC: 1065 PG/ML (ref 0–125)
PLATELET # BLD AUTO: 152 10E9/L (ref 150–450)
POTASSIUM SERPL-SCNC: 4 MMOL/L (ref 3.4–5.3)
PROT SERPL-MCNC: 6.8 G/DL (ref 6.8–8.8)
RBC # BLD AUTO: 4.38 10E12/L (ref 3.8–5.2)
SODIUM SERPL-SCNC: 140 MMOL/L (ref 133–144)
WBC # BLD AUTO: 6.6 10E9/L (ref 4–11)

## 2019-04-17 PROCEDURE — 99215 OFFICE O/P EST HI 40 MIN: CPT | Mod: ZP | Performed by: INTERNAL MEDICINE

## 2019-04-17 PROCEDURE — 83880 ASSAY OF NATRIURETIC PEPTIDE: CPT | Performed by: INTERNAL MEDICINE

## 2019-04-17 PROCEDURE — 36415 COLL VENOUS BLD VENIPUNCTURE: CPT | Performed by: INTERNAL MEDICINE

## 2019-04-17 PROCEDURE — G0463 HOSPITAL OUTPT CLINIC VISIT: HCPCS | Mod: ZF

## 2019-04-17 PROCEDURE — 85027 COMPLETE CBC AUTOMATED: CPT | Performed by: INTERNAL MEDICINE

## 2019-04-17 PROCEDURE — 80053 COMPREHEN METABOLIC PANEL: CPT | Performed by: INTERNAL MEDICINE

## 2019-04-17 RX ORDER — FUROSEMIDE 20 MG
TABLET ORAL
Qty: 45 TABLET | Refills: 3 | Status: SHIPPED | OUTPATIENT
Start: 2019-04-17 | End: 2020-01-02

## 2019-04-17 ASSESSMENT — MIFFLIN-ST. JEOR: SCORE: 1088.91

## 2019-04-17 ASSESSMENT — PAIN SCALES - GENERAL: PAINLEVEL: MILD PAIN (2)

## 2019-04-17 NOTE — NURSING NOTE
Med Reconcile: Reviewed and verified all current medications with the patient. The updated medication list was printed and given to the patient.    Medication Change: Patient was educated regarding prescribed medication change(Lasix), including discussion of the indication, administration, side effects, and when to report to MD or RN. Patient demonstrated understanding of this information and agreed to call with further questions or concerns.    Labs: Patient was given results of the laboratory testing obtained today. Patient demonstrated understanding of this information and agreed to call with further questions or concerns.     Patient was instructed to return for the next laboratory testing in 1 week with PMD.     Diet: Patient instructed regarding a heart healthy diet, including discussion of reduced fat and sodium intake. Patient demonstrated understanding of this information and agreed to call with further questions or concerns.    Return Appointment: Patient given instructions regarding scheduling next clinic visit. Patient demonstrated understanding of this information and agreed to call with further questions or concerns.    Patient stated she understood all health information given and agreed to call with further questions or concerns.    Medication Changes:  1. Take furosemide 20 mgs on Monday, Wednesday and Friday    Patient Instructions:  1. Continue staying active and eat a heart healthy diet.    2. Please keep current list of medications with you at all times.    3. Remember to weigh yourself daily after voiding and before you consume any food or beverages and log the numbers.  If you have gained/lost 2 pounds overnight or 5 pounds in a week contact us immediately for medication adjustments or further instructions.    4. **Please call us immediately if you have any syncope (fainting or passing out), chest pain, edema (swelling or weight gain), or decline in your functional status (general decline in  how you are feeling).    Follow up Appointment Information:  1. See Dr. Babcock in 7-10 days for BMP and BNP  2. Follow up with NP in 8 weeks with labs prior    Results:  Component      Latest Ref Rng & Units 4/17/2019   Sodium      133 - 144 mmol/L 140   Potassium      3.4 - 5.3 mmol/L 4.0   Chloride      94 - 109 mmol/L 114 (H)   Carbon Dioxide      20 - 32 mmol/L 19 (L)   Anion Gap      3 - 14 mmol/L 7   Glucose      70 - 99 mg/dL 93   Urea Nitrogen      7 - 30 mg/dL 15   Creatinine      0.52 - 1.04 mg/dL 1.13 (H)   GFR Estimate      >60 mL/min/1.73:m2 53 (L)   GFR Estimate If Black      >60 mL/min/1.73:m2 62   Calcium      8.5 - 10.1 mg/dL 8.8   Bilirubin Total      0.2 - 1.3 mg/dL 0.6   Albumin      3.4 - 5.0 g/dL 3.7   Protein Total      6.8 - 8.8 g/dL 6.8   Alkaline Phosphatase      40 - 150 U/L 72   ALT      0 - 50 U/L 28   AST      0 - 45 U/L 13   WBC      4.0 - 11.0 10e9/L 6.6   RBC Count      3.8 - 5.2 10e12/L 4.38   Hemoglobin      11.7 - 15.7 g/dL 14.1   Hematocrit      35.0 - 47.0 % 43.2   MCV      78 - 100 fl 99   MCH      26.5 - 33.0 pg 32.2   MCHC      31.5 - 36.5 g/dL 32.6   RDW      10.0 - 15.0 % 12.5   Platelet Count      150 - 450 10e9/L 152   N-Terminal Pro Bnp      0 - 125 pg/mL 1,065 (H)       **patient states she had been off of her Opsumit for ~1 week due to some type of confusion.  Advised her she can just restart the Opsumit, no titration necessary.      Asked patient how she had been taking her furosemide, as previous instructions said to take 20mg once daily.  patient states that it had been confusing so she had missed a few doses because she wasn't really sure how she was supposed to be taking it.  Clarified she now is to take 20mg once daily on Mon, Wed & Fri.  Patient confirms she weighs daily.  Reviewed with her to call me if she gains >3 lbs overnight or 5 lbs in 1 week.  Patient verbalized understanding, agreed with plan and denied any further questions. Keyanna Mccray RN on  4/17/2019 at 4:05 PM

## 2019-04-17 NOTE — TELEPHONE ENCOUNTER
PA Initiation    Medication: Uptravi 1600 mcg  Insurance Company: [employer] Part D - Phone [phone] Fax [phone]  Pharmacy Filling the Rx: [facility] [address]  Start Date: 4/17/2019

## 2019-04-17 NOTE — NURSING NOTE
Chief Complaint   Patient presents with     Follow Up      Return for PH F/U after Labs & Echo (Discharged from hospital 1 week ago r/t MH episode     Medications reviewed and vitals performed.  Jojo Lynn CMA

## 2019-04-17 NOTE — PROGRESS NOTES
The patient returns for follow-up subsequent to recent hospitalization for psychosis.  She has modest exertional shortness of breath but no signs of cor pulmonale despite very high pressures. Her thinking is quite flighty and skips from topic to topic, some not relevant to the issues surround our visit.  She is not actively hallucinating nor is she suicidally depressed.    Constitutional: weight gain, but nofever, chills, night sweats  HEENT: without visual changes, swallow difficulties  Pulmonary: with shortness of breath, but no cough, wheeze, hemoptysis  Cardiac: without chest pain, JUAN, PND, orthopnea, edema, palpitation, pre-syncope, syncope,  GI: without diarrhea, constipation, jaundice, melena, GERD, hematemesis  : without frequency, urgency, dysuria, hematuria  Skin: rash, bruise, open lesions  Neuro: without TIA, focal neurologic complaints, seizure, trauma  Ortho: without pain, swelling, mobility impairment  Endocrine: diabetes, thyroid, heat/cold intolerance, polyuria, polyphagia, change bowel habits.  Sleep:no IVETTE, periodic breathing, fatigue  Other: anxious regarding recent hospitalization for psychotic episodes     Constitutional: alert, oriented, normal gait and station, normal mentation.  Oral: moist mucous membranes  Lymph: without pathologic adenopathy  Chest: clear to ausculation and percussion  Cor: No evidence of left or right ventricular activity.  Rhythm is regular.  S1 normal, S2 split physiologically. Murmurs are not present  Abdomen: without tenderness, rebound, guarding, masses, ascites  Extremities: Edema not present  Neuro: no focal defects, normal mentation  Skin: without open lesions  Psych: oriented, verbal, pressured speech, perseveration, not suicidal    Current Outpatient Medications   Medication     acetaminophen (TYLENOL) 500 MG tablet     azaTHIOprine (IMURAN) 50 MG tablet     BUSPIRONE HCL PO     calcitRIOL (ROCALTROL) 0.25 MCG capsule     carvedilol (COREG) 3.125 MG tablet      Cholecalciferol (VITAMIN D PO)     FLUoxetine (PROZAC) 20 MG capsule     furosemide (LASIX) 40 MG tablet     levalbuterol (XOPENEX HFA) 45 MCG/ACT inhaler     LORazepam (ATIVAN) 0.5 MG tablet     macitentan (OPSUMIT) 10 MG tablet     OLANZapine (ZYPREXA) 2.5 MG tablet     order for DME     ORDER FOR DME     potassium chloride (K-TAB,KLOR-CON) 10 MEQ tablet     potassium chloride ER (K-DUR/KLOR-CON M) 10 MEQ CR tablet     ranitidine (RANITIDINE) 75 MG tablet     selexipag (UPTRAVI) 1600 MCG tablet     simvastatin (ZOCOR) 10 MG tablet     tadalafil, PAH, (ADCIRCA) 20 MG TABS     SODIUM BICARBONATE PO     No current facility-administered medications for this visit.        Results for GÓMEZ ANALI J (MRN [id number]) as of 4/17/2019 15:25   Ref. Range 4/5/2019 19:28 4/17/2019 14:14   Sodium Latest Ref Range: 133 - 144 mmol/L  140   Potassium Latest Ref Range: 3.4 - 5.3 mmol/L  4.0   Chloride Latest Ref Range: 94 - 109 mmol/L  114 (H)   Carbon Dioxide Latest Ref Range: 20 - 32 mmol/L  19 (L)   Urea Nitrogen Latest Ref Range: 7 - 30 mg/dL  15   Creatinine Latest Ref Range: 0.52 - 1.04 mg/dL  1.13 (H)   GFR Estimate Latest Ref Range: >60 mL/min/1.73_m2  53 (L)   GFR Estimate If Black Latest Ref Range: >60 mL/min/1.73_m2  62   Calcium Latest Ref Range: 8.5 - 10.1 mg/dL  8.8   Anion Gap Latest Ref Range: 3 - 14 mmol/L  7   Albumin Latest Ref Range: 3.4 - 5.0 g/dL  3.7   Protein Total Latest Ref Range: 6.8 - 8.8 g/dL  6.8   Bilirubin Total Latest Ref Range: 0.2 - 1.3 mg/dL  0.6   Alkaline Phosphatase Latest Ref Range: 40 - 150 U/L  72   ALT Latest Ref Range: 0 - 50 U/L  28   AST Latest Ref Range: 0 - 45 U/L  13   N-Terminal Pro Bnp Latest Ref Range: 0 - 125 pg/mL  1,065 (H)   Glucose Latest Ref Range: 70 - 99 mg/dL  93   WBC Latest Ref Range: 4.0 - 11.0 10e9/L  6.6   Hemoglobin Latest Ref Range: 11.7 - 15.7 g/dL  14.1   Hematocrit Latest Ref Range: 35.0 - 47.0 %  43.2   Platelet Count Latest Ref Range: 150 - 450 10e9/L   152   RBC Count Latest Ref Range: 3.8 - 5.2 10e12/L  4.38   MCV Latest Ref Range: 78 - 100 fl  99   MCH Latest Ref Range: 26.5 - 33.0 pg  32.2   MCHC Latest Ref Range: 31.5 - 36.5 g/dL  32.6   RDW Latest Ref Range: 10.0 - 15.0 %  12.5   Color Urine Unknown Light Yellow    Appearance Urine Unknown Clear    Glucose Urine Latest Ref Range: NEG^Negative mg/dL Negative    Bilirubin Urine Latest Ref Range: NEG^Negative  Negative    Ketones Urine Latest Ref Range: NEG^Negative mg/dL Negative    Specific Gravity Urine Latest Ref Range: 1.003 - 1.035  1.009    pH Urine Latest Ref Range: 5.0 - 7.0 pH 5.5    Protein Albumin Urine Latest Ref Range: NEG^Negative mg/dL Negative    Urobilinogen mg/dL Latest Ref Range: 0.0 - 2.0 mg/dL Normal    Nitrite Urine Latest Ref Range: NEG^Negative  Negative    Blood Urine Latest Ref Range: NEG^Negative  Negative    Leukocyte Esterase Urine Latest Ref Range: NEG^Negative  Negative    Source Unknown Midstream Urine    WBC Urine Latest Ref Range: 0 - 5 /HPF 1    RBC Urine Latest Ref Range: 0 - 2 /HPF <1    Mucous Urine Latest Ref Range: NEG^Negative /LPF Present (A)    Hyaline Casts Latest Ref Range: 0 - 2 /LPF 1          1. Take furosemide 20 mgs Monday, Wednesday and Friday  2, See Dr. Babcock in 7-10 days for BMP and BNP  3. There is a large issue surrounding appropriate participation in outpatient program.    She has very high pressures, seemingly unresponsive to current medical regimen.  We have discussed chronic parenteral therapy however, both family and my staff believe this is not feasible in view of her social support, mental illness issues.    Norm Franco

## 2019-04-17 NOTE — PATIENT INSTRUCTIONS
Medication Changes:  1. Take furosemide 20 mgs on Monday, Wednesday and Friday    Patient Instructions:  1. Continue staying active and eat a heart healthy diet.    2. Please keep current list of medications with you at all times.    3. Remember to weigh yourself daily after voiding and before you consume any food or beverages and log the numbers.  If you have gained/lost 2 pounds overnight or 5 pounds in a week contact us immediately for medication adjustments or further instructions.    4. **Please call us immediately if you have any syncope (fainting or passing out), chest pain, edema (swelling or weight gain), or decline in your functional status (general decline in how you are feeling).    Follow up Appointment Information:  1. See Dr. Babcock in 7-10 days for BMP and BNP  2. Follow up with NP in 8 weeks with labs prior    Results:  Component      Latest Ref Rng & Units 4/17/2019   Sodium      133 - 144 mmol/L 140   Potassium      3.4 - 5.3 mmol/L 4.0   Chloride      94 - 109 mmol/L 114 (H)   Carbon Dioxide      20 - 32 mmol/L 19 (L)   Anion Gap      3 - 14 mmol/L 7   Glucose      70 - 99 mg/dL 93   Urea Nitrogen      7 - 30 mg/dL 15   Creatinine      0.52 - 1.04 mg/dL 1.13 (H)   GFR Estimate      >60 mL/min/1.73:m2 53 (L)   GFR Estimate If Black      >60 mL/min/1.73:m2 62   Calcium      8.5 - 10.1 mg/dL 8.8   Bilirubin Total      0.2 - 1.3 mg/dL 0.6   Albumin      3.4 - 5.0 g/dL 3.7   Protein Total      6.8 - 8.8 g/dL 6.8   Alkaline Phosphatase      40 - 150 U/L 72   ALT      0 - 50 U/L 28   AST      0 - 45 U/L 13   WBC      4.0 - 11.0 10e9/L 6.6   RBC Count      3.8 - 5.2 10e12/L 4.38   Hemoglobin      11.7 - 15.7 g/dL 14.1   Hematocrit      35.0 - 47.0 % 43.2   MCV      78 - 100 fl 99   MCH      26.5 - 33.0 pg 32.2   MCHC      31.5 - 36.5 g/dL 32.6   RDW      10.0 - 15.0 % 12.5   Platelet Count      150 - 450 10e9/L 152   N-Terminal Pro Bnp      0 - 125 pg/mL 1,065 (H)     We are located on the third floor of  the [facility] on the [facility].  Our address is     [address] [place] [address] [place] [address]    Thank you for allowing us to be a part of your care here at the [facility]    If you have questions or concerns please contact us at:    Keyanna Mccray RN, BSN, PHN  Reji Escobar (Schedule,Prior Auth)  Nurse Coordinator     Clinic   Pulmonary Hypertension   Pulmonary Hypertension  [facility]  (P)[phone]    (P) [phone]        (F) [phone]    ** Please note that you will NOT receive a reminder call regarding your scheduled testing, reminder calls are for provider appointments only.  If you are scheduled for testing within the [facility] system you may receive a call regarding pre-registration for billing purposes only.**     Remember to weigh yourself daily after voiding and before you consume any food or beverages and log the numbers.  If you have gained/lost 2 pounds overnight or 5 pounds in a week contact us immediately for medication adjustments or further instructions.   **Please call us immediately if you have any syncope, chest pain, edema, or decline in your functional status.    Support Group:  Pulmonary Hypertension Association  [email or web address].phassociation.org/  **Look at the Events Tab** They even have Support Groups that you can call into    [facility] PH Support Group  Second Saturday of the Month from 1-3 PM   Location: [address] [place] [address]  Leader: Addie Sanchez and Mendy Galo  Phone: [phone] or [phone]  Email: [email or web address]

## 2019-04-17 NOTE — LETTER
4/17/2019      RE: Judith Nelson  [address] [place] [address]       Dear Colleague,    Thank you for the opportunity to participate in the care of your patient, Judith Nelson, at the [facility]. Please see a copy of my visit note below.    The patient returns for follow-up subsequent to recent hospitalization for psychosis.  She has modest exertional shortness of breath but no signs of cor pulmonale despite very high pressures. Her thinking is quite flighty and skips from topic to topic, some not relevant to the issues surround our visit.  She is not actively hallucinating nor is she suicidally depressed.    Constitutional: weight gain, but nofever, chills, night sweats  HEENT: without visual changes, swallow difficulties  Pulmonary: with shortness of breath, but no cough, wheeze, hemoptysis  Cardiac: without chest pain, JUAN, PND, orthopnea, edema, palpitation, pre-syncope, syncope,  GI: without diarrhea, constipation, jaundice, melena, GERD, hematemesis  : without frequency, urgency, dysuria, hematuria  Skin: rash, bruise, open lesions  Neuro: without TIA, focal neurologic complaints, seizure, trauma  Ortho: without pain, swelling, mobility impairment  Endocrine: diabetes, thyroid, heat/cold intolerance, polyuria, polyphagia, change bowel habits.  Sleep:no IVETTE, periodic breathing, fatigue  Other: anxious regarding recent hospitalization for psychotic episodes     Constitutional: alert, oriented, normal gait and station, normal mentation.  Oral: moist mucous membranes  Lymph: without pathologic adenopathy  Chest: clear to ausculation and percussion  Cor: No evidence of left or right ventricular activity.  Rhythm is regular.  S1 normal, S2 split physiologically. Murmurs are not present  Abdomen: without tenderness, rebound, guarding, masses, ascites  Extremities: Edema not present  Neuro: no focal defects, normal mentation  Skin:  without open lesions  Psych: oriented, verbal, pressured speech, perseveration, not suicidal    Current Outpatient Medications   Medication     acetaminophen (TYLENOL) 500 MG tablet     azaTHIOprine (IMURAN) 50 MG tablet     BUSPIRONE HCL PO     calcitRIOL (ROCALTROL) 0.25 MCG capsule     carvedilol (COREG) 3.125 MG tablet     Cholecalciferol (VITAMIN D PO)     FLUoxetine (PROZAC) 20 MG capsule     furosemide (LASIX) 40 MG tablet     levalbuterol (XOPENEX HFA) 45 MCG/ACT inhaler     LORazepam (ATIVAN) 0.5 MG tablet     macitentan (OPSUMIT) 10 MG tablet     OLANZapine (ZYPREXA) 2.5 MG tablet     order for DME     ORDER FOR DME     potassium chloride (K-TAB,KLOR-CON) 10 MEQ tablet     potassium chloride ER (K-DUR/KLOR-CON M) 10 MEQ CR tablet     ranitidine (RANITIDINE) 75 MG tablet     selexipag (UPTRAVI) 1600 MCG tablet     simvastatin (ZOCOR) 10 MG tablet     tadalafil, PAH, (ADCIRCA) 20 MG TABS     SODIUM BICARBONATE PO     No current facility-administered medications for this visit.        Results for ANALI GÓMEZ (MRN [id number]) as of 4/17/2019 15:25   Ref. Range 4/5/2019 19:28 4/17/2019 14:14   Sodium Latest Ref Range: 133 - 144 mmol/L  140   Potassium Latest Ref Range: 3.4 - 5.3 mmol/L  4.0   Chloride Latest Ref Range: 94 - 109 mmol/L  114 (H)   Carbon Dioxide Latest Ref Range: 20 - 32 mmol/L  19 (L)   Urea Nitrogen Latest Ref Range: 7 - 30 mg/dL  15   Creatinine Latest Ref Range: 0.52 - 1.04 mg/dL  1.13 (H)   GFR Estimate Latest Ref Range: >60 mL/min/1.73_m2  53 (L)   GFR Estimate If Black Latest Ref Range: >60 mL/min/1.73_m2  62   Calcium Latest Ref Range: 8.5 - 10.1 mg/dL  8.8   Anion Gap Latest Ref Range: 3 - 14 mmol/L  7   Albumin Latest Ref Range: 3.4 - 5.0 g/dL  3.7   Protein Total Latest Ref Range: 6.8 - 8.8 g/dL  6.8   Bilirubin Total Latest Ref Range: 0.2 - 1.3 mg/dL  0.6   Alkaline Phosphatase Latest Ref Range: 40 - 150 U/L  72   ALT Latest Ref Range: 0 - 50 U/L  28   AST Latest Ref Range: 0 -  45 U/L  13   N-Terminal Pro Bnp Latest Ref Range: 0 - 125 pg/mL  1,065 (H)   Glucose Latest Ref Range: 70 - 99 mg/dL  93   WBC Latest Ref Range: 4.0 - 11.0 10e9/L  6.6   Hemoglobin Latest Ref Range: 11.7 - 15.7 g/dL  14.1   Hematocrit Latest Ref Range: 35.0 - 47.0 %  43.2   Platelet Count Latest Ref Range: 150 - 450 10e9/L  152   RBC Count Latest Ref Range: 3.8 - 5.2 10e12/L  4.38   MCV Latest Ref Range: 78 - 100 fl  99   MCH Latest Ref Range: 26.5 - 33.0 pg  32.2   MCHC Latest Ref Range: 31.5 - 36.5 g/dL  32.6   RDW Latest Ref Range: 10.0 - 15.0 %  12.5   Color Urine Unknown Light Yellow    Appearance Urine Unknown Clear    Glucose Urine Latest Ref Range: NEG^Negative mg/dL Negative    Bilirubin Urine Latest Ref Range: NEG^Negative  Negative    Ketones Urine Latest Ref Range: NEG^Negative mg/dL Negative    Specific Gravity Urine Latest Ref Range: 1.003 - 1.035  1.009    pH Urine Latest Ref Range: 5.0 - 7.0 pH 5.5    Protein Albumin Urine Latest Ref Range: NEG^Negative mg/dL Negative    Urobilinogen mg/dL Latest Ref Range: 0.0 - 2.0 mg/dL Normal    Nitrite Urine Latest Ref Range: NEG^Negative  Negative    Blood Urine Latest Ref Range: NEG^Negative  Negative    Leukocyte Esterase Urine Latest Ref Range: NEG^Negative  Negative    Source Unknown Midstream Urine    WBC Urine Latest Ref Range: 0 - 5 /HPF 1    RBC Urine Latest Ref Range: 0 - 2 /HPF <1    Mucous Urine Latest Ref Range: NEG^Negative /LPF Present (A)    Hyaline Casts Latest Ref Range: 0 - 2 /LPF 1          1. Take furosemide 20 mgs Monday, Wednesday and Friday  2, See Dr. Babcock in 7-10 days for BMP and BNP  3. There is a large issue surrounding appropriate participation in outpatient program.    She has very high pressures, seemingly unresponsive to current medical regimen.  We have discussed chronic parenteral therapy however, both family and my staff believe this is not feasible in view of her social support, mental illness issues.      Please do not  hesitate to contact me if you have any questions/concerns.     Sincerely,     Norm Franco MD

## 2019-04-22 ENCOUNTER — TELEPHONE (OUTPATIENT)
Dept: CARDIOLOGY | Facility: CLINIC | Age: 59
End: 2019-04-22

## 2019-04-22 DIAGNOSIS — E87.6 DIURETIC-INDUCED HYPOKALEMIA: Primary | ICD-10-CM

## 2019-04-22 DIAGNOSIS — T50.2X5A DIURETIC-INDUCED HYPOKALEMIA: Primary | ICD-10-CM

## 2019-04-22 DIAGNOSIS — I50.9 HEART FAILURE (H): ICD-10-CM

## 2019-04-22 NOTE — TELEPHONE ENCOUNTER
[facility] Call Center    Phone Message    May a detailed message be left on voicemail: no    Reason for Call: Medication Question or concern regarding medication   Prescription Clarification  Name of Medication: furosemide (LASIX) 20 MG tablet  Prescribing Provider: Dr. Franco   Pharmacy: [facility] [address] [place] [address] AVE AT [facility] [address]   What on the order needs clarification? Pt recently released from hospital and was told to not to take the furosemide, but did a follow-up appt with her Cardiologist and was put back on furosemide. Her Home Care facility is concerned regarding the potassium chloride and is requesting a call back from a nurse to discuss medications.     Action Taken: Message routed to:  [facility] Cardiology Clinic

## 2019-04-23 NOTE — TELEPHONE ENCOUNTER
LM for [facility] stating I received her message about being concerned about patient's Potassium.    Asked her to call me back and let me know exactly what about the potassium she is concerned about.  Left my direct dial number for call back. Left my direct dial number for call back.  Keyanna Mccray RN on 4/23/2019 at 10:55 AM  -----------------------------------------  Received another message from [facility] who manages patient's medications.  They last had orders to stop the Lasix and potassium, and we re-started the Lasix but not the potassium so she wants to know how to handle that.  ----------------------------------------  Called back [facility] and left a VM with order for patient to take KDUR 10meq tablet once daily with Furosemide (only when she takes the furosemide).  Keyanna Mccray RN on 4/24/2019 at 3:46 PM  ---------------------------------------  Received Vm from [facility] acknowledging orders for potassium, but she doesn't know what size Lasix tab patient is taking, so she also needs that clarified.  She states we can fax order   -------------------------------------  Called Nedra Burdick back and gave her TORB that patient was ordered Lasix 20mg tab once daily on Monday, Wednesday and Friday's.  [facility] acknowledged order and stated she would let the nephrologist know as well.  Agreed with plan. Keyanna Mccray RN on 4/25/2019 at 3:49 PM

## 2019-04-24 RX ORDER — POTASSIUM CHLORIDE 750 MG/1
TABLET, EXTENDED RELEASE ORAL
Qty: 90 TABLET | Refills: 2 | Status: SHIPPED | OUTPATIENT
Start: 2019-04-24 | End: 2019-09-11

## 2019-04-24 NOTE — TELEPHONE ENCOUNTER
Prior Authorization Approval    Authorization Effective Date: 1/17/2019  Authorization Expiration Date: 4/16/2020  Medication: Uptravi 1600 mcg - Approved  Approved Dose/Quantity: 60 tabs/30 days   Insurance Company: [employer] Part D - Phone [phone] Fax [phone]  Which Pharmacy is filling the prescription (Not needed for infusion/clinic administered): [address]

## 2019-05-02 DIAGNOSIS — I50.22 CHRONIC SYSTOLIC HEART FAILURE (H): Primary | ICD-10-CM

## 2019-05-02 RX ORDER — CARVEDILOL 12.5 MG/1
6.25 TABLET ORAL 2 TIMES DAILY WITH MEALS
Qty: 90 TABLET | Refills: 1 | Status: CANCELLED | OUTPATIENT
Start: 2019-05-02

## 2019-05-04 NOTE — TELEPHONE ENCOUNTER
carvedilol (COREG) 12.5 MG tablet     Last Written Prescription Date:  1/11/19  Last Fill Quantity: 90,   # refills: 1  Last Office Visit : 4/17/19  Future Office visit: none    Routing refill request to provider for review/approval because:  Med end date 4/9/19      Med list  carvedilol (COREG) 3.125 MG tablet   Requested  carvedilol (COREG) 12.5 MG tablet

## 2019-05-06 RX ORDER — CARVEDILOL 3.12 MG/1
3.12 TABLET ORAL 2 TIMES DAILY WITH MEALS
Qty: 180 TABLET | Refills: 3 | Status: SHIPPED | OUTPATIENT
Start: 2019-05-06 | End: 2019-12-13

## 2019-05-06 NOTE — TELEPHONE ENCOUNTER
[facility] Call Center    Phone Message    May a detailed message be left on voicemail: yes    Reason for Call: Medication Refill Request    Has the patient contacted the pharmacy for the refill? Yes   Name of medication being requested: yolanda  Provider who prescribed the medication: Dr Franco  Pharmacy: [facility] [address] [place] [address] AVE AT [facility] [address]  Date medication is needed: as soon as possible, Aung states that Judith is completely out of the medication         Action Taken: Message routed to:  [facility]  Cardiology

## 2019-05-10 ENCOUNTER — RECORDS - HEALTHEAST (OUTPATIENT)
Dept: LAB | Facility: CLINIC | Age: 59
End: 2019-05-10

## 2019-05-10 LAB
ALBUMIN SERPL-MCNC: 4.3 G/DL (ref 3.5–5)
ALP SERPL-CCNC: 63 U/L (ref 45–120)
ALT SERPL W P-5'-P-CCNC: 15 U/L (ref 0–45)
ANION GAP SERPL CALCULATED.3IONS-SCNC: 12 MMOL/L (ref 5–18)
AST SERPL W P-5'-P-CCNC: 17 U/L (ref 0–40)
BILIRUB SERPL-MCNC: 0.4 MG/DL (ref 0–1)
BUN SERPL-MCNC: 18 MG/DL (ref 8–22)
CALCIUM SERPL-MCNC: 9.8 MG/DL (ref 8.5–10.5)
CHLORIDE BLD-SCNC: 111 MMOL/L (ref 98–107)
CO2 SERPL-SCNC: 18 MMOL/L (ref 22–31)
CREAT SERPL-MCNC: 1.3 MG/DL (ref 0.6–1.1)
GFR SERPL CREATININE-BSD FRML MDRD: 42 ML/MIN/1.73M2
GLUCOSE BLD-MCNC: 104 MG/DL (ref 70–125)
POTASSIUM BLD-SCNC: 3.7 MMOL/L (ref 3.5–5)
PROT SERPL-MCNC: 6.6 G/DL (ref 6–8)
SODIUM SERPL-SCNC: 141 MMOL/L (ref 136–145)

## 2019-05-30 LAB
ALBUMIN SERPL-MCNC: 4.3 G/DL
ALP SERPL-CCNC: 63 U/L
ALT SERPL-CCNC: 15 U/L
ANION GAP SERPL CALCULATED.3IONS-SCNC: 12 MMOL/L
AST SERPL-CCNC: 17 U/L
BILIRUB SERPL-MCNC: 0.4 MG/DL
BUN SERPL-MCNC: 18 MG/DL
CALCIUM SERPL-MCNC: 9.8 MG/DL
CHLORIDE SERPLBLD-SCNC: 111 MMOL/L
CO2 SERPL-SCNC: 18 MMOL/L
CREAT SERPL-MCNC: 1.3 MG/DL
GFR SERPL CREATININE-BSD FRML MDRD: 51 ML/MIN/1.73M2
GLUCOSE SERPL-MCNC: 104 MG/DL (ref 70–99)
POTASSIUM SERPL-SCNC: 3.7 MMOL/L
PROT SERPL-MCNC: 6.6 G/DL
SODIUM SERPL-SCNC: 141 MMOL/L

## 2019-06-03 ENCOUNTER — TELEPHONE (OUTPATIENT)
Dept: CARDIOLOGY | Facility: CLINIC | Age: 59
End: 2019-06-03

## 2019-06-03 NOTE — TELEPHONE ENCOUNTER
[facility] Call Center    Phone Message    May a detailed message be left on voicemail: yes    Reason for Call: Other: Pt calling asking to speak with a nurse for Dr. Franco. Pt is scheduled to see Korin this month but has had an insurance change and is wondering how urgent the appt is. Due to the change in her insurance, she would like to reschedule to July if that is permissible.     Action Taken: Message routed to:  [facility]

## 2019-06-04 NOTE — TELEPHONE ENCOUNTER
LM for patient advising her that yes, she can re-schedule her appt for July, but I want to talk with her to assess how she is doing and confirm she had followed up with her PMD after we saw her last time.  Also advised her that if she has new insurance, she needs to call Reji with that information so he can submit new prior authorizations for all of her medications, so please call him. Keyanna Mccray RN on 6/4/2019 at 10:03 AM  ---------------------------------  Spoke with Reji who acknowledged he received a VM from the patient as well, so he is aware she currently doesn't have insurance for June, but once she provides him new Insurance for July, he will be able to submit New PA's.  Keyanna Mccray RN on 6/6/2019 at 9:51 AM

## 2019-06-11 ENCOUNTER — OFFICE VISIT (OUTPATIENT)
Dept: CARDIOLOGY | Facility: CLINIC | Age: 59
End: 2019-06-11
Attending: INTERNAL MEDICINE
Payer: MEDICARE

## 2019-06-11 VITALS
DIASTOLIC BLOOD PRESSURE: 60 MMHG | HEART RATE: 78 BPM | SYSTOLIC BLOOD PRESSURE: 98 MMHG | WEIGHT: 124.4 LBS | BODY MASS INDEX: 22.04 KG/M2 | HEIGHT: 63 IN | OXYGEN SATURATION: 94 %

## 2019-06-11 DIAGNOSIS — R06.02 SOB (SHORTNESS OF BREATH): ICD-10-CM

## 2019-06-11 DIAGNOSIS — I27.20 PULMONARY HYPERTENSION (H): ICD-10-CM

## 2019-06-11 LAB
ALBUMIN SERPL-MCNC: 4 G/DL (ref 3.4–5)
ALP SERPL-CCNC: 67 U/L (ref 40–150)
ALT SERPL W P-5'-P-CCNC: 21 U/L (ref 0–50)
ANION GAP SERPL CALCULATED.3IONS-SCNC: 5 MMOL/L (ref 3–14)
AST SERPL W P-5'-P-CCNC: 17 U/L (ref 0–45)
BILIRUB SERPL-MCNC: 0.5 MG/DL (ref 0.2–1.3)
BUN SERPL-MCNC: 20 MG/DL (ref 7–30)
CALCIUM SERPL-MCNC: 9.3 MG/DL (ref 8.5–10.1)
CHLORIDE SERPL-SCNC: 112 MMOL/L (ref 94–109)
CO2 SERPL-SCNC: 23 MMOL/L (ref 20–32)
CREAT SERPL-MCNC: 1.18 MG/DL (ref 0.52–1.04)
ERYTHROCYTE [DISTWIDTH] IN BLOOD BY AUTOMATED COUNT: 13.2 % (ref 10–15)
GFR SERPL CREATININE-BSD FRML MDRD: 51 ML/MIN/{1.73_M2}
GLUCOSE SERPL-MCNC: 86 MG/DL (ref 70–99)
HCT VFR BLD AUTO: 45.3 % (ref 35–47)
HGB BLD-MCNC: 15.1 G/DL (ref 11.7–15.7)
MCH RBC QN AUTO: 31.9 PG (ref 26.5–33)
MCHC RBC AUTO-ENTMCNC: 33.3 G/DL (ref 31.5–36.5)
MCV RBC AUTO: 96 FL (ref 78–100)
NT-PROBNP SERPL-MCNC: 685 PG/ML (ref 0–125)
PLATELET # BLD AUTO: 184 10E9/L (ref 150–450)
POTASSIUM SERPL-SCNC: 4.3 MMOL/L (ref 3.4–5.3)
PROT SERPL-MCNC: 7.1 G/DL (ref 6.8–8.8)
RBC # BLD AUTO: 4.73 10E12/L (ref 3.8–5.2)
SODIUM SERPL-SCNC: 140 MMOL/L (ref 133–144)
WBC # BLD AUTO: 4.8 10E9/L (ref 4–11)

## 2019-06-11 PROCEDURE — 36415 COLL VENOUS BLD VENIPUNCTURE: CPT | Performed by: INTERNAL MEDICINE

## 2019-06-11 PROCEDURE — 80053 COMPREHEN METABOLIC PANEL: CPT | Performed by: INTERNAL MEDICINE

## 2019-06-11 PROCEDURE — G0463 HOSPITAL OUTPT CLINIC VISIT: HCPCS | Mod: ZF

## 2019-06-11 PROCEDURE — 99214 OFFICE O/P EST MOD 30 MIN: CPT | Mod: ZP | Performed by: NURSE PRACTITIONER

## 2019-06-11 PROCEDURE — 85027 COMPLETE CBC AUTOMATED: CPT | Performed by: INTERNAL MEDICINE

## 2019-06-11 PROCEDURE — 83880 ASSAY OF NATRIURETIC PEPTIDE: CPT | Performed by: INTERNAL MEDICINE

## 2019-06-11 ASSESSMENT — MIFFLIN-ST. JEOR: SCORE: 1113.4

## 2019-06-11 ASSESSMENT — PAIN SCALES - GENERAL: PAINLEVEL: NO PAIN (0)

## 2019-06-11 NOTE — PATIENT INSTRUCTIONS
Medication Changes:  1. No medication changes today    2. Of note, if you find that you still cannot lose weight with increased, regular exercise; consider asking your psychiatrist about Latuda, or a medication other than Zyprexa for side effects/potential weight gain.     Patient Instructions:  1. Continue staying active and eat a heart healthy diet.    2. Please keep current list of medications with you at all times.    3. Remember to weigh yourself daily after voiding and before you consume any food or beverages and log the numbers.  If you have gained 2 pounds overnight or 5 pounds in a week contact us immediately for medication adjustments or further instructions.    4. **Please call us immediately if you have any syncope (fainting or passing out), chest pain, edema (swelling or weight gain), or decline in your functional status (general decline in how you are feeling).    Follow up Appointment Information:  1. Follow up in 3 months with Dr. Franco and labs  2. Call me with Address & Name of Hotel you are staying at in [place].    Results:  Results for ANALI GÓMEZ MARY (MRN [id number]) as of 6/11/2019 14:39   Ref. Range 2/3/2012 13:47   Sodium Latest Ref Range: 133 - 144 mmol/L 144   Potassium Latest Ref Range: 3.4 - 5.3 mmol/L 4.5   Chloride Latest Ref Range: 94 - 109 mmol/L 106   Carbon Dioxide Latest Ref Range: 20 - 32 mmol/L 25   Urea Nitrogen Latest Ref Range: 7 - 30 mg/dL 15   Creatinine Latest Ref Range: 0.52 - 1.04 mg/dL 1.22 (H)   GFR Estimate Latest Ref Range: >60 mL/min/1.7m2 47 (L)   GFR Estimate If Black Latest Ref Range: >60 mL/min/1.7m2 56 (L)   Calcium Latest Ref Range: 8.5 - 10.4 mg/dL 10.7 (H)   Anion Gap Latest Ref Range: 6 - 17 mmol/L 13   Albumin Latest Ref Range: 3.9 - 5.1 g/dL 4.6   Protein Total Latest Ref Range: 6.8 - 8.8 g/dL 8.1   Bilirubin Total Latest Ref Range: 0.2 - 1.3 mg/dL 0.7   Alkaline Phosphatase Latest Ref Range: 40 - 150 U/L 190 (H)   ALT Latest Ref Range: 0 - 50 U/L  26   AST Latest Ref Range: 0 - 45 U/L 18   Glucose Latest Ref Range: 60 - 99 mg/dL 100 (H)   WBC Latest Ref Range: 4.0 - 11.0 10e9/L 10.5   Hemoglobin Latest Ref Range: 11.7 - 15.7 g/dL 11.8   Hematocrit Latest Ref Range: 35.0 - 47.0 % 36.8   Platelet Count Latest Ref Range: 150 - 450 10e9/L 332   RBC Count Latest Ref Range: 3.8 - 5.2 10e12/L 3.92   MCV Latest Ref Range: 78 - 100 fl 94   MCH Latest Ref Range: 26.5 - 33.0 pg 30.1   MCHC Latest Ref Range: 31.5 - 36.5 g/dL 32.1   RDW Latest Ref Range: 10.0 - 15.0 % 13.5   Diff Method Unknown Automated Method   % Neutrophils Latest Ref Range: 40 - 75 % 72.0   % Lymphocytes Latest Ref Range: 20 - 48 % 17.6 (L)   % Monocytes Latest Ref Range: 0 - 12 % 8.0   % Eosinophils Latest Ref Range: 0 - 6 % 1.7   % Basophils Latest Ref Range: 0 - 2 % 0.7   Absolute Neutrophil Latest Ref Range: 1.6 - 8.3 10e9/L 7.6   Absolute Lymphocytes Latest Ref Range: 0.8 - 5.3 10e9/L 1.8   Absolute Monocytes Latest Ref Range: 0.0 - 1.3 10e9/L 0.8   Absolute Eosinophils Latest Ref Range: 0.0 - 0.7 10e9/L 0.2   Absolute Basophils Latest Ref Range: 0.0 - 0.2 10e9/L 0.1     We are located on the third floor of the Clinic and [facility] Center ([facility]) on the [facility].  Our address is     [address] [place] [address] [place] [address] [place] [address]    Thank you for allowing us to be a part of your care here at the [facility]    If you have questions or concerns please contact us at:    Keyanna Mccray, RN, BSN, PHN  Reji Escobar or Keisha Albarran (Schedule,Prior Auth)  Nurse Coordinator     Clinic   Pulmonary Hypertension   Pulmonary Hypertension  [facility]  [phone])[phone]    (P) [phone]        (f) [phone]    ** Please note that you will NOT receive a reminder call regarding your scheduled testing, reminder calls are for provider appointments only.  If  you are scheduled for testing within the [employer] system you may receive a call regarding pre-registration for billing purposes only.**     Remember to weigh yourself daily after voiding and before you consume any food or beverages and log the numbers.  If you have gained/lost 2 pounds overnight or 5 pounds in a week contact us immediately for medication adjustments or further instructions.   **Please call us immediately if you have any syncope, chest pain, edema, or decline in your functional status.    Support Group:  Pulmonary Hypertension Association  Https://www.phassociation.org/  **Look at the Events Tab** They even have Support Groups that you can call into    [facility] PH Support Group  Second Saturday of the Month from 1-3 PM   Location: [address] [place] [id number]  Leader: Addie Sanchez and Mendy Galo  Phone: [phone] or [phone]  Email: [email or web address]

## 2019-06-11 NOTE — NURSING NOTE
Flying to [facility], arriving 6/16/19-6/21/19.  2L O2 @ [facility].    Oral Surgery is scheduled for 8/6/19 [facility].

## 2019-06-11 NOTE — NURSING NOTE
Chief Complaint   Patient presents with     Follow Up      Return for 2 month PH F/U after Labs prior after changing Lasix.

## 2019-06-11 NOTE — PROGRESS NOTES
2019      Ms. Judith Nelson is a pleasant 59 year old female with a past medical history of pulmonary arterial hypertension, iron deficiency anemia, sarcoidosis, hyperprolactinemia, depression, MGUS, CKD, anxiety and depression.       Today, she is accompanied by: daughter, Saritha    Current Symptoms  1. Any ER visits or hospital admissions since last appointment: No    2. Shortness of breath: Yes: exertional, and some days more than others   3. Dizziness or lightheadedness: Yes: sometimes with taking a shower  4. Any syncopal episodes or falls: No  5. Chest pain or chest tightness: No  6. Nausea, vomiting, diarrhea, constipation: Yes: diarrhea has improved; still occasionally  7. Swelling in the legs: No  8. Bloating in the abdomen: No; but has gained weight on Zyprexa  9. PND; Waking up at night coughing, choking or SOB: No  10. Any medication intolerances: No  11. Reported headaches: No  12. Jaw pain or bone/joint pain: Yes: shoulder aches from stress    13. Current level of activity: Active as tolerable      PAST MEDICAL HISTORY:  Past Medical History:   Diagnosis Date     Anxiety      CKD (chronic kidney disease), stage III (H)     biopsy suspicious for renal sarcoidosis      Hyperprolactinemia (H)      Lower extremity edema     chronic w/ chronic right ankle ulcer     Lumbar compression fracture (H)      Major depressive disorder     with psychotic features, followed by Dr. Rosales at [facility] in [place]     Menopause     patient is post menopausal     MGUS (monoclonal gammopathy of unknown significance)     mild, followed by Dr. Long     Osteoporosis      Other seborrheic keratosis      Protrusio acetabuli      Pulmonary hypertension (H)      Sarcoidosis      Stress-induced cardiomyopathy          FAMILY HISTORY:  Family History   Problem Relation Age of Onset     Cancer Mother          age 62 leukemia     Gastrointestinal Disease Mother         diverticulitis     Hypertension  Mother      Allergies Mother      Arthritis Mother      Depression Mother      Eye Disorder Mother      Osteoporosis Mother      Anxiety Disorder Mother      Mental Illness Mother      Asthma Mother      Other Cancer Mother      Migraines Mother      C.A.D. Father         MI/CAD      Cancer Father         skin     Blood Disease Father         renal  problem     Hypertension Father      Anesthesia Reaction Father      Cardiovascular Father      Neurologic Disorder Father         Parkinson's disease     Anxiety Disorder Father      Other Cancer Father      Hyperlipidemia Father      Coronary Artery Disease Father      C.A.D. Maternal Grandmother          late 82s MI     Diabetes Maternal Grandmother      Osteoporosis Maternal Grandmother      C.A.D. Maternal Grandfather          mid 80s MI     Alzheimer Disease Paternal Grandmother          80s     Alzheimer Disease Paternal Grandfather          80s     Neurologic Disorder Sister         migraines     Allergies Sister      Diabetes Other         AODM     Neurologic Disorder Sister         migraines     Allergies Sister      Anesthesia Reaction Son      Anesthesia Reaction Daughter      Anesthesia Reaction Daughter      Diabetes Sister         hypoglycemia     Anxiety Disorder Son      Anxiety Disorder Sister      Substance Abuse Sister      Asthma Sister      Asthma Daughter      Depression Son      Hypertension Sister      Hyperlipidemia Sister      Migraines Sister          SOCIAL HISTORY:  Social History     Tobacco Use     Smoking status: Never Smoker     Smokeless tobacco: Never Used   Substance Use Topics     Alcohol use: No     Alcohol/week: 0.0 oz     Comment: Occ.     Drug use: No         CURRENT MEDICATIONS:  Current Outpatient Medications   Medication Sig Dispense Refill     acetaminophen (TYLENOL) 500 MG tablet Take 1-2 tablets (500-1,000 mg) by mouth every 6 hours as needed for pain (Take as needed for pain.  Do not exceed 4 grams (8  tablets) in a day) 45 tablet 10     azaTHIOprine (IMURAN) 50 MG tablet Take 1 tablet (50 mg) by mouth daily 90 tablet 3     calcitRIOL (ROCALTROL) 0.25 MCG capsule Take 1 capsule (0.25 mcg) by mouth daily 30 capsule 1     carvedilol (COREG) 3.125 MG tablet Take 1 tablet (3.125 mg) by mouth 2 times daily (with meals) 180 tablet 3     Cholecalciferol (VITAMIN D PO) Take 50,000 Units by mouth once a week       FLUoxetine (PROZAC) 20 MG capsule Take 1 capsule (20 mg) by mouth daily 30 capsule 1     furosemide (LASIX) 20 MG tablet Take 1 tablet by mouth on Monday, Wednesday & Friday's 45 tablet 3     levalbuterol (XOPENEX HFA) 45 MCG/ACT inhaler Inhale 2 puffs into the lungs every 6 hours as needed for shortness of breath / dyspnea or wheezing 1 Inhaler 0     LORazepam (ATIVAN) 0.5 MG tablet Take 2 tablets (1 mg) by mouth 2 times daily 90 tablet 1     macitentan (OPSUMIT) 10 MG tablet Take 1 tablet (10 mg) by mouth daily 30 tablet 0     OLANZapine (ZYPREXA) 2.5 MG tablet Take 1 tab (2.5 mg) in the morning and 2 tabs (5 mg) at bedtime for a total daily dose of 7.5 mg 90 tablet 0     order for DME Oxygen       ORDER FOR DME Equipment being ordered: SHOWER CHAIR  FROM [facility] Wally 1 Device 0     potassium chloride ER (K-DUR/KLOR-CON M) 10 MEQ CR tablet Take 1 tab once daily by mouth on the days you take Furosemide. 90 tablet 2     ranitidine (RANITIDINE) 75 MG tablet Take 1-2 tablets ( mg) by mouth 2 times daily 60 tablet 11     selexipag (UPTRAVI) 1600 MCG tablet Take 1 tablet (1,600 mcg) by mouth every 12 hours       simvastatin (ZOCOR) 10 MG tablet Take 1 tablet by mouth At Bedtime. 90 tablet 1     tadalafil, PAH, (ADCIRCA) 20 MG TABS Take 2 tablets (40 mg) by mouth daily 180 tablet 3     BUSPIRONE HCL PO Take 15 mg by mouth 2 times daily       SODIUM BICARBONATE PO Take by mouth 2 times daily           ROS:   10 point ROS of systems including Constitutional, Eyes, Respiratory, Cardiovascular, Gastroenterology,  "Genitourinary, Integumentary, Muscularskeletal, Psychiatric were all negative except for pertinent positives noted in my HPI.    EXAM:  BP 98/60 (BP Location: Right arm, Patient Position: Chair, Cuff Size: Adult Regular)   Pulse 78   Ht 1.6 m (5' 3\")   Wt 56.4 kg (124 lb 6.4 oz)   SpO2 94%   BMI 22.04 kg/m    General: appears comfortable, alert and articulate  Head: normocephalic, atraumatic  Eyes: anicteric sclera, EOMI  Neck: no adenopathy  Orophyarynx: moist mucosa, no lesions, dentition intact  Heart: regular, S1/S2, no murmur, gallop, rub, estimated JVP wnl  Lungs: clear, no rales or wheezing  Abdomen: soft, non-tender, bowel sounds present, no hepatosplenomegaly  Extremities: no clubbing, cyanosis or edema  Neurological: normal speech and affect, no gross motor deficits      Weight  Wt Readings from Last 10 Encounters:   06/11/19 56.4 kg (124 lb 6.4 oz)   04/17/19 54 kg (119 lb)   04/09/19 54.9 kg (121 lb)   01/24/19 54.1 kg (119 lb 4.3 oz)   01/16/19 53.3 kg (117 lb 6.4 oz)   12/27/18 54.8 kg (120 lb 14.4 oz)   12/05/18 52.8 kg (116 lb 8 oz)   12/05/18 52.8 kg (116 lb 8 oz)   11/07/18 53.5 kg (118 lb)   09/10/18 53.3 kg (117 lb 9.6 oz)         Labs:    CBC RESULTS:  Lab Results   Component Value Date    WBC 4.8 06/11/2019    RBC 4.73 06/11/2019    HGB 15.1 06/11/2019    HCT 45.3 06/11/2019    MCV 96 06/11/2019    MCH 31.9 06/11/2019    MCHC 33.3 06/11/2019    RDW 13.2 06/11/2019     06/11/2019       CMP RESULTS:  Lab Results   Component Value Date     06/11/2019    POTASSIUM 4.3 06/11/2019    CHLORIDE 112 (H) 06/11/2019    CO2 23 06/11/2019    ANIONGAP 5 06/11/2019    GLC 86 06/11/2019    BUN 20 06/11/2019    CR 1.18 (H) 06/11/2019    GFRESTIMATED 51 (L) 06/11/2019    GFRESTBLACK 59 (L) 06/11/2019    ISHMAEL 9.3 06/11/2019    BILITOTAL 0.5 06/11/2019    ALBUMIN 4.0 06/11/2019    ALKPHOS 67 06/11/2019    ALT 21 06/11/2019    AST 17 06/11/2019        INR RESULTS:  Lab Results   Component Value " Date    INR 1.04 03/26/2013       BNP RESULTS:  Lab Results   Component Value Date    NTBNPI 472 01/24/2019     No results found for: BNP      Recent Tests:      Echocardiogram (3/28/2019):  Interpretation Summary  Left ventricular function, chamber size, wall motion, and wall thickness are  normal.The EF is 60-65%. Flattened septum is consistent with right ventricular  pressure and volume overload.  Global right ventricular function is normal. There is mild right ventricular  hypertrophy. Mild right ventricular dilation is present.  Moderate to severe tricuspid insufficiency is present.  RVSP measured at 120mmHg. Very severe PH with close to systemic pressures.  The inferior vena cava was normal in size with preserved respiratory  variability. No pericardial effusion is present.     Compared to prior study, maximum RVSP is measured higher.     Left Ventricle  Left ventricular function, chamber size, wall motion, and wall thickness are  normal.The EF is 60-65%. Left ventricular diastolic function is normal.  Flattened septum is consistent with right ventricular pressure and volume  overload.     Right Ventricle  Global right ventricular function is normal. There is mild right ventricular  hypertrophy. Mild right ventricular dilation is present.     Atria  Both atria appear normal.        Mitral Valve  The mitral valve is normal. Trace mitral insufficiency is present.     Aortic Valve  Aortic valve is normal in structure and function. The aortic valve is  tricuspid. No aortic regurgitation is present.     Tricuspid Valve  The tricuspid valve is normal. Moderate to severe tricuspid insufficiency is  present. RVSP measured at 120mmHg.     Pulmonic Valve  The pulmonic valve is normal.     Vessels  The aorta root is normal. The inferior vena cava was normal in size with  preserved respiratory variability.     Pericardium  No pericardial effusion is present.        Compared to Previous Study  Compared to prior study,  maximum RVSP is measured higher.        RHC (1/16/2019):          Assessment and Plan:   Ms. Judith Nelson is a 59 year old female with pulmonary arterial hypertension and sarcoidosis, WHO Group I, NYHA Functional Class II.      #PAH  Mrs. Judith Nelson returns to clinic for follow up, with her daughter, Saritha.  Clinically, her vital signs and labs are stable; physical exam shows no signs of right heart failure or fluid overload with no JVD or lower leg swelling.  She is stable on her current PH medication regimen. She expressed discontent with having been on Zyprexa for a recent hospitalization of auditory hallucinations and paranoia, since she has gained approximately 10 lbs on the therapy, however the medication has been controlling the symptoms.  We advised that she follow up with her psychiatrist regarding these concerns and any possible medication changes with improved weight gain profiles.  Ms. Nelson will otherwise follow up in our clinic in three months with repeat labs.      It was a pleasure seeing Ms. Judith Nelson at the [facility].           Sincerely,  Korin Hirsch, APRN, CNP.

## 2019-06-12 LAB — INTERPRETATION ECG - MUSE: NORMAL

## 2019-06-14 ENCOUNTER — TELEPHONE (OUTPATIENT)
Dept: CARDIOLOGY | Facility: CLINIC | Age: 59
End: 2019-06-14

## 2019-06-14 NOTE — TELEPHONE ENCOUNTER
Patient had advised us at her office visit this week that she was leaving on a trip to [place] this weekend with her daughters.  She has not set up Oxygen for the hotel, so I asked her to call me with Hotel name and date of check in and I would work to secure the Oxygen for her.  -----------------------------  Patient called and left me a message that the Hotel was the [facility] in [place], [place].    Called [employer] and left a msg for someone to call me back to discuss travel Oxygen arrangements.    I did call the Hotel where she will be staying starting [place] night 6/16/19 who provided me with the name of an Oxygen company they have worked with Called [facility] @ [phone].    I called [facility] and discussed situation.  I found out that if she is not going to pay for the O2 out of her own pocket, her current Oxygen company needs to set up the Oxygen so proper billing can be arranged.  Agreed with plan. Keyanna Mccray RN on 6/14/2019 at 11:17 AM  ---------------------------  [facility] rep took information and stated they usually prefer a 2-3 week advance notice to contact their other [facility] facilities, but they will try to get it done.  Rep asked me for reservation number and how many days she was staying, but I didn't know so they will call patient directly.  Agreed with plan. Keyanna Mccray RN on 6/14/2019 at 1:49 PM

## 2019-06-28 DIAGNOSIS — I27.20 PULMONARY HYPERTENSION (H): Primary | ICD-10-CM

## 2019-06-28 NOTE — TELEPHONE ENCOUNTER
Medication Refill double check:    Last office visit was on 6/11/19 with Korin Hirsch CNP.    Follow up was recommended for 3 months.    Any additional encounters with changes to requested med? no    Authorizing provider is: Dr. Joan Smith was approved.     Additional orders/notes:       Keyanna Mccray RN on 6/28/2019 at 12:49 PM

## 2019-07-01 ENCOUNTER — TELEPHONE (OUTPATIENT)
Dept: CARDIOLOGY | Facility: CLINIC | Age: 59
End: 2019-07-01

## 2019-07-01 NOTE — TELEPHONE ENCOUNTER
Received Patient Assistance Program Request forms for [employer] and [employer].  Completed and faxed forms for expedited review request.

## 2019-07-02 DIAGNOSIS — J84.9 ILD (INTERSTITIAL LUNG DISEASE) (H): Primary | ICD-10-CM

## 2019-07-03 ENCOUNTER — OFFICE VISIT (OUTPATIENT)
Dept: PULMONOLOGY | Facility: CLINIC | Age: 59
End: 2019-07-03
Attending: INTERNAL MEDICINE
Payer: MEDICARE

## 2019-07-03 VITALS
WEIGHT: 124 LBS | HEART RATE: 80 BPM | HEIGHT: 63 IN | RESPIRATION RATE: 16 BRPM | SYSTOLIC BLOOD PRESSURE: 121 MMHG | DIASTOLIC BLOOD PRESSURE: 70 MMHG | BODY MASS INDEX: 21.97 KG/M2 | OXYGEN SATURATION: 97 %

## 2019-07-03 DIAGNOSIS — I27.20 PULMONARY HYPERTENSION (H): Primary | ICD-10-CM

## 2019-07-03 DIAGNOSIS — E55.9 VITAMIN D DEFICIENCY: ICD-10-CM

## 2019-07-03 DIAGNOSIS — D86.9 SARCOIDOSIS: ICD-10-CM

## 2019-07-03 DIAGNOSIS — D86.9 SARCOIDOSIS: Primary | ICD-10-CM

## 2019-07-03 LAB
6 MIN WALK (FT): 1095 FT
6 MIN WALK (M): 334 M
ALBUMIN SERPL-MCNC: 4 G/DL (ref 3.4–5)
ALP SERPL-CCNC: 63 U/L (ref 40–150)
ALT SERPL W P-5'-P-CCNC: 20 U/L (ref 0–50)
ANION GAP SERPL CALCULATED.3IONS-SCNC: 6 MMOL/L (ref 3–14)
AST SERPL W P-5'-P-CCNC: 17 U/L (ref 0–45)
BASOPHILS # BLD AUTO: 0.1 10E9/L (ref 0–0.2)
BASOPHILS NFR BLD AUTO: 1.2 %
BILIRUB DIRECT SERPL-MCNC: 0.1 MG/DL (ref 0–0.2)
BILIRUB SERPL-MCNC: 0.5 MG/DL (ref 0.2–1.3)
BUN SERPL-MCNC: 13 MG/DL (ref 7–30)
CALCIUM SERPL-MCNC: 9.1 MG/DL (ref 8.5–10.1)
CHLORIDE SERPL-SCNC: 109 MMOL/L (ref 94–109)
CO2 SERPL-SCNC: 24 MMOL/L (ref 20–32)
CREAT SERPL-MCNC: 1.37 MG/DL (ref 0.52–1.04)
DIFFERENTIAL METHOD BLD: NORMAL
DLCOUNC-%PRED-PRE: 78 %
DLCOUNC-PRE: 15.48 ML/MIN/MMHG
DLCOUNC-PRED: 19.7 ML/MIN/MMHG
EOSINOPHIL # BLD AUTO: 0.1 10E9/L (ref 0–0.7)
EOSINOPHIL NFR BLD AUTO: 0.8 %
ERV-%PRED-PRE: 103 %
ERV-PRE: 0.96 L
ERV-PRED: 0.93 L
ERYTHROCYTE [DISTWIDTH] IN BLOOD BY AUTOMATED COUNT: 13.4 % (ref 10–15)
EXPTIME-PRE: 8.11 SEC
FEF2575-%PRED-PRE: 42 %
FEF2575-PRE: 0.92 L/SEC
FEF2575-PRED: 2.19 L/SEC
FEFMAX-%PRED-PRE: 69 %
FEFMAX-PRE: 4.37 L/SEC
FEFMAX-PRED: 6.25 L/SEC
FEV1-%PRED-PRE: 71 %
FEV1-PRE: 1.64 L
FEV1FEV6-PRE: 68 %
FEV1FEV6-PRED: 81 %
FEV1FVC-PRE: 68 %
FEV1FVC-PRED: 81 %
FEV1SVC-PRE: 69 %
FEV1SVC-PRED: 72 %
FIFMAX-PRE: 3.97 L/SEC
FVC-%PRED-PRE: 84 %
FVC-PRE: 2.42 L
FVC-PRED: 2.87 L
GFR SERPL CREATININE-BSD FRML MDRD: 42 ML/MIN/{1.73_M2}
GLUCOSE SERPL-MCNC: 93 MG/DL (ref 70–99)
HCT VFR BLD AUTO: 46.5 % (ref 35–47)
HGB BLD-MCNC: 15.2 G/DL (ref 11.7–15.7)
IC-%PRED-PRE: 63 %
IC-PRE: 1.43 L
IC-PRED: 2.26 L
IMM GRANULOCYTES # BLD: 0 10E9/L (ref 0–0.4)
IMM GRANULOCYTES NFR BLD: 0.5 %
LYMPHOCYTES # BLD AUTO: 1.1 10E9/L (ref 0.8–5.3)
LYMPHOCYTES NFR BLD AUTO: 16.1 %
MCH RBC QN AUTO: 31.6 PG (ref 26.5–33)
MCHC RBC AUTO-ENTMCNC: 32.7 G/DL (ref 31.5–36.5)
MCV RBC AUTO: 97 FL (ref 78–100)
MONOCYTES # BLD AUTO: 0.5 10E9/L (ref 0–1.3)
MONOCYTES NFR BLD AUTO: 7.4 %
NEUTROPHILS # BLD AUTO: 4.9 10E9/L (ref 1.6–8.3)
NEUTROPHILS NFR BLD AUTO: 74 %
NRBC # BLD AUTO: 0 10*3/UL
NRBC BLD AUTO-RTO: 0 /100
PLATELET # BLD AUTO: 231 10E9/L (ref 150–450)
POTASSIUM SERPL-SCNC: 4.1 MMOL/L (ref 3.4–5.3)
PROT SERPL-MCNC: 7.1 G/DL (ref 6.8–8.8)
PTH-INTACT SERPL-MCNC: 69 PG/ML (ref 18–80)
RBC # BLD AUTO: 4.81 10E12/L (ref 3.8–5.2)
SODIUM SERPL-SCNC: 139 MMOL/L (ref 133–144)
VA-%PRED-PRE: 80 %
VA-PRE: 3.74 L
VC-%PRED-PRE: 75 %
VC-PRE: 2.39 L
VC-PRED: 3.19 L
WBC # BLD AUTO: 6.7 10E9/L (ref 4–11)

## 2019-07-03 PROCEDURE — 83970 ASSAY OF PARATHORMONE: CPT | Performed by: INTERNAL MEDICINE

## 2019-07-03 PROCEDURE — 82652 VIT D 1 25-DIHYDROXY: CPT | Performed by: INTERNAL MEDICINE

## 2019-07-03 PROCEDURE — 80048 BASIC METABOLIC PNL TOTAL CA: CPT | Performed by: INTERNAL MEDICINE

## 2019-07-03 PROCEDURE — 85025 COMPLETE CBC W/AUTO DIFF WBC: CPT | Performed by: INTERNAL MEDICINE

## 2019-07-03 PROCEDURE — 83520 IMMUNOASSAY QUANT NOS NONAB: CPT | Performed by: INTERNAL MEDICINE

## 2019-07-03 PROCEDURE — 80076 HEPATIC FUNCTION PANEL: CPT | Performed by: INTERNAL MEDICINE

## 2019-07-03 PROCEDURE — 82306 VITAMIN D 25 HYDROXY: CPT | Performed by: INTERNAL MEDICINE

## 2019-07-03 PROCEDURE — 36415 COLL VENOUS BLD VENIPUNCTURE: CPT | Performed by: INTERNAL MEDICINE

## 2019-07-03 PROCEDURE — G0463 HOSPITAL OUTPT CLINIC VISIT: HCPCS | Mod: ZF

## 2019-07-03 ASSESSMENT — PAIN SCALES - GENERAL: PAINLEVEL: NO PAIN (0)

## 2019-07-03 ASSESSMENT — MIFFLIN-ST. JEOR: SCORE: 1111.46

## 2019-07-03 NOTE — NURSING NOTE
Chief Complaint   Patient presents with     Interstitial Lung Disease (ILD)     3 month follow up      Joi Zapata CMA  Pt has not had mammogram done yet, contact information has been added to  AVS

## 2019-07-03 NOTE — PROGRESS NOTES
Reason for Visit  Judith Nelson is a 59 year old year old female who is being seen for Interstitial Lung Disease (ILD) (3 month follow up )    Pulmonary HPI    The patient was seen and examined by Gael Flaherty     Ms. Nelson comes in for followup.  She was last in the Pulmonary Clinic in 12/2018 at which time she was on Imuran 50 mg per day for her sarcoidosis with primarily renal involvement.  There is also a question of group 5 versus other pulmonary hypertension and she is not a candidate for systemic therapy for pulmonary hypertension (secondary to social situation, mental health, behavioral health issues).      She closes comes in today with 1 inpatient admission for auditory hallucinations.  She was started on Zyprexa for this.  She reports no other new symptoms.  She tells me that she has put on a significant amount of weight since she started the Zyprexa.      Denies any cough or sputum production, orthopnea or PND.       She also reports intermittent use of oxygent. She is adherent to supplemental oxygen therpay at night but does not use it when travelling.    Lifelong non-smoker and no seasonal allergies.        Current Outpatient Medications   Medication     acetaminophen (TYLENOL) 500 MG tablet     azaTHIOprine (IMURAN) 50 MG tablet     BUSPIRONE HCL PO     calcitRIOL (ROCALTROL) 0.25 MCG capsule     carvedilol (COREG) 3.125 MG tablet     FLUoxetine (PROZAC) 20 MG capsule     furosemide (LASIX) 20 MG tablet     levalbuterol (XOPENEX HFA) 45 MCG/ACT inhaler     LORazepam (ATIVAN) 0.5 MG tablet     macitentan (OPSUMIT) 10 MG tablet     OLANZapine (ZYPREXA) 2.5 MG tablet     order for DME     ORDER FOR DME     potassium chloride ER (K-DUR/KLOR-CON M) 10 MEQ CR tablet     selexipag (UPTRAVI) 1600 MCG tablet     simvastatin (ZOCOR) 10 MG tablet     tadalafil, PAH, (ADCIRCA) 20 MG TABS     Cholecalciferol (VITAMIN D PO)     ranitidine (RANITIDINE) 75 MG tablet     SODIUM BICARBONATE PO     No current  facility-administered medications for this visit.      Allergies   Allergen Reactions     Dilaudid [Hydromorphone] Nausea and Vomiting     Morphine Nausea and Vomiting     Latex Rash     From gloves     Past Medical History:   Diagnosis Date     Anxiety      CKD (chronic kidney disease), stage III (H)     biopsy suspicious for renal sarcoidosis      Hyperprolactinemia (H)      Lower extremity edema     chronic w/ chronic right ankle ulcer     Lumbar compression fracture (H)      Major depressive disorder     with psychotic features, followed by Dr. Rosales at [facility] in [facility]     patient is post menopausal     MGUS (monoclonal gammopathy of unknown significance)     mild, followed by Dr. Long     Osteoporosis      Other seborrheic keratosis      Protrusio acetabuli      Pulmonary hypertension (H)      Sarcoidosis      Stress-induced cardiomyopathy        Past Surgical History:   Procedure Laterality Date     C PELVIS/HIP JOINT SURGERY UNLISTED       Csection,  X 2       CV RIGHT HEART CATH N/A 2019    Procedure: 1130 RHC;  Surgeon: Jeanne Angel MD;  Location:  HEART CARDIAC CATH LAB     CYSTOSCOPY, RETROGRADES, INSERT STENT URETER(S), COMBINED  10/2/2013    Procedure: COMBINED CYSTOSCOPY, RETROGRADES, INSERT STENT URETER(S);  Left Retrograde Ureteropyelogram and Ureteral Stent Placement;  Surgeon: SONIA Martinez MD;  Location: [facility] OR     ENDOSCOPIC RETROGRADE CHOLANGIOPANCREATOGRAM  2012    Procedure:ENDOSCOPIC RETROGRADE CHOLANGIOPANCREATOGRAM; ERCP biliary sphincterotomy and stone removal; Surgeon:MARGIE RUGGIEROIQ; Location: OR     LAPAROSCOPIC CHOLECYSTECTOMY WITH CHOLANGIOGRAMS  2012    Procedure:LAPAROSCOPIC CHOLECYSTECTOMY WITH CHOLANGIOGRAMS; Surgeon:BRIANA PADILLA; Location:[facility] OR     ORTHOPEDIC SURGERY  10/1/2010    Right hip replacement     ZZ GASTROSCOPY,FL  6/10    Erythematous duodenopathy       Social History     Socioeconomic  "History     Marital status:      Spouse name: Not on file     Number of children: 3     Years of education: 12     Highest education level: Not on file   Occupational History     Employer: UNEMPLOYED   Social Needs     Financial resource strain: Not on file     Food insecurity:     Worry: Not on file     Inability: Not on file     Transportation needs:     Medical: Not on file     Non-medical: Not on file   Tobacco Use     Smoking status: Never Smoker     Smokeless tobacco: Never Used   Substance and Sexual Activity     Alcohol use: No     Alcohol/week: 0.0 oz     Comment: Occ.     Drug use: No     Sexual activity: Never     Birth control/protection: Spermicide   Lifestyle     Physical activity:     Days per week: Not on file     Minutes per session: Not on file     Stress: Not on file   Relationships     Social connections:     Talks on phone: Not on file     Gets together: Not on file     Attends Sabianism service: Not on file     Active member of club or organization: Not on file     Attends meetings of clubs or organizations: Not on file     Relationship status: Not on file     Intimate partner violence:     Fear of current or ex partner: Not on file     Emotionally abused: Not on file     Physically abused: Not on file     Forced sexual activity: Not on file   Other Topics Concern     Parent/sibling w/ CABG, MI or angioplasty before 65F 55M? No   Social History Narrative     Not on file       ROS Pulmonary    A complete ROS was otherwise negative except as noted in the HPI.  /70 (BP Location: Right arm, Patient Position: Chair, Cuff Size: Adult Regular)   Pulse 80   Resp 16   Ht 1.6 m (5' 2.99\")   Wt 56.2 kg (124 lb)   SpO2 97%   BMI 21.97 kg/m    Exam:   GENERAL APPEARANCE: Alert, and in no apparent distress.  EYES: PERRL, EOMI  HENT: Nasal mucosa with no edema and no hyperemia. No nasal polyps.  MOUTH: Oral mucosa is moist, without any lesions, no tonsillar enlargement, no oropharyngeal " exudate.  NECK: supple, no masses, no thyromegaly.  LYMPHATICS: No significant axillary, cervical, or supraclavicular nodes.  RESP: normal percussion, good air flow throughout.  No crackles. No rhonchi. No wheezes.  CV: Normal S1, S2, regular rhythm, normal rate. No murmur.  No rub. No gallop. No LE edema.   ABDOMEN:  Bowel sounds normal, soft, nontender, no HSM or masses.   MS: extremities normal. No clubbing. No cyanosis.  SKIN: no rash on limited exam  NEURO: Mentation intact, speech normal, normal strength and tone, normal gait and stance  Results:  PFTs done today were reviewed by me with the patient.  She also had a 6-minute walk test done where she walked 300 feet on room air and had a decrease in O2 saturation from 93% to 87%.  She was started on 2 liters nasal cannula oxygen supplementation and walked a total distance of 109 feet, which is below the lower limit of normal (1415 feet).  O2 saturation at the beginning of the walk with 2 liters nasal cannula supplementation was 96 and dropped to 90%.  FVC 2.42 liters, which is 84% of predicted.  FEV1 is 1.64, which is 71% of predicted.  The ratio of 64.  DLCO not corrected for hemoglobin is 15.48 which is 78% of predicted.  My interpretation is that she has mild obstruction with mildly decreased diffusion capacity and exercise limitation with exertional hypoxia.  In comparison to prior spirometry, the FVC is decreased by 250 mL and the FEV1 has decreased by 160 mL.     Assessment and plan:  Ms. Nelson is a 59-year-old female with sarcoidosis based on granulomatous inflammation on a kidney biopsy and hypercalcemia, minimal pulmonary disease but evidence of pulmonary hypertension with near-systemic pressures currently on systemic anti-inflammatory therapy with Imuran 50 mg per day, selexipag, tadalafil and macitentan for pulmonary hypertension.   1.  Pulmonary sarcoidosis.  She does have evidence of obstructive airways involvement today.  There is no prior  cigarette smoke exposure or history of asthma, not sure if there is an alternate explanation of the pulmonary involvement.  Concern is airway involvement by sarcoid.  She is only on p.r.n.  Xopenex currently, and I am adding inhaled corticosteroids and LABA to her regimen.  She is somewhat reluctant to use any other medications, but is agreeable to pursue a trial.   2.  Extrapulmonary sarcoidosis with evidence of renal disease.  So has a poor hypertension, SAPH versus others on triple therapy.  She follows with Ophthalmology outside the [facility] system.  We will check metabolic labs and CBC today.   3.  Drug monitoring, LFTs and CBC for following up on Imuran.   4.  Cardiac MRI was conducted 11/01/2017.  No LGE to suggest cardiac disease.   5.  The patient is willing to participate in pulmonary rehabilitation.      I will see her back in 3-6 months.

## 2019-07-03 NOTE — LETTER
7/3/2019       RE: Judith Nelson  [address] [facility] [address]     Dear Colleague,    Thank you for referring your patient, Judith Nelson, to the [facility] at [facility]. Please see a copy of my visit note below.    Reason for Visit  Judith Nelson is a 59 year old year old female who is being seen for Interstitial Lung Disease (ILD) (3 month follow up )    Pulmonary HPI    The patient was seen and examined by Gael Flaherty     Ms. Nelson comes in for followup.  She was last in the Pulmonary Clinic in 12/2018 at which time she was on Imuran 50 mg per day for her sarcoidosis with primarily renal involvement.  There is also a question of group 5 versus other pulmonary hypertension and she is not a candidate for systemic therapy for pulmonary hypertension (secondary to social situation, mental health, behavioral health issues).      She closes comes in today with 1 inpatient admission for auditory hallucinations.  She was started on Zyprexa for this.  She reports no other new symptoms.  She tells me that she has put on a significant amount of weight since she started the Zyprexa.      Denies any cough or sputum production, orthopnea or PND.       She also reports intermittent use of oxygent. She is adherent to supplemental oxygen therpay at night but does not use it when travelling.    Lifelong non-smoker and no seasonal allergies.        Current Outpatient Medications   Medication     acetaminophen (TYLENOL) 500 MG tablet     azaTHIOprine (IMURAN) 50 MG tablet     BUSPIRONE HCL PO     calcitRIOL (ROCALTROL) 0.25 MCG capsule     carvedilol (COREG) 3.125 MG tablet     FLUoxetine (PROZAC) 20 MG capsule     furosemide (LASIX) 20 MG tablet     levalbuterol (XOPENEX HFA) 45 MCG/ACT inhaler     LORazepam (ATIVAN) 0.5 MG tablet     macitentan (OPSUMIT) 10 MG tablet     OLANZapine (ZYPREXA) 2.5 MG tablet     order for DME      ORDER FOR DME     potassium chloride ER (K-DUR/KLOR-CON M) 10 MEQ CR tablet     selexipag (UPTRAVI) 1600 MCG tablet     simvastatin (ZOCOR) 10 MG tablet     tadalafil, PAH, (ADCIRCA) 20 MG TABS     Cholecalciferol (VITAMIN D PO)     ranitidine (RANITIDINE) 75 MG tablet     SODIUM BICARBONATE PO     No current facility-administered medications for this visit.      Allergies   Allergen Reactions     Dilaudid [Hydromorphone] Nausea and Vomiting     Morphine Nausea and Vomiting     Latex Rash     From gloves     Past Medical History:   Diagnosis Date     Anxiety      CKD (chronic kidney disease), stage III (H)     biopsy suspicious for renal sarcoidosis      Hyperprolactinemia (H)      Lower extremity edema     chronic w/ chronic right ankle ulcer     Lumbar compression fracture (H)      Major depressive disorder     with psychotic features, followed by Dr. Rosales at [facility] in [place]     Menopause     patient is post menopausal     MGUS (monoclonal gammopathy of unknown significance)     mild, followed by Dr. Long     Osteoporosis      Other seborrheic keratosis      Protrusio acetabuli      Pulmonary hypertension (H)      Sarcoidosis      Stress-induced cardiomyopathy        Past Surgical History:   Procedure Laterality Date     C PELVIS/HIP JOINT SURGERY UNLISTED       Csection,  X 2       CV RIGHT HEART CATH N/A 2019    Procedure: 1130 RHC;  Surgeon: Jeanne Angel MD;  Location:  HEART CARDIAC CATH LAB     CYSTOSCOPY, RETROGRADES, INSERT STENT URETER(S), COMBINED  10/2/2013    Procedure: COMBINED CYSTOSCOPY, RETROGRADES, INSERT STENT URETER(S);  Left Retrograde Ureteropyelogram and Ureteral Stent Placement;  Surgeon: SONIA Martinez MD;  Location: [facility] OR     ENDOSCOPIC RETROGRADE CHOLANGIOPANCREATOGRAM  2012    Procedure:ENDOSCOPIC RETROGRADE CHOLANGIOPANCREATOGRAM; ERCP biliary sphincterotomy and stone removal; Surgeon:MARGIE RUGGIEROIQ; Location:[facility]     LAPAROSCOPIC  CHOLECYSTECTOMY WITH CHOLANGIOGRAMS  1/21/2012    Procedure:LAPAROSCOPIC CHOLECYSTECTOMY WITH CHOLANGIOGRAMS; Surgeon:BRIANA PADILLA; Location:[facility]     ORTHOPEDIC SURGERY  10/1/2010    Right hip replacement     ZZ GASTROSCOPY,[place]  6/10    Erythematous duodenopathy       Social History     Socioeconomic History     Marital status:      Spouse name: Not on file     Number of children: 3     Years of education: 12     Highest education level: Not on file   Occupational History     Employer: UNEMPLOYED   Social Needs     Financial resource strain: Not on file     Food insecurity:     Worry: Not on file     Inability: Not on file     Transportation needs:     Medical: Not on file     Non-medical: Not on file   Tobacco Use     Smoking status: Never Smoker     Smokeless tobacco: Never Used   Substance and Sexual Activity     Alcohol use: No     Alcohol/week: 0.0 oz     Comment: Occ.     Drug use: No     Sexual activity: Never     Birth control/protection: Spermicide   Lifestyle     Physical activity:     Days per week: Not on file     Minutes per session: Not on file     Stress: Not on file   Relationships     Social connections:     Talks on phone: Not on file     Gets together: Not on file     Attends Buddhist service: Not on file     Active member of club or organization: Not on file     Attends meetings of clubs or organizations: Not on file     Relationship status: Not on file     Intimate partner violence:     Fear of current or ex partner: Not on file     Emotionally abused: Not on file     Physically abused: Not on file     Forced sexual activity: Not on file   Other Topics Concern     Parent/sibling w/ CABG, MI or angioplasty before 65F 55M? No   Social History Narrative     Not on file       ROS Pulmonary    A complete ROS was otherwise negative except as noted in the HPI.  /70 (BP Location: Right arm, Patient Position: Chair, Cuff Size: Adult Regular)   Pulse 80   Resp 16   Ht 1.6 m (5'  "2.99\")   Wt 56.2 kg (124 lb)   SpO2 97%   BMI 21.97 kg/m     Exam:   GENERAL APPEARANCE: Alert, and in no apparent distress.  EYES: PERRL, EOMI  HENT: Nasal mucosa with no edema and no hyperemia. No nasal polyps.  MOUTH: Oral mucosa is moist, without any lesions, no tonsillar enlargement, no oropharyngeal exudate.  NECK: supple, no masses, no thyromegaly.  LYMPHATICS: No significant axillary, cervical, or supraclavicular nodes.  RESP: normal percussion, good air flow throughout.  No crackles. No rhonchi. No wheezes.  CV: Normal S1, S2, regular rhythm, normal rate. No murmur.  No rub. No gallop. No LE edema.   ABDOMEN:  Bowel sounds normal, soft, nontender, no HSM or masses.   MS: extremities normal. No clubbing. No cyanosis.  SKIN: no rash on limited exam  NEURO: Mentation intact, speech normal, normal strength and tone, normal gait and stance  Results:  PFTs done today were reviewed by me with the patient.  She also had a 6-minute walk test done where she walked 300 feet on room air and had a decrease in O2 saturation from 93% to 87%.  She was started on 2 liters nasal cannula oxygen supplementation and walked a total distance of 109 feet, which is below the lower limit of normal (1415 feet).  O2 saturation at the beginning of the walk with 2 liters nasal cannula supplementation was 96 and dropped to 90%.  FVC 2.42 liters, which is 84% of predicted.  FEV1 is 1.64, which is 71% of predicted.  The ratio of 64.  DLCO not corrected for hemoglobin is 15.48 which is 78% of predicted.  My interpretation is that she has mild obstruction with mildly decreased diffusion capacity and exercise limitation with exertional hypoxia.  In comparison to prior spirometry, the FVC is decreased by 250 mL and the FEV1 has decreased by 160 mL.     Assessment and plan:  Ms. Nelson is a 59-year-old female with sarcoidosis based on granulomatous inflammation on a kidney biopsy and hypercalcemia, minimal pulmonary disease but evidence of " pulmonary hypertension with near-systemic pressures currently on systemic anti-inflammatory therapy with Imuran 50 mg per day, selexipag, tadalafil and macitentan for pulmonary hypertension.   1.  Pulmonary sarcoidosis.  She does have evidence of obstructive airways involvement today.  There is no prior cigarette smoke exposure or history of asthma, not sure if there is an alternate explanation of the pulmonary involvement.  Concern is airway involvement by sarcoid.  She is only on p.r.n.  Xopenex currently, and I am adding inhaled corticosteroids and LABA to her regimen.  She is somewhat reluctant to use any other medications, but is agreeable to pursue a trial.   2.  Extrapulmonary sarcoidosis with evidence of renal disease.  So has a poor hypertension, SAPH versus others on triple therapy.  She follows with Ophthalmology [facility] the [facility].  We will check metabolic labs and CBC today.   3.  Drug monitoring, LFTs and CBC for following up on Imuran.   4.  Cardiac MRI was conducted 11/01/2017.  No LGE to suggest cardiac disease.   5.  The patient is willing to participate in pulmonary rehabilitation.      I will see her back in 3-6 months.     Again, thank you for allowing me to participate in the care of your patient.      Sincerely,    Gael Flaherty MD

## 2019-07-03 NOTE — PATIENT INSTRUCTIONS
Preventive Care:     Breast Cancer Screening: During our visit today, we discussed that it is recommended you receive breast cancer screening. Please call or make an appointment with your primary care provider to discuss this with them. You may also call the [facility] scheduling line ([phone]) to set up a mammography appointment at the [facility] within the [facility].

## 2019-07-05 LAB — DEPRECATED CALCIDIOL+CALCIFEROL SERPL-MC: 38 UG/L (ref 20–75)

## 2019-07-10 LAB
1,25(OH)2D SERPL-MCNC: 53.1 PG/ML (ref 19.9–79.3)
LAB SCANNED RESULT: NORMAL

## 2019-07-17 ENCOUNTER — HOSPITAL ENCOUNTER (EMERGENCY)
Facility: CLINIC | Age: 59
Discharge: LEFT BEFORE DISCHARGE | End: 2019-07-17
Payer: MEDICARE

## 2019-07-17 VITALS
SYSTOLIC BLOOD PRESSURE: 131 MMHG | DIASTOLIC BLOOD PRESSURE: 76 MMHG | OXYGEN SATURATION: 93 % | HEART RATE: 70 BPM | RESPIRATION RATE: 16 BRPM

## 2019-08-19 NOTE — TELEPHONE ENCOUNTER
Spoke with patient on Friday who stated that she received some sort of denial letter from [employer] for her Uptravi and Opsumit Patient Assistance Application.      Pt stated that she has refilled her medication in July and August. Asked the pt if she confirms if there is a co-pay amount before having the medication shipped.  Pt states that she has asked the pharmacy. Pt states that she is afraid she may have a co-pay because when she started the medication, she was told that the co-pay of her medication was about $35.00.    Called [employer] and spoke with Vandana who stated that the pt's application for patient assistance for the Opsumit and Uptravi was not denied, but closed.  Vandana stated that the medication's co-pay is $0.00 and that the medication is either covered through pt's insurance or a gamaliel.     Contacted [employer] to confirm if a gamaliel was covering the pt's co-pay cost. Spoke with Sally who stated that there is no gamaliel on file, so the insurance is probably covering the co-pay cost of the medications as she does see a $0.00 co-pay.     Contacted pt to inform her of the outcome of the conversation with [employer] and [facility]. Told pt to contact her Medicare advocate to make sure that she has the same information.  Pt stated she will consider calling her Medicare advocate.      Also told pt to always ask about co-pay when her medications are being shipped out just to make sure that it is still $0.00 co-pay.

## 2019-08-22 DIAGNOSIS — I27.0 PRIMARY PULMONARY HYPERTENSION (H): Primary | ICD-10-CM

## 2019-08-22 NOTE — TELEPHONE ENCOUNTER
Medication Refill double check:    Last office visit was on 6/11/19 with Korin Hirsch CNP.    Follow up was recommended for 3 months.    Any additional encounters with changes to requested med?     Authorizing provider is: Dr. Joan Smith was approved.     Additional orders/notes:       Keyanna Mccray RN on 8/22/2019 at 10:50 AM

## 2019-09-11 ENCOUNTER — OFFICE VISIT (OUTPATIENT)
Dept: CARDIOLOGY | Facility: CLINIC | Age: 59
End: 2019-09-11
Attending: NURSE PRACTITIONER
Payer: MEDICARE

## 2019-09-11 VITALS
OXYGEN SATURATION: 92 % | BODY MASS INDEX: 22.48 KG/M2 | WEIGHT: 126.9 LBS | DIASTOLIC BLOOD PRESSURE: 70 MMHG | HEART RATE: 72 BPM | SYSTOLIC BLOOD PRESSURE: 112 MMHG | HEIGHT: 63 IN

## 2019-09-11 DIAGNOSIS — R06.02 SOB (SHORTNESS OF BREATH): ICD-10-CM

## 2019-09-11 DIAGNOSIS — I27.20 PULMONARY HYPERTENSION (H): Primary | ICD-10-CM

## 2019-09-11 DIAGNOSIS — I27.20 PULMONARY HYPERTENSION (H): ICD-10-CM

## 2019-09-11 LAB
ANION GAP SERPL CALCULATED.3IONS-SCNC: 10 MMOL/L (ref 3–14)
BUN SERPL-MCNC: 13 MG/DL (ref 7–30)
CALCIUM SERPL-MCNC: 9.6 MG/DL (ref 8.5–10.1)
CHLORIDE SERPL-SCNC: 108 MMOL/L (ref 94–109)
CO2 SERPL-SCNC: 20 MMOL/L (ref 20–32)
CREAT SERPL-MCNC: 1.26 MG/DL (ref 0.52–1.04)
ERYTHROCYTE [DISTWIDTH] IN BLOOD BY AUTOMATED COUNT: 12.7 % (ref 10–15)
GFR SERPL CREATININE-BSD FRML MDRD: 47 ML/MIN/{1.73_M2}
GLUCOSE SERPL-MCNC: 118 MG/DL (ref 70–99)
HCT VFR BLD AUTO: 44.1 % (ref 35–47)
HGB BLD-MCNC: 14.7 G/DL (ref 11.7–15.7)
MCH RBC QN AUTO: 31.8 PG (ref 26.5–33)
MCHC RBC AUTO-ENTMCNC: 33.3 G/DL (ref 31.5–36.5)
MCV RBC AUTO: 96 FL (ref 78–100)
NT-PROBNP SERPL-MCNC: 583 PG/ML (ref 0–125)
PLATELET # BLD AUTO: 168 10E9/L (ref 150–450)
POTASSIUM SERPL-SCNC: 3.8 MMOL/L (ref 3.4–5.3)
RBC # BLD AUTO: 4.62 10E12/L (ref 3.8–5.2)
SODIUM SERPL-SCNC: 138 MMOL/L (ref 133–144)
WBC # BLD AUTO: 5.3 10E9/L (ref 4–11)

## 2019-09-11 PROCEDURE — G0463 HOSPITAL OUTPT CLINIC VISIT: HCPCS | Mod: ZF

## 2019-09-11 PROCEDURE — 85027 COMPLETE CBC AUTOMATED: CPT | Performed by: NURSE PRACTITIONER

## 2019-09-11 PROCEDURE — 83880 ASSAY OF NATRIURETIC PEPTIDE: CPT | Performed by: NURSE PRACTITIONER

## 2019-09-11 PROCEDURE — 36415 COLL VENOUS BLD VENIPUNCTURE: CPT | Performed by: NURSE PRACTITIONER

## 2019-09-11 PROCEDURE — 80048 BASIC METABOLIC PNL TOTAL CA: CPT | Performed by: NURSE PRACTITIONER

## 2019-09-11 PROCEDURE — 99215 OFFICE O/P EST HI 40 MIN: CPT | Mod: ZP | Performed by: INTERNAL MEDICINE

## 2019-09-11 RX ORDER — BUSPIRONE HYDROCHLORIDE 15 MG/1
15 TABLET ORAL DAILY
COMMUNITY
Start: 2019-06-16 | End: 2019-09-11

## 2019-09-11 RX ORDER — POTASSIUM CITRATE 10 MEQ/1
10 TABLET, EXTENDED RELEASE ORAL
COMMUNITY

## 2019-09-11 RX ORDER — LAMOTRIGINE 25 MG/1
100 TABLET ORAL DAILY
COMMUNITY

## 2019-09-11 ASSESSMENT — MIFFLIN-ST. JEOR: SCORE: 1124.74

## 2019-09-11 ASSESSMENT — PAIN SCALES - GENERAL: PAINLEVEL: NO PAIN (0)

## 2019-09-11 NOTE — PATIENT INSTRUCTIONS
Medication Changes:  No medication changes at this time. Please continue current medication regiment.    Patient Instructions:  1. Continue staying active and eat a heart healthy diet.    2. Please keep current list of medications with you at all times.    3. Remember to weigh yourself daily after voiding and before you consume any food or beverages and log the numbers.  If you have gained 2 pounds overnight or 5 pounds in a week contact us immediately for medication adjustments or further instructions.    4. **Please call us immediately if you have any syncope (fainting or passing out), chest pain, edema (swelling or weight gain), or decline in your functional status (general decline in how you are feeling).    Check-In  Time Check-In Location Estimated Length Procedure   Name     March 2020    60 minutes Echocardiogram (Echo)**   Procedure Preparations & Instructions     This is a non-invasive procedure and does NOT require any preparation         Check-In  Time Check-In Location Estimated Length Procedure   Name     March 2020   [facility]   3rd Floor 30 minutes Six Minute Walk Test**   Procedure Preparations & Instructions     This is a non-invasive procedure and does NOT require any preparation       Follow up Appointment Information:  -follow up in March 2020 with Echo, 6mwt and labs prior    Results:  Component      Latest Ref Rng & Units 9/11/2019   Sodium      133 - 144 mmol/L 138   Potassium      3.4 - 5.3 mmol/L 3.8   Chloride      94 - 109 mmol/L 108   Carbon Dioxide      20 - 32 mmol/L 20   Anion Gap      3 - 14 mmol/L 10   Glucose      70 - 99 mg/dL 118 (H)   Urea Nitrogen      7 - 30 mg/dL 13   Creatinine      0.52 - 1.04 mg/dL 1.26 (H)   GFR Estimate      >60 mL/min/1.73:m2 47 (L)   GFR Estimate If Black      >60 mL/min/1.73:m2 54 (L)   Calcium      8.5 - 10.1 mg/dL 9.6   WBC      4.0 - 11.0 10e9/L 5.3   RBC Count      3.8 - 5.2 10e12/L 4.62   Hemoglobin      11.7 - 15.7 g/dL 14.7   Hematocrit      35.0 -  47.0 % 44.1   MCV      78 - 100 fl 96   MCH      26.5 - 33.0 pg 31.8   MCHC      31.5 - 36.5 g/dL 33.3   RDW      10.0 - 15.0 % 12.7   Platelet Count      150 - 450 10e9/L 168   N-Terminal Pro Bnp      0 - 125 pg/mL 583 (H)     We are located on the third floor of the [facility] on the [facility].  Our address is     [address] [place] [address] on [address]rd [place] [address]    Thank you for allowing us to be a part of your care here at the [facility]    If you have questions or concerns please contact us at:    Keyanna Mccray, RN, BSN, PHN Keisha Albarran (Schedule,Prior Auth)  Nurse Coordinator     Clinic   Pulmonary Hypertension   Pulmonary Hypertension  [facility]  (Phone)[phone]   (Phone) [phone]        (Fax) [phone]    ** Please note that you will NOT receive a reminder call regarding your scheduled testing, reminder calls are for provider appointments only.  If you are scheduled for testing within the [employer] system you may receive a call regarding pre-registration for billing purposes only.**     Remember to weigh yourself daily after voiding and before you consume any food or beverages and log the numbers.  If you have gained/lost 2 pounds overnight or 5 pounds in a week contact us immediately for medication adjustments or further instructions.   **Please call us immediately if you have any syncope, chest pain, edema, or decline in your functional status.    Support Group:  Pulmonary Hypertension Association  Https://www.phassociation.org/  **Look at the Events Tab** They even have Support Groups that you can call into    [facility] Support Group  Second Saturday of the Month from 1-3 PM   Location: [address] [place] [address]  Leader: Addie Sanchez and Mendy Galo  Phone: [phone] or [phone]  Email:  [email or web address]

## 2019-09-11 NOTE — NURSING NOTE
Procedures and/or Testing: Patient given instructions regarding  Echocardiogram,  6 minute walk test, . Discussed purpose, preparation, procedure and when to expect results reported back to the patient. Patient demonstrated understanding of this information and agreed to call with further questions or concerns.    Med Reconcile: Reviewed and verified all current medications with the patient. The updated medication list was printed and given to the patient.    Labs: Patient was given results of the laboratory testing obtained today. Patient demonstrated understanding of this information and agreed to call with further questions or concerns.     Diet: Patient instructed regarding a heart healthy diet, including discussion of reduced fat and sodium intake. Patient demonstrated understanding of this information and agreed to call with further questions or concerns.    Return Appointment: Patient given instructions regarding scheduling next clinic visit. Patient demonstrated understanding of this information and agreed to call with further questions or concerns.    Patient stated she understood all health information given and agreed to call with further questions or concerns.    Medication Changes:  No medication changes at this time. Please continue current medication regiment.    Patient Instructions:  1. Continue staying active and eat a heart healthy diet.    2. Please keep current list of medications with you at all times.    3. Remember to weigh yourself daily after voiding and before you consume any food or beverages and log the numbers.  If you have gained 2 pounds overnight or 5 pounds in a week contact us immediately for medication adjustments or further instructions.    4. **Please call us immediately if you have any syncope (fainting or passing out), chest pain, edema (swelling or weight gain), or decline in your functional status (general decline in how you are feeling).    Check-In  Time Check-In Location  Estimated Length Procedure   Name     March 2020    60 minutes Echocardiogram (Echo)**   Procedure Preparations & Instructions     This is a non-invasive procedure and does NOT require any preparation         Check-In  Time Check-In Location Estimated Length Procedure   Name     March 2020   [facility]   3rd Floor 30 minutes Six Minute Walk Test**   Procedure Preparations & Instructions     This is a non-invasive procedure and does NOT require any preparation       Follow up Appointment Information:  -follow up in March 2020 with Echo, 6mwt and labs prior    Results:  Component      Latest Ref Rng & Units 9/11/2019   Sodium      133 - 144 mmol/L 138   Potassium      3.4 - 5.3 mmol/L 3.8   Chloride      94 - 109 mmol/L 108   Carbon Dioxide      20 - 32 mmol/L 20   Anion Gap      3 - 14 mmol/L 10   Glucose      70 - 99 mg/dL 118 (H)   Urea Nitrogen      7 - 30 mg/dL 13   Creatinine      0.52 - 1.04 mg/dL 1.26 (H)   GFR Estimate      >60 mL/min/1.73:m2 47 (L)   GFR Estimate If Black      >60 mL/min/1.73:m2 54 (L)   Calcium      8.5 - 10.1 mg/dL 9.6   WBC      4.0 - 11.0 10e9/L 5.3   RBC Count      3.8 - 5.2 10e12/L 4.62   Hemoglobin      11.7 - 15.7 g/dL 14.7   Hematocrit      35.0 - 47.0 % 44.1   MCV      78 - 100 fl 96   MCH      26.5 - 33.0 pg 31.8   MCHC      31.5 - 36.5 g/dL 33.3   RDW      10.0 - 15.0 % 12.7   Platelet Count      150 - 450 10e9/L 168   N-Terminal Pro Bnp      0 - 125 pg/mL 583 (H)     **patient was escorted to coordinator to make 6 mo. Appt. Keyanna Mccray RN on 9/11/2019 at 3:37 PM

## 2019-09-11 NOTE — LETTER
9/11/2019      RE: Judith Nelson  [address] [facility] [address]       Dear Colleague,    Thank you for the opportunity to participate in the care of your patient, Judith Nelson, at the [facility]. Please see a copy of my visit note below.    The patient returns for follow-  She has modest exertional shortness of breath but no signs of cor pulmonale despite very high pressures. Her thinking is quite flighty and skips from topic to topic, some not relevant to the issues surround our visit.  She is not actively hallucinating nor is she suicidally depressed.    Constitutional: weight gain, but nofever, chills, night sweats  HEENT: without visual changes, swallow difficulties  Pulmonary: with shortness of breath, but no cough, wheeze, hemoptysis  Cardiac: without chest pain, JUAN, PND, orthopnea, edema, palpitation, pre-syncope, syncope,  GI: without diarrhea, constipation, jaundice, melena, GERD, hematemesis  : without frequency, urgency, dysuria, hematuria  Skin: rash, bruise, open lesions  Neuro: without TIA, focal neurologic complaints, seizure, trauma  Ortho: without pain, swelling, mobility impairment  Endocrine: diabetes, thyroid, heat/cold intolerance, polyuria, polyphagia, change bowel habits.  Sleep:no IVETTE, periodic breathing, fatigue  Other: anxious regarding recent hospitalization for psychotic episodes     Constitutional: alert, oriented, normal gait and station, normal mentation.  Oral: moist mucous membranes  Lymph: without pathologic adenopathy  Chest: clear to ausculation and percussion  Cor: No evidence of left or right ventricular activity.  Rhythm is regular.  S1 normal, S2 split physiologically. Murmurs are not present  Abdomen: without tenderness, rebound, guarding, masses, ascites  Extremities: Edema not present  Neuro: no focal defects, normal mentation  Skin: without open lesions  Psych: oriented, verbal, pressured  speech, perseveration, not suicidal    Results for ANALI GÓMEZ (MRN [id number]) as of 9/11/2019 15:02   Ref. Range 9/11/2019 13:23   Sodium Latest Ref Range: 133 - 144 mmol/L 138   Potassium Latest Ref Range: 3.4 - 5.3 mmol/L 3.8   Chloride Latest Ref Range: 94 - 109 mmol/L 108   Carbon Dioxide Latest Ref Range: 20 - 32 mmol/L 20   Urea Nitrogen Latest Ref Range: 7 - 30 mg/dL 13   Creatinine Latest Ref Range: 0.52 - 1.04 mg/dL 1.26 (H)   GFR Estimate Latest Ref Range: >60 mL/min/1.73_m2 47 (L)   GFR Estimate If Black Latest Ref Range: >60 mL/min/1.73_m2 54 (L)   Calcium Latest Ref Range: 8.5 - 10.1 mg/dL 9.6   Anion Gap Latest Ref Range: 3 - 14 mmol/L 10   N-Terminal Pro Bnp Latest Ref Range: 0 - 125 pg/mL 583 (H)   Glucose Latest Ref Range: 70 - 99 mg/dL 118 (H)   WBC Latest Ref Range: 4.0 - 11.0 10e9/L 5.3   Hemoglobin Latest Ref Range: 11.7 - 15.7 g/dL 14.7   Hematocrit Latest Ref Range: 35.0 - 47.0 % 44.1   Platelet Count Latest Ref Range: 150 - 450 10e9/L 168   RBC Count Latest Ref Range: 3.8 - 5.2 10e12/L 4.62   MCV Latest Ref Range: 78 - 100 fl 96   MCH Latest Ref Range: 26.5 - 33.0 pg 31.8   MCHC Latest Ref Range: 31.5 - 36.5 g/dL 33.3   RDW Latest Ref Range: 10.0 - 15.0 % 12.7       Current Outpatient Medications   Medication     acetaminophen (TYLENOL) 500 MG tablet     azaTHIOprine (IMURAN) 50 MG tablet     BUSPIRONE HCL PO     calcitRIOL (ROCALTROL) 0.25 MCG capsule     carvedilol (COREG) 3.125 MG tablet     denosumab (PROLIA) 60 MG/ML SOSY injection     FLUoxetine (PROZAC) 20 MG capsule     fluticasone-salmeterol (ADVAIR) 250-50 MCG/DOSE inhaler     furosemide (LASIX) 20 MG tablet     lamoTRIgine (LAMICTAL) 25 MG tablet     LORazepam (ATIVAN) 0.5 MG tablet     macitentan (OPSUMIT) 10 MG tablet     OLANZapine (ZYPREXA) 2.5 MG tablet     potassium citrate (UROCIT-K) 10 MEQ (1080 MG) CR tablet     ranitidine (RANITIDINE) 75 MG tablet     selexipag (UPTRAVI) 1600 MCG tablet     simvastatin (ZOCOR) 10  MG tablet     tadalafil, PAH, (ADCIRCA) 20 MG TABS     order for DME     ORDER FOR DME     No current facility-administered medications for this visit.          She continues to have  pressures, seemingly unresponsive to current medical regimen.  We continue to have discussions regarding chronic parenteral therapy however, both family and my staff believe this is not feasible in view of her social support, mental illness issues    She is functionally improved but substantially worse in terms of her anxieties. She was encouraged to see her therapist.  She is not suicidal.      Please do not hesitate to contact me if you have any questions/concerns.     Sincerely,     Norm Franco MD

## 2019-09-11 NOTE — PROGRESS NOTES
The patient returns for follow-  She has modest exertional shortness of breath but no signs of cor pulmonale despite very high pressures. Her thinking is quite flighty and skips from topic to topic, some not relevant to the issues surround our visit.  She is not actively hallucinating nor is she suicidally depressed.    Constitutional: weight gain, but nofever, chills, night sweats  HEENT: without visual changes, swallow difficulties  Pulmonary: with shortness of breath, but no cough, wheeze, hemoptysis  Cardiac: without chest pain, JUAN, PND, orthopnea, edema, palpitation, pre-syncope, syncope,  GI: without diarrhea, constipation, jaundice, melena, GERD, hematemesis  : without frequency, urgency, dysuria, hematuria  Skin: rash, bruise, open lesions  Neuro: without TIA, focal neurologic complaints, seizure, trauma  Ortho: without pain, swelling, mobility impairment  Endocrine: diabetes, thyroid, heat/cold intolerance, polyuria, polyphagia, change bowel habits.  Sleep:no IVETTE, periodic breathing, fatigue  Other: anxious regarding recent hospitalization for psychotic episodes     Constitutional: alert, oriented, normal gait and station, normal mentation.  Oral: moist mucous membranes  Lymph: without pathologic adenopathy  Chest: clear to ausculation and percussion  Cor: No evidence of left or right ventricular activity.  Rhythm is regular.  S1 normal, S2 split physiologically. Murmurs are not present  Abdomen: without tenderness, rebound, guarding, masses, ascites  Extremities: Edema not present  Neuro: no focal defects, normal mentation  Skin: without open lesions  Psych: oriented, verbal, pressured speech, perseveration, not suicidal    Results for ANALI GÓMEZ (MRN [id number]) as of 9/11/2019 15:02   Ref. Range 9/11/2019 13:23   Sodium Latest Ref Range: 133 - 144 mmol/L 138   Potassium Latest Ref Range: 3.4 - 5.3 mmol/L 3.8   Chloride Latest Ref Range: 94 - 109 mmol/L 108   Carbon Dioxide Latest Ref Range: 20  - 32 mmol/L 20   Urea Nitrogen Latest Ref Range: 7 - 30 mg/dL 13   Creatinine Latest Ref Range: 0.52 - 1.04 mg/dL 1.26 (H)   GFR Estimate Latest Ref Range: >60 mL/min/1.73_m2 47 (L)   GFR Estimate If Black Latest Ref Range: >60 mL/min/1.73_m2 54 (L)   Calcium Latest Ref Range: 8.5 - 10.1 mg/dL 9.6   Anion Gap Latest Ref Range: 3 - 14 mmol/L 10   N-Terminal Pro Bnp Latest Ref Range: 0 - 125 pg/mL 583 (H)   Glucose Latest Ref Range: 70 - 99 mg/dL 118 (H)   WBC Latest Ref Range: 4.0 - 11.0 10e9/L 5.3   Hemoglobin Latest Ref Range: 11.7 - 15.7 g/dL 14.7   Hematocrit Latest Ref Range: 35.0 - 47.0 % 44.1   Platelet Count Latest Ref Range: 150 - 450 10e9/L 168   RBC Count Latest Ref Range: 3.8 - 5.2 10e12/L 4.62   MCV Latest Ref Range: 78 - 100 fl 96   MCH Latest Ref Range: 26.5 - 33.0 pg 31.8   MCHC Latest Ref Range: 31.5 - 36.5 g/dL 33.3   RDW Latest Ref Range: 10.0 - 15.0 % 12.7       Current Outpatient Medications   Medication     acetaminophen (TYLENOL) 500 MG tablet     azaTHIOprine (IMURAN) 50 MG tablet     BUSPIRONE HCL PO     calcitRIOL (ROCALTROL) 0.25 MCG capsule     carvedilol (COREG) 3.125 MG tablet     denosumab (PROLIA) 60 MG/ML SOSY injection     FLUoxetine (PROZAC) 20 MG capsule     fluticasone-salmeterol (ADVAIR) 250-50 MCG/DOSE inhaler     furosemide (LASIX) 20 MG tablet     lamoTRIgine (LAMICTAL) 25 MG tablet     LORazepam (ATIVAN) 0.5 MG tablet     macitentan (OPSUMIT) 10 MG tablet     OLANZapine (ZYPREXA) 2.5 MG tablet     potassium citrate (UROCIT-K) 10 MEQ (1080 MG) CR tablet     ranitidine (RANITIDINE) 75 MG tablet     selexipag (UPTRAVI) 1600 MCG tablet     simvastatin (ZOCOR) 10 MG tablet     tadalafil, PAH, (ADCIRCA) 20 MG TABS     order for DME     ORDER FOR DME     No current facility-administered medications for this visit.          She continues to have  pressures, seemingly unresponsive to current medical regimen.  We continue to have discussions regarding chronic parenteral therapy  however, both family and my staff believe this is not feasible in view of her social support, mental illness issues    She is functionally improved but substantially worse in terms of her anxieties. She was encouraged to see her therapist.  She is not suicidal.        Norm Franco

## 2019-09-12 DIAGNOSIS — D86.9 SARCOIDOSIS: ICD-10-CM

## 2019-09-27 ENCOUNTER — TRANSFERRED RECORDS (OUTPATIENT)
Dept: HEALTH INFORMATION MANAGEMENT | Facility: CLINIC | Age: 59
End: 2019-09-27

## 2019-10-08 ENCOUNTER — PATIENT OUTREACH (OUTPATIENT)
Dept: PULMONOLOGY | Facility: CLINIC | Age: 59
End: 2019-10-08

## 2019-10-08 NOTE — PROGRESS NOTES
[facility]:  Care Coordination Note     SITUATION   Judith Nelson is a 59 year old female who is receiving support for:  Medication Question (oxygen)  .    BACKGROUND     Has home oxygen, demonstrates need for wearing 2 LPM with activity per 6 MWT.    ASSESSMENT     Called to say her O2 sats were 76% on room air yesterday. Endorses that she does not wear supplemental oxygen regularly, but put on 2 LPM after seeing her low O2 sat. Concerned about the drop.      PLAN          Nursing Interventions: Educated and reinforced the need for pt to be wearing supplemental oxygen with activity.     Follow-up plan:  Pt emotional at the thought of wearing oxygen but verbalized understanding that she should. Pt will make effort to incorporate wearing oxygen more with activity.        Rosamaria Mendoza, RN, BSN

## 2019-10-22 DIAGNOSIS — D64.9 ANEMIA, UNSPECIFIED TYPE: ICD-10-CM

## 2019-10-22 DIAGNOSIS — I27.20 PULMONARY HYPERTENSION (H): Primary | ICD-10-CM

## 2019-10-22 NOTE — LETTER
October 22, 2019      TO: Judith Nelson  [address] [place] [id number]         Dear Judith,    As of this date we have not received your monthly liver function test and hemoglobin results for the month of October. If you have completed this test please forward a copy to our office as soon as possible.  Our fax number is [phone]. Otherwise, please have it drawn in the next week.  As you are aware, the medication you are taking can potentially cause adverse effects and liver damage if not monitored closely. It is our goal to provide safe and effective care to our patients and it is imperative that the blood tests be obtained on a routine monthly schedule for your safety.    Please contact us with any questions or concerns.        Sincerely,      Keyanna Mccray, RN-BC, BSN, PHN

## 2019-10-23 ENCOUNTER — RECORDS - HEALTHEAST (OUTPATIENT)
Dept: LAB | Facility: CLINIC | Age: 59
End: 2019-10-23

## 2019-10-23 LAB
CHOLEST SERPL-MCNC: 174 MG/DL
FASTING STATUS PATIENT QL REPORTED: NORMAL
HDLC SERPL-MCNC: 52 MG/DL
LDLC SERPL CALC-MCNC: 101 MG/DL
TRIGL SERPL-MCNC: 107 MG/DL

## 2019-10-24 LAB
HPV SOURCE: NORMAL
HUMAN PAPILLOMA VIRUS 16 DNA: NEGATIVE
HUMAN PAPILLOMA VIRUS 18 DNA: NEGATIVE
HUMAN PAPILLOMA VIRUS FINAL DIAGNOSIS: NORMAL
HUMAN PAPILLOMA VIRUS OTHER HR: NEGATIVE
SPECIMEN DESCRIPTION: NORMAL

## 2019-10-28 ENCOUNTER — TELEPHONE (OUTPATIENT)
Dept: CARDIOLOGY | Facility: CLINIC | Age: 59
End: 2019-10-28

## 2019-10-28 LAB
BKR LAB AP ABNORMAL BLEEDING: NO
BKR LAB AP BIRTH CONTROL/HORMONES: NORMAL
BKR LAB AP CERVICAL APPEARANCE: NORMAL
BKR LAB AP GYN ADEQUACY: NORMAL
BKR LAB AP GYN INTERPRETATION: NORMAL
BKR LAB AP HPV REFLEX: NORMAL
BKR LAB AP LMP: NORMAL
BKR LAB AP PATIENT STATUS: NO
BKR LAB AP PREVIOUS ABNORMAL: NORMAL
BKR LAB AP PREVIOUS NORMAL: 2011
HIGH RISK?: NO
PATH REPORT.COMMENTS IMP SPEC: NORMAL
RESULT FLAG (HE HISTORICAL CONVERSION): NORMAL

## 2019-10-28 NOTE — TELEPHONE ENCOUNTER
[facility] Call Center    Phone Message    May a detailed message be left on voicemail: yes    Reason for Call: Other: Pt calling in would like to know if it is ok to do her labs at the [facility] clinic, also she would like to know how frequent she should be having her labs done please reach out as soon as possible , thank you!      Action Taken: Message routed to:  [facility] cardio

## 2019-10-28 NOTE — TELEPHONE ENCOUNTER
I called pt back and left message notified her that Monthly labs are not needed. Lynne Tamayo RN on 10/28/2019 at 1:50 PM

## 2019-11-03 ENCOUNTER — HEALTH MAINTENANCE LETTER (OUTPATIENT)
Age: 59
End: 2019-11-03

## 2019-11-25 ENCOUNTER — TELEPHONE (OUTPATIENT)
Dept: CARDIOLOGY | Facility: CLINIC | Age: 59
End: 2019-11-25

## 2019-11-25 NOTE — TELEPHONE ENCOUNTER
Faxed [employer] Patient Assistance Program request forms for Opsumit and Uptravi to [employer] for review.    Keisha Albarran  Clinic   Pulmonary Hypertension  [facility]  (P) [phone]

## 2019-12-13 ENCOUNTER — HOSPITAL ENCOUNTER (EMERGENCY)
Facility: CLINIC | Age: 59
Discharge: HOME | End: 2019-12-13
Attending: STUDENT IN AN ORGANIZED HEALTH CARE EDUCATION/TRAINING PROGRAM | Admitting: STUDENT IN AN ORGANIZED HEALTH CARE EDUCATION/TRAINING PROGRAM
Payer: MEDICARE

## 2019-12-13 ENCOUNTER — APPOINTMENT (OUTPATIENT)
Dept: GENERAL RADIOLOGY | Facility: CLINIC | Age: 59
End: 2019-12-13
Attending: STUDENT IN AN ORGANIZED HEALTH CARE EDUCATION/TRAINING PROGRAM
Payer: MEDICARE

## 2019-12-13 VITALS
TEMPERATURE: 97.6 F | BODY MASS INDEX: 21.43 KG/M2 | DIASTOLIC BLOOD PRESSURE: 68 MMHG | HEART RATE: 72 BPM | WEIGHT: 121 LBS | RESPIRATION RATE: 20 BRPM | SYSTOLIC BLOOD PRESSURE: 111 MMHG | OXYGEN SATURATION: 94 %

## 2019-12-13 DIAGNOSIS — R07.89 CHEST WALL TENDERNESS: ICD-10-CM

## 2019-12-13 LAB
ALBUMIN SERPL-MCNC: 3.6 G/DL (ref 3.4–5)
ALP SERPL-CCNC: 50 U/L (ref 40–150)
ALT SERPL W P-5'-P-CCNC: 15 U/L (ref 0–50)
ANION GAP SERPL CALCULATED.3IONS-SCNC: 8 MMOL/L (ref 3–14)
AST SERPL W P-5'-P-CCNC: 10 U/L (ref 0–45)
BASOPHILS # BLD AUTO: 0.1 10E9/L (ref 0–0.2)
BASOPHILS NFR BLD AUTO: 0.9 %
BILIRUB SERPL-MCNC: 0.5 MG/DL (ref 0.2–1.3)
BUN SERPL-MCNC: 15 MG/DL (ref 7–30)
CALCIUM SERPL-MCNC: 9.2 MG/DL (ref 8.5–10.1)
CHLORIDE SERPL-SCNC: 112 MMOL/L (ref 94–109)
CO2 SERPL-SCNC: 20 MMOL/L (ref 20–32)
CREAT SERPL-MCNC: 1.3 MG/DL (ref 0.52–1.04)
DIFFERENTIAL METHOD BLD: NORMAL
EOSINOPHIL # BLD AUTO: 0.1 10E9/L (ref 0–0.7)
EOSINOPHIL NFR BLD AUTO: 1.7 %
ERYTHROCYTE [DISTWIDTH] IN BLOOD BY AUTOMATED COUNT: 12.3 % (ref 10–15)
GFR SERPL CREATININE-BSD FRML MDRD: 45 ML/MIN/{1.73_M2}
GLUCOSE SERPL-MCNC: 98 MG/DL (ref 70–99)
HCT VFR BLD AUTO: 43 % (ref 35–47)
HGB BLD-MCNC: 14.2 G/DL (ref 11.7–15.7)
IMM GRANULOCYTES # BLD: 0 10E9/L (ref 0–0.4)
IMM GRANULOCYTES NFR BLD: 0.4 %
LYMPHOCYTES # BLD AUTO: 0.9 10E9/L (ref 0.8–5.3)
LYMPHOCYTES NFR BLD AUTO: 16.3 %
MCH RBC QN AUTO: 31 PG (ref 26.5–33)
MCHC RBC AUTO-ENTMCNC: 33 G/DL (ref 31.5–36.5)
MCV RBC AUTO: 94 FL (ref 78–100)
MONOCYTES # BLD AUTO: 0.5 10E9/L (ref 0–1.3)
MONOCYTES NFR BLD AUTO: 9.4 %
NEUTROPHILS # BLD AUTO: 3.9 10E9/L (ref 1.6–8.3)
NEUTROPHILS NFR BLD AUTO: 71.3 %
NRBC # BLD AUTO: 0 10*3/UL
NRBC BLD AUTO-RTO: 0 /100
PLATELET # BLD AUTO: 207 10E9/L (ref 150–450)
POTASSIUM SERPL-SCNC: 4.1 MMOL/L (ref 3.4–5.3)
PROT SERPL-MCNC: 6.7 G/DL (ref 6.8–8.8)
RBC # BLD AUTO: 4.58 10E12/L (ref 3.8–5.2)
SODIUM SERPL-SCNC: 140 MMOL/L (ref 133–144)
TROPONIN I SERPL-MCNC: <0.015 UG/L (ref 0–0.04)
WBC # BLD AUTO: 5.4 10E9/L (ref 4–11)

## 2019-12-13 PROCEDURE — 93005 ELECTROCARDIOGRAM TRACING: CPT | Performed by: STUDENT IN AN ORGANIZED HEALTH CARE EDUCATION/TRAINING PROGRAM

## 2019-12-13 PROCEDURE — 99285 EMERGENCY DEPT VISIT HI MDM: CPT | Mod: 25 | Performed by: STUDENT IN AN ORGANIZED HEALTH CARE EDUCATION/TRAINING PROGRAM

## 2019-12-13 PROCEDURE — 71101 X-RAY EXAM UNILAT RIBS/CHEST: CPT | Mod: LT

## 2019-12-13 PROCEDURE — 85025 COMPLETE CBC W/AUTO DIFF WBC: CPT | Performed by: STUDENT IN AN ORGANIZED HEALTH CARE EDUCATION/TRAINING PROGRAM

## 2019-12-13 PROCEDURE — 84484 ASSAY OF TROPONIN QUANT: CPT | Performed by: STUDENT IN AN ORGANIZED HEALTH CARE EDUCATION/TRAINING PROGRAM

## 2019-12-13 PROCEDURE — 80053 COMPREHEN METABOLIC PANEL: CPT | Performed by: STUDENT IN AN ORGANIZED HEALTH CARE EDUCATION/TRAINING PROGRAM

## 2019-12-13 PROCEDURE — 93010 ELECTROCARDIOGRAM REPORT: CPT | Mod: Z6 | Performed by: STUDENT IN AN ORGANIZED HEALTH CARE EDUCATION/TRAINING PROGRAM

## 2019-12-13 PROCEDURE — 25000132 ZZH RX MED GY IP 250 OP 250 PS 637: Mod: GY | Performed by: STUDENT IN AN ORGANIZED HEALTH CARE EDUCATION/TRAINING PROGRAM

## 2019-12-13 RX ORDER — ACETAMINOPHEN 325 MG/1
650 TABLET ORAL ONCE
Status: COMPLETED | OUTPATIENT
Start: 2019-12-13 | End: 2019-12-13

## 2019-12-13 RX ORDER — CARVEDILOL 12.5 MG/1
6.25 TABLET ORAL 2 TIMES DAILY
COMMUNITY
Start: 2019-01-11 | End: 2020-01-02

## 2019-12-13 RX ORDER — LOPERAMIDE HCL 2 MG
2 CAPSULE ORAL ONCE
Status: COMPLETED | OUTPATIENT
Start: 2019-12-13 | End: 2019-12-13

## 2019-12-13 RX ORDER — RISPERIDONE 0.25 MG/1
0.5 TABLET ORAL EVERY MORNING
COMMUNITY

## 2019-12-13 RX ADMIN — LOPERAMIDE HYDROCHLORIDE 2 MG: 2 CAPSULE ORAL at 12:20

## 2019-12-13 RX ADMIN — ACETAMINOPHEN 650 MG: 325 TABLET, FILM COATED ORAL at 12:20

## 2019-12-13 NOTE — ED AVS SNAPSHOT
[facility] Emergency Department  [address] [facility] [address]  Phone:  [phone]  Fax:  [phone]                                    Judith Nelson   MRN: [id number]    Department:  [facility] Department   Date of Visit:  12/13/2019           After Visit Summary Signature Page    I have received my discharge instructions, and my questions have been answered. I have discussed any challenges I see with this plan with the nurse or doctor.    ..........................................................................................................................................  Patient/Patient Representative Signature      ..........................................................................................................................................  Patient Representative Print Name and Relationship to Patient    ..................................................               ................................................  Date                                   Time    ..........................................................................................................................................  Reviewed by Signature/Title    ...................................................              ..............................................  Date                                               Time          22EPIC Rev 08/18
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
## 2020-01-02 DIAGNOSIS — R06.02 SOB (SHORTNESS OF BREATH): ICD-10-CM

## 2020-01-02 DIAGNOSIS — I27.20 PULMONARY HYPERTENSION (H): Primary | ICD-10-CM

## 2020-01-02 DIAGNOSIS — I51.81 STRESS-INDUCED CARDIOMYOPATHY: ICD-10-CM

## 2020-01-02 RX ORDER — TADALAFIL 20 MG/1
40 TABLET ORAL DAILY
Qty: 60 TABLET | Refills: 11 | Status: SHIPPED | OUTPATIENT
Start: 2020-01-02 | End: 2021-01-28

## 2020-01-02 RX ORDER — FUROSEMIDE 20 MG
TABLET ORAL
Qty: 45 TABLET | Refills: 3 | Status: SHIPPED | OUTPATIENT
Start: 2020-01-02

## 2020-01-02 RX ORDER — CARVEDILOL 6.25 MG/1
6.25 TABLET ORAL 2 TIMES DAILY WITH MEALS
Qty: 180 TABLET | Refills: 3 | Status: SHIPPED | OUTPATIENT
Start: 2020-01-02 | End: 2020-05-22

## 2020-01-02 NOTE — TELEPHONE ENCOUNTER
Patient called to say that she needed her Tadalfil Rx sent to [facility] since she will be using a [employer] coupon going forward.    We reviewed the cost of her Furosemide, Coreg & Tadalafil through the [employer] website.  I forwarded patient the [employer] coupons via text on the website and sent new Erx to [facility] so she didn't have to transfer them.  She was going to call [facility] (current Pharmacy) to check med prices.  Agreed with plan. Keyanna Mccray RN on 1/2/2020 at 12:28 PM      Sent all 3 Rx to [facility].  Quantities sent were based on best price through [employer]. Keyanna Mccray RN on 1/2/2020 at 12:29 PM

## 2020-01-07 ENCOUNTER — TELEPHONE (OUTPATIENT)
Dept: CARDIOLOGY | Facility: CLINIC | Age: 60
End: 2020-01-07

## 2020-01-07 NOTE — TELEPHONE ENCOUNTER
Prior Authorization Retail Medication Request    Medication/Dose: Tadalafil 20 mg tablets   ICD code (if different than what is on RX):    Previously Tried and Failed:    Rationale:      Insurance Name:  Medicare    Insurance ID:   [id number]      Pharmacy Information (if different than what is on RX)  Name:  [facility]   Phone:  [phone] fax : [phone]

## 2020-01-07 NOTE — TELEPHONE ENCOUNTER
PA Initiation    Medication: Tadalafil 20 mg tablets -   Insurance Company: [employer] Espinosa - Phone [phone] Fax [phone]  Pharmacy Filling the Rx: [id number] - [place], [place] - [address] [place] [address]  Filling Pharmacy Phone: [phone]  Filling Pharmacy Fax: [phone]  Start Date: 1/7/2020

## 2020-01-09 NOTE — TELEPHONE ENCOUNTER
Prior Authorization Approval    Medication: Tadalafil 20 mg tablets - APPROVED was approved on 1/8/2020  Effective: 1/1/2020 to 1/7/2021  Reference #:    Approved Dose/Quantity:   Insurance Company: [employer] - Phone [phone] Fax [phone]  Expected CoPay:    Pharmacy Filling the Rx: [id number] - [address]  Pharmacy Notified: Yes  Patient Notified: Comment:  **Instructed pharmacy to notify patient when script is ready to /ship.**

## 2020-01-10 ENCOUNTER — INFUSION THERAPY VISIT (OUTPATIENT)
Dept: INFUSION THERAPY | Facility: CLINIC | Age: 60
End: 2020-01-10
Attending: INTERNAL MEDICINE
Payer: MEDICARE

## 2020-01-10 ENCOUNTER — OFFICE VISIT (OUTPATIENT)
Dept: ENDOCRINOLOGY | Facility: CLINIC | Age: 60
End: 2020-01-10
Payer: MEDICARE

## 2020-01-10 VITALS
HEART RATE: 87 BPM | BODY MASS INDEX: 20.45 KG/M2 | DIASTOLIC BLOOD PRESSURE: 61 MMHG | WEIGHT: 119.8 LBS | HEIGHT: 64 IN | SYSTOLIC BLOOD PRESSURE: 97 MMHG

## 2020-01-10 VITALS
SYSTOLIC BLOOD PRESSURE: 99 MMHG | TEMPERATURE: 98.4 F | DIASTOLIC BLOOD PRESSURE: 67 MMHG | OXYGEN SATURATION: 93 % | HEART RATE: 83 BPM

## 2020-01-10 DIAGNOSIS — Z92.29 PERSONAL HISTORY OF OTHER DRUG THERAPY: ICD-10-CM

## 2020-01-10 DIAGNOSIS — Z92.29 PERSONAL HISTORY OF DRUG THERAPY: ICD-10-CM

## 2020-01-10 DIAGNOSIS — K29.81 DUODENITIS HEMORRHAGIC: ICD-10-CM

## 2020-01-10 DIAGNOSIS — Z76.89 HEALTH CARE HOME: ICD-10-CM

## 2020-01-10 DIAGNOSIS — F41.1 GENERALIZED ANXIETY DISORDER: ICD-10-CM

## 2020-01-10 DIAGNOSIS — I27.21 PAH (PULMONARY ARTERIAL HYPERTENSION) WITH PORTAL HYPERTENSION (H): ICD-10-CM

## 2020-01-10 DIAGNOSIS — R93.89 ABNORMAL FINDINGS ON DIAGNOSTIC IMAGING OF OTHER SPECIFIED BODY STRUCTURES: ICD-10-CM

## 2020-01-10 DIAGNOSIS — N18.4 CHRONIC KIDNEY DISEASE, STAGE IV (SEVERE) (H): ICD-10-CM

## 2020-01-10 DIAGNOSIS — M81.0 OSTEOPOROSIS, POSTMENOPAUSAL: ICD-10-CM

## 2020-01-10 DIAGNOSIS — I51.81 STRESS-INDUCED CARDIOMYOPATHY: ICD-10-CM

## 2020-01-10 DIAGNOSIS — M81.0 OSTEOPOROSIS, POSTMENOPAUSAL: Primary | ICD-10-CM

## 2020-01-10 DIAGNOSIS — M81.0 SENILE OSTEOPOROSIS: ICD-10-CM

## 2020-01-10 DIAGNOSIS — J96.11 CHRONIC HYPOXEMIC RESPIRATORY FAILURE (H): ICD-10-CM

## 2020-01-10 DIAGNOSIS — Z78.0 ASYMPTOMATIC POSTMENOPAUSAL STATUS: ICD-10-CM

## 2020-01-10 DIAGNOSIS — K76.6 PAH (PULMONARY ARTERIAL HYPERTENSION) WITH PORTAL HYPERTENSION (H): ICD-10-CM

## 2020-01-10 DIAGNOSIS — T14.8XXA HEMATOMA OF SKIN: ICD-10-CM

## 2020-01-10 DIAGNOSIS — Z13.6 CARDIOVASCULAR SCREENING; LDL GOAL LESS THAN 160: ICD-10-CM

## 2020-01-10 DIAGNOSIS — F32.1 MAJOR DEPRESSIVE DISORDER, SINGLE EPISODE, MODERATE (H): ICD-10-CM

## 2020-01-10 DIAGNOSIS — R07.9 CHEST PAIN, UNSPECIFIED TYPE: ICD-10-CM

## 2020-01-10 DIAGNOSIS — N18.4 CKD (CHRONIC KIDNEY DISEASE) STAGE 4, GFR 15-29 ML/MIN (H): ICD-10-CM

## 2020-01-10 DIAGNOSIS — D50.8 OTHER IRON DEFICIENCY ANEMIA: Primary | ICD-10-CM

## 2020-01-10 DIAGNOSIS — K80.50 GALL STONES, COMMON BILE DUCT: ICD-10-CM

## 2020-01-10 DIAGNOSIS — I27.20 PULMONARY HYPERTENSION (H): ICD-10-CM

## 2020-01-10 DIAGNOSIS — L97.311 SKIN ULCER OF RIGHT ANKLE, LIMITED TO BREAKDOWN OF SKIN (H): ICD-10-CM

## 2020-01-10 DIAGNOSIS — D86.9 SARCOIDOSIS: ICD-10-CM

## 2020-01-10 DIAGNOSIS — E78.2 MIXED HYPERLIPIDEMIA: ICD-10-CM

## 2020-01-10 DIAGNOSIS — K21.9 GASTROESOPHAGEAL REFLUX DISEASE, ESOPHAGITIS PRESENCE NOT SPECIFIED: ICD-10-CM

## 2020-01-10 DIAGNOSIS — M24.7: ICD-10-CM

## 2020-01-10 DIAGNOSIS — Z79.899 ENCOUNTER FOR LONG-TERM CURRENT USE OF MEDICATION: ICD-10-CM

## 2020-01-10 DIAGNOSIS — R60.0 LOWER EXTREMITY EDEMA: ICD-10-CM

## 2020-01-10 PROCEDURE — 25000128 H RX IP 250 OP 636: Mod: ZF | Performed by: INTERNAL MEDICINE

## 2020-01-10 PROCEDURE — 96372 THER/PROPH/DIAG INJ SC/IM: CPT

## 2020-01-10 RX ADMIN — DENOSUMAB 60 MG: 60 INJECTION SUBCUTANEOUS at 15:08

## 2020-01-10 ASSESSMENT — PAIN SCALES - GENERAL
PAINLEVEL: NO PAIN (0)
PAINLEVEL: NO PAIN (0)

## 2020-01-10 ASSESSMENT — MIFFLIN-ST. JEOR: SCORE: 1100.47

## 2020-01-10 NOTE — LETTER
1/10/2020       RE: Judith Nelson  [address] [facility] [id number]     Dear Colleague,    Thank you for referring your patient, Judith Nelson, to the [facility] ENDOCRINOLOGY at [facility]. Please see a copy of my visit note below.    [facility]  Endocrinology  Jenna Salas MD  01/10/2020      Chief Complaint:   Follow Up     History of Present Illness:   Judith Nelson is a 59 year old female with a history of sarcoidosis, CKD, MGUS, and significant pulmonary hypertension who presents for evaluation of osteoporosis.    #1 Osteoporosis with history of nontraumatic fracture and chronic steroid use due to sarcoidosis  Summary of previous history:  Ms. Nelson is a 53-year-old female with a complex medical history including pulmonary hypertension for which she has been following up with Dr. Franco since 10/2010 and she was placed on inhaled treprostini and tadalafil with right sided heart showing a significant pulm HTN. Earlier in 3/201 she was found to have high Ca of 11.3 along with a normal PTH and worsening renal functions. Patient was given a dose of Zoledronic acid with a drop in her Ca to 9.4 next month. Her Ca has been stable with levels less than 10. She had an extensive workup for her renal failure and renal biopsy showed a non-necrotizing granulomas and negative stains bringing up the issue of sarcoidosis. That diagnosis has been considered more likely in view of the hypercalcemia, chronic kidney disease and elevated ACE levels. She also found to have MGUS.  She has a history of osteoporosis based on a DEXA scan which was done in 02/2010 showing a T score of - 2.7 in the LS along with a non traumatic fracture of her left femur after a hip arthoplasty. She was seen in our clinic for that matter in 2010 and she was placed on a monthly Boniva since then but that has been stopped apparently secondary to her worsening renal fx, as well as, her Ca-  vitamin D supplement has been stopped in view of the recurrent hypercalcemia. She has a repeat DXA scan in 2011 which showed possible improvement in her T score to - 2.5.  The patient has been on prednisone since 04/02/2013. When I evaluated the patient in 2013, her renal function remained quite poor with a creatinine around 2.0.  I then started the patient on Prolia.  She received her first dose in October 2013.  She tolerated this well.  She received her second dose of Prolia in April 2014.  She tolerated this well.  Her bone density in April 2014 showed the lowest T score -2.3 in the lumbar spine which has improved compared with her previous T score -2.5 in 2011.   Of note, the patient reports stopping her prednisone in 2014.  She has now been off her prednisone since roughly August 2014.  Her creatinine in September 2014 was about 1.98. The patient received her 3rd dose of Prolia in September 2014.   She received her fourth dose of Prolia in April 2015.  March 2015 DEXA scan showed improvement at the level of the lumbar spine and possible decline at the left total hip ( which appears nonsignificant). Her fifth dose of Prolia was given in August, 2017. She has a history of hypercalcemia related to her sarcoidosis although her September 2015 serum calcium was 8.9.    Interval history:  She was last seen in February 2018.  She was planning to have teeth extracted in the near future therefore Prolia was not given at that time.  She had 4 (possibly 5) teeth extracted in October 2019.  This is her last planned dental work.  She has not had any known fractures since her last appointment.  She [place]  Since then she has had ongoing pain in her right great toe and still has discoloration under her nail.  No imaging has been done of this.  She also feel as if her posture is worse, particularly in her upper back.  She did schedule a DXA scan but it is  not until February 2020.    #2 Pulmonary issues related to pulmonary artery hypertension and sarcoidosis  The patient has a history of pulmonary artery hypertension, now on Imuran, selexipag, tadalafil, and macitentan.  She last saw pulmonology in July 2019 at which time there was evidence of obstructive disease therefore she was started on Advair in addition to her PRN Xopenex.  She saw cardiology in September 2019 who noted ongoing severe pulmonary hypertension despite her current treatments however did not feel chronic parenteral therapy was feasible given her social situation and mental illness issues.    #3 Sarcoidosis  She was previously on prednisone for treatment of her sarcoidosis. She was on this from at least 2013 to August 2014.  Currently on Imuran.  2014 ACTH stimulation test was normal.    Interval history:  As far as she is aware, her sarcoidosis is stable.      #4 Anxiety  She is currently on Prozac, Lamictal, and Risperdal.  She has been more depressed lately.  She has been worrying about the future.  Her daughter is 21 years old now and still lives with her but she worries about if/when her daughter will move out.  She worries about living alone.  Her other children and grandchildren live in [place].  Her daughter brings her to most of her appointments.  She does not have home health services currently as she did not qualify after losing her medical assistance.  She does have a therapist.    Review of Systems:   Pertinent items are noted in HPI, remainder of complete ROS is negative.      Active Medications:      acetaminophen (TYLENOL) 500 MG tablet, Take 1-2 tablets (500-1,000 mg) by mouth every 6 hours as needed for pain (Take as needed for pain.  Do not exceed 4 grams (8 tablets) in a day) (Patient taking differently: Take 1,000 mg by mouth every 6 hours as needed for pain (Take as needed for pain.  Do not exceed 4 grams (8 tablets) in a day) ), Disp: 45 tablet, Rfl: 10     ADVAIR DISKUS  250-50 MCG/DOSE inhaler, INHALE 1 PUFF BY MOUTH TWICE A DAY, Disp: 1 Inhaler, Rfl: 11     azaTHIOprine (IMURAN) 50 MG tablet, Take 1 tablet (50 mg) by mouth daily, Disp: 90 tablet, Rfl: 3     BUSPIRONE HCL PO, Take 15 mg by mouth 2 times daily, Disp: , Rfl:      calcitRIOL (ROCALTROL) 0.25 MCG capsule, Take 1 capsule (0.25 mcg) by mouth daily, Disp: 30 capsule, Rfl: 1     carvedilol (COREG) 6.25 MG tablet, Take 1 tablet (6.25 mg) by mouth 2 times daily (with meals), Disp: 180 tablet, Rfl: 3     FLUoxetine (PROZAC) 20 MG capsule, Take 1 capsule (20 mg) by mouth daily, Disp: 30 capsule, Rfl: 1     furosemide (LASIX) 20 MG tablet, Take 1 tablet by mouth on Monday, Wednesday & Friday's, Disp: 45 tablet, Rfl: 3     lamoTRIgine (LAMICTAL) 25 MG tablet, Take 100 mg by mouth daily , Disp: , Rfl:      LORazepam (ATIVAN) 0.5 MG tablet, Take 2 tablets (1 mg) by mouth 2 times daily, Disp: 90 tablet, Rfl: 1     macitentan (OPSUMIT) 10 MG tablet, Take 1 tablet (10 mg) by mouth daily, Disp: 30 tablet, Rfl: 11     potassium citrate (UROCIT-K) 10 MEQ (1080 MG) CR tablet, Take 10 mEq by mouth three times a week On Monday, Wednesday and Friday, Disp: , Rfl:      ranitidine (RANITIDINE) 75 MG tablet, Take 1-2 tablets ( mg) by mouth 2 times daily (Patient taking differently: Take 150 mg by mouth At Bedtime ), Disp: 60 tablet, Rfl: 11     risperiDONE (RISPERDAL) 0.25 MG tablet, Take 0.5 mg by mouth every morning, Disp: , Rfl:      selexipag (UPTRAVI) 1600 MCG tablet, Take 1 tablet (1,600 mcg) by mouth every 12 hours, Disp: 60 tablet, Rfl: 11     simvastatin (ZOCOR) 10 MG tablet, Take 1 tablet by mouth At Bedtime., Disp: 90 tablet, Rfl: 1     tadalafil, PAH, (ADCIRCA) 20 MG TABS, Take 2 tablets (40 mg) by mouth daily, Disp: 60 tablet, Rfl: 11      Allergies:   Dilaudid [hydromorphone]; Morphine; and Latex      Past Medical History:  Chronic hypoxemic respiratory failure  Pulmonary hypertension   Sarcoidosis   Stress-induced  "cardiomyopathy   Chronic kidney disease, stage 4  Iron deficiency anemia   Osteoporosis   Mixed hyperlipidemia  Gastroesophageal reflux disease   Gall stones  Duodenitis, hemorrhagic  Generalized anxiety disorder   Major depressive disorder, moderate  Seborrheic keratosis  Protrusio acetabuli   Lumbar compression fracture   Hyperprolactinemia      Past Surgical History:  Right heart catheterization 19  Cystoscopy, stent insertion 10/2/13  Cholecystectomy, ERCP 2012  Total hip arthroplasty, right 10/1/10   section     Family History:   Migraine headaches - sister, mother   Hypertension - sister, father, mother  Hyperlipidemia - sister, father  Depression - son, mother   Asthma - sister, daughter, mother  Anxiety - sister, son, father  Anesthesia reaction - son, daughter  Alzheimer's disease - paternal grandfather, paternal grandmother   Coronary artery disease - maternal grandfather, maternal grandmother, father   Osteoporosis - maternal grandmother, mother   Diabetes - maternal grandmother   Leukemia - mother      Social History:   Presents to clinic with her daughter.  Tobacco Use: No previous or current tobacco use.   Alcohol Use: Occasional alcohol use.   PCP: Anupama Babcock      Physical Exam:   BP 97/61   Pulse 87   Ht 1.613 m (5' 3.5\")   Wt 54.3 kg (119 lb 12.8 oz)   BMI 20.89 kg/m        GENERAL APPEARANCE: Alert and no distress  NECK: No lymphadenopathy appreciated  Thyroid: No obvious nodules palpated   CV: RRR without M/R/G  Lungs: CTA bilaterally  Abdomen: Soft, Nontender, non distended, positive bowel sounds   Neuro: no focal deficits  Skin: No infection in feet. Right great toe with subungual hematoma  Mood: Normal   Lymph: neg in neck and supraclavicular area      Assessment and Plan:  #1 Osteoporosis, postmenopausal  She had been doing well on Prolia however is overdue for a dose as she was having dental procedures.  Her last extractions were over 2 months ago and she has no " further dental work planned therefore will give her Prolia today.  Repeat DXA scan planned in February 2020.  Recheck BMD labs today.  Given the patient's reported back pain,.  Lumbar spine x-ray to evaluate for possible compression fracture.    Addendum: Patient able to receive Prolia in January 2020.    #2 right subungual hematoma  This looks like a subungual hematoma.  Will have patient obtain foot x-ray.  I suspect this is causing pressure on her nail and therefore discomfort.  Patient referred to dermatology for assistance given noted pain.    #3 assistance with medical transport  The patient currently cannot drive herself.  She would like assistance with options on medical transport as well as home health care if her daughter is not available.  Social work referral made.       Follow-up: Return in about 6 months (around 7/10/2020).      Scribe Disclosure:  I, Liana Hopkins, am serving as a scribe to document services personally performed by Jenna Salas MD at this visit, based upon the provider's statements to me. All documentation has been reviewed by the aforementioned provider prior to being entered into the official medical record.     Portions of this medical record were completed by a scribe. UPON MY REVIEW AND AUTHENTICATION BY ELECTRONIC SIGNATURE, this confirms (a) I performed the applicable clinical services, and (b) the record is accurate.       Again, thank you for allowing me to participate in the care of your patient.      Sincerely,    Jenna Salas MD

## 2020-01-10 NOTE — PROGRESS NOTES
[facility]  Endocrinology  Jenna Salas MD  01/10/2020      Chief Complaint:   Follow Up     History of Present Illness:   Judith Nelson is a 59 year old female with a history of sarcoidosis, CKD, MGUS, and significant pulmonary hypertension who presents for evaluation of osteoporosis.    #1 Osteoporosis with history of nontraumatic fracture and chronic steroid use due to sarcoidosis  Summary of previous history:  Ms. Nelson is a 53-year-old female with a complex medical history including pulmonary hypertension for which she has been following up with Dr. Franco since 10/2010 and she was placed on inhaled treprostini and tadalafil with right sided heart showing a significant pulm HTN. Earlier in 3/201 she was found to have high Ca of 11.3 along with a normal PTH and worsening renal functions. Patient was given a dose of Zoledronic acid with a drop in her Ca to 9.4 next month. Her Ca has been stable with levels less than 10. She had an extensive workup for her renal failure and renal biopsy showed a non-necrotizing granulomas and negative stains bringing up the issue of sarcoidosis. That diagnosis has been considered more likely in view of the hypercalcemia, chronic kidney disease and elevated ACE levels. She also found to have MGUS.  She has a history of osteoporosis based on a DEXA scan which was done in 02/2010 showing a T score of - 2.7 in the LS along with a non traumatic fracture of her left femur after a hip arthoplasty. She was seen in our clinic for that matter in 2010 and she was placed on a monthly Boniva since then but that has been stopped apparently secondary to her worsening renal fx, as well as, her Ca- vitamin D supplement has been stopped in view of the recurrent hypercalcemia. She has a repeat DXA scan in 2011 which showed possible improvement in her T score to - 2.5.  The patient has been on prednisone since 04/02/2013. When I evaluated the patient in 2013, her renal function remained  quite poor with a creatinine around 2.0.  I then started the patient on Prolia.  She received her first dose in October 2013.  She tolerated this well.  She received her second dose of Prolia in April 2014.  She tolerated this well.  Her bone density in April 2014 showed the lowest T score -2.3 in the lumbar spine which has improved compared with her previous T score -2.5 in 2011.   Of note, the patient reports stopping her prednisone in 2014.  She has now been off her prednisone since roughly August 2014.  Her creatinine in September 2014 was about 1.98. The patient received her 3rd dose of Prolia in September 2014.   She received her fourth dose of Prolia in April 2015.  March 2015 DEXA scan showed improvement at the level of the lumbar spine and possible decline at the left total hip ( which appears nonsignificant). Her fifth dose of Prolia was given in August, 2017. She has a history of hypercalcemia related to her sarcoidosis although her September 2015 serum calcium was 8.9.    Interval history:  She was last seen in February 2018.  She was planning to have teeth extracted in the near future therefore Prolia was not given at that time.  She had 4 (possibly 5) teeth extracted in October 2019.  This is her last planned dental work.  She has not had any known fractures since her last appointment.  She [place]  Since then she has had ongoing pain in her right great toe and still has discoloration under her nail.  No imaging has been done of this.  She also feel as if her posture is worse, particularly in her upper back.  She did schedule a DXA scan but it is not until February 2020.    #2 Pulmonary issues related to pulmonary artery hypertension and sarcoidosis  The patient has a history of pulmonary artery hypertension, now on Imuran, selexipag, tadalafil, and macitentan.  She last saw pulmonology in July 2019 at which time there was evidence of  obstructive disease therefore she was started on Advair in addition to her PRN Xopenex.  She saw cardiology in September 2019 who noted ongoing severe pulmonary hypertension despite her current treatments however did not feel chronic parenteral therapy was feasible given her social situation and mental illness issues.    #3 Sarcoidosis  She was previously on prednisone for treatment of her sarcoidosis. She was on this from at least 2013 to August 2014.  Currently on Imuran.  2014 ACTH stimulation test was normal.    Interval history:  As far as she is aware, her sarcoidosis is stable.      #4 Anxiety  She is currently on Prozac, Lamictal, and Risperdal.  She has been more depressed lately.  She has been worrying about the future.  Her daughter is 21 years old now and still lives with her but she worries about if/when her daughter will move out.  She worries about living alone.  Her other children and grandchildren live in [place].  Her daughter brings her to most of her appointments.  She does not have home health services currently as she did not qualify after losing her medical assistance.  She does have a therapist.    Review of Systems:   Pertinent items are noted in HPI, remainder of complete ROS is negative.      Active Medications:      acetaminophen (TYLENOL) 500 MG tablet, Take 1-2 tablets (500-1,000 mg) by mouth every 6 hours as needed for pain (Take as needed for pain.  Do not exceed 4 grams (8 tablets) in a day) (Patient taking differently: Take 1,000 mg by mouth every 6 hours as needed for pain (Take as needed for pain.  Do not exceed 4 grams (8 tablets) in a day) ), Disp: 45 tablet, Rfl: 10     ADVAIR DISKUS 250-50 MCG/DOSE inhaler, INHALE 1 PUFF BY MOUTH TWICE A DAY, Disp: 1 Inhaler, Rfl: 11     azaTHIOprine (IMURAN) 50 MG tablet, Take 1 tablet (50 mg) by mouth daily, Disp: 90 tablet, Rfl: 3     BUSPIRONE HCL PO, Take 15 mg by mouth 2 times daily, Disp: , Rfl:      calcitRIOL (ROCALTROL) 0.25 MCG  capsule, Take 1 capsule (0.25 mcg) by mouth daily, Disp: 30 capsule, Rfl: 1     carvedilol (COREG) 6.25 MG tablet, Take 1 tablet (6.25 mg) by mouth 2 times daily (with meals), Disp: 180 tablet, Rfl: 3     FLUoxetine (PROZAC) 20 MG capsule, Take 1 capsule (20 mg) by mouth daily, Disp: 30 capsule, Rfl: 1     furosemide (LASIX) 20 MG tablet, Take 1 tablet by mouth on Monday, Wednesday & Friday's, Disp: 45 tablet, Rfl: 3     lamoTRIgine (LAMICTAL) 25 MG tablet, Take 100 mg by mouth daily , Disp: , Rfl:      LORazepam (ATIVAN) 0.5 MG tablet, Take 2 tablets (1 mg) by mouth 2 times daily, Disp: 90 tablet, Rfl: 1     macitentan (OPSUMIT) 10 MG tablet, Take 1 tablet (10 mg) by mouth daily, Disp: 30 tablet, Rfl: 11     potassium citrate (UROCIT-K) 10 MEQ (1080 MG) CR tablet, Take 10 mEq by mouth three times a week On Monday, Wednesday and Friday, Disp: , Rfl:      ranitidine (RANITIDINE) 75 MG tablet, Take 1-2 tablets ( mg) by mouth 2 times daily (Patient taking differently: Take 150 mg by mouth At Bedtime ), Disp: 60 tablet, Rfl: 11     risperiDONE (RISPERDAL) 0.25 MG tablet, Take 0.5 mg by mouth every morning, Disp: , Rfl:      selexipag (UPTRAVI) 1600 MCG tablet, Take 1 tablet (1,600 mcg) by mouth every 12 hours, Disp: 60 tablet, Rfl: 11     simvastatin (ZOCOR) 10 MG tablet, Take 1 tablet by mouth At Bedtime., Disp: 90 tablet, Rfl: 1     tadalafil, PAH, (ADCIRCA) 20 MG TABS, Take 2 tablets (40 mg) by mouth daily, Disp: 60 tablet, Rfl: 11      Allergies:   Dilaudid [hydromorphone]; Morphine; and Latex      Past Medical History:  Chronic hypoxemic respiratory failure  Pulmonary hypertension   Sarcoidosis   Stress-induced cardiomyopathy   Chronic kidney disease, stage 4  Iron deficiency anemia   Osteoporosis   Mixed hyperlipidemia  Gastroesophageal reflux disease   Gall stones  Duodenitis, hemorrhagic  Generalized anxiety disorder   Major depressive disorder, moderate  Seborrheic keratosis  Protrusio acetabuli   Lumbar  "compression fracture   Hyperprolactinemia      Past Surgical History:  Right heart catheterization 19  Cystoscopy, stent insertion 10/2/13  Cholecystectomy, ERCP 2012  Total hip arthroplasty, right 10/1/10   section     Family History:   Migraine headaches - sister, mother   Hypertension - sister, father, mother  Hyperlipidemia - sister, father  Depression - son, mother   Asthma - sister, daughter, mother  Anxiety - sister, son, father  Anesthesia reaction - son, daughter  Alzheimer's disease - paternal grandfather, paternal grandmother   Coronary artery disease - maternal grandfather, maternal grandmother, father   Osteoporosis - maternal grandmother, mother   Diabetes - maternal grandmother   Leukemia - mother      Social History:   Presents to clinic with her daughter.  Tobacco Use: No previous or current tobacco use.   Alcohol Use: Occasional alcohol use.   PCP: Anupama Babcock      Physical Exam:   BP 97/61   Pulse 87   Ht 1.613 m (5' 3.5\")   Wt 54.3 kg (119 lb 12.8 oz)   BMI 20.89 kg/m       GENERAL APPEARANCE: Alert and no distress  NECK: No lymphadenopathy appreciated  Thyroid: No obvious nodules palpated   CV: RRR without M/R/G  Lungs: CTA bilaterally  Abdomen: Soft, Nontender, non distended, positive bowel sounds   Neuro: no focal deficits  Skin: No infection in feet. Right great toe with subungual hematoma  Mood: Normal   Lymph: neg in neck and supraclavicular area      Assessment and Plan:  #1 Osteoporosis, postmenopausal  She had been doing well on Prolia however is overdue for a dose as she was having dental procedures.  Her last extractions were over 2 months ago and she has no further dental work planned therefore will give her Prolia today.  Repeat DXA scan planned in 2020.  Recheck BMD labs today.  Given the patient's reported back pain,.  Lumbar spine x-ray to evaluate for possible compression fracture.    Addendum: Patient able to receive Prolia in 2020.    #2 " right subungual hematoma  This looks like a subungual hematoma.  Will have patient obtain foot x-ray.  I suspect this is causing pressure on her nail and therefore discomfort.  Patient referred to dermatology for assistance given noted pain.    #3 assistance with medical transport  The patient currently cannot drive herself.  She would like assistance with options on medical transport as well as home health care if her daughter is not available.  Social work referral made.       Follow-up: Return in about 6 months (around 7/10/2020).      Scribe Disclosure:  I, Liana Sandeep, am serving as a scribe to document services personally performed by Jenna Salas MD at this visit, based upon the provider's statements to me. All documentation has been reviewed by the aforementioned provider prior to being entered into the official medical record.     Portions of this medical record were completed by a scribe. UPON MY REVIEW AND AUTHENTICATION BY ELECTRONIC SIGNATURE, this confirms (a) I performed the applicable clinical services, and (b) the record is accurate.

## 2020-01-10 NOTE — PROGRESS NOTES
Patient presents to the [facility] for Prolia.  Order written by Dr. Salas was completed today. Name and  verified with patient. See MAR for medication details. Medication was divided into 1 syringes by pharmacy and given in the following sites back side of left upper arm. Patient tolerated injection well and was discharged to home.  Patient is to return in 6 months.    Anny Santamaria MA    Administrations This Visit     denosumab (PROLIA) injection 60 mg     Admin Date  01/10/2020 Action  Given Dose  60 mg Route  Subcutaneous Administered By  Anny Santamaria MA

## 2020-01-10 NOTE — PATIENT INSTRUCTIONS
Patient Education     Denosumab injection  Brand Names: Prolia, XGEVA  What is this medicine?  DENOSUMAB (den oh татьяна mab) slows bone breakdown. Prolia is used to treat osteoporosis in women after menopause and in men, and in people who are taking corticosteroids for 6 months or more. Xgeva is used to treat a high calcium level due to cancer and to prevent bone fractures and other bone problems caused by multiple myeloma or cancer bone metastases. Xgeva is also used to treat giant cell tumor of the bone.  How should I use this medicine?  This medicine is for injection under the skin. It is given by a health care professional in a hospital or clinic setting.  A special MedGuide will be given to you before each treatment. Be sure to read this information carefully each time.  For Prolia, talk to your pediatrician regarding the use of this medicine in children. Special care may be needed. For Xgeva, talk to your pediatrician regarding the use of this medicine in children. While this drug may be prescribed for children as young as 13 years for selected conditions, precautions do apply.  What side effects may I notice from receiving this medicine?  Side effects that you should report to your doctor or health care professional as soon as possible:    allergic reactions like skin rash, itching or hives, swelling of the face, lips, or tongue    bone pain    breathing problems    dizziness    jaw pain, especially after dental work    redness, blistering, peeling of the skin    signs and symptoms of infection like fever or chills; cough; sore throat; pain or trouble passing urine    signs of low calcium like fast heartbeat, muscle cramps or muscle pain; pain, tingling, numbness in the hands or feet; seizures    unusual bleeding or bruising    unusually weak or tired  Side effects that usually do not require medical attention (report to your doctor or health care professional if they continue or are  bothersome):    constipation    diarrhea    headache    joint pain    loss of appetite    muscle pain    runny nose    tiredness    upset stomach  What may interact with this medicine?  Do not take this medicine with any of the following medications:    other medicines containing denosumab  This medicine may also interact with the following medications:    medicines that lower your chance of fighting infection    steroid medicines like prednisone or cortisone  What if I miss a dose?  It is important not to miss your dose. Call your doctor or health care professional if you are unable to keep an appointment.  Where should I keep my medicine?  This medicine is only given in a clinic, doctor's office, or other health care setting and will not be stored at home.  What should I tell my health care provider before I take this medicine?  They need to know if you have any of these conditions:    dental disease    having surgery or tooth extraction    infection    kidney disease    low levels of calcium or Vitamin D in the blood    malnutrition    on hemodialysis    skin conditions or sensitivity    thyroid or parathyroid disease    an unusual reaction to denosumab, other medicines, foods, dyes, or preservatives    pregnant or trying to get pregnant    breast-feeding  What should I watch for while using this medicine?  Visit your doctor or health care professional for regular checks on your progress. Your doctor or health care professional may order blood tests and other tests to see how you are doing.  Call your doctor or health care professional for advice if you get a fever, chills or sore throat, or other symptoms of a cold or flu. Do not treat yourself. This drug may decrease your body's ability to fight infection. Try to avoid being around people who are sick.  You should make sure you get enough calcium and vitamin D while you are taking this medicine, unless your doctor tells you not to. Discuss the foods you eat and  the vitamins you take with your health care professional.  See your dentist regularly. Brush and floss your teeth as directed. Before you have any dental work done, tell your dentist you are receiving this medicine.  Do not become pregnant while taking this medicine or for 5 months after stopping it. Talk with your doctor or health care professional about your birth control options while taking this medicine. Women should inform their doctor if they wish to become pregnant or think they might be pregnant. There is a potential for serious side effects to an unborn child. Talk to your health care professional or pharmacist for more information.  NOTE:This sheet is a summary. It may not cover all possible information. If you have questions about this medicine, talk to your doctor, pharmacist, or health care provider. Copyright  2019 [employer]

## 2020-01-13 NOTE — TELEPHONE ENCOUNTER
Patient called to say she is switching to [facility] for Opsumit so she can use an assistance program, but they need a new Rx from us first.  Patient is anxious because she only has a few days left of her med.  Advised her I would call and give them an order.  Patient verbalized understanding, agreed with plan and denied any further questions. Keyanna Mccray RN on 1/13/2020 at 1:04 PM    ([facility] [phone].)  ----------------------------------------  Called [facility] pharmacy and spoke to Miranda (Pharmacist) who I gave a TORB for Opsumit. Keyanna Mccray RN on 1/13/2020 at 1:10 PM

## 2020-01-21 ENCOUNTER — PATIENT OUTREACH (OUTPATIENT)
Dept: CARE COORDINATION | Facility: CLINIC | Age: 60
End: 2020-01-21

## 2020-01-23 ENCOUNTER — HOSPITAL ENCOUNTER (OUTPATIENT)
Dept: MAMMOGRAPHY | Facility: CLINIC | Age: 60
Discharge: HOME | End: 2020-01-23
Attending: FAMILY MEDICINE | Admitting: FAMILY MEDICINE
Payer: MEDICARE

## 2020-01-23 DIAGNOSIS — Z12.31 VISIT FOR SCREENING MAMMOGRAM: ICD-10-CM

## 2020-01-23 PROCEDURE — 77067 SCR MAMMO BI INCL CAD: CPT

## 2020-01-23 NOTE — PROGRESS NOTES
Social Work Intervention  [facility]    Data/Intervention:    Patient Name:  Judith Nelson  /Age:  1960 (59 year old)    Visit Type: telephone  Referral Source: Dr Jenna Salas  Reason for Referral:  Transportation and home care    Collaborated With:    -Judith  -[employer] mobility/[facility] transit link    Patient Concerns/Issues:   Brief conversation with Mendy. She was very anxious. She indicated that her dtr who lives with her now has a full time job M-F and isn't very available to drive her here to her appts. She is wondering about metro mobility or other transportation.   She is receiving mental health counseling and that person indicated that she could use metro mobility.    Intervention/Education/Resources Provided:  Reviewed that she is outside of the service area for metro mobility. Discussed that she could use Transit Link in [facility] that can pick her up at home and get her to a location within [facility] where [employer] mobility could pick her up. She would need to enroll in metro mobility. When I told her that information, she became very anxious and upset and indicated that there was no way she could do that and what would happen if she didn't get picked up, etc...  There are no other low cost options from her area. She indicated that uber is too expensive.  She previously was on [place] and had funding for rides but she received a small inheritance and was taken off those programs.     Assessment/Plan:  Conversation was brief due to Pt's anxiety and wish to be brief. I have sent a [employer] [facility] message with details about how to use the above transportation should she change her mind. No further f/u planned at this time.     Provided patient/family with contact information and availability.    HELENE Terry, [facility]    [facility]  [phone]

## 2020-01-24 DIAGNOSIS — R93.89 ABNORMAL FINDINGS ON DIAGNOSTIC IMAGING OF OTHER SPECIFIED BODY STRUCTURES: ICD-10-CM

## 2020-01-24 DIAGNOSIS — M81.0 OSTEOPOROSIS, POSTMENOPAUSAL: ICD-10-CM

## 2020-01-24 LAB
ANION GAP SERPL CALCULATED.3IONS-SCNC: 8 MMOL/L (ref 3–14)
BUN SERPL-MCNC: 16 MG/DL (ref 7–30)
CALCIUM SERPL-MCNC: 9 MG/DL (ref 8.5–10.1)
CHLORIDE SERPL-SCNC: 111 MMOL/L (ref 94–109)
CO2 SERPL-SCNC: 20 MMOL/L (ref 20–32)
CREAT SERPL-MCNC: 1.28 MG/DL (ref 0.52–1.04)
ERYTHROCYTE [DISTWIDTH] IN BLOOD BY AUTOMATED COUNT: 12.6 % (ref 10–15)
GFR SERPL CREATININE-BSD FRML MDRD: 46 ML/MIN/{1.73_M2}
GLUCOSE SERPL-MCNC: 92 MG/DL (ref 70–99)
HCT VFR BLD AUTO: 40.7 % (ref 35–47)
HGB BLD-MCNC: 14.2 G/DL (ref 11.7–15.7)
MCH RBC QN AUTO: 30.7 PG (ref 26.5–33)
MCHC RBC AUTO-ENTMCNC: 34.9 G/DL (ref 31.5–36.5)
MCV RBC AUTO: 88 FL (ref 78–100)
PLATELET # BLD AUTO: 207 10E9/L (ref 150–450)
POTASSIUM SERPL-SCNC: 4.2 MMOL/L (ref 3.4–5.3)
RBC # BLD AUTO: 4.62 10E12/L (ref 3.8–5.2)
SODIUM SERPL-SCNC: 139 MMOL/L (ref 133–144)
T4 FREE SERPL-MCNC: 1.34 NG/DL (ref 0.76–1.46)
TSH SERPL DL<=0.005 MIU/L-ACNC: 1.77 MU/L (ref 0.4–4)
WBC # BLD AUTO: 5.5 10E9/L (ref 4–11)

## 2020-01-24 PROCEDURE — 82784 ASSAY IGA/IGD/IGG/IGM EACH: CPT | Performed by: INTERNAL MEDICINE

## 2020-01-24 PROCEDURE — 85027 COMPLETE CBC AUTOMATED: CPT | Performed by: INTERNAL MEDICINE

## 2020-01-24 PROCEDURE — 83516 IMMUNOASSAY NONANTIBODY: CPT | Performed by: INTERNAL MEDICINE

## 2020-01-24 PROCEDURE — 84165 PROTEIN E-PHORESIS SERUM: CPT | Performed by: INTERNAL MEDICINE

## 2020-01-24 PROCEDURE — 84439 ASSAY OF FREE THYROXINE: CPT | Performed by: INTERNAL MEDICINE

## 2020-01-24 PROCEDURE — 82306 VITAMIN D 25 HYDROXY: CPT | Performed by: INTERNAL MEDICINE

## 2020-01-24 PROCEDURE — 86334 IMMUNOFIX E-PHORESIS SERUM: CPT | Performed by: INTERNAL MEDICINE

## 2020-01-24 PROCEDURE — 36415 COLL VENOUS BLD VENIPUNCTURE: CPT | Performed by: INTERNAL MEDICINE

## 2020-01-24 PROCEDURE — 83516 IMMUNOASSAY NONANTIBODY: CPT | Mod: 59 | Performed by: INTERNAL MEDICINE

## 2020-01-24 PROCEDURE — 00000402 ZZHCL STATISTIC TOTAL PROTEIN: Performed by: INTERNAL MEDICINE

## 2020-01-24 PROCEDURE — 84443 ASSAY THYROID STIM HORMONE: CPT | Performed by: INTERNAL MEDICINE

## 2020-01-24 PROCEDURE — 80048 BASIC METABOLIC PNL TOTAL CA: CPT | Performed by: INTERNAL MEDICINE

## 2020-01-24 NOTE — LETTER
Patient:  Judith Nelson  :   1960  MRN:     [id number]        Ms.Pamela MARY Nelson  [address] [facility] [id number]        2020    Dear Mendy    Here are your labs which look pretty good.  This includes a normal blood count, thyroid, protein level, and vitamin D.  Your kidney function is a little bit elevated but this is consistent with previous values, if not slightly improved.    If you have any questions, please feel free to contact my nurse at [phone] select option #3 for triage nurse  or  option #1 for scheduling related questions.    Regards    Jenna Salas MD      Resulted Orders   CBC with platelets   Result Value Ref Range    WBC 5.5 4.0 - 11.0 10e9/L    RBC Count 4.62 3.8 - 5.2 10e12/L    Hemoglobin 14.2 11.7 - 15.7 g/dL    Hematocrit 40.7 35.0 - 47.0 %    MCV 88 78 - 100 fl    MCH 30.7 26.5 - 33.0 pg    MCHC 34.9 31.5 - 36.5 g/dL    RDW 12.6 10.0 - 15.0 %    Platelet Count 207 150 - 450 10e9/L   Protein Immunofixation Serum   Result Value Ref Range    Immunofixation ELP       No monoclonal protein seen on immunofixation.  Pathological significance requires clinical   correlation.        Comment:      Marcial Main M.D., Ph.D.     610 - 1,616 mg/dL    IGA 96 84 - 499 mg/dL    IGM 92 35 - 242 mg/dL   Protein electrophoresis   Result Value Ref Range    Albumin Fraction 4.3 3.7 - 5.1 g/dL    Alpha 1 Fraction 0.3 0.2 - 0.4 g/dL    Alpha 2 Fraction 0.6 0.5 - 0.9 g/dL    Beta Fraction 0.6 0.6 - 1.0 g/dL    Gamma Fraction 0.8 0.7 - 1.6 g/dL    Monoclonal Peak 0.0 0.0 g/dL    ELP Interpretation:       Essentially normal electrophoretic pattern.  No monoclonal protein seen. Pathologic   significance requires clinical correlation.  Marcial Main M.D., Ph.D., Pathologist.     Basic metabolic panel   Result Value Ref Range    Sodium 139 133 - 144 mmol/L    Potassium 4.2 3.4 - 5.3 mmol/L    Chloride 111 (H) 94 - 109 mmol/L    Carbon Dioxide 20 20 - 32 mmol/L    Anion  Gap 8 3 - 14 mmol/L    Glucose 92 70 - 99 mg/dL    Urea Nitrogen 16 7 - 30 mg/dL    Creatinine 1.28 (H) 0.52 - 1.04 mg/dL    GFR Estimate 46 (L) >60 mL/min/[1.73_m2]      Comment:      Non  GFR Calc  Starting 12/18/2018, serum creatinine based estimated GFR (eGFR) will be   calculated using the Chronic Kidney Disease Epidemiology Collaboration   (CKD-EPI) equation.      GFR Estimate If Black 53 (L) >60 mL/min/[1.73_m2]      Comment:       GFR Calc  Starting 12/18/2018, serum creatinine based estimated GFR (eGFR) will be   calculated using the Chronic Kidney Disease Epidemiology Collaboration   (CKD-EPI) equation.      Calcium 9.0 8.5 - 10.1 mg/dL   Tissue transglutaminase nia IgA and IgG   Result Value Ref Range    Tissue Transglutaminase Antibody IgA <1 <7 U/mL      Comment:      Negative  The tTG-IgA assay has limited utility for patients with decreased levels of   IgA. Screening for celiac disease should include IgA testing to rule out   selective IgA deficiency and to guide selection and interpretation of   serological testing. tTG-IgG testing may be positive in celiac disease   patients with IgA deficiency.      Tissue Transglutaminase Nia IgG <1 <7 U/mL      Comment:      Negative   T4 free   Result Value Ref Range    T4 Free 1.34 0.76 - 1.46 ng/dL   TSH   Result Value Ref Range    TSH 1.77 0.40 - 4.00 mU/L   25 Hydroxyvitamin D2 and D3   Result Value Ref Range    25 OH Vit D2 8 ug/L    25 OH Vit D3 22 ug/L    25 OH Vit D total 30 20 - 75 ug/L      Comment:      Season, race, dietary intake, and treatment affect the concentration of   25-hydroxy-Vitamin D. Values may decrease during winter months and increase   during summer months. Values 20-29 ug/L may indicate Vitamin D insufficiency   and values <20 ug/L may indicate Vitamin D deficiency.  This test was developed and its performance characteristics determined by the   [facility], [facility]  Chemistry Laboratory.   It has not been cleared or approved by the FDA. The laboratory is regulated   under CLIA as qualified to perform high-complexity testing. This test is used   for clinical purposes. It should not be regarded as investigational or for   research.         [facility]

## 2020-01-27 DIAGNOSIS — R93.89 ABNORMAL FINDINGS ON DIAGNOSTIC IMAGING OF OTHER SPECIFIED BODY STRUCTURES: ICD-10-CM

## 2020-01-27 DIAGNOSIS — M81.0 OSTEOPOROSIS, POSTMENOPAUSAL: ICD-10-CM

## 2020-01-27 LAB
ALBUMIN SERPL ELPH-MCNC: 4.3 G/DL (ref 3.7–5.1)
ALPHA1 GLOB SERPL ELPH-MCNC: 0.3 G/DL (ref 0.2–0.4)
ALPHA2 GLOB SERPL ELPH-MCNC: 0.6 G/DL (ref 0.5–0.9)
B-GLOBULIN SERPL ELPH-MCNC: 0.6 G/DL (ref 0.6–1)
CA-I SERPL ISE-MCNC: 4.8 MG/DL (ref 4.4–5.2)
GAMMA GLOB SERPL ELPH-MCNC: 0.8 G/DL (ref 0.7–1.6)
IGA SERPL-MCNC: 96 MG/DL (ref 84–499)
IGG SERPL-MCNC: 766 MG/DL (ref 610–1616)
IGM SERPL-MCNC: 92 MG/DL (ref 35–242)
M PROTEIN SERPL ELPH-MCNC: 0 G/DL
PROT PATTERN SERPL ELPH-IMP: NORMAL
PROT PATTERN SERPL IFE-IMP: NORMAL
TTG IGA SER-ACNC: <1 U/ML
TTG IGG SER-ACNC: <1 U/ML

## 2020-01-27 PROCEDURE — 36415 COLL VENOUS BLD VENIPUNCTURE: CPT | Performed by: INTERNAL MEDICINE

## 2020-01-27 PROCEDURE — 82330 ASSAY OF CALCIUM: CPT | Performed by: INTERNAL MEDICINE

## 2020-01-28 LAB
DEPRECATED CALCIDIOL+CALCIFEROL SERPL-MC: 30 UG/L (ref 20–75)
VITAMIN D2 SERPL-MCNC: 8 UG/L
VITAMIN D3 SERPL-MCNC: 22 UG/L

## 2020-01-30 ENCOUNTER — TELEPHONE (OUTPATIENT)
Dept: ENDOCRINOLOGY | Facility: CLINIC | Age: 60
End: 2020-01-30

## 2020-01-30 NOTE — TELEPHONE ENCOUNTER
"Note below sent to coordinators  \"please schedule pt for 6 month prolia injection in July 2020 - can do at [facility] if needed\"    --  Pt got prolia injection  - will schedule pt for 6 month prolia injection  --  Dear Mendy    Here are your labs which look pretty good.  This includes a normal blood count, thyroid, protein level, and vitamin D.  Your kidney function is a little bit elevated but this is consistent with previous values, if not slightly improved.    If you have any questions, please feel free to contact my nurse at [phone] select option #3 for triage nurse  or  option #1 for scheduling related questions.    Regards    Jenna Salas MD    No visits with results within 1 Day(s) from this visit.   Latest known visit with results is:   Orders Only on 01/27/2020   Component Date Value Ref Range Status     Calcium Ionized 01/27/2020 4.8  4.4 - 5.2 mg/dL Final         "

## 2020-02-10 ENCOUNTER — HEALTH MAINTENANCE LETTER (OUTPATIENT)
Age: 60
End: 2020-02-10

## 2020-02-10 NOTE — TELEPHONE ENCOUNTER
Received notification from [employer] Patient Assistance [employer] that pt has been approved for the 's patient assistance program from 1/2/2020 - 12/31/2020 for Opsumit and Uptravi.     Keisha Albarran  Clinic   Pulmonary Hypertension  [facility]  (P) [phone]

## 2020-02-18 ENCOUNTER — OFFICE VISIT (OUTPATIENT)
Dept: DERMATOLOGY | Facility: CLINIC | Age: 60
End: 2020-02-18
Attending: INTERNAL MEDICINE
Payer: MEDICARE

## 2020-02-18 VITALS — OXYGEN SATURATION: 91 % | DIASTOLIC BLOOD PRESSURE: 53 MMHG | HEART RATE: 78 BPM | SYSTOLIC BLOOD PRESSURE: 92 MMHG

## 2020-02-18 DIAGNOSIS — S90.222D: Primary | ICD-10-CM

## 2020-02-18 PROCEDURE — 99213 OFFICE O/P EST LOW 20 MIN: CPT | Performed by: DERMATOLOGY

## 2020-02-18 NOTE — LETTER
2020         RE: Judith Nelson  [address] [facility] [address]        Dear Colleague,    Thank you for referring your patient, Judith Nelson, to the [facility]. Please see a copy of my visit note below.    Judith Nelson , a 59 year old year old female patient, I was asked to see by Dr. Salas for hematoma on toenail.  Had trauma labor day and got hematoma and nail dystophy it can be tender, it is growing out.  Patient has no other skin complaints today.  Remainder of the HPI, Meds, PMH, Allergies, FH, and SH was reviewed in chart.      Past Medical History:   Diagnosis Date     Anxiety      CKD (chronic kidney disease), stage III (H)     biopsy suspicious for renal sarcoidosis      Hyperprolactinemia (H)      Lower extremity edema     chronic w/ chronic right ankle ulcer     Lumbar compression fracture (H)      Major depressive disorder     with psychotic features, followed by Dr. Rosales at [facility] in [facility]     patient is post menopausal     MGUS (monoclonal gammopathy of unknown significance)     mild, followed by Dr. Long     Osteoporosis      Other seborrheic keratosis      Protrusio acetabuli      Pulmonary hypertension (H)      Sarcoidosis      Stress-induced cardiomyopathy        Past Surgical History:   Procedure Laterality Date     C PELVIS/HIP JOINT SURGERY UNLISTED       Csection,  X 2       CV RIGHT HEART CATH N/A 2019    Procedure: 1130 RHC;  Surgeon: Jeanne Angel MD;  Location:  HEART CARDIAC CATH LAB     CYSTOSCOPY, RETROGRADES, INSERT STENT URETER(S), COMBINED  10/2/2013    Procedure: COMBINED CYSTOSCOPY, RETROGRADES, INSERT STENT URETER(S);  Left Retrograde Ureteropyelogram and Ureteral Stent Placement;  Surgeon: SONIA Martinez MD;  Location: [facility] OR     ENDOSCOPIC RETROGRADE CHOLANGIOPANCREATOGRAM  2012    Procedure:ENDOSCOPIC RETROGRADE CHOLANGIOPANCREATOGRAM; ERCP biliary sphincterotomy and stone  removal; Surgeon:MARGIE RUGGIERO; Location:[facility] OR     LAPAROSCOPIC CHOLECYSTECTOMY WITH CHOLANGIOGRAMS  2012    Procedure:LAPAROSCOPIC CHOLECYSTECTOMY WITH CHOLANGIOGRAMS; Surgeon:BRIANA PADILLA; Location:[facility] OR     ORTHOPEDIC SURGERY  10/1/2010    Right hip replacement     ZZ GASTROSCOPY,[place]  6/10    Erythematous duodenopathy        Family History   Problem Relation Age of Onset     Cancer Mother          age 62 leukemia     Gastrointestinal Disease Mother         diverticulitis     Hypertension Mother      Allergies Mother      Arthritis Mother      Depression Mother      Eye Disorder Mother      Osteoporosis Mother      Anxiety Disorder Mother      Mental Illness Mother      Asthma Mother      Other Cancer Mother      Migraines Mother      C.A.D. Father         MI/CAD      Cancer Father         skin     Blood Disease Father         renal  problem     Hypertension Father      Anesthesia Reaction Father      Cardiovascular Father      Neurologic Disorder Father         Parkinson's disease     Anxiety Disorder Father      Other Cancer Father      Hyperlipidemia Father      Coronary Artery Disease Father      C.A.D. Maternal Grandmother          late 82s MI     Diabetes Maternal Grandmother      Osteoporosis Maternal Grandmother      C.A.D. Maternal Grandfather          mid 80s MI     Alzheimer Disease Paternal Grandmother          80s     Alzheimer Disease Paternal Grandfather          80s     Neurologic Disorder Sister         migraines     Allergies Sister      Diabetes Other         AODM     Neurologic Disorder Sister         migraines     Allergies Sister      Anesthesia Reaction Son      Anesthesia Reaction Daughter      Anesthesia Reaction Daughter      Diabetes Sister         hypoglycemia     Anxiety Disorder Son      Anxiety Disorder Sister      Substance Abuse Sister      Asthma Sister      Asthma Daughter      Depression Son      Hypertension Sister      Hyperlipidemia  Sister      Migraines Sister        Social History     Socioeconomic History     Marital status:      Spouse name: Not on file     Number of children: 3     Years of education: 12     Highest education level: Not on file   Occupational History     Employer: UNEMPLOYED   Social Needs     Financial resource strain: Not on file     Food insecurity:     Worry: Not on file     Inability: Not on file     Transportation needs:     Medical: Not on file     Non-medical: Not on file   Tobacco Use     Smoking status: Never Smoker     Smokeless tobacco: Never Used   Substance and Sexual Activity     Alcohol use: No     Alcohol/week: 0.0 standard drinks     Comment: Occ.     Drug use: No     Sexual activity: Never     Birth control/protection: Spermicide   Lifestyle     Physical activity:     Days per week: Not on file     Minutes per session: Not on file     Stress: Not on file   Relationships     Social connections:     Talks on phone: Not on file     Gets together: Not on file     Attends Orthodoxy service: Not on file     Active member of club or organization: Not on file     Attends meetings of clubs or organizations: Not on file     Relationship status: Not on file     Intimate partner violence:     Fear of current or ex partner: Not on file     Emotionally abused: Not on file     Physically abused: Not on file     Forced sexual activity: Not on file   Other Topics Concern     Parent/sibling w/ CABG, MI or angioplasty before 65F 55M? No   Social History Narrative     Not on file       Outpatient Encounter Medications as of 2/18/2020   Medication Sig Dispense Refill     acetaminophen (TYLENOL) 500 MG tablet Take 1-2 tablets (500-1,000 mg) by mouth every 6 hours as needed for pain (Take as needed for pain.  Do not exceed 4 grams (8 tablets) in a day) (Patient taking differently: Take 1,000 mg by mouth every 6 hours as needed for pain (Take as needed for pain.  Do not exceed 4 grams (8 tablets) in a day) ) 45 tablet  10     ADVAIR DISKUS 250-50 MCG/DOSE inhaler INHALE 1 PUFF BY MOUTH TWICE A DAY 1 Inhaler 11     azaTHIOprine (IMURAN) 50 MG tablet Take 1 tablet (50 mg) by mouth daily 90 tablet 3     BUSPIRONE HCL PO Take 15 mg by mouth 2 times daily       calcitRIOL (ROCALTROL) 0.25 MCG capsule Take 1 capsule (0.25 mcg) by mouth daily 30 capsule 1     carvedilol (COREG) 6.25 MG tablet Take 1 tablet (6.25 mg) by mouth 2 times daily (with meals) 180 tablet 3     FLUoxetine (PROZAC) 20 MG capsule Take 1 capsule (20 mg) by mouth daily 30 capsule 1     furosemide (LASIX) 20 MG tablet Take 1 tablet by mouth on Monday, Wednesday & Friday's 45 tablet 3     lamoTRIgine (LAMICTAL) 25 MG tablet Take 100 mg by mouth daily        LORazepam (ATIVAN) 0.5 MG tablet Take 2 tablets (1 mg) by mouth 2 times daily 90 tablet 1     macitentan (OPSUMIT) 10 MG tablet Take 1 tablet (10 mg) by mouth daily 30 tablet 11     order for DME Oxygen       ORDER FOR DME Equipment being ordered: SHOWER CHAIR  FROM [employer] 1 Device 0     potassium citrate (UROCIT-K) 10 MEQ (1080 MG) CR tablet Take 10 mEq by mouth three times a week On Monday, Wednesday and Friday       ranitidine (RANITIDINE) 75 MG tablet Take 1-2 tablets ( mg) by mouth 2 times daily (Patient taking differently: Take 150 mg by mouth At Bedtime ) 60 tablet 11     risperiDONE (RISPERDAL) 0.25 MG tablet Take 0.5 mg by mouth every morning       selexipag (UPTRAVI) 1600 MCG tablet Take 1 tablet (1,600 mcg) by mouth every 12 hours 60 tablet 11     simvastatin (ZOCOR) 10 MG tablet Take 1 tablet by mouth At Bedtime. 90 tablet 1     tadalafil, PAH, (ADCIRCA) 20 MG TABS Take 2 tablets (40 mg) by mouth daily 60 tablet 11     No facility-administered encounter medications on file as of 2/18/2020.              Review Of Systems  Skin: As above  Eyes: negative  Ears/Nose/Throat: negative  Respiratory: No shortness of breath, dyspnea on exertion, cough, or hemoptysis  Cardiovascular:  negative  Gastrointestinal: negative  Genitourinary: negative  Musculoskeletal: negative  Neurologic: negative  Psychiatric: negative  Hematologic/Lymphatic/Immunologic: negative  Endocrine: negative      O:   NAD, WDWN, Alert & Oriented, Mood & Affect wnl, Vitals stable   Here today with daughter    BP 92/53   Pulse 78   SpO2 91%    General appearance luz maria ii   Vitals stable   Alert, oriented and in no acute distress     L great toe with dystrophy and red plaque beneath toe, temoval with scraping   Proximal nail is clear, no cuticle lesions   The remainder of expanded problem focused exam was unremarkable; the following areas were examined:  scalp/hair, conjunctiva/lids, face, neck, lips, chest, digits/nails, RUE, LUE.      Eyes: Conjunctivae/lids:Normal     ENT: Lips, buccal mucosa, tongue: normal    MSK:Normal    Cardiovascular: peripheral edema none    Pulm: Breathing Normal    Neuro/Psych: Orientation:Normal; Mood/Affect:Normal      A/P:  1. Toe nail hematoma  Avulsion discussed with patient she declines  Waiting for toe to grow discussed with patient 16-24 months  She prefers to observe  Signs and Symptoms of skin cancer discussed with patient.    Skin care regimen reviewed with patient: Eliminate harsh soaps, i.e. Dial, zest, irsih spring; Mild soaps such as Cetaphil or Dove sensitive skin, avoid hot or cold showers, aggressive use of emollients including vanicream, cetaphil or cerave discussed with patient.    Return to clinic prn       Again, thank you for allowing me to participate in the care of your patient.        Sincerely,        Benito Baltazar MD

## 2020-02-18 NOTE — PROGRESS NOTES
Judith Nelson , a 59 year old year old female patient, I was asked to see by Dr. Salas for hematoma on toenail.  Had trauma labor day and got hematoma and nail dystophy it can be tender, it is growing out.  Patient has no other skin complaints today.  Remainder of the HPI, Meds, PMH, Allergies, FH, and SH was reviewed in chart.      Past Medical History:   Diagnosis Date     Anxiety      CKD (chronic kidney disease), stage III (H)     biopsy suspicious for renal sarcoidosis      Hyperprolactinemia (H)      Lower extremity edema     chronic w/ chronic right ankle ulcer     Lumbar compression fracture (H)      Major depressive disorder     with psychotic features, followed by Dr. Rosales at [facility] in [place]     Menopause     patient is post menopausal     MGUS (monoclonal gammopathy of unknown significance)     mild, followed by Dr. Long     Osteoporosis      Other seborrheic keratosis      Protrusio acetabuli      Pulmonary hypertension (H)      Sarcoidosis      Stress-induced cardiomyopathy        Past Surgical History:   Procedure Laterality Date     C PELVIS/HIP JOINT SURGERY UNLISTED       Csection,  X 2       CV RIGHT HEART CATH N/A 2019    Procedure: 1130 RHC;  Surgeon: Jeanne Angel MD;  Location:  HEART CARDIAC CATH LAB     CYSTOSCOPY, RETROGRADES, INSERT STENT URETER(S), COMBINED  10/2/2013    Procedure: COMBINED CYSTOSCOPY, RETROGRADES, INSERT STENT URETER(S);  Left Retrograde Ureteropyelogram and Ureteral Stent Placement;  Surgeon: SONIA Martinez MD;  Location: [facility] OR     ENDOSCOPIC RETROGRADE CHOLANGIOPANCREATOGRAM  2012    Procedure:ENDOSCOPIC RETROGRADE CHOLANGIOPANCREATOGRAM; ERCP biliary sphincterotomy and stone removal; Surgeon:MARGIE RUGGIEROIQ; Location: OR     LAPAROSCOPIC CHOLECYSTECTOMY WITH CHOLANGIOGRAMS  2012    Procedure:LAPAROSCOPIC CHOLECYSTECTOMY WITH CHOLANGIOGRAMS; Surgeon:BRIANA PADILLA; Location:[facility]     ORTHOPEDIC SURGERY   10/1/2010    Right hip replacement     ZZ GASTROSCOPY,[place]  6/10    Erythematous duodenopathy        Family History   Problem Relation Age of Onset     Cancer Mother          age 62 leukemia     Gastrointestinal Disease Mother         diverticulitis     Hypertension Mother      Allergies Mother      Arthritis Mother      Depression Mother      Eye Disorder Mother      Osteoporosis Mother      Anxiety Disorder Mother      Mental Illness Mother      Asthma Mother      Other Cancer Mother      Migraines Mother      C.A.D. Father         MI/CAD      Cancer Father         skin     Blood Disease Father         renal  problem     Hypertension Father      Anesthesia Reaction Father      Cardiovascular Father      Neurologic Disorder Father         Parkinson's disease     Anxiety Disorder Father      Other Cancer Father      Hyperlipidemia Father      Coronary Artery Disease Father      C.A.D. Maternal Grandmother          late 82s MI     Diabetes Maternal Grandmother      Osteoporosis Maternal Grandmother      C.A.D. Maternal Grandfather          mid 80s MI     Alzheimer Disease Paternal Grandmother          80s     Alzheimer Disease Paternal Grandfather          80s     Neurologic Disorder Sister         migraines     Allergies Sister      Diabetes Other         AODM     Neurologic Disorder Sister         migraines     Allergies Sister      Anesthesia Reaction Son      Anesthesia Reaction Daughter      Anesthesia Reaction Daughter      Diabetes Sister         hypoglycemia     Anxiety Disorder Son      Anxiety Disorder Sister      Substance Abuse Sister      Asthma Sister      Asthma Daughter      Depression Son      Hypertension Sister      Hyperlipidemia Sister      Migraines Sister        Social History     Socioeconomic History     Marital status:      Spouse name: Not on file     Number of children: 3     Years of education: 12     Highest education level: Not on file   Occupational History      Employer: UNEMPLOYED   Social Needs     Financial resource strain: Not on file     Food insecurity:     Worry: Not on file     Inability: Not on file     Transportation needs:     Medical: Not on file     Non-medical: Not on file   Tobacco Use     Smoking status: Never Smoker     Smokeless tobacco: Never Used   Substance and Sexual Activity     Alcohol use: No     Alcohol/week: 0.0 standard drinks     Comment: Occ.     Drug use: No     Sexual activity: Never     Birth control/protection: Spermicide   Lifestyle     Physical activity:     Days per week: Not on file     Minutes per session: Not on file     Stress: Not on file   Relationships     Social connections:     Talks on phone: Not on file     Gets together: Not on file     Attends Zoroastrian service: Not on file     Active member of club or organization: Not on file     Attends meetings of clubs or organizations: Not on file     Relationship status: Not on file     Intimate partner violence:     Fear of current or ex partner: Not on file     Emotionally abused: Not on file     Physically abused: Not on file     Forced sexual activity: Not on file   Other Topics Concern     Parent/sibling w/ CABG, MI or angioplasty before 65F 55M? No   Social History Narrative     Not on file       Outpatient Encounter Medications as of 2/18/2020   Medication Sig Dispense Refill     acetaminophen (TYLENOL) 500 MG tablet Take 1-2 tablets (500-1,000 mg) by mouth every 6 hours as needed for pain (Take as needed for pain.  Do not exceed 4 grams (8 tablets) in a day) (Patient taking differently: Take 1,000 mg by mouth every 6 hours as needed for pain (Take as needed for pain.  Do not exceed 4 grams (8 tablets) in a day) ) 45 tablet 10     ADVAIR DISKUS 250-50 MCG/DOSE inhaler INHALE 1 PUFF BY MOUTH TWICE A DAY 1 Inhaler 11     azaTHIOprine (IMURAN) 50 MG tablet Take 1 tablet (50 mg) by mouth daily 90 tablet 3     BUSPIRONE HCL PO Take 15 mg by mouth 2 times daily       calcitRIOL  (ROCALTROL) 0.25 MCG capsule Take 1 capsule (0.25 mcg) by mouth daily 30 capsule 1     carvedilol (COREG) 6.25 MG tablet Take 1 tablet (6.25 mg) by mouth 2 times daily (with meals) 180 tablet 3     FLUoxetine (PROZAC) 20 MG capsule Take 1 capsule (20 mg) by mouth daily 30 capsule 1     furosemide (LASIX) 20 MG tablet Take 1 tablet by mouth on Monday, Wednesday & Friday's 45 tablet 3     lamoTRIgine (LAMICTAL) 25 MG tablet Take 100 mg by mouth daily        LORazepam (ATIVAN) 0.5 MG tablet Take 2 tablets (1 mg) by mouth 2 times daily 90 tablet 1     macitentan (OPSUMIT) 10 MG tablet Take 1 tablet (10 mg) by mouth daily 30 tablet 11     order for DME Oxygen       ORDER FOR DME Equipment being ordered: SHOWER CHAIR  FROM [employer] 1 Device 0     potassium citrate (UROCIT-K) 10 MEQ (1080 MG) CR tablet Take 10 mEq by mouth three times a week On Monday, Wednesday and Friday       ranitidine (RANITIDINE) 75 MG tablet Take 1-2 tablets ( mg) by mouth 2 times daily (Patient taking differently: Take 150 mg by mouth At Bedtime ) 60 tablet 11     risperiDONE (RISPERDAL) 0.25 MG tablet Take 0.5 mg by mouth every morning       selexipag (UPTRAVI) 1600 MCG tablet Take 1 tablet (1,600 mcg) by mouth every 12 hours 60 tablet 11     simvastatin (ZOCOR) 10 MG tablet Take 1 tablet by mouth At Bedtime. 90 tablet 1     tadalafil, PAH, (ADCIRCA) 20 MG TABS Take 2 tablets (40 mg) by mouth daily 60 tablet 11     No facility-administered encounter medications on file as of 2/18/2020.              Review Of Systems  Skin: As above  Eyes: negative  Ears/Nose/Throat: negative  Respiratory: No shortness of breath, dyspnea on exertion, cough, or hemoptysis  Cardiovascular: negative  Gastrointestinal: negative  Genitourinary: negative  Musculoskeletal: negative  Neurologic: negative  Psychiatric: negative  Hematologic/Lymphatic/Immunologic: negative  Endocrine: negative      O:   NAD, WDWN, Alert & Oriented, Mood & Affect wnl, Vitals  stable   Here today with daughter    BP 92/53   Pulse 78   SpO2 91%    General appearance luz maria ii   Vitals stable   Alert, oriented and in no acute distress     L great toe with dystrophy and red plaque beneath toe, temoval with scraping   Proximal nail is clear, no cuticle lesions   The remainder of expanded problem focused exam was unremarkable; the following areas were examined:  scalp/hair, conjunctiva/lids, face, neck, lips, chest, digits/nails, RUE, LUE.      Eyes: Conjunctivae/lids:Normal     ENT: Lips, buccal mucosa, tongue: normal    MSK:Normal    Cardiovascular: peripheral edema none    Pulm: Breathing Normal    Neuro/Psych: Orientation:Normal; Mood/Affect:Normal      A/P:  1. Toe nail hematoma  Avulsion discussed with patient she declines  Waiting for toe to grow discussed with patient 16-24 months  She prefers to observe  Signs and Symptoms of skin cancer discussed with patient.    Skin care regimen reviewed with patient: Eliminate harsh soaps, i.e. Dial, zest, irsih spring; Mild soaps such as Cetaphil or Dove sensitive skin, avoid hot or cold showers, aggressive use of emollients including vanicream, cetaphil or cerave discussed with patient.    Return to clinic prn

## 2020-02-27 ENCOUNTER — HOSPITAL ENCOUNTER (OUTPATIENT)
Dept: GENERAL RADIOLOGY | Facility: CLINIC | Age: 60
Discharge: HOME | End: 2020-02-27
Attending: INTERNAL MEDICINE | Admitting: INTERNAL MEDICINE
Payer: MEDICARE

## 2020-02-27 ENCOUNTER — HOSPITAL ENCOUNTER (OUTPATIENT)
Dept: BONE DENSITY | Facility: CLINIC | Age: 60
End: 2020-02-27
Attending: FAMILY MEDICINE
Payer: MEDICARE

## 2020-02-27 DIAGNOSIS — M81.0 OSTEOPOROSIS: ICD-10-CM

## 2020-02-27 DIAGNOSIS — M81.0 OSTEOPOROSIS, POSTMENOPAUSAL: ICD-10-CM

## 2020-02-27 PROCEDURE — 72100 X-RAY EXAM L-S SPINE 2/3 VWS: CPT

## 2020-02-27 PROCEDURE — 77080 DXA BONE DENSITY AXIAL: CPT

## 2020-03-01 NOTE — PROGRESS NOTES
Discharge instructions reviewed with her, R internal jugular site is dry and intact. Denies any pain. Eating and taking po fluids. Walked to the shuttle to meet with Dr. Franco.   details… detailed exam

## 2020-03-05 ENCOUNTER — TELEPHONE (OUTPATIENT)
Dept: ENDOCRINOLOGY | Facility: CLINIC | Age: 60
End: 2020-03-05

## 2020-03-05 NOTE — TELEPHONE ENCOUNTER
Call patient.  Patient to do Prolia every 6 months.  Likely bone loss noted on DXA is related to her break from Prolia 3/20/2018 and  -  Dear Judith    Here is your back x-ray which shows a chronic compression fracture.  This is not new.  Your bone density does show some ongoing bone loss.  This was due to the break from Prolia that you had because of your dental issues.  I think this point that we continue with the Prolia every 6 months.  I will see you in July for repeat Prolia shot and we can talk about how to schedule the second Prolia at [facility] 6 months subsequently.    If you have any questions, please feel free to contact my nurse at [phone] select option #3 for triage nurse  or  option #1 for scheduling related questions.    Regards    Jenna Salas MD

## 2020-03-19 ENCOUNTER — TELEPHONE (OUTPATIENT)
Dept: DERMATOLOGY | Facility: CLINIC | Age: 60
End: 2020-03-19

## 2020-03-19 NOTE — TELEPHONE ENCOUNTER
Reason for call:  Patient reporting a symptom    Symptom or request: Pt calling - states she was in here about a month ago - toe nail issue.  States the toe nail is falling off on it's own now - wondering what the treatment.  Can't head out to the store right now due to lung condition.    Duration (how long have symptoms been present):     Have you been treated for this before? Yes    Additional comments:     Phone Number patient can be reached at:  Home number on file [phone] (home)    Best Time:      Can we leave a detailed message on this number:  YES    Call taken on 3/19/2020 at 4:19 PM by Judith Junior

## 2020-03-20 NOTE — TELEPHONE ENCOUNTER
Spoke to patient and she stated that her toenail is almost ready to fall off. She stated she did not think it was growing out at all yet. She denied signs of infection. Pt stated she cannot come to clinic to have the toenail removed at this time due to the Covid virus. Discussed with patient that the toenail will naturally fall off and if she can use a toenail clipper to trim it that is ok. Advised to use vaseline or bacitracin if she ended up with an open area when the toenail fell off. Also advised her to keep it clean. Pt verbalized understanding. Discussed with pt how to wash with wash cloth as she stated she cannot carlos over due to her bad hip.   Cori FORRESTER RN BSN PHN  Specialty Clinics

## 2020-04-01 ENCOUNTER — TELEPHONE (OUTPATIENT)
Dept: CARDIOLOGY | Facility: CLINIC | Age: 60
End: 2020-04-01

## 2020-04-01 NOTE — TELEPHONE ENCOUNTER
Prior Authorization Not Needed per Insurance    Medication: Tadalafil 20mg - PA Not Needed  Insurance Company: [employer] Part D - Phone [phone] Fax [phone]     Pharmacy Filling the Rx: [id number] - [address]  ----------------------------  Insurance: Carol Ann  BIN: [id number]  PCN: [id number]  ID#: [id number]  GRP#: [id number]

## 2020-04-23 ENCOUNTER — TELEPHONE (OUTPATIENT)
Dept: PULMONOLOGY | Facility: CLINIC | Age: 60
End: 2020-04-23

## 2020-04-23 ENCOUNTER — TELEPHONE (OUTPATIENT)
Dept: CARDIOLOGY | Facility: CLINIC | Age: 60
End: 2020-04-23

## 2020-04-23 NOTE — TELEPHONE ENCOUNTER
[facility] Call Center    Phone Message    May a detailed message be left on voicemail: yes     Reason for Call: Other: Judith calling to check in with the care team about her appointment that was cancelled on 3/11/20 with Dr. Franco. Judith was supposed to have labs, a 6 minute walk test, and an echocardiogram that day as well. Judith would like to get a call back to discuss when these appointments should be rescheduled due to the COVID-19 crisis. Please give Judith a call back at your earliest convenience to discuss.     Action Taken: Message routed to:  [facility]  Cardio    Travel Screening: Not Applicable

## 2020-04-23 NOTE — TELEPHONE ENCOUNTER
Called patient to re-schedule appt that had been cancelled last minute in March.  No one had called her to re-schedule, so she called to follow up.    She prefers a telephone vs Video appt on Wed. 5/6/20 @ 12:00pm. Placed on Hold and sent msg to Keisha to cement appt. Keyanna Mccray RN on 4/23/2020 at 4:11 PM

## 2020-04-23 NOTE — TELEPHONE ENCOUNTER
[facility] Call Center    Phone Message    May a detailed message be left on voicemail: no     Reason for Call: Medication Question or concern regarding medication   Prescription Clarification  Name of Medication: azaTHIOprine (IMURAN) 50 MG tablet   Prescribing Provider: Dr. Flaherty   Pharmacy: [facility]   What on the order needs clarification? Pt is concerned she has not actually been taking this and doesn't know for how long. She called [facility] to have them call us for this issue. Please call Pt back to discuss, otherwise for other questions [facility] can be contacted.          Action Taken: Message routed to:  [facility] UMP PULM ADULT [facility]    Travel Screening: Not Applicable

## 2020-04-27 DIAGNOSIS — R06.02 SOB (SHORTNESS OF BREATH): ICD-10-CM

## 2020-04-27 DIAGNOSIS — I27.20 PULMONARY HYPERTENSION (H): ICD-10-CM

## 2020-04-27 DIAGNOSIS — I51.81 STRESS-INDUCED CARDIOMYOPATHY: ICD-10-CM

## 2020-04-27 NOTE — TELEPHONE ENCOUNTER
Spoke to patient, she has not been taking Imuran since June when she transferred pharmacies. Stated breathing has been stable. Does not need to restart at this time. Plan to discuss with Dr Flaherty at virtual visit next week.

## 2020-04-29 ENCOUNTER — VIRTUAL VISIT (OUTPATIENT)
Dept: PULMONOLOGY | Facility: CLINIC | Age: 60
End: 2020-04-29
Attending: INTERNAL MEDICINE
Payer: MEDICARE

## 2020-04-29 DIAGNOSIS — D86.9 SARCOIDOSIS: Primary | ICD-10-CM

## 2020-04-29 RX ORDER — CARVEDILOL 3.12 MG/1
TABLET ORAL
Qty: 56 TABLET | OUTPATIENT
Start: 2020-04-29

## 2020-04-29 RX ORDER — FUROSEMIDE 20 MG
TABLET ORAL
Qty: 12 TABLET | OUTPATIENT
Start: 2020-04-29

## 2020-04-29 RX ORDER — TOPIRAMATE 50 MG/1
50 TABLET, FILM COATED ORAL DAILY
COMMUNITY

## 2020-04-29 NOTE — TELEPHONE ENCOUNTER
potassium chloride ER (KLOR-CON M) 10 MEQ CR tablet       Last Written Prescription Date:  NOT ON MED LIST  Discontinued  Medication Reconciliation Clean Latoya German RN  9/11    Last Office Visit : 9-11-19  Future Office visit:  5-6-20    Routing refill request to provider for review/approval because:  Drug not active on patient's medication list      DENIED:   FUROSEMIDE 20MG TAB: DUPLICATE,LAST FILL 1-2-20 FOR 45 TAB W/3 RF :[facility] [address] [place] [address] PHARM CHANGE      CARVEDILOL 3.125MG TAB DUPLICATE,LAST FILL 1-2-20  TAB W/3 RF,[facility] [address] [place] [address] : PHARM CHANGE

## 2020-04-29 NOTE — PROGRESS NOTES
"The patient has been notified of the following:      \"We have found that certain health care needs can be provided without the need for a face to face visit.  This service lets us provide the care you need with a phone conversation.       I will have full access to your [facility] medical record during this entire phone call.   I will be taking notes for your medical record.      Since this is like an office visit, we will bill your insurance company for this service.       There are potential benefits and risks of telephone visits (e.g. limits to patient confidentiality) that differ from in-person visits.?  Confidentiality still applies for telephone services, and nobody will record the visit.  It is important to be in a quiet, private space that is free of distractions (including cell phone or other devices) during the visit.??      If during the course of the call I believe a telephone visit is not appropriate, you will not be charged for this service\"     Consent has been obtained for this service by care team member: Yes        Reason for Visit  Judith Nelson is a 60 year old year old female for follow up of sarcoidosis  Pulmonary HPI    Ms. Nelson was last seen in the pulmonary clinic on July 3, 2019.  At that time she reported being on Imuran 50 mg/day for sarcoid renal involvement.  She also was on treatment for pulmonary hypertension likely as a pH.  She has been evaluated in the emergency room for musculoskeletal chest pain.  She was also evaluated by nephrology where it was discovered that she has not been on Imuran since almost a year or so back.  The patient is also questioning if she is on the right dose of carvedilol.    Overall the patient reports high level of stress.  Some of this is related to COVID pandemic.  She also reports that she might have to look for a different living place as the rent for her townhouse has increased.  She also has some stress related to her daughters " pregnancy.    She reports no new pulmonary symptoms but reports significant fatigue.    Current Outpatient Medications   Medication     acetaminophen (TYLENOL) 500 MG tablet     BUSPIRONE HCL PO     calcitRIOL (ROCALTROL) 0.25 MCG capsule     carvedilol (COREG) 6.25 MG tablet     FLUoxetine (PROZAC) 20 MG capsule     furosemide (LASIX) 20 MG tablet     lamoTRIgine (LAMICTAL) 25 MG tablet     LORazepam (ATIVAN) 0.5 MG tablet     macitentan (OPSUMIT) 10 MG tablet     order for DME     ORDER FOR DME     potassium citrate (UROCIT-K) 10 MEQ (1080 MG) CR tablet     ranitidine (RANITIDINE) 75 MG tablet     selexipag (UPTRAVI) 1600 MCG tablet     simvastatin (ZOCOR) 10 MG tablet     tadalafil, PAH, (ADCIRCA) 20 MG TABS     topiramate (TOPAMAX) 50 MG tablet     ADVAIR DISKUS 250-50 MCG/DOSE inhaler     azaTHIOprine (IMURAN) 50 MG tablet     risperiDONE (RISPERDAL) 0.25 MG tablet     No current facility-administered medications for this visit.      Allergies   Allergen Reactions     Dilaudid [Hydromorphone] Nausea and Vomiting     Morphine Nausea and Vomiting     Latex Rash     From gloves     Past Medical History:   Diagnosis Date     Anxiety      CKD (chronic kidney disease), stage III (H)     biopsy suspicious for renal sarcoidosis      Hyperprolactinemia (H)      Lower extremity edema     chronic w/ chronic right ankle ulcer     Lumbar compression fracture (H)      Major depressive disorder     with psychotic features, followed by Dr. Rosales at [facility] in [place]     Menopause     patient is post menopausal     MGUS (monoclonal gammopathy of unknown significance)     mild, followed by Dr. Long     Osteoporosis      Other seborrheic keratosis      Protrusio acetabuli      Pulmonary hypertension (H)      Sarcoidosis      Stress-induced cardiomyopathy        Past Surgical History:   Procedure Laterality Date     C PELVIS/HIP JOINT SURGERY UNLISTED       Csection,  X 2       CV RIGHT HEART CATH N/A  1/16/2019    Procedure: 1130 RHC;  Surgeon: Jeanne Angel MD;  Location:  HEART CARDIAC CATH LAB     CYSTOSCOPY, RETROGRADES, INSERT STENT URETER(S), COMBINED  10/2/2013    Procedure: COMBINED CYSTOSCOPY, RETROGRADES, INSERT STENT URETER(S);  Left Retrograde Ureteropyelogram and Ureteral Stent Placement;  Surgeon: SONIA Martinez MD;  Location: [facility] OR     ENDOSCOPIC RETROGRADE CHOLANGIOPANCREATOGRAM  1/22/2012    Procedure:ENDOSCOPIC RETROGRADE CHOLANGIOPANCREATOGRAM; ERCP biliary sphincterotomy and stone removal; Surgeon:MARGIE RUGGIERO; Location: OR     LAPAROSCOPIC CHOLECYSTECTOMY WITH CHOLANGIOGRAMS  1/21/2012    Procedure:LAPAROSCOPIC CHOLECYSTECTOMY WITH CHOLANGIOGRAMS; Surgeon:BRIANA PADILLA; Location:[facility] OR     ORTHOPEDIC SURGERY  10/1/2010    Right hip replacement     ZZ GASTROSCOPY,[place]  6/10    Erythematous duodenopathy       Social History     Socioeconomic History     Marital status:      Spouse name: Not on file     Number of children: 3     Years of education: 12     Highest education level: Not on file   Occupational History     Employer: UNEMPLOYED   Social Needs     Financial resource strain: Not on file     Food insecurity     Worry: Not on file     Inability: Not on file     Transportation needs     Medical: Not on file     Non-medical: Not on file   Tobacco Use     Smoking status: Never Smoker     Smokeless tobacco: Never Used   Substance and Sexual Activity     Alcohol use: No     Alcohol/week: 0.0 standard drinks     Comment: Occ.     Drug use: No     Sexual activity: Never     Birth control/protection: Spermicide   Lifestyle     Physical activity     Days per week: Not on file     Minutes per session: Not on file     Stress: Not on file   Relationships     Social connections     Talks on phone: Not on file     Gets together: Not on file     Attends Confucianist service: Not on file     Active member of club or organization: Not on file     Attends meetings of clubs or  organizations: Not on file     Relationship status: Not on file     Intimate partner violence     Fear of current or ex partner: Not on file     Emotionally abused: Not on file     Physically abused: Not on file     Forced sexual activity: Not on file   Other Topics Concern     Parent/sibling w/ CABG, MI or angioplasty before 65F 55M? No   Social History Narrative     Not on file       ROS Pulmonary    A complete ROS was otherwise negative except as noted in the HPI.  There were no vitals taken for this visit.  Exam: unable to perform    Results:  No results found for this or any previous visit (from the past 168 hour(s)).    Assessment and plan: Ms. Nelson is a 59-year-old female with sarcoidosis based on granulomatous inflammation on a kidney biopsy and hypercalcemia, minimal pulmonary disease but evidence of pulmonary hypertension with near-systemic pressures currently on no  anti-inflammatory therapy and receiving treatment for PHTN selexipag, tadalafil and macitentan for pulmonary hypertension.     1.  Pulmonary: Follow-up with Dr. Franco for pulmonary hypertension.  Till then continue with current agents.  Will need PFTs.  2.  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  I discussed the case with Dr. Garcia today.  She has labs pending which will be done on Friday.  We will add TSH, ACE level, soluble IL-2 are levels.  Metabolic like labs are being done at that time.    Once the above labs are available we can decide if she should go back on systemic anti-inflammatory therapy.  Even if her creatinine is within normal range one reason would be to continue Imuran with SAP H being a concern.    I will see her back in 3 months.        Addendum: Discussed the case with Dr. Hidalgo.  Labs reviewed.  No critical need to restart Imuran for renal disease.  Unclear if she will need immunosuppression for pulmonary hypertension which is likely SAPH.  Will review with Dr. Roberts.

## 2020-05-01 RX ORDER — POTASSIUM CHLORIDE 750 MG/1
TABLET, EXTENDED RELEASE ORAL
Qty: 12 TABLET | Refills: 2 | OUTPATIENT
Start: 2020-05-01

## 2020-05-01 NOTE — TELEPHONE ENCOUNTER
Attempted to call patient to verify which pharmacy she is using, but got busy signal.    Called [facility] and asked how long she has been using them; Since before January 2020.  Asked if they were waiting on any more refills?  No, it appears they just recently received refills on most of her meds, so they didn't need anything right now.  Verified understanding.    Closed request. Keyanna Mccray RN on 5/1/2020 at 11:21 AM

## 2020-05-06 ENCOUNTER — VIRTUAL VISIT (OUTPATIENT)
Dept: CARDIOLOGY | Facility: CLINIC | Age: 60
End: 2020-05-06
Attending: INTERNAL MEDICINE
Payer: MEDICARE

## 2020-05-06 VITALS — BODY MASS INDEX: 19.84 KG/M2 | WEIGHT: 113.8 LBS

## 2020-05-06 DIAGNOSIS — R06.02 SOB (SHORTNESS OF BREATH): ICD-10-CM

## 2020-05-06 DIAGNOSIS — I27.20 PULMONARY HYPERTENSION (H): ICD-10-CM

## 2020-05-06 PROCEDURE — 99443 ZZC PHYSICIAN TELEPHONE EVALUATION 21-30 MIN: CPT | Performed by: INTERNAL MEDICINE

## 2020-05-06 ASSESSMENT — PAIN SCALES - GENERAL: PAINLEVEL: NO PAIN (0)

## 2020-05-06 NOTE — PROGRESS NOTES
Pulmonary hypertension treated with three oral agents  Sarcoidosis treated with Imuran  CKD with positive renal biopsy for sarcoid  Oxygen dependent respiratory failure  Dysthymic disorder    I conducted a virtual telephone visit with this patient's permission. The patient did not wish to have video visit, We discussed the patient's current condition, reviewed interim history since last visit, current functional status, diet, and possible cardiac symptoms including: chest pain, tightness, heaviness, pressure, PND, orthopnea, ankle edema, increasing abdominal girth, weight gain, palpitation, pre-syncope or syncope.  We also reviewed the patient's current medications and cross checked for allergies.  The duration of the call was 25 minutes    Constitutional: weight loss, fever, chills, night sweats  HEENT: without visual changes, swallow difficulties  Pulmonary: without shortness of breath, cough, wheeze, hemoptysis  Cardiac: without chest pain, JUAN, PND, orthopnea, edema, palpitation, pre-syncope, syncope,  GI: without diarrhea, constipation, jaundice, melena, GERD, hematemesis  : without frequency, urgency, dysuria, hematuria  Skin: rash, bruise, open lesions  Neuro: without TIA, focal neurologic complaints, seizure, trauma  Ortho: without pain, swelling, mobility impairment  Endocrine: diabetes, thyroid, heat/cold intolerance, polyuria, polyphagia, change bowel habits.  Sleep:no IVETTE, periodic breathing, fatigue      I spent 30 minutes reviewing chart, coordinating care and preparing report    We reviewed and suggested:  1. Viral epidemic safety suggestions  2. Continued adherence to medical regimen, diet, and exercise program as previously described  3. Current scheduled visit deferred in light of current situation  4. Call immediately should your health status change, and we will make arrangements to see you immediately.      Suggest:  For June 1. Repeat right heart catherization and echocardiogram  2. Obtain  laboratories to include CBC, CMP, crpinflammation, IL-6    The patient is not a candidate for lung transplantation or likely parenteral prostacyclin in view of her prolonged inability to consistently grasp and execute on the chronic nature of her disease.      If elevation of IL-6 or other inflammatory markers might consider adding chronic immunosuppression with tacrolimus.            Constitutional:+ weight loss, but no fever, chills, night sweats  HEENT: without visual changes, swallow difficulties  Pulmonary: with shortness of breath, but no cough, wheeze, hemoptysis  Cardiac: without chest pain, JUAN, PND, orthopnea, edema, palpitation, pre-syncope, syncope,  GI: without diarrhea, constipation, jaundice, melena, GERD, hematemesis  : without frequency, urgency, dysuria, hematuria  Skin: rash, bruise, open lesions  Neuro: without TIA, focal neurologic complaints, seizure, trauma  Ortho: without pain, swelling, mobility impairment  Endocrine: diabetes, thyroid, heat/cold intolerance, polyuria, polyphagia, change bowel habits.  Sleep:no IVETTE, periodic breathing, fatigue  Other: anxious regarding recent hospitalization for psychotic episodes     Constitutional: alert, oriented, normal gait and station, normal mentation.  Oral: moist mucous membranes  Lymph: without pathologic adenopathy  Chest: clear to ausculation and percussion  Cor: No evidence of left or right ventricular activity.  Rhythm is regular.  S1 normal, S2 split physiologically. Murmurs are not present  Abdomen: without tenderness, rebound, guarding, masses, ascites  Extremities: Edema not present  Neuro: no focal defects, normal mentation  Skin: without open lesions  Psych: oriented, verbal, pressured speech, perseveration, not suicidal     Catherization 2019    Echocardiogram 2019  Name: ANALI GÓMEZ  MRN: [id number]  : 1960  Study Date: 2019 10:42 AM  Age: 59 yrs  Gender: Female  Patient Location: [facility]  Reason For Study:  Pulmonary HTN  Ordering Physician: WILLIAM DAVENPORT  Performed By: Holger Collazo DAPHNE     BSA: 1.6 m2  Height: 63 in  Weight: 119 lb  BP: 119/77 mmHg  _____________________________________________________________________________  __        Procedure  Complete Portable Echo Adult.  _____________________________________________________________________________  __        Interpretation Summary  Left ventricular function, chamber size, wall motion, and wall thickness are  normal.The EF is 60-65%. Flattened septum is consistent with right ventricular  pressure and volume overload.  Global right ventricular function is normal. There is mild right ventricular  hypertrophy. Mild right ventricular dilation is present.  Moderate to severe tricuspid insufficiency is present.  RVSP measured at 120mmHg. Very severe PH with close to systemic pressures.  The inferior vena cava was normal in size with preserved respiratory  variability. No pericardial effusion is present.     Compared to prior study, maximum RVSP is measured higher        Current Outpatient Medications   Medication     acetaminophen (TYLENOL) 500 MG tablet     BUSPIRONE HCL PO     calcitRIOL (ROCALTROL) 0.25 MCG capsule     carvedilol (COREG) 6.25 MG tablet     FLUoxetine (PROZAC) 20 MG capsule     furosemide (LASIX) 20 MG tablet     lamoTRIgine (LAMICTAL) 25 MG tablet     LORazepam (ATIVAN) 0.5 MG tablet     macitentan (OPSUMIT) 10 MG tablet     order for DME     ORDER FOR DME     potassium citrate (UROCIT-K) 10 MEQ (1080 MG) CR tablet     ranitidine (RANITIDINE) 75 MG tablet     risperiDONE (RISPERDAL) 0.25 MG tablet     selexipag (UPTRAVI) 1600 MCG tablet     simvastatin (ZOCOR) 10 MG tablet     tadalafil, PAH, (ADCIRCA) 20 MG TABS     topiramate (TOPAMAX) 50 MG tablet     ADVAIR DISKUS 250-50 MCG/DOSE inhaler     azaTHIOprine (IMURAN) 50 MG tablet     No current facility-administered medications for this visit.        Results for ANALI GÓMEZ (MRN  [id number]) as of 5/6/2020 11:51   Ref. Range 1/24/2020 11:03   Sodium Latest Ref Range: 133 - 144 mmol/L 139   Potassium Latest Ref Range: 3.4 - 5.3 mmol/L 4.2   Chloride Latest Ref Range: 94 - 109 mmol/L 111 (H)   Carbon Dioxide Latest Ref Range: 20 - 32 mmol/L 20   Urea Nitrogen Latest Ref Range: 7 - 30 mg/dL 16   Creatinine Latest Ref Range: 0.52 - 1.04 mg/dL 1.28 (H)   GFR Estimate Latest Ref Range: >60 mL/min/1.73_m2 46 (L)   GFR Estimate If Black Latest Ref Range: >60 mL/min/1.73_m2 53 (L)   Calcium Latest Ref Range: 8.5 - 10.1 mg/dL 9.0   Anion Gap Latest Ref Range: 3 - 14 mmol/L 8   25 OH Vit D total Latest Ref Range: 20 - 75 ug/L 30   25 OH Vit D2 Latest Units: ug/L 8   25 OH Vit D3 Latest Units: ug/L 22   T4 Free Latest Ref Range: 0.76 - 1.46 ng/dL 1.34   Tissue Transglutaminase Antibody IgA Latest Ref Range: <7 U/mL <1   Tissue Transglutaminase Daniela IgG Latest Ref Range: <7 U/mL <1   TSH Latest Ref Range: 0.40 - 4.00 mU/L 1.77   Glucose Latest Ref Range: 70 - 99 mg/dL 92   WBC Latest Ref Range: 4.0 - 11.0 10e9/L 5.5   Hemoglobin Latest Ref Range: 11.7 - 15.7 g/dL 14.2   Hematocrit Latest Ref Range: 35.0 - 47.0 % 40.7   Platelet Count Latest Ref Range: 150 - 450 10e9/L 207   RBC Count Latest Ref Range: 3.8 - 5.2 10e12/L 4.62   MCV Latest Ref Range: 78 - 100 fl 88   MCH Latest Ref Range: 26.5 - 33.0 pg 30.7   MCHC Latest Ref Range: 31.5 - 36.5 g/dL 34.9   RDW Latest Ref Range: 10.0 - 15.0 % 12.6   Albumin Fraction Latest Ref Range: 3.7 - 5.1 g/dL 4.3   Alpha 1 Fraction Latest Ref Range: 0.2 - 0.4 g/dL 0.3   Alpha 2 Fraction Latest Ref Range: 0.5 - 0.9 g/dL 0.6   Beta Fraction Latest Ref Range: 0.6 - 1.0 g/dL 0.6   ELP Interpretation: Unknown Essentially iliana...   Gamma Fraction Latest Ref Range: 0.7 - 1.6 g/dL 0.8   IGA Latest Ref Range: 84 - 499 mg/dL 96   IGG Latest Ref Range: 610 - 1,616 mg/dL 766   IGM Latest Ref Range: 35 - 242 mg/dL 92   Immunofixation ELP Unknown No monoclonal pro...   Monoclonal  Peak Latest Ref Range: 0.0 g/dL 0.0                 Norm Franco

## 2020-05-06 NOTE — PROGRESS NOTES
"Judith Nelson is a 60 year old female who is being evaluated via a billable telephone visit.      The patient has been notified of following:     \"This telephone visit will be conducted via a call between you and your physician/provider. We have found that certain health care needs can be provided without the need for a physical exam.  This service lets us provide the care you need with a short phone conversation.  If a prescription is necessary we can send it directly to your pharmacy.  If lab work is needed we can place an order for that and you can then stop by our lab to have the test done at a later time.    Telephone visits are billed at different rates depending on your insurance coverage. During this emergency period, for some insurers they may be billed the same as an in-person visit.  Please reach out to your insurance provider with any questions.    If during the course of the call the physician/provider feels a telephone visit is not appropriate, you will not be charged for this service.\"    Patient has given verbal consent for Telephone visit?  Yes    What phone number would you like to be contacted at? [phone]    How would you like to obtain your AVS? Mail a copy      "

## 2020-05-20 ENCOUNTER — TELEPHONE (OUTPATIENT)
Dept: CARDIOLOGY | Facility: CLINIC | Age: 60
End: 2020-05-20

## 2020-05-20 DIAGNOSIS — I27.20 PULMONARY HYPERTENSION (H): Primary | ICD-10-CM

## 2020-05-20 DIAGNOSIS — I51.81 STRESS-INDUCED CARDIOMYOPATHY: ICD-10-CM

## 2020-05-20 DIAGNOSIS — R06.02 SOB (SHORTNESS OF BREATH): ICD-10-CM

## 2020-05-20 NOTE — TELEPHONE ENCOUNTER
[facility] Call Center    Phone Message    May a detailed message be left on voicemail: yes     Reason for Call: Other: Pt would like a call back to discuss appt the Dr wanted her to schedule for june , PLease reach out to pt to discuss     Action Taken: Message routed to:  [facility] Cardio    Travel Screening: Not Applicable           -------------------------------------------  Patient LM for me stating she was under the impression someone would be calling her to schedule a RHC and Echo in June, but no one has called yet.    Also, she understood her Coreg dose to be 3.125mg BID, but her pharmacy has been filling it 6.25mg BID.  She wants to clarify what her dose should be.  --------------------------------------------  Sent staff msg to Dr. Franco asking for his help figuring out the correct Coreg dose. Keyanna Mccray RN on 5/21/2020 at 4:33 PM  --------------------------------------------  RE: Coreg dosing   Received: Today   Message Contents   Norm Franco MD sent to Keyanna Mccray RN               3.125 please    Previous Messages     ----- Message -----   From: Keyanna Mccray RN   Sent: 5/21/2020   4:26 PM CDT   To: Norm Franco MD   Subject: Coreg dosing                                     Dr. Franco,     patient called to clarify what dose of Coreg she is supposed to be taking.  I tried figuring it out, but I'm confused and unsure myself now.     She stated she has been taking 3.125mg BID for a long time, but she just realized her pharmacy has been giving her 6.25mg BID.     Can you help?     Thanks!   Keyanna       ------------------------------------  Called patient and advised her Of Dr. Franco's recommendation for Coreg dose.  [facility], and new Erx sent to her mail order pharmacy to start next month with reduced dose.    patient was also advised that we are beginning to schedule testing for our  patients, so she will be hearing from Keisha to help her do that.  Patient verbalized understanding, agreed with plan and denied any further questions. Keyanna Mccray RN on 5/22/2020 at 1:15 PM

## 2020-05-22 RX ORDER — CARVEDILOL 3.12 MG/1
3.12 TABLET ORAL 2 TIMES DAILY WITH MEALS
Qty: 180 TABLET | Refills: 2 | Status: SHIPPED | OUTPATIENT
Start: 2020-06-22 | End: 2021-03-22

## 2020-05-22 RX ORDER — CARVEDILOL 3.12 MG/1
3.12 TABLET ORAL 2 TIMES DAILY WITH MEALS
Qty: 60 TABLET | Refills: 0 | Status: SHIPPED | OUTPATIENT
Start: 2020-05-22 | End: 2020-06-25

## 2020-06-25 DIAGNOSIS — I27.20 PULMONARY HTN (H): Primary | ICD-10-CM

## 2020-06-25 RX ORDER — LIDOCAINE 40 MG/G
CREAM TOPICAL
Status: CANCELLED | OUTPATIENT
Start: 2020-06-25

## 2020-06-26 DIAGNOSIS — Z11.59 ENCOUNTER FOR SCREENING FOR OTHER VIRAL DISEASES: Primary | ICD-10-CM

## 2020-07-02 ENCOUNTER — TELEPHONE (OUTPATIENT)
Dept: CARDIOLOGY | Facility: CLINIC | Age: 60
End: 2020-07-02

## 2020-07-02 NOTE — TELEPHONE ENCOUNTER
Patient states she has lost a lot of weight since she was seen last.  She is currently 113lbs with her goal weight being 115lbs. She is eating every day until she's full and sometimes throws up.  Denies night sweats and has a lot of chronic indigestion.      Reviewed all pre-procedure instructions including COVID testing.  Advised her if she hadn't heard from the COVID scheduling line by tomorrow afternoon, she should call me.  Patient verbalized understanding, agreed with plan and denied any further questions. Keyanna Mccray RN on 7/2/2020 at 4:39 PM    ----------------------  Patient LM that she is scheduled for her procedure next week, but does she need a follow up appt?      ----- Message from Keyanna Mccray RN sent at 6/25/2020  4:21 PM CDT -----  Regarding: FW: RHC Orders  Review pre-procedure instructions.  Not DM & no blood thinners.  ----- Message -----  From: Keisha Albarran  Sent: 6/25/2020   3:56 PM CDT  To: Keyanna Mccray RN  Subject: RHC Orders                                       Fly Briceño,    Just as a reminder - cause I know I forget to do this on Teams, but could you place the RHC order. She will be completing it on July 9 w/ Dr. Burnett.    Thank you,  -Keisha

## 2020-07-06 DIAGNOSIS — Z11.59 ENCOUNTER FOR SCREENING FOR OTHER VIRAL DISEASES: ICD-10-CM

## 2020-07-06 PROCEDURE — U0003 INFECTIOUS AGENT DETECTION BY NUCLEIC ACID (DNA OR RNA); SEVERE ACUTE RESPIRATORY SYNDROME CORONAVIRUS 2 (SARS-COV-2) (CORONAVIRUS DISEASE [COVID-19]), AMPLIFIED PROBE TECHNIQUE, MAKING USE OF HIGH THROUGHPUT TECHNOLOGIES AS DESCRIBED BY CMS-2020-01-R: HCPCS | Performed by: INTERNAL MEDICINE

## 2020-07-06 PROCEDURE — 99207 ZZC NO CHARGE NURSE ONLY: CPT

## 2020-07-07 LAB
SARS-COV-2 RNA SPEC QL NAA+PROBE: NOT DETECTED
SPECIMEN SOURCE: NORMAL

## 2020-07-08 ENCOUNTER — TELEPHONE (OUTPATIENT)
Dept: CARDIOLOGY | Facility: CLINIC | Age: 60
End: 2020-07-08

## 2020-07-09 ENCOUNTER — HOSPITAL ENCOUNTER (OUTPATIENT)
Facility: CLINIC | Age: 60
Discharge: HOME | End: 2020-07-09
Attending: INTERNAL MEDICINE | Admitting: INTERNAL MEDICINE
Payer: MEDICARE

## 2020-07-09 ENCOUNTER — APPOINTMENT (OUTPATIENT)
Dept: MEDSURG UNIT | Facility: CLINIC | Age: 60
End: 2020-07-09
Attending: INTERNAL MEDICINE
Payer: MEDICARE

## 2020-07-09 ENCOUNTER — HOSPITAL ENCOUNTER (OUTPATIENT)
Dept: CARDIOLOGY | Facility: CLINIC | Age: 60
End: 2020-07-09
Attending: INTERNAL MEDICINE
Payer: MEDICARE

## 2020-07-09 ENCOUNTER — APPOINTMENT (OUTPATIENT)
Dept: LAB | Facility: CLINIC | Age: 60
End: 2020-07-09
Attending: INTERNAL MEDICINE
Payer: MEDICARE

## 2020-07-09 VITALS
WEIGHT: 113 LBS | SYSTOLIC BLOOD PRESSURE: 117 MMHG | HEIGHT: 63 IN | TEMPERATURE: 98 F | OXYGEN SATURATION: 96 % | BODY MASS INDEX: 20.02 KG/M2 | DIASTOLIC BLOOD PRESSURE: 62 MMHG | RESPIRATION RATE: 18 BRPM

## 2020-07-09 DIAGNOSIS — I27.20 PULMONARY HTN (H): ICD-10-CM

## 2020-07-09 DIAGNOSIS — I27.20 PULMONARY HYPERTENSION (H): ICD-10-CM

## 2020-07-09 DIAGNOSIS — R06.02 SOB (SHORTNESS OF BREATH): ICD-10-CM

## 2020-07-09 LAB
ALBUMIN SERPL-MCNC: 3.7 G/DL (ref 3.4–5)
ALP SERPL-CCNC: 43 U/L (ref 40–150)
ALT SERPL W P-5'-P-CCNC: 18 U/L (ref 0–50)
ANION GAP SERPL CALCULATED.3IONS-SCNC: 6 MMOL/L (ref 3–14)
AST SERPL W P-5'-P-CCNC: 11 U/L (ref 0–45)
BILIRUB SERPL-MCNC: 0.6 MG/DL (ref 0.2–1.3)
BUN SERPL-MCNC: 16 MG/DL (ref 7–30)
CALCIUM SERPL-MCNC: 9.1 MG/DL (ref 8.5–10.1)
CHLORIDE SERPL-SCNC: 112 MMOL/L (ref 94–109)
CO2 SERPL-SCNC: 22 MMOL/L (ref 20–32)
CREAT SERPL-MCNC: 1.52 MG/DL (ref 0.52–1.04)
ERYTHROCYTE [DISTWIDTH] IN BLOOD BY AUTOMATED COUNT: 12.4 % (ref 10–15)
GFR SERPL CREATININE-BSD FRML MDRD: 37 ML/MIN/{1.73_M2}
GLUCOSE SERPL-MCNC: 82 MG/DL (ref 70–99)
HCT VFR BLD AUTO: 45 % (ref 35–47)
HGB BLD-MCNC: 14.4 G/DL (ref 11.7–15.7)
MCH RBC QN AUTO: 30.6 PG (ref 26.5–33)
MCHC RBC AUTO-ENTMCNC: 32 G/DL (ref 31.5–36.5)
MCV RBC AUTO: 96 FL (ref 78–100)
NT-PROBNP SERPL-MCNC: 394 PG/ML (ref 0–125)
PLATELET # BLD AUTO: 187 10E9/L (ref 150–450)
POTASSIUM SERPL-SCNC: 4 MMOL/L (ref 3.4–5.3)
PROT SERPL-MCNC: 6.4 G/DL (ref 6.8–8.8)
RBC # BLD AUTO: 4.71 10E12/L (ref 3.8–5.2)
SODIUM SERPL-SCNC: 139 MMOL/L (ref 133–144)
WBC # BLD AUTO: 4.7 10E9/L (ref 4–11)

## 2020-07-09 PROCEDURE — 85027 COMPLETE CBC AUTOMATED: CPT | Performed by: INTERNAL MEDICINE

## 2020-07-09 PROCEDURE — 25000125 ZZHC RX 250: Performed by: INTERNAL MEDICINE

## 2020-07-09 PROCEDURE — 83880 ASSAY OF NATRIURETIC PEPTIDE: CPT | Performed by: INTERNAL MEDICINE

## 2020-07-09 PROCEDURE — 93451 RIGHT HEART CATH: CPT | Performed by: INTERNAL MEDICINE

## 2020-07-09 PROCEDURE — 93306 TTE W/DOPPLER COMPLETE: CPT

## 2020-07-09 PROCEDURE — 93306 TTE W/DOPPLER COMPLETE: CPT | Mod: 26 | Performed by: INTERNAL MEDICINE

## 2020-07-09 PROCEDURE — 36415 COLL VENOUS BLD VENIPUNCTURE: CPT | Performed by: INTERNAL MEDICINE

## 2020-07-09 PROCEDURE — 93451 RIGHT HEART CATH: CPT | Mod: 26 | Performed by: INTERNAL MEDICINE

## 2020-07-09 PROCEDURE — 27210794 ZZH OR GENERAL SUPPLY STERILE: Performed by: INTERNAL MEDICINE

## 2020-07-09 PROCEDURE — 80053 COMPREHEN METABOLIC PANEL: CPT | Performed by: INTERNAL MEDICINE

## 2020-07-09 PROCEDURE — 40000166 ZZH STATISTIC PP CARE STAGE 1

## 2020-07-09 RX ORDER — LIDOCAINE 40 MG/G
CREAM TOPICAL
Status: COMPLETED | OUTPATIENT
Start: 2020-07-09 | End: 2020-07-09

## 2020-07-09 RX ADMIN — LIDOCAINE: 40 CREAM TOPICAL at 10:48

## 2020-07-09 ASSESSMENT — MIFFLIN-ST. JEOR: SCORE: 1056.69

## 2020-07-09 NOTE — PROGRESS NOTES
Pt has returned to unit 2a s/p RHC. Right neck site CDI, soft, flat, non tender. Discharge instructions reviewed with pt pre procedure, pt reports she doesn't have any questions. Dr Izaguirre came to bedside to speak with pt post procedure. Pt tolerating PO. Daughter at bedside.  [id number] Pt transported to [facility] via wheelchair.

## 2020-07-09 NOTE — DISCHARGE INSTRUCTIONS
[facility]                        Interventional Cardiology  Discharge Instructions   Post Right Heart Cath      AFTER YOU GO HOME:    DO drink plenty of fluids    DO resume your regular diet and medications unless otherwise instructed by your Primary Physician    Do Not scrub the procedure site vigorously    No lotion or powder to the puncture site for 3 days    CALL YOUR PRIMARY PHYSICIAN IF: You may resume all normal activity.  Monitor neck site for bleeding, swelling, or voice changes. If you notice bleeding or swelling immediately apply pressure to the site and call number below to speak with Cardiology Fellow.  If you experience any changes in your breathing you should call your doctor immediately or come to the closest Emergency Department.  Do not drive yourself.    ADDITIONAL INSTRUCTIONS: Medications: You are to resume all home medications including anticoagulation therapy unless otherwise advised by your primary cardiologist or nurse coordinator.    Follow Up: Per your primary cardiology team    If you have any questions or concerns regarding your procedure site please call [phone] at anytime and ask for Cardiology Fellow on call.  They are available 24 hours a day.  You may also contact the Cardiology Clinic after hours number at [phone].                                                       Telephone Numbers [phone] Monday-Friday 8:00 am to 4:30 pm    [phone] After 4:30 pm Monday-Friday, Weekends & Holidays  Ask for Interventional Cardiologist on call. Someone is on call 24 hours/day   [facility] toll free number [phone] Monday-Friday 8:00 am to 4:30 pm   [facility] Emergency Dept [phone]

## 2020-07-09 NOTE — IP AVS SNAPSHOT
MRN:[id number]                      After Visit Summary   7/9/2020    Judith Nelson    MRN: [id number]           Visit Information        Department      7/9/2020  9:56 AM [address] [facility]          Review of your medicines      UNREVIEWED medicines. Ask your doctor about these medicines       Dose / Directions   acetaminophen 500 MG tablet  Commonly known as:  TYLENOL  Used for:  Pulmonary hypertension (H)      Dose:  500-1,000 mg  Take 1-2 tablets (500-1,000 mg) by mouth every 6 hours as needed for pain (Take as needed for pain.  Do not exceed 4 grams (8 tablets) in a day)  Quantity:  45 tablet  Refills:  10     Advair Diskus 250-50 MCG/DOSE inhaler  Used for:  Sarcoidosis  Generic drug:  fluticasone-salmeterol      INHALE 1 PUFF BY MOUTH TWICE A DAY  Quantity:  1 Inhaler  Refills:  11     azaTHIOprine 50 MG tablet  Commonly known as:  Imuran  Used for:  Sarcoidosis      Dose:  50 mg  Take 1 tablet (50 mg) by mouth daily  Quantity:  90 tablet  Refills:  3     BUSPIRONE HCL PO      Dose:  15 mg  Take 15 mg by mouth 2 times daily  Refills:  0     calcitRIOL 0.25 MCG capsule  Commonly known as:  ROCALTROL      Dose:  0.25 mcg  Take 1 capsule (0.25 mcg) by mouth daily  Quantity:  30 capsule  Refills:  1     carvedilol 3.125 MG tablet  Commonly known as:  Coreg  Used for:  Pulmonary hypertension (H), Stress-induced cardiomyopathy, SOB (shortness of breath)      Dose:  3.125 mg  Take 1 tablet (3.125 mg) by mouth 2 times daily (with meals)  Quantity:  180 tablet  Refills:  2     FLUoxetine 20 MG capsule  Commonly known as:  PROzac  Used for:  Severe episode of recurrent major depressive disorder, with psychotic features (H)      Dose:  20 mg  Take 1 capsule (20 mg) by mouth daily  Quantity:  30 capsule  Refills:  1     furosemide 20 MG tablet  Commonly known as:  LASIX  Used for:  Pulmonary hypertension (H), Stress-induced cardiomyopathy, SOB (shortness of breath)      Take 1 tablet by mouth on  Monday, Wednesday & Friday's  Quantity:  45 tablet  Refills:  3     lamoTRIgine 25 MG tablet  Commonly known as:  LaMICtal      Dose:  100 mg  Take 100 mg by mouth daily  Refills:  0     LORazepam 0.5 MG tablet  Commonly known as:  ATIVAN  Used for:  Major depressive disorder, single episode, severe with psychotic features (H)      Dose:  1 mg  Take 2 tablets (1 mg) by mouth 2 times daily  Quantity:  90 tablet  Refills:  1     macitentan 10 MG tablet  Commonly known as:  Opsumit  Used for:  Primary pulmonary hypertension (H)      Dose:  10 mg  Take 1 tablet (10 mg) by mouth daily  Quantity:  30 tablet  Refills:  11     potassium citrate 10 MEQ (1080 MG) CR tablet  Commonly known as:  UROCIT-K      Dose:  10 mEq  Take 10 mEq by mouth three times a week Tuesday Thursday and Saturday  Refills:  0     ranitidine 75 MG tablet  Commonly known as:  ranitidine  Used for:  Primary pulmonary hypertension (H), Anemia, unspecified type, SOB (shortness of breath)      Dose:   mg  Take 1-2 tablets ( mg) by mouth 2 times daily  Quantity:  60 tablet  Refills:  11     risperiDONE 0.25 MG tablet  Commonly known as:  risperDAL      Dose:  0.5 mg  Take 0.5 mg by mouth every morning  Refills:  0     selexipag 1600 MCG tablet  Commonly known as:  UPTRAVI  Used for:  Pulmonary hypertension (H)      Dose:  1,600 mcg  Take 1 tablet (1,600 mcg) by mouth every 12 hours  Quantity:  60 tablet  Refills:  11     simvastatin 10 MG tablet  Commonly known as:  ZOCOR  Used for:  Hypercholesterolemia      Dose:  10 mg  Take 1 tablet by mouth At Bedtime.  Quantity:  90 tablet  Refills:  1     tadalafil (PAH) 20 MG Tabs  Commonly known as:  Adcirca  Used for:  Pulmonary hypertension (H), Stress-induced cardiomyopathy, SOB (shortness of breath)      Dose:  40 mg  Take 2 tablets (40 mg) by mouth daily  Quantity:  60 tablet  Refills:  11     topiramate 50 MG tablet  Commonly known as:  TOPAMAX      Dose:  50 mg  Take 50 mg by mouth  daily  Refills:  0        CONTINUE these medicines which have NOT CHANGED       Dose / Directions   order for DME      Oxygen  Refills:  0     order for DME  Used for:  Tremor, Generalized muscle weakness, Sarcoidosis      Equipment being ordered: SHOWER CHAIR  FROM [facility]  Quantity:  1 Device  Refills:  0              Protect others around you: Learn how to safely use, store and throw away your medicines at www.disposemymeds.org.       Follow-ups after your visit       Your next 10 appointments already scheduled    Jul 09, 2020 10:30 AM CDT  Echo Complete with UUECHISELA  [facility] ([facility]) [address] [place] [id number] [phone]   1. Please bring or wear a comfortable two-piece outfit.  2. You may eat, drink and take your normal medicines.  3. Please do not apply perfumes or lotions on the day of your exam.   4. For any questions that cannot be answered, please contact the ordering physician     Jul 09, 2020 12:30 PM CDT  LAB with UU LAB GOLD WAITING  [facility] ([facility]) [address] [place] [phone]   Please do not eat 10-12 hours before your appointment if you are coming in fasting for labs on lipids, cholesterol, or glucose (sugar). Does not apply to pregnant women. Water, tea and black coffee (with nothing added) is okay. Do not drink other fluids, diet soda or gum. If you have concerns about taking your medications, please send a message by clicking on Secure Messaging, Message Your Care Team.     Jul 09, 2020  Procedure with Sebastien Izaguirre MD  [facility]  Heart Cath Lab ([facility]) [address] [place] [id number] [phone]   The [facility] is located on the corner of [facility] [address] [facility] on the [facility]. It is easily accessible from virtually any point in the [facility] area, via [address] and [address].   Jul 09, 2020  1:00 PM CDT  Procedure - 2.5 hour with U2A ROOM 11  Unit 2A [facility] ([facility]) [address]  [facility] [address]      Jul 16, 2020 11:30 AM CDT  (Arrive by 11:15 AM)  Video Visit with Norm Franco MD  [facility] ([facility]) [address] [place] [address] [phone]   [facility]  Note: this is not an onsite visit; there is no need to come to the facility.  Please have a list of all current medications available for appointment.      Jul 17, 2020  1:00 PM CDT  [facility] Lab Draw with  MASONIC LAB DRAW  [facility] Lab Draw ([facility]) [address] [place] [address] [phone]   Located in the [facility] at [address] [place] [address]. We're taking every precaution to prevent the spread of COVID-19. Our top priority is to protect and care for our patients, community members, and our staff. For that reason, we are suspending  services until further notice. Please self-park your vehicle before entering our facility. For [facility] please use the [address] at [address] [place] [address].        Jul 17, 2020  1:30 PM CDT  (Arrive by 1:15 PM)  RETURN ENDOCRINE with Jenna Salas MD  [facility] End[facility]inology ([facility]) [address] [place] [address]  3rd Floor  [facility] [address] [phone]      Jul 17, 2020  3:00 PM CDT  Infusion 60 with UC SPEC INFUSION, UC 46 ATC  [facility] Specialty and Procedure ([facility]) [address] [place] [address] [phone]      Aug 19, 2020 10:30 AM CDT  FULL PULMONARY FUNCTION with UC PFL B    Health Pulmonary Function Testing ([facility]) [address] [facility]  3rd Floor  [facility] [address] [phone]         Care Instructions       Further instructions from your care team       [facility]                        Interventional Cardiology  Discharge Instructions   Post Right Heart Cath      AFTER YOU GO HOME:    DO drink plenty of fluids    DO resume your regular diet and medications unless otherwise instructed by your Primary Physician    Do Not scrub the procedure site vigorously    No lotion or powder to the puncture site for 3 days    CALL YOUR PRIMARY PHYSICIAN IF: You may resume all normal activity.  Monitor neck site for bleeding, swelling, or voice changes. If you notice bleeding or swelling immediately apply pressure to the site and call number below to speak with Cardiology Fellow.  If you experience any changes in your breathing you should call your doctor immediately or come to the closest Emergency Department.  Do not drive yourself.    ADDITIONAL INSTRUCTIONS: Medications: You are to resume all home medications including anticoagulation therapy unless otherwise advised by your primary cardiologist or nurse coordinator.    Follow Up: Per your primary cardiology team    If you have any questions or concerns regarding your procedure site please call [phone] at anytime and ask for Cardiology Fellow on call.  They are available 24 hours a day.  You may also contact the Cardiology Clinic after hours number at [phone].                                                       Telephone Numbers [phone] Monday-Friday 8:00 am to 4:30 pm    [phone] After 4:30 pm Monday-Friday, Weekends & Holidays  Ask for Interventional Cardiologist on call. Someone is on call 24 hours/day   [facility] toll free number [phone] Monday-Friday 8:00 am to 4:30 pm   [facility] Emergency Dept [phone]                   Additional Information  About Your Visit       [employer] Information    [employer] gives you secure access to your electronic health record. If you see a primary care provider, you can also send messages to your care team and make appointments. If you have questions, please call your primary care clinic.  If you do not have a primary care provider, please call [phone] and they will assist you.       Care EveryWhere ID    This is your Care EveryWhere ID. This could be used by other organizations to access your [facility] medical records  [id number]        Primary Care Provider Office Phone # Fax #    Anupama Babcock [phone]      Equal Access to Services    [facility]: Hadii pablo jones Somarsha, waaxda lujo-annadaha, qaybta kaalmada kayla, alysha worley . So [facility] [phone].    ATENCIÓN: Si habla español, tiene a dhillon disposición servicios gratuitos de asistencia lingüística. Llame al [phone].    We comply with applicable federal and state civil rights laws, including the [place] Human Rights Act. We do not discriminate on the basis of race, color, creed, Lutheran, national origin, marital status, age, disability, sex, sexual orientation, or gender identity.       Thank you!    Thank you for choosing [facility] for your care. Our goal is always to provide you with excellent care. Hearing back from our patients is one way we can continue to improve our services. Please take a few minutes to complete the written survey that you may receive in the mail after you visit with us. Thank you!            Medication List      Medications          Morning Afternoon Evening Bedtime As Needed    order for DME  INSTRUCTIONS:  Oxygen                     order for DME  INSTRUCTIONS:  Equipment being ordered: SHOWER CHAIR  FROM [facility]                       ASK your doctor about these medications          Morning Afternoon Evening Bedtime As Needed    acetaminophen 500 MG tablet  Also known  as:  TYLENOL  INSTRUCTIONS:  Take 1-2 tablets (500-1,000 mg) by mouth every 6 hours as needed for pain (Take as needed for pain.  Do not exceed 4 grams (8 tablets) in a day)                     Advair Diskus 250-50 MCG/DOSE inhaler  INSTRUCTIONS:  INHALE 1 PUFF BY MOUTH TWICE A DAY  Doctor's comments:  Maximum Refills Reached  Generic drug:  fluticasone-salmeterol                     azaTHIOprine 50 MG tablet  Also known as:  Imuran  INSTRUCTIONS:  Take 1 tablet (50 mg) by mouth daily                     BUSPIRONE HCL PO  INSTRUCTIONS:  Take 15 mg by mouth 2 times daily                     calcitRIOL 0.25 MCG capsule  Also known as:  ROCALTROL  INSTRUCTIONS:  Take 1 capsule (0.25 mcg) by mouth daily                     carvedilol 3.125 MG tablet  Also known as:  Coreg  INSTRUCTIONS:  Take 1 tablet (3.125 mg) by mouth 2 times daily (with meals)                     FLUoxetine 20 MG capsule  Also known as:  PROzac  INSTRUCTIONS:  Take 1 capsule (20 mg) by mouth daily                     furosemide 20 MG tablet  Also known as:  LASIX  INSTRUCTIONS:  Take 1 tablet by mouth on Monday, Wednesday & Friday's                     lamoTRIgine 25 MG tablet  Also known as:  LaMICtal  INSTRUCTIONS:  Take 100 mg by mouth daily                     LORazepam 0.5 MG tablet  Also known as:  ATIVAN  INSTRUCTIONS:  Take 2 tablets (1 mg) by mouth 2 times daily                     macitentan 10 MG tablet  Also known as:  Opsumit  INSTRUCTIONS:  Take 1 tablet (10 mg) by mouth daily                     potassium citrate 10 MEQ (1080 MG) CR tablet  Also known as:  UROCIT-K  INSTRUCTIONS:  Take 10 mEq by mouth three times a week Tuesday Thursday and Saturday                     ranitidine 75 MG tablet  Also known as:  ranitidine  INSTRUCTIONS:  Take 1-2 tablets ( mg) by mouth 2 times daily  Doctor's comments:  This replaces Omeprazole                     risperiDONE 0.25 MG tablet  Also known as:  risperDAL  INSTRUCTIONS:  Take 0.5 mg by  mouth every morning                     selexipag 1600 MCG tablet  Also known as:  UPTRAVI  INSTRUCTIONS:  Take 1 tablet (1,600 mcg) by mouth every 12 hours                     simvastatin 10 MG tablet  Also known as:  ZOCOR  INSTRUCTIONS:  Take 1 tablet by mouth At Bedtime.                     tadalafil (PAH) 20 MG Tabs  Also known as:  Adcirca  INSTRUCTIONS:  Take 2 tablets (40 mg) by mouth daily                     topiramate 50 MG tablet  Also known as:  TOPAMAX  INSTRUCTIONS:  Take 50 mg by mouth daily

## 2020-07-09 NOTE — PROGRESS NOTES
Pt arrives to 2a, with daughter, for RHC. Consent needs to be signed. Discharge instructions reviewed with pt pre procedure, pt verbalizes understanding.

## 2020-07-14 ENCOUNTER — TELEPHONE (OUTPATIENT)
Dept: CARDIOLOGY | Facility: CLINIC | Age: 60
End: 2020-07-14

## 2020-07-14 NOTE — TELEPHONE ENCOUNTER
Received a call from the pt stating that there was a mix-up in her scheduling. Pt states that she had to check-in at 10:30 and her right heart catheterization (RHC) was supposed to be at 12:30, but they got her in sooner with a different doctor. Pt expressed concerns that her insurance might not cover the new doctor and pt wanted to know if the office could review this information. Stated to pt that her appointments were set up correctly and she was to have the right heart catheterization with Dr. Francisco, but completed it with Dr. Izaguirre instead. Pt states that was correct as they told her they could get her in sooner and the doctor performing is in the same network as Dr. Franco. Told pt that the office does not have access to that information and requested pt contact the [place] billing team for further assistance. Suggested pt follow the billing teams instructions if they request she call her insurance. Stated to pt that no further assistance can be provided, unless pt needs another phone number to contact. Pt verbalized understanding and had no further questions.    No further follow-up completed and encounter closed.    Keisha Albarran  Clinic   Pulmonary Hypertension  [facility]  (P) [phone]

## 2020-07-16 ENCOUNTER — TELEPHONE (OUTPATIENT)
Dept: ENDOCRINOLOGY | Facility: CLINIC | Age: 60
End: 2020-07-16

## 2020-07-16 ENCOUNTER — VIRTUAL VISIT (OUTPATIENT)
Dept: CARDIOLOGY | Facility: CLINIC | Age: 60
End: 2020-07-16
Attending: INTERNAL MEDICINE
Payer: MEDICARE

## 2020-07-16 DIAGNOSIS — D86.9 SARCOIDOSIS: ICD-10-CM

## 2020-07-16 DIAGNOSIS — R06.02 SOB (SHORTNESS OF BREATH): ICD-10-CM

## 2020-07-16 DIAGNOSIS — I27.20 PULMONARY HTN (H): Primary | ICD-10-CM

## 2020-07-16 PROCEDURE — 99214 OFFICE O/P EST MOD 30 MIN: CPT | Mod: 95 | Performed by: INTERNAL MEDICINE

## 2020-07-16 NOTE — LETTER
7/16/2020      RE: Judith Nelson  [address] [facility] [address]       Dear Colleague,    Thank you for the opportunity to participate in the care of your patient, Judith Nelson, at the [facility] HEART [facility]. Please see a copy of my visit note below.      I had a 30 minute FACETIME video visit with her at home and with her permission.  1130-12:00.  I was in my office.     Pulmonary hypertension treated with three oral agents  Sarcoidosis treated with Imuran  CKD with positive renal biopsy for sarcoid  Oxygen dependent respiratory failure  Dysthymic disorder        Plan:  1. Add IL-6, CRP infllammation to labs that she had yesterday  2. Please make sure that she is seen by [facility] in the near future  3. Discussed off label use of tacrolimus and/or PPARg agonist in view of history of persistent PH refractory to medication  4. She is a poor candidate for parenteral prostacyclin in view of social support and anxiety.  Consider switching to inhaled Tyvaso in lieu of Uptrava  5. The patient is non-suicidally depressed to the point where she has difficulties in making decisions/received permission to talk with brother and sister-in-law [phone]  6. Follow-up in 3 - 4 weeks    The patient has not been walking.  She has been losing weight.  She has no PND, orthopnea, ankle edema, palpitation, pre-syncope or syncope.  She is very depressed about her living situation.  She is seeing therapist regularly. She is taking her medicine regularly.       Constitutional: weight loss, fever, chills, night sweats  HEENT: without visual changes, swallow difficulties  Pulmonary: without shortness of breath, cough, wheeze, hemoptysis  Cardiac: without chest pain, JUAN, PND, orthopnea, edema, palpitation, pre-syncope, syncope,  GI: without diarrhea, constipation, jaundice, melena, GERD, hematemesis  : without frequency, urgency, dysuria, hematuria  Skin: rash,  bruise, open lesions  Neuro: without TIA, focal neurologic complaints, seizure, trauma  Ortho: without pain, swelling, mobility impairment  Endocrine: diabetes, thyroid, heat/cold intolerance, polyuria, polyphagia, change bowel habits.  Sleep:no IVETTE, periodic breathing, fatigue  Other: depressed as noted above        Current Outpatient Medications   Medication     acetaminophen (TYLENOL) 500 MG tablet     ADVAIR DISKUS 250-50 MCG/DOSE inhaler     azaTHIOprine (IMURAN) 50 MG tablet     BUSPIRONE HCL PO     calcitRIOL (ROCALTROL) 0.25 MCG capsule     carvedilol (COREG) 3.125 MG tablet     FLUoxetine (PROZAC) 20 MG capsule     furosemide (LASIX) 20 MG tablet     lamoTRIgine (LAMICTAL) 25 MG tablet     LORazepam (ATIVAN) 0.5 MG tablet     macitentan (OPSUMIT) 10 MG tablet     order for DME     ORDER FOR DME     potassium citrate (UROCIT-K) 10 MEQ (1080 MG) CR tablet     ranitidine (RANITIDINE) 75 MG tablet     risperiDONE (RISPERDAL) 0.25 MG tablet     selexipag (UPTRAVI) 1600 MCG tablet     simvastatin (ZOCOR) 10 MG tablet     tadalafil, PAH, (ADCIRCA) 20 MG TABS     topiramate (TOPAMAX) 50 MG tablet     No current facility-administered medications for this visit.        Results for ANALI GÓMEZ (MRN [id number]) as of 7/16/2020 10:58   Ref. Range 7/9/2020 10:15   Sodium Latest Ref Range: 133 - 144 mmol/L 139   Potassium Latest Ref Range: 3.4 - 5.3 mmol/L 4.0   Chloride Latest Ref Range: 94 - 109 mmol/L 112 (H)   Carbon Dioxide Latest Ref Range: 20 - 32 mmol/L 22   Urea Nitrogen Latest Ref Range: 7 - 30 mg/dL 16   Creatinine Latest Ref Range: 0.52 - 1.04 mg/dL 1.52 (H)   GFR Estimate Latest Ref Range: >60 mL/min/1.73_m2 37 (L)   GFR Estimate If Black Latest Ref Range: >60 mL/min/1.73_m2 43 (L)   Calcium Latest Ref Range: 8.5 - 10.1 mg/dL 9.1   Anion Gap Latest Ref Range: 3 - 14 mmol/L 6   Albumin Latest Ref Range: 3.4 - 5.0 g/dL 3.7   Protein Total Latest Ref Range: 6.8 - 8.8 g/dL 6.4 (L)   Bilirubin Total Latest  Ref Range: 0.2 - 1.3 mg/dL 0.6   Alkaline Phosphatase Latest Ref Range: 40 - 150 U/L 43   ALT Latest Ref Range: 0 - 50 U/L 18   AST Latest Ref Range: 0 - 45 U/L 11   N-Terminal Pro Bnp Latest Ref Range: 0 - 125 pg/mL 394 (H)         Creatinine Latest Ref Range: 0.52 - 1.04 mg/dL 1.52 (H)   GFR Estimate Latest Ref Range: >60 mL/min/1.73_m2 37 (L)   GFR Estimate If Black Latest Ref Range: >60 mL/min/1.73_m2 43 (L)   Calcium Latest Ref Range: 8.5 - 10.1 mg/dL 9.1   Anion Gap Latest Ref Range: 3 - 14 mmol/L 6   Albumin Latest Ref Range: 3.4 - 5.0 g/dL 3.7   Protein Total Latest Ref Range: 6.8 - 8.8 g/dL 6.4 (L)   Bilirubin Total Latest Ref Range: 0.2 - 1.3 mg/dL 0.6   Alkaline Phosphatase Latest Ref Range: 40 - 150 U/L 43   ALT Latest Ref Range: 0 - 50 U/L 18   AST Latest Ref Range: 0 - 45 U/L 11   N-Terminal Pro Bnp Latest Ref Range: 0 - 125 pg/mL 394 (H)   Glucose Latest Ref Range: 70 - 99 mg/dL 82   WBC Latest Ref Range: 4.0 - 11.0 10e9/L 4.7   Hemoglobin Latest Ref Range: 11.7 - 15.7 g/dL 14.4   Hematocrit Latest Ref Range: 35.0 - 47.0 % 45.0   Platelet Count Latest Ref Range: 150 - 450 10e9/L 187   RBC Count Latest Ref Range: 3.8 - 5.2 10e12/L 4.71   MCV Latest Ref Range: 78 - 100 fl 96   MCH Latest Ref Range: 26.5 - 33.0 pg 30.6   MCHC Latest Ref Range: 31.5 - 36.5 g/dL 32.0   RDW Latest Ref Range: 10.0 - 15.0 % 12.4         Right sided filling pressures are normal.    Severely elevated pulmonary artery hypertension.    Left sided filling pressures are mildly elevated.    Normal cardiac output level.     Severe pulmonary hypertension with mildly elevated LVEDP. Borderline reduced cardiac index.               Right Heart Pressures     Systolic BP: 120/71mmHg    RA: 6/9/5mmHg  RV: 95/5mmHg  PA: 95/34/54 mmHg  PCWP: 15/15/14 mmHg  CO/CI (Dipti): 2.6/1.71   CO/CI (Thermo): 3.77/2.48  PVR: 15WU Right sided filling pressures are normal.Left sided filling pressures are mildly elevated. Severely elevated pulmonary artery  hypertension.Normal cardiac output level.        Please do not hesitate to contact me if you have any questions/concerns.     Sincerely,     Norm Franco MD

## 2020-07-16 NOTE — PROGRESS NOTES
"Judith Nelson is a 60 year old female who is being evaluated via a billable video visit.      The patient has been notified of following:     \"This video visit will be conducted via a call between you and your physician/provider. We have found that certain health care needs can be provided without the need for an in-person physical exam.  This service lets us provide the care you need with a video conversation.  If a prescription is necessary we can send it directly to your pharmacy.  If lab work is needed we can place an order for that and you can then stop by our lab to have the test done at a later time.    Video visits are billed at different rates depending on your insurance coverage.  Please reach out to your insurance provider with any questions.    If during the course of the call the physician/provider feels a video visit is not appropriate, you will not be charged for this service.\"    Patient has given verbal consent for Video visit? Yes  How would you like to obtain your AVS? MyChart  If you are dropped from the video visit, the video invite should be resent to: Text to cell phone: [phone]  Will anyone else be joining your video visit? No  "

## 2020-07-16 NOTE — TELEPHONE ENCOUNTER
[facility] Call Center    Phone Message    May a detailed message be left on voicemail: yes     Reason for Call: Other: Pt has just gotten two sets of conflicting directions regarding an injection she needs.  One said that Dr Salas would work out a time for her to get the injection during the upcoming video visit on Friday.  The other wanted her to call back about scheduling it.  I see no notes - Please call Pt to clarify      Action Taken: Message routed to:  [facility] endocrine    Travel Screening: Not Applicable

## 2020-07-16 NOTE — PROGRESS NOTES
I had a 30 minute FACETIME video visit with her at home and with her permission.  1130-12:00.  I was in my office.     Pulmonary hypertension treated with three oral agents  Sarcoidosis treated with Imuran  CKD with positive renal biopsy for sarcoid  Oxygen dependent respiratory failure  Dysthymic disorder        Plan:  1. Add IL-6, CRP infllammation to labs that she had yesterday  2. Please make sure that she is seen by [facility] in the near future  3. Discussed off label use of tacrolimus and/or PPARg agonist in view of history of persistent PH refractory to medication  4. She is a poor candidate for parenteral prostacyclin in view of social support and anxiety.  Consider switching to inhaled Tyvaso in lieu of Uptrava  5. The patient is non-suicidally depressed to the point where she has difficulties in making decisions/received permission to talk with brother and sister-in-law [phone]  6. Follow-up in 3 - 4 weeks    The patient has not been walking.  She has been losing weight.  She has no PND, orthopnea, ankle edema, palpitation, pre-syncope or syncope.  She is very depressed about her living situation.  She is seeing therapist regularly. She is taking her medicine regularly.       Constitutional: weight loss, fever, chills, night sweats  HEENT: without visual changes, swallow difficulties  Pulmonary: without shortness of breath, cough, wheeze, hemoptysis  Cardiac: without chest pain, JUAN, PND, orthopnea, edema, palpitation, pre-syncope, syncope,  GI: without diarrhea, constipation, jaundice, melena, GERD, hematemesis  : without frequency, urgency, dysuria, hematuria  Skin: rash, bruise, open lesions  Neuro: without TIA, focal neurologic complaints, seizure, trauma  Ortho: without pain, swelling, mobility impairment  Endocrine: diabetes, thyroid, heat/cold intolerance, polyuria, polyphagia, change bowel habits.  Sleep:no IVETTE, periodic breathing, fatigue  Other: depressed as noted  above        Current Outpatient Medications   Medication     acetaminophen (TYLENOL) 500 MG tablet     ADVAIR DISKUS 250-50 MCG/DOSE inhaler     azaTHIOprine (IMURAN) 50 MG tablet     BUSPIRONE HCL PO     calcitRIOL (ROCALTROL) 0.25 MCG capsule     carvedilol (COREG) 3.125 MG tablet     FLUoxetine (PROZAC) 20 MG capsule     furosemide (LASIX) 20 MG tablet     lamoTRIgine (LAMICTAL) 25 MG tablet     LORazepam (ATIVAN) 0.5 MG tablet     macitentan (OPSUMIT) 10 MG tablet     order for DME     ORDER FOR DME     potassium citrate (UROCIT-K) 10 MEQ (1080 MG) CR tablet     ranitidine (RANITIDINE) 75 MG tablet     risperiDONE (RISPERDAL) 0.25 MG tablet     selexipag (UPTRAVI) 1600 MCG tablet     simvastatin (ZOCOR) 10 MG tablet     tadalafil, PAH, (ADCIRCA) 20 MG TABS     topiramate (TOPAMAX) 50 MG tablet     No current facility-administered medications for this visit.        Results for ANALI GÓMEZ (MRN [id number]) as of 7/16/2020 10:58   Ref. Range 7/9/2020 10:15   Sodium Latest Ref Range: 133 - 144 mmol/L 139   Potassium Latest Ref Range: 3.4 - 5.3 mmol/L 4.0   Chloride Latest Ref Range: 94 - 109 mmol/L 112 (H)   Carbon Dioxide Latest Ref Range: 20 - 32 mmol/L 22   Urea Nitrogen Latest Ref Range: 7 - 30 mg/dL 16   Creatinine Latest Ref Range: 0.52 - 1.04 mg/dL 1.52 (H)   GFR Estimate Latest Ref Range: >60 mL/min/1.73_m2 37 (L)   GFR Estimate If Black Latest Ref Range: >60 mL/min/1.73_m2 43 (L)   Calcium Latest Ref Range: 8.5 - 10.1 mg/dL 9.1   Anion Gap Latest Ref Range: 3 - 14 mmol/L 6   Albumin Latest Ref Range: 3.4 - 5.0 g/dL 3.7   Protein Total Latest Ref Range: 6.8 - 8.8 g/dL 6.4 (L)   Bilirubin Total Latest Ref Range: 0.2 - 1.3 mg/dL 0.6   Alkaline Phosphatase Latest Ref Range: 40 - 150 U/L 43   ALT Latest Ref Range: 0 - 50 U/L 18   AST Latest Ref Range: 0 - 45 U/L 11   N-Terminal Pro Bnp Latest Ref Range: 0 - 125 pg/mL 394 (H)         Creatinine Latest Ref Range: 0.52 - 1.04 mg/dL 1.52 (H)   GFR Estimate  Latest Ref Range: >60 mL/min/1.73_m2 37 (L)   GFR Estimate If Black Latest Ref Range: >60 mL/min/1.73_m2 43 (L)   Calcium Latest Ref Range: 8.5 - 10.1 mg/dL 9.1   Anion Gap Latest Ref Range: 3 - 14 mmol/L 6   Albumin Latest Ref Range: 3.4 - 5.0 g/dL 3.7   Protein Total Latest Ref Range: 6.8 - 8.8 g/dL 6.4 (L)   Bilirubin Total Latest Ref Range: 0.2 - 1.3 mg/dL 0.6   Alkaline Phosphatase Latest Ref Range: 40 - 150 U/L 43   ALT Latest Ref Range: 0 - 50 U/L 18   AST Latest Ref Range: 0 - 45 U/L 11   N-Terminal Pro Bnp Latest Ref Range: 0 - 125 pg/mL 394 (H)   Glucose Latest Ref Range: 70 - 99 mg/dL 82   WBC Latest Ref Range: 4.0 - 11.0 10e9/L 4.7   Hemoglobin Latest Ref Range: 11.7 - 15.7 g/dL 14.4   Hematocrit Latest Ref Range: 35.0 - 47.0 % 45.0   Platelet Count Latest Ref Range: 150 - 450 10e9/L 187   RBC Count Latest Ref Range: 3.8 - 5.2 10e12/L 4.71   MCV Latest Ref Range: 78 - 100 fl 96   MCH Latest Ref Range: 26.5 - 33.0 pg 30.6   MCHC Latest Ref Range: 31.5 - 36.5 g/dL 32.0   RDW Latest Ref Range: 10.0 - 15.0 % 12.4         Right sided filling pressures are normal.    Severely elevated pulmonary artery hypertension.    Left sided filling pressures are mildly elevated.    Normal cardiac output level.     Severe pulmonary hypertension with mildly elevated LVEDP. Borderline reduced cardiac index.               Right Heart Pressures     Systolic BP: 120/71mmHg    RA: 6/9/5mmHg  RV: 95/5mmHg  PA: 95/34/54 mmHg  PCWP: 15/15/14 mmHg  CO/CI (Dipti): 2.6/1.71   CO/CI (Thermo): 3.77/2.48  PVR: 15WU Right sided filling pressures are normal.Left sided filling pressures are mildly elevated. Severely elevated pulmonary artery hypertension.Normal cardiac output level.

## 2020-07-17 ENCOUNTER — VIRTUAL VISIT (OUTPATIENT)
Dept: ENDOCRINOLOGY | Facility: CLINIC | Age: 60
End: 2020-07-17
Payer: MEDICARE

## 2020-07-17 ENCOUNTER — TELEPHONE (OUTPATIENT)
Dept: ENDOCRINOLOGY | Facility: CLINIC | Age: 60
End: 2020-07-17

## 2020-07-17 DIAGNOSIS — M81.0 SENILE OSTEOPOROSIS: Primary | ICD-10-CM

## 2020-07-17 PROBLEM — N18.4 STAGE 4 CHRONIC KIDNEY DISEASE (H): Status: ACTIVE | Noted: 2020-07-17

## 2020-07-17 PROBLEM — N18.30 CHRONIC KIDNEY DISEASE, STAGE III (MODERATE) (H): Status: ACTIVE | Noted: 2020-07-17

## 2020-07-17 NOTE — PROGRESS NOTES
[facility]  Endocrinology  Jenna Salas MD  7/17/2020     Chief Complaint:   Video Visit     History of Present Illness:   Judith Nelson is a 60 year old female with a history of sarcoidosis, CKD, MGUS, and significant pulmonary hypertension who presents for evaluation of osteoporosis.    Due to the COVID 19 pandemic this visit was converted to a virtual  visit in order to help prevent spread of infection in this high risk patient and the general population .      Tried Amwell at 1:32 pm - no response. Tried Doximity at 1:34 pm and was successful. Video visit  stopped at 1:48        #1 Osteoporosis with history of nontraumatic fracture and chronic steroid use due to sarcoidosis  Summary of previous history:  Ms. Nelson is a 53-year-old female with a complex medical history including pulmonary hypertension for which she has been following up with Dr. Franco since 10/2010 and she was placed on inhaled treprostini and tadalafil with right sided heart showing a significant pulm HTN. Earlier in 3/201 she was found to have high Ca of 11.3 along with a normal PTH and worsening renal functions. Patient was given a dose of Zoledronic acid with a drop in her Ca to 9.4 next month. Her Ca has been stable with levels less than 10. She had an extensive workup for her renal failure and renal biopsy showed a non-necrotizing granulomas and negative stains bringing up the issue of sarcoidosis. That diagnosis has been considered more likely in view of the hypercalcemia, chronic kidney disease and elevated ACE levels. She also found to have MGUS.  She has a history of osteoporosis based on a DEXA scan which was done in 02/2010 showing a T score of - 2.7 in the LS along with a non traumatic fracture of her left femur after a hip arthoplasty. She was seen in our clinic for that matter in 2010 and she was placed on a monthly Boniva since then but that has been stopped apparently secondary to her worsening renal fx, as well as,  her Ca- vitamin D supplement has been stopped in view of the recurrent hypercalcemia. She has a repeat DXA scan in 2011 which showed possible improvement in her T score to - 2.5.  The patient has been on prednisone since 04/02/2013. When I evaluated the patient in 2013, her renal function remained quite poor with a creatinine around 2.0.  I then started the patient on Prolia.  She received her first dose in October 2013.  She tolerated this well.  She received her second dose of Prolia in April 2014.  She tolerated this well.  Her bone density in April 2014 showed the lowest T score -2.3 in the lumbar spine which has improved compared with her previous T score -2.5 in 2011.   Of note, the patient reports stopping her prednisone in 2014.  She has now been off her prednisone since roughly August 2014.  Her creatinine in September 2014 was about 1.98. The patient received her 3rd dose of Prolia in September 2014.   She received her fourth dose of Prolia in April 2015.  March 2015 DEXA scan showed improvement at the level of the lumbar spine and possible decline at the left total hip ( which appears nonsignificant). Her fifth dose of Prolia was given in August, 2017. She has a history of hypercalcemia related to her sarcoidosis although her September 2015 serum calcium was 8.9.  In 2018, she had her Prolia delayed because of teeth extraction (completed in October 2019).    Interval history:  She received her Prolia shot in January 2020.  Her DEXA scan in February 2020 showed a compression fracture and decreased in her bone density at the level of her lumbar spine and both total hips compared with the previous exam in 2015.  July 2020 creatinine was about 1.52.  January 2020 vitamin D was 30, TTG was negative, TSH was 1.77.  I had felt that her ongoing bone loss was most likely related to the discontinuation of her Prolia.  She was supposed to receive her Prolia shot today but did not have this scheduled.    #2 Pulmonary  issues related to pulmonary artery hypertension and sarcoidosis  The patient has a history of pulmonary artery hypertension, now on Imuran, selexipag, tadalafil, and macitentan.  She last saw pulmonology in July 2019 at which time there was evidence of obstructive disease therefore she was started on Advair in addition to her PRN Xopenex.      Interval history: The patient has seen cardiology again.  The patient has ongoing weight loss.  She had a right heart cath in July 2020.  Apparently her pulmonary hypertension is refractory to medication treatment.    #3 Sarcoidosis  She was previously on prednisone for treatment of her sarcoidosis. She was on this from at least 2013 to August 2014.  Currently on Imuran.  2014 ACTH stimulation test was normal.    Interval history:  As far as she is aware, her sarcoidosis is stable.      #4 Anxiety  She is currently on Prozac, Lamictal, and Risperdal.  She has been more depressed lately.     Interval history: The patient continues to struggle with anxiety and depression.  She denies any thoughts about hurting herself or others.  She has social stressors.    Review of Systems:   Pertinent items are noted in HPI, remainder of complete ROS is negative.      Active Medications:   Current Outpatient Medications   Medication Sig Dispense Refill     acetaminophen (TYLENOL) 500 MG tablet Take 1-2 tablets (500-1,000 mg) by mouth every 6 hours as needed for pain (Take as needed for pain.  Do not exceed 4 grams (8 tablets) in a day) (Patient taking differently: Take 1,000 mg by mouth every 6 hours as needed for pain (Take as needed for pain.  Do not exceed 4 grams (8 tablets) in a day) ) 45 tablet 10     ADVAIR DISKUS 250-50 MCG/DOSE inhaler INHALE 1 PUFF BY MOUTH TWICE A DAY 1 Inhaler 11     azaTHIOprine (IMURAN) 50 MG tablet Take 1 tablet (50 mg) by mouth daily 90 tablet 3     BUSPIRONE HCL PO Take 15 mg by mouth 2 times daily       calcitRIOL (ROCALTROL) 0.25 MCG capsule Take 1 capsule  (0.25 mcg) by mouth daily 30 capsule 1     carvedilol (COREG) 3.125 MG tablet Take 1 tablet (3.125 mg) by mouth 2 times daily (with meals) 180 tablet 2     FLUoxetine (PROZAC) 20 MG capsule Take 1 capsule (20 mg) by mouth daily (Patient taking differently: Take 30 mg by mouth daily ) 30 capsule 1     furosemide (LASIX) 20 MG tablet Take 1 tablet by mouth on Monday, Wednesday & Friday's (Patient taking differently: Take 1 tablet by mouth on tuesdays, thursdays and saturdays) 45 tablet 3     lamoTRIgine (LAMICTAL) 25 MG tablet Take 100 mg by mouth daily        LORazepam (ATIVAN) 0.5 MG tablet Take 2 tablets (1 mg) by mouth 2 times daily 90 tablet 1     macitentan (OPSUMIT) 10 MG tablet Take 1 tablet (10 mg) by mouth daily 30 tablet 11     order for DME Oxygen       ORDER FOR DME Equipment being ordered: SHOWER CHAIR  FROM [employer] 1 Device 0     potassium citrate (UROCIT-K) 10 MEQ (1080 MG) CR tablet Take 10 mEq by mouth three times a week Tuesday Thursday and Saturday       ranitidine (RANITIDINE) 75 MG tablet Take 1-2 tablets ( mg) by mouth 2 times daily (Patient taking differently: Take 150 mg by mouth At Bedtime ) 60 tablet 11     risperiDONE (RISPERDAL) 0.25 MG tablet Take 0.5 mg by mouth every morning       selexipag (UPTRAVI) 1600 MCG tablet Take 1 tablet (1,600 mcg) by mouth every 12 hours 60 tablet 11     simvastatin (ZOCOR) 10 MG tablet Take 1 tablet by mouth At Bedtime. 90 tablet 1     tadalafil, PAH, (ADCIRCA) 20 MG TABS Take 2 tablets (40 mg) by mouth daily 60 tablet 11     topiramate (TOPAMAX) 50 MG tablet Take 50 mg by mouth daily          Allergies:   Dilaudid [hydromorphone]; Morphine; and Latex      Past Medical History:  Chronic hypoxemic respiratory failure  Pulmonary hypertension   Sarcoidosis   Stress-induced cardiomyopathy   Chronic kidney disease, stage 4  Iron deficiency anemia   Osteoporosis   Mixed hyperlipidemia  Gastroesophageal reflux disease   Gall stones  Duodenitis,  "hemorrhagic  Generalized anxiety disorder   Major depressive disorder, moderate  Seborrheic keratosis  Protrusio acetabuli   Lumbar compression fracture   Hyperprolactinemia      Past Surgical History:  Right heart catheterization 19  Cystoscopy, stent insertion 10/2/13  Cholecystectomy, ERCP 2012  Total hip arthroplasty, right 10/1/10   section     Family History:   Migraine headaches - sister, mother   Hypertension - sister, father, mother  Hyperlipidemia - sister, father  Depression - son, mother   Asthma - sister, daughter, mother  Anxiety - sister, son, father  Anesthesia reaction - son, daughter  Alzheimer's disease - paternal grandfather, paternal grandmother   Coronary artery disease - maternal grandfather, maternal grandmother, father   Osteoporosis - maternal grandmother, mother   Diabetes - maternal grandmother   Leukemia - mother      Social History:   Presents to clinic with her daughter.  Tobacco Use: No previous or current tobacco use.   Alcohol Use: Occasional alcohol use.   PCP: Anupama Babcock      Physical Exam:   There were no vitals taken for this visit.     GENERAL: Healthy, alert and no distress\",\"EYES: Eyes grossly normal to inspection.  No discharge or erythema, or obvious scleral/conjunctival abnormalities.\",\"RESP: No audible wheeze, cough, or visible cyanosis.  No visible retractions or increased work of breathing.  \",\"SKIN: Visible skin clear. No significant rash, abnormal pigmentation or lesions.\",\"NEURO: Cranial nerves grossly intact.  Mentation and speech appropriate for age.\",\"PSYCH: Mentation appears normal, affect normal/bright, judgement and insight intact, normal speech and appearance well-groomed.    Assessment and Plan:  #1 Osteoporosis, postmenopausal  The patient absolutely needs her Prolia shot (last shot in 2020) as stopping the Prolia shot resulted her with ongoing bone loss.  She had responded well to Prolia previously.  We will see if this can be " done locally ([facility]) or potentially [place] [facility].    #2 Pulmonary issues related to pulmonary artery hypertension and sarcoidosis  Followed by cardiology.  It seems that her treatment options are limited.    #3 Sarcoidosis  Not discussed at current visit.    #4 Anxiety  The patient reports local counselor follow-up.  She reports local social work support  (declines  referral today).  Denies any thoughts about hurting herself or others.    I think the main priority is for her to be scheduled for a Prolia shot either at [facility] or at home (using [facility] [facility] infusion if possible).  We will see what can be arranged.     Follow-up: 6 months          Due to the COVID 19 pandemic this visit was converted to a telephone/virtual  visit in order to help prevent spread of infection in this high risk patient and the general population .      Tried Amwell at 1:32 pm - no response. Tried Doximity at 1:34 pm and was successful. Video visit  stopped at 1:48

## 2020-07-17 NOTE — LETTER
"7/17/2020       RE: Judith Nelson  [address] [facility] [address]     Dear Colleague,    Thank you for referring your patient, Judith Nelson, to the [facility] ENDOCRINOLOGY at [facility]. Please see a copy of my visit note below.    Judith Nelson is a 60 year old female who is being evaluated via a billable video visit.      The patient has been notified of following:     \"This video visit will be conducted via a call between you and your physician/provider. We have found that certain health care needs can be provided without the need for an in-person physical exam.  This service lets us provide the care you need with a video conversation.  If a prescription is necessary we can send it directly to your pharmacy.  If lab work is needed we can place an order for that and you can then stop by our lab to have the test done at a later time.    Video visits are billed at different rates depending on your insurance coverage.  Please reach out to your insurance provider with any questions.    If during the course of the call the physician/provider feels a video visit is not appropriate, you will not be charged for this service.\"    Patient has given verbal consent for Video visit? Yes  How would you like to obtain your AVS? MyChart  If you are dropped from the video visit, the video invite should be resent to: Text to cell phone: [phone]  Will anyone else be joining your video visit? No    [facility]  Endocrinology  Jenna Salas MD  7/17/2020     Chief Complaint:   Video Visit     History of Present Illness:   Judith Nelson is a 60 year old female with a history of sarcoidosis, CKD, MGUS, and significant pulmonary hypertension who presents for evaluation of osteoporosis.    Due to the COVID 19 pandemic this visit was converted to a virtual  visit in order to help prevent spread of infection in this high risk patient and the general population .      Tried " Viraj at 1:32 pm - no response. Tried Doximity at 1:34 pm and was successful. Video visit  stopped at 1:48        #1 Osteoporosis with history of nontraumatic fracture and chronic steroid use due to sarcoidosis  Summary of previous history:  Ms. Nelson is a 53-year-old female with a complex medical history including pulmonary hypertension for which she has been following up with Dr. Franco since 10/2010 and she was placed on inhaled treprostini and tadalafil with right sided heart showing a significant pulm HTN. Earlier in 3/201 she was found to have high Ca of 11.3 along with a normal PTH and worsening renal functions. Patient was given a dose of Zoledronic acid with a drop in her Ca to 9.4 next month. Her Ca has been stable with levels less than 10. She had an extensive workup for her renal failure and renal biopsy showed a non-necrotizing granulomas and negative stains bringing up the issue of sarcoidosis. That diagnosis has been considered more likely in view of the hypercalcemia, chronic kidney disease and elevated ACE levels. She also found to have MGUS.  She has a history of osteoporosis based on a DEXA scan which was done in 02/2010 showing a T score of - 2.7 in the LS along with a non traumatic fracture of her left femur after a hip arthoplasty. She was seen in our clinic for that matter in 2010 and she was placed on a monthly Boniva since then but that has been stopped apparently secondary to her worsening renal fx, as well as, her Ca- vitamin D supplement has been stopped in view of the recurrent hypercalcemia. She has a repeat DXA scan in 2011 which showed possible improvement in her T score to - 2.5.  The patient has been on prednisone since 04/02/2013. When I evaluated the patient in 2013, her renal function remained quite poor with a creatinine around 2.0.  I then started the patient on Prolia.  She received her first dose in October 2013.  She tolerated this well.  She received her second dose  of Prolia in April 2014.  She tolerated this well.  Her bone density in April 2014 showed the lowest T score -2.3 in the lumbar spine which has improved compared with her previous T score -2.5 in 2011.   Of note, the patient reports stopping her prednisone in 2014.  She has now been off her prednisone since roughly August 2014.  Her creatinine in September 2014 was about 1.98. The patient received her 3rd dose of Prolia in September 2014.   She received her fourth dose of Prolia in April 2015.  March 2015 DEXA scan showed improvement at the level of the lumbar spine and possible decline at the left total hip ( which appears nonsignificant). Her fifth dose of Prolia was given in August, 2017. She has a history of hypercalcemia related to her sarcoidosis although her September 2015 serum calcium was 8.9.  In 2018, she had her Prolia delayed because of teeth extraction (completed in October 2019).    Interval history:  She received her Prolia shot in January 2020.  Her DEXA scan in February 2020 showed a compression fracture and decreased in her bone density at the level of her lumbar spine and both total hips compared with the previous exam in 2015.  July 2020 creatinine was about 1.52.  January 2020 vitamin D was 30, TTG was negative, TSH was 1.77.  I had felt that her ongoing bone loss was most likely related to the discontinuation of her Prolia.  She was supposed to receive her Prolia shot today but did not have this scheduled.    #2 Pulmonary issues related to pulmonary artery hypertension and sarcoidosis  The patient has a history of pulmonary artery hypertension, now on Imuran, selexipag, tadalafil, and macitentan.  She last saw pulmonology in July 2019 at which time there was evidence of obstructive disease therefore she was started on Advair in addition to her PRN Xopenex.      Interval history: The patient has seen cardiology again.  The patient has ongoing weight loss.  She had a right heart cath in July  2020.  Apparently her pulmonary hypertension is refractory to medication treatment.    #3 Sarcoidosis  She was previously on prednisone for treatment of her sarcoidosis. She was on this from at least 2013 to August 2014.  Currently on Imuran.  2014 ACTH stimulation test was normal.    Interval history:  As far as she is aware, her sarcoidosis is stable.      #4 Anxiety  She is currently on Prozac, Lamictal, and Risperdal.  She has been more depressed lately.     Interval history: The patient continues to struggle with anxiety and depression.  She denies any thoughts about hurting herself or others.  She has social stressors.    Review of Systems:   Pertinent items are noted in HPI, remainder of complete ROS is negative.      Active Medications:   Current Outpatient Medications   Medication Sig Dispense Refill     acetaminophen (TYLENOL) 500 MG tablet Take 1-2 tablets (500-1,000 mg) by mouth every 6 hours as needed for pain (Take as needed for pain.  Do not exceed 4 grams (8 tablets) in a day) (Patient taking differently: Take 1,000 mg by mouth every 6 hours as needed for pain (Take as needed for pain.  Do not exceed 4 grams (8 tablets) in a day) ) 45 tablet 10     ADVAIR DISKUS 250-50 MCG/DOSE inhaler INHALE 1 PUFF BY MOUTH TWICE A DAY 1 Inhaler 11     azaTHIOprine (IMURAN) 50 MG tablet Take 1 tablet (50 mg) by mouth daily 90 tablet 3     BUSPIRONE HCL PO Take 15 mg by mouth 2 times daily       calcitRIOL (ROCALTROL) 0.25 MCG capsule Take 1 capsule (0.25 mcg) by mouth daily 30 capsule 1     carvedilol (COREG) 3.125 MG tablet Take 1 tablet (3.125 mg) by mouth 2 times daily (with meals) 180 tablet 2     FLUoxetine (PROZAC) 20 MG capsule Take 1 capsule (20 mg) by mouth daily (Patient taking differently: Take 30 mg by mouth daily ) 30 capsule 1     furosemide (LASIX) 20 MG tablet Take 1 tablet by mouth on Monday, Wednesday & Friday's (Patient taking differently: Take 1 tablet by mouth on tuesdays, thursdays and  ) 45 tablet 3     lamoTRIgine (LAMICTAL) 25 MG tablet Take 100 mg by mouth daily        LORazepam (ATIVAN) 0.5 MG tablet Take 2 tablets (1 mg) by mouth 2 times daily 90 tablet 1     macitentan (OPSUMIT) 10 MG tablet Take 1 tablet (10 mg) by mouth daily 30 tablet 11     order for DME Oxygen       ORDER FOR DME Equipment being ordered: SHOWER CHAIR  FROM [employer] 1 Device 0     potassium citrate (UROCIT-K) 10 MEQ (1080 MG) CR tablet Take 10 mEq by mouth three times a week  and Saturday       ranitidine (RANITIDINE) 75 MG tablet Take 1-2 tablets ( mg) by mouth 2 times daily (Patient taking differently: Take 150 mg by mouth At Bedtime ) 60 tablet 11     risperiDONE (RISPERDAL) 0.25 MG tablet Take 0.5 mg by mouth every morning       selexipag (UPTRAVI) 1600 MCG tablet Take 1 tablet (1,600 mcg) by mouth every 12 hours 60 tablet 11     simvastatin (ZOCOR) 10 MG tablet Take 1 tablet by mouth At Bedtime. 90 tablet 1     tadalafil, PAH, (ADCIRCA) 20 MG TABS Take 2 tablets (40 mg) by mouth daily 60 tablet 11     topiramate (TOPAMAX) 50 MG tablet Take 50 mg by mouth daily          Allergies:   Dilaudid [hydromorphone]; Morphine; and Latex      Past Medical History:  Chronic hypoxemic respiratory failure  Pulmonary hypertension   Sarcoidosis   Stress-induced cardiomyopathy   Chronic kidney disease, stage 4  Iron deficiency anemia   Osteoporosis   Mixed hyperlipidemia  Gastroesophageal reflux disease   Gall stones  Duodenitis, hemorrhagic  Generalized anxiety disorder   Major depressive disorder, moderate  Seborrheic keratosis  Protrusio acetabuli   Lumbar compression fracture   Hyperprolactinemia      Past Surgical History:  Right heart catheterization 19  Cystoscopy, stent insertion 10/2/13  Cholecystectomy, ERCP 2012  Total hip arthroplasty, right 10/1/10   section     Family History:   Migraine headaches - sister, mother   Hypertension - sister, father, mother  Hyperlipidemia  "- sister, father  Depression - son, mother   Asthma - sister, daughter, mother  Anxiety - sister, son, father  Anesthesia reaction - son, daughter  Alzheimer's disease - paternal grandfather, paternal grandmother   Coronary artery disease - maternal grandfather, maternal grandmother, father   Osteoporosis - maternal grandmother, mother   Diabetes - maternal grandmother   Leukemia - mother      Social History:   Presents to clinic with her daughter.  Tobacco Use: No previous or current tobacco use.   Alcohol Use: Occasional alcohol use.   PCP: Anupama Babcock      Physical Exam:   There were no vitals taken for this visit.     GENERAL: Healthy, alert and no distress\",\"EYES: Eyes grossly normal to inspection.  No discharge or erythema, or obvious scleral/conjunctival abnormalities.\",\"RESP: No audible wheeze, cough, or visible cyanosis.  No visible retractions or increased work of breathing.  \",\"SKIN: Visible skin clear. No significant rash, abnormal pigmentation or lesions.\",\"NEURO: Cranial nerves grossly intact.  Mentation and speech appropriate for age.\",\"PSYCH: Mentation appears normal, affect normal/bright, judgement and insight intact, normal speech and appearance well-groomed.    Assessment and Plan:  #1 Osteoporosis, postmenopausal  The patient absolutely needs her Prolia shot (last shot in January 2020) as stopping the Prolia shot resulted her with ongoing bone loss.  She had responded well to Prolia previously.  We will see if this can be done locally ([facility]) or potentially [place] home infusion.    #2 Pulmonary issues related to pulmonary artery hypertension and sarcoidosis  Followed by cardiology.  It seems that her treatment options are limited.    #3 Sarcoidosis  Not discussed at current visit.    #4 Anxiety  The patient reports local counselor follow-up.  She reports local social work support  (declines  referral today).  Denies any thoughts about hurting herself or others.    I " think the main priority is for her to be scheduled for a Prolia shot either at [facility] or at home (using [facility] home infusion if possible).  We will see what can be arranged.     Follow-up: 6 months    Due to the COVID 19 pandemic this visit was converted to a telephone/virtual  visit in order to help prevent spread of infection in this high risk patient and the general population .      Tried Amwell at 1:32 pm - no response. Tried Doximity at 1:34 pm and was successful. Video visit  stopped at 1:48        Again, thank you for allowing me to participate in the care of your patient.      Sincerely,    Jenna Salas MD

## 2020-07-17 NOTE — TELEPHONE ENCOUNTER
----- Message from Jenna Salas MD sent at 7/17/2020  2:05 PM CDT -----    I really need to help with this.  Patient was supposed to receive Prolia shot today but did not have this done.  She needs to be on regular Prolia shots otherwise she will lose her  bone density.  She would prefer to do this at [facility].  If this is not possible, can we do this by [facility] infusion?

## 2020-07-17 NOTE — TELEPHONE ENCOUNTER
[facility]  Address: [address] [place] [address]  Departments: [facility] [place]  Hours:Open 24 hours  Phone: [phone]  Infusion Center  [facility]   Spoke jessica Huston RN at [facility] [place] states they are administering prolia infusions at this time.   Sent to provider to place order. Pt will need to contact [facility] to schedule.  Nesha Shrestha RN on 7/17/2020 at 2:26 PM

## 2020-07-17 NOTE — TELEPHONE ENCOUNTER
I spoke with Judith and she was only scheduled for labs today no prolia injection.They called and told her not to come for labs because there were no orders for te Prolia.  When I look at infusion orders no Therapy plan for Prolia was done. Dr Salas please place Therapy plan for Prolia  and then we will work on having this done early next week at [facility] if the infusion center is open there. Therapy plans need to be signed by providers to be valid. Jillian Adrian RN on 7/17/2020 at 2:19 PM

## 2020-07-17 NOTE — TELEPHONE ENCOUNTER
----- Message from Jenna Salas MD sent at 7/17/2020  2:05 PM CDT -----    I really need to help with this.  Patient was supposed to receive Prolia shot today but did not have this done.  She needs to be on regular Prolia shots otherwise she will lose her  bone density.  She would prefer to do this at [facility].  If this is not possible, can we do this by [facility] infusion?

## 2020-07-21 ENCOUNTER — TELEPHONE (OUTPATIENT)
Dept: CARDIOLOGY | Facility: CLINIC | Age: 60
End: 2020-07-21

## 2020-07-21 NOTE — TELEPHONE ENCOUNTER
PA Initiation    Medication: Tyvaso    Pharmacy Filling the Rx: [facility] - [address]  Start Date: 7/21/2020    *Tyvaso referral form completed and faxed to [facility] along with right heart catheterization report, clinic note, echocardiogram report, and 6 minute walk test, for review and assistance with obtaining the pt's prior authorization.    Keisha Albarran  Clinic   Pulmonary Hypertension  [facility]  (P) [phone]

## 2020-07-21 NOTE — TELEPHONE ENCOUNTER
Please help Judith schedule Prolia injection at [facility]. New orders entered. Jillian Adrian RN on 7/21/2020 at 3:44 PM

## 2020-07-21 NOTE — NURSING NOTE
Plan:  1. Add IL-6, CRP infllammation to labs that she had yesterday  2. Please make sure that she is seen by [facility] in the near future  3. Discussed off label use of tacrolimus and/or PPARg agonist in view of history of persistent PH refractory to medication  4. She is a poor candidate for parenteral prostacyclin in view of social support and anxiety.  Consider switching to inhaled Tyvaso in lieu of Uptrava  5. The patient is non-suicidally depressed to the point where she has difficulties in making decisions/received permission to talk with brother and sister-in-law [phone]  6. Follow-up in 3 - 4 weeks  ---------------------------  Blood sample too old to add on labs MD wanted.  Did place order for CRP & IL6 for next time she has blood drawn.     Called Dr. Franco to clarify if the [facility] is aka Rheumatology, and does he want us to place PA for Tyvaso and transition off of Uptravi?  No answer. Keyanna Mccray RN on 7/21/2020 at 12:18 PM  ----------------------------  After speaking with Dr. Franco, the [facility] is through Pulmonary, specifically Dr. Membreno.    He also wants us to get PA approved for Tyvaso then call and let him know, he will then talk with patient again.  Verbalized understanding. Keyanna Mccray RN on 7/21/2020 at 12:28 PM    -----------------------------  Orders placed & mychart message sent to patient with pulmonology phone number to make appt.  Keyanna Mccray RN on 7/21/2020 at 12:35 PM

## 2020-07-27 ENCOUNTER — DOCUMENTATION ONLY (OUTPATIENT)
Dept: CARE COORDINATION | Facility: CLINIC | Age: 60
End: 2020-07-27

## 2020-07-28 ENCOUNTER — TELEPHONE (OUTPATIENT)
Dept: CARDIOLOGY | Facility: CLINIC | Age: 60
End: 2020-07-28

## 2020-07-28 NOTE — TELEPHONE ENCOUNTER
"Patient called and asked if Mendy Capps had spoken with me; yes.  patient was waiting for a call back from me with an update on her plan of care.  I apologized, as I had gotten an update from Mendy but wasn't aware I had to call her back.      patient asked if she was being started on Tyvaso?  Yes.  We reviewed Dr. Franco's note from their recent virtual visit which explained plan of care and follow up recommendations.  Advised her I had sent her a [employer] message letting her know he wanted her to see a [facility] MD in Pulmonary, when she informed me she doesn't use Mychart and she already sees Dr. Razo.  Advised her then she was already seeing the person he wanted so she was covered, she also has an appt with him in the next few weeks.  In-activated her Mychart, as she said she doesn't know how to use it.    She had received a phone call from [employer] about Tyvaso and was advised it wasn't going to cost her anything.  Explained that now that we know it's been approved, I will talk with Dr. Franco for instructions on how he wants her to transition from Uptravi onto Tyvaso.  Once I had that information, I would call her.    patient was tearful and stated \"I'm so depressed\", her daughter is having a baby, her brother has melanoma and she has to find a new place to live.  She doesn't know how she's going to get to her appt's because her daughter will be busy, her sister-in-law is taking care of her brother and [facility]'t [facility] out to her home in [place].    patient wasn't sure if she as a transportation benefit through her medical insurance, so I referred her to Liana Alaniz  to help her figure it out.  I provided her with her office number to leave a message.    Confirmed patient was still seeing her therapist and psychologist regularly; yes and was recently started on a small dose of Ritalin in the mornings.      Patient verbalized understanding, agreed with plan and denied any further " questions. Keyanna Mccray RN on 7/28/2020 at 4:51 PM  -----------------------  Spoke with Dr. Franco for instructions on transition from Uptravi to Tyvaso as we don't have a protocol for this yet.  He will discuss with Dr. Angel and get back to me.  Verbalized understanding. Keyanna Mccray RN on 7/28/2020 at 4:52 PM

## 2020-07-30 ENCOUNTER — TELEPHONE (OUTPATIENT)
Dept: CARDIOLOGY | Facility: CLINIC | Age: 60
End: 2020-07-30

## 2020-07-30 NOTE — TELEPHONE ENCOUNTER
E-mail received 20      Judith Omar     1960    I just spoke with her.  She said she is ready to start Tyvaso.  She requested  10 am when her youngest daughter can be there (Saritha).  She was worried about her messy house.  I wonder if we can get some help for her in her home for housekeeping?  [facility] does not have that service .Could [facility] assess her for help sometime?  Thank you!  Mendy Kennedy, I think that is it Please call with any questions!  Mendy Capps, RN BSN    CardioPulmonary Nurse Specialist    [facility]  ------------------------------------  I sent a reply e-mail to Mendy advising her that I am still waiting on the titration orders from Dr. Franco to come off Uptravi and start Tyvaso. Keyanna Mccray RN on 2020 at 11:39 AM

## 2020-08-06 ENCOUNTER — TELEPHONE (OUTPATIENT)
Dept: CARDIOLOGY | Facility: CLINIC | Age: 60
End: 2020-08-06

## 2020-08-06 NOTE — TELEPHONE ENCOUNTER
Patient LM stating she was confused about the Tyvaso and Uptravi Titration.  The SP wonb't send it until they have the titration orderts.  Can I call her to discuss.  ------------------------------------  Attempted to call patient twice, but got busy signal both times.  Will try again later.    Received the titration order this morning from Dr. Franco.Keyanna Mccray RN on 8/6/2020 at 1:20 PM      This will be done weekly, as patient has her meds set up weekly and titrating any faster would create confusion and potential med error.   -------------------------  Sent e-mail to Guillermina Singer and Mendy Capps RN with [facility] documenting the above titration plan.  Keyanna Mccray RN on 8/6/2020 at 1:48 PM  ------------------------  Sent another e-mail to blanca Barfield  At [facility] today as she e-mailed asking for titration schedule.    patient called asking about titration schedule and when [employer] will ship Tyvaso.  LM that I had sent an e-mail to pharmacist Friday & today so they should be calling her to ship product. Keyanna Mccray RN on 8/10/2020 at 3:51 PM

## 2020-08-07 ENCOUNTER — HOSPITAL ENCOUNTER (OUTPATIENT)
Dept: LAB | Facility: CLINIC | Age: 60
Discharge: HOME | End: 2020-08-07
Attending: INTERNAL MEDICINE | Admitting: INTERNAL MEDICINE
Payer: MEDICARE

## 2020-08-07 ENCOUNTER — INFUSION THERAPY VISIT (OUTPATIENT)
Dept: INFUSION THERAPY | Facility: CLINIC | Age: 60
End: 2020-08-07
Attending: INTERNAL MEDICINE
Payer: MEDICARE

## 2020-08-07 VITALS — DIASTOLIC BLOOD PRESSURE: 52 MMHG | HEART RATE: 64 BPM | SYSTOLIC BLOOD PRESSURE: 96 MMHG | TEMPERATURE: 97.7 F

## 2020-08-07 DIAGNOSIS — D50.8 OTHER IRON DEFICIENCY ANEMIA: Primary | ICD-10-CM

## 2020-08-07 DIAGNOSIS — Z79.899 ENCOUNTER FOR LONG-TERM CURRENT USE OF MEDICATION: ICD-10-CM

## 2020-08-07 DIAGNOSIS — K21.9 GASTROESOPHAGEAL REFLUX DISEASE, ESOPHAGITIS PRESENCE NOT SPECIFIED: ICD-10-CM

## 2020-08-07 DIAGNOSIS — M81.0 OSTEOPOROSIS, POSTMENOPAUSAL: ICD-10-CM

## 2020-08-07 LAB
CALCIUM SERPL-MCNC: 9 MG/DL (ref 8.5–10.1)
CREAT SERPL-MCNC: 1.25 MG/DL (ref 0.52–1.04)
GFR SERPL CREATININE-BSD FRML MDRD: 47 ML/MIN/{1.73_M2}
PHOSPHATE SERPL-MCNC: 2.9 MG/DL (ref 2.5–4.5)

## 2020-08-07 PROCEDURE — 96372 THER/PROPH/DIAG INJ SC/IM: CPT

## 2020-08-07 PROCEDURE — 82565 ASSAY OF CREATININE: CPT | Performed by: INTERNAL MEDICINE

## 2020-08-07 PROCEDURE — 36415 COLL VENOUS BLD VENIPUNCTURE: CPT | Performed by: INTERNAL MEDICINE

## 2020-08-07 PROCEDURE — 82310 ASSAY OF CALCIUM: CPT | Performed by: INTERNAL MEDICINE

## 2020-08-07 PROCEDURE — 25000128 H RX IP 250 OP 636: Performed by: INTERNAL MEDICINE

## 2020-08-07 PROCEDURE — 84100 ASSAY OF PHOSPHORUS: CPT | Performed by: INTERNAL MEDICINE

## 2020-08-07 RX ADMIN — DENOSUMAB 60 MG: 60 INJECTION SUBCUTANEOUS at 10:50

## 2020-08-07 NOTE — LETTER
Patient:  Judith Nelson  :   1960  MRN:     [id number]        Ms.Pamela MARY Nelson  [address] [place] [address]        August 10, 2020    Dear Mendy    I hope your recent Prolia shot went well. Your labs look good. Please make sure you have the Prolia scheduled for every 6 months. This can be done at [facility]. Please call them to schedule.    If you have any questions, please feel free to contact my nurse at [phone] select option #3 for triage nurse  or  option #1 for scheduling related questions.    Regards    Jenna Salas MD        Resulted Orders   Calcium   Result Value Ref Range    Calcium 9.0 8.5 - 10.1 mg/dL   Creatinine   Result Value Ref Range    Creatinine 1.25 (H) 0.52 - 1.04 mg/dL    GFR Estimate 47 (L) >60 mL/min/[1.73_m2]      Comment:      Non  GFR Calc  Starting 2018, serum creatinine based estimated GFR (eGFR) will be   calculated using the Chronic Kidney Disease Epidemiology Collaboration   (CKD-EPI) equation.      GFR Estimate If Black 54 (L) >60 mL/min/[1.73_m2]      Comment:       GFR Calc  Starting 2018, serum creatinine based estimated GFR (eGFR) will be   calculated using the Chronic Kidney Disease Epidemiology Collaboration   (CKD-EPI) equation.     Phosphorus   Result Value Ref Range    Phosphorus 2.9 2.5 - 4.5 mg/dL       [place] chemo therapy

## 2020-08-07 NOTE — PROGRESS NOTES
Infusion Nursing Note:  Judith Nelson presents today for Prolia injection.    Patient seen by provider today: No   present during visit today: Not Applicable.    Note: pt was noted to be hypotensive; she denied any issue with dizziness. Encouraged/advised her to contact her PCP with the information provided to her; wrote her BP's on a piece of paper for her.     Intravenous Access:  No Intravenous access/labs at this visit.    Treatment Conditions:  Results reviewed, labs MET treatment parameters, ok to proceed with treatment.      Post Infusion Assessment:  Patient tolerated injection without incident.       Discharge Plan:   Patient discharged in stable condition accompanied by: self.  Departure Mode: Ambulatory.    Odalis Schneider RN

## 2020-08-14 ENCOUNTER — TELEPHONE (OUTPATIENT)
Dept: ENDOCRINOLOGY | Facility: CLINIC | Age: 60
End: 2020-08-14

## 2020-08-14 NOTE — TELEPHONE ENCOUNTER
Reviewed physicians note with patient Your labs look good. Please make sure you have the Prolia scheduled for every 6 months. This can be done at [facility]. Please call them to schedule.    Test Reason : 

Blood Pressure : ***/*** mmHG

Vent. Rate : 082 BPM     Atrial Rate : 082 BPM

   P-R Int : 120 ms          QRS Dur : 088 ms

    QT Int : 370 ms       P-R-T Axes : 060 087 014 degrees

   QTc Int : 432 ms

 

NORMAL SINUS RHYTHM WITH SINUS ARRHYTHMIA

NORMAL ECG

NO PREVIOUS ECGS AVAILABLE

Confirmed by SERGIO SWANN MD ([id number]) on 6/4/2017 4:35:26 PM

 

Referred By:             Confirmed By:SERGIO SWANN MD

## 2020-08-14 NOTE — TELEPHONE ENCOUNTER
[facility] Call Center    Phone Message    May a detailed message be left on voicemail: yes     Reason for Call: Requesting Results   Name/type of test: Labs  Date of test: 8/7/20  Was test done at a location other than [facility] (Please fill in the location if not [facility])?: No      Action Taken: Message routed to:  [facility] ([facility] Endo    Travel Screening: Not Applicable

## 2020-08-18 NOTE — TELEPHONE ENCOUNTER
"Per [facility]:    \"Judith  was cleared for Tyvaso on 7/30.  Patient has/had a lot of questions and I believe some of that is being addressed. I think Mendy Capps our CPNS has talked with your nursing team about her concerns.  Hopefully patient is more comfortable with the plan.  Pharmacy is also working on transition orders.  Hope to have her shipment and RN visit very soon.\"    Keisha Albarran  Clinic   Pulmonary Hypertension  [facility]  (P) [phone]  "

## 2020-08-19 ENCOUNTER — OFFICE VISIT (OUTPATIENT)
Dept: PULMONOLOGY | Facility: CLINIC | Age: 60
End: 2020-08-19
Attending: INTERNAL MEDICINE
Payer: MEDICARE

## 2020-08-19 VITALS
RESPIRATION RATE: 18 BRPM | SYSTOLIC BLOOD PRESSURE: 94 MMHG | DIASTOLIC BLOOD PRESSURE: 63 MMHG | TEMPERATURE: 98.1 F | BODY MASS INDEX: 19.49 KG/M2 | OXYGEN SATURATION: 97 % | HEART RATE: 74 BPM | WEIGHT: 110 LBS

## 2020-08-19 DIAGNOSIS — D86.9 SARCOIDOSIS: Chronic | ICD-10-CM

## 2020-08-19 DIAGNOSIS — R06.02 SOB (SHORTNESS OF BREATH): ICD-10-CM

## 2020-08-19 DIAGNOSIS — I27.20 PULMONARY HTN (H): ICD-10-CM

## 2020-08-19 DIAGNOSIS — D86.9 SARCOIDOSIS: Primary | Chronic | ICD-10-CM

## 2020-08-19 DIAGNOSIS — I27.20 PULMONARY HTN (H): Primary | ICD-10-CM

## 2020-08-19 LAB
6 MIN WALK (FT): 983 FT
6 MIN WALK (M): 300 M

## 2020-08-19 PROCEDURE — G0463 HOSPITAL OUTPT CLINIC VISIT: HCPCS | Mod: 25,ZF

## 2020-08-19 ASSESSMENT — PAIN SCALES - GENERAL: PAINLEVEL: NO PAIN (0)

## 2020-08-19 NOTE — PROGRESS NOTES
Reason for Visit  Judith Nelson is a 60 year old year old female who is being seen for   Pulmonary HPI    The patient was seen and examined by Gael Flaherty MD     Ms. Nelson was last evaluated by a phone visit in April 2020.  At that time she reported being on Imuran for renal involvement.  She also was on triple oral therapy including tadalafil, macitentan, selexipag and carvedilol.  Since then it was discovered that the patient was not on Imuran for almost a year. At the  last evaluated in the pulmonary hypertension clinic with the plan is to add inhaled treprostinil and stop selexipag.  However the patient tells me that for some reason she has not received the inhaled medications.  She also reports nervousness surrounding the changes.  She had a right heart catheterization done in July 2020 where her mean PA pressure was 54 mmHg and pulmonary capillary wedge pressure was 14 mmHg    She reports cough which is dry.  This is been present for several months.  She has minimal sinus drainage but has daily reflux symptoms.  She was prescribed oxygen at night and with activity based on a 6-minute walk test done in July 2019.  However, she tells me today that she is not using oxygen because she is unable to afford copay of $25.       Current Outpatient Medications   Medication     acetaminophen (TYLENOL) 500 MG tablet     ADVAIR DISKUS 250-50 MCG/DOSE inhaler     azaTHIOprine (IMURAN) 50 MG tablet     BUSPIRONE HCL PO     calcitRIOL (ROCALTROL) 0.25 MCG capsule     carvedilol (COREG) 3.125 MG tablet     FLUoxetine (PROZAC) 20 MG capsule     furosemide (LASIX) 20 MG tablet     lamoTRIgine (LAMICTAL) 25 MG tablet     LORazepam (ATIVAN) 0.5 MG tablet     macitentan (OPSUMIT) 10 MG tablet     order for DME     ORDER FOR DME     potassium citrate (UROCIT-K) 10 MEQ (1080 MG) CR tablet     ranitidine (RANITIDINE) 75 MG tablet     risperiDONE (RISPERDAL) 0.25 MG tablet     selexipag (UPTRAVI) 1600 MCG tablet      simvastatin (ZOCOR) 10 MG tablet     tadalafil, PAH, (ADCIRCA) 20 MG TABS     topiramate (TOPAMAX) 50 MG tablet     treprostinil (TYVASO) 0.6 MG/ML SOLN neb solution     No current facility-administered medications for this visit.      Allergies   Allergen Reactions     Dilaudid [Hydromorphone] Nausea and Vomiting     Morphine Nausea and Vomiting     Latex Rash     From gloves     Past Medical History:   Diagnosis Date     Anxiety      CKD (chronic kidney disease), stage III (H)     biopsy suspicious for renal sarcoidosis      Hyperprolactinemia (H)      Lower extremity edema     chronic w/ chronic right ankle ulcer     Lumbar compression fracture (H)      Major depressive disorder     with psychotic features, followed by Dr. [facility]ales at [facility] in [facility]     patient is post menopausal     MGUS (monoclonal gammopathy of unknown significance)     mild, followed by Dr. Long     Osteoporosis      Other seborrheic keratosis      Protrusio acetabuli      Pulmonary hypertension (H)      Sarcoidosis      Stress-induced cardiomyopathy        Past Surgical History:   Procedure Laterality Date     C PELVIS/HIP JOINT SURGERY UNLISTED       Csection,  X 2       CV RIGHT HEART CATH N/A 2019    Procedure: 1130 RHC;  Surgeon: Jeanne Angel MD;  Location:  HEART CARDIAC CATH LAB     CV RIGHT HEART CATH N/A 2020    Procedure: CV RIGHT HEART CATH;  Surgeon: Sebastien Izaguirre MD;  Location:  HEART CARDIAC CATH LAB     CYSTOSCOPY, RETROGRADES, INSERT STENT URETER(S), COMBINED  10/2/2013    Procedure: COMBINED CYSTOSCOPY, RETROGRADES, INSERT STENT URETER(S);  Left Retrograde Ureteropyelogram and Ureteral Stent Placement;  Surgeon: SONIA Martinez MD;  Location: [facility] OR     ENDOSCOPIC RETROGRADE CHOLANGIOPANCREATOGRAM  2012    Procedure:ENDOSCOPIC RETROGRADE CHOLANGIOPANCREATOGRAM; ERCP biliary sphincterotomy and stone removal; Surgeon:MARGIE RUGGIERO; Location: OR      LAPAROSCOPIC CHOLECYSTECTOMY WITH CHOLANGIOGRAMS  1/21/2012    Procedure:LAPAROSCOPIC CHOLECYSTECTOMY WITH CHOLANGIOGRAMS; Surgeon:BRIANA PADILLA; Location:[facility] OR     ORTHOPEDIC SURGERY  10/1/2010    Right hip replacement     ZZ GASTROSCOPY,[place]  6/10    Erythematous duodenopathy       Social History     Socioeconomic History     Marital status:      Spouse name: Not on file     Number of children: 3     Years of education: 12     Highest education level: Not on file   Occupational History     Employer: UNEMPLOYED   Social Needs     Financial resource strain: Not on file     Food insecurity     Worry: Not on file     Inability: Not on file     Transportation needs     Medical: Not on file     Non-medical: Not on file   Tobacco Use     Smoking status: Never Smoker     Smokeless tobacco: Never Used   Substance and Sexual Activity     Alcohol use: No     Alcohol/week: 0.0 standard drinks     Comment: Occ.     Drug use: No     Sexual activity: Never     Birth control/protection: Spermicide, Post-menopausal   Lifestyle     Physical activity     Days per week: Not on file     Minutes per session: Not on file     Stress: Not on file   Relationships     Social connections     Talks on phone: Not on file     Gets together: Not on file     Attends Pentecostal service: Not on file     Active member of club or organization: Not on file     Attends meetings of clubs or organizations: Not on file     Relationship status: Not on file     Intimate partner violence     Fear of current or ex partner: Not on file     Emotionally abused: Not on file     Physically abused: Not on file     Forced sexual activity: Not on file   Other Topics Concern     Parent/sibling w/ CABG, MI or angioplasty before 65F 55M? No   Social History Narrative     Not on file       ROS Pulmonary    A complete ROS was otherwise negative except as noted in the HPI.  BP 94/63   Pulse 74   Temp 98.1  F (36.7  C) (Oral)   Resp 18   Wt 49.9 kg (110  lb)   SpO2 97%   BMI 19.49 kg/m    Exam:   GENERAL APPEARANCE: Alert, and in no apparent distress.  EYES: PERRL, EOMI  HENT: Nasal mucosa with no edema and no hyperemia.   MOUTH: Oral mucosa is moist, without any lesions, no tonsillar enlargement, no oropharyngeal exudate.  NECK: supple, no masses, no thyromegaly.  LYMPHATICS: No significant axillary, cervical, or supraclavicular nodes.  RESP: normal percussion, good air flow throughout.  No crackles. No rhonchi. No wheezes.  CV: Normal S1, S2, regular rhythm, normal rate. No murmur.  No rub. No gallop. No LE edema.   MS: extremities normal. No clubbing. No cyanosis.  SKIN: no rash on limited exam  NEURO: Mentation intact, speech normal, normal strength and tone, normal gait and stance    Results:    PFTs done today were reviewed by me with the patient.Her FVC is 2.52 L (81%), FEV1 1.77 L (72%) and ratio 70.  Total lung capacity is 5.10 L (106%), residual is 2.46 (2.46 L (153%).  Diffusion capacity corrected for hemoglobin is 14.34 (73%).  Her residual volume is 2.46 L (133%).  Fibrillation is that she has obstructive airways disease with mildly decreased diffusion capacity.      Assessment and plan: Ms. Nelson is a 60-year-old female with history of sarcoidosis based on granulomatous inflammation identified on a kidney biopsy and hypercalcemia, sarcoid associated pulmonary hypertension with mean PA pressure of 54 mmHg currently tadalafil, macitentan and selexipag.  She has exertional hypoxia but is currently not on any oxygen therapy.  She also reports cough mostly dry and gastroesophageal reflux..   1.  Pulmonary: Obstructive airways disease on PFTs with minimal symptoms.  We will continue to monitor.  Inhaled agents could be an option.  2.  Extrapulmonary sarcoidosis: Renal involvement, hypercalcemia and sarcoidosis upon hypertension.  Changes are being planned for her SAPH management.  She stopped taking Imuran and Arava and has had stable renal functions.   We will continue to monitor off therapy.  Recent labs done which were reviewed.  3.  Cough: Likely related to gastroesophageal reflux may need to start her on scheduled PPI.  4.  Hypoxia: Repeat O2 titration study ordered today.      Addendum: 6-minute walk test done on room air was reviewed by me.  Room air saturations were 94% at the beginning of the walk.  The lowest O2 saturation was 93 at 4 minutes.  She walked 983 feet which is below the lower limit of normal of 1441 feet.  My depression is that she has a sedimentation, desaturation but no significant hypoxia on the 6-minute walk test.  Based on this study she has no significant hypoxia.  Defer oxygen therapy of pulmonary hypertension to pulmonary  hypertension team.

## 2020-08-19 NOTE — LETTER
8/19/2020         RE: Judith Nelson  [address] [place] [address]        Dear Colleague,    Thank you for referring your patient, Judith Nelson, to the [facility]. Please see a copy of my visit note below.    Reason for Visit  Judith Nelson is a 60 year old year old female who is being seen for   Pulmonary HPI    The patient was seen and examined by Gael Flaherty MD     Ms. Nelson was last evaluated by a phone visit in April 2020.  At that time she reported being on Imuran for renal involvement.  She also was on triple oral therapy including tadalafil, macitentan, selexipag and carvedilol.  Since then it was discovered that the patient was not on Imuran for almost a year. At the  last evaluated in the pulmonary hypertension clinic with the plan is to add inhaled treprostinil and stop selexipag.  However the patient tells me that for some reason she has not received the inhaled medications.  She also reports nervousness surrounding the changes.  She had a right heart catheterization done in July 2020 where her mean PA pressure was 54 mmHg and pulmonary capillary wedge pressure was 14 mmHg    She reports cough which is dry.  This is been present for several months.  She has minimal sinus drainage but has daily reflux symptoms.  She was prescribed oxygen at night and with activity based on a 6-minute walk test done in July 2019.  However, she tells me today that she is not using oxygen because she is unable to afford copay of $25.       Current Outpatient Medications   Medication     acetaminophen (TYLENOL) 500 MG tablet     ADVAIR DISKUS 250-50 MCG/DOSE inhaler     azaTHIOprine (IMURAN) 50 MG tablet     BUSPIRONE HCL PO     calcitRIOL (ROCALTROL) 0.25 MCG capsule     carvedilol (COREG) 3.125 MG tablet     FLUoxetine (PROZAC) 20 MG capsule     furosemide (LASIX) 20 MG tablet     lamoTRIgine (LAMICTAL) 25 MG tablet     LORazepam (ATIVAN) 0.5 MG tablet      macitentan (OPSUMIT) 10 MG tablet     order for DME     ORDER FOR DME     potassium citrate (UROCIT-K) 10 MEQ (1080 MG) CR tablet     ranitidine (RANITIDINE) 75 MG tablet     risperiDONE (RISPERDAL) 0.25 MG tablet     selexipag (UPTRAVI) 1600 MCG tablet     simvastatin (ZOCOR) 10 MG tablet     tadalafil, PAH, (ADCIRCA) 20 MG TABS     topiramate (TOPAMAX) 50 MG tablet     treprostinil (TYVASO) 0.6 MG/ML SOLN neb solution     No current facility-administered medications for this visit.      Allergies   Allergen Reactions     Dilaudid [Hydromorphone] Nausea and Vomiting     Morphine Nausea and Vomiting     Latex Rash     From gloves     Past Medical History:   Diagnosis Date     Anxiety      CKD (chronic kidney disease), stage III (H)     biopsy suspicious for renal sarcoidosis      Hyperprolactinemia (H)      Lower extremity edema     chronic w/ chronic right ankle ulcer     Lumbar compression fracture (H)      Major depressive disorder     with psychotic features, followed by Dr. Rosales at [facility] in [place]     Menopause     patient is post menopausal     MGUS (monoclonal gammopathy of unknown significance)     mild, followed by Dr. Long     Osteoporosis      Other seborrheic keratosis      Protrusio acetabuli      Pulmonary hypertension (H)      Sarcoidosis      Stress-induced cardiomyopathy        Past Surgical History:   Procedure Laterality Date     C PELVIS/HIP JOINT SURGERY UNLISTED       Csection,  X 2       CV RIGHT HEART CATH N/A 2019    Procedure: 1130 RHC;  Surgeon: Jeanne Angel MD;  Location:  HEART CARDIAC CATH LAB     CV RIGHT HEART CATH N/A 2020    Procedure: CV RIGHT HEART CATH;  Surgeon: Sebastien Izaguirre MD;  [facility]     CYSTOSCOPY, RETROGRADES, INSERT STENT URETER(S), COMBINED  10/2/2013    Procedure: COMBINED CYSTOSCOPY, RETROGRADES, INSERT STENT URETER(S);  Left Retrograde Ureteropyelogram and Ureteral Stent Placement;  Surgeon:  SONIA Martinez MD;  Location: [facility] OR     ENDOSCOPIC RETROGRADE CHOLANGIOPANCREATOGRAM  1/22/2012    Procedure:ENDOSCOPIC RETROGRADE CHOLANGIOPANCREATOGRAM; ERCP biliary sphincterotomy and stone removal; Surgeon:MARGIE RUGGIERO; Location: OR     LAPAROSCOPIC CHOLECYSTECTOMY WITH CHOLANGIOGRAMS  1/21/2012    Procedure:LAPAROSCOPIC CHOLECYSTECTOMY WITH CHOLANGIOGRAMS; Surgeon:BRIANA PADILLA; Location:[facility]     ORTHOPEDIC SURGERY  10/1/2010    Right hip replacement     ZZ GASTROSCOPY,[place]  6/10    Erythematous duodenopathy       Social History     Socioeconomic History     Marital status:      Spouse name: Not on file     Number of children: 3     Years of education: 12     Highest education level: Not on file   Occupational History     Employer: UNEMPLOYED   Social Needs     Financial resource strain: Not on file     Food insecurity     Worry: Not on file     Inability: Not on file     Transportation needs     Medical: Not on file     Non-medical: Not on file   Tobacco Use     Smoking status: Never Smoker     Smokeless tobacco: Never Used   Substance and Sexual Activity     Alcohol use: No     Alcohol/week: 0.0 standard drinks     Comment: Occ.     Drug use: No     Sexual activity: Never     Birth control/protection: Spermicide, Post-menopausal   Lifestyle     Physical activity     Days per week: Not on file     Minutes per session: Not on file     Stress: Not on file   Relationships     Social connections     Talks on phone: Not on file     Gets together: Not on file     Attends Taoist service: Not on file     Active member of club or organization: Not on file     Attends meetings of clubs or organizations: Not on file     Relationship status: Not on file     Intimate partner violence     Fear of current or ex partner: Not on file     Emotionally abused: Not on file     Physically abused: Not on file     Forced sexual activity: Not on file   Other Topics Concern     Parent/sibling w/ CABG, MI or  angioplasty before 65F 55M? No   Social History Narrative     Not on file       ROS Pulmonary    A complete ROS was otherwise negative except as noted in the HPI.  BP 94/63   Pulse 74   Temp 98.1  F (36.7  C) (Oral)   Resp 18   Wt 49.9 kg (110 lb)   SpO2 97%   BMI 19.49 kg/m    Exam:   GENERAL APPEARANCE: Alert, and in no apparent distress.  EYES: PERRL, EOMI  HENT: Nasal mucosa with no edema and no hyperemia.   MOUTH: Oral mucosa is moist, without any lesions, no tonsillar enlargement, no oropharyngeal exudate.  NECK: supple, no masses, no thyromegaly.  LYMPHATICS: No significant axillary, cervical, or supraclavicular nodes.  RESP: normal percussion, good air flow throughout.  No crackles. No rhonchi. No wheezes.  CV: Normal S1, S2, regular rhythm, normal rate. No murmur.  No rub. No gallop. No LE edema.   MS: extremities normal. No clubbing. No cyanosis.  SKIN: no rash on limited exam  NEURO: Mentation intact, speech normal, normal strength and tone, normal gait and stance    Results:    PFTs done today were reviewed by me with the patient.Her FVC is 2.52 L (81%), FEV1 1.77 L (72%) and ratio 70.  Total lung capacity is 5.10 L (106%), residual is 2.46 (2.46 L (153%).  Diffusion capacity corrected for hemoglobin is 14.34 (73%).  Her residual volume is 2.46 L (133%).  Fibrillation is that she has obstructive airways disease with mildly decreased diffusion capacity.      Assessment and plan: Ms. Nelson is a 60-year-old female with history of sarcoidosis based on granulomatous inflammation identified on a kidney biopsy and hypercalcemia, sarcoid associated pulmonary hypertension with mean PA pressure of 54 mmHg currently tadalafil, macitentan and selexipag.  She has exertional hypoxia but is currently not on any oxygen therapy.  She also reports cough mostly dry and gastroesophageal reflux..   1.  Pulmonary: Obstructive airways disease on PFTs with minimal symptoms.  We will continue to monitor.  Inhaled agents  could be an option.  2.  Extrapulmonary sarcoidosis: Renal involvement, hypercalcemia and sarcoidosis upon hypertension.  Changes are being planned for her SAPH management.  She stopped taking Imuran and Arava and has had stable renal functions.  We will continue to monitor off therapy.  Recent labs done which were reviewed.  3.  Cough: Likely related to gastroesophageal reflux may need to start her on scheduled PPI.  4.  Hypoxia: Repeat O2 titration study ordered today.      Addendum: 6-minute walk test done on room air was reviewed by me.  Room air saturations were 94% at the beginning of the walk.  The lowest O2 saturation was 93 at 4 minutes.  She walked 983 feet which is below the lower limit of normal of 1441 feet.  My depression is that she has a sedimentation, desaturation but no significant hypoxia on the 6-minute walk test.  Based on this study she has no significant hypoxia.  Defer oxygen therapy of pulmonary hypertension to pulmonary  hypertension team.    Again, thank you for allowing me to participate in the care of your patient.        Sincerely,        Gael Flaherty MD

## 2020-08-19 NOTE — NURSING NOTE
Chief Complaint   Patient presents with     Interstitial Lung Disease (ILD)     Sarcoids      Medications reviewed and updated.  Vitals taken  Joi Zapata CMA

## 2020-08-22 LAB
DLCOUNC-%PRED-PRE: 75 %
DLCOUNC-PRE: 14.76 ML/MIN/MMHG
DLCOUNC-PRED: 19.62 ML/MIN/MMHG
ERV-%PRED-PRE: 85 %
ERV-PRE: 0.9 L
ERV-PRED: 1.05 L
EXPTIME-PRE: 7.51 SEC
FEF2575-%PRED-PRE: 46 %
FEF2575-PRE: 1.05 L/SEC
FEF2575-PRED: 2.27 L/SEC
FEFMAX-%PRED-PRE: 76 %
FEFMAX-PRE: 4.72 L/SEC
FEFMAX-PRED: 6.19 L/SEC
FEV1-%PRED-PRE: 72 %
FEV1-PRE: 1.77 L
FEV1FEV6-PRE: 69 %
FEV1FEV6-PRED: 81 %
FEV1FVC-PRE: 70 %
FEV1FVC-PRED: 80 %
FEV1SVC-PRE: 67 %
FEV1SVC-PRED: 77 %
FIFMAX-PRE: 4.15 L/SEC
FIO2-PRE: 21 %
FRCPLETH-%PRED-PRE: 127 %
FRCPLETH-PRE: 3.36 L
FRCPLETH-PRED: 2.64 L
FVC-%PRED-PRE: 81 %
FVC-PRE: 2.52 L
FVC-PRED: 3.09 L
IC-%PRED-PRE: 82 %
IC-PRE: 1.74 L
IC-PRED: 2.11 L
RVPLETH-%PRED-PRE: 133 %
RVPLETH-PRE: 2.46 L
RVPLETH-PRED: 1.84 L
TLCPLETH-%PRED-PRE: 106 %
TLCPLETH-PRE: 5.1 L
TLCPLETH-PRED: 4.77 L
VA-%PRED-PRE: 83 %
VA-PRE: 3.84 L
VC-%PRED-PRE: 83 %
VC-PRE: 2.64 L
VC-PRED: 3.17 L

## 2020-08-28 ENCOUNTER — TELEPHONE (OUTPATIENT)
Dept: CARDIOLOGY | Facility: CLINIC | Age: 60
End: 2020-08-28

## 2020-08-28 ENCOUNTER — MEDICAL CORRESPONDENCE (OUTPATIENT)
Dept: HEALTH INFORMATION MANAGEMENT | Facility: CLINIC | Age: 60
End: 2020-08-28

## 2020-08-28 NOTE — TELEPHONE ENCOUNTER
: 1960    I saw Judith for first dose Tyvaso on 2020.  She has been on Tyvaso before but it has been 5 years.  Her daughter Saritha was also present.  Mendy was very nervous but did well.  Her daughter Saritha will help. Mendy started at 3 breaths and had no side effects.  She decreased her Uptravi to 1200 mcgs every 12 hours also (per dosing sheet & MD orders)    She runs a low blood pressure.  Initially prior to dosing she had a blood pressure of 86/61, then 87/58, and finally 93/61.  Heart rate stable in the 60 s.    I checked her vital signs over the hour, and she ran a blood pressure of 89/61, 97/62, 103/66, 96/57.  Her heart rate remained in the 60 s.  rr 16.  Temperature 97.5 and 97.4.  Her lungs were clear and heart sounds normal.     I will see her again on 2020 which is in one week to assess her response & to have her increase her Tyvaso and decrease her Uptravi.    Thanks & please call with questions!  Mendy Capps RN BSN    CardioPulmonary Nurse Specialist    [facility]    M: [phone]  /  [email or web address]

## 2020-09-10 ENCOUNTER — MEDICAL CORRESPONDENCE (OUTPATIENT)
Dept: HEALTH INFORMATION MANAGEMENT | Facility: CLINIC | Age: 60
End: 2020-09-10

## 2020-09-10 ENCOUNTER — TELEPHONE (OUTPATIENT)
Dept: CARDIOLOGY | Facility: CLINIC | Age: 60
End: 2020-09-10

## 2020-09-10 NOTE — TELEPHONE ENCOUNTER
"Judith Nelson    [facility] ID: [id number]    : 1960    [address],     [place] [address]        Diet: low sodium.     DME:  shower chair Oxygen: 2L prn haven't been using [employer] [phone].    Height 5'3\"         Therapy: Tyvaso increased to 9 breaths QID today; Uptravi stopped.        9/10/2020  Visit today - Week 2 on Tyvaso    Vitals:  Temperature: 97.1, blood pressure 99/74, heart rate 80 rr 16. Lungs are clear.  Heart sounds good.    Judith complains of a cough with her treatments which I noted while I was here.  But it might be one or two coughs & then none.        Mendy still feels overwhelmed.      Is there any agency that could assess her and help with rides, the house, etc, grocery...with no charge?          My next visit is  at 13:30 pm.  (Week 3) Thank you!       Judith Capps RN BSN  Cardiopulmonary Nurse Specialist  ----------------------------------  Sent staff msg to Liana Alaniz to see if she could help patient find medical rides. Updated Mendy Capps RN. Keyanna Mccray RN on 9/10/2020 at 2:23 PM    "

## 2020-09-14 ENCOUNTER — PATIENT OUTREACH (OUTPATIENT)
Dept: CARE COORDINATION | Facility: CLINIC | Age: 60
End: 2020-09-14

## 2020-09-14 NOTE — PROGRESS NOTES
Social Work Follow-Up  [facility]    Data/Intervention:  Patient Name:  Judith Nelson  /Age:  1960 (60 year old)    Reason for Follow-Up:  transportation    Collaborated With:    -Judith Mccray RN    Intervention/Education/Resources Provided:  See previous SW note. Pt with significant anxiety. She has many concerns. She indicates she needs to move soon and isn't sure if her dtr will be moving with her or not. She is having a baby. She has children in the [facility] area but she feels she would feel lost if she moved there. She states she doesn't know where anything is located in [place] and it's too big. She would like to continue to live in the [place]. She is on a waiting list at a low income housing complex but she indicated there is at least a year waiting list. She is using her savings along with her income from soc sec dis to pay for her monthly rent.   We have discussed transportation in the past and she could use [employer] but would have to get a ride to a [employer]  location within [employer] boundaries. She could use a local [address] transit for that but she doesn't feel she could manage that with her anxiety. It's too complex and would be difficult for her to manage. There are no other low cost transportation options. Encouraged her to consider living within [employer] [place] so she would have transport available.   Reviewed some subsidized housing that she could consider contacting and getting on a wait list for if they are open for new wait list people. Will mail them to her. Also discussed a program [place]-EPD(medical assistance for employed persons with disabilities) if she is able to work part time (self employment is fine) and make at least $65/month, she could likely be eligible to have [place] covered services including transportation. Pt doesn't feel she can do that.     Pt is getting treatment for anxiety.    Assessment/Plan:  There are no  low cost transportation options for the Pt living in [place] to get to the [facility] clinics.  Will mail some housing info as well as info re [facility].     Previously provided patient/family with writer's contact information and availability.       HELENE Terry, [facility]    [facility]  [phone]

## 2020-09-18 ENCOUNTER — MEDICAL CORRESPONDENCE (OUTPATIENT)
Dept: HEALTH INFORMATION MANAGEMENT | Facility: CLINIC | Age: 60
End: 2020-09-18

## 2020-10-05 ENCOUNTER — MEDICAL CORRESPONDENCE (OUTPATIENT)
Dept: HEALTH INFORMATION MANAGEMENT | Facility: CLINIC | Age: 60
End: 2020-10-05

## 2020-10-09 ENCOUNTER — TELEPHONE (OUTPATIENT)
Dept: PULMONOLOGY | Facility: CLINIC | Age: 60
End: 2020-10-09

## 2020-10-09 NOTE — TELEPHONE ENCOUNTER
[facility] Call Center    Phone Message    May a detailed message be left on voicemail: no     Reason for Call: Other: Pt would like a call back as soon as possible as she has a few questions and concerns regarding oxygen and was not able to go into detail as she stated it was complicated to explain      Action Taken: Message routed to:  [facility] ([facility] Cardio    Travel Screening: Not Applicable  ---------------------------------------  Called patient who states she had gotten somehing from her Oxygen co. ([employer]) that they may need a new order for her O2.  She hasn't been using it because her last 6mwt was a lot better, but she also knows her numbers have gone up ([facility]).      patient states she cannot afford the Oxygen co-pay, as she's on a tight budget.  She also mentioned that she uses a gamaliel to pay for her PH meds and she knows that she needs to re-apply, but she hasn't received anything about how to do that.       [facility] what [facility] need as far as orders.  Gave her their number so she could call their billing dept to see if they have any assistance programs.  Also advised her I would reach out to Keisha about the OOP assistance for the next year and one of us would get back to her about that.  Patient verbalized understanding, agreed with plan and denied any further questions. Keyanna Mccray RN on 10/21/2020 at 3:21 PM       Sent staff msg to Keisha about out of pocket assistance. Keyanna Mccray RN on 10/21/2020 at 3:25 PM    ----------------------------  Called [employer] and asked if they needed any documentation from us per patient report.  Rep looked into file and denied any paperwork needed from us at this time.  Verbalized understanding. Keyanna Mccray RN on 10/28/2020 at 12:56 PM

## 2020-10-15 ENCOUNTER — TELEPHONE (OUTPATIENT)
Dept: CARDIOLOGY | Facility: CLINIC | Age: 60
End: 2020-10-15

## 2020-10-15 NOTE — TELEPHONE ENCOUNTER
"Judith Capps (ST) [email or web address]>  Tue 10/6/2020 9:53 AM    Judith Nelson    [facility] ID: [id number]    : 1960    [address] [place] [address]    Diet: low sodium.     DME:  shower chair Oxygen: 2L prn (haven't been using) [facility] [phone].    Height 5'3\"     Week 6 on Tyvaso 10/6/2020:     Mendy is at 9 breaths of Tyvaso 4 times per day. She is having no side-effects.    Vitals:  Wt: 109 pounds; blood pressure 113/74, hr 87, rr 16 Temperature 97.2; her lungs are clear; heart sounds are good.    Breathing is okay; Her energy level is somewhat improved.    She still is depressed about things in her life. She did just have a grandson.    My next visit is per our policy - week 16 - This is scheduled for 12/15 at 1 pm.      Please call with any questions.  Mendy Capps, RN BSN  Cardiopulmonary Nurse Specialist  "

## 2020-10-22 ENCOUNTER — TELEPHONE (OUTPATIENT)
Dept: CARDIOLOGY | Facility: CLINIC | Age: 60
End: 2020-10-22

## 2020-10-22 NOTE — TELEPHONE ENCOUNTER
Received a message from RN about pt needing paperwork to complete for 2021 co-pay assistance.    Attempted to reach pt. Unable. Left message stating that renewal period opens at the beginning of November and requested pt contact her gamaliel to follow-up directly. Requested pt call back with any questions.    No further follow-up completed and encounter closed.    Keisha Albarran  Clinic   Pulmonary Hypertension  [facility]  (P) [phone]

## 2020-10-28 ENCOUNTER — VIRTUAL VISIT (OUTPATIENT)
Dept: PULMONOLOGY | Facility: CLINIC | Age: 60
End: 2020-10-28
Attending: INTERNAL MEDICINE
Payer: MEDICARE

## 2020-10-28 DIAGNOSIS — D86.9 SARCOIDOSIS: Primary | ICD-10-CM

## 2020-10-28 PROCEDURE — 99442 PR PHYSICIAN TELEPHONE EVALUATION 11-20 MIN: CPT | Mod: 95 | Performed by: INTERNAL MEDICINE

## 2020-10-28 NOTE — PROGRESS NOTES
Reason for Visit  Judith Nelson is a 60 year old year old female who is being seen for sarcoidosis.   Pulmonary HPI    The patient was seen and examined by Gael Flaherty MD     Ms. Nelson was last seen for an in person visit on August 19, 2020.  At that time she reported that she was not using her oxygen because of the cost.  She has pulmonary hypertension likely related to sarcoidosis and renal disease.  She declined inhaled bronchodilators despite obstructive PFTs.    Today she reports several concerns that  revolve around her social situation and also her mental health issues.  She feels very overwhelmed currently because of the rent of the tunnels that she is sent.  Her ex- has offered her housing in [place] but she is concerned that if she moves to [place] she will be away from her social support system including her children who are in the [facility].  She believes that she has to decide today.  She is also concerned that if she breaks through the use of her rental house, she might have to pay extra money for that.    She had reported cough at the time of last clinic visit and gastroesophageal reflux for which over-the-counter omeprazole was recommended.  She reports improvement of cough.      Current Outpatient Medications   Medication     acetaminophen (TYLENOL) 500 MG tablet     ADVAIR DISKUS 250-50 MCG/DOSE inhaler     BUSPIRONE HCL PO     calcitRIOL (ROCALTROL) 0.25 MCG capsule     carvedilol (COREG) 3.125 MG tablet     FLUoxetine (PROZAC) 20 MG capsule     furosemide (LASIX) 20 MG tablet     lamoTRIgine (LAMICTAL) 25 MG tablet     LORazepam (ATIVAN) 0.5 MG tablet     macitentan (OPSUMIT) 10 MG tablet     methylphenidate (RITALIN) 5 mg TABS half-tab     omeprazole (PRILOSEC) 20 MG DR capsule     order for DME     ORDER FOR DME     potassium citrate (UROCIT-K) 10 MEQ (1080 MG) CR tablet     ranitidine (RANITIDINE) 75 MG tablet     risperiDONE (RISPERDAL) 0.25 MG tablet      selexipag (UPTRAVI) 1600 MCG tablet     simvastatin (ZOCOR) 10 MG tablet     tadalafil, PAH, (ADCIRCA) 20 MG TABS     topiramate (TOPAMAX) 50 MG tablet     treprostinil (TYVASO) 0.6 MG/ML SOLN neb solution     No current facility-administered medications for this visit.      Allergies   Allergen Reactions     Dilaudid [Hydromorphone] Nausea and Vomiting     Morphine Nausea and Vomiting     Latex Rash     From gloves     Past Medical History:   Diagnosis Date     Anxiety      CKD (chronic kidney disease), stage III (H)     biopsy suspicious for renal sarcoidosis      Hyperprolactinemia (H)      Lower extremity edema     chronic w/ chronic right ankle ulcer     Lumbar compression fracture (H)      Major depressive disorder     with psychotic features, followed by Dr. Rosales at [facility] in [place]     Menopause     patient is post menopausal     MGUS (monoclonal gammopathy of unknown significance)     mild, followed by Dr. Long     Osteoporosis      Other seborrheic keratosis      Protrusio acetabuli      Pulmonary hypertension (H)      Sarcoidosis      Stress-induced cardiomyopathy        Past Surgical History:   Procedure Laterality Date     C PELVIS/HIP JOINT SURGERY UNLISTED       Csection,  X 2       CV RIGHT HEART CATH N/A 2019    Procedure: 1130 RHC;  Surgeon: Jeanne Angel MD;  Location:  HEART CARDIAC CATH LAB     CV RIGHT HEART CATH N/A 2020    Procedure: CV RIGHT HEART CATH;  Surgeon: Sebastien Izaguirre MD;  Location:  HEART CARDIAC CATH LAB     CYSTOSCOPY, RETROGRADES, INSERT STENT URETER(S), COMBINED  10/2/2013    Procedure: COMBINED CYSTOSCOPY, RETROGRADES, INSERT STENT URETER(S);  Left Retrograde Ureteropyelogram and Ureteral Stent Placement;  Surgeon: SONIA Martinez MD;  Location: [facility]     ENDOSCOPIC RETROGRADE CHOLANGIOPANCREATOGRAM  2012    Procedure:ENDOSCOPIC RETROGRADE CHOLANGIOPANCREATOGRAM; ERCP biliary sphincterotomy and stone removal; Surgeon:MONIK  MARGIE FOSS; [facility]     LAPAROSCOPIC CHOLECYSTECTOMY WITH CHOLANGIOGRAMS  1/21/2012    Procedure:LAPAROSCOPIC CHOLECYSTECTOMY WITH CHOLANGIOGRAMS; Surgeon:BRIANA PADILLA; Location:[facility]     ORTHOPEDIC SURGERY  10/1/2010    Right hip replacement     ZZ GASTROSCOPY,FL  6/10    Erythematous duodenopathy       Social History     Socioeconomic History     Marital status:      Spouse name: Not on file     Number of children: 3     Years of education: 12     Highest education level: Not on file   Occupational History     Employer: UNEMPLOYED   Social Needs     Financial resource strain: Not on file     Food insecurity     Worry: Not on file     Inability: Not on file     Transportation needs     Medical: Not on file     Non-medical: Not on file   Tobacco Use     Smoking status: Never Smoker     Smokeless tobacco: Never Used   Substance and Sexual Activity     Alcohol use: No     Alcohol/week: 0.0 standard drinks     Comment: Occ.     Drug use: No     Sexual activity: Never     Birth control/protection: Spermicide, Post-menopausal   Lifestyle     Physical activity     Days per week: Not on file     Minutes per session: Not on file     Stress: Not on file   Relationships     Social connections     Talks on phone: Not on file     Gets together: Not on file     Attends Baptism service: Not on file     Active member of club or organization: Not on file     Attends meetings of clubs or organizations: Not on file     Relationship status: Not on file     Intimate partner violence     Fear of current or ex partner: Not on file     Emotionally abused: Not on file     Physically abused: Not on file     Forced sexual activity: Not on file   Other Topics Concern     Parent/sibling w/ CABG, MI or angioplasty before 65F 55M? No   Social History Narrative     Not on file     ROS Pulmonary  A complete ROS was otherwise negative except as noted in the HPI.  There were no vitals taken for this visit.  Exam:  "    Unable to do as it is a phone visit.     Results:  None available     Assessment and plan: Ms. Nelosn is a 60-year-old female with history of sarcoidosis based on granulomatous inflammation identified on a kidney biopsy and hypercalcemia, sarcoid associated pulmonary hypertension with mean PA pressure of 54 mmHg currently tadalafil, macitentan and selexipag.  She has exertional hypoxia but is currently not on any oxygen therapy.  She also reports cough mostly dry and gastroesophageal reflux..   1.  Pulmonary: Obstructive airways disease on PFTs with minimal symptoms.  We will continue to monitor.  Inhaled agents could be an option.  2.  SAP H: As above on triple therapy.  Follows with Dr. Franco.  3.  Extrapulmonary sarcoidosis: Evaluation and management deferred for now is unable to get labs.  She does have renal involvement and will need serum creatinine.  She also had hypercalcemia in the past  4.  Depression: Acknowledges being depressed and overwhelmed by several ongoing issues.  She is working with a therapist.  She acknowledges that she is not planning to hurt herself or anybody else.  She would want her daughter stating need for several medications which was provided.  High cost of care and needs to stay on themedications. Unexecpeted expense for breaking lease may put her at risk as she may not be able to afford medicatios.   This note consists of symbols derived from keyboarding, dictation and/or voice recognition software. As a result, there may be errors in the script that have gone undetected. Please consider this when interpreting information found in this chart        Judith Nelson is a 60 year old female who is being evaluated via a billable telephone visit.      The patient has been notified of following:     \"This telephone visit will be conducted via a call between you and your physician/provider. We have found that certain health care needs can be provided without the need for a " "physical exam.  This service lets us provide the care you need with a short phone conversation.  If a prescription is necessary we can send it directly to your pharmacy.  If lab work is needed we can place an order for that and you can then stop by our lab to have the test done at a later time.    Telephone visits are billed at different rates depending on your insurance coverage. During this emergency period, for some insurers they may be billed the same as an in-person visit.  Please reach out to your insurance provider with any questions.    If during the course of the call the physician/provider feels a telephone visit is not appropriate, you will not be charged for this service.\"    Patient has given verbal consent for Telephone visit?  Yes    What phone number would you like to be contacted at? [phone]    How would you like to obtain your AVS? Mail a copy    Phone call duration: 15 minutes    Gael Flaherty MD      "

## 2020-10-28 NOTE — LETTER
10/28/2020         RE: Judith Nelson  [address] [facility] [address]        Dear Colleague,    Thank you for referring your patient, Judith Nelson, to the [facility] [place]. Please see a copy of my visit note below.    Reason for Visit  Judith Nelson is a 60 year old year old female who is being seen for sarcoidosis.   Pulmonary HPI    The patient was seen and examined by Gael Flaherty MD     Ms. Nelson was last seen for an in person visit on August 19, 2020.  At that time she reported that she was not using her oxygen because of the cost.  She has pulmonary hypertension likely related to sarcoidosis and renal disease.  She declined inhaled bronchodilators despite obstructive PFTs.    Today she reports several concerns that  revolve around her social situation and also her mental health issues.  She feels very overwhelmed currently because of the rent of the tunnels that she is sent.  Her ex- has offered her housing in [place] but she is concerned that if she moves to [place] she will be away from her social support system including her children who are in the [facility].  She believes that she has to decide today.  She is also concerned that if she breaks through the use of her rental house, she might have to pay extra money for that.    She had reported cough at the time of last clinic visit and gastroesophageal reflux for which over-the-counter omeprazole was recommended.  She reports improvement of cough.      Current Outpatient Medications   Medication     acetaminophen (TYLENOL) 500 MG tablet     ADVAIR DISKUS 250-50 MCG/DOSE inhaler     BUSPIRONE HCL PO     calcitRIOL (ROCALTROL) 0.25 MCG capsule     carvedilol (COREG) 3.125 MG tablet     FLUoxetine (PROZAC) 20 MG capsule     furosemide (LASIX) 20 MG tablet     lamoTRIgine (LAMICTAL) 25 MG tablet     LORazepam (ATIVAN) 0.5 MG tablet     macitentan (OPSUMIT)  10 MG tablet     methylphenidate (RITALIN) 5 mg TABS half-tab     omeprazole (PRILOSEC) 20 MG DR capsule     order for DME     ORDER FOR DME     potassium citrate (UROCIT-K) 10 MEQ (1080 MG) CR tablet     ranitidine (RANITIDINE) 75 MG tablet     risperiDONE (RISPERDAL) 0.25 MG tablet     selexipag (UPTRAVI) 1600 MCG tablet     simvastatin (ZOCOR) 10 MG tablet     tadalafil, PAH, (ADCIRCA) 20 MG TABS     topiramate (TOPAMAX) 50 MG tablet     treprostinil (TYVASO) 0.6 MG/ML SOLN neb solution     No current facility-administered medications for this visit.      Allergies   Allergen Reactions     Dilaudid [Hydromorphone] Nausea and Vomiting     Morphine Nausea and Vomiting     Latex Rash     From gloves     Past Medical History:   Diagnosis Date     Anxiety      CKD (chronic kidney disease), stage III (H)     biopsy suspicious for renal sarcoidosis      Hyperprolactinemia (H)      Lower extremity edema     chronic w/ chronic right ankle ulcer     Lumbar compression fracture (H)      Major depressive disorder     with psychotic features, followed by Dr. Rosales at [facility] in [place]     Menopause 2012    patient is post menopausal     MGUS (monoclonal gammopathy of unknown significance)     mild, followed by Dr. Long     Osteoporosis      Other seborrheic keratosis      Protrusio acetabuli      Pulmonary hypertension (H)      Sarcoidosis      Stress-induced cardiomyopathy        Past Surgical History:   Procedure Laterality Date     C PELVIS/HIP JOINT SURGERY UNLISTED       Csection,  X 2       CV RIGHT HEART CATH N/A 2019    Procedure: 1130 RHC;  Surgeon: Jeanne Angel MD;  Location:  HEART CARDIAC CATH LAB     CV RIGHT HEART CATH N/A 2020    Procedure: CV RIGHT HEART CATH;  Surgeon: Sebastien Izaguirre MD;  [facility]     CYSTOSCOPY, RETROGRADES, INSERT STENT URETER(S), COMBINED  10/2/2013    Procedure: COMBINED CYSTOSCOPY, RETROGRADES, INSERT STENT URETER(S);  Left  Retrograde Ureteropyelogram and Ureteral Stent Placement;  Surgeon: SONIA Martinez MD;  Location: [facility]     ENDOSCOPIC RETROGRADE CHOLANGIOPANCREATOGRAM  1/22/2012    Procedure:ENDOSCOPIC RETROGRADE CHOLANGIOPANCREATOGRAM; ERCP biliary sphincterotomy and stone removal; Surgeon:MARGIE RUGGIERO; [facility]     LAPAROSCOPIC CHOLECYSTECTOMY WITH CHOLANGIOGRAMS  1/21/2012    Procedure:LAPAROSCOPIC CHOLECYSTECTOMY WITH CHOLANGIOGRAMS; Surgeon:BRIANA PADILLA; Location:[facility]     ORTHOPEDIC SURGERY  10/1/2010    Right hip replacement     ZZ GASTROSCOPY,[place]  6/10    Erythematous duodenopathy       Social History     Socioeconomic History     Marital status:      Spouse name: Not on file     Number of children: 3     Years of education: 12     Highest education level: Not on file   Occupational History     Employer: UNEMPLOYED   Social Needs     Financial resource strain: Not on file     Food insecurity     Worry: Not on file     Inability: Not on file     Transportation needs     Medical: Not on file     Non-medical: Not on file   Tobacco Use     Smoking status: Never Smoker     Smokeless tobacco: Never Used   Substance and Sexual Activity     Alcohol use: No     Alcohol/week: 0.0 standard drinks     Comment: Occ.     Drug use: No     Sexual activity: Never     Birth control/protection: Spermicide, Post-menopausal   Lifestyle     Physical activity     Days per week: Not on file     Minutes per session: Not on file     Stress: Not on file   Relationships     Social connections     Talks on phone: Not on file     Gets together: Not on file     Attends Religion service: Not on file     Active member of club or organization: Not on file     Attends meetings of clubs or organizations: Not on file     Relationship status: Not on file     Intimate partner violence     Fear of current or ex partner: Not on file     Emotionally abused: Not on file     Physically abused: Not on file     Forced sexual activity:  Not on file   Other Topics Concern     Parent/sibling w/ CABG, MI or angioplasty before 65F 55M? No   Social History Narrative     Not on file     ROS Pulmonary  A complete ROS was otherwise negative except as noted in the HPI.  There were no vitals taken for this visit.  Exam:     Unable to do as it is a phone visit.     Results:  None available     Assessment and plan: Ms. Nelson is a 60-year-old female with history of sarcoidosis based on granulomatous inflammation identified on a kidney biopsy and hypercalcemia, sarcoid associated pulmonary hypertension with mean PA pressure of 54 mmHg currently tadalafil, macitentan and selexipag.  She has exertional hypoxia but is currently not on any oxygen therapy.  She also reports cough mostly dry and gastroesophageal reflux..   1.  Pulmonary: Obstructive airways disease on PFTs with minimal symptoms.  We will continue to monitor.  Inhaled agents could be an option.  2.  SAP H: As above on triple therapy.  Follows with Dr. Franco.  3.  Extrapulmonary sarcoidosis: Evaluation and management deferred for now is unable to get labs.  She does have renal involvement and will need serum creatinine.  She also had hypercalcemia in the past  4.  Depression: Acknowledges being depressed and overwhelmed by several ongoing issues.  She is working with a therapist.  She acknowledges that she is not planning to hurt herself or anybody else.  She would want her daughter stating need for several medications which was provided.  High cost of care and needs to stay on themedications. Unexecpeted expense for breaking lease may put her at risk as she may not be able to afford medicatios.   This note consists of symbols derived from keyboarding, dictation and/or voice recognition software. As a result, there may be errors in the script that have gone undetected. Please consider this when interpreting information found in this chart        Judith Nelson is a 60 year old female who is  "being evaluated via a billable telephone visit.      The patient has been notified of following:     \"This telephone visit will be conducted via a call between you and your physician/provider. We have found that certain health care needs can be provided without the need for a physical exam.  This service lets us provide the care you need with a short phone conversation.  If a prescription is necessary we can send it directly to your pharmacy.  If lab work is needed we can place an order for that and you can then stop by our lab to have the test done at a later time.    Telephone visits are billed at different rates depending on your insurance coverage. During this emergency period, for some insurers they may be billed the same as an in-person visit.  Please reach out to your insurance provider with any questions.    If during the course of the call the physician/provider feels a telephone visit is not appropriate, you will not be charged for this service.\"    Patient has given verbal consent for Telephone visit?  Yes    What phone number would you like to be contacted at? [phone]    How would you like to obtain your AVS? Mail a copy    Phone call duration: 15 minutes    Gael Flaherty MD          "

## 2020-10-30 ENCOUNTER — TELEPHONE (OUTPATIENT)
Dept: CARDIOLOGY | Facility: CLINIC | Age: 60
End: 2020-10-30

## 2020-10-30 NOTE — TELEPHONE ENCOUNTER
"Patient LM that she has some questions.  ------------------------  LM I was returning her call. Left my direct dial number for call back.  Keyanna Mccray RN on 10/30/2020 at 1:44 PM  ------------------------  Ex  is offering to rent her a house in [place] for $800 per month which includes utilities. She knows this is a very marko deal, but she doesn't like the idea of him being her landlord and [place].  She is very stressed and feels everything is moving too fast.    We discussed some specific moving instructions such as providing us, her pharmacies and her O2 compoany with the new address.    patient is concerned about her family having \"unrealistic expectations of me\" regarding \"babysitting & everything\". Patient couldn't be more specific, but is requesting a letter from Dr. Franco to give her family so they know her limitations.  Advised her I would send Dr. Franco a message and we will mail letter once completed.  Patient verbalized understanding, agreed with plan and denied any further questions. Keyanna Mccray RN on 11/3/2020 at 12:17 PM      "

## 2020-12-04 ENCOUNTER — TELEPHONE (OUTPATIENT)
Dept: CARDIOLOGY | Facility: CLINIC | Age: 60
End: 2020-12-04

## 2020-12-04 NOTE — TELEPHONE ENCOUNTER
Pt contacted the office inquiring about co-pay assistance for her Tyvaso for 2021.    Returned pt's call and requested that pt contact [employer] program at  [phone]. Stated to pt that she will inform the representative that the grants are currently closed and she needs assistance with her medication for 2021 as she is not able to afford the medication. Stated that ASSIST may ask pt a series of question and will then mail out an application for the pt to complete. Stated that pt should only complete the pt portion and the provider portion should be left blank as the office will complete those forms. Requested that if pt had any questions during the process to contact the office. Pt denies further questions.    No further follow-up completed and encounter closed.    Keisha Albarran  Clinic   Pulmonary Hypertension  [facility]  (P) [phone]

## 2020-12-16 ENCOUNTER — TELEPHONE (OUTPATIENT)
Dept: CARDIOLOGY | Facility: CLINIC | Age: 60
End: 2020-12-16

## 2020-12-16 ENCOUNTER — MEDICAL CORRESPONDENCE (OUTPATIENT)
Dept: HEALTH INFORMATION MANAGEMENT | Facility: CLINIC | Age: 60
End: 2020-12-16

## 2020-12-16 NOTE — TELEPHONE ENCOUNTER
PA Initiation    Medication: Opsumit 10mg  Insurance Company: [employer] Part D - Phone [phone] Fax [phone]  Pharmacy Filling the Rx: [facility] - [place], [place] - [facility] [address]  Start Date: 12/16/2020    *Prior authorization form completed and faxed to Carol [facility] for expedited review along right heart catheterization report.

## 2020-12-22 ENCOUNTER — TELEPHONE (OUTPATIENT)
Dept: CARDIOLOGY | Facility: CLINIC | Age: 60
End: 2020-12-22

## 2020-12-22 NOTE — TELEPHONE ENCOUNTER
Patient called to inquire about follow up appt, as she had been on the Tyvaso for ~4 months now.  Advised her she was on my list to call, so that worked out well.    We arranged on a video visit for 12/30/20 @ 12:00pm  Patient verbalized understanding, agreed with plan and denied any further questions. Keyanna Mccray RN on 12/22/2020 at 1:21 PM    Updated patient's address as well, as she had recently moved to [place].

## 2020-12-30 ENCOUNTER — VIRTUAL VISIT (OUTPATIENT)
Dept: CARDIOLOGY | Facility: CLINIC | Age: 60
End: 2020-12-30
Attending: INTERNAL MEDICINE
Payer: MEDICARE

## 2020-12-30 DIAGNOSIS — R06.02 SOB (SHORTNESS OF BREATH): ICD-10-CM

## 2020-12-30 DIAGNOSIS — D86.9 SARCOIDOSIS: ICD-10-CM

## 2020-12-30 DIAGNOSIS — I27.20 PULMONARY HTN (H): ICD-10-CM

## 2020-12-30 PROCEDURE — 99214 OFFICE O/P EST MOD 30 MIN: CPT | Mod: 95 | Performed by: INTERNAL MEDICINE

## 2020-12-30 NOTE — PROGRESS NOTES
"Judith Nelson is a 60 year old female who is being evaluated via a billable video visit.      The patient has been notified of following:     \"This video visit will be conducted via a call between you and your physician/provider. We have found that certain health care needs can be provided without the need for an in-person physical exam.  This service lets us provide the care you need with a video conversation.  If a prescription is necessary we can send it directly to your pharmacy.  If lab work is needed we can place an order for that and you can then stop by our lab to have the test done at a later time.    Video visits are billed at different rates depending on your insurance coverage.  Please reach out to your insurance provider with any questions.    If during the course of the call the physician/provider feels a video visit is not appropriate, you will not be charged for this service.\"    Patient has given verbal consent for Video visit? Yes  How would you like to obtain your AVS? Mail a copy  If you are dropped from the video visit, the video invite should be resent to: Text to cell phone: [phone]   Will anyone else be joining your video visit? No      Vitals - Patient Reported  Weight (Patient Reported): 52.2 kg (115 lb)  Height (Patient Reported): 158.8 cm (5' 2.5\")  BMI (Based on Pt Reported Ht/Wt): 20.7  SpO2 (Patient Reported): 91  Pulse (Patient Reported): 98  Pain Score: No Pain (0)(Has a little SOB)    Vitals were taken and medication reconciled.    MICHOACANO Mcintyre  12:15 PM  "

## 2020-12-30 NOTE — PROGRESS NOTES
"  I had a 45 minute FACETIME video visit with her at home and with her permission.  1130-12:00.  I was in my office.     Pulmonary hypertension treated with three oral agents  Sarcoidosis treated with Imuran  CKD with positive renal biopsy for sarcoid  Oxygen dependent respiratory failure  Dysthymic disorder  Obstructive PFTs    Kelvin Christianson M.D.Nephrology [facility] M.D. Rheumatology [facility] M.D. Internal Medicine [facility] M.D.   Cardiology,         Plan:    1. The patient has systemic sarcoid and a very complicated social/psychological situation.  She has significant anxiety and depression and has been with longtime therapist.  She has moved to [facility] for economic reasons and needs to establish care there.  I have copied the doctors above in hope of them accepting her a patient.  She is very sweet, well-intended but has difficult time with decision making and following through  2. She is in need of transportation assistance to clinic visits as she is \"paralyzed\" by the though of driving in [place]  3. She is currently renting a house in [place]  4. She has not had labs check in over 4 months and would suggest: IL-6, CMP, CBC, calcium (hypercalcemia ), CRP inflammation, BNP  5. Echocardiogram in [place] to estimate PA pressure and assess RV function  6. She is on inhaled Tyvaso which she is tolerating  And feels improve.          The patient has not been walking.  She has been losing weight.  She has no PND, orthopnea, ankle edema, palpitation, pre-syncope or syncope.  She is very depressed about her living situation.  She is seeing therapist regularly. She is taking her medicine regularly.     Wt Readings from Last 24 Encounters:   08/19/20 49.9 kg (110 lb)   07/09/20 51.3 kg (113 lb)   05/06/20 51.6 kg (113 lb 12.8 oz)   01/10/20 54.3 kg (119 lb 12.8 oz)   12/13/19 54.9 kg (121 lb)   09/11/19 57.6 kg (126 lb 14.4 oz)   07/03/19 56.2 kg (124 lb) "   19 56.4 kg (124 lb 6.4 oz)   19 54 kg (119 lb)   19 54.9 kg (121 lb)   19 54.1 kg (119 lb 4.3 oz)   19 53.3 kg (117 lb 6.4 oz)   18 54.8 kg (120 lb 14.4 oz)   18 52.8 kg (116 lb 8 oz)   18 52.8 kg (116 lb 8 oz)   18 53.5 kg (118 lb)   09/10/18 53.3 kg (117 lb 9.6 oz)   07/10/18 53.5 kg (118 lb)   18 50.4 kg (111 lb 3.2 oz)   18 52.8 kg (116 lb 8 oz)   18 51 kg (112 lb 8 oz)   18 51.7 kg (114 lb)   18 53.6 kg (118 lb 3.2 oz)   18 52.2 kg (115 lb)       Echocardiogram    Name: ANALI GÓMEZ  MRN: [id number]  : 1960  Study Date: 2020 09:28 AM  Age: 60 yrs  Gender: Female  Patient Location: [facility]  Reason For Study: Pulmonary hypertension (H), SOB (shortness of breath)  Ordering Physician: MINE CASTRO  Referring Physician: MINE CASTRO  Performed By: Juanis Pagan RDCS     BSA: 1.5 m2  Height: 63 in  Weight: 113 lb  HR: 64  BP: 110/68 mmHg  _____________________________________________________________________________  __        Procedure  Echocardiogram with two-dimensional, color and spectral Doppler performed.  _____________________________________________________________________________  __        Interpretation Summary  Severe pulmonary hypertension is present.  Left ventricular function, chamber size, wall motion, and wall thickness are  normal.The EF is 60-65%.  Mild to moderate right ventricular dilation is present. Global right  ventricular function is normal.  Moderate tricuspid insufficiency is present.  IVC diameter and respiratory changes fall into an intermediate range  suggesting an RA pressure of 8 mmHg.  Trivial pericardial effusion is present.  No change from prior.  _____________________________________________________________________________  __        Left Ventricle  Left ventricular function, chamber size, wall motion, and wall thickness are  normal.The EF is 60-65%. Left  ventricular diastolic function is indeterminate.  Paradoxical septal motion consistent with right ventricular pressure overload  is present.     Right Ventricle  Global right ventricular function is normal. Mild to moderate right  ventricular dilation is present. There is mild to moderate right ventricular  hypertrophy.     Atria  Mild biatrial enlargement is present.     Mitral Valve  Trace to mild mitral insufficiency is present.        Aortic Valve  Aortic valve is normal in structure and function.     Tricuspid Valve  Moderate tricuspid insufficiency is present. The right ventricular systolic  pressure is approximated at 93.3 mmHg plus the right atrial pressure. Severe  pulmonary hypertension is present.     Pulmonic Valve  The pulmonic valve is normal.     Vessels  The thoracic aorta is normal. Dilation of the inferior vena cava is present  with normal respiratory variation in diameter. IVC diameter and respiratory  changes fall into an intermediate range suggesting an RA pressure of 8 mmHg.     Pericardium  Trivial pericardial effusion is present.     _____________________________________________________________________________  __  MMode/2D Measurements & Calculations  IVSd: 0.79 cm  LVIDd: 4.1 cm  LVIDs: 2.4 cm  LVPWd: 0.81 cm  FS: 42.6 %  LV mass(C)d: 98.4 grams  LV mass(C)dI: 64.8 grams/m2     asc Aorta Diam: 2.9 cm  LVOT diam: 2.0 cm  LVOT area: 3.1 cm2  LA Volume (BP): 46.9 ml  LA Volume Index (BP): 30.9 ml/m2  RWT: 0.39        Doppler Measurements & Calculations  MV E max dionne: 73.3 cm/sec  MV A max dionne: 47.6 cm/sec  MV E/A: 1.5  MV dec time: 0.22 sec  PA acc time: 0.10 sec     TR max dionne: 483.0 cm/sec  TR max P.3 mmHg  E/E' av.4  Lateral E/e': 7.9  Medial E/e': 15.0      Constitutional: weight loss, fever, chills, night sweats  HEENT: without visual changes, swallow difficulties  Pulmonary: without shortness of breath, cough, wheeze, hemoptysis  Cardiac: without chest pain, JUAN, PND,  orthopnea, edema, palpitation, pre-syncope, syncope,  GI: without diarrhea, constipation, jaundice, melena, GERD, hematemesis  : without frequency, urgency, dysuria, hematuria  Skin: rash, bruise, open lesions  Neuro: without TIA, focal neurologic complaints, seizure, trauma  Ortho: without pain, swelling, mobility impairment  Endocrine: diabetes, thyroid, heat/cold intolerance, polyuria, polyphagia, change bowel habits.  Sleep:no IVETTE, periodic breathing, fatigue  Other: depressed as noted above        Current Outpatient Medications   Medication     acetaminophen (TYLENOL) 500 MG tablet     BUSPIRONE HCL PO     LORazepam (ATIVAN) 0.5 MG tablet     ADVAIR DISKUS 250-50 MCG/DOSE inhaler     calcitRIOL (ROCALTROL) 0.25 MCG capsule     carvedilol (COREG) 3.125 MG tablet     FLUoxetine (PROZAC) 20 MG capsule     furosemide (LASIX) 20 MG tablet     lamoTRIgine (LAMICTAL) 25 MG tablet     macitentan (OPSUMIT) 10 MG tablet     methylphenidate (RITALIN) 5 mg TABS half-tab     omeprazole (PRILOSEC) 20 MG DR capsule     order for DME     ORDER FOR DME     potassium citrate (UROCIT-K) 10 MEQ (1080 MG) CR tablet     ranitidine (RANITIDINE) 75 MG tablet     risperiDONE (RISPERDAL) 0.25 MG tablet     selexipag (UPTRAVI) 1600 MCG tablet     simvastatin (ZOCOR) 10 MG tablet     tadalafil, PAH, (ADCIRCA) 20 MG TABS     topiramate (TOPAMAX) 50 MG tablet     treprostinil (TYVASO) 0.6 MG/ML SOLN neb solution     No current facility-administered medications for this visit.        Results for ANALI GÓMEZ (MRN [id number]) as of 7/16/2020 10:58   Ref. Range 7/9/2020 10:15   Sodium Latest Ref Range: 133 - 144 mmol/L 139   Potassium Latest Ref Range: 3.4 - 5.3 mmol/L 4.0   Chloride Latest Ref Range: 94 - 109 mmol/L 112 (H)   Carbon Dioxide Latest Ref Range: 20 - 32 mmol/L 22   Urea Nitrogen Latest Ref Range: 7 - 30 mg/dL 16   Creatinine Latest Ref Range: 0.52 - 1.04 mg/dL 1.52 (H)   GFR Estimate Latest Ref Range: >60 mL/min/1.73_m2  37 (L)   GFR Estimate If Black Latest Ref Range: >60 mL/min/1.73_m2 43 (L)   Calcium Latest Ref Range: 8.5 - 10.1 mg/dL 9.1   Anion Gap Latest Ref Range: 3 - 14 mmol/L 6   Albumin Latest Ref Range: 3.4 - 5.0 g/dL 3.7   Protein Total Latest Ref Range: 6.8 - 8.8 g/dL 6.4 (L)   Bilirubin Total Latest Ref Range: 0.2 - 1.3 mg/dL 0.6   Alkaline Phosphatase Latest Ref Range: 40 - 150 U/L 43   ALT Latest Ref Range: 0 - 50 U/L 18   AST Latest Ref Range: 0 - 45 U/L 11   N-Terminal Pro Bnp Latest Ref Range: 0 - 125 pg/mL 394 (H)         Creatinine Latest Ref Range: 0.52 - 1.04 mg/dL 1.52 (H)   GFR Estimate Latest Ref Range: >60 mL/min/1.73_m2 37 (L)   GFR Estimate If Black Latest Ref Range: >60 mL/min/1.73_m2 43 (L)   Calcium Latest Ref Range: 8.5 - 10.1 mg/dL 9.1   Anion Gap Latest Ref Range: 3 - 14 mmol/L 6   Albumin Latest Ref Range: 3.4 - 5.0 g/dL 3.7   Protein Total Latest Ref Range: 6.8 - 8.8 g/dL 6.4 (L)   Bilirubin Total Latest Ref Range: 0.2 - 1.3 mg/dL 0.6   Alkaline Phosphatase Latest Ref Range: 40 - 150 U/L 43   ALT Latest Ref Range: 0 - 50 U/L 18   AST Latest Ref Range: 0 - 45 U/L 11   N-Terminal Pro Bnp Latest Ref Range: 0 - 125 pg/mL 394 (H)   Glucose Latest Ref Range: 70 - 99 mg/dL 82   WBC Latest Ref Range: 4.0 - 11.0 10e9/L 4.7   Hemoglobin Latest Ref Range: 11.7 - 15.7 g/dL 14.4   Hematocrit Latest Ref Range: 35.0 - 47.0 % 45.0   Platelet Count Latest Ref Range: 150 - 450 10e9/L 187   RBC Count Latest Ref Range: 3.8 - 5.2 10e12/L 4.71   MCV Latest Ref Range: 78 - 100 fl 96   MCH Latest Ref Range: 26.5 - 33.0 pg 30.6   MCHC Latest Ref Range: 31.5 - 36.5 g/dL 32.0   RDW Latest Ref Range: 10.0 - 15.0 % 12.4       Catherization 07/2020    Right sided filling pressures are normal.    Severely elevated pulmonary artery hypertension.    Left sided filling pressures are mildly elevated.    Normal cardiac output level.     Severe pulmonary hypertension with mildly elevated LVEDP. Borderline reduced cardiac index.           Plan      Follow bedrest per protocol    Continued medical management and lifestyle modifications for cardiovascular risk factor optimizations.    Follow up with Dr. Franco.    Discharge today per protocol   Hemodynamics    Systolic BP: 120/71mmHg    RA: 6/9/5mmHg  RV: 95/5mmHg  PA: 95/34/54 mmHg  PCWP: 15/15/14 mmHg  CO/CI (Dipti): 2.6/1.71   CO/CI (Thermo): 3.77/2.48  PVR: 15WU Right sided filling pressures are normal.Left sided filling pressures are mildly elevated. Severely elevated pulmonary artery hypertension.Normal cardiac output level.   Pressures(don't display values >20,000) Phase: Baseline     Time Systolic (mmHg) Diastolic (mmHg) Mean (mmHg) A Wave (mmHg) V Wave (mmHg) EDP (mmHg) Max dp/dt (mmHg/sec) HR (bpm) Content (mL/dL) SAT (%)   RA Pressures 11:52 AM   5    6    9      59        RV Pressures 11:53 AM 95        5     56        PA Pressures 11:55 AM 95    34    54        58        PCW Pressures 11:54 AM   14    15    15      59        Blood Flow Results Phase: Baseline     Time Results  Indexed Values (L/min/m2)   QP 11:32 AM 2.6 L/min    1.71      QS 11:32 AM 2.6 L/min    1.71      Blood Oximetry Phase: Baseline     Time Hb  SAT(%)  PO2  Content (mL/dL) PA Sat (%)   PA 11:32 AM  65.2 %      65.2      Art 11:32 AM  97 %     18.07       Cardiac Output Phase: Baseline     Time TDCO (L/min) TDCI (L/min/m2) Dipti C.O. (L/min) Dipti C.I. (L/min/m2) Dipti HR (bpm)   Cardiac Output Results 11:32 AM 3.77    2.48    2.6    1.71        11:56 AM 3.77          Resistance Results Phase: Baseline     Time PVR  SVR  TPR  TVR  PVR/SVR  TPR/TVR    Resistance Results (Metric) 11:32 AM 1231.66 dsc-5     1662.74 dsc-5         Resistance Results (Wood) 11:32 AM 15.4 BALL     20.79 BALL         Stoke Volume Results Phase: Baseline     Time RVSW (gm*m) LVSW (gm*m) RVSW-I (gm*m/m2) LVSW-I (gm*m/m2)   Stroke Work Results 11:32 AM 29.84     19.67                             Catherization    Right sided filling pressures are  normal.    Severely elevated pulmonary artery hypertension.    Left sided filling pressures are mildly elevated.    Normal cardiac output level.     Severe pulmonary hypertension with mildly elevated LVEDP. Borderline reduced cardiac index.               Right Heart Pressures     Systolic BP: 120/71mmHg    RA: 6/9/5mmHg  RV: 95/5mmHg  PA: 95/34/54 mmHg  PCWP: 15/15/14 mmHg  CO/CI (Dipti): 2.6/1.71   CO/CI (Thermo): 3.77/2.48  PVR: 15WU Right sided filling pressures are normal.Left sided filling pressures are mildly elevated. Severely elevated pulmonary artery hypertension.Normal cardiac output level.      CT CHEST PULMONARY EMBOLISM WITH CONTRAST   1/24/2019 9:02 PM      HISTORY: Shortness of breath. Pain.     TECHNIQUE: 59 mL Isovue-[id number]. Radiation dose for this scan was reduced  using automated exposure control, adjustment of the mA and/or kV  according to patient size, or iterative reconstruction technique.     COMPARISON: 5/22/2015, 4/21/2014     FINDINGS: There is no evidence of pulmonary embolism or acute thoracic  aortic abnormality. There is minimal coronary atherosclerosis. No  pneumothorax. No pleural or pericardial effusion. The main pulmonary  artery is enlarged, suggestive of pulmonary arterial hypertension.     No thoracic or axillary adenopathy. No pulmonary nodule or mass. No  airspace consolidation. Images through the upper abdomen demonstrate  left hydronephrosis, present on prior studies.                                                                      IMPRESSION:  1. No evidence of pulmonary embolism.  2. Enlarged main pulmonary arteries suggestive of pulmonary arterial  hypertension.  3. Chronic left hydronephrosis.       CC: Doctors above

## 2020-12-30 NOTE — LETTER
"12/30/2020      RE: Judith Nelson  [address]  [facility] [address]       Dear Colleague,    Thank you for the opportunity to participate in the care of your patient, Judith Nelson, at the [facility]. Please see a copy of my visit note below.    Judith Nelson is a 60 year old female who is being evaluated via a billable video visit.      The patient has been notified of following:     \"This video visit will be conducted via a call between you and your physician/provider. We have found that certain health care needs can be provided without the need for an in-person physical exam.  This service lets us provide the care you need with a video conversation.  If a prescription is necessary we can send it directly to your pharmacy.  If lab work is needed we can place an order for that and you can then stop by our lab to have the test done at a later time.    Video visits are billed at different rates depending on your insurance coverage.  Please reach out to your insurance provider with any questions.    If during the course of the call the physician/provider feels a video visit is not appropriate, you will not be charged for this service.\"    Patient has given verbal consent for Video visit? Yes  How would you like to obtain your AVS? Mail a copy  If you are dropped from the video visit, the video invite should be resent to: Text to cell phone: [phone]   Will anyone else be joining your video visit? No      Vitals - Patient Reported  Weight (Patient Reported): 52.2 kg (115 lb)  Height (Patient Reported): 158.8 cm (5' 2.5\")  BMI (Based on Pt Reported Ht/Wt): 20.7  SpO2 (Patient Reported): 91  Pulse (Patient Reported): 98  Pain Score: No Pain (0)(Has a little SOB)    Vitals were taken and medication reconciled.    MICHOACANO Mcintyre  12:15 PM      I had a 45 minute FACETIME video visit with her at home and with her permission.  " "1130-12:00.  I was in my office.     Pulmonary hypertension treated with three oral agents  Sarcoidosis treated with Imuran  CKD with positive renal biopsy for sarcoid  Oxygen dependent respiratory failure  Dysthymic disorder  Obstructive PFTs    Kelvin Christianson M.D.Nephrology [facility] Luque M.D. Rheumatology [facility] Luque M.D. Internal Medicine [facility] Doe M.D.   Cardiology,         Plan:    1. The patient has systemic sarcoid and a very complicated social/psychological situation.  She has significant anxiety and depression and has been with longtime therapist.  She has moved to [facility] for economic reasons and needs to establish care there.  I have copied the doctors above in hope of them accepting her a patient.  She is very sweet, well-intended but has difficult time with decision making and following through  2. She is in need of transportation assistance to clinic visits as she is \"paralyzed\" by the though of driving in [place]  3. She is currently renting a house in [place]  4. She has not had labs check in over 4 months and would suggest: IL-6, CMP, CBC, calcium (hypercalcemia ), CRP inflammation, BNP  5. Echocardiogram in [place] to estimate PA pressure and assess RV function  6. She is on inhaled Tyvaso which she is tolerating  And feels improve.          The patient has not been walking.  She has been losing weight.  She has no PND, orthopnea, ankle edema, palpitation, pre-syncope or syncope.  She is very depressed about her living situation.  She is seeing therapist regularly. She is taking her medicine regularly.     Wt Readings from Last 24 Encounters:   08/19/20 49.9 kg (110 lb)   07/09/20 51.3 kg (113 lb)   05/06/20 51.6 kg (113 lb 12.8 oz)   01/10/20 54.3 kg (119 lb 12.8 oz)   12/13/19 54.9 kg (121 lb)   09/11/19 57.6 kg (126 lb 14.4 oz)   07/03/19 56.2 kg (124 lb)   06/11/19 56.4 kg (124 lb 6.4 oz)   04/17/19 54 kg (119 lb)   04/09/19 54.9 kg (121 " lb)   19 54.1 kg (119 lb 4.3 oz)   19 53.3 kg (117 lb 6.4 oz)   18 54.8 kg (120 lb 14.4 oz)   18 52.8 kg (116 lb 8 oz)   18 52.8 kg (116 lb 8 oz)   18 53.5 kg (118 lb)   09/10/18 53.3 kg (117 lb 9.6 oz)   07/10/18 53.5 kg (118 lb)   18 50.4 kg (111 lb 3.2 oz)   18 52.8 kg (116 lb 8 oz)   18 51 kg (112 lb 8 oz)   18 51.7 kg (114 lb)   18 53.6 kg (118 lb 3.2 oz)   18 52.2 kg (115 lb)       Echocardiogram    Name: ANALI GÓMEZ  MRN: [id number]  : 1960  Study Date: 2020 09:28 AM  Age: 60 yrs  Gender: Female  Patient Location: [facility]  Reason For Study: Pulmonary hypertension (H), SOB (shortness of breath)  Ordering Physician: MINE CASTRO  Referring Physician: MINE CASTRO  Performed By: Juanis Pagan RDCS     BSA: 1.5 m2  Height: 63 in  Weight: 113 lb  HR: 64  BP: 110/68 mmHg  _____________________________________________________________________________  __        Procedure  Echocardiogram with two-dimensional, color and spectral Doppler performed.  _____________________________________________________________________________  __        Interpretation Summary  Severe pulmonary hypertension is present.  Left ventricular function, chamber size, wall motion, and wall thickness are  normal.The EF is 60-65%.  Mild to moderate right ventricular dilation is present. Global right  ventricular function is normal.  Moderate tricuspid insufficiency is present.  IVC diameter and respiratory changes fall into an intermediate range  suggesting an RA pressure of 8 mmHg.  Trivial pericardial effusion is present.  No change from prior.  _____________________________________________________________________________  __        Left Ventricle  Left ventricular function, chamber size, wall motion, and wall thickness are  normal.The EF is 60-65%. Left ventricular diastolic function is indeterminate.  Paradoxical septal motion consistent  with right ventricular pressure overload  is present.     Right Ventricle  Global right ventricular function is normal. Mild to moderate right  ventricular dilation is present. There is mild to moderate right ventricular  hypertrophy.     Atria  Mild biatrial enlargement is present.     Mitral Valve  Trace to mild mitral insufficiency is present.        Aortic Valve  Aortic valve is normal in structure and function.     Tricuspid Valve  Moderate tricuspid insufficiency is present. The right ventricular systolic  pressure is approximated at 93.3 mmHg plus the right atrial pressure. Severe  pulmonary hypertension is present.     Pulmonic Valve  The pulmonic valve is normal.     Vessels  The thoracic aorta is normal. Dilation of the inferior vena cava is present  with normal respiratory variation in diameter. IVC diameter and respiratory  changes fall into an intermediate range suggesting an RA pressure of 8 mmHg.     Pericardium  Trivial pericardial effusion is present.     _____________________________________________________________________________  __  MMode/2D Measurements & Calculations  IVSd: 0.79 cm  LVIDd: 4.1 cm  LVIDs: 2.4 cm  LVPWd: 0.81 cm  FS: 42.6 %  LV mass(C)d: 98.4 grams  LV mass(C)dI: 64.8 grams/m2     asc Aorta Diam: 2.9 cm  LVOT diam: 2.0 cm  LVOT area: 3.1 cm2  LA Volume (BP): 46.9 ml  LA Volume Index (BP): 30.9 ml/m2  RWT: 0.39        Doppler Measurements & Calculations  MV E max dionne: 73.3 cm/sec  MV A max dionne: 47.6 cm/sec  MV E/A: 1.5  MV dec time: 0.22 sec  PA acc time: 0.10 sec     TR max dionne: 483.0 cm/sec  TR max P.3 mmHg  E/E' av.4  Lateral E/e': 7.9  Medial E/e': 15.0      Constitutional: weight loss, fever, chills, night sweats  HEENT: without visual changes, swallow difficulties  Pulmonary: without shortness of breath, cough, wheeze, hemoptysis  Cardiac: without chest pain, JUAN, PND, orthopnea, edema, palpitation, pre-syncope, syncope,  GI: without diarrhea, constipation,  jaundice, melena, GERD, hematemesis  : without frequency, urgency, dysuria, hematuria  Skin: rash, bruise, open lesions  Neuro: without TIA, focal neurologic complaints, seizure, trauma  Ortho: without pain, swelling, mobility impairment  Endocrine: diabetes, thyroid, heat/cold intolerance, polyuria, polyphagia, change bowel habits.  Sleep:no IVETTE, periodic breathing, fatigue  Other: depressed as noted above        Current Outpatient Medications   Medication     acetaminophen (TYLENOL) 500 MG tablet     BUSPIRONE HCL PO     LORazepam (ATIVAN) 0.5 MG tablet     ADVAIR DISKUS 250-50 MCG/DOSE inhaler     calcitRIOL (ROCALTROL) 0.25 MCG capsule     carvedilol (COREG) 3.125 MG tablet     FLUoxetine (PROZAC) 20 MG capsule     furosemide (LASIX) 20 MG tablet     lamoTRIgine (LAMICTAL) 25 MG tablet     macitentan (OPSUMIT) 10 MG tablet     methylphenidate (RITALIN) 5 mg TABS half-tab     omeprazole (PRILOSEC) 20 MG DR capsule     order for DME     ORDER FOR DME     potassium citrate (UROCIT-K) 10 MEQ (1080 MG) CR tablet     ranitidine (RANITIDINE) 75 MG tablet     risperiDONE (RISPERDAL) 0.25 MG tablet     selexipag (UPTRAVI) 1600 MCG tablet     simvastatin (ZOCOR) 10 MG tablet     tadalafil, PAH, (ADCIRCA) 20 MG TABS     topiramate (TOPAMAX) 50 MG tablet     treprostinil (TYVASO) 0.6 MG/ML SOLN neb solution     No current facility-administered medications for this visit.        Results for ANALI GÓMEZ (MRN [id number]) as of 7/16/2020 10:58   Ref. Range 7/9/2020 10:15   Sodium Latest Ref Range: 133 - 144 mmol/L 139   Potassium Latest Ref Range: 3.4 - 5.3 mmol/L 4.0   Chloride Latest Ref Range: 94 - 109 mmol/L 112 (H)   Carbon Dioxide Latest Ref Range: 20 - 32 mmol/L 22   Urea Nitrogen Latest Ref Range: 7 - 30 mg/dL 16   Creatinine Latest Ref Range: 0.52 - 1.04 mg/dL 1.52 (H)   GFR Estimate Latest Ref Range: >60 mL/min/1.73_m2 37 (L)   GFR Estimate If Black Latest Ref Range: >60 mL/min/1.73_m2 43 (L)   Calcium Latest  Ref Range: 8.5 - 10.1 mg/dL 9.1   Anion Gap Latest Ref Range: 3 - 14 mmol/L 6   Albumin Latest Ref Range: 3.4 - 5.0 g/dL 3.7   Protein Total Latest Ref Range: 6.8 - 8.8 g/dL 6.4 (L)   Bilirubin Total Latest Ref Range: 0.2 - 1.3 mg/dL 0.6   Alkaline Phosphatase Latest Ref Range: 40 - 150 U/L 43   ALT Latest Ref Range: 0 - 50 U/L 18   AST Latest Ref Range: 0 - 45 U/L 11   N-Terminal Pro Bnp Latest Ref Range: 0 - 125 pg/mL 394 (H)         Creatinine Latest Ref Range: 0.52 - 1.04 mg/dL 1.52 (H)   GFR Estimate Latest Ref Range: >60 mL/min/1.73_m2 37 (L)   GFR Estimate If Black Latest Ref Range: >60 mL/min/1.73_m2 43 (L)   Calcium Latest Ref Range: 8.5 - 10.1 mg/dL 9.1   Anion Gap Latest Ref Range: 3 - 14 mmol/L 6   Albumin Latest Ref Range: 3.4 - 5.0 g/dL 3.7   Protein Total Latest Ref Range: 6.8 - 8.8 g/dL 6.4 (L)   Bilirubin Total Latest Ref Range: 0.2 - 1.3 mg/dL 0.6   Alkaline Phosphatase Latest Ref Range: 40 - 150 U/L 43   ALT Latest Ref Range: 0 - 50 U/L 18   AST Latest Ref Range: 0 - 45 U/L 11   N-Terminal Pro Bnp Latest Ref Range: 0 - 125 pg/mL 394 (H)   Glucose Latest Ref Range: 70 - 99 mg/dL 82   WBC Latest Ref Range: 4.0 - 11.0 10e9/L 4.7   Hemoglobin Latest Ref Range: 11.7 - 15.7 g/dL 14.4   Hematocrit Latest Ref Range: 35.0 - 47.0 % 45.0   Platelet Count Latest Ref Range: 150 - 450 10e9/L 187   RBC Count Latest Ref Range: 3.8 - 5.2 10e12/L 4.71   MCV Latest Ref Range: 78 - 100 fl 96   MCH Latest Ref Range: 26.5 - 33.0 pg 30.6   MCHC Latest Ref Range: 31.5 - 36.5 g/dL 32.0   RDW Latest Ref Range: 10.0 - 15.0 % 12.4       Catherization 07/2020    Right sided filling pressures are normal.    Severely elevated pulmonary artery hypertension.    Left sided filling pressures are mildly elevated.    Normal cardiac output level.     Severe pulmonary hypertension with mildly elevated LVEDP. Borderline reduced cardiac index.          Plan      Follow bedrest per protocol    Continued medical management and lifestyle  modifications for cardiovascular risk factor optimizations.    Follow up with Dr. Franco.    Discharge today per protocol   Hemodynamics    Systolic BP: 120/71mmHg    RA: 6/9/5mmHg  RV: 95/5mmHg  PA: 95/34/54 mmHg  PCWP: 15/15/14 mmHg  CO/CI (Dipti): 2.6/1.71   CO/CI (Thermo): 3.77/2.48  PVR: 15WU Right sided filling pressures are normal.Left sided filling pressures are mildly elevated. Severely elevated pulmonary artery hypertension.Normal cardiac output level.   Pressures(don't display values >20,000) Phase: Baseline     Time Systolic (mmHg) Diastolic (mmHg) Mean (mmHg) A Wave (mmHg) V Wave (mmHg) EDP (mmHg) Max dp/dt (mmHg/sec) HR (bpm) Content (mL/dL) SAT (%)   RA Pressures 11:52 AM   5    6    9      59        RV Pressures 11:53 AM 95        5     56        PA Pressures 11:55 AM 95    34    54        58        PCW Pressures 11:54 AM   14    15    15      59        Blood Flow Results Phase: Baseline     Time Results  Indexed Values (L/min/m2)   QP 11:32 AM 2.6 L/min    1.71      QS 11:32 AM 2.6 L/min    1.71      Blood Oximetry Phase: Baseline     Time Hb  SAT(%)  PO2  Content (mL/dL) PA Sat (%)   PA 11:32 AM  65.2 %      65.2      Art 11:32 AM  97 %     18.07       Cardiac Output Phase: Baseline     Time TDCO (L/min) TDCI (L/min/m2) Dipti C.O. (L/min) Dipti C.I. (L/min/m2) Dipti HR (bpm)   Cardiac Output Results 11:32 AM 3.77    2.48    2.6    1.71        11:56 AM 3.77          Resistance Results Phase: Baseline     Time PVR  SVR  TPR  TVR  PVR/SVR  TPR/TVR    Resistance Results (Metric) 11:32 AM 1231.66 dsc-5     1662.74 dsc-5         Resistance Results (Wood) 11:32 AM 15.4 BALL     20.79 BALL         Stoke Volume Results Phase: Baseline     Time RVSW (gm*m) LVSW (gm*m) RVSW-I (gm*m/m2) LVSW-I (gm*m/m2)   Stroke Work Results 11:32 AM 29.84     19.67                             Catherization    Right sided filling pressures are normal.    Severely elevated pulmonary artery hypertension.    Left sided filling  pressures are mildly elevated.    Normal cardiac output level.     Severe pulmonary hypertension with mildly elevated LVEDP. Borderline reduced cardiac index.               Right Heart Pressures     Systolic BP: 120/71mmHg    RA: 6/9/5mmHg  RV: 95/5mmHg  PA: 95/34/54 mmHg  PCWP: 15/15/14 mmHg  CO/CI (Dipti): 2.6/1.71   CO/CI (Thermo): 3.77/2.48  PVR: 15WU Right sided filling pressures are normal.Left sided filling pressures are mildly elevated. Severely elevated pulmonary artery hypertension.Normal cardiac output level.      CT CHEST PULMONARY EMBOLISM WITH CONTRAST   1/24/2019 9:02 PM      HISTORY: Shortness of breath. Pain.     TECHNIQUE: 59 mL Isovue-370. Radiation dose for this scan was reduced  using automated exposure control, adjustment of the mA and/or kV  according to patient size, or iterative reconstruction technique.     COMPARISON: 5/22/2015, 4/21/2014     FINDINGS: There is no evidence of pulmonary embolism or acute thoracic  aortic abnormality. There is minimal coronary atherosclerosis. No  pneumothorax. No pleural or pericardial effusion. The main pulmonary  artery is enlarged, suggestive of pulmonary arterial hypertension.     No thoracic or axillary adenopathy. No pulmonary nodule or mass. No  airspace consolidation. Images through the upper abdomen demonstrate  left hydronephrosis, present on prior studies.                                                                      IMPRESSION:  1. No evidence of pulmonary embolism.  2. Enlarged main pulmonary arteries suggestive of pulmonary arterial  hypertension.  3. Chronic left hydronephrosis.       CC: Doctors above        Please do not hesitate to contact me if you have any questions/concerns.     Sincerely,     Norm Franco MD

## 2020-12-31 ENCOUNTER — TELEPHONE (OUTPATIENT)
Dept: CARDIOLOGY | Facility: CLINIC | Age: 60
End: 2020-12-31

## 2020-12-31 NOTE — TELEPHONE ENCOUNTER
Prior Authorization Retail Medication Request    Medication/Dose: Tadalafil 20mg Tablets  ICD code (if different than what is on RX):    Previously Tried and Failed:    Rationale:      Insurance Name:  Medicare  Insurance ID:   [id number]      Pharmacy Information (if different than what is on RX)  [facility]:  [facility]   Phone:  [phone] Fax: [phone]

## 2020-12-31 NOTE — TELEPHONE ENCOUNTER
PA Initiation    Medication: Tadalafil 20mg Tablets -   Insurance Company: Sherlyn Espinosa - Phone [phone] Fax [phone]  Pharmacy Filling the Rx: [id number] - [place], [place] - [address] [place] [address]  Filling Pharmacy Phone: [phone]  Filling Pharmacy Fax: [phone]  Start Date: 12/31/2020

## 2021-01-07 ENCOUNTER — TELEPHONE (OUTPATIENT)
Dept: ENDOCRINOLOGY | Facility: CLINIC | Age: 61
End: 2021-01-07

## 2021-01-07 ENCOUNTER — TELEPHONE (OUTPATIENT)
Dept: CARDIOLOGY | Facility: CLINIC | Age: 61
End: 2021-01-07

## 2021-01-07 DIAGNOSIS — R06.02 SOB (SHORTNESS OF BREATH): ICD-10-CM

## 2021-01-07 DIAGNOSIS — E83.52 HYPERCALCEMIA: ICD-10-CM

## 2021-01-07 DIAGNOSIS — I27.20 PULMONARY HYPERTENSION (H): Primary | ICD-10-CM

## 2021-01-07 NOTE — TELEPHONE ENCOUNTER
*Contacted pt to inform her that the prior authorization was completed and submitted through the phone. Stated to pt that a follow-up will be made with Willis tomorrow to provide them the approval information and ensure they are processing the correct medication as the one through CoverMyMeds was for viagra. Pt had no further questions.    Keisha Albarran  Clinic   Pulmonary Hypertension  [facility]  (P) [phone]

## 2021-01-07 NOTE — TELEPHONE ENCOUNTER
"Plan from visit 12/30/20:     1. The patient has systemic sarcoid and a very complicated social/psychological situation.  She has significant anxiety and depression and has been with longtime therapist.  She has moved to [facility] for economic reasons and needs to establish care there.  I have copied the doctors above in hope of them accepting her a patient.  She is very sweet, well-intended but has difficult time with decision making and following through  2. She is in need of transportation assistance to clinic visits as she is \"paralyzed\" by the though of driving in [place]  3. She is currently renting a house in [place]  4. She has not had labs check in over 4 months and would suggest: IL-6, CMP, CBC, calcium (hypercalcemia ), CRP inflammation, BNP  5. Echocardiogram in [place] to estimate PA pressure and assess RV function  6. She is on inhaled Tyvaso which she is tolerating  And feels improve.   ---------------------------------  Placed orders for labs & Echo.  Keyanna Mccray RN on 1/7/2021 at 9:40 AM    Sent myself a reminder to check with Dr. Doe's office to see if they will accept her as a new patient. Keyanna Mccray RN on 1/7/2021 at 9:41 AM    Faxed Orders, facesheet & referral order to Dr. Doe's office. Keyanna Mccray RN on 1/7/2021 at 9:57 AM      "

## 2021-01-07 NOTE — TELEPHONE ENCOUNTER
Prior Authorization Approval    Authorization Effective Date: 12/17/2021  Authorization Expiration Date: 12/17/2023  Medication: Opsumit 10mg - Approved  Approved Dose/Quantity: 30 tablets/30 days  Reference #: [id number]   Insurance Company: [employer] Part D - Phone [phone] Fax [phone]  Which Pharmacy is filling the prescription (Not needed for infusion/clinic administered): [facility] - [place], [place] - [address] [place] [address]  Pharmacy Notified:  Yes  Patient Notified:  Yes  ----------------------------  Insurance: Carol Ann  BIN: [id number]  PCN: [id number]  ID#: [id number]  GRP#: [id number]    *Contacted Carol Ann to follow-up on approval status of Opsumit prior authorization. Spoke with Brit who stated it was approved from 12/17/2020 through 12/17/2021, authorization number [id number].    Keisha Albarran  Clinic   Pulmonary Hypertension  [facility]  (p) [phone]

## 2021-01-07 NOTE — TELEPHONE ENCOUNTER
----- Message from Fannie Cha sent at 1/6/2021  9:49 AM CST -----    ----- Message -----  From: Jenna Salas MD  Sent: 1/5/2021   4:48 PM CST  To: Clinic [id number]    Please let pt know to get set up for prolia  ----- Message -----  From: Jenna Salas MD  Sent: 1/4/2021  To: Jenna Salas MD    Get set up for prolia at [facility] in January 2020?

## 2021-01-07 NOTE — TELEPHONE ENCOUNTER
Prior Authorization Approval    Authorization Effective Date: 1/7/2021  Authorization Expiration Date: 1/7/2022  Medication: Tadalafil 20mg - Approved  Approved Dose/Quantity: 60 tablets/30 days  Reference #: [id number]   Insurance Company: Sherlyn Espinosa - Phone [phone] Fax [phone]  Which Pharmacy is filling the prescription (Not needed for infusion/clinic administered): [facility] [id number] - [place], [place] - [address]  Pharmacy Notified:  Yes  Patient Notified:  Yes  ----------------------------  Insurance: Carol Ann  BIN: [id number]  PCN: MARIVEL  ID#: [id number]  GRP#: [id number]    *Completed verbal prior authorization over the phone with Brit from [employer].

## 2021-01-08 NOTE — TELEPHONE ENCOUNTER
Faxed over prior authorization approval for Tadalafil to [facility].    *Contacted [facility] ([phone]) to follow-up. Spoke with Rosemarie, pharmacy technician who stated that the fax was received. Asked Rosemarie if the medication was being ran for Tadalafil PAH and not Viagra. Rosemarie transferred call to pharmacist, Bib. Asked Bib about the Tadalafil as the prior authorization submitted to be completed was for Viagra and not for PAH. Bib stated on his end he is not able to see which one is which, but will figure it out. Requested if Bib need anything further to contact the office.    Keisha Albarran  Clinic   Pulmonary Hypertension  [facility]  (P) [phone]

## 2021-01-21 DIAGNOSIS — I27.0 PRIMARY PULMONARY HYPERTENSION (H): ICD-10-CM

## 2021-01-21 NOTE — TELEPHONE ENCOUNTER
Received VM from [facility] pharmacy requesting a refill of patient's Opsumit.  Refill sent. Keyanna Mccray RN on 1/21/2021 at 12:49 PM

## 2021-01-26 ENCOUNTER — TELEPHONE (OUTPATIENT)
Dept: CARDIOLOGY | Facility: CLINIC | Age: 61
End: 2021-01-26

## 2021-01-26 DIAGNOSIS — I27.20 PULMONARY HYPERTENSION (H): ICD-10-CM

## 2021-01-26 DIAGNOSIS — I51.81 STRESS-INDUCED CARDIOMYOPATHY: ICD-10-CM

## 2021-01-26 DIAGNOSIS — R06.02 SOB (SHORTNESS OF BREATH): ICD-10-CM

## 2021-01-26 RX ORDER — TADALAFIL 20 MG/1
40 TABLET ORAL DAILY
Qty: 60 TABLET | Refills: 11 | Status: CANCELLED | OUTPATIENT
Start: 2021-01-26

## 2021-01-26 NOTE — TELEPHONE ENCOUNTER
----- Message from Keyanna Mccray RN sent at 1/7/2021  9:42 AM CST -----  Regarding: refer pt to establish new care  Call Dr. Liu Doe's office ([place]) and see if they have accepted patient and/or reached out to her.  --------------------  Spoke with KIRBY Valentin and advised I was following up to make sure they knew we were transferring her care to them.  Carolyne found papers I had faxed and was going to walk them down to their referral coordinator to start the process of setting up a chart and getting an appt for patient.  Agreed with plan. Keyanna Mccray RN on 1/26/2021 at 3:08 PM

## 2021-01-28 ENCOUNTER — TRANSFERRED RECORDS (OUTPATIENT)
Dept: HEALTH INFORMATION MANAGEMENT | Facility: CLINIC | Age: 61
End: 2021-01-28

## 2021-01-28 DIAGNOSIS — I51.81 STRESS-INDUCED CARDIOMYOPATHY: ICD-10-CM

## 2021-01-28 DIAGNOSIS — R06.02 SOB (SHORTNESS OF BREATH): ICD-10-CM

## 2021-01-28 DIAGNOSIS — I27.20 PULMONARY HYPERTENSION (H): ICD-10-CM

## 2021-01-28 RX ORDER — TADALAFIL 20 MG/1
40 TABLET ORAL DAILY
Qty: 60 TABLET | Refills: 2 | Status: SHIPPED | OUTPATIENT
Start: 2021-01-28

## 2021-01-28 NOTE — TELEPHONE ENCOUNTER
Medication Refill double check:    Last virtual visit was on 12/30/20 with .    Follow up was recommended for 3 months with new provider..    Any additional encounters with changes to requested med? no    Authorizing provider is: Joan    Limited refill was approved.     Additional orders/notes: patient is transferring her care to Dr. Doe in [place], but has not established care yet.  We will provide her with 3 months of medication in order to let her get into clinic where they can assume refills moving forward.    Keyanna Mccray RN on 1/28/2021 at 2:01 PM

## 2021-01-29 ENCOUNTER — TELEPHONE (OUTPATIENT)
Dept: CARDIOLOGY | Facility: CLINIC | Age: 61
End: 2021-01-29

## 2021-01-29 NOTE — TELEPHONE ENCOUNTER
Patient LM that she needs a refill of one of her meds and she needs to make sure records were transferred.  --------------------  Molly Luque MD ([facility]) but can't get in until Nov.  She saw an new PMD yesterday, but they didn't think they could take care of her because she was too complicated.    Updated Judith that I recently sent refills for both Tadalafil & Opsumit so she should be good for ~ 3 months until she can get in with Dr. Doe.    Also, I spoke with Dr. Doe's Rn and they are working on getting her seen soon.  Patient verbalized understanding, agreed with plan and denied any further questions. Keyanna Mccray RN on 1/29/2021 at 3:24 PM    Sent Dr. Franco a msg asking for help with referral. Keyanna Mccray RN on 1/29/2021 at 3:26 PM    Dr. Franco did not have any additional recommendations for Internal medicine in [place].  ----------------------  LM that Dr. Franco did not have any additional recommendations, but she could ask Dr. Doe, and or call her health insurance company and ask them to find her an Internist in her area that is accepting new patients. Keyanna Mccray RN on 2/11/2021 at 2:12 PM

## 2021-02-22 ENCOUNTER — TRANSFERRED RECORDS (OUTPATIENT)
Dept: HEALTH INFORMATION MANAGEMENT | Facility: CLINIC | Age: 61
End: 2021-02-22

## 2021-03-18 DIAGNOSIS — I27.20 PULMONARY HYPERTENSION (H): ICD-10-CM

## 2021-03-18 DIAGNOSIS — I51.81 STRESS-INDUCED CARDIOMYOPATHY: ICD-10-CM

## 2021-03-18 DIAGNOSIS — R06.02 SOB (SHORTNESS OF BREATH): ICD-10-CM

## 2021-03-22 RX ORDER — CARVEDILOL 3.12 MG/1
3.12 TABLET ORAL 2 TIMES DAILY WITH MEALS
Qty: 180 TABLET | Refills: 2 | Status: SHIPPED | OUTPATIENT
Start: 2021-03-22

## 2021-03-24 ENCOUNTER — VIRTUAL VISIT (OUTPATIENT)
Dept: PULMONOLOGY | Facility: CLINIC | Age: 61
End: 2021-03-24
Attending: INTERNAL MEDICINE
Payer: MEDICARE

## 2021-03-24 DIAGNOSIS — D86.9 SARCOIDOSIS: Primary | ICD-10-CM

## 2021-03-24 PROCEDURE — 99442 PR PHYSICIAN TELEPHONE EVALUATION 11-20 MIN: CPT | Mod: 95 | Performed by: INTERNAL MEDICINE

## 2021-03-24 RX ORDER — LOPERAMIDE HCL 2 MG
2 CAPSULE ORAL 4 TIMES DAILY PRN
COMMUNITY

## 2021-03-24 NOTE — PROGRESS NOTES
Reason for Visit  Judith Nelson is a 61 year old year old female who Is evaluated for sarcoidosis  Pulmonary HPI    The patient was evaluated d by Gael Flaherty MD     Ms. Nelson was last seen in the pulmonary clinic for sarcoidosis in October 2020.  At that time she was contemplating moving to [place] as it was becoming unaffordable to went in the [facility].  She also realized that she was not taking her Imuran directed towards renal sarcoidosis.  Due to cost she was not able to afford oxygen supplementation.    She is now living in [place] and is moving her care to [facility].  She has found a physician who will manage her pulmonary hypertension.  An nephrology appointment is also pending.  She reports that her breathing has not changed significantly.  She had recent blood work done which showed a creatinine which was elevated.  Care everywhere review shows that her creatinine is 1.5.    Current Outpatient Medications   Medication     acetaminophen (TYLENOL) 500 MG tablet     BUSPIRONE HCL PO     calcitRIOL (ROCALTROL) 0.25 MCG capsule     Calcium Carbonate-Vit D-Min (CALCIUM 1200 PO)     carvedilol (COREG) 3.125 MG tablet     cholecalciferol 50 MCG (2000 UT) CAPS     FLUoxetine (PROZAC) 20 MG capsule     furosemide (LASIX) 20 MG tablet     lamoTRIgine (LAMICTAL) 25 MG tablet     loperamide (IMODIUM) 2 MG capsule     LORazepam (ATIVAN) 0.5 MG tablet     macitentan (OPSUMIT) 10 MG tablet     omeprazole (PRILOSEC) 20 MG DR capsule     order for DME     ORDER FOR DME     potassium citrate (UROCIT-K) 10 MEQ (1080 MG) CR tablet     risperiDONE (RISPERDAL) 0.25 MG tablet     simvastatin (ZOCOR) 10 MG tablet     tadalafil, PAH, (ADCIRCA) 20 MG TABS     treprostinil (TYVASO) 0.6 MG/ML SOLN neb solution     ADVAIR DISKUS 250-50 MCG/DOSE inhaler     methylphenidate (RITALIN) 5 mg TABS half-tab     ranitidine (RANITIDINE) 75 MG tablet     selexipag (UPTRAVI) 1600 MCG tablet     topiramate (TOPAMAX) 50 MG  tablet     No current facility-administered medications for this visit.      Allergies   Allergen Reactions     Dilaudid [Hydromorphone] Nausea and Vomiting     Morphine Nausea and Vomiting     Latex Rash     From gloves     Past Medical History:   Diagnosis Date     Anxiety      CKD (chronic kidney disease), stage III     biopsy suspicious for renal sarcoidosis      Hyperprolactinemia (H)      Lower extremity edema     chronic w/ chronic right ankle ulcer     Lumbar compression fracture (H)      Major depressive disorder     with psychotic features, followed by Dr. Rosales at [facility] in [facility]     patient is post menopausal     MGUS (monoclonal gammopathy of unknown significance)     mild, followed by Dr. Long     Osteoporosis      Other seborrheic keratosis      Protrusio acetabuli      Pulmonary hypertension (H)      Sarcoidosis      Stress-induced cardiomyopathy        Past Surgical History:   Procedure Laterality Date     C PELVIS/HIP JOINT SURGERY UNLISTED       Csection,  X 2       CV RIGHT HEART CATH MEASUREMENTS RECORDED N/A 2019    Procedure: 1130 RHC;  Surgeon: Jeanne Angel MD;  Location:  HEART CARDIAC CATH LAB     CV RIGHT HEART CATH MEASUREMENTS RECORDED N/A 2020    Procedure: CV RIGHT HEART CATH;  Surgeon: Sebastien Izaguirre MD;  Location:  HEART CARDIAC CATH LAB     CYSTOSCOPY, RETROGRADES, INSERT STENT URETER(S), COMBINED  10/2/2013    Procedure: COMBINED CYSTOSCOPY, RETROGRADES, INSERT STENT URETER(S);  Left Retrograde Ureteropyelogram and Ureteral Stent Placement;  Surgeon: SONIA Martinez MD;  Location: [facility] OR     ENDOSCOPIC RETROGRADE CHOLANGIOPANCREATOGRAM  2012    Procedure:ENDOSCOPIC RETROGRADE CHOLANGIOPANCREATOGRAM; ERCP biliary sphincterotomy and stone removal; Surgeon:MARGIE RUGGIEROIQ; Location: OR     LAPAROSCOPIC CHOLECYSTECTOMY WITH CHOLANGIOGRAMS  2012    Procedure:LAPAROSCOPIC CHOLECYSTECTOMY WITH CHOLANGIOGRAMS;  Surgeon:BRIANA PADILLA; Location:[facility] OR     ORTHOPEDIC SURGERY  10/1/2010    Right hip replacement     ZZ GASTROSCOPY,FL  6/10    Erythematous duodenopathy       Social History     Socioeconomic History     Marital status:      Spouse name: Not on file     Number of children: 3     Years of education: 12     Highest education level: Not on file   Occupational History     Employer: UNEMPLOYED   Social Needs     Financial resource strain: Not on file     Food insecurity     Worry: Not on file     Inability: Not on file     Transportation needs     Medical: Not on file     Non-medical: Not on file   Tobacco Use     Smoking status: Never Smoker     Smokeless tobacco: Never Used   Substance and Sexual Activity     Alcohol use: No     Alcohol/week: 0.0 standard drinks     Comment: Occ.     Drug use: No     Sexual activity: Never     Birth control/protection: Spermicide, Post-menopausal   Lifestyle     Physical activity     Days per week: Not on file     Minutes per session: Not on file     Stress: Not on file   Relationships     Social connections     Talks on phone: Not on file     Gets together: Not on file     Attends Orthodox service: Not on file     Active member of club or organization: Not on file     Attends meetings of clubs or organizations: Not on file     Relationship status: Not on file     Intimate partner violence     Fear of current or ex partner: Not on file     Emotionally abused: Not on file     Physically abused: Not on file     Forced sexual activity: Not on file   Other Topics Concern     Parent/sibling w/ CABG, MI or angioplasty before 65F 55M? No   Social History Narrative     Not on file       ROS Pulmonary    A complete ROS was otherwise negative except as noted in the HPI.  There were no vitals taken for this visit.  Exam:   Unable to perform as this is a phone visit.     Results:  No results found for this or any previous visit (from the past 168 hour(s)).    Assessment and  plan:  Ms. Nelson is a 61-year-old female with history of sarcoidosis based on granulomatous inflammation identified on a kidney biopsy and hypercalcemia, likely sarcoid associated pulmonary hypertension with mean PA pressure of 54 mmHg currently tadalafil, macitentan and selexipag.  She has exertional hypoxia but is currently not on any oxygen therapy.  She also reports cough mostly dry and gastroesophageal reflux.. She has been unable to afford nasal cannula oxygen supplementation.  1.  Pulmonary: Last set of PFTs indicated mild obstruction with increased respiratory volume.  The DLCO was also mildly decreased at 73%.  Plan to continue monitoring as she had no symptoms.  She declined inhaled medications.  This could be an option in the future.  2.  Extrapulmonary sarcoidosis: Primary renal manifestation and concern for sarcoid associated pulmonary hypertension.  She was on Imuran for renal sarcoid.  However it appeared that she was not taking Imuran for almost a year.  The plan was to follow creatinine and reinitiate anti-inflammatory therapy.  For sarcoidosis and pulmonary pretension she followed with Dr. Franco and is on triple therapy.  It would be beneficial for her to be on oxygen.  Plan to periodically monitor CBC, BMP, calcium, vitamin D, 125 hydroxy vitamin D, PTH, ACE and Vinh-2R 4 assessing sarcoidosis activity and reinitiate therapy.  The patient is requesting the name of a provider in [facility].  We will make a referral to the respiratory group at [facility].  The other option would be for her to be seen by one of the [facility] providers at [place].  We will review this with her.     This note consists of symbols derived from keyboarding, dictation and/or voice recognition software. As a result, there may be errors in the script that have gone undetected. Please consider this when interpreting information found in this chart      Judith is a 61 year old who is being  evaluated via a billable telephone visit.      What phone number would you like to be contacted at? [phone]    How would you like to obtain your AVS? Mail a copy  Phone call duration: 15 minutes

## 2021-03-24 NOTE — LETTER
3/24/2021         RE: Judith Nelson  [address]  [place] [address]        Dear Colleague,    Thank you for referring your patient, Judith Nelson, to the [facility] [place]. Please see a copy of my visit note below.    Reason for Visit  Judith Nelson is a 61 year old year old female who Is evaluated for sarcoidosis  Pulmonary HPI    The patient was evaluated d by Gael Flaherty MD     Ms. Nelson was last seen in the pulmonary clinic for sarcoidosis in October 2020.  At that time she was contemplating moving to [place] as it was becoming unaffordable to went in the [facility].  She also realized that she was not taking her Imuran directed towards renal sarcoidosis.  Due to cost she was not able to afford oxygen supplementation.    She is now living in [place] and is moving her care to [facility].  She has found a physician who will manage her pulmonary hypertension.  An nephrology appointment is also pending.  She reports that her breathing has not changed significantly.  She had recent blood work done which showed a creatinine which was elevated.  Care everywhere review shows that her creatinine is 1.5.    Current Outpatient Medications   Medication     acetaminophen (TYLENOL) 500 MG tablet     BUSPIRONE HCL PO     calcitRIOL (ROCALTROL) 0.25 MCG capsule     Calcium Carbonate-Vit D-Min (CALCIUM 1200 PO)     carvedilol (COREG) 3.125 MG tablet     cholecalciferol 50 MCG (2000 UT) CAPS     FLUoxetine (PROZAC) 20 MG capsule     furosemide (LASIX) 20 MG tablet     lamoTRIgine (LAMICTAL) 25 MG tablet     loperamide (IMODIUM) 2 MG capsule     LORazepam (ATIVAN) 0.5 MG tablet     macitentan (OPSUMIT) 10 MG tablet     omeprazole (PRILOSEC) 20 MG DR capsule     order for DME     ORDER FOR DME     potassium citrate (UROCIT-K) 10 MEQ (1080 MG) CR tablet     risperiDONE (RISPERDAL) 0.25 MG tablet     simvastatin (ZOCOR) 10 MG tablet      tadalafil, PAH, (ADCIRCA) 20 MG TABS     treprostinil (TYVASO) 0.6 MG/ML SOLN neb solution     ADVAIR DISKUS 250-50 MCG/DOSE inhaler     methylphenidate (RITALIN) 5 mg TABS half-tab     ranitidine (RANITIDINE) 75 MG tablet     selexipag (UPTRAVI) 1600 MCG tablet     topiramate (TOPAMAX) 50 MG tablet     No current facility-administered medications for this visit.      Allergies   Allergen Reactions     Dilaudid [Hydromorphone] Nausea and Vomiting     Morphine Nausea and Vomiting     Latex Rash     From gloves     Past Medical History:   Diagnosis Date     Anxiety      CKD (chronic kidney disease), stage III     biopsy suspicious for renal sarcoidosis      Hyperprolactinemia (H)      Lower extremity edema     chronic w/ chronic right ankle ulcer     Lumbar compression fracture (H)      Major depressive disorder     with psychotic features, followed by Dr. Rosales at [facility] in [place]     Menopause     patient is post menopausal     MGUS (monoclonal gammopathy of unknown significance)     mild, followed by Dr. Long     Osteoporosis      Other seborrheic keratosis      Protrusio acetabuli      Pulmonary hypertension (H)      Sarcoidosis      Stress-induced cardiomyopathy        Past Surgical History:   Procedure Laterality Date     C PELVIS/HIP JOINT SURGERY UNLISTED       Csection,  X 2       CV RIGHT HEART CATH MEASUREMENTS RECORDED N/A 2019    Procedure: 1130 RHC;  Surgeon: Jeanne Angel MD;  Location:  HEART CARDIAC CATH LAB     CV RIGHT HEART CATH MEASUREMENTS RECORDED N/A 2020    Procedure: CV RIGHT HEART CATH;  Surgeon: Sebastien Izaguirre MD;  Location:  HEART CARDIAC CATH LAB     CYSTOSCOPY, RETROGRADES, INSERT STENT URETER(S), COMBINED  10/2/2013    Procedure: COMBINED CYSTOSCOPY, RETROGRADES, INSERT STENT URETER(S);  Left Retrograde Ureteropyelogram and Ureteral Stent Placement;  Surgeon: SONIA Martinez MD;  Location: [facility] OR     ENDOSCOPIC RETROGRADE CHOLANGIOPANCREATOGRAM   1/22/2012    Procedure:ENDOSCOPIC RETROGRADE CHOLANGIOPANCREATOGRAM; ERCP biliary sphincterotomy and stone removal; Surgeon:MARGIE RUGGIERO; Location:[facility]     LAPAROSCOPIC CHOLECYSTECTOMY WITH CHOLANGIOGRAMS  1/21/2012    Procedure:LAPAROSCOPIC CHOLECYSTECTOMY WITH CHOLANGIOGRAMS; Surgeon:BRIANA PADILLA; Location:[facility] OR     ORTHOPEDIC SURGERY  10/1/2010    Right hip replacement     ZZ GASTROSCOPY,[place]  6/10    Erythematous duodenopathy       Social History     Socioeconomic History     Marital status:      Spouse name: Not on file     Number of children: 3     Years of education: 12     Highest education level: Not on file   Occupational History     Employer: UNEMPLOYED   Social Needs     Financial resource strain: Not on file     Food insecurity     Worry: Not on file     Inability: Not on file     Transportation needs     Medical: Not on file     Non-medical: Not on file   Tobacco Use     Smoking status: Never Smoker     Smokeless tobacco: Never Used   Substance and Sexual Activity     Alcohol use: No     Alcohol/week: 0.0 standard drinks     Comment: Occ.     Drug use: No     Sexual activity: Never     Birth control/protection: Spermicide, Post-menopausal   Lifestyle     Physical activity     Days per week: Not on file     Minutes per session: Not on file     Stress: Not on file   Relationships     Social connections     Talks on phone: Not on file     Gets together: Not on file     Attends Buddhist service: Not on file     Active member of club or organization: Not on file     Attends meetings of clubs or organizations: Not on file     Relationship status: Not on file     Intimate partner violence     Fear of current or ex partner: Not on file     Emotionally abused: Not on file     Physically abused: Not on file     Forced sexual activity: Not on file   Other Topics Concern     Parent/sibling w/ CABG, MI or angioplasty before 65F 55M? No   Social History Narrative     Not on file       ROS  Pulmonary    A complete ROS was otherwise negative except as noted in the HPI.  There were no vitals taken for this visit.  Exam:   Unable to perform as this is a phone visit.     Results:  No results found for this or any previous visit (from the past 168 hour(s)).    Assessment and plan:  Ms. Nelson is a 61-year-old female with history of sarcoidosis based on granulomatous inflammation identified on a kidney biopsy and hypercalcemia, likely sarcoid associated pulmonary hypertension with mean PA pressure of 54 mmHg currently tadalafil, macitentan and selexipag.  She has exertional hypoxia but is currently not on any oxygen therapy.  She also reports cough mostly dry and gastroesophageal reflux.. She has been unable to afford nasal cannula oxygen supplementation.  1.  Pulmonary: Last set of PFTs indicated mild obstruction with increased respiratory volume.  The DLCO was also mildly decreased at 73%.  Plan to continue monitoring as she had no symptoms.  She declined inhaled medications.  This could be an option in the future.  2.  Extrapulmonary sarcoidosis: Primary renal manifestation and concern for sarcoid associated pulmonary hypertension.  She was on Imuran for renal sarcoid.  However it appeared that she was not taking Imuran for almost a year.  The plan was to follow creatinine and reinitiate anti-inflammatory therapy.  For sarcoidosis and pulmonary pretension she followed with Dr. Franco and is on triple therapy.  It would be beneficial for her to be on oxygen.  Plan to periodically monitor CBC, BMP, calcium, vitamin D, 125 hydroxy vitamin D, PTH, ACE and Vinh-2R 4 assessing sarcoidosis activity and reinitiate therapy.  The patient is requesting the name of a provider in [facility].  We will make a referral to the respiratory group at [facility].  The other option would be for her to be seen by one of the [facility] providers at [place].  We will review this with her.     This note  consists of symbols derived from keyboarding, dictation and/or voice recognition software. As a result, there may be errors in the script that have gone undetected. Please consider this when interpreting information found in this chart      Judith is a 61 year old who is being evaluated via a billable telephone visit.      What phone number would you like to be contacted at? [phone]    How would you like to obtain your AVS? Mail a copy  Phone call duration: 15 minutes        Again, thank you for allowing me to participate in the care of your patient.        Sincerely,        Gael Flaherty MD

## 2021-03-29 DIAGNOSIS — J84.9 ILD (INTERSTITIAL LUNG DISEASE) (H): ICD-10-CM

## 2021-03-29 DIAGNOSIS — D86.9 SARCOIDOSIS: Primary | ICD-10-CM

## 2021-04-08 ENCOUNTER — TELEPHONE (OUTPATIENT)
Dept: ENDOCRINOLOGY | Facility: CLINIC | Age: 61
End: 2021-04-08

## 2021-04-08 NOTE — LETTER
Patient:  Judith Nelson  :   1960  MRN:     [id number]        Ms.Pamela MARY Nelson  [address] [place] [address]        2021    Dear Judith    This is a letter reminding you that you do not have follow-up scheduled.  In addition, you need to get your Prolia every 6 months.  This is very important for you to keep your bones strong.  If you do not get your Prolia every 6 months, you are at risk for bone loss.  If you need help scheduling, please let us know.    If you have any questions, please feel free to contact my nurse at [phone] select option #3 for triage nurse  or  option #1 for scheduling related questions.    Regards    Jenna Salas MD

## 2021-04-15 DIAGNOSIS — I27.20 PULMONARY HYPERTENSION (H): ICD-10-CM

## 2021-04-15 DIAGNOSIS — I51.81 STRESS-INDUCED CARDIOMYOPATHY: ICD-10-CM

## 2021-04-15 DIAGNOSIS — R06.02 SOB (SHORTNESS OF BREATH): ICD-10-CM

## 2021-04-19 RX ORDER — TADALAFIL 20 MG/1
TABLET ORAL
Qty: 56 TABLET | OUTPATIENT
Start: 2021-04-19

## 2021-04-19 NOTE — TELEPHONE ENCOUNTER
Patient no longer follows with Dr. Franco, and has already established care with Dr. Doe in [place].  Added comment to pharmacy to contact Dr. Doe in [place]. Keyanna Mccray RN on 4/19/2021 at 9:57 AM

## 2021-04-19 NOTE — CONFIDENTIAL NOTE
tadalafil, PAH, (ADCIRCA) 20 MG TABS  Last Written Prescription Date: 1/28/21  Last Fill Quantity: 60,   # refills: 2  Last Office Visit : 12/30/20  Future Office visit:  none    Routing refill request to provider for review/approval because: not on protocol

## 2021-04-29 ENCOUNTER — TRANSFERRED RECORDS (OUTPATIENT)
Dept: HEALTH INFORMATION MANAGEMENT | Facility: CLINIC | Age: 61
End: 2021-04-29

## 2021-05-12 DIAGNOSIS — D86.9 SARCOIDOSIS: ICD-10-CM

## 2021-05-17 ENCOUNTER — TELEPHONE (OUTPATIENT)
Dept: CARDIOLOGY | Facility: CLINIC | Age: 61
End: 2021-05-17

## 2021-05-17 NOTE — TELEPHONE ENCOUNTER
Patient LM asking if it was possible for her to talk to Dr. Franco?  ------------------------  Discussed patient's request with Dr. Franco who verbalized understanding. Keyanna Mccray RN on 6/8/2021 at 2:05 PM'

## 2021-11-17 NOTE — ED NOTES
Pt states that she has L hip pain that started earlier today. She states that it started in her L calf and is now in her L hip area. The pain is gone in her calf. She states that she was seen in [facility] today and they ruled out a DVT. She states hat she has pulmonary hypertension and is seen by a home health care nurse. She has osteoporosis. She was told she may have to shaver her L hip repaired. She thinks she may have injured her hip this week.   1

## 2021-11-19 DIAGNOSIS — D86.9 SARCOIDOSIS: ICD-10-CM

## 2022-06-11 DIAGNOSIS — D86.9 SARCOIDOSIS: ICD-10-CM

## 2022-06-14 DIAGNOSIS — D86.9 SARCOIDOSIS: ICD-10-CM

## 2022-07-06 ENCOUNTER — TELEPHONE (OUTPATIENT)
Dept: ENDOCRINOLOGY | Facility: CLINIC | Age: 62
End: 2022-07-06

## 2022-07-06 DIAGNOSIS — D86.9 SARCOIDOSIS: ICD-10-CM

## 2022-07-06 NOTE — LETTER
Patient:  Judith Nelson  :   1960  MRN:     [id number]        Ms.Pamela MARY Nelson  [address] [place] [address]        2022    Dear ,    I see that you do not have a follow-up appointment in endocrinology. I would recommend that you be evaluated by an endocrinologist to minimize complications. If you like to be seen at the [facility], please call [phone] select option #1 for an appointment.    Regards    Jenna Salas MD

## 2023-12-27 NOTE — TELEPHONE ENCOUNTER
LM for patient advising her that Dr. Franco want to increase her Uptravi to the maximum dose.  I would like her to call me so we can discuss what that entails, and which pharmacy is providing her with that medication?Keyanna Mccray RN on 7/26/2018 at 11:19 AM  ------------------------------  Reviewed uptitration of Uptravi with patient to maximum (goal dose) of 1600mcg BID.  Patient uses [facility] pharmacy so I advised her I would give them a call with the dose increase and they will probably reach out to her as well.  Patient verbalized understanding, agreed with plan and denied any further questions. Keyanna Mccray RN on 7/26/2018 at 11:26 AM  -------------------------------  Called blanca Sarmiento at [facility] and gave TORB to uptitrate Uptravi to goal dose of 1600mcg BID with refills for 1 year. Keyanna Mccray RN on 7/26/2018 at 11:34 AM     Fall with Harm Risk

## 2024-08-12 ENCOUNTER — TRANSFERRED RECORDS (OUTPATIENT)
Dept: HEALTH INFORMATION MANAGEMENT | Facility: CLINIC | Age: 64
End: 2024-08-12
Payer: MEDICARE

## 2024-08-13 ENCOUNTER — TRANSCRIBE ORDERS (OUTPATIENT)
Dept: OTHER | Age: 64
End: 2024-08-13

## 2024-08-13 ENCOUNTER — PRE VISIT (OUTPATIENT)
Dept: CARDIOLOGY | Facility: CLINIC | Age: 64
End: 2024-08-13
Payer: MEDICARE

## 2024-08-13 DIAGNOSIS — I27.20 SEVERE PULMONARY HYPERTENSION (H): Primary | ICD-10-CM

## 2024-08-15 NOTE — TELEPHONE ENCOUNTER
RECORDS RECEIVED FROM:    DATE RECEIVED:    GENERAL RECORDS STATUS DETAILS   OFFICE NOTE from cardiologists Internal 5-22-24 [place]   EKG (STRIPS & REPORTS) Received 8-12-24   ECHOS (IMAGES AND REPORTS) Received 10-3-22   PULMONARY HYPERTENSION      6 MINUTE WALK TEST N/A    PULMONARY FUNCTION TESTS In process 3-6-24  6-20-23   RIGHT HEART CATH (IMAGES) Received 10-31-23   SLEEP STUDY / OVERNIGHT OXIMETRY N/A    XR CHEST   (IMAGES AND REPORTS) Received 9-28-23   CHEST CT  (IMAGES AND REPORTS) Received 12-20-23   V/Q SCAN (IMAGES) N/A    LIVER US  (IMAGES AND REPORTS) N/A    ANGIOGRAMS (IMAGES) N/A    STRESS TEST   (IMAGES AND REPORTS) N/A      [facility] Records and Imaging Requested:   Action Taken PFT Full Report  ECG/EKG Strips 3-6-24 and 6-20-23 8-12-24     CT Chest Images  Cardiac Cath Images  XR Chest Images  ECHO Images 12-20-23  10-31-23 and 10-3-22  9-28-23  10-3-22      9-27 Faxed 2nd Request for PFT Reports- Report details within [facility].

## 2024-10-04 ENCOUNTER — TELEPHONE (OUTPATIENT)
Dept: CARDIOLOGY | Facility: CLINIC | Age: 64
End: 2024-10-04
Payer: MEDICARE

## 2024-10-04 NOTE — TELEPHONE ENCOUNTER
Patient confirmed scheduled appointment:  Date: 10/25/24  Time: 2pm   Visit type: NEW PH   Provider: MICHELLE  Location: [facility]   Testing/imaginMWT  Additional notes: N/A

## 2024-10-25 ENCOUNTER — OFFICE VISIT (OUTPATIENT)
Dept: PULMONOLOGY | Facility: CLINIC | Age: 64
End: 2024-10-25
Attending: INTERNAL MEDICINE
Payer: MEDICARE

## 2024-10-25 ENCOUNTER — OFFICE VISIT (OUTPATIENT)
Dept: CARDIOLOGY | Facility: CLINIC | Age: 64
End: 2024-10-25
Attending: INTERNAL MEDICINE
Payer: MEDICARE

## 2024-10-25 VITALS
HEART RATE: 69 BPM | WEIGHT: 133.3 LBS | BODY MASS INDEX: 23.61 KG/M2 | SYSTOLIC BLOOD PRESSURE: 129 MMHG | OXYGEN SATURATION: 98 % | DIASTOLIC BLOOD PRESSURE: 83 MMHG

## 2024-10-25 DIAGNOSIS — K76.6 PAH (PULMONARY ARTERIAL HYPERTENSION) WITH PORTAL HYPERTENSION (H): Primary | Chronic | ICD-10-CM

## 2024-10-25 DIAGNOSIS — R06.02 SOB (SHORTNESS OF BREATH): ICD-10-CM

## 2024-10-25 DIAGNOSIS — R06.02 SOB (SHORTNESS OF BREATH): Primary | ICD-10-CM

## 2024-10-25 DIAGNOSIS — I27.21 PAH (PULMONARY ARTERIAL HYPERTENSION) WITH PORTAL HYPERTENSION (H): Primary | Chronic | ICD-10-CM

## 2024-10-25 DIAGNOSIS — I27.20 SEVERE PULMONARY HYPERTENSION (H): ICD-10-CM

## 2024-10-25 DIAGNOSIS — K76.6 PAH (PULMONARY ARTERIAL HYPERTENSION) WITH PORTAL HYPERTENSION (H): Chronic | ICD-10-CM

## 2024-10-25 DIAGNOSIS — I27.21 PAH (PULMONARY ARTERIAL HYPERTENSION) WITH PORTAL HYPERTENSION (H): Chronic | ICD-10-CM

## 2024-10-25 PROBLEM — G47.34 NOCTURNAL HYPOXEMIA: Status: ACTIVE | Noted: 2021-04-29

## 2024-10-25 LAB
6 MIN WALK (FT): 1058 FT
6 MIN WALK (M): 322 M

## 2024-10-25 PROCEDURE — 94618 PULMONARY STRESS TESTING: CPT | Performed by: INTERNAL MEDICINE

## 2024-10-25 PROCEDURE — 99207 PR NO CHARGE LOS: CPT

## 2024-10-25 PROCEDURE — 99205 OFFICE O/P NEW HI 60 MIN: CPT | Performed by: INTERNAL MEDICINE

## 2024-10-25 PROCEDURE — G0463 HOSPITAL OUTPT CLINIC VISIT: HCPCS | Performed by: INTERNAL MEDICINE

## 2024-10-25 RX ORDER — ARIPIPRAZOLE 15 MG/1
1 TABLET ORAL AT BEDTIME
COMMUNITY
Start: 2023-05-15

## 2024-10-25 RX ORDER — POTASSIUM CHLORIDE 750 MG/1
10 TABLET, EXTENDED RELEASE ORAL DAILY
COMMUNITY
Start: 2024-05-10

## 2024-10-25 RX ORDER — TREPROSTINIL 64 UG/1
64 INHALANT ORAL
COMMUNITY
Start: 2024-10-25

## 2024-10-25 RX ORDER — CHOLECALCIFEROL (VITAMIN D3) 50 MCG
2000 TABLET ORAL
COMMUNITY
Start: 2024-05-10

## 2024-10-25 RX ORDER — BUDESONIDE AND FORMOTEROL FUMARATE DIHYDRATE 160; 4.5 UG/1; UG/1
2 AEROSOL RESPIRATORY (INHALATION)
COMMUNITY
Start: 2024-01-02

## 2024-10-25 RX ORDER — TRAZODONE HYDROCHLORIDE 100 MG/1
1 TABLET ORAL AT BEDTIME
COMMUNITY
Start: 2024-06-05

## 2024-10-25 ASSESSMENT — PAIN SCALES - GENERAL: PAINLEVEL_OUTOF10: MILD PAIN (2)

## 2024-10-25 NOTE — PATIENT INSTRUCTIONS
You were seen today in the Pulmonary Hypertension Clinic at the [facility].     Cardiology Provider you saw during your visit:    Dr. Angel    Medication Changes:  Increase Tyvaso Nebulizer to 12 breaths - we will work on your Tyvaso DPI 64mcg 4 times daily.  We will start the enrollment and prior authorization for Sotatercept (Winrevair)      Follow-up:   3 month follow-up with Dr Angel with 6 minute walk test and labs prior    Please call us immediately if you have any syncope (fainting or passing out), chest pain, edema (swelling or weight gain), or decline in your functional status (general decline in how you are feeling).    If you have emergent concerns after hours or on the weekend, please call our on-call Cardiologist at [phone], option [phone]. For emergencies call [phone].     Thank you for allowing us to be a part of your care here at the [facility]    If you have questions or concerns please contact us at:    Keyanna Mccray RN (P: [phone])    Nurse Coordinator       Pulmonary Hypertension     [facility]         JENNIFER Quinn   (Prior Auths & Pt Assistance)   ()  Clinic   Clinic   Pulmonary Hypertension   Pulmonary Hypertension  [facility]  [facility]  (P)[phone]    (P) [phone]  (F) [phone]

## 2024-10-25 NOTE — LETTER
10/25/2024      RE: Judith Nelson  [address]  [place] [address]       Dear Colleague,    Thank you for the opportunity to participate in the care of your patient, Judith Nelson, at the [facility] [place] at [facility]. Please see a copy of my visit note below.    Service Date: 2024    RE:  Judith Nelson   MRN:  [id number]  :  1960      Dear Dr. Doe:    We had the pleasure of seeing Judith Nelson at the [facility]. Although you are familiar with this patient's history, please allow me to summarize it for the purpose of our records.     She is a very pleasant 64-year-old female with past medical history significant for pulmonary arterial hypertension out of proportion to sarcoidosis.  She has been on triple combination therapy with tadalafil, macitentan, and inhaled treprostinil 9 breaths 4 times a day.  She used to follow with my colleague Dr. Milliganker the [place].  She relocated to [place].  In light of this she was following with Dr. Liu Ruiz locally.    She was reportedly doing well up until this past spring when a routine 6-minute walk test showed a decrease in exercise capacity.  She was also noted to have an elevated NT proBNP.  In light of this she had a right heart catheterization which showed increase in PA pressure but stable PVR and normal cardiac output.  Her biventricular filling pressures are normal.  Of note her echocardiogram at this time showed mild right ventricular dilatation with normal right ventricular function.  She was referred to us in light of these findings to determine whether she needs further escalation of her pulmonary vasodilator therapy especially parenteral therapies.    She subjectively does not feel any significant change in her symptoms in the last 1 year.  Her both daughters endorse this.  She is functional class II.   She lives independently.  She can do her activities of daily living.  She can walk up to a block or 2 without any limitations.  She has not had any lower extremity swelling or abdominal distention.  No exertional chest pain or chest pressure.  No exertional presyncope or syncope.  No palpitations.  No recent hospitalizations or ER visits.  She is very compliant with her pulmonary vasodilator therapy.      PAST MEDICAL HISTORY:  1.  Pulmonary arterial hypertension  2.  Sarcoidosis  3.  Anxiety and major depression  4.  Chronic kidney disease  5.  Hyperprolactinemia  6.  MGUS  7.  Lumbar compression fracture  8.  History of stress-induced cardiomyopathy    PAST SURGICAL HISTORY:  Past Surgical History:   Procedure Laterality Date     Csection,  X 2       CV RIGHT HEART CATH MEASUREMENTS RECORDED N/A 2019     CV RIGHT HEART CATH MEASUREMENTS RECORDED N/A 2020     CYSTOSCOPY, RETROGRADES, INSERT STENT URETER(S), COMBINED  10/2/2013     ENDOSCOPIC RETROGRADE CHOLANGIOPANCREATOGRAM  2012     LAPAROSCOPIC CHOLECYSTECTOMY WITH CHOLANGIOGRAMS  2012     ORTHOPEDIC SURGERY  10/1/2010     Z GASTROSCOPY,FL  6/10     [facility] PELVIS/HIP JOINT SURGERY UNLISTED         CURRENT MEDICATIONS:  Current Outpatient Medications   Medication Sig Dispense Refill     acetaminophen (TYLENOL) 500 MG tablet Take 1-2 tablets (500-1,000 mg) by mouth every 6 hours as needed for pain (Take as needed for pain.  Do not exceed 4 grams (8 tablets) in a day) (Patient taking differently: Take 1,000 mg by mouth every 6 hours as needed for pain (Take as needed for pain.  Do not exceed 4 grams (8 tablets) in a day).) 45 tablet 10     ARIPiprazole (ABILIFY) 15 MG tablet Take 1 tablet by mouth at bedtime.       budesonide-formoterol (SYMBICORT) 160-4.5 MCG/ACT Inhaler Inhale 2 puffs into the lungs two times daily.       BUSPIRONE HCL PO Take 30 mg by mouth 2 times daily.       calcitRIOL (ROCALTROL) 0.25 MCG capsule Take 0.25 mcg by mouth  twice a week. 30 capsule 1     Calcium Carbonate-Vit D-Min (CALCIUM 1200 PO)        carvedilol (COREG) 3.125 MG tablet Take 1 tablet (3.125 mg) by mouth 2 times daily (with meals) 180 tablet 2     FLUoxetine (PROZAC) 20 MG capsule Take 1 capsule (20 mg) by mouth daily (Patient taking differently: Take 40 mg by mouth daily.) 30 capsule 1     furosemide (LASIX) 20 MG tablet Take 1 tablet by mouth on Monday, Wednesday & Friday's (Patient taking differently: No sig reported) 45 tablet 3     lamoTRIgine (LAMICTAL) 25 MG tablet Take 100 mg by mouth daily        loperamide (IMODIUM) 2 MG capsule Take 2 mg by mouth 4 times daily as needed for diarrhea       LORazepam (ATIVAN) 0.5 MG tablet Take 2 tablets (1 mg) by mouth 2 times daily (Patient taking differently: Take 0.5 mg by mouth every evening.) 90 tablet 1     macitentan (OPSUMIT) 10 MG tablet Take 1 tablet (10 mg) by mouth daily 30 tablet 11     omeprazole (PRILOSEC) 20 MG DR capsule TAKE 1 CAPSULE BY MOUTH DAILY 28 capsule 3     order for DME Oxygen       ORDER FOR DME Equipment being ordered: SHOWER CHAIR  FROM Key Ring 1 Device 0     potassium chloride narinder ER (KLOR-CON M10) 10 MEQ CR tablet Take 10 mEq by mouth daily.       simvastatin (ZOCOR) 10 MG tablet Take 1 tablet by mouth At Bedtime. 90 tablet 1     sotatercept-csrk (WINREVAIR) 45 MG subcutaneous injection Inject 0.8 mLs (40 mg) subcutaneously every 21 days. Starting Dose  0     sotatercept-csrk (WINREVAIR) 45 MG subcutaneous injection Inject 0.4 mLs (20 mg) subcutaneously once. Starting Dose       tadalafil, PAH, (ADCIRCA) 20 MG TABS Take 2 tablets (40 mg) by mouth daily 60 tablet 2     traZODone (DESYREL) 100 MG tablet Take 1 tablet by mouth at bedtime.       Treprostinil (TYVASO DPI MAINTENANCE KIT) 64 MCG inhaler Inhale 1 Inhalation (64 mcg) into the lungs. Inhale 64mcg into the lungs four times daily.       treprostinil (TYVASO) 0.6 MG/ML SOLN neb solution Inhale 12 Breaths into the lungs every 4  hours (while awake for Tyvaso). 3.6 mL      Vitamin D3 50 mcg (2000 units) tablet Take 2,000 Units by mouth.       No current facility-administered medications for this visit.       ROS:   10 point ROS negative except as discussed in above HPI.    SOCIAL HISTORY:  She is single now.  She is .  She has 2 grown daughters who live close by.  She does not smoke, drink or use any recreational drugs.  She is currently not working.    FAMILY HISTORY:  Family History   Problem Relation Age of Onset     Cancer Mother          age 62 leukemia     Gastrointestinal Disease Mother         diverticulitis     Hypertension Mother      Allergies Mother      Arthritis Mother      Depression Mother      Eye Disorder Mother      Osteoporosis Mother      Anxiety Disorder Mother      Mental Illness Mother      Asthma Mother      Other Cancer Mother      Migraines Mother      C.A.D. Father         MI/CAD      Cancer Father         skin     Blood Disease Father         renal  problem     Hypertension Father      Anesthesia Reaction Father      Cardiovascular Father      Neurologic Disorder Father         Parkinson's disease     Anxiety Disorder Father      Other Cancer Father      Hyperlipidemia Father      Coronary Artery Disease Father      C.A.D. Maternal Grandmother          late 82s MI     Diabetes Maternal Grandmother      Osteoporosis Maternal Grandmother      C.A.D. Maternal Grandfather          mid 80s MI     Alzheimer Disease Paternal Grandmother          80s     Alzheimer Disease Paternal Grandfather          80s     Neurologic Disorder Sister         migraines     Allergies Sister      Diabetes Other         AODM     Neurologic Disorder Sister         migraines     Allergies Sister      Anesthesia Reaction Son      Anesthesia Reaction Daughter      Anesthesia Reaction Daughter      Diabetes Sister         hypoglycemia     Anxiety Disorder Son      Anxiety Disorder Sister      Substance Abuse Sister       Asthma Sister      Asthma Daughter      Depression Son      Hypertension Sister      Hyperlipidemia Sister      Migraines Sister        EXAM:  /83 (BP Location: Right arm, Patient Position: Chair, Cuff Size: Adult Regular)   Pulse 69   Wt 60.5 kg (133 lb 4.8 oz)   SpO2 98%   BMI 23.61 kg/m    Awake, alert, and oriented x 3.   Comfortable. No apparent distress.  No pallor, cyanosis, or clubbing  Carotids 2+ bilateral and pulse was regular in rhythm.  No JVD  Heart: regular, normal S1/S2, no murmur, gallop, rub  Lungs: NVBS and clear to auscultation bilaterally, no rales or wheezing  Abdomen: soft, non-tender, bowel sounds present, no hepatosplenomegaly  Extremities: no edema  Neurological: No gross focal neurological deficit    Labs:  Recent Results (from the past week)   6 minute walk test    Collection Time: 10/25/24 12:00 AM   Result Value Ref Range    6 min walk (FT) 1,058 1,331 ft    6 Min Walk (M) 322 406 m   General PFT Lab (Please always keep checked)    Collection Time: 10/25/24  2:11 PM   Result Value Ref Range    FIO2-Pre 21.00 %       Echocardiogram (05/2024):  I personally reviewed the echocardiographic images from [facility].  I agree with these findings.      * The estimated ejection fraction is 60-65%.     * Left ventricular segmental wall motion is normal.     * The right ventricle is mildly enlarged.     * Normal right ventricular systolic function.     * Severe pulmonary hypertension, estimated pulmonary arterial systolic   pressure is  85 mmHg.     * There is trace circumferential pericardial effusion visualized.     * Prior study from 10/3/2022. In comparison there is no significant   change.     RHC:  Her right heart catheterization in October 2022 showed an RA pressure of 5, PA pressure of 85/26 with a mean of 45, pulmonary capillary wedge pressure of 12, PA saturation of 68%, PVR of 8.7 Wood units.  Her cardiac output was 3.8 with an index of 2.3.    Her right heart  catheterization in October 2023 showed an RA pressure of 3 RV of 77/6, PA of 79/23 with a mean of 44, pulmonary capital wedge pressure of 8, PA saturation of 69%, cardiac output of 3.3, cardiac index of 2.1, and calculated PVR of 11 Wood units.    Her repeat right heart catheterization this past August showed an RA pressure of 4 PA of 90/25 with a mean of 48, pulmonary capital wedge pressure of 11, PA saturation 64%, cardiac output of 4.7 with an index of 2.96, and a calculated PVR of 8 Wood units.      PFT        Latest Ref Rng & Units 8/19/2020    10:30 AM   PFT   FVC L 2.52    FEV1 L 1.77    FVC% % 81    FEV1% % 72          6MWT (October 2024):  She walked 322 m.  She did not desaturate on room air.    Assessment and Plan:     In summary, Judith Nelson is a 64-year-old female with pulmonary arterial hypertension out of proportion to sarcoidosis who is currently on triple combination therapy returns today for follow-up.    Impression  1.  Pulmonary arterial hypertension out of proportion to sarcoidosis  2.  Low intermediate risk with a reveal score of 7  3.  No evidence of decompensated right heart failure    On careful review of her hemodynamic data, there has been no significant change in her pulmonary vascular disease.  Her PVR is hovering anywhere around 8 Wood units.  In fact her PVR was 11 units last year and now its 8 units.  Consistent with this her right ventricular function is normal on echocardiogram.  Her cardiac output is preserved.  Her right-sided filling pressures are normal.    If we include the mild pericardial effusion on her echocardiogram, her reveal score is 7 which would put her at low intermediate risk.  Given this, I discussed with her the benefits and risks of escalating her pulmonary vasodilator therapy.  I do not think she is sick enough to go on a parenteral prostacyclin therapy at this time and point.  She is only on 9 breaths of inhaled treprostinil which could be maximized to 12  breaths 4 times a day.  I also discussed with her the recent safety and efficacy data on sotatercept which is shown to significantly lower PA pressure with improvement in 6-minute walk distance.  She understands the risk and benefits including telangiectasia, low platelets, and bleeding and willing to try this    Thus in addition to tadalafil 40 mg and macitentan 10 mg daily, will increase her inhaled treprostinil to 12 breaths 4 times a day.  Will also start her on sotatercept.  She is interested in switching from inhaled treprostinil nebulizer to dry powder inhaler for convenience which I think is reasonable.  Will continue on the same dose of diuretics.  She is not needing supplemental oxygen.  She is on a low-dose carvedilol from a open label clinical trial.  She has tolerated this all disease.  Her RV function is also reminded stable on this.  Thus we will continue her on this.    I have recommended her to return to see us in 3 months with repeat 6-minute walk test with labs and echocardiogram.  She will call us in the interim with any further worsening symptoms.    It was a pleasure seeing Judith Nelson at the [facility]. Please contact us with any questions or concerns that you may have. We thank you for involving us in this patients care.    Total time today was 65 minutes reviewing notes, imaging, labs, patient visit, orders and documentation     Sincerely,      Jeanne Angel MD  Professor of Medicine  [facility]  Heart Failure, Transplant, and Mechanical Circulatory Support Cardiology   Cardiovascular Division  [facility]   [phone]          Please do not hesitate to contact me if you have any questions/concerns.     Sincerely,     Jeanne Angel MD

## 2024-10-25 NOTE — LETTER
10/25/2024      RE: Judith Nelson  [address]  [place] [address]       Service Date: 2024    RE:  Judith Nelson   MRN:  [id number]  :  1960      Dear Dr. Doe:    We had the pleasure of seeing Judith Nelson at the [facility]. Although you are familiar with this patient's history, please allow me to summarize it for the purpose of our records.     She is a very pleasant 64-year-old female with past medical history significant for pulmonary arterial hypertension out of proportion to sarcoidosis.  She has been on triple combination therapy with tadalafil, macitentan, and inhaled treprostinil 9 breaths 4 times a day.  She used to follow with my colleague Dr. [place] [facility] [place].  She relocated to [place].  In light of this she was following with Dr. Liu Ruiz locally.    She was reportedly doing well up until this past spring when a routine 6-minute walk test showed a decrease in exercise capacity.  She was also noted to have an elevated NT proBNP.  In light of this she had a right heart catheterization which showed increase in PA pressure but stable PVR and normal cardiac output.  Her biventricular filling pressures are normal.  Of note her echocardiogram at this time showed mild right ventricular dilatation with normal right ventricular function.  She was referred to us in light of these findings to determine whether she needs further escalation of her pulmonary vasodilator therapy especially parenteral therapies.    She subjectively does not feel any significant change in her symptoms in the last 1 year.  Her both daughters endorse this.  She is functional class II.  She lives independently.  She can do her activities of daily living.  She can walk up to a block or 2 without any limitations.  She has not had any lower extremity swelling or abdominal distention.  No exertional chest pain or chest pressure.  No exertional  presyncope or syncope.  No palpitations.  No recent hospitalizations or ER visits.  She is very compliant with her pulmonary vasodilator therapy.      PAST MEDICAL HISTORY:  1.  Pulmonary arterial hypertension  2.  Sarcoidosis  3.  Anxiety and major depression  4.  Chronic kidney disease  5.  Hyperprolactinemia  6.  MGUS  7.  Lumbar compression fracture  8.  History of stress-induced cardiomyopathy    PAST SURGICAL HISTORY:  Past Surgical History:   Procedure Laterality Date     Csection,  X 2       CV RIGHT HEART CATH MEASUREMENTS RECORDED N/A 2019     CV RIGHT HEART CATH MEASUREMENTS RECORDED N/A 2020     CYSTOSCOPY, RETROGRADES, INSERT STENT URETER(S), COMBINED  10/2/2013     ENDOSCOPIC RETROGRADE CHOLANGIOPANCREATOGRAM  2012     LAPAROSCOPIC CHOLECYSTECTOMY WITH CHOLANGIOGRAMS  2012     ORTHOPEDIC SURGERY  10/1/2010      GASTROSCOPY,FL  6/10     [facility] PELVIS/HIP JOINT SURGERY UNLISTED         CURRENT MEDICATIONS:  Current Outpatient Medications   Medication Sig Dispense Refill     acetaminophen (TYLENOL) 500 MG tablet Take 1-2 tablets (500-1,000 mg) by mouth every 6 hours as needed for pain (Take as needed for pain.  Do not exceed 4 grams (8 tablets) in a day) (Patient taking differently: Take 1,000 mg by mouth every 6 hours as needed for pain (Take as needed for pain.  Do not exceed 4 grams (8 tablets) in a day).) 45 tablet 10     ARIPiprazole (ABILIFY) 15 MG tablet Take 1 tablet by mouth at bedtime.       budesonide-formoterol (SYMBICORT) 160-4.5 MCG/ACT Inhaler Inhale 2 puffs into the lungs two times daily.       BUSPIRONE HCL PO Take 30 mg by mouth 2 times daily.       calcitRIOL (ROCALTROL) 0.25 MCG capsule Take 0.25 mcg by mouth twice a week. 30 capsule 1     Calcium Carbonate-Vit D-Min (CALCIUM 1200 PO)        carvedilol (COREG) 3.125 MG tablet Take 1 tablet (3.125 mg) by mouth 2 times daily (with meals) 180 tablet 2     FLUoxetine (PROZAC) 20 MG capsule Take 1 capsule (20 mg) by  mouth daily (Patient taking differently: Take 40 mg by mouth daily.) 30 capsule 1     furosemide (LASIX) 20 MG tablet Take 1 tablet by mouth on Monday, Wednesday & Friday's (Patient taking differently: No sig reported) 45 tablet 3     lamoTRIgine (LAMICTAL) 25 MG tablet Take 100 mg by mouth daily        loperamide (IMODIUM) 2 MG capsule Take 2 mg by mouth 4 times daily as needed for diarrhea       LORazepam (ATIVAN) 0.5 MG tablet Take 2 tablets (1 mg) by mouth 2 times daily (Patient taking differently: Take 0.5 mg by mouth every evening.) 90 tablet 1     macitentan (OPSUMIT) 10 MG tablet Take 1 tablet (10 mg) by mouth daily 30 tablet 11     omeprazole (PRILOSEC) 20 MG DR capsule TAKE 1 CAPSULE BY MOUTH DAILY 28 capsule 3     order for DME Oxygen       ORDER FOR DME Equipment being ordered: SHOWER CHAIR  FROM [employer] 1 Device 0     potassium chloride narinder ER (KLOR-CON M10) 10 MEQ CR tablet Take 10 mEq by mouth daily.       simvastatin (ZOCOR) 10 MG tablet Take 1 tablet by mouth At Bedtime. 90 tablet 1     sotatercept-csrk (WINREVAIR) 45 MG subcutaneous injection Inject 0.8 mLs (40 mg) subcutaneously every 21 days. Starting Dose  0     sotatercept-csrk (WINREVAIR) 45 MG subcutaneous injection Inject 0.4 mLs (20 mg) subcutaneously once. Starting Dose       tadalafil, PAH, (ADCIRCA) 20 MG TABS Take 2 tablets (40 mg) by mouth daily 60 tablet 2     traZODone (DESYREL) 100 MG tablet Take 1 tablet by mouth at bedtime.       Treprostinil (TYVASO DPI MAINTENANCE KIT) 64 MCG inhaler Inhale 1 Inhalation (64 mcg) into the lungs. Inhale 64mcg into the lungs four times daily.       treprostinil (TYVASO) 0.6 MG/ML SOLN neb solution Inhale 12 Breaths into the lungs every 4 hours (while awake for Tyvaso). 3.6 mL      Vitamin D3 50 mcg (2000 units) tablet Take 2,000 Units by mouth.       No current facility-administered medications for this visit.       ROS:   10 point ROS negative except as discussed in above HPI.    SOCIAL  HISTORY:  She is single now.  She is .  She has 2 grown daughters who live close by.  She does not smoke, drink or use any recreational drugs.  She is currently not working.    FAMILY HISTORY:  Family History   Problem Relation Age of Onset     Cancer Mother          age 62 leukemia     Gastrointestinal Disease Mother         diverticulitis     Hypertension Mother      Allergies Mother      Arthritis Mother      Depression Mother      Eye Disorder Mother      Osteoporosis Mother      Anxiety Disorder Mother      Mental Illness Mother      Asthma Mother      Other Cancer Mother      Migraines Mother      C.A.D. Father         MI/CAD      Cancer Father         skin     Blood Disease Father         renal  problem     Hypertension Father      Anesthesia Reaction Father      Cardiovascular Father      Neurologic Disorder Father         Parkinson's disease     Anxiety Disorder Father      Other Cancer Father      Hyperlipidemia Father      Coronary Artery Disease Father      C.A.D. Maternal Grandmother          late 82s MI     Diabetes Maternal Grandmother      Osteoporosis Maternal Grandmother      C.A.D. Maternal Grandfather          mid 80s MI     Alzheimer Disease Paternal Grandmother          80s     Alzheimer Disease Paternal Grandfather          80s     Neurologic Disorder Sister         migraines     Allergies Sister      Diabetes Other         AODM     Neurologic Disorder Sister         migraines     Allergies Sister      Anesthesia Reaction Son      Anesthesia Reaction Daughter      Anesthesia Reaction Daughter      Diabetes Sister         hypoglycemia     Anxiety Disorder Son      Anxiety Disorder Sister      Substance Abuse Sister      Asthma Sister      Asthma Daughter      Depression Son      Hypertension Sister      Hyperlipidemia Sister      Migraines Sister        EXAM:  /83 (BP Location: Right arm, Patient Position: Chair, Cuff Size: Adult Regular)   Pulse 69   Wt 60.5  kg (133 lb 4.8 oz)   SpO2 98%   BMI 23.61 kg/m    Awake, alert, and oriented x 3.   Comfortable. No apparent distress.  No pallor, cyanosis, or clubbing  Carotids 2+ bilateral and pulse was regular in rhythm.  No JVD  Heart: regular, normal S1/S2, no murmur, gallop, rub  Lungs: NVBS and clear to auscultation bilaterally, no rales or wheezing  Abdomen: soft, non-tender, bowel sounds present, no hepatosplenomegaly  Extremities: no edema  Neurological: No gross focal neurological deficit    Labs:  Recent Results (from the past week)   6 minute walk test    Collection Time: 10/25/24 12:00 AM   Result Value Ref Range    6 min walk (FT) 1,058 1,331 ft    6 Min Walk (M) 322 406 m   General PFT Lab (Please always keep checked)    Collection Time: 10/25/24  2:11 PM   Result Value Ref Range    FIO2-Pre 21.00 %       Echocardiogram (05/2024):  I personally reviewed the echocardiographic images from [facility].  I agree with these findings.      * The estimated ejection fraction is 60-65%.     * Left ventricular segmental wall motion is normal.     * The right ventricle is mildly enlarged.     * Normal right ventricular systolic function.     * Severe pulmonary hypertension, estimated pulmonary arterial systolic   pressure is  85 mmHg.     * There is trace circumferential pericardial effusion visualized.     * Prior study from 10/3/2022. In comparison there is no significant   change.     RHC:  Her right heart catheterization in October 2022 showed an RA pressure of 5, PA pressure of 85/26 with a mean of 45, pulmonary capillary wedge pressure of 12, PA saturation of 68%, PVR of 8.7 Wood units.  Her cardiac output was 3.8 with an index of 2.3.    Her right heart catheterization in October 2023 showed an RA pressure of 3 RV of 77/6, PA of 79/23 with a mean of 44, pulmonary capital wedge pressure of 8, PA saturation of 69%, cardiac output of 3.3, cardiac index of 2.1, and calculated PVR of 11 Wood units.    Her repeat  right heart catheterization this past August showed an RA pressure of 4 PA of 90/25 with a mean of 48, pulmonary capital wedge pressure of 11, PA saturation 64%, cardiac output of 4.7 with an index of 2.96, and a calculated PVR of 8 Wood units.      PFT        Latest Ref Rng & Units 8/19/2020    10:30 AM   PFT   FVC L 2.52    FEV1 L 1.77    FVC% % 81    FEV1% % 72          6MWT (October 2024):  She walked 322 m.  She did not desaturate on room air.    Assessment and Plan:     In summary, Judith Nelson is a 64-year-old female with pulmonary arterial hypertension out of proportion to sarcoidosis who is currently on triple combination therapy returns today for follow-up.    Impression  1.  Pulmonary arterial hypertension out of proportion to sarcoidosis  2.  Low intermediate risk with a reveal score of 7  3.  No evidence of decompensated right heart failure    On careful review of her hemodynamic data, there has been no significant change in her pulmonary vascular disease.  Her PVR is hovering anywhere around 8 Wood units.  In fact her PVR was 11 units last year and now its 8 units.  Consistent with this her right ventricular function is normal on echocardiogram.  Her cardiac output is preserved.  Her right-sided filling pressures are normal.    If we include the mild pericardial effusion on her echocardiogram, her reveal score is 7 which would put her at low intermediate risk.  Given this, I discussed with her the benefits and risks of escalating her pulmonary vasodilator therapy.  I do not think she is sick enough to go on a parenteral prostacyclin therapy at this time and point.  She is only on 9 breaths of inhaled treprostinil which could be maximized to 12 breaths 4 times a day.  I also discussed with her the recent safety and efficacy data on sotatercept which is shown to significantly lower PA pressure with improvement in 6-minute walk distance.  She understands the risk and benefits including  telangiectasia, low platelets, and bleeding and willing to try this    Thus in addition to tadalafil 40 mg and macitentan 10 mg daily, will increase her inhaled treprostinil to 12 breaths 4 times a day.  Will also start her on sotatercept.  She is interested in switching from inhaled treprostinil nebulizer to dry powder inhaler for convenience which I think is reasonable.  Will continue on the same dose of diuretics.  She is not needing supplemental oxygen.  She is on a low-dose carvedilol from a open label clinical trial.  She has tolerated this all disease.  Her RV function is also reminded stable on this.  Thus we will continue her on this.    I have recommended her to return to see us in 3 months with repeat 6-minute walk test with labs and echocardiogram.  She will call us in the interim with any further worsening symptoms.    It was a pleasure seeing Judith MARY Nelson at the [facility]. Please contact us with any questions or concerns that you may have. We thank you for involving us in this patients care.    Total time today was 65 minutes reviewing notes, imaging, labs, patient visit, orders and documentation     Sincerely,      Jeanne Angel MD  Professor of Medicine  [facility]  Heart Failure, Transplant, and Mechanical Circulatory Support Cardiology   Cardiovascular Division  [facility]   [phone]          Jeanne Angel MD

## 2024-10-26 NOTE — PROGRESS NOTES
Service Date: 2024    RE:  Judith Nelson   MRN:  [id number]  :  1960      Dear Dr. Doe:    We had the pleasure of seeing Judith Nelson at the [facility]. Although you are familiar with this patient's history, please allow me to summarize it for the purpose of our records.     She is a very pleasant 64-year-old female with past medical history significant for pulmonary arterial hypertension out of proportion to sarcoidosis.  She has been on triple combination therapy with tadalafil, macitentan, and inhaled treprostinil 9 breaths 4 times a day.  She used to follow with my colleague Dr. Milliganker the [place].  She relocated to [place].  In light of this she was following with Dr. Liu Ruiz locally.    She was reportedly doing well up until this past spring when a routine 6-minute walk test showed a decrease in exercise capacity.  She was also noted to have an elevated NT proBNP.  In light of this she had a right heart catheterization which showed increase in PA pressure but stable PVR and normal cardiac output.  Her biventricular filling pressures are normal.  Of note her echocardiogram at this time showed mild right ventricular dilatation with normal right ventricular function.  She was referred to us in light of these findings to determine whether she needs further escalation of her pulmonary vasodilator therapy especially parenteral therapies.    She subjectively does not feel any significant change in her symptoms in the last 1 year.  Her both daughters endorse this.  She is functional class II.  She lives independently.  She can do her activities of daily living.  She can walk up to a block or 2 without any limitations.  She has not had any lower extremity swelling or abdominal distention.  No exertional chest pain or chest pressure.  No exertional presyncope or syncope.  No palpitations.  No recent hospitalizations or ER visits.  She  is very compliant with her pulmonary vasodilator therapy.      PAST MEDICAL HISTORY:  1.  Pulmonary arterial hypertension  2.  Sarcoidosis  3.  Anxiety and major depression  4.  Chronic kidney disease  5.  Hyperprolactinemia  6.  MGUS  7.  Lumbar compression fracture  8.  History of stress-induced cardiomyopathy    PAST SURGICAL HISTORY:  Past Surgical History:   Procedure Laterality Date    Csection,  X 2      CV RIGHT HEART CATH MEASUREMENTS RECORDED N/A 2019    CV RIGHT HEART CATH MEASUREMENTS RECORDED N/A 2020    CYSTOSCOPY, RETROGRADES, INSERT STENT URETER(S), COMBINED  10/2/2013    ENDOSCOPIC RETROGRADE CHOLANGIOPANCREATOGRAM  2012    LAPAROSCOPIC CHOLECYSTECTOMY WITH CHOLANGIOGRAMS  2012    ORTHOPEDIC SURGERY  10/1/2010     GASTROSCOPY,FL  6/10    [facility] PELVIS/HIP JOINT SURGERY UNLISTED         CURRENT MEDICATIONS:  Current Outpatient Medications   Medication Sig Dispense Refill    acetaminophen (TYLENOL) 500 MG tablet Take 1-2 tablets (500-1,000 mg) by mouth every 6 hours as needed for pain (Take as needed for pain.  Do not exceed 4 grams (8 tablets) in a day) (Patient taking differently: Take 1,000 mg by mouth every 6 hours as needed for pain (Take as needed for pain.  Do not exceed 4 grams (8 tablets) in a day).) 45 tablet 10    ARIPiprazole (ABILIFY) 15 MG tablet Take 1 tablet by mouth at bedtime.      budesonide-formoterol (SYMBICORT) 160-4.5 MCG/ACT Inhaler Inhale 2 puffs into the lungs two times daily.      BUSPIRONE HCL PO Take 30 mg by mouth 2 times daily.      calcitRIOL (ROCALTROL) 0.25 MCG capsule Take 0.25 mcg by mouth twice a week. 30 capsule 1    Calcium Carbonate-Vit D-Min (CALCIUM 1200 PO)       carvedilol (COREG) 3.125 MG tablet Take 1 tablet (3.125 mg) by mouth 2 times daily (with meals) 180 tablet 2    FLUoxetine (PROZAC) 20 MG capsule Take 1 capsule (20 mg) by mouth daily (Patient taking differently: Take 40 mg by mouth daily.) 30 capsule 1    furosemide (LASIX) 20  MG tablet Take 1 tablet by mouth on Monday, Wednesday & Friday's (Patient taking differently: No sig reported) 45 tablet 3    lamoTRIgine (LAMICTAL) 25 MG tablet Take 100 mg by mouth daily       loperamide (IMODIUM) 2 MG capsule Take 2 mg by mouth 4 times daily as needed for diarrhea      LORazepam (ATIVAN) 0.5 MG tablet Take 2 tablets (1 mg) by mouth 2 times daily (Patient taking differently: Take 0.5 mg by mouth every evening.) 90 tablet 1    macitentan (OPSUMIT) 10 MG tablet Take 1 tablet (10 mg) by mouth daily 30 tablet 11    omeprazole (PRILOSEC) 20 MG DR capsule TAKE 1 CAPSULE BY MOUTH DAILY 28 capsule 3    order for DME Oxygen      ORDER FOR DME Equipment being ordered: SHOWER CHAIR  FROM [employer] 1 Device 0    potassium chloride narinder ER (KLOR-CON M10) 10 MEQ CR tablet Take 10 mEq by mouth daily.      simvastatin (ZOCOR) 10 MG tablet Take 1 tablet by mouth At Bedtime. 90 tablet 1    sotatercept-csrk (WINREVAIR) 45 MG subcutaneous injection Inject 0.8 mLs (40 mg) subcutaneously every 21 days. Starting Dose  0    sotatercept-csrk (WINREVAIR) 45 MG subcutaneous injection Inject 0.4 mLs (20 mg) subcutaneously once. Starting Dose      tadalafil, PAH, (ADCIRCA) 20 MG TABS Take 2 tablets (40 mg) by mouth daily 60 tablet 2    traZODone (DESYREL) 100 MG tablet Take 1 tablet by mouth at bedtime.      Treprostinil (TYVASO DPI MAINTENANCE KIT) 64 MCG inhaler Inhale 1 Inhalation (64 mcg) into the lungs. Inhale 64mcg into the lungs four times daily.      treprostinil (TYVASO) 0.6 MG/ML SOLN neb solution Inhale 12 Breaths into the lungs every 4 hours (while awake for Tyvaso). 3.6 mL     Vitamin D3 50 mcg (2000 units) tablet Take 2,000 Units by mouth.       No current facility-administered medications for this visit.       ROS:   10 point ROS negative except as discussed in above HPI.    SOCIAL HISTORY:  She is single now.  She is .  She has 2 grown daughters who live close by.  She does not smoke, drink or use  any recreational drugs.  She is currently not working.    FAMILY HISTORY:  Family History   Problem Relation Age of Onset    Cancer Mother          age 62 leukemia    Gastrointestinal Disease Mother         diverticulitis    Hypertension Mother     Allergies Mother     Arthritis Mother     Depression Mother     Eye Disorder Mother     Osteoporosis Mother     Anxiety Disorder Mother     Mental Illness Mother     Asthma Mother     Other Cancer Mother     Migraines Mother     C.A.D. Father         MI/CAD     Cancer Father         skin    Blood Disease Father         renal  problem    Hypertension Father     Anesthesia Reaction Father     Cardiovascular Father     Neurologic Disorder Father         Parkinson's disease    Anxiety Disorder Father     Other Cancer Father     Hyperlipidemia Father     Coronary Artery Disease Father     C.A.D. Maternal Grandmother          late 82s MI    Diabetes Maternal Grandmother     Osteoporosis Maternal Grandmother     C.A.D. Maternal Grandfather          mid 80s MI    Alzheimer Disease Paternal Grandmother          80s    Alzheimer Disease Paternal Grandfather          80s    Neurologic Disorder Sister         migraines    Allergies Sister     Diabetes Other         AODM    Neurologic Disorder Sister         migraines    Allergies Sister     Anesthesia Reaction Son     Anesthesia Reaction Daughter     Anesthesia Reaction Daughter     Diabetes Sister         hypoglycemia    Anxiety Disorder Son     Anxiety Disorder Sister     Substance Abuse Sister     Asthma Sister     Asthma Daughter     Depression Son     Hypertension Sister     Hyperlipidemia Sister     Migraines Sister        EXAM:  /83 (BP Location: Right arm, Patient Position: Chair, Cuff Size: Adult Regular)   Pulse 69   Wt 60.5 kg (133 lb 4.8 oz)   SpO2 98%   BMI 23.61 kg/m    Awake, alert, and oriented x 3.   Comfortable. No apparent distress.  No pallor, cyanosis, or clubbing  Carotids 2+  bilateral and pulse was regular in rhythm.  No JVD  Heart: regular, normal S1/S2, no murmur, gallop, rub  Lungs: NVBS and clear to auscultation bilaterally, no rales or wheezing  Abdomen: soft, non-tender, bowel sounds present, no hepatosplenomegaly  Extremities: no edema  Neurological: No gross focal neurological deficit    Labs:  Recent Results (from the past week)   6 minute walk test    Collection Time: 10/25/24 12:00 AM   Result Value Ref Range    6 min walk (FT) 1,058 1,331 ft    6 Min Walk (M) 322 406 m   General PFT Lab (Please always keep checked)    Collection Time: 10/25/24  2:11 PM   Result Value Ref Range    FIO2-Pre 21.00 %       Echocardiogram (05/2024):  I personally reviewed the echocardiographic images from [facility].  I agree with these findings.      * The estimated ejection fraction is 60-65%.     * Left ventricular segmental wall motion is normal.     * The right ventricle is mildly enlarged.     * Normal right ventricular systolic function.     * Severe pulmonary hypertension, estimated pulmonary arterial systolic   pressure is  85 mmHg.     * There is trace circumferential pericardial effusion visualized.     * Prior study from 10/3/2022. In comparison there is no significant   change.     RHC:  Her right heart catheterization in October 2022 showed an RA pressure of 5, PA pressure of 85/26 with a mean of 45, pulmonary capillary wedge pressure of 12, PA saturation of 68%, PVR of 8.7 Wood units.  Her cardiac output was 3.8 with an index of 2.3.    Her right heart catheterization in October 2023 showed an RA pressure of 3 RV of 77/6, PA of 79/23 with a mean of 44, pulmonary capital wedge pressure of 8, PA saturation of 69%, cardiac output of 3.3, cardiac index of 2.1, and calculated PVR of 11 Wood units.    Her repeat right heart catheterization this past August showed an RA pressure of 4 PA of 90/25 with a mean of 48, pulmonary capital wedge pressure of 11, PA saturation 64%, cardiac  output of 4.7 with an index of 2.96, and a calculated PVR of 8 Wood units.      PFT        Latest Ref Rng & Units 8/19/2020    10:30 AM   PFT   FVC L 2.52    FEV1 L 1.77    FVC% % 81    FEV1% % 72          6MWT (October 2024):  She walked 322 m.  She did not desaturate on room air.    Assessment and Plan:     In summary, Judith Nelson is a 64-year-old female with pulmonary arterial hypertension out of proportion to sarcoidosis who is currently on triple combination therapy returns today for follow-up.    Impression  1.  Pulmonary arterial hypertension out of proportion to sarcoidosis  2.  Low intermediate risk with a reveal score of 7  3.  No evidence of decompensated right heart failure    On careful review of her hemodynamic data, there has been no significant change in her pulmonary vascular disease.  Her PVR is hovering anywhere around 8 Wood units.  In fact her PVR was 11 units last year and now its 8 units.  Consistent with this her right ventricular function is normal on echocardiogram.  Her cardiac output is preserved.  Her right-sided filling pressures are normal.    If we include the mild pericardial effusion on her echocardiogram, her reveal score is 7 which would put her at low intermediate risk.  Given this, I discussed with her the benefits and risks of escalating her pulmonary vasodilator therapy.  I do not think she is sick enough to go on a parenteral prostacyclin therapy at this time and point.  She is only on 9 breaths of inhaled treprostinil which could be maximized to 12 breaths 4 times a day.  I also discussed with her the recent safety and efficacy data on sotatercept which is shown to significantly lower PA pressure with improvement in 6-minute walk distance.  She understands the risk and benefits including telangiectasia, low platelets, and bleeding and willing to try this    Thus in addition to tadalafil 40 mg and macitentan 10 mg daily, will increase her inhaled treprostinil to 12  breaths 4 times a day.  Will also start her on sotatercept.  She is interested in switching from inhaled treprostinil nebulizer to dry powder inhaler for convenience which I think is reasonable.  Will continue on the same dose of diuretics.  She is not needing supplemental oxygen.  She is on a low-dose carvedilol from a open label clinical trial.  She has tolerated this all disease.  Her RV function is also reminded stable on this.  Thus we will continue her on this.    I have recommended her to return to see us in 3 months with repeat 6-minute walk test with labs and echocardiogram.  She will call us in the interim with any further worsening symptoms.    It was a pleasure seeing Judith MARY Nelson at the [facility]. Please contact us with any questions or concerns that you may have. We thank you for involving us in this patients care.    Total time today was 65 minutes reviewing notes, imaging, labs, patient visit, orders and documentation     Sincerely,      Jeanne Angel MD  Professor of Medicine  [facility]  Heart Failure, Transplant, and Mechanical Circulatory Support Cardiology   Cardiovascular Division  [facility]   [phone]

## 2024-10-28 ENCOUNTER — TELEPHONE (OUTPATIENT)
Dept: CARDIOLOGY | Facility: CLINIC | Age: 64
End: 2024-10-28
Payer: MEDICARE

## 2024-10-28 LAB — FIO2-PRE: 21 %

## 2024-10-28 NOTE — TELEPHONE ENCOUNTER
10/28/2024  @ 1:38 PM -  Winrevair (sotatercept) 45 mg kit - new start -     -  Patient Assistance/copay assistance -   Called pt @  540 pm -      10/28/24 - Called pt - she has [employer] venecia ID: [id number], Card #:[id number], BIN: [id number] PCN: [id number], PCGrp: [id number] - bal as of today:  $9989.20 - lrr  EXPIRES:  1/22/25     - called pt back to watch her email for renewal instructions for 2025.  PVU.     VENECIA APPROVED - thru Dr Doe/St Bee  Medication: WINREVAIR 45 MG SC KIT  Amount: $ 10,000  [facility] [place] Name: [facility] Phone: [phone]  [facility] Fax: [phone]  Member ID: [id number]   CARD #:  [id number]  BIN: [id number]  PCN: [id number]  Group: [id number]  Foundation Effective Date: 1/23/2024  Foundation Expiration Date: 1/22/2025  Additional Information: *advised pt to watch email for renewal instructions from [facility].  Bal: 9989.20 as of today  Patient Notified: deisi FORRESTER CMA- Prior Auths  Cardiology/Pulmonary Hypertension

## 2024-10-28 NOTE — TELEPHONE ENCOUNTER
----- Message from Spencer AGUILAR sent at 10/25/2024  4:42 PM CDT -----  Regarding: NEW MED START - Sticky update  Jeanne Roman MD would like patient     to start on : Winrevair (sotatercept) -[: Preggers] -   >>>>Patient current weight:  60.5 kg on: 10/25/24      >>>>   0.4 ml = 1st dose  (weight x 0.3 mg/kg / 50 mg/ml)  -  : 1x45 mg vial kit    >>>>   0.8 ml = 2nd & subsequent doses  (weight x 0.7 mg/kg / 50 mg/ml)  : 1 x 45 mg vial kit         Dx Code:  I27.0 - Idiopathic/Heritable PAH  WHO Group :   1  FC:  III      Appointments needed :  follow-up 3 months with labs and walk prior        Jeanne Roman MD would like patient     to start on : Tyvaso DPI (trepostinil) - [: Prevoty]       Dx Code:  I27.0 - Idiopathic/Heritable PAH  WHO Group :   1  FC:  III      Appointments needed :  None - Pt transitioning from Tyvaso NEB to Tyvaso DPI.

## 2024-10-28 NOTE — TELEPHONE ENCOUNTER
10/28/2024  @ 1:37 PM -  Winrevair (sotatercept) 45 mg kit - new start -     -  **enrollment FAXED VIA RIGHT Fax @ 605 pm w/ consent, [employer] approval ltr, med list, facesheet and PA approval info (no ltr rcvd as of this note):       >>>>Patient current weight: 60.5 kg on: 10/25/24   >>>> 0.4 ml = 1st dose (weight x 0.3 mg/kg / 50 mg/ml) - : 1x45 mg vial kit   >>>> 0.8 ml = 2nd & subsequent doses (weight x 0.7 mg/kg / 50 mg/ml) : 1 x 45 mg vial kit                 Guillermina FORRESTER CMA- Prior Auths  Cardiology/Pulmonary Hypertension

## 2024-10-28 NOTE — TELEPHONE ENCOUNTER
10/28/2024  @ 5:22 PM -  Winrevair (sotatercept) 45 mg kit - new start - APPROVED - Approved today by [employer] 2017  The case has been Approved from [id number] to [id number]  Authorization Expiration Date: 10/27/2025    [No letter received with [facility] approval alert as of this note.]     Prior Authorization Approval  Medication: WINREVAIR 45 MG SC KIT  Authorization Effective Date: 7/30/2024  Authorization Expiration Date: 10/28/2025  Approved Dose/Quantity: 1 kit 21 days  Reference #: N/A   Insurance Company: [employer] - Phone [phone] Fax [phone]  Expected CoPay: $    CoPay Card Available:      Financial Assistance Needed: pt has [employer] gamaliel on file, expires: 1/22/2025.    Which Pharmacy is filling the prescription: [place], [place] - [address] [place] [address]  Pharmacy Notified: *  Patient Notified: y     Updated Snapshot, added to PA Calendar; ENROLLMENT to be faxed to : Rakesh.  Guillermina FORRESTER CMA- Prior Auths  Cardiology/Pulmonary Hypertension

## 2024-10-28 NOTE — TELEPHONE ENCOUNTER
10/28/2024  @ 1:39 PM -  Winrevair (sotatercept) 45 mg kit - new start     PA Initiation  Medication: Winrevair (sotatercept) 45 mg kit - new start   Insurance Company: [employer] - Phone [phone] Fax [phone]  Pharmacy Filling the Rx: [facility] - [place], [place] - [address]  Filling Pharmacy Phone:    Filling Pharmacy Fax:    Start Date: 10/28/2024  via [facility], key [id number], w/ RHC dated : 8/12/24, Elvin; Dx Code: I27.0 OOP to sarcoid.     Addtl Responses:   -Are there any other medications the patient will use in combination with the requested medication for treatment of this diagnosis?*    OPSUMIT 10 MG (30/30)-CLASS; ERA, TADALAFIL 20 MG (60/30)-CLASS:PDE5, TYVASO DPI 64 MCG QID (112/28)-CLASS:PROSTACYCLIN.      -Are there reasons for selecting the requested medication, dosing schedule, and quantity over alternatives (e.g., contraindications, allergies or history of adverse drug reactions to alternatives, lower dose tried)?*   Winrevair is a first-in-class activin signaling inhibitor which rebalances pulmonary vascular homeostasis toward growth inhibiting and proapoptotic signaling. Winrevair inhibits cell proliferation, promotes apoptosis, and alleviates inflammation in the vessel walls leading to reverse remodeling and restoration of vessel patency.  In the STELLAR trial the primary end point was the 6minute walk test with patients showing improved exercise capacity as assessed by 6minute walk an increase of  greater than 30 meters improved pulmonary hemodynamics and decrease in NT-proBNP levels. The risk of death or nonfatal clinical worsening events, assessed up to the end of the trial, was 84% lower with Winrevair than with placebo.         -  Are there medications the patient has tried and failed for treatment of this diagnosis?*  Uptravi Initiated: 6/17/2018 (Transitioned to Tyvaso on 9/10/2020);  Orenitram Initiated: 12/16/2015 (Transitioned 06/17/2018 to  Uptravi)  ----------------------------  Insurance[place] [employer] [place]  / [employer] [place] [place]  BIN: [id number]  PCN: [id number]  RX GRP#: [id number]  ID#: [id number]         Guillermina FORRESTER CMA- Prior Auths  Cardiology/Pulmonary Hypertension

## 2024-10-30 ENCOUNTER — TELEPHONE (OUTPATIENT)
Dept: CARDIOLOGY | Facility: CLINIC | Age: 64
End: 2024-10-30
Payer: MEDICARE

## 2024-10-30 NOTE — TELEPHONE ENCOUNTER
10/30/2024  @ 5:00 PM - Tyvaso DPI (16/32/48 mcg titr kit) - new start      - ** PAP/copay assistance -   [facility] - BAL: $9989.20 ASOF 11/1/24     VENECIA APPROVED - thru Dr Doe/St Bee  Medication: WINREVAIR 45 MG SC KIT  & AND CAN ALSO USE FOR TYVASO DPI    Amount: $ 10,000  [facility] [place] Name: [employer] Phone: [phone]  [facility] [place] Fax: [phone]  Member ID: [id number]   CARD #:  [id number]  BIN: [id number]  PCN: [id number]  Group: [id number]  Foundation Effective Date: 1/23/2024  Foundation Expiration Date: 1/22/2025  Additional Information: *advised pt to watch email for renewal instructions from [employer].  Bal: 9989.20 as of today  Patient Notified: edisi FORRESTER CMA- Prior Auths  Cardiology/Pulmonary Hypertension

## 2024-10-30 NOTE — TELEPHONE ENCOUNTER
10/30/2024  @ 5:00 PM -  Tyvaso DPI (16/32/48 mcg titr kit) - new start     RE: ANALI GÓMEZ - 1960 -  **URGENT - PLEASE EXPEDITE!!**  HELLO -  This pt is transitioning from Tyvaso Neb Solution tto Tyvaso DPI, See PA approval attached.  Ms. Gómez has a  [employer] gamaliel in place: exp: 1/22/2025.  Pt is due for a Tyvaso Neb delivery, but would like to switch to DPI ASAP.   Please coordinate with [facility] Nurse:  Keyanna Mccray, KIRBY.   Let me know if you need anything further,  Thanks, Guillermina FORRESTER CMA      - ** enrollment - 11/1/24 - FAXED TO [facility] @ 356 pm :             - ** consent - faxed 11/1/24 @ 356 pm             Guillermina FORRESTER CMA- Prior Auths  Cardiology/Pulmonary Hypertension

## 2024-10-30 NOTE — TELEPHONE ENCOUNTER
----- Message from Spencer AGUILAR sent at 10/25/2024  4:42 PM CDT -----  Regarding: NEW MED START - Sticky update  Jeanne Roman MD would like patient     to start on : Winrevair (sotatercept) -[: [employer]] -   >>>>Patient current weight:  60.5 kg on: 10/25/24      >>>>   0.4 ml = 1st dose  (weight x 0.3 mg/kg / 50 mg/ml)  -  : 1x45 mg vial kit    >>>>   0.8 ml = 2nd & subsequent doses  (weight x 0.7 mg/kg / 50 mg/ml)  : 1 x 45 mg vial kit         Dx Code:  I27.0 - Idiopathic/Heritable PAH  WHO Group :   1  FC:  III      Appointments needed :  follow-up 3 months with labs and walk prior        Jeanne Roman MD would like patient     to start on : Tyvaso DPI (trepostinil) - [: Fon]       Dx Code:  I27.0 - Idiopathic/Heritable PAH  WHO Group :   1  FC:  III      Appointments needed :  None - Pt transitioning from Tyvaso NEB to Tyvaso DPI.

## 2024-10-30 NOTE — TELEPHONE ENCOUNTER
10/30/2024  @ 5:01 PM -  Tyvaso DPI (16/32/48 mcg titr kit) - new start     PA Initiation  Medication: Tyvaso DPI (16/32/48 mcg titr kit) - new start   Insurance Company: [employer] - Phone [phone] Fax [phone]  Pharmacy Filling the Rx: [facility] - [place], [place] - [address]  Filling Pharmacy Phone:    Filling Pharmacy Fax:    Start Date: 10/30/2024  via [facility], key [id number], w/ RHC [facility]; Dx Code: I27.0 OOP to sarcoid.     Addtl Responses:    PT HAS FAILED ORENITRAM (12/16/15 - 6/17/18), SWITCHED TO UPTRAVI DUE TO WORSENING PRESSURES.  PT STOPPED UPTRAVI 6/17/18 - 9/10/20) AND SWITCHED TO TYVASO NEB SOLUTION; PT IS REQUESTING TO SWITCH TO TYVASO DPI FOR EASE OF USE AND IMPROVED COMPLIANCE.          ----------------------------  [place]urance[place]  / [employer] [place]  BIN: [id number]  PCN: [id number]  RX GRP#: [id number]  ID#: [id number]        Guillermina FORRESTER CMA- Prior Auths  Cardiology/Pulmonary Hypertension

## 2024-11-01 NOTE — TELEPHONE ENCOUNTER
11/1/2024  @ 3:36 PM -  Tyvaso DPI (16/32/48 mcg titr kit) - APPROVED       Prior Authorization Approval  Medication: TYVASO DPI TITRATION KIT 16 & 32 & 48 MCG IN POWD  Authorization Effective Date: 8/3/2024  Authorization Expiration Date: 11/1/2025  Approved Dose/Quantity: 252/28  Reference #: [id number]   Insurance Company: [employer] - Phone [phone] Fax [phone]  Expected CoPay: $    CoPay Card Available:      Financial Assistance Needed: *Undetermined as of the date of this note   Which Pharmacy is filling the prescription: [facility] [place] - [address]  Pharmacy Notified: *  Patient Notified: y    Updated Snapshot, added to PA Calendar; faxed to : [employer]:  [phone]          Guillermina FORRESTER CMA- Prior Auths  Cardiology/Pulmonary Hypertension

## 2024-11-04 ENCOUNTER — MYC MEDICAL ADVICE (OUTPATIENT)
Dept: CARDIOLOGY | Facility: CLINIC | Age: 64
End: 2024-11-04
Payer: MEDICARE

## 2024-11-04 NOTE — CONFIDENTIAL NOTE
Faxed CBC standing orders to [facility]. Keyanna Mccray RN on 11/4/2024 at 4:38 PM    Re-sent labs fax to [facility]. Keyanna Mccray RN on 11/6/2024 at 10:15 AM    Received call from Bharathi in the [facility] lab who advised they did receive the fax, but need the MD signature.    Has order  signed & dated & re-faxed. Keyanna Mccray RN on 11/6/2024 at 10:22 AM

## 2024-11-11 NOTE — TELEPHONE ENCOUNTER
"11/11/2024  @ 1:46 PM -  Rcvd status report  from Jared LOUIS RN/[facility] Liaison on 11/11/2024 -  \"PATIENT HAS BEEN CLEARED FOR THERAPY. [facility]  COMPLETED. PENDING OK TO SHIP FROM [place] FOR FIRST DOSE. \"   Routed to  Nursing :  KIRBY Gama CMA- Prior Auths  Cardiology/Pulmonary Hypertension     "

## 2024-11-11 NOTE — TELEPHONE ENCOUNTER
"11/11/2024  @ 1:48 PM -  Rcvd status report  from Jared LOUIS RN/[facility] Liaison on 11/11/2024 -  \"PATIENT HAS BEEN CLEARED FOR THERAPY. PENDING [facility]  AND SHIPMENT.  LEFT 11-8-24 FOR PATIENT TO CALL BACK [facility]. \"   Guillermina FORRESTER CMA- Prior Auths  Cardiology/Pulmonary Hypertension     "

## 2024-12-11 ENCOUNTER — MYC MEDICAL ADVICE (OUTPATIENT)
Dept: CARDIOLOGY | Facility: CLINIC | Age: 64
End: 2024-12-11
Payer: MEDICARE

## 2025-02-07 ENCOUNTER — TELEPHONE (OUTPATIENT)
Dept: CARDIOLOGY | Facility: CLINIC | Age: 65
End: 2025-02-07
Payer: COMMERCIAL

## 2025-02-07 NOTE — TELEPHONE ENCOUNTER
[facility] Call Center    Phone Message    May a detailed message be left on voicemail: yes     Reason for Call: Other: patient is calling and would like to speak to the care team, please call patient to advise, thank you      Action Taken: Other: cardiology     Travel Screening: Not Applicable     Date of Service:

## 2025-02-17 ENCOUNTER — OFFICE VISIT (OUTPATIENT)
Dept: CARDIOLOGY | Facility: CLINIC | Age: 65
End: 2025-02-17
Attending: INTERNAL MEDICINE
Payer: COMMERCIAL

## 2025-02-17 ENCOUNTER — LAB (OUTPATIENT)
Dept: LAB | Facility: CLINIC | Age: 65
End: 2025-02-17
Attending: INTERNAL MEDICINE
Payer: COMMERCIAL

## 2025-02-17 VITALS
SYSTOLIC BLOOD PRESSURE: 118 MMHG | OXYGEN SATURATION: 93 % | DIASTOLIC BLOOD PRESSURE: 76 MMHG | BODY MASS INDEX: 22.85 KG/M2 | HEART RATE: 97 BPM | WEIGHT: 129 LBS

## 2025-02-17 DIAGNOSIS — I27.20 SEVERE PULMONARY HYPERTENSION (H): ICD-10-CM

## 2025-02-17 DIAGNOSIS — I27.21 PAH (PULMONARY ARTERIAL HYPERTENSION) WITH PORTAL HYPERTENSION (H): ICD-10-CM

## 2025-02-17 DIAGNOSIS — K76.6 PAH (PULMONARY ARTERIAL HYPERTENSION) WITH PORTAL HYPERTENSION (H): ICD-10-CM

## 2025-02-17 DIAGNOSIS — R06.02 SOB (SHORTNESS OF BREATH): ICD-10-CM

## 2025-02-17 LAB
6 MIN WALK (FT): 1245 FT
6 MIN WALK (M): 379 M
ALBUMIN SERPL BCG-MCNC: 4.3 G/DL (ref 3.5–5.2)
ALP SERPL-CCNC: 57 U/L (ref 40–150)
ALT SERPL W P-5'-P-CCNC: 7 U/L (ref 0–50)
ANION GAP SERPL CALCULATED.3IONS-SCNC: 9 MMOL/L (ref 7–15)
AST SERPL W P-5'-P-CCNC: 19 U/L (ref 0–45)
BILIRUB SERPL-MCNC: 0.3 MG/DL
BUN SERPL-MCNC: 10.9 MG/DL (ref 8–23)
CALCIUM SERPL-MCNC: 9.4 MG/DL (ref 8.8–10.4)
CHLORIDE SERPL-SCNC: 106 MMOL/L (ref 98–107)
CREAT SERPL-MCNC: 1.36 MG/DL (ref 0.51–0.95)
EGFRCR SERPLBLD CKD-EPI 2021: 44 ML/MIN/1.73M2
ERYTHROCYTE [DISTWIDTH] IN BLOOD BY AUTOMATED COUNT: 13.4 % (ref 10–15)
GLUCOSE SERPL-MCNC: 114 MG/DL (ref 70–99)
HCO3 SERPL-SCNC: 24 MMOL/L (ref 22–29)
HCT VFR BLD AUTO: 47.6 % (ref 35–47)
HGB BLD-MCNC: 15.6 G/DL (ref 11.7–15.7)
MCH RBC QN AUTO: 29 PG (ref 26.5–33)
MCHC RBC AUTO-ENTMCNC: 32.8 G/DL (ref 31.5–36.5)
MCV RBC AUTO: 89 FL (ref 78–100)
NT-PROBNP SERPL-MCNC: 235 PG/ML (ref 0–900)
PLATELET # BLD AUTO: 205 10E3/UL (ref 150–450)
POTASSIUM SERPL-SCNC: 4.1 MMOL/L (ref 3.4–5.3)
PROT SERPL-MCNC: 6.9 G/DL (ref 6.4–8.3)
RBC # BLD AUTO: 5.38 10E6/UL (ref 3.8–5.2)
SODIUM SERPL-SCNC: 139 MMOL/L (ref 135–145)
WBC # BLD AUTO: 8.2 10E3/UL (ref 4–11)

## 2025-02-17 PROCEDURE — G0463 HOSPITAL OUTPT CLINIC VISIT: HCPCS | Performed by: INTERNAL MEDICINE

## 2025-02-17 PROCEDURE — 83880 ASSAY OF NATRIURETIC PEPTIDE: CPT | Performed by: PATHOLOGY

## 2025-02-17 PROCEDURE — 94618 PULMONARY STRESS TESTING: CPT | Performed by: INTERNAL MEDICINE

## 2025-02-17 PROCEDURE — 85027 COMPLETE CBC AUTOMATED: CPT | Performed by: PATHOLOGY

## 2025-02-17 PROCEDURE — 80053 COMPREHEN METABOLIC PANEL: CPT | Performed by: PATHOLOGY

## 2025-02-17 PROCEDURE — 36415 COLL VENOUS BLD VENIPUNCTURE: CPT | Performed by: PATHOLOGY

## 2025-02-17 RX ORDER — LIDOCAINE 40 MG/G
CREAM TOPICAL
OUTPATIENT
Start: 2025-02-17

## 2025-02-17 ASSESSMENT — PAIN SCALES - GENERAL: PAINLEVEL_OUTOF10: NO PAIN (0)

## 2025-02-17 NOTE — NURSING NOTE
Chief Complaint   Patient presents with    Follow Up     RETURN PULMONARY HYPERTENSION       Vitals were taken, medications reconciled.    Andres Bee, DILLON    3:53 PM

## 2025-02-17 NOTE — NURSING NOTE
Follow Up Plan:  - RHC with Dr. Angel in March  - Follow up same day    Patient escorted to Pods to schedule F/U testing. Message sent to Lauryn to push images after 2/24 for echo. Latoya Felder RN on 2/17/2025 at 4:35 PM

## 2025-02-17 NOTE — PROGRESS NOTES
Service Date: 2025    RE:  Judith Nelson   MRN:  [id number]  :  1960      Dear Dr. Doe:    We had the pleasure of seeing Judith Nelson at the [facility]. Although you are familiar with this patient's history, please allow me to summarize it for the purpose of our records.     She is a very pleasant 64-year-old female with past medical history significant for pulmonary arterial hypertension out of proportion to sarcoidosis.  She has been on triple combination therapy with tadalafil, macitentan, and inhaled treprostinil 9 breaths 4 times a day.  She used to follow with my colleague Dr. Franco at the [place]. She relocated to [place] and, in light of this, she has been following with Dr. Liu Ruiz locally.    She was reportedly doing well up until this past spring 2024 when a routine 6-minute walk test showed a decrease in exercise capacity.  She was also noted to have an elevated NT proBNP.  She subsequently had a right heart catheterization which showed increase in PA pressure but stable PVR and normal cardiac output.  Her biventricular filling pressures were normal.  Of note her echocardiogram at this time showed mild right ventricular dilatation with normal right ventricular function.  She was referred to us in light of these findings to determine whether she needs further escalation of her pulmonary vasodilator therapy especially parenteral therapies.    She subjectively does not feel any significant change in her symptoms in the last 1 year***.  She is functional class II.  She lives independently.  She can do her activities of daily living.  She can walk up to a block or 2 without any limitations.  She has not had any lower extremity swelling or abdominal distention.  No exertional chest pain or chest pressure.  No exertional presyncope or syncope.  No palpitations.  No recent hospitalizations or ER visits.  She is very  compliant with her pulmonary vasodilator therapy.      PAST MEDICAL HISTORY:  1.  Pulmonary arterial hypertension  2.  Sarcoidosis  3.  Anxiety and major depression  4.  Chronic kidney disease  5.  Hyperprolactinemia  6.  MGUS  7.  Lumbar compression fracture  8.  History of stress-induced cardiomyopathy    PAST SURGICAL HISTORY:  Past Surgical History:   Procedure Laterality Date    Csection,  X 2      CV RIGHT HEART CATH MEASUREMENTS RECORDED N/A 2019    CV RIGHT HEART CATH MEASUREMENTS RECORDED N/A 2020    CYSTOSCOPY, RETROGRADES, INSERT STENT URETER(S), COMBINED  10/2/2013    ENDOSCOPIC RETROGRADE CHOLANGIOPANCREATOGRAM  2012    LAPAROSCOPIC CHOLECYSTECTOMY WITH CHOLANGIOGRAMS  2012    ORTHOPEDIC SURGERY  10/1/2010     GASTROSCOPY,FL  6/10    [facility] PELVIS/HIP JOINT SURGERY UNLISTED         CURRENT MEDICATIONS:  Current Outpatient Medications   Medication Sig Dispense Refill    acetaminophen (TYLENOL) 500 MG tablet Take 1-2 tablets (500-1,000 mg) by mouth every 6 hours as needed for pain (Take as needed for pain.  Do not exceed 4 grams (8 tablets) in a day) (Patient taking differently: Take 1,000 mg by mouth every 6 hours as needed for pain (Take as needed for pain.  Do not exceed 4 grams (8 tablets) in a day).) 45 tablet 10    ARIPiprazole (ABILIFY) 15 MG tablet Take 1 tablet by mouth at bedtime.      budesonide-formoterol (SYMBICORT) 160-4.5 MCG/ACT Inhaler Inhale 2 puffs into the lungs two times daily.      BUSPIRONE HCL PO Take 30 mg by mouth 2 times daily.      calcitRIOL (ROCALTROL) 0.25 MCG capsule Take 0.25 mcg by mouth twice a week. 30 capsule 1    Calcium Carbonate-Vit D-Min (CALCIUM 1200 PO)       carvedilol (COREG) 3.125 MG tablet Take 1 tablet (3.125 mg) by mouth 2 times daily (with meals) 180 tablet 2    FLUoxetine (PROZAC) 20 MG capsule Take 1 capsule (20 mg) by mouth daily (Patient taking differently: Take 40 mg by mouth daily.) 30 capsule 1    furosemide (LASIX) 20 MG  tablet Take 1 tablet by mouth on Monday, Wednesday & Friday's (Patient taking differently: No sig reported) 45 tablet 3    lamoTRIgine (LAMICTAL) 25 MG tablet Take 100 mg by mouth daily       loperamide (IMODIUM) 2 MG capsule Take 2 mg by mouth 4 times daily as needed for diarrhea      LORazepam (ATIVAN) 0.5 MG tablet Take 2 tablets (1 mg) by mouth 2 times daily (Patient taking differently: Take 0.5 mg by mouth every evening.) 90 tablet 1    macitentan (OPSUMIT) 10 MG tablet Take 1 tablet (10 mg) by mouth daily 30 tablet 11    omeprazole (PRILOSEC) 20 MG DR capsule TAKE 1 CAPSULE BY MOUTH DAILY 28 capsule 3    order for DME Oxygen      ORDER FOR DME Equipment being ordered: SHOWER CHAIR  FROM [employer] 1 Device 0    potassium chloride narinder ER (KLOR-CON M10) 10 MEQ CR tablet Take 10 mEq by mouth daily.      simvastatin (ZOCOR) 10 MG tablet Take 1 tablet by mouth At Bedtime. 90 tablet 1    sotatercept-csrk (WINREVAIR) 45 MG subcutaneous injection Inject 0.8 mLs (40 mg) subcutaneously every 21 days. Starting Dose      sotatercept-csrk (WINREVAIR) 45 MG subcutaneous injection Inject 0.4 mLs (20 mg) subcutaneously once. Starting Dose      tadalafil, PAH, (ADCIRCA) 20 MG TABS Take 2 tablets (40 mg) by mouth daily 60 tablet 2    traZODone (DESYREL) 100 MG tablet Take 1 tablet by mouth at bedtime.      Treprostinil (TYVASO DPI MAINTENANCE KIT) 64 MCG inhaler Inhale 1 Inhalation (64 mcg) into the lungs. Inhale 64mcg into the lungs four times daily.      treprostinil (TYVASO) 0.6 MG/ML SOLN neb solution Inhale 12 Breaths into the lungs every 4 hours (while awake for Tyvaso). 3.6 mL     Vitamin D3 50 mcg (2000 units) tablet Take 2,000 Units by mouth.       No current facility-administered medications for this visit.       ROS:   10 point ROS negative except as discussed in above HPI.    SOCIAL HISTORY:  She is single now.  She is .  She has 2 grown daughters who live close by.  She does not smoke, drink or use any  recreational drugs.  She is currently not working.    FAMILY HISTORY:  Family History   Problem Relation Age of Onset    Cancer Mother          age 62 leukemia    Gastrointestinal Disease Mother         diverticulitis    Hypertension Mother     Allergies Mother     Arthritis Mother     Depression Mother     Eye Disorder Mother     Osteoporosis Mother     Anxiety Disorder Mother     Mental Illness Mother     Asthma Mother     Other Cancer Mother     Migraines Mother     C.A.D. Father         MI/CAD     Cancer Father         skin    Blood Disease Father         renal  problem    Hypertension Father     Anesthesia Reaction Father     Cardiovascular Father     Neurologic Disorder Father         Parkinson's disease    Anxiety Disorder Father     Other Cancer Father     Hyperlipidemia Father     Coronary Artery Disease Father     C.A.D. Maternal Grandmother          late 82s MI    Diabetes Maternal Grandmother     Osteoporosis Maternal Grandmother     C.A.D. Maternal Grandfather          mid 80s MI    Alzheimer Disease Paternal Grandmother          80s    Alzheimer Disease Paternal Grandfather          80s    Neurologic Disorder Sister         migraines    Allergies Sister     Diabetes Other         AODM    Neurologic Disorder Sister         migraines    Allergies Sister     Anesthesia Reaction Son     Anesthesia Reaction Daughter     Anesthesia Reaction Daughter     Diabetes Sister         hypoglycemia    Anxiety Disorder Son     Anxiety Disorder Sister     Substance Abuse Sister     Asthma Sister     Asthma Daughter     Depression Son     Hypertension Sister     Hyperlipidemia Sister     Migraines Sister        EXAM:  There were no vitals taken for this visit.  Awake, alert, and oriented x 3.   Comfortable. No apparent distress.  No pallor, cyanosis, or clubbing  Carotids 2+ bilateral and pulse was regular in rhythm.  No JVD  Heart: regular, normal S1/S2, no murmur, gallop, rub  Lungs: NVBS and clear  to auscultation bilaterally, no rales or wheezing  Abdomen: soft, non-tender, bowel sounds present, no hepatosplenomegaly  Extremities: no edema  Neurological: No gross focal neurological deficit    Labs:  No results found for this or any previous visit (from the past week).      Echocardiogram (05/2024):  I personally reviewed the echocardiographic images from [facility].  I agree with these findings.      * The estimated ejection fraction is 60-65%.     * Left ventricular segmental wall motion is normal.     * The right ventricle is mildly enlarged.     * Normal right ventricular systolic function.     * Severe pulmonary hypertension, estimated pulmonary arterial systolic   pressure is  85 mmHg.     * There is trace circumferential pericardial effusion visualized.     * Prior study from 10/3/2022. In comparison there is no significant   change.     RHC:  Her right heart catheterization in October 2022 showed an RA pressure of 5, PA pressure of 85/26 with a mean of 45, pulmonary capillary wedge pressure of 12, PA saturation of 68%, PVR of 8.7 Wood units.  Her cardiac output was 3.8 with an index of 2.3.    Her right heart catheterization in October 2023 showed an RA pressure of 3 RV of 77/6, PA of 79/23 with a mean of 44, pulmonary capital wedge pressure of 8, PA saturation of 69%, cardiac output of 3.3, cardiac index of 2.1, and calculated PVR of 11 Wood units.    Her repeat right heart catheterization in August 2024 showed an RA pressure of 4 PA of 90/25 with a mean of 48, pulmonary capital wedge pressure of 11, PA saturation 64%, cardiac output of 4.7 with an index of 2.96, and a calculated PVR of 8 Wood units.      PFT        Latest Ref Rng & Units 8/19/2020    10:30 AM   PFT   FVC L 2.52    FEV1 L 1.77    FVC% % 81    FEV1% % 72          6MWT (October 2024):  She walked 322 m. She did not desaturate on room air.    Assessment and Plan:     In summary, Judith Nelson is a 64-year-old female with  pulmonary arterial hypertension out of proportion to sarcoidosis who is currently on triple combination therapy returns today for follow-up.    Impression  1.  Pulmonary arterial hypertension out of proportion to sarcoidosis  2.  Low intermediate risk with a reveal score of 7***  3.  No evidence of decompensated right heart failure    On careful review of her hemodynamic data, there has been no significant change in her pulmonary vascular disease.  Her PVR is stable around 8 Wood units. Consistent with this, her right ventricular function remains normal on echocardiogram, her cardiac output is preserved, and her right-sided filling pressures are normal.    At her last visit in October, we discussed how she was low intermediate risk by Reveal score. We discussed options for escalating therapies including parenteral prostacyclins and sotatercept. At that time, she decided to pursue sotatercept therapy.     Thus her current regimen includes tadalafil 40 mg daily, macitentan 10 mg daily, inhaled treprostinil 12 breaths 4 times a day, and now sotatercept injections. Will continue on the same dose of diuretics.  She is not needing supplemental oxygen.  She is on a low-dose carvedilol from a open label clinical trial.  She has tolerated this all disease.  Her RV function is also reminded stable on this.  Thus we will continue her on this.    I have recommended her to return to see us in 3 months with repeat 6-minute walk test with labs and echocardiogram***.  She will call us in the interim with any further worsening symptoms.    It was a pleasure seeing Judith MARY Nelson at the [facility]. Please contact us with any questions or concerns that you may have. We thank you for involving us in this patients care.    This patient was seen and discussed with Dr Jeanne Angel, attending cardiologist, who agrees with the assessment and plan unless otherwise indicated.    Octavio JEFFERY  MD [facility], PGY-6  Fellow, Cardiovascular Disease

## 2025-02-17 NOTE — Clinical Note
2025      RE: Judith Nelson  [address]  [place] [address]       Dear Colleague,    Thank you for the opportunity to participate in the care of your patient, Judith Nelson, at the [facility] [place] at [facility]. Please see a copy of my visit note below.    Service Date: 2025    RE:  Judith Nelson   MRN:  [id number]  :  1960      Dear Dr. Doe:    We had the pleasure of seeing Judith Nelson at the [facility]. Although you are familiar with this patient's history, please allow me to summarize it for the purpose of our records.     She is a very pleasant 64-year-old female with past medical history significant for pulmonary arterial hypertension out of proportion to sarcoidosis.  She has been on triple combination therapy with tadalafil, macitentan, and inhaled treprostinil 9 breaths 4 times a day.  She used to follow with my colleague Dr. Franco at the [place]. She relocated to [place] and, in light of this, she has been following with Dr. Liu Ruiz locally.    She was reportedly doing well up until this past spring 2024 when a routine 6-minute walk test showed a decrease in exercise capacity.  She was also noted to have an elevated NT proBNP.  She subsequently had a right heart catheterization which showed increase in PA pressure but stable PVR and normal cardiac output.  Her biventricular filling pressures were normal.  Of note her echocardiogram at this time showed mild right ventricular dilatation with normal right ventricular function.  She was referred to us in light of these findings to determine whether she needs further escalation of her pulmonary vasodilator therapy especially parenteral therapies.    She subjectively does not feel any significant change in her symptoms in the last 1 year***.  She is functional class II.  She lives  independently.  She can do her activities of daily living.  She can walk up to a block or 2 without any limitations.  She has not had any lower extremity swelling or abdominal distention.  No exertional chest pain or chest pressure.  No exertional presyncope or syncope.  No palpitations.  No recent hospitalizations or ER visits.  She is very compliant with her pulmonary vasodilator therapy.      PAST MEDICAL HISTORY:  1.  Pulmonary arterial hypertension  2.  Sarcoidosis  3.  Anxiety and major depression  4.  Chronic kidney disease  5.  Hyperprolactinemia  6.  MGUS  7.  Lumbar compression fracture  8.  History of stress-induced cardiomyopathy    PAST SURGICAL HISTORY:  Past Surgical History:   Procedure Laterality Date    Csection,  X 2      CV RIGHT HEART CATH MEASUREMENTS RECORDED N/A 2019    CV RIGHT HEART CATH MEASUREMENTS RECORDED N/A 2020    CYSTOSCOPY, RETROGRADES, INSERT STENT URETER(S), COMBINED  10/2/2013    ENDOSCOPIC RETROGRADE CHOLANGIOPANCREATOGRAM  2012    LAPAROSCOPIC CHOLECYSTECTOMY WITH CHOLANGIOGRAMS  2012    ORTHOPEDIC SURGERY  10/1/2010    Z GASTROSCOPY,FL  6/10    [facility] PELVIS/HIP JOINT SURGERY UNLISTED         CURRENT MEDICATIONS:  Current Outpatient Medications   Medication Sig Dispense Refill    acetaminophen (TYLENOL) 500 MG tablet Take 1-2 tablets (500-1,000 mg) by mouth every 6 hours as needed for pain (Take as needed for pain.  Do not exceed 4 grams (8 tablets) in a day) (Patient taking differently: Take 1,000 mg by mouth every 6 hours as needed for pain (Take as needed for pain.  Do not exceed 4 grams (8 tablets) in a day).) 45 tablet 10    ARIPiprazole (ABILIFY) 15 MG tablet Take 1 tablet by mouth at bedtime.      budesonide-formoterol (SYMBICORT) 160-4.5 MCG/ACT Inhaler Inhale 2 puffs into the lungs two times daily.      BUSPIRONE HCL PO Take 30 mg by mouth 2 times daily.      calcitRIOL (ROCALTROL) 0.25 MCG capsule Take 0.25 mcg by mouth twice a week. 30 capsule  1    Calcium Carbonate-Vit D-Min (CALCIUM 1200 PO)       carvedilol (COREG) 3.125 MG tablet Take 1 tablet (3.125 mg) by mouth 2 times daily (with meals) 180 tablet 2    FLUoxetine (PROZAC) 20 MG capsule Take 1 capsule (20 mg) by mouth daily (Patient taking differently: Take 40 mg by mouth daily.) 30 capsule 1    furosemide (LASIX) 20 MG tablet Take 1 tablet by mouth on Monday, Wednesday & Friday's (Patient taking differently: No sig reported) 45 tablet 3    lamoTRIgine (LAMICTAL) 25 MG tablet Take 100 mg by mouth daily       loperamide (IMODIUM) 2 MG capsule Take 2 mg by mouth 4 times daily as needed for diarrhea      LORazepam (ATIVAN) 0.5 MG tablet Take 2 tablets (1 mg) by mouth 2 times daily (Patient taking differently: Take 0.5 mg by mouth every evening.) 90 tablet 1    macitentan (OPSUMIT) 10 MG tablet Take 1 tablet (10 mg) by mouth daily 30 tablet 11    omeprazole (PRILOSEC) 20 MG DR capsule TAKE 1 CAPSULE BY MOUTH DAILY 28 capsule 3    order for DME Oxygen      ORDER FOR DME Equipment being ordered: SHOWER CHAIR  FROM [employer] 1 Device 0    potassium chloride narinder ER (KLOR-CON M10) 10 MEQ CR tablet Take 10 mEq by mouth daily.      simvastatin (ZOCOR) 10 MG tablet Take 1 tablet by mouth At Bedtime. 90 tablet 1    sotatercept-csrk (WINREVAIR) 45 MG subcutaneous injection Inject 0.8 mLs (40 mg) subcutaneously every 21 days. Starting Dose      sotatercept-csrk (WINREVAIR) 45 MG subcutaneous injection Inject 0.4 mLs (20 mg) subcutaneously once. Starting Dose      tadalafil, PAH, (ADCIRCA) 20 MG TABS Take 2 tablets (40 mg) by mouth daily 60 tablet 2    traZODone (DESYREL) 100 MG tablet Take 1 tablet by mouth at bedtime.      Treprostinil (TYVASO DPI MAINTENANCE KIT) 64 MCG inhaler Inhale 1 Inhalation (64 mcg) into the lungs. Inhale 64mcg into the lungs four times daily.      treprostinil (TYVASO) 0.6 MG/ML SOLN neb solution Inhale 12 Breaths into the lungs every 4 hours (while awake for Tyvaso). 3.6 mL      Vitamin D3 50 mcg (2000 units) tablet Take 2,000 Units by mouth.       No current facility-administered medications for this visit.       ROS:   10 point ROS negative except as discussed in above HPI.    SOCIAL HISTORY:  She is single now.  She is .  She has 2 grown daughters who live close by.  She does not smoke, drink or use any recreational drugs.  She is currently not working.    FAMILY HISTORY:  Family History   Problem Relation Age of Onset    Cancer Mother          age 62 leukemia    Gastrointestinal Disease Mother         diverticulitis    Hypertension Mother     Allergies Mother     Arthritis Mother     Depression Mother     Eye Disorder Mother     Osteoporosis Mother     Anxiety Disorder Mother     Mental Illness Mother     Asthma Mother     Other Cancer Mother     Migraines Mother     C.A.D. Father         MI/CAD     Cancer Father         skin    Blood Disease Father         renal  problem    Hypertension Father     Anesthesia Reaction Father     Cardiovascular Father     Neurologic Disorder Father         Parkinson's disease    Anxiety Disorder Father     Other Cancer Father     Hyperlipidemia Father     Coronary Artery Disease Father     C.A.D. Maternal Grandmother          late 82s MI    Diabetes Maternal Grandmother     Osteoporosis Maternal Grandmother     C.A.D. Maternal Grandfather          mid 80s MI    Alzheimer Disease Paternal Grandmother          80s    Alzheimer Disease Paternal Grandfather          80s    Neurologic Disorder Sister         migraines    Allergies Sister     Diabetes Other         AODM    Neurologic Disorder Sister         migraines    Allergies Sister     Anesthesia Reaction Son     Anesthesia Reaction Daughter     Anesthesia Reaction Daughter     Diabetes Sister         hypoglycemia    Anxiety Disorder Son     Anxiety Disorder Sister     Substance Abuse Sister     Asthma Sister     Asthma Daughter     Depression Son     Hypertension Sister      Hyperlipidemia Sister     Migraines Sister        EXAM:  There were no vitals taken for this visit.  Awake, alert, and oriented x 3.   Comfortable. No apparent distress.  No pallor, cyanosis, or clubbing  Carotids 2+ bilateral and pulse was regular in rhythm.  No JVD  Heart: regular, normal S1/S2, no murmur, gallop, rub  Lungs: NVBS and clear to auscultation bilaterally, no rales or wheezing  Abdomen: soft, non-tender, bowel sounds present, no hepatosplenomegaly  Extremities: no edema  Neurological: No gross focal neurological deficit    Labs:  No results found for this or any previous visit (from the past week).      Echocardiogram (05/2024):  I personally reviewed the echocardiographic images from [facility].  I agree with these findings.      * The estimated ejection fraction is 60-65%.     * Left ventricular segmental wall motion is normal.     * The right ventricle is mildly enlarged.     * Normal right ventricular systolic function.     * Severe pulmonary hypertension, estimated pulmonary arterial systolic   pressure is  85 mmHg.     * There is trace circumferential pericardial effusion visualized.     * Prior study from 10/3/2022. In comparison there is no significant   change.     RHC:  Her right heart catheterization in October 2022 showed an RA pressure of 5, PA pressure of 85/26 with a mean of 45, pulmonary capillary wedge pressure of 12, PA saturation of 68%, PVR of 8.7 Wood units.  Her cardiac output was 3.8 with an index of 2.3.    Her right heart catheterization in October 2023 showed an RA pressure of 3 RV of 77/6, PA of 79/23 with a mean of 44, pulmonary capital wedge pressure of 8, PA saturation of 69%, cardiac output of 3.3, cardiac index of 2.1, and calculated PVR of 11 Wood units.    Her repeat right heart catheterization in August 2024 showed an RA pressure of 4 PA of 90/25 with a mean of 48, pulmonary capital wedge pressure of 11, PA saturation 64%, cardiac output of 4.7 with an index  of 2.96, and a calculated PVR of 8 Wood units.      PFT        Latest Ref Rng & Units 8/19/2020    10:30 AM   PFT   FVC L 2.52    FEV1 L 1.77    FVC% % 81    FEV1% % 72          6MWT (October 2024):  She walked 322 m. She did not desaturate on room air.    Assessment and Plan:     In summary, Judith Nelson is a 64-year-old female with pulmonary arterial hypertension out of proportion to sarcoidosis who is currently on triple combination therapy returns today for follow-up.    Impression  1.  Pulmonary arterial hypertension out of proportion to sarcoidosis  2.  Low intermediate risk with a reveal score of 7***  3.  No evidence of decompensated right heart failure    On careful review of her hemodynamic data, there has been no significant change in her pulmonary vascular disease.  Her PVR is stable around 8 Wood units. Consistent with this, her right ventricular function remains normal on echocardiogram, her cardiac output is preserved, and her right-sided filling pressures are normal.    At her last visit in October, we discussed how she was low intermediate risk by Reveal score. We discussed options for escalating therapies including parenteral prostacyclins and sotatercept. At that time, she decided to pursue sotatercept therapy.     Thus her current regimen includes tadalafil 40 mg daily, macitentan 10 mg daily, inhaled treprostinil 12 breaths 4 times a day, and now sotatercept injections. Will continue on the same dose of diuretics.  She is not needing supplemental oxygen.  She is on a low-dose carvedilol from a open label clinical trial.  She has tolerated this all disease.  Her RV function is also reminded stable on this.  Thus we will continue her on this.    I have recommended her to return to see us in 3 months with repeat 6-minute walk test with labs and echocardiogram***.  She will call us in the interim with any further worsening symptoms.    It was a pleasure seeing Judith Nelson at the  [facility]. Please contact us with any questions or concerns that you may have. We thank you for involving us in this patients care.    This patient was seen and discussed with Dr Jeanne Angel, attending cardiologist, who agrees with the assessment and plan unless otherwise indicated.    Octavio Gatica MD [facility], PGY-6  Fellow, Cardiovascular Disease            Service Date: 2025    RE:  Judith Nelson   MRN:  [id number]  :  1960      Dear Dr. Doe:    We had the pleasure of seeing Judith Nelson at the [facility]pert[facility]. Although you are familiar with this patient's history, please allow me to summarize it for the purpose of our records.     She is a very pleasant 64-year-old female with past medical history significant for pulmonary arterial hypertension out of proportion to sarcoidosis.  She has been on triple combination therapy with tadalafil, macitentan, and inhaled treprostinil 9 breaths 4 times a day.  She used to follow with my colleague Dr. Milliganker the [place].  She relocated to [place].  In light of this she was following with Dr. Liu Ruiz locally.    She was reportedly doing well up until this past spring when a routine 6-minute walk test showed a decrease in exercise capacity.  She was also noted to have an elevated NT proBNP.  In light of this she had a right heart catheterization which showed increase in PA pressure but stable PVR and normal cardiac output.  Her biventricular filling pressures are normal.  Of note her echocardiogram at this time showed mild right ventricular dilatation with normal right ventricular function.  She was referred to us in light of these findings to determine whether she needs further escalation of her pulmonary vasodilator therapy especially parenteral therapies.    She subjectively does not feel any significant change in her symptoms in the last 1  year.  Her both daughters endorse this.  She is functional class II.  She lives independently.  She can do her activities of daily living.  She can walk up to a block or 2 without any limitations.  She has not had any lower extremity swelling or abdominal distention.  No exertional chest pain or chest pressure.  No exertional presyncope or syncope.  No palpitations.  No recent hospitalizations or ER visits.  She is very compliant with her pulmonary vasodilator therapy.      PAST MEDICAL HISTORY:  1.  Pulmonary arterial hypertension  2.  Sarcoidosis  3.  Anxiety and major depression  4.  Chronic kidney disease  5.  Hyperprolactinemia  6.  MGUS  7.  Lumbar compression fracture  8.  History of stress-induced cardiomyopathy    PAST SURGICAL HISTORY:  Past Surgical History:   Procedure Laterality Date     Csection,  X 2       CV RIGHT HEART CATH MEASUREMENTS RECORDED N/A 2019     CV RIGHT HEART CATH MEASUREMENTS RECORDED N/A 2020     CYSTOSCOPY, RETROGRADES, INSERT STENT URETER(S), COMBINED  10/2/2013     ENDOSCOPIC RETROGRADE CHOLANGIOPANCREATOGRAM  2012     LAPAROSCOPIC CHOLECYSTECTOMY WITH CHOLANGIOGRAMS  2012     ORTHOPEDIC SURGERY  10/1/2010      GASTROSCOPY,FL  6/10     [facility] PELVIS/HIP JOINT SURGERY UNLISTED         CURRENT MEDICATIONS:  Current Outpatient Medications   Medication Sig Dispense Refill     acetaminophen (TYLENOL) 500 MG tablet Take 1-2 tablets (500-1,000 mg) by mouth every 6 hours as needed for pain (Take as needed for pain.  Do not exceed 4 grams (8 tablets) in a day) (Patient taking differently: Take 1,000 mg by mouth every 6 hours as needed for pain (Take as needed for pain.  Do not exceed 4 grams (8 tablets) in a day).) 45 tablet 10     ARIPiprazole (ABILIFY) 15 MG tablet Take 1 tablet by mouth at bedtime.       budesonide-formoterol (SYMBICORT) 160-4.5 MCG/ACT Inhaler Inhale 2 puffs into the lungs two times daily.       BUSPIRONE HCL PO Take 30 mg by mouth 2 times daily.        calcitRIOL (ROCALTROL) 0.25 MCG capsule Take 0.25 mcg by mouth twice a week. 30 capsule 1     Calcium Carbonate-Vit D-Min (CALCIUM 1200 PO)        carvedilol (COREG) 3.125 MG tablet Take 1 tablet (3.125 mg) by mouth 2 times daily (with meals) 180 tablet 2     FLUoxetine (PROZAC) 20 MG capsule Take 1 capsule (20 mg) by mouth daily (Patient taking differently: Take 40 mg by mouth daily.) 30 capsule 1     furosemide (LASIX) 20 MG tablet Take 1 tablet by mouth on Monday, Wednesday & Friday's (Patient taking differently: No sig reported) 45 tablet 3     lamoTRIgine (LAMICTAL) 25 MG tablet Take 100 mg by mouth daily        loperamide (IMODIUM) 2 MG capsule Take 2 mg by mouth 4 times daily as needed for diarrhea       LORazepam (ATIVAN) 0.5 MG tablet Take 2 tablets (1 mg) by mouth 2 times daily (Patient taking differently: Take 0.5 mg by mouth every evening.) 90 tablet 1     macitentan (OPSUMIT) 10 MG tablet Take 1 tablet (10 mg) by mouth daily 30 tablet 11     omeprazole (PRILOSEC) 20 MG DR capsule TAKE 1 CAPSULE BY MOUTH DAILY 28 capsule 3     order for DME Oxygen       ORDER FOR DME Equipment being ordered: SHOWER CHAIR  FROM [employer] 1 Device 0     potassium chloride narinder ER (KLOR-CON M10) 10 MEQ CR tablet Take 10 mEq by mouth daily.       simvastatin (ZOCOR) 10 MG tablet Take 1 tablet by mouth At Bedtime. 90 tablet 1     sotatercept-csrk (WINREVAIR) 45 MG subcutaneous injection Inject 0.8 mLs (40 mg) subcutaneously every 21 days. Starting Dose       sotatercept-csrk (WINREVAIR) 45 MG subcutaneous injection Inject 0.4 mLs (20 mg) subcutaneously once. Starting Dose       tadalafil, PAH, (ADCIRCA) 20 MG TABS Take 2 tablets (40 mg) by mouth daily 60 tablet 2     traZODone (DESYREL) 100 MG tablet Take 1 tablet by mouth at bedtime.       Treprostinil (TYVASO DPI MAINTENANCE KIT) 64 MCG inhaler Inhale 1 Inhalation (64 mcg) into the lungs. Inhale 64mcg into the lungs four times daily.       treprostinil (TYVASO) 0.6  MG/ML SOLN neb solution Inhale 12 Breaths into the lungs every 4 hours (while awake for Tyvaso). 3.6 mL      Vitamin D3 50 mcg (2000 units) tablet Take 2,000 Units by mouth.       No current facility-administered medications for this visit.       ROS:   10 point ROS negative except as discussed in above HPI.    SOCIAL HISTORY:  She is single now.  She is .  She has 2 grown daughters who live close by.  She does not smoke, drink or use any recreational drugs.  She is currently not working.    FAMILY HISTORY:  Family History   Problem Relation Age of Onset     Cancer Mother          age 62 leukemia     Gastrointestinal Disease Mother         diverticulitis     Hypertension Mother      Allergies Mother      Arthritis Mother      Depression Mother      Eye Disorder Mother      Osteoporosis Mother      Anxiety Disorder Mother      Mental Illness Mother      Asthma Mother      Other Cancer Mother      Migraines Mother      C.A.D. Father         MI/CAD      Cancer Father         skin     Blood Disease Father         renal  problem     Hypertension Father      Anesthesia Reaction Father      Cardiovascular Father      Neurologic Disorder Father         Parkinson's disease     Anxiety Disorder Father      Other Cancer Father      Hyperlipidemia Father      Coronary Artery Disease Father      C.A.D. Maternal Grandmother          late 82s MI     Diabetes Maternal Grandmother      Osteoporosis Maternal Grandmother      C.A.D. Maternal Grandfather          mid 80s MI     Alzheimer Disease Paternal Grandmother          80s     Alzheimer Disease Paternal Grandfather          80s     Neurologic Disorder Sister         migraines     Allergies Sister      Diabetes Other         AODM     Neurologic Disorder Sister         migraines     Allergies Sister      Anesthesia Reaction Son      Anesthesia Reaction Daughter      Anesthesia Reaction Daughter      Diabetes Sister         hypoglycemia     Anxiety  Disorder Son      Anxiety Disorder Sister      Substance Abuse Sister      Asthma Sister      Asthma Daughter      Depression Son      Hypertension Sister      Hyperlipidemia Sister      Migraines Sister        EXAM:  There were no vitals taken for this visit.  Awake, alert, and oriented x 3.   Comfortable. No apparent distress.  No pallor, cyanosis, or clubbing  Carotids 2+ bilateral and pulse was regular in rhythm.  No JVD  Heart: regular, normal S1/S2, no murmur, gallop, rub  Lungs: NVBS and clear to auscultation bilaterally, no rales or wheezing  Abdomen: soft, non-tender, bowel sounds present, no hepatosplenomegaly  Extremities: no edema  Neurological: No gross focal neurological deficit    Labs:  Recent Results (from the past week)   Comprehensive metabolic panel    Collection Time: 02/17/25  2:37 PM   Result Value Ref Range    Sodium 139 135 - 145 mmol/L    Potassium 4.1 3.4 - 5.3 mmol/L    Carbon Dioxide (CO2) 24 22 - 29 mmol/L    Anion Gap 9 7 - 15 mmol/L    Urea Nitrogen 10.9 8.0 - 23.0 mg/dL    Creatinine 1.36 (H) 0.51 - 0.95 mg/dL    GFR Estimate 44 (L) >60 mL/min/1.73m2    Calcium 9.4 8.8 - 10.4 mg/dL    Chloride 106 98 - 107 mmol/L    Glucose 114 (H) 70 - 99 mg/dL    Alkaline Phosphatase 57 40 - 150 U/L    AST 19 0 - 45 U/L    ALT 7 0 - 50 U/L    Protein Total 6.9 6.4 - 8.3 g/dL    Albumin 4.3 3.5 - 5.2 g/dL    Bilirubin Total 0.3 <=1.2 mg/dL   CBC with platelets    Collection Time: 02/17/25  2:37 PM   Result Value Ref Range    WBC Count 8.2 4.0 - 11.0 10e3/uL    RBC Count 5.38 (H) 3.80 - 5.20 10e6/uL    Hemoglobin 15.6 11.7 - 15.7 g/dL    Hematocrit 47.6 (H) 35.0 - 47.0 %    MCV 89 78 - 100 fL    MCH 29.0 26.5 - 33.0 pg    MCHC 32.8 31.5 - 36.5 g/dL    RDW 13.4 10.0 - 15.0 %    Platelet Count 205 150 - 450 10e3/uL       Echocardiogram (05/2024):  I personally reviewed the echocardiographic images from [facility].  I agree with these findings.      * The estimated ejection fraction is 60-65%.      * Left ventricular segmental wall motion is normal.     * The right ventricle is mildly enlarged.     * Normal right ventricular systolic function.     * Severe pulmonary hypertension, estimated pulmonary arterial systolic   pressure is  85 mmHg.     * There is trace circumferential pericardial effusion visualized.     * Prior study from 10/3/2022. In comparison there is no significant   change.     RHC:  Her right heart catheterization in October 2022 showed an RA pressure of 5, PA pressure of 85/26 with a mean of 45, pulmonary capillary wedge pressure of 12, PA saturation of 68%, PVR of 8.7 Wood units.  Her cardiac output was 3.8 with an index of 2.3.    Her right heart catheterization in October 2023 showed an RA pressure of 3 RV of 77/6, PA of 79/23 with a mean of 44, pulmonary capital wedge pressure of 8, PA saturation of 69%, cardiac output of 3.3, cardiac index of 2.1, and calculated PVR of 11 Wood units.    Her repeat right heart catheterization this past August showed an RA pressure of 4 PA of 90/25 with a mean of 48, pulmonary capital wedge pressure of 11, PA saturation 64%, cardiac output of 4.7 with an index of 2.96, and a calculated PVR of 8 Wood units.      PFT        Latest Ref Rng & Units 8/19/2020    10:30 AM   PFT   FVC L 2.52    FEV1 L 1.77    FVC% % 81    FEV1% % 72          6MWT (October 2024):  She walked 322 m.  She did not desaturate on room air.    Assessment and Plan:     In summary, Judith Nelson is a 64-year-old female with pulmonary arterial hypertension out of proportion to sarcoidosis who is currently on triple combination therapy returns today for follow-up.    Impression  1.  Pulmonary arterial hypertension out of proportion to sarcoidosis  2.  Low intermediate risk with a reveal score of 7  3.  No evidence of decompensated right heart failure    On careful review of her hemodynamic data, there has been no significant change in her pulmonary vascular disease.  Her PVR is  hovering anywhere around 8 Wood units.  In fact her PVR was 11 units last year and now its 8 units.  Consistent with this her right ventricular function is normal on echocardiogram.  Her cardiac output is preserved.  Her right-sided filling pressures are normal.    If we include the mild pericardial effusion on her echocardiogram, her reveal score is 7 which would put her at low intermediate risk.  Given this, I discussed with her the benefits and risks of escalating her pulmonary vasodilator therapy.  I do not think she is sick enough to go on a parenteral prostacyclin therapy at this time and point.  She is only on 9 breaths of inhaled treprostinil which could be maximized to 12 breaths 4 times a day.  I also discussed with her the recent safety and efficacy data on sotatercept which is shown to significantly lower PA pressure with improvement in 6-minute walk distance.  She understands the risk and benefits including telangiectasia, low platelets, and bleeding and willing to try this    Thus in addition to tadalafil 40 mg and macitentan 10 mg daily, will increase her inhaled treprostinil to 12 breaths 4 times a day.  Will also start her on sotatercept.  She is interested in switching from inhaled treprostinil nebulizer to dry powder inhaler for convenience which I think is reasonable.  Will continue on the same dose of diuretics.  She is not needing supplemental oxygen.  She is on a low-dose carvedilol from a open label clinical trial.  She has tolerated this all disease.  Her RV function is also reminded stable on this.  Thus we will continue her on this.    I have recommended her to return to see us in 3 months with repeat 6-minute walk test with labs and echocardiogram.  She will call us in the interim with any further worsening symptoms.    It was a pleasure seeing Judith Nelson at the [facility]. Please contact us with any questions or concerns that you may  have. We thank you for involving us in this patients care.    Total time today was 65 minutes reviewing notes, imaging, labs, patient visit, orders and documentation     Sincerely,      Jeanne Angel MD  Professor of Medicine  [facility]  Heart Failure, Transplant, and Mechanical Circulatory Support Cardiology   Cardiovascular Division  [facility]   [phone]            Please do not hesitate to contact me if you have any questions/concerns.     Sincerely,     Jeanne Angel MD

## 2025-02-17 NOTE — PATIENT INSTRUCTIONS
You were seen today in the Pulmonary Hypertension Clinic at the [facility].     Cardiology Provider you saw during your visit:    Dr. Angel    Medication Changes:  - None    Results:   Component      Latest Ref Rng 2/17/2025  2:37 PM   Sodium      135 - 145 mmol/L 139    Potassium      3.4 - 5.3 mmol/L 4.1    Carbon Dioxide (CO2)      22 - 29 mmol/L 24    Anion Gap      7 - 15 mmol/L 9    Urea Nitrogen      8.0 - 23.0 mg/dL 10.9    Creatinine      0.51 - 0.95 mg/dL 1.36 (H)    GFR Estimate      >60 mL/min/1.73m2 44 (L)    Calcium      8.8 - 10.4 mg/dL 9.4    Chloride      98 - 107 mmol/L 106    Glucose      70 - 99 mg/dL 114 (H)    Alkaline Phosphatase      40 - 150 U/L 57    AST      0 - 45 U/L 19    ALT      0 - 50 U/L 7    Protein Total      6.4 - 8.3 g/dL 6.9    Albumin      3.5 - 5.2 g/dL 4.3    Bilirubin Total      <=1.2 mg/dL 0.3    WBC      4.0 - 11.0 10e3/uL 8.2    RBC Count      3.80 - 5.20 10e6/uL 5.38 (H)    Hemoglobin      11.7 - 15.7 g/dL 15.6    Hematocrit      35.0 - 47.0 % 47.6 (H)    MCV      78 - 100 fL 89    MCH      26.5 - 33.0 pg 29.0    MCHC      31.5 - 36.5 g/dL 32.8    RDW      10.0 - 15.0 % 13.4    Platelet Count      150 - 450 10e3/uL 205    N-Terminal Pro Bnp      0 - 900 pg/mL 235       Legend:  (H) High  (L) Low    Recommendations:   - Right heart catheterization with Dr. Agnel in March    Pre-procedure instructions - Right Heart Cath and/or Biopsy and/or Pulmonary Angiogram  Patient Education    Your arrival time is TBD.  Location is [address] Waiting Room  Please plan on being at the hospital all day. If you are on dialysis, DO NOT schedule on a dialysis day.  At any time, emergencies and/or urgent cases may come up which could delay the start of your procedure.    Pre-procedure instructions - Right heart catheterization  No solid food for 8 hours prior  Nothing to drink 2  hours prior to arrival time  You can take your morning medications (except diabetic and blood thinners) with sips of water  We recommend you arrange for a ride to drop you off and pick you up, in the instance, you are unable to drive home, however you should be able to function as you normally would after the procedure              Anticoagulation Medication Instructions   NA    You will need to follow up with one of our cardiology APPs 1-2 weeks after your procedure. If you need help scheduling or rescheduling your appointment, please call [phone]      Follow-up:   - Follow up with Dr. Angel same day     Please call us immediately if you have any syncope (fainting or passing out), chest pain, edema (swelling or weight gain), or decline in your functional status (general decline in how you are feeling).    If you have emergent concerns after hours or on the weekend, please call our on-call Cardiologist at [phone], option 4. For emergencies call [phone].     Thank you for allowing us to be a part of your care here at the [facility]    If you have questions or concerns please contact us at:    Keyanna Mccray, RN (P: [phone])    Nurse Coordinator       Pulmonary Hypertension     [facility]         JENNIFER Quinn   (Prior Auths & Pt Assistance)   ()  Clinic   Clinic   Pulmonary Hypertension   Pulmonary Hypertension  [facility]  (P)[phone]    (P) [phone]  (F) [phone]

## 2025-02-17 NOTE — LETTER
2025      RE: Judith Nelson  [address]  [place] [address]       Service Date: 2025    RE:  Judith Nelson   MRN:  [id number]  :  1960      Dear Dr. Doe:    We had the pleasure of seeing Judith Nelson at the [facility]. Although you are familiar with this patient's history, please allow me to summarize it for the purpose of our records.     She is a very pleasant 64-year-old female with past medical history significant for pulmonary arterial hypertension out of proportion to sarcoidosis.  She has been on triple combination therapy with tadalafil, macitentan, and inhaled treprostinil 9 breaths 4 times a day.  She used to follow with my colleague Dr. Milligank[facility].  She relocated to [place].  In light of this she was following with Dr. Liu Ruiz locally.    She was reportedly doing well up until this past spring when a routine 6-minute walk test showed a decrease in exercise capacity.  She was also noted to have an elevated NT proBNP.  In light of this she had a right heart catheterization which showed increase in PA pressure but stable PVR and normal cardiac output.  Her biventricular filling pressures are normal.  Of note her echocardiogram at this time showed mild right ventricular dilatation with normal right ventricular function.  She was referred to us in light of these findings to determine whether she needs further escalation of her pulmonary vasodilator therapy especially parenteral therapies.    Since starting sotatercept patient says she feels much better and has more energy than before. She is still SOB when changing her clothes but this is improved compared to before. She is WHO FC III. She has not had any bleeding on the sotatercept.  She has not had any lower extremity swelling or abdominal distention.  No exertional chest pain or chest pressure.  No exertional presyncope or syncope.  No palpitations.   No recent hospitalizations or ER visits.  She is very compliant with her pulmonary vasodilator therapy.      PAST MEDICAL HISTORY:  1.  Pulmonary arterial hypertension  2.  Sarcoidosis  3.  Anxiety and major depression  4.  Chronic kidney disease  5.  Hyperprolactinemia  6.  MGUS  7.  Lumbar compression fracture  8.  History of stress-induced cardiomyopathy    PAST SURGICAL HISTORY:  Past Surgical History:   Procedure Laterality Date     Csection,  X 2       CV RIGHT HEART CATH MEASUREMENTS RECORDED N/A 2019     CV RIGHT HEART CATH MEASUREMENTS RECORDED N/A 2020     CYSTOSCOPY, RETROGRADES, INSERT STENT URETER(S), COMBINED  10/2/2013     ENDOSCOPIC RETROGRADE CHOLANGIOPANCREATOGRAM  2012     LAPAROSCOPIC CHOLECYSTECTOMY WITH CHOLANGIOGRAMS  2012     ORTHOPEDIC SURGERY  10/1/2010      GASTROSCOPY,FL  6/10     [facility] PELVIS/HIP JOINT SURGERY UNLISTED         CURRENT MEDICATIONS:  Current Outpatient Medications   Medication Sig Dispense Refill     acetaminophen (TYLENOL) 500 MG tablet Take 1-2 tablets (500-1,000 mg) by mouth every 6 hours as needed for pain (Take as needed for pain.  Do not exceed 4 grams (8 tablets) in a day) (Patient taking differently: Take 1,000 mg by mouth every 6 hours as needed for pain (Take as needed for pain.  Do not exceed 4 grams (8 tablets) in a day).) 45 tablet 10     ARIPiprazole (ABILIFY) 15 MG tablet Take 1 tablet by mouth at bedtime.       budesonide-formoterol (SYMBICORT) 160-4.5 MCG/ACT Inhaler Inhale 2 puffs into the lungs two times daily.       BUSPIRONE HCL PO Take 30 mg by mouth 2 times daily.       calcitRIOL (ROCALTROL) 0.25 MCG capsule Take 0.25 mcg by mouth twice a week. 30 capsule 1     Calcium Carbonate-Vit D-Min (CALCIUM 1200 PO)        carvedilol (COREG) 3.125 MG tablet Take 1 tablet (3.125 mg) by mouth 2 times daily (with meals) 180 tablet 2     FLUoxetine (PROZAC) 20 MG capsule Take 1 capsule (20 mg) by mouth daily (Patient taking differently:  Take 40 mg by mouth daily.) 30 capsule 1     furosemide (LASIX) 20 MG tablet Take 1 tablet by mouth on Monday, Wednesday & Friday's (Patient taking differently: No sig reported) 45 tablet 3     lamoTRIgine (LAMICTAL) 25 MG tablet Take 100 mg by mouth daily        loperamide (IMODIUM) 2 MG capsule Take 2 mg by mouth 4 times daily as needed for diarrhea       LORazepam (ATIVAN) 0.5 MG tablet Take 2 tablets (1 mg) by mouth 2 times daily (Patient taking differently: Take 0.5 mg by mouth every evening.) 90 tablet 1     macitentan (OPSUMIT) 10 MG tablet Take 1 tablet (10 mg) by mouth daily 30 tablet 11     omeprazole (PRILOSEC) 20 MG DR capsule TAKE 1 CAPSULE BY MOUTH DAILY 28 capsule 3     order for DME Oxygen       ORDER FOR DME Equipment being ordered: SHOWER CHAIR  FROM [employer] 1 Device 0     potassium chloride narinder ER (KLOR-CON M10) 10 MEQ CR tablet Take 10 mEq by mouth daily.       simvastatin (ZOCOR) 10 MG tablet Take 1 tablet by mouth At Bedtime. 90 tablet 1     sotatercept-csrk (WINREVAIR) 45 MG subcutaneous injection Inject 0.8 mLs (40 mg) subcutaneously every 21 days. Starting Dose       sotatercept-csrk (WINREVAIR) 45 MG subcutaneous injection Inject 0.4 mLs (20 mg) subcutaneously once. Starting Dose       tadalafil, PAH, (ADCIRCA) 20 MG TABS Take 2 tablets (40 mg) by mouth daily 60 tablet 2     traZODone (DESYREL) 100 MG tablet Take 1 tablet by mouth at bedtime.       Treprostinil (TYVASO DPI MAINTENANCE KIT) 64 MCG inhaler Inhale 1 Inhalation (64 mcg) into the lungs. Inhale 64mcg into the lungs four times daily.       treprostinil (TYVASO) 0.6 MG/ML SOLN neb solution Inhale 12 Breaths into the lungs every 4 hours (while awake for Tyvaso). 3.6 mL      Vitamin D3 50 mcg (2000 units) tablet Take 2,000 Units by mouth.       No current facility-administered medications for this visit.       ROS:   10 point ROS negative except as discussed in above HPI.    SOCIAL HISTORY:  She is single now.  She is  .  She has 2 grown daughters who live close by.  She does not smoke, drink or use any recreational drugs.  She is currently not working.    FAMILY HISTORY:  Family History   Problem Relation Age of Onset     Cancer Mother          age 62 leukemia     Gastrointestinal Disease Mother         diverticulitis     Hypertension Mother      Allergies Mother      Arthritis Mother      Depression Mother      Eye Disorder Mother      Osteoporosis Mother      Anxiety Disorder Mother      Mental Illness Mother      Asthma Mother      Other Cancer Mother      Migraines Mother      C.A.D. Father         MI/CAD      Cancer Father         skin     Blood Disease Father         renal  problem     Hypertension Father      Anesthesia Reaction Father      Cardiovascular Father      Neurologic Disorder Father         Parkinson's disease     Anxiety Disorder Father      Other Cancer Father      Hyperlipidemia Father      Coronary Artery Disease Father      C.A.D. Maternal Grandmother          late 82s MI     Diabetes Maternal Grandmother      Osteoporosis Maternal Grandmother      C.A.D. Maternal Grandfather          mid 80s MI     Alzheimer Disease Paternal Grandmother          80s     Alzheimer Disease Paternal Grandfather          80s     Neurologic Disorder Sister         migraines     Allergies Sister      Diabetes Other         AODM     Neurologic Disorder Sister         migraines     Allergies Sister      Anesthesia Reaction Son      Anesthesia Reaction Daughter      Anesthesia Reaction Daughter      Diabetes Sister         hypoglycemia     Anxiety Disorder Son      Anxiety Disorder Sister      Substance Abuse Sister      Asthma Sister      Asthma Daughter      Depression Son      Hypertension Sister      Hyperlipidemia Sister      Migraines Sister        EXAM:  There were no vitals taken for this visit.  Awake, alert, and oriented x 3.   Comfortable. No apparent distress.  No pallor, cyanosis, or  clubbing  Carotids 2+ bilateral and pulse was regular in rhythm.  No JVD  Heart: regular, normal S1/S2, no murmur, gallop, rub  Lungs: NVBS and clear to auscultation bilaterally, no rales or wheezing  Abdomen: soft, non-tender, bowel sounds present, no hepatosplenomegaly  Extremities: no edema  Neurological: No gross focal neurological deficit    Labs:  Recent Results (from the past week)   Comprehensive metabolic panel    Collection Time: 02/17/25  2:37 PM   Result Value Ref Range    Sodium 139 135 - 145 mmol/L    Potassium 4.1 3.4 - 5.3 mmol/L    Carbon Dioxide (CO2) 24 22 - 29 mmol/L    Anion Gap 9 7 - 15 mmol/L    Urea Nitrogen 10.9 8.0 - 23.0 mg/dL    Creatinine 1.36 (H) 0.51 - 0.95 mg/dL    GFR Estimate 44 (L) >60 mL/min/1.73m2    Calcium 9.4 8.8 - 10.4 mg/dL    Chloride 106 98 - 107 mmol/L    Glucose 114 (H) 70 - 99 mg/dL    Alkaline Phosphatase 57 40 - 150 U/L    AST 19 0 - 45 U/L    ALT 7 0 - 50 U/L    Protein Total 6.9 6.4 - 8.3 g/dL    Albumin 4.3 3.5 - 5.2 g/dL    Bilirubin Total 0.3 <=1.2 mg/dL   CBC with platelets    Collection Time: 02/17/25  2:37 PM   Result Value Ref Range    WBC Count 8.2 4.0 - 11.0 10e3/uL    RBC Count 5.38 (H) 3.80 - 5.20 10e6/uL    Hemoglobin 15.6 11.7 - 15.7 g/dL    Hematocrit 47.6 (H) 35.0 - 47.0 %    MCV 89 78 - 100 fL    MCH 29.0 26.5 - 33.0 pg    MCHC 32.8 31.5 - 36.5 g/dL    RDW 13.4 10.0 - 15.0 %    Platelet Count 205 150 - 450 10e3/uL       Echocardiogram (05/2024):  I personally reviewed the echocardiographic images from [facility].  I agree with these findings.      * The estimated ejection fraction is 60-65%.     * Left ventricular segmental wall motion is normal.     * The right ventricle is mildly enlarged.     * Normal right ventricular systolic function.     * Severe pulmonary hypertension, estimated pulmonary arterial systolic   pressure is  85 mmHg.     * There is trace circumferential pericardial effusion visualized.     * Prior study from 10/3/2022. In  comparison there is no significant   change.     RHC:  Her right heart catheterization in October 2022 showed an RA pressure of 5, PA pressure of 85/26 with a mean of 45, pulmonary capillary wedge pressure of 12, PA saturation of 68%, PVR of 8.7 Wood units.  Her cardiac output was 3.8 with an index of 2.3.    Her right heart catheterization in October 2023 showed an RA pressure of 3 RV of 77/6, PA of 79/23 with a mean of 44, pulmonary capital wedge pressure of 8, PA saturation of 69%, cardiac output of 3.3, cardiac index of 2.1, and calculated PVR of 11 Wood units.    Her repeat right heart catheterization this past August showed an RA pressure of 4 PA of 90/25 with a mean of 48, pulmonary capital wedge pressure of 11, PA saturation 64%, cardiac output of 4.7 with an index of 2.96, and a calculated PVR of 8 Wood units.      PFT        Latest Ref Rng & Units 8/19/2020    10:30 AM   PFT   FVC L 2.52    FEV1 L 1.77    FVC% % 81    FEV1% % 72          6MWT (October 2024):  She walked 379 m (compared to 322 on last test).  She did not desaturate on room air.    Assessment and Plan:     In summary, Judith Nelson is a 64-year-old female with pulmonary arterial hypertension out of proportion to sarcoidosis who is currently on quadruple combination therapy returns today for follow-up. Sotatercept was added to her regimen at her last clinic visit and this is her first follow up visit.     Impression  1.  Pulmonary arterial hypertension out of proportion to sarcoidosis  2.  Low intermediate risk with a reveal score of 7  3.  No evidence of decompensated right heart failure    On careful review of her hemodynamic data, there has been no significant change in her pulmonary vascular disease.  Her PVR is hovering anywhere around 8 Wood units.  In fact her PVR was 11 units last year and now its 8 units.  Consistent with this her right ventricular function is normal on echocardiogram.  Her cardiac output is preserved.  Her  right-sided filling pressures are normal.    If we include the mild pericardial effusion on her echocardiogram, her reveal score is 7 which would put her at low intermediate risk.  Given this, I discussed with her the benefits and risks of escalating her pulmonary vasodilator therapy.  I do not think she is sick enough to go on a parenteral prostacyclin therapy at this time and point.  She is only on 9 breaths of inhaled treprostinil which could be maximized to 12 breaths 4 times a day.  I also discussed with her the recent safety and efficacy data on sotatercept which is shown to significantly lower PA pressure with improvement in 6-minute walk distance.  She understands the risk and benefits including telangiectasia, low platelets, and bleeding and willing to try this    Thus in addition to tadalafil 40 mg and macitentan 10 mg daily, will increase her inhaled treprostinil to 12 breaths 4 times a day. She has switched back to DPI for convenience.  She is clinically doing much better. She feels better, her 6 MWD has increased by 50 m and her proBNP is within normal limits. She is euvolemic on exam today. She has a repeat echocardiogram scheduled at the end of this month. We will also schedule a repeat RHC for her in the upcoming weeks.     Will continue on the same dose of diuretics.  She is not needing supplemental oxygen.  She is on a low-dose carvedilol from a open label clinical trial.  She has tolerated this all disease.  Her RV function is also reminded stable on this.  Thus we will continue her on this.    We will follow her up in 3 months.     It was a pleasure seeing Judith Nelson at the [facility] Pulmonary [facility]pertension [facility]. Please contact us with any questions or concerns that you may have. We thank you for involving us in this patients care.    Total time today was 65 minutes reviewing notes, imaging, labs, patient visit, orders and documentation     Sincerely,  Gay Cramer PGY  7  Advanced Heart Failure Fellow     Seen with Dr Angel.       I examined the patient and agree with the assessment and plan of Dr. Cramer    Total time today was 46 minutes reviewing notes, imaging, labs, patient visit, orders and documentation    The longitudinal plan of care for the diagnosis(es) of   Sarcoidosis associated PH as documented were addressed during this visit. Due to the added complexity in care, I will continue to support Judith in the subsequent management and with ongoing continuity of care.           Jeanne Angel MD   Center for Pulmonary Hypertension  Heart Failure, Transplant, and Mechanical Circulatory Support Cardiology   Cardiovascular Division  [facility]   [phone]              Jeanne Angel MD

## 2025-02-17 NOTE — PROGRESS NOTES
Service Date: 2025    RE:  Judith Nelson   MRN:  [id number]  :  1960      Dear Dr. Doe:    We had the pleasure of seeing Judith Nelson at the [facility]. Although you are familiar with this patient's history, please allow me to summarize it for the purpose of our records.     She is a very pleasant 64-year-old female with past medical history significant for pulmonary arterial hypertension out of proportion to sarcoidosis.  She has been on triple combination therapy with tadalafil, macitentan, and inhaled treprostinil 9 breaths 4 times a day.  She used to follow with my colleague Dr. Milliganker the [place].  She relocated to [place].  In light of this she was following with Dr. Liu Ruiz locally.    She was reportedly doing well up until this past spring when a routine 6-minute walk test showed a decrease in exercise capacity.  She was also noted to have an elevated NT proBNP.  In light of this she had a right heart catheterization which showed increase in PA pressure but stable PVR and normal cardiac output.  Her biventricular filling pressures are normal.  Of note her echocardiogram at this time showed mild right ventricular dilatation with normal right ventricular function.  She was referred to us in light of these findings to determine whether she needs further escalation of her pulmonary vasodilator therapy especially parenteral therapies.    Since starting sotatercept patient says she feels much better and has more energy than before. She is still SOB when changing her clothes but this is improved compared to before. She is WHO FC III. She has not had any bleeding on the sotatercept.  She has not had any lower extremity swelling or abdominal distention.  No exertional chest pain or chest pressure.  No exertional presyncope or syncope.  No palpitations.  No recent hospitalizations or ER visits.  She is very compliant with her  pulmonary vasodilator therapy.      PAST MEDICAL HISTORY:  1.  Pulmonary arterial hypertension  2.  Sarcoidosis  3.  Anxiety and major depression  4.  Chronic kidney disease  5.  Hyperprolactinemia  6.  MGUS  7.  Lumbar compression fracture  8.  History of stress-induced cardiomyopathy    PAST SURGICAL HISTORY:  Past Surgical History:   Procedure Laterality Date    Csection,  X 2      CV RIGHT HEART CATH MEASUREMENTS RECORDED N/A 2019    CV RIGHT HEART CATH MEASUREMENTS RECORDED N/A 2020    CYSTOSCOPY, RETROGRADES, INSERT STENT URETER(S), COMBINED  10/2/2013    ENDOSCOPIC RETROGRADE CHOLANGIOPANCREATOGRAM  2012    LAPAROSCOPIC CHOLECYSTECTOMY WITH CHOLANGIOGRAMS  2012    ORTHOPEDIC SURGERY  10/1/2010     GASTROSCOPY,FL  6/10    [facility] PELVIS/HIP JOINT SURGERY UNLISTED         CURRENT MEDICATIONS:  Current Outpatient Medications   Medication Sig Dispense Refill    acetaminophen (TYLENOL) 500 MG tablet Take 1-2 tablets (500-1,000 mg) by mouth every 6 hours as needed for pain (Take as needed for pain.  Do not exceed 4 grams (8 tablets) in a day) (Patient taking differently: Take 1,000 mg by mouth every 6 hours as needed for pain (Take as needed for pain.  Do not exceed 4 grams (8 tablets) in a day).) 45 tablet 10    ARIPiprazole (ABILIFY) 15 MG tablet Take 1 tablet by mouth at bedtime.      budesonide-formoterol (SYMBICORT) 160-4.5 MCG/ACT Inhaler Inhale 2 puffs into the lungs two times daily.      BUSPIRONE HCL PO Take 30 mg by mouth 2 times daily.      calcitRIOL (ROCALTROL) 0.25 MCG capsule Take 0.25 mcg by mouth twice a week. 30 capsule 1    Calcium Carbonate-Vit D-Min (CALCIUM 1200 PO)       carvedilol (COREG) 3.125 MG tablet Take 1 tablet (3.125 mg) by mouth 2 times daily (with meals) 180 tablet 2    FLUoxetine (PROZAC) 20 MG capsule Take 1 capsule (20 mg) by mouth daily (Patient taking differently: Take 40 mg by mouth daily.) 30 capsule 1    furosemide (LASIX) 20 MG tablet Take 1 tablet by  mouth on Monday, Wednesday & Friday's (Patient taking differently: No sig reported) 45 tablet 3    lamoTRIgine (LAMICTAL) 25 MG tablet Take 100 mg by mouth daily       loperamide (IMODIUM) 2 MG capsule Take 2 mg by mouth 4 times daily as needed for diarrhea      LORazepam (ATIVAN) 0.5 MG tablet Take 2 tablets (1 mg) by mouth 2 times daily (Patient taking differently: Take 0.5 mg by mouth every evening.) 90 tablet 1    macitentan (OPSUMIT) 10 MG tablet Take 1 tablet (10 mg) by mouth daily 30 tablet 11    omeprazole (PRILOSEC) 20 MG DR capsule TAKE 1 CAPSULE BY MOUTH DAILY 28 capsule 3    order for DME Oxygen      ORDER FOR DME Equipment being ordered: SHOWER CHAIR  FROM [employer] Preference 1 Device 0    potassium chloride narinder ER (KLOR-CON M10) 10 MEQ CR tablet Take 10 mEq by mouth daily.      simvastatin (ZOCOR) 10 MG tablet Take 1 tablet by mouth At Bedtime. 90 tablet 1    sotatercept-csrk (WINREVAIR) 45 MG subcutaneous injection Inject 0.8 mLs (40 mg) subcutaneously every 21 days. Starting Dose      sotatercept-csrk (WINREVAIR) 45 MG subcutaneous injection Inject 0.4 mLs (20 mg) subcutaneously once. Starting Dose      tadalafil, PAH, (ADCIRCA) 20 MG TABS Take 2 tablets (40 mg) by mouth daily 60 tablet 2    traZODone (DESYREL) 100 MG tablet Take 1 tablet by mouth at bedtime.      Treprostinil (TYVASO DPI MAINTENANCE KIT) 64 MCG inhaler Inhale 1 Inhalation (64 mcg) into the lungs. Inhale 64mcg into the lungs four times daily.      treprostinil (TYVASO) 0.6 MG/ML SOLN neb solution Inhale 12 Breaths into the lungs every 4 hours (while awake for Tyvaso). 3.6 mL     Vitamin D3 50 mcg (2000 units) tablet Take 2,000 Units by mouth.       No current facility-administered medications for this visit.       ROS:   10 point ROS negative except as discussed in above HPI.    SOCIAL HISTORY:  She is single now.  She is .  She has 2 grown daughters who live close by.  She does not smoke, drink or use any recreational drugs.  She  is currently not working.    FAMILY HISTORY:  Family History   Problem Relation Age of Onset    Cancer Mother          age 62 leukemia    Gastrointestinal Disease Mother         diverticulitis    Hypertension Mother     Allergies Mother     Arthritis Mother     Depression Mother     Eye Disorder Mother     Osteoporosis Mother     Anxiety Disorder Mother     Mental Illness Mother     Asthma Mother     Other Cancer Mother     Migraines Mother     C.A.D. Father         MI/CAD     Cancer Father         skin    Blood Disease Father         renal  problem    Hypertension Father     Anesthesia Reaction Father     Cardiovascular Father     Neurologic Disorder Father         Parkinson's disease    Anxiety Disorder Father     Other Cancer Father     Hyperlipidemia Father     Coronary Artery Disease Father     C.A.D. Maternal Grandmother          late 82s MI    Diabetes Maternal Grandmother     Osteoporosis Maternal Grandmother     C.A.D. Maternal Grandfather          mid 80s MI    Alzheimer Disease Paternal Grandmother          80s    Alzheimer Disease Paternal Grandfather          80s    Neurologic Disorder Sister         migraines    Allergies Sister     Diabetes Other         AODM    Neurologic Disorder Sister         migraines    Allergies Sister     Anesthesia Reaction Son     Anesthesia Reaction Daughter     Anesthesia Reaction Daughter     Diabetes Sister         hypoglycemia    Anxiety Disorder Son     Anxiety Disorder Sister     Substance Abuse Sister     Asthma Sister     Asthma Daughter     Depression Son     Hypertension Sister     Hyperlipidemia Sister     Migraines Sister        EXAM:  There were no vitals taken for this visit.  Awake, alert, and oriented x 3.   Comfortable. No apparent distress.  No pallor, cyanosis, or clubbing  Carotids 2+ bilateral and pulse was regular in rhythm.  No JVD  Heart: regular, normal S1/S2, no murmur, gallop, rub  Lungs: NVBS and clear to auscultation  bilaterally, no rales or wheezing  Abdomen: soft, non-tender, bowel sounds present, no hepatosplenomegaly  Extremities: no edema  Neurological: No gross focal neurological deficit    Labs:  Recent Results (from the past week)   Comprehensive metabolic panel    Collection Time: 02/17/25  2:37 PM   Result Value Ref Range    Sodium 139 135 - 145 mmol/L    Potassium 4.1 3.4 - 5.3 mmol/L    Carbon Dioxide (CO2) 24 22 - 29 mmol/L    Anion Gap 9 7 - 15 mmol/L    Urea Nitrogen 10.9 8.0 - 23.0 mg/dL    Creatinine 1.36 (H) 0.51 - 0.95 mg/dL    GFR Estimate 44 (L) >60 mL/min/1.73m2    Calcium 9.4 8.8 - 10.4 mg/dL    Chloride 106 98 - 107 mmol/L    Glucose 114 (H) 70 - 99 mg/dL    Alkaline Phosphatase 57 40 - 150 U/L    AST 19 0 - 45 U/L    ALT 7 0 - 50 U/L    Protein Total 6.9 6.4 - 8.3 g/dL    Albumin 4.3 3.5 - 5.2 g/dL    Bilirubin Total 0.3 <=1.2 mg/dL   CBC with platelets    Collection Time: 02/17/25  2:37 PM   Result Value Ref Range    WBC Count 8.2 4.0 - 11.0 10e3/uL    RBC Count 5.38 (H) 3.80 - 5.20 10e6/uL    Hemoglobin 15.6 11.7 - 15.7 g/dL    Hematocrit 47.6 (H) 35.0 - 47.0 %    MCV 89 78 - 100 fL    MCH 29.0 26.5 - 33.0 pg    MCHC 32.8 31.5 - 36.5 g/dL    RDW 13.4 10.0 - 15.0 %    Platelet Count 205 150 - 450 10e3/uL       Echocardiogram (05/2024):  I personally reviewed the echocardiographic images from [facility].  I agree with these findings.      * The estimated ejection fraction is 60-65%.     * Left ventricular segmental wall motion is normal.     * The right ventricle is mildly enlarged.     * Normal right ventricular systolic function.     * Severe pulmonary hypertension, estimated pulmonary arterial systolic   pressure is  85 mmHg.     * There is trace circumferential pericardial effusion visualized.     * Prior study from 10/3/2022. In comparison there is no significant   change.     RHC:  Her right heart catheterization in October 2022 showed an RA pressure of 5, PA pressure of 85/26 with a mean  of 45, pulmonary capillary wedge pressure of 12, PA saturation of 68%, PVR of 8.7 Wood units.  Her cardiac output was 3.8 with an index of 2.3.    Her right heart catheterization in October 2023 showed an RA pressure of 3 RV of 77/6, PA of 79/23 with a mean of 44, pulmonary capital wedge pressure of 8, PA saturation of 69%, cardiac output of 3.3, cardiac index of 2.1, and calculated PVR of 11 Wood units.    Her repeat right heart catheterization this past August showed an RA pressure of 4 PA of 90/25 with a mean of 48, pulmonary capital wedge pressure of 11, PA saturation 64%, cardiac output of 4.7 with an index of 2.96, and a calculated PVR of 8 Wood units.      PFT        Latest Ref Rng & Units 8/19/2020    10:30 AM   PFT   FVC L 2.52    FEV1 L 1.77    FVC% % 81    FEV1% % 72          6MWT (October 2024):  She walked 379 m (compared to 322 on last test).  She did not desaturate on room air.    Assessment and Plan:     In summary, Judith Nelson is a 64-year-old female with pulmonary arterial hypertension out of proportion to sarcoidosis who is currently on quadruple combination therapy returns today for follow-up. Sotatercept was added to her regimen at her last clinic visit and this is her first follow up visit.     Impression  1.  Pulmonary arterial hypertension out of proportion to sarcoidosis  2.  Low intermediate risk with a reveal score of 7  3.  No evidence of decompensated right heart failure    On careful review of her hemodynamic data, there has been no significant change in her pulmonary vascular disease.  Her PVR is hovering anywhere around 8 Wood units.  In fact her PVR was 11 units last year and now its 8 units.  Consistent with this her right ventricular function is normal on echocardiogram.  Her cardiac output is preserved.  Her right-sided filling pressures are normal.    If we include the mild pericardial effusion on her echocardiogram, her reveal score is 7 which would put her at low  intermediate risk.  Given this, I discussed with her the benefits and risks of escalating her pulmonary vasodilator therapy.  I do not think she is sick enough to go on a parenteral prostacyclin therapy at this time and point.  She is only on 9 breaths of inhaled treprostinil which could be maximized to 12 breaths 4 times a day.  I also discussed with her the recent safety and efficacy data on sotatercept which is shown to significantly lower PA pressure with improvement in 6-minute walk distance.  She understands the risk and benefits including telangiectasia, low platelets, and bleeding and willing to try this    Thus in addition to tadalafil 40 mg and macitentan 10 mg daily, will increase her inhaled treprostinil to 12 breaths 4 times a day. She has switched back to DPI for convenience.  She is clinically doing much better. She feels better, her 6 MWD has increased by 50 m and her proBNP is within normal limits. She is euvolemic on exam today. She has a repeat echocardiogram scheduled at the end of this month. We will also schedule a repeat RHC for her in the upcoming weeks.     Will continue on the same dose of diuretics.  She is not needing supplemental oxygen.  She is on a low-dose carvedilol from a open label clinical trial.  She has tolerated this all disease.  Her RV function is also reminded stable on this.  Thus we will continue her on this.    We will follow her up in 3 months.     It was a pleasure seeing Judith Nelson at the [facility]. Please contact us with any questions or concerns that you may have. We thank you for involving us in this patients care.    Total time today was 65 minutes reviewing notes, imaging, labs, patient visit, orders and documentation     Sincerely,  Gya Cramer PGY 7  Advanced Heart Failure Fellow     Seen with Dr Angel.       I examined the patient and agree with the assessment and plan of Dr. Cramer    Total time today  was 46 minutes reviewing notes, imaging, labs, patient visit, orders and documentation    The longitudinal plan of care for the diagnosis(es) of   Sarcoidosis associated PH as documented were addressed during this visit. Due to the added complexity in care, I will continue to support Judith in the subsequent management and with ongoing continuity of care.           Jeanne Angel MD   [facility]  Heart Failure, Transplant, and Mechanical Circulatory Support Cardiology   Cardiovascular Division  [facility]   [phone]

## 2025-02-18 ENCOUNTER — TELEPHONE (OUTPATIENT)
Dept: CARDIOLOGY | Facility: CLINIC | Age: 65
End: 2025-02-18
Payer: COMMERCIAL

## 2025-02-18 ENCOUNTER — MYC MEDICAL ADVICE (OUTPATIENT)
Dept: CARDIOLOGY | Facility: CLINIC | Age: 65
End: 2025-02-18
Payer: COMMERCIAL

## 2025-02-18 LAB — FIO2-PRE: 21 %

## 2025-02-18 NOTE — TELEPHONE ENCOUNTER
Health Call Center    Phone Message    May a detailed message be left on voicemail: yes     Reason for Call: Other: Patient would like a call from care team in regards her right Heart Cath. Patient having questions and concerns. Please call back to discuss.     Action Taken: Other: cardio    Travel Screening: Not Applicable     Date of Service:        Thank you!  [facility]

## 2025-02-18 NOTE — TELEPHONE ENCOUNTER
Spoke with patient who states she left her AVS at the [facility] with all the instructions and how to make appt for RHC.  Advised her I would have Lauryn call to help her schedule and I would send her the pre-pro edure information prior to the procedure.  Patient verbalized understanding, agreed with plan and denied any further questions. Keyanna Mccray RN on 2/18/2025 at 11:24 AM    Sent msg to Lauryn for scheduling. Keyanna Mccray RN on 2/18/2025 at 11:25 AM

## 2025-03-03 ENCOUNTER — MYC MEDICAL ADVICE (OUTPATIENT)
Dept: CARDIOLOGY | Facility: CLINIC | Age: 65
End: 2025-03-03
Payer: COMMERCIAL

## 2025-04-24 ENCOUNTER — TELEPHONE (OUTPATIENT)
Dept: CARDIOLOGY | Facility: CLINIC | Age: 65
End: 2025-04-24
Payer: COMMERCIAL

## 2025-04-24 NOTE — TELEPHONE ENCOUNTER
4/24/2025  @ 11:05 AM -  Rcvd phone call  from PATIENT on 4/24/2025 -  asking if there was  parking at the hospital for her appt.  She has labs and a RHC scheduled for 4/25/25.  Routed to Lauryn Flores/Scheduling.   Guillermina FORRESTER CMA- Prior Auths  Cardiology/Pulmonary Hypertension

## 2025-04-25 ENCOUNTER — APPOINTMENT (OUTPATIENT)
Dept: LAB | Facility: CLINIC | Age: 65
End: 2025-04-25
Attending: INTERNAL MEDICINE
Payer: COMMERCIAL

## 2025-04-25 ENCOUNTER — HOSPITAL ENCOUNTER (OUTPATIENT)
Facility: CLINIC | Age: 65
Discharge: HOME | End: 2025-04-25
Attending: INTERNAL MEDICINE | Admitting: INTERNAL MEDICINE
Payer: COMMERCIAL

## 2025-04-25 ENCOUNTER — APPOINTMENT (OUTPATIENT)
Dept: MEDSURG UNIT | Facility: CLINIC | Age: 65
End: 2025-04-25
Attending: INTERNAL MEDICINE
Payer: COMMERCIAL

## 2025-04-25 VITALS
SYSTOLIC BLOOD PRESSURE: 120 MMHG | DIASTOLIC BLOOD PRESSURE: 79 MMHG | OXYGEN SATURATION: 94 % | WEIGHT: 124.4 LBS | RESPIRATION RATE: 16 BRPM | HEART RATE: 62 BPM | TEMPERATURE: 97.5 F | BODY MASS INDEX: 22.04 KG/M2

## 2025-04-25 DIAGNOSIS — R06.02 SOB (SHORTNESS OF BREATH): ICD-10-CM

## 2025-04-25 DIAGNOSIS — I27.20 SEVERE PULMONARY HYPERTENSION (H): ICD-10-CM

## 2025-04-25 LAB
ALBUMIN SERPL BCG-MCNC: 4.1 G/DL (ref 3.5–5.2)
ALP SERPL-CCNC: 55 U/L (ref 40–150)
ALT SERPL W P-5'-P-CCNC: 6 U/L (ref 0–50)
ANION GAP SERPL CALCULATED.3IONS-SCNC: 10 MMOL/L (ref 7–15)
AST SERPL W P-5'-P-CCNC: 19 U/L (ref 0–45)
BASOPHILS # BLD AUTO: 0 10E3/UL (ref 0–0.2)
BASOPHILS NFR BLD AUTO: 1 %
BILIRUB SERPL-MCNC: 0.3 MG/DL
BUN SERPL-MCNC: 12.1 MG/DL (ref 8–23)
CALCIUM SERPL-MCNC: 9.8 MG/DL (ref 8.8–10.4)
CHLORIDE SERPL-SCNC: 108 MMOL/L (ref 98–107)
CREAT SERPL-MCNC: 1.28 MG/DL (ref 0.51–0.95)
EGFRCR SERPLBLD CKD-EPI 2021: 47 ML/MIN/1.73M2
EOSINOPHIL # BLD AUTO: 0.1 10E3/UL (ref 0–0.7)
EOSINOPHIL NFR BLD AUTO: 1 %
ERYTHROCYTE [DISTWIDTH] IN BLOOD BY AUTOMATED COUNT: 14.6 % (ref 10–15)
GLUCOSE SERPL-MCNC: 109 MG/DL (ref 70–99)
HCO3 SERPL-SCNC: 22 MMOL/L (ref 22–29)
HCT VFR BLD AUTO: 45.1 % (ref 35–47)
HGB BLD-MCNC: 14.3 G/DL (ref 11.7–15.7)
HGB BLD-MCNC: 14.9 G/DL (ref 11.7–15.7)
IMM GRANULOCYTES # BLD: 0.1 10E3/UL
IMM GRANULOCYTES NFR BLD: 2 %
INR PPP: 1 (ref 0.85–1.15)
LYMPHOCYTES # BLD AUTO: 1.5 10E3/UL (ref 0.8–5.3)
LYMPHOCYTES NFR BLD AUTO: 22 %
MCH RBC QN AUTO: 28.7 PG (ref 26.5–33)
MCHC RBC AUTO-ENTMCNC: 33 G/DL (ref 31.5–36.5)
MCV RBC AUTO: 87 FL (ref 78–100)
MONOCYTES # BLD AUTO: 0.5 10E3/UL (ref 0–1.3)
MONOCYTES NFR BLD AUTO: 7 %
NEUTROPHILS # BLD AUTO: 4.5 10E3/UL (ref 1.6–8.3)
NEUTROPHILS NFR BLD AUTO: 68 %
NRBC # BLD AUTO: 0 10E3/UL
NRBC BLD AUTO-RTO: 0 /100
NT-PROBNP SERPL-MCNC: 539 PG/ML (ref 0–900)
OXYHGB MFR BLDV: 60 % (ref 70–75)
PLATELET # BLD AUTO: 208 10E3/UL (ref 150–450)
POTASSIUM SERPL-SCNC: 4.5 MMOL/L (ref 3.4–5.3)
PROT SERPL-MCNC: 6.4 G/DL (ref 6.4–8.3)
RBC # BLD AUTO: 5.19 10E6/UL (ref 3.8–5.2)
SODIUM SERPL-SCNC: 140 MMOL/L (ref 135–145)
WBC # BLD AUTO: 6.6 10E3/UL (ref 4–11)

## 2025-04-25 PROCEDURE — 250N000009 HC RX 250: Performed by: INTERNAL MEDICINE

## 2025-04-25 PROCEDURE — 999N000132 HC STATISTIC PP CARE STAGE 1

## 2025-04-25 PROCEDURE — 36415 COLL VENOUS BLD VENIPUNCTURE: CPT | Performed by: INTERNAL MEDICINE

## 2025-04-25 PROCEDURE — 85610 PROTHROMBIN TIME: CPT | Performed by: INTERNAL MEDICINE

## 2025-04-25 PROCEDURE — 93451 RIGHT HEART CATH: CPT | Performed by: INTERNAL MEDICINE

## 2025-04-25 PROCEDURE — 999N000142 HC STATISTIC PROCEDURE PREP ONLY

## 2025-04-25 PROCEDURE — 272N000001 HC OR GENERAL SUPPLY STERILE: Performed by: INTERNAL MEDICINE

## 2025-04-25 PROCEDURE — 82810 BLOOD GASES O2 SAT ONLY: CPT

## 2025-04-25 PROCEDURE — 93451 RIGHT HEART CATH: CPT | Mod: 26 | Performed by: INTERNAL MEDICINE

## 2025-04-25 PROCEDURE — C1894 INTRO/SHEATH, NON-LASER: HCPCS | Performed by: INTERNAL MEDICINE

## 2025-04-25 PROCEDURE — 85018 HEMOGLOBIN: CPT | Mod: XU

## 2025-04-25 PROCEDURE — 83880 ASSAY OF NATRIURETIC PEPTIDE: CPT | Performed by: INTERNAL MEDICINE

## 2025-04-25 PROCEDURE — C1725 CATH, TRANSLUMIN NON-LASER: HCPCS | Performed by: INTERNAL MEDICINE

## 2025-04-25 PROCEDURE — 85025 COMPLETE CBC W/AUTO DIFF WBC: CPT | Performed by: INTERNAL MEDICINE

## 2025-04-25 PROCEDURE — 82565 ASSAY OF CREATININE: CPT | Performed by: INTERNAL MEDICINE

## 2025-04-25 RX ORDER — LIDOCAINE 40 MG/G
CREAM TOPICAL
Status: COMPLETED | OUTPATIENT
Start: 2025-04-25 | End: 2025-04-25

## 2025-04-25 RX ADMIN — LIDOCAINE: 40 CREAM TOPICAL at 12:41

## 2025-04-25 ASSESSMENT — ACTIVITIES OF DAILY LIVING (ADL)
ADLS_ACUITY_SCORE: 54

## 2025-04-25 NOTE — PROGRESS NOTES
Pt prepped for RHC. R internal jugular site prepped, consent signed, labs still in process. Daughter at bedside. Pt demonstrated  understanding of discharge instructions.

## 2025-04-25 NOTE — DISCHARGE INSTRUCTIONS
[facility]                        Interventional Cardiology  Discharge Instructions   Post Right Heart Cath and/or Heart Biopsy      AFTER YOU GO HOME:  DO drink plenty of fluids  DO resume your regular diet and medications unless otherwise instructed by your Primary Physician  Do Not scrub the procedure site vigorously  No lotion or powder to the puncture site for 3 days    ACTIVITY:  Take it easy for the rest of the day. Do not do strenuous exercise and do not lift, pull, or push anything heavy. This lets the catheter site heal.     CARE OF THE CATHETER SITE:    For at least 24 hours, keep the bandage over the spot where the catheter was inserted.   Put ice or a cold pack on the area for 10 to 20 minutes at a time to help with soreness or swelling.   You may shower 24 hours after the procedure. Pat the incision dry.  Don't take a bath for 48 hours    WHEN SHOULD YOU CALL FOR HELP:  Monitor neck site for bleeding, swelling, or voice changes. If you notice bleeding or swelling immediately apply pressure to the site for 15 minutes, and call number below to speak with Cardiology Fellow.  If you experience any changes in your breathing you should call your doctor immediately or come to the closest Emergency Department.  Do not drive yourself.  If you have a fast-growing, painful lump at the catheter site.  If you have symptoms of infection, such as:  Increased pain, swelling, warmth, or redness.  Red streaks leading from the area.  Pus draining from the area.  A fever.    ADDITIONAL INSTRUCTIONS: Medications: You are to resume all home medications including anticoagulation therapy unless otherwise advised by your primary cardiologist or nurse coordinator.    Follow Up: Per your primary cardiology team    If you have any questions or concerns regarding your procedure site please call [phone] at any time & press option 4 to speak to the .  Ask for the Cardiology Fellow on call.  They are  available 24 hours a day.  You may also contact the Cardiology Clinic after hours number at [phone].                                                       Telephone Numbers [phone] Monday-Friday 8:00 am to 4:30 pm    [phone] After 4:30 pm Monday-Friday, Weekends & Holidays  Ask for Interventional Cardiologist on call. Someone is on call 24 hours/day   [facility] toll free number [phone] Monday-Friday 8:00 am to 4:30 pm   [facility] Emergency Dept [phone]

## 2025-04-25 NOTE — Clinical Note
Removed intact Detail Level: Detailed Additional Notes: If present at next OV cryotherapy is indicated

## 2025-04-25 NOTE — PROGRESS NOTES
Pt retruend from [facility]. R internal jugular site CDI with no hematoma. Pt eating and drinking without issue. Demonstrated understanding of discharge instructions. Dr. Barclay coming to bedside to talk with patient.

## 2025-04-25 NOTE — PROGRESS NOTES
Service Date: 2024    RE:  Judith Nelson   MRN:  [id number]  :  1960      Dear Dr. Doe:    We had the pleasure of seeing Judith Nelson at the [facility]. As you know, she is a very pleasant 65-year-old female with past medical history significant for pulmonary arterial hypertension out of proportion to sarcoidosis.  She is on quadruple combination therapy with tadalafil, macitentan, inhaled treprostinil DPI 64 mcg QID, and Sotatercept.      She returns today for follow-up.  We saw her in October.  We switched her from inhaled treprostinil nebulizer to DPI.  We also started her on sotatercept.  Since then, she has been feeling better.  She has less shortness of breath and able to do more activities.  I would currently characterize her as functional class II.  She has no lower extremity swelling or abdominal distention.  No exertional chest pain or chest pressure.  No exertional presyncope or syncope.  No recent hospitalization or ER visits.    She is tolerating the DPI well.  She has also not had any side effects with sotatercept.  Her hemoglobin and platelet counts are stable.  She has not noticed any telangiectasia, GI bleeding or increased epistaxis.      PAST MEDICAL HISTORY:  1.  Pulmonary arterial hypertension  2.  Sarcoidosis  3.  Anxiety and major depression  4.  Chronic kidney disease  5.  Hyperprolactinemia  6.  MGUS  7.  Lumbar compression fracture  8.  History of stress-induced cardiomyopathy    PAST SURGICAL HISTORY:  Past Surgical History:   Procedure Laterality Date    Csection,  X 2      CV RIGHT HEART CATH MEASUREMENTS RECORDED N/A 2019    CV RIGHT HEART CATH MEASUREMENTS RECORDED N/A 2020    CYSTOSCOPY, RETROGRADES, INSERT STENT URETER(S), COMBINED  10/2/2013    ENDOSCOPIC RETROGRADE CHOLANGIOPANCREATOGRAM  2012    LAPAROSCOPIC CHOLECYSTECTOMY WITH CHOLANGIOGRAMS  2012    ORTHOPEDIC SURGERY  10/1/2010     ZZ GASTROSCOPY,FL  6/10    [facility] PELVIS/HIP JOINT SURGERY UNLISTED         CURRENT MEDICATIONS:  Current Facility-Administered Medications   Medication Dose Route Frequency Provider Last Rate Last Admin    lidocaine 1 %    Once PRN Jeanne Angel MD   3 mL at 04/25/25 1316     Current Outpatient Medications   Medication Sig Dispense Refill    acetaminophen (TYLENOL) 500 MG tablet Take 1-2 tablets (500-1,000 mg) by mouth every 6 hours as needed for pain (Take as needed for pain.  Do not exceed 4 grams (8 tablets) in a day) 45 tablet 10    ARIPiprazole (ABILIFY) 15 MG tablet Take 1 tablet by mouth at bedtime.      BUSPIRONE HCL PO Take 30 mg by mouth 2 times daily.      calcitRIOL (ROCALTROL) 0.25 MCG capsule Take 0.25 mcg by mouth twice a week. 30 capsule 1    carvedilol (COREG) 3.125 MG tablet Take 1 tablet (3.125 mg) by mouth 2 times daily (with meals) 180 tablet 2    FLUoxetine (PROZAC) 20 MG capsule Take 1 capsule (20 mg) by mouth daily 30 capsule 1    furosemide (LASIX) 20 MG tablet Take 1 tablet by mouth on Monday, Wednesday & Friday's 45 tablet 3    lamoTRIgine (LAMICTAL) 25 MG tablet Take 25 mg by mouth daily.      loperamide (IMODIUM) 2 MG capsule Take 2 mg by mouth 4 times daily as needed for diarrhea      LORazepam (ATIVAN) 0.5 MG tablet Take 2 tablets (1 mg) by mouth 2 times daily (Patient taking differently: Take 1 mg by mouth once.) 90 tablet 1    macitentan (OPSUMIT) 10 MG tablet Take 1 tablet (10 mg) by mouth daily 30 tablet 11    omeprazole (PRILOSEC) 20 MG DR capsule TAKE 1 CAPSULE BY MOUTH DAILY 28 capsule 3    simvastatin (ZOCOR) 10 MG tablet Take 1 tablet by mouth At Bedtime. 90 tablet 1    tadalafil, PAH, (ADCIRCA) 20 MG TABS Take 2 tablets (40 mg) by mouth daily 60 tablet 2    traZODone (DESYREL) 100 MG tablet Take 1 tablet by mouth at bedtime.      Treprostinil (TYVASO DPI MAINTENANCE KIT) 64 MCG inhaler Inhale 1 Inhalation (64 mcg) into the lungs. Inhale 64mcg into the lungs four times  daily.      treprostinil (TYVASO) 0.6 MG/ML SOLN neb solution Inhale 12 Breaths into the lungs every 4 hours (while awake for Tyvaso). 3.6 mL     Vitamin D3 50 mcg (2000 units) tablet Take 2,000 Units by mouth.      order for DME Oxygen      ORDER FOR DME Equipment being ordered: SHOWER CHAIR  FROM [employer] 1 Device 0    sotatercept-csrk (WINREVAIR) 45 MG subcutaneous injection Inject 0.8 mLs (40 mg) subcutaneously every 21 days. Starting Dose      sotatercept-csrk (WINREVAIR) 45 MG subcutaneous injection Inject 0.4 mLs (20 mg) subcutaneously once. Starting Dose         ROS:   10 point ROS negative except as discussed in above HPI.    SOCIAL HISTORY:  She is single now.  She is .  She has 2 grown daughters who live close by.  She does not smoke, drink or use any recreational drugs.  She is currently not working.    FAMILY HISTORY:  Family History   Problem Relation Age of Onset    Cancer Mother          age 62 leukemia    Gastrointestinal Disease Mother         diverticulitis    Hypertension Mother     Allergies Mother     Arthritis Mother     Depression Mother     Eye Disorder Mother     Osteoporosis Mother     Anxiety Disorder Mother     Mental Illness Mother     Asthma Mother     Other Cancer Mother     Migraines Mother     C.A.D. Father         MI/CAD     Cancer Father         skin    Blood Disease Father         renal  problem    Hypertension Father     Anesthesia Reaction Father     Cardiovascular Father     Neurologic Disorder Father         Parkinson's disease    Anxiety Disorder Father     Other Cancer Father     Hyperlipidemia Father     Coronary Artery Disease Father     C.A.D. Maternal Grandmother          late 82s MI    Diabetes Maternal Grandmother     Osteoporosis Maternal Grandmother     C.A.D. Maternal Grandfather          mid 80s MI    Alzheimer Disease Paternal Grandmother          80s    Alzheimer Disease Paternal Grandfather          80s    Neurologic Disorder  Sister         migraines    Allergies Sister     Diabetes Other         AODM    Neurologic Disorder Sister         migraines    Allergies Sister     Anesthesia Reaction Son     Anesthesia Reaction Daughter     Anesthesia Reaction Daughter     Diabetes Sister         hypoglycemia    Anxiety Disorder Son     Anxiety Disorder Sister     Substance Abuse Sister     Asthma Sister     Asthma Daughter     Depression Son     Hypertension Sister     Hyperlipidemia Sister     Migraines Sister        EXAM:  /79 (Cuff Size: Adult Regular)   Pulse 62   Temp 97.5  F (36.4  C)   Resp 16   Wt 56.4 kg (124 lb 6.4 oz)   SpO2 94%   BMI 22.04 kg/m    Awake, alert, and oriented x 3.   Comfortable. No apparent distress.  No pallor, cyanosis, or clubbing  Carotids 2+ bilateral and pulse was regular in rhythm.  No JVD  Heart: regular, normal S1/S2, no murmur, gallop, rub  Lungs: NVBS and clear to auscultation bilaterally, no rales or wheezing  Abdomen: soft, non-tender, bowel sounds present, no hepatosplenomegaly  Extremities: no edema  Neurological: No gross focal neurological deficit    Labs:  Recent Results (from the past week)   INR    Collection Time: 04/25/25 11:59 AM   Result Value Ref Range    INR 1.00 0.85 - 1.15   Comprehensive metabolic panel    Collection Time: 04/25/25 11:59 AM   Result Value Ref Range    Sodium 140 135 - 145 mmol/L    Potassium 4.5 3.4 - 5.3 mmol/L    Carbon Dioxide (CO2) 22 22 - 29 mmol/L    Anion Gap 10 7 - 15 mmol/L    Urea Nitrogen 12.1 8.0 - 23.0 mg/dL    Creatinine 1.28 (H) 0.51 - 0.95 mg/dL    GFR Estimate 47 (L) >60 mL/min/1.73m2    Calcium 9.8 8.8 - 10.4 mg/dL    Chloride 108 (H) 98 - 107 mmol/L    Glucose 109 (H) 70 - 99 mg/dL    Alkaline Phosphatase 55 40 - 150 U/L    AST 19 0 - 45 U/L    ALT 6 0 - 50 U/L    Protein Total 6.4 6.4 - 8.3 g/dL    Albumin 4.1 3.5 - 5.2 g/dL    Bilirubin Total 0.3 <=1.2 mg/dL   NT probnp inpatient and ED    Collection Time: 04/25/25 11:59 AM   Result Value  Ref Range    N terminal Pro BNP Inpatient 539 0 - 900 pg/mL   CBC with platelets and differential    Collection Time: 04/25/25 11:59 AM   Result Value Ref Range    WBC Count 6.6 4.0 - 11.0 10e3/uL    RBC Count 5.19 3.80 - 5.20 10e6/uL    Hemoglobin 14.9 11.7 - 15.7 g/dL    Hematocrit 45.1 35.0 - 47.0 %    MCV 87 78 - 100 fL    MCH 28.7 26.5 - 33.0 pg    MCHC 33.0 31.5 - 36.5 g/dL    RDW 14.6 10.0 - 15.0 %    Platelet Count 208 150 - 450 10e3/uL    % Neutrophils 68 %    % Lymphocytes 22 %    % Monocytes 7 %    % Eosinophils 1 %    % Basophils 1 %    % Immature Granulocytes 2 %    NRBCs per 100 WBC 0 <1 /100    Absolute Neutrophils 4.5 1.6 - 8.3 10e3/uL    Absolute Lymphocytes 1.5 0.8 - 5.3 10e3/uL    Absolute Monocytes 0.5 0.0 - 1.3 10e3/uL    Absolute Eosinophils 0.1 0.0 - 0.7 10e3/uL    Absolute Basophils 0.0 0.0 - 0.2 10e3/uL    Absolute Immature Granulocytes 0.1 <=0.4 10e3/uL    Absolute NRBCs 0.0 10e3/uL   Hemoglobin POCT    Collection Time: 04/25/25  1:37 PM   Result Value Ref Range    Hemoglobin POCT 14.3 11.7 - 15.7 g/dL   Oxyhemoglobin, venous POCT    Collection Time: 04/25/25  1:37 PM   Result Value Ref Range    Oxyhemoglobin Venous POCT 60 (L) 70 - 75 %       Echocardiogram (02/2025):  * The estimated ejection fraction is 65%.     * Left ventricular segmental wall motion is normal.     * There is no left ventricular hypertrophy.     * The left ventricular diastolic function is normal.     * The right ventricle is normal in size.     * Normal right ventricular systolic function.     * There is mild tricuspid regurgitation.     * Moderate to severe pulmonary hypertension, estimated pulmonary arterial   systolic pressure is  59 mmHg.     * Prior study from 5/22/2024. In comparison the estimated PA systolic   pressure has decreased significantly.     RHC:  Her right heart catheterization in October 2022 showed an RA pressure of 5, PA pressure of 85/26 with a mean of 45, pulmonary capillary wedge pressure of  12, PA saturation of 68%, PVR of 8.7 Wood units.  Her cardiac output was 3.8 with an index of 2.3.    Her right heart catheterization in October 2023 showed an RA pressure of 3 RV of 77/6, PA of 79/23 with a mean of 44, pulmonary capital wedge pressure of 8, PA saturation of 69%, cardiac output of 3.3, cardiac index of 2.1, and calculated PVR of 11 Wood units.    Her repeat right heart catheterization this past August showed an RA pressure of 4 PA of 90/25 with a mean of 48, pulmonary capital wedge pressure of 11, PA saturation 64%, cardiac output of 4.7 with an index of 2.96, and a calculated PVR of 8 Wood units.      PFT        Latest Ref Rng & Units 8/19/2020    10:30 AM   PFT   FVC L 2.52    FEV1 L 1.77    FVC% % 81    FEV1% % 72          6MWT (October 2024):  She walked 379 m.  She did not desaturate on room air.    Assessment and Plan:     In summary, Judith Nelson is a 65-year-old female with pulmonary arterial hypertension out of proportion to sarcoidosis who is currently on triple combination therapy returns today for follow-up.    Impression  1.  Pulmonary arterial hypertension out of proportion to sarcoidosis  2.  Low intermediate risk with a reveal score of 7  3.  No evidence of decompensated right heart failure    I am delighted to see that she is responding very well to the core triple combination therapy.  She is NYHA functional class II.  She is not in decompensated heart failure.  Her NT proBNP is within normal limits.  Her 6-minute walk distance has increased.  Her echocardiogram shows near normalization of right ventricular size and function.  Repeat hemodynamic assessment shows a significant improvement in PA pressure.  However her cardiac output is relatively lower than before.  This gives her PVR of 7.  Her cardiac output is low in the setting of low filling pressure and bradycardia.    Thus, overall, she is at low risk status for increased mortality.  No indication for escalating her  pulmonary vasodilator therapy further.  Hence, I have recommended her to continue the quadruple combination therapy with inhaled treprostinil DPI 64 mcg 4 times a day, macitentan 10 mg daily, tadalafil 40 mg daily, and sotatercept 0.7 mg/kg dosing.  She is euvolemic on furosemide 20 mg 3 times a week which I recommend her to continue.  She uses supplemental oxygen at nighttime.  Carvedilol 3.125 mg from an open label clinical trial which she has been tolerating.  She has been doing well on this.  Will continue this.    I have recommended her to return to see Dr. Doe in 3 months and me in 6 months with repeat 6-minute walk test and labs.  She will call us in the interim with any further worsening symptoms.    It was a pleasure seeing Judith Nelson at the [facility]. Please contact us with any questions or concerns that you may have. We thank you for involving us in this patients care.    Total time today was 43 minutes reviewing notes, imaging, labs, patient visit, orders and documentation     Sincerely,      Jeanne Angel MD  Professor of Medicine  [facility]  Heart Failure, Transplant, and Mechanical Circulatory Support Cardiology   Cardiovascular Division  [facility]   [phone]

## 2025-04-25 NOTE — PRE-PROCEDURE
Consenting/Education for Cardiology Procedure: Right heart catheterization     Patient understands we would like to perform the listed procedure(s) due to pHTN     The patient understands the following:      The procedure was described to the patient in detail.     None sedation is planned for this procedure. Patient understands risks and complications of the procedure which include but are not limited to bruising/swelling around the incision site, infection, bleeding, allergic reaction to local anesthetic, air embolism, arterial puncture, stroke, heart attack, need for emergency heart surgery, death.        Patient verbalized understanding of risks and benefits and has elected to proceed with the procedure or procedures listed above.    Maria De Jesus Kate DNP APRN CNP  ICU Cardiology-CICU Service  Securely message with the Vocera Web Console (learn more here)    OK to take 0.50 mg of ativan pre procedure that she brought from home  VS:   /78   Pulse 70   Temp 97.5  F (36.4  C)   Resp 16   Wt 56.4 kg (124 lb 6.4 oz)   SpO2 92%   BMI 22.04 kg/m    LABS:        Recent Labs   Lab 04/25/25  1159   HGB 14.9   HCT 45.1

## 2025-05-10 ENCOUNTER — HEALTH MAINTENANCE LETTER (OUTPATIENT)
Age: 65
End: 2025-05-10

## 2025-07-08 ENCOUNTER — TELEPHONE (OUTPATIENT)
Dept: CARDIOLOGY | Facility: CLINIC | Age: 65
End: 2025-07-08
Payer: COMMERCIAL

## 2025-07-08 DIAGNOSIS — R06.02 SOB (SHORTNESS OF BREATH): ICD-10-CM

## 2025-07-08 DIAGNOSIS — I27.20 SEVERE PULMONARY HYPERTENSION (H): Primary | ICD-10-CM

## 2025-07-08 NOTE — TELEPHONE ENCOUNTER
"Note written by Dr. Angel after performing patient's RHC on 4/25/25      \"I have recommended her to return to see Dr. Doe in 3 months and me in 6 months with repeat 6-minute walk test and labs.  She will call us in the interim with any further worsening symptoms.\"       Placed orders. Keyanna Mccray RN on 7/8/2025 at 11:03 AM    "

## 2025-08-14 ENCOUNTER — TELEPHONE (OUTPATIENT)
Dept: CARDIOLOGY | Facility: CLINIC | Age: 65
End: 2025-08-14
Payer: COMMERCIAL

## (undated) DEVICE — KIT MICROINTRODUCER VASCULAR  4FRX21GAX4CM

## (undated) DEVICE — INTRO SHEATH 7FRX10CM PINNACLE RSS702

## (undated) DEVICE — PACK HEART RIGHT CUSTOM SAN32RHF18

## (undated) DEVICE — Device

## (undated) DEVICE — WIRE GUIDE 0.025"X150CM EMERALD J TIP 502524

## (undated) DEVICE — KIT RIGHT HEART CATH 60130719

## (undated) DEVICE — CATH SWAN 7FR X 110CM

## (undated) DEVICE — INTRODUCER SHEATH 4FRX40CM MICROPUNC PED G47946

## (undated) DEVICE — UMMC CONVENIENCE KIT FORMALLY H9656021017160 NEW# 602101716

## (undated) RX ORDER — BETAMETHASONE SODIUM PHOSPHATE AND BETAMETHASONE ACETATE 3; 3 MG/ML; MG/ML
INJECTION, SUSPENSION INTRA-ARTICULAR; INTRALESIONAL; INTRAMUSCULAR; SOFT TISSUE
Status: DISPENSED
Start: 2018-12-27

## (undated) RX ORDER — LIDOCAINE 40 MG/G
CREAM TOPICAL
Status: DISPENSED
Start: 2017-04-12

## (undated) RX ORDER — LIDOCAINE 40 MG/G
CREAM TOPICAL
Status: DISPENSED
Start: 2019-01-16

## (undated) RX ORDER — LIDOCAINE HYDROCHLORIDE 10 MG/ML
INJECTION, SOLUTION INFILTRATION; PERINEURAL
Status: DISPENSED
Start: 2018-04-18

## (undated) RX ORDER — LIDOCAINE 40 MG/G
CREAM TOPICAL
Status: DISPENSED
Start: 2025-04-25

## (undated) RX ORDER — LIDOCAINE 40 MG/G
CREAM TOPICAL
Status: DISPENSED
Start: 2017-09-13

## (undated) RX ORDER — LORAZEPAM 0.5 MG/1
TABLET ORAL
Status: DISPENSED
Start: 2019-01-16

## (undated) RX ORDER — LORAZEPAM 0.5 MG/1
TABLET ORAL
Status: DISPENSED
Start: 2017-09-13

## (undated) RX ORDER — LIDOCAINE HYDROCHLORIDE 10 MG/ML
INJECTION, SOLUTION INFILTRATION; PERINEURAL
Status: DISPENSED
Start: 2018-12-27

## (undated) RX ORDER — LIDOCAINE 40 MG/G
CREAM TOPICAL
Status: DISPENSED
Start: 2020-07-09

## (undated) RX ORDER — LORAZEPAM 0.5 MG/1
TABLET ORAL
Status: DISPENSED
Start: 2018-04-18

## (undated) RX ORDER — LIDOCAINE 40 MG/G
CREAM TOPICAL
Status: DISPENSED
Start: 2018-04-18